# Patient Record
Sex: FEMALE | Race: WHITE | NOT HISPANIC OR LATINO
[De-identification: names, ages, dates, MRNs, and addresses within clinical notes are randomized per-mention and may not be internally consistent; named-entity substitution may affect disease eponyms.]

---

## 2017-02-23 ENCOUNTER — APPOINTMENT (OUTPATIENT)
Dept: SURGERY | Facility: CLINIC | Age: 72
End: 2017-02-23

## 2017-02-23 VITALS
HEART RATE: 84 BPM | SYSTOLIC BLOOD PRESSURE: 100 MMHG | TEMPERATURE: 98.3 F | OXYGEN SATURATION: 97 % | RESPIRATION RATE: 16 BRPM | DIASTOLIC BLOOD PRESSURE: 63 MMHG

## 2017-02-23 DIAGNOSIS — S40.022S CONTUSION OF LEFT UPPER ARM, SEQUELA: ICD-10-CM

## 2017-02-23 DIAGNOSIS — R92.0 MAMMOGRAPHIC MICROCALCIFICATION FOUND ON DIAGNOSTIC IMAGING OF BREAST: ICD-10-CM

## 2017-02-23 DIAGNOSIS — K21.9 GASTRO-ESOPHAGEAL REFLUX DISEASE W/OUT ESOPHAGITIS: ICD-10-CM

## 2017-02-23 DIAGNOSIS — I89.0 LYMPHEDEMA, NOT ELSEWHERE CLASSIFIED: ICD-10-CM

## 2017-02-23 DIAGNOSIS — F17.200 NICOTINE DEPENDENCE, UNSPECIFIED, UNCOMPLICATED: ICD-10-CM

## 2017-02-23 DIAGNOSIS — Z87.19 PERSONAL HISTORY OF OTHER DISEASES OF THE DIGESTIVE SYSTEM: ICD-10-CM

## 2017-02-23 RX ORDER — VENLAFAXINE 37.5 MG/1
37.5 TABLET ORAL
Refills: 0 | Status: ACTIVE | COMMUNITY

## 2017-02-23 RX ORDER — LIDOCAINE 50 MG/G
5 PATCH CUTANEOUS
Refills: 0 | Status: ACTIVE | COMMUNITY

## 2017-02-23 RX ORDER — BISMUTH SUBSALICYLATE 525 MG/1
TABLET ORAL
Refills: 0 | Status: ACTIVE | COMMUNITY

## 2017-04-26 ENCOUNTER — APPOINTMENT (OUTPATIENT)
Dept: ORTHOPEDIC SURGERY | Facility: CLINIC | Age: 72
End: 2017-04-26

## 2017-04-26 VITALS
HEIGHT: 62.5 IN | BODY MASS INDEX: 15.79 KG/M2 | SYSTOLIC BLOOD PRESSURE: 141 MMHG | HEART RATE: 82 BPM | DIASTOLIC BLOOD PRESSURE: 80 MMHG | WEIGHT: 88 LBS

## 2017-04-26 DIAGNOSIS — M25.731 OSTEOPHYTE, RIGHT WRIST: ICD-10-CM

## 2017-04-26 DIAGNOSIS — R22.9 LOCALIZED SWELLING, MASS AND LUMP, UNSPECIFIED: ICD-10-CM

## 2018-11-20 ENCOUNTER — INPATIENT (INPATIENT)
Facility: HOSPITAL | Age: 73
LOS: 5 days | Discharge: HOME | DRG: 682 | End: 2018-11-26
Payer: MEDICARE

## 2018-11-20 VITALS
OXYGEN SATURATION: 99 % | SYSTOLIC BLOOD PRESSURE: 110 MMHG | DIASTOLIC BLOOD PRESSURE: 74 MMHG | TEMPERATURE: 97 F | HEIGHT: 62 IN | HEART RATE: 70 BPM | RESPIRATION RATE: 18 BRPM | WEIGHT: 89.07 LBS

## 2018-11-20 DIAGNOSIS — E87.2 ACIDOSIS: ICD-10-CM

## 2018-11-20 DIAGNOSIS — E83.39 OTHER DISORDERS OF PHOSPHORUS METABOLISM: ICD-10-CM

## 2018-11-20 DIAGNOSIS — K56.609 UNSPECIFIED INTESTINAL OBSTRUCTION, UNSPECIFIED AS TO PARTIAL VERSUS COMPLETE OBSTRUCTION: ICD-10-CM

## 2018-11-20 DIAGNOSIS — N17.9 ACUTE KIDNEY FAILURE, UNSPECIFIED: ICD-10-CM

## 2018-11-20 LAB
ALBUMIN SERPL ELPH-MCNC: 4 G/DL — SIGNIFICANT CHANGE UP (ref 3.3–5)
ALBUMIN SERPL ELPH-MCNC: 4.9 G/DL — SIGNIFICANT CHANGE UP (ref 3.3–5)
ALP SERPL-CCNC: 249 U/L — HIGH (ref 40–120)
ALP SERPL-CCNC: 334 U/L — HIGH (ref 40–120)
ALT FLD-CCNC: 51 U/L — HIGH (ref 10–45)
ALT FLD-CCNC: 64 U/L — HIGH (ref 10–45)
ANION GAP SERPL CALC-SCNC: 27 MMOL/L — HIGH (ref 5–17)
ANION GAP SERPL CALC-SCNC: 38 MMOL/L — HIGH (ref 5–17)
APPEARANCE UR: ABNORMAL
APTT BLD: 34.4 SEC — SIGNIFICANT CHANGE UP (ref 27.5–36.3)
AST SERPL-CCNC: 48 U/L — HIGH (ref 10–40)
AST SERPL-CCNC: 64 U/L — HIGH (ref 10–40)
BACTERIA # UR AUTO: NEGATIVE — SIGNIFICANT CHANGE UP
BASE EXCESS BLDV CALC-SCNC: 6.5 MMOL/L — HIGH (ref -2–2)
BASOPHILS # BLD AUTO: 0 K/UL — SIGNIFICANT CHANGE UP (ref 0–0.2)
BILIRUB SERPL-MCNC: 0.3 MG/DL — SIGNIFICANT CHANGE UP (ref 0.2–1.2)
BILIRUB SERPL-MCNC: 0.5 MG/DL — SIGNIFICANT CHANGE UP (ref 0.2–1.2)
BILIRUB UR-MCNC: ABNORMAL
BLD GP AB SCN SERPL QL: NEGATIVE — SIGNIFICANT CHANGE UP
BUN SERPL-MCNC: 101 MG/DL — HIGH (ref 7–23)
BUN SERPL-MCNC: 101 MG/DL — HIGH (ref 7–23)
CA-I SERPL-SCNC: 0.84 MMOL/L — LOW (ref 1.12–1.3)
CALCIUM SERPL-MCNC: 6.8 MG/DL — LOW (ref 8.4–10.5)
CALCIUM SERPL-MCNC: 8.5 MG/DL — SIGNIFICANT CHANGE UP (ref 8.4–10.5)
CHLORIDE BLDV-SCNC: 76 MMOL/L — LOW (ref 96–108)
CHLORIDE SERPL-SCNC: 73 MMOL/L — LOW (ref 96–108)
CHLORIDE SERPL-SCNC: 85 MMOL/L — LOW (ref 96–108)
CO2 BLDV-SCNC: 40 MMOL/L — HIGH (ref 22–30)
CO2 SERPL-SCNC: 28 MMOL/L — SIGNIFICANT CHANGE UP (ref 22–31)
CO2 SERPL-SCNC: 29 MMOL/L — SIGNIFICANT CHANGE UP (ref 22–31)
COLOR SPEC: YELLOW — SIGNIFICANT CHANGE UP
CREAT ?TM UR-MCNC: 137 MG/DL — SIGNIFICANT CHANGE UP
CREAT SERPL-MCNC: 7.51 MG/DL — HIGH (ref 0.5–1.3)
CREAT SERPL-MCNC: 7.66 MG/DL — HIGH (ref 0.5–1.3)
DIFF PNL FLD: ABNORMAL
EOSINOPHIL # BLD AUTO: 0 K/UL — SIGNIFICANT CHANGE UP (ref 0–0.5)
EPI CELLS # UR: 1 /HPF — SIGNIFICANT CHANGE UP
GAS PNL BLDA: SIGNIFICANT CHANGE UP
GAS PNL BLDV: 135 MMOL/L — LOW (ref 136–145)
GAS PNL BLDV: SIGNIFICANT CHANGE UP
GLUCOSE BLDV-MCNC: 132 MG/DL — HIGH (ref 70–99)
GLUCOSE SERPL-MCNC: 102 MG/DL — HIGH (ref 70–99)
GLUCOSE SERPL-MCNC: 139 MG/DL — HIGH (ref 70–99)
GLUCOSE UR QL: NEGATIVE — SIGNIFICANT CHANGE UP
HCO3 BLDV-SCNC: 38 MMOL/L — HIGH (ref 21–29)
HCT VFR BLD CALC: 47.4 % — HIGH (ref 34.5–45)
HCT VFR BLD CALC: 52.5 % — HIGH (ref 34.5–45)
HCT VFR BLDA CALC: 52 % — HIGH (ref 39–50)
HGB BLD CALC-MCNC: 17.1 G/DL — HIGH (ref 11.5–15.5)
HGB BLD-MCNC: 15.2 G/DL — SIGNIFICANT CHANGE UP (ref 11.5–15.5)
HGB BLD-MCNC: 17.4 G/DL — HIGH (ref 11.5–15.5)
HYALINE CASTS # UR AUTO: 20 /LPF — HIGH (ref 0–2)
INR BLD: 1.15 RATIO — SIGNIFICANT CHANGE UP (ref 0.88–1.16)
KETONES UR-MCNC: NEGATIVE — SIGNIFICANT CHANGE UP
LACTATE BLDV-MCNC: 6.1 MMOL/L — CRITICAL HIGH (ref 0.7–2)
LEUKOCYTE ESTERASE UR-ACNC: ABNORMAL
LIDOCAIN IGE QN: 19 U/L — SIGNIFICANT CHANGE UP (ref 7–60)
LYMPHOCYTES # BLD AUTO: 0.5 K/UL — LOW (ref 1–3.3)
LYMPHOCYTES # BLD AUTO: 2 % — LOW (ref 13–44)
MAGNESIUM SERPL-MCNC: 1.9 MG/DL — SIGNIFICANT CHANGE UP (ref 1.6–2.6)
MAGNESIUM SERPL-MCNC: 2.3 MG/DL — SIGNIFICANT CHANGE UP (ref 1.6–2.6)
MCHC RBC-ENTMCNC: 31.5 PG — SIGNIFICANT CHANGE UP (ref 27–34)
MCHC RBC-ENTMCNC: 32.1 GM/DL — SIGNIFICANT CHANGE UP (ref 32–36)
MCHC RBC-ENTMCNC: 32.2 PG — SIGNIFICANT CHANGE UP (ref 27–34)
MCHC RBC-ENTMCNC: 33.1 GM/DL — SIGNIFICANT CHANGE UP (ref 32–36)
MCV RBC AUTO: 97.2 FL — SIGNIFICANT CHANGE UP (ref 80–100)
MCV RBC AUTO: 98.2 FL — SIGNIFICANT CHANGE UP (ref 80–100)
METAMYELOCYTES # FLD: 1 % — HIGH (ref 0–0)
MONOCYTES # BLD AUTO: 1.2 K/UL — HIGH (ref 0–0.9)
MONOCYTES NFR BLD AUTO: 10 % — SIGNIFICANT CHANGE UP (ref 2–14)
MYELOCYTES NFR BLD: 1 % — HIGH (ref 0–0)
NEUTROPHILS # BLD AUTO: 9.7 K/UL — HIGH (ref 1.8–7.4)
NEUTROPHILS NFR BLD AUTO: 85 % — HIGH (ref 43–77)
NEUTS BAND # BLD: 1 % — SIGNIFICANT CHANGE UP (ref 0–8)
NITRITE UR-MCNC: NEGATIVE — SIGNIFICANT CHANGE UP
OSMOLALITY UR: 363 MOS/KG — SIGNIFICANT CHANGE UP (ref 300–900)
PCO2 BLDV: 84 MMHG — HIGH (ref 35–50)
PH BLDV: 7.27 — LOW (ref 7.35–7.45)
PH UR: 5.5 — SIGNIFICANT CHANGE UP (ref 5–8)
PHOSPHATE SERPL-MCNC: 13.6 MG/DL — HIGH (ref 2.5–4.5)
PHOSPHATE SERPL-MCNC: 15.1 MG/DL — HIGH (ref 2.5–4.5)
PLAT MORPH BLD: NORMAL — SIGNIFICANT CHANGE UP
PLATELET # BLD AUTO: 290 K/UL — SIGNIFICANT CHANGE UP (ref 150–400)
PLATELET # BLD AUTO: 350 K/UL — SIGNIFICANT CHANGE UP (ref 150–400)
PO2 BLDV: <50 MMHG — LOW (ref 25–45)
POTASSIUM BLDV-SCNC: 3.6 MMOL/L — SIGNIFICANT CHANGE UP (ref 3.5–5.3)
POTASSIUM SERPL-MCNC: 3.9 MMOL/L — SIGNIFICANT CHANGE UP (ref 3.5–5.3)
POTASSIUM SERPL-MCNC: 4.7 MMOL/L — SIGNIFICANT CHANGE UP (ref 3.5–5.3)
POTASSIUM SERPL-SCNC: 3.9 MMOL/L — SIGNIFICANT CHANGE UP (ref 3.5–5.3)
POTASSIUM SERPL-SCNC: 4.7 MMOL/L — SIGNIFICANT CHANGE UP (ref 3.5–5.3)
PROT SERPL-MCNC: 6.9 G/DL — SIGNIFICANT CHANGE UP (ref 6–8.3)
PROT SERPL-MCNC: 8.6 G/DL — HIGH (ref 6–8.3)
PROT UR-MCNC: ABNORMAL
PROTHROM AB SERPL-ACNC: 13.3 SEC — HIGH (ref 10–12.9)
RBC # BLD: 4.83 M/UL — SIGNIFICANT CHANGE UP (ref 3.8–5.2)
RBC # BLD: 5.4 M/UL — HIGH (ref 3.8–5.2)
RBC # FLD: 12.9 % — SIGNIFICANT CHANGE UP (ref 10.3–14.5)
RBC # FLD: 13 % — SIGNIFICANT CHANGE UP (ref 10.3–14.5)
RBC BLD AUTO: SIGNIFICANT CHANGE UP
RBC CASTS # UR COMP ASSIST: 18 /HPF — HIGH (ref 0–4)
RH IG SCN BLD-IMP: POSITIVE — SIGNIFICANT CHANGE UP
SAO2 % BLDV: 16 % — LOW (ref 67–88)
SODIUM SERPL-SCNC: 140 MMOL/L — SIGNIFICANT CHANGE UP (ref 135–145)
SODIUM SERPL-SCNC: 140 MMOL/L — SIGNIFICANT CHANGE UP (ref 135–145)
SODIUM UR-SCNC: 31 MMOL/L — SIGNIFICANT CHANGE UP
SP GR SPEC: 1.02 — SIGNIFICANT CHANGE UP (ref 1.01–1.02)
UROBILINOGEN FLD QL: NEGATIVE — SIGNIFICANT CHANGE UP
WBC # BLD: 11.5 K/UL — HIGH (ref 3.8–10.5)
WBC # BLD: 7 K/UL — SIGNIFICANT CHANGE UP (ref 3.8–10.5)
WBC # FLD AUTO: 11.5 K/UL — HIGH (ref 3.8–10.5)
WBC # FLD AUTO: 7 K/UL — SIGNIFICANT CHANGE UP (ref 3.8–10.5)
WBC UR QL: 12 /HPF — HIGH (ref 0–5)

## 2018-11-20 PROCEDURE — 74176 CT ABD & PELVIS W/O CONTRAST: CPT | Mod: 26

## 2018-11-20 PROCEDURE — 93010 ELECTROCARDIOGRAM REPORT: CPT

## 2018-11-20 PROCEDURE — 71045 X-RAY EXAM CHEST 1 VIEW: CPT | Mod: 26,76

## 2018-11-20 PROCEDURE — 71250 CT THORAX DX C-: CPT | Mod: 26

## 2018-11-20 PROCEDURE — 99291 CRITICAL CARE FIRST HOUR: CPT | Mod: GC

## 2018-11-20 RX ORDER — ERTAPENEM SODIUM 1 G/1
500 INJECTION, POWDER, LYOPHILIZED, FOR SOLUTION INTRAMUSCULAR; INTRAVENOUS ONCE
Qty: 0 | Refills: 0 | Status: COMPLETED | OUTPATIENT
Start: 2018-11-20 | End: 2018-11-20

## 2018-11-20 RX ORDER — IPRATROPIUM/ALBUTEROL SULFATE 18-103MCG
3 AEROSOL WITH ADAPTER (GRAM) INHALATION EVERY 6 HOURS
Qty: 0 | Refills: 0 | Status: DISCONTINUED | OUTPATIENT
Start: 2018-11-20 | End: 2018-11-26

## 2018-11-20 RX ORDER — MORPHINE SULFATE 50 MG/1
4 CAPSULE, EXTENDED RELEASE ORAL ONCE
Qty: 0 | Refills: 0 | Status: DISCONTINUED | OUTPATIENT
Start: 2018-11-20 | End: 2018-11-20

## 2018-11-20 RX ORDER — SODIUM CHLORIDE 9 MG/ML
1000 INJECTION INTRAMUSCULAR; INTRAVENOUS; SUBCUTANEOUS
Qty: 0 | Refills: 0 | Status: DISCONTINUED | OUTPATIENT
Start: 2018-11-20 | End: 2018-11-22

## 2018-11-20 RX ORDER — HEPARIN SODIUM 5000 [USP'U]/ML
5000 INJECTION INTRAVENOUS; SUBCUTANEOUS EVERY 8 HOURS
Qty: 0 | Refills: 0 | Status: DISCONTINUED | OUTPATIENT
Start: 2018-11-20 | End: 2018-11-23

## 2018-11-20 RX ORDER — SODIUM CHLORIDE 9 MG/ML
1000 INJECTION INTRAMUSCULAR; INTRAVENOUS; SUBCUTANEOUS ONCE
Qty: 0 | Refills: 0 | Status: DISCONTINUED | OUTPATIENT
Start: 2018-11-20 | End: 2018-11-20

## 2018-11-20 RX ORDER — SODIUM CHLORIDE 9 MG/ML
500 INJECTION INTRAMUSCULAR; INTRAVENOUS; SUBCUTANEOUS ONCE
Qty: 0 | Refills: 0 | Status: COMPLETED | OUTPATIENT
Start: 2018-11-20 | End: 2018-11-20

## 2018-11-20 RX ORDER — BUDESONIDE, MICRONIZED 100 %
0.5 POWDER (GRAM) MISCELLANEOUS EVERY 12 HOURS
Qty: 0 | Refills: 0 | Status: DISCONTINUED | OUTPATIENT
Start: 2018-11-20 | End: 2018-11-26

## 2018-11-20 RX ORDER — SODIUM CHLORIDE 9 MG/ML
1000 INJECTION INTRAMUSCULAR; INTRAVENOUS; SUBCUTANEOUS ONCE
Qty: 0 | Refills: 0 | Status: COMPLETED | OUTPATIENT
Start: 2018-11-20 | End: 2018-11-20

## 2018-11-20 RX ORDER — NICOTINE POLACRILEX 2 MG
1 GUM BUCCAL DAILY
Qty: 0 | Refills: 0 | Status: DISCONTINUED | OUTPATIENT
Start: 2018-11-20 | End: 2018-11-21

## 2018-11-20 RX ORDER — PANTOPRAZOLE SODIUM 20 MG/1
40 TABLET, DELAYED RELEASE ORAL DAILY
Qty: 0 | Refills: 0 | Status: DISCONTINUED | OUTPATIENT
Start: 2018-11-20 | End: 2018-11-22

## 2018-11-20 RX ORDER — CHLORHEXIDINE GLUCONATE 213 G/1000ML
1 SOLUTION TOPICAL
Qty: 0 | Refills: 0 | Status: DISCONTINUED | OUTPATIENT
Start: 2018-11-20 | End: 2018-11-26

## 2018-11-20 RX ORDER — CALCIUM GLUCONATE 100 MG/ML
2 VIAL (ML) INTRAVENOUS ONCE
Qty: 0 | Refills: 0 | Status: COMPLETED | OUTPATIENT
Start: 2018-11-20 | End: 2018-11-21

## 2018-11-20 RX ORDER — TETRACAINE/BENZOCAINE/BUTAMBEN 2%-14%-2%
1 OINTMENT (GRAM) TOPICAL ONCE
Qty: 0 | Refills: 0 | Status: COMPLETED | OUTPATIENT
Start: 2018-11-20 | End: 2018-11-20

## 2018-11-20 RX ORDER — TETRACAINE/BENZOCAINE/BUTAMBEN 2%-14%-2%
1 OINTMENT (GRAM) TOPICAL
Qty: 0 | Refills: 0 | Status: DISCONTINUED | OUTPATIENT
Start: 2018-11-20 | End: 2018-11-21

## 2018-11-20 RX ADMIN — Medication 1 SPRAY(S): at 19:34

## 2018-11-20 RX ADMIN — MORPHINE SULFATE 4 MILLIGRAM(S): 50 CAPSULE, EXTENDED RELEASE ORAL at 20:43

## 2018-11-20 RX ADMIN — SODIUM CHLORIDE 1000 MILLILITER(S): 9 INJECTION INTRAMUSCULAR; INTRAVENOUS; SUBCUTANEOUS at 19:56

## 2018-11-20 RX ADMIN — ERTAPENEM SODIUM 100 MILLIGRAM(S): 1 INJECTION, POWDER, LYOPHILIZED, FOR SOLUTION INTRAMUSCULAR; INTRAVENOUS at 18:29

## 2018-11-20 RX ADMIN — SODIUM CHLORIDE 3000 MILLILITER(S): 9 INJECTION INTRAMUSCULAR; INTRAVENOUS; SUBCUTANEOUS at 23:40

## 2018-11-20 RX ADMIN — MORPHINE SULFATE 4 MILLIGRAM(S): 50 CAPSULE, EXTENDED RELEASE ORAL at 18:30

## 2018-11-20 RX ADMIN — SODIUM CHLORIDE 1000 MILLILITER(S): 9 INJECTION INTRAMUSCULAR; INTRAVENOUS; SUBCUTANEOUS at 17:57

## 2018-11-20 RX ADMIN — MORPHINE SULFATE 4 MILLIGRAM(S): 50 CAPSULE, EXTENDED RELEASE ORAL at 20:07

## 2018-11-20 NOTE — ED ADULT NURSE REASSESSMENT NOTE - NS ED NURSE REASSESS COMMENT FT1
Pt. is a difficult stick; VBG unable to be obtained. Dr. Sandip Oden states that it can be done upstairs in SICU (transport ready).

## 2018-11-20 NOTE — H&P ADULT - ASSESSMENT
Assessment:  73F with multiple past surgeries and history of narcotic bowel, who presents with inability to tolerate PO and notable dehydration, as well as CT findings concerning for SBO.    Plan:  - Admit to [facility] team surgery under Dr. Livingston  - NGT placed in ED with 700cc immediate return  - NPO / IVF  - 2L bolus given in ED  - Repeat labs tonight  - Serial abdominal exams  - SICU consult     Discussed with Dr. Miki Dang, PGY-2  [facility] Surgery [phone]

## 2018-11-20 NOTE — H&P ADULT - NSHPLABSRESULTS_GEN_ALL_CORE
CBC (11-20 @ 15:31)                              17.4<H>                         11.5<H>  )----------------(  350        85.0<H>% Neutrophils, 2.0<L>% Lymphocytes, ANC: 9.7<H>                              52.5<H>    BMP (11-20 @ 15:31)             140     |  73<L>   |  101<H>		Ca++ --      Ca 8.5                ---------------------------------( 139<H>		Mg 2.3                3.9     |  29      |  7.66<H>			Ph 15.1<H>    LFTs (11-20 @ 15:31)      TPro 8.6<H> / Alb 4.9 / TBili 0.5 / DBili -- / AST 64<H> / ALT 64<H> / AlkPhos 334<H>          VBG (11-20 @ 15:31)     7.27<L> / 84<H> / <50<L> / 38<H> / 6.5<H> / 16<L>%     Lactate: 6.1<HH>        < from: CT Abdomen and Pelvis No Cont (11.20.18 @ 17:07) >    FINDINGS:  Evaluation of the solid organs and vasculature is limited without   intravenous and oral contrast.    LOWER CHEST: Groundglass opacities in the right lower lobe.    LIVER: Status post partial hepatectomy.  BILE DUCTS: Intrahepatic and extrahepatic biliary ductal dilatation,   unchanged.  GALLBLADDER: Postcholecystectomy.  SPLEEN: Within normal limits.  PANCREAS: Within normal limits.  ADRENALS: Within normal limits.  KIDNEYS/URETERS: Bilateral nonobstructing renal calculi, measuring up to   4 mm in the left kidney. No hydronephrosis.    BLADDER: Within normal limits.  REPRODUCTIVE ORGANS: Hysterectomy.    BOWEL: The stomach is distended. Multiple dilated loops of small bowel   with collapsed loops of ileum in the right lower quadrant consistent with   small bowel obstruction.   PERITONEUM: No ascites.  VESSELS:  Atherosclerotic calcifications.  RETROPERITONEUM: No lymphadenopathy.    ABDOMINAL WALL: Within normal limits.  BONES: Within normal limits.    IMPRESSION: Small bowel obstruction with transition point in the ileum   located in theright lower quadrant.    Groundglass opacities in the right lower lobe suggest pneumonia.    < end of copied text >

## 2018-11-20 NOTE — CONSULT NOTE ADULT - ASSESSMENT
73 year old female with   PMH chronic Back pain (on Narcotics), history of multiple SBO's, narcotic associated constipation/ileus, COPD, current everyday smoker   admitted with abdominal pain and distension, nausea and poor PO intake with associated worsening painful spasms.      c/c  cachexia  and   dehydrated with acute Renal Failure (SHAKIRA) with marked hyperphosphatemia, acidosis   SBO on ct  surg eval  iV hydration  SHAKIRA/ renal  eval  arevalo  to be placed  (known to Dr Livingston)  ct with   sbo  and   RLL pna/  on   ab   ct  chest, pending    from: CT Abdomen and Pelvis No Cont (11.20.18 @ 17:07) >    IMPRESSION: Small bowel obstruction with transition point in the ileum   located in theright lower quadrant.  Groundglass opacities in the right lower lobe suggest pneumonia.  Findings discussed with Dr. Diaz on 11/20/2018 at 5:30 PM with read   back.    < end of copied text >

## 2018-11-20 NOTE — ED PROVIDER NOTE - MEDICAL DECISION MAKING DETAILS
73 F, cachectic, with vomiting and inability to tolerate PO x 3 days. Appears dry. Labs, hydrate, Ct abd, admit

## 2018-11-20 NOTE — CONSULT NOTE ADULT - ASSESSMENT
pt is well known to me who was admitted sec to SBO,Acute renal failure with sob and possible pneumoniae.  gentle hydration  npo  surgery consult  pt is a high risk candidate for surgery sec to severe malnutrition  tsh  arevalo check for uo  awaiting ecg  if pt needs surgery will consider echo  dvt prophylaxis  doppler of le r/o dvt pt is well known to me who was admitted sec to SBO,Acute renal failure with sob and possible pneumoniae.  gentle hydration  npo  surgery consult  pt is a high risk candidate for surgery sec to severe malnutrition  tsh/ lactic level  arevalo check for uo  awaiting ecg  if pt needs surgery will consider echo  dvt prophylaxis  doppler of le r/o dvt

## 2018-11-20 NOTE — H&P ADULT - ATTENDING COMMENTS
I have seen and evaluated patient, and I have reviewed the chart, data, and CT films. I have discussed case with surgery and SICU teams. Patient clinically improved this morning with soft mildly distended nontender abdomen. Patient continues with high NG tube output and elevated creatinine. Nephrology consulted. Continue as per SICU. Once labs improve I recommended Narcan via the NG tube. Patient has narcotic bowel and often responds to Narcan. Her CT scans often mimic SBO when she has narcotic-based ileus.

## 2018-11-20 NOTE — H&P ADULT - HISTORY OF PRESENT ILLNESS
72 y/o F pt c/o abdominal pain, has known chronic back pain. Also notes vomiting x12 hours - spitting up white saliva, no drooling. Pt is taking pain medication (chronic for back pain), last took 1 hour ago, oxycodone 10mg. PMD sent pt into the ED because she's dehydrated. She is known to our practice with multiple prior admissions for SBO, narcotic associated ileus/constipation, but has not required hospitalization for this in several years.  She states she moves bowels more regularly - last regular BM reported yesterday, aide at bedside reports smear brown BM today    Patient states she lives alone, last ate 3 days go. drank a cup of tea yesterday  reports generalized spasms - hands/abdomen, face/mouth and also in throat which are extremely painful.  Hand spasms cause sever finger cramps and cannot hold anything - this began 1-2 days ago, increased in intensity today    no rectal bleeding. Increased abdominal distension from baseline +crampy abdominal pain  no fever or chills  known COPD - current everyday smoker  admits to weight loss but cannot quantify

## 2018-11-20 NOTE — CONSULT NOTE ADULT - PROBLEM SELECTOR RECOMMENDATION 9
Etiology?  Likely Pre-renal, clinically dehydrated  Persistent Pre-renal state leading to ATN can not be ruled out at present  FENa >1  CT abd has no hydronephrosis  Given NS bolus in the ER  Start NS 75cc/hr  Monitor I/O  Check CK level  Monitor for fluid overload  No urgent indication for HD at present  Avoid Nephrotoxic meds
96.8

## 2018-11-20 NOTE — CONSULT NOTE ADULT - SUBJECTIVE AND OBJECTIVE BOX
Patient is a 73y old  Female who presents with a chief complaint of nausea, abdominal pain    HPI:   72 y/o F pt c/o abdominal pain, has known chronic back pain. Also notes vomiting x12 hours - spitting up white saliva, no drooling. Pt is taking pain medication (chronic for back pain), last took 1 hour ago, oxycodone 10mg. PMD sent pt into the ED because she's dehydrated. She is known to our practice with multiple prior admissions for SBO, narcotic associated ileus/constipation, but has not required hospitalization for this in several years.  She states she moves bowels more regularly - last regular BM reported yesterday, aide at bedside reports smear brown BM today    Patient states she lives alone, last ate 3 days go. drank a cup of tea yesterday  reports generalized spasms - hands/abdomen, face/mouth and also in throat which are extremely painful.  Hand spasms cause sever finger cramps and cannot hold anything - this began 1-2 days ago, increased in intensity today    no rectal bleeding. Increased abdominal distension from baseline +crampy abdominal pain  no fever or chills  known COPD - current everyday smoker  admits to weight loss but cannot quantify    PAST MEDICAL & SURGICAL HISTORY:  Back pain  Bowel obstruction: multiple hospitalizations  Kidney stone  Emphysema  Narcotic Dependence  S/P Radiation Therapy  S/P Chemotherapy, Time Since Greater than 12 Weeks  Narcotic Dysmotile Cilia Syndrome  Chronic Pain Following Surgery or Procedure: following right breast mastectomy  Ankle Fracture: right anle fx s/p cast 2009  History of Small Bowel Obstruction: 1/2010  Asthma  Breast Cancer  S/P hernia surgery: femoral hernia - 2012  S/P Exploratory Laparotomy  S/P Appendectomy  S/P Cholecystectomy  Liver Mass s/p liver rsxn: s/p resection 2009  History of Hysterectomy: 1998  S/P Right Mastectomy: 2006      Allergies  Biaxin (Rash)  Cipro (Headache)  Flagyl (Headache)  penicillin (Rash; Swelling)  sulfa drugs (Unknown)        MEDICATIONS  (STANDING):  · 	pantoprazole 40 mg oral delayed release tablet: tab(s) orally 2 times a day  · 	sucralfate 1 g oral tablet: 1 tab(s) orally 4 times a day  · 	simethicone 80 mg oral tablet, chewable: 1 tab(s) orally 3 times a day  · 	senna oral tablet: 2 tab(s) orally once a day (at bedtime)  · 	lidocaine 5% topical film: Apply topically to affected area once a day  · 	nicotine 14 mg/24 hr transdermal film, extended release: 1 patch transdermal once a day  · 	Centrum Antioxidant Multiple Vitamins and Minerals oral tablet: 1 tab(s) orally once a day  · 	Valium: 2.5 milligram(s) orally once a day (at bedtime), As Needed  · 	oxyCODONE 10 mg oral tablet: 1 tab(s) orally every 6 hours  · 	MiraLax - oral powder for reconstitution: 17 gram(s) orally 2 times a day  MEDICATIONS  (PRN):      Social History:  single, lives alone   current everyday smoker, no ETOH reported      Family History   IBD (  ) Yes   ( X ) No  GI Malignancy (  )  Yes    ( X ) No        Advanced Directives: ( X    ) None    (      ) DNR    (     ) DNI    (     ) Health Care Proxy:     Review of Systems:    General:  No, fevers, chills, night sweats, +weight loss  CV:  No pain, palpitations, hypo/hypertension  Resp:  +COPD 9not on home oxygen) +current tobacco use  GI:  see HPI  :  No pain, bleeding, incontinence, nocturia  Muscle:  see HPI  Neuro:  No weakness, tingling, memory problems  Psych:  No fatigue, insomnia, mood problems, depression  Endocrine:  No polyuria, polydypsia, cold/heat intolerance  Heme:  No petechiae, ecchymosis, easy bruisability  Skin:  No rash, tattoos, scars, edema      Vital Signs Last 24 Hrs  T(C): 36.1 (20 Nov 2018 14:36), Max: 36.1 (20 Nov 2018 14:36)  T(F): 97 (20 Nov 2018 14:36), Max: 97 (20 Nov 2018 14:36)  HR: 70 (20 Nov 2018 14:36) (70 - 70)  BP: 110/74 (20 Nov 2018 14:36) (110/74 - 110/74)  BP(mean): --  RR: 18 (20 Nov 2018 14:36) (18 - 18)  SpO2: 99% (20 Nov 2018 14:36) (99% - 99%)    PHYSICAL EXAM:    Constitutional: chronically ill appearing frail emaciated appearing female +temporal wasting +muscle wasting  Neck: No LAD, no JVD  Respiratory: distant BS b/l  Cardiovascular: S1 and S2, RRR  Gastrointestinal: protuberant abdomen, softly distended +muscle wasting +prominent abdominal vasculature +tympanitic +generalized tenderness to palpation. no rebound or rigidity +multiple surgical scars +incisional hernias, reducible  Extremities: +clubbing +dusky mottled appearance to feet +pulses  Vascular: palpable  Neurological: A/O x 3, no focal deficits  Psychiatric: Normal mood, normal affect  Skin: No rashes +decreased turgor        LABS:                        17.4   11.5  )-----------( 350      ( 20 Nov 2018 15:31 )             52.5     Blood Gas Venous - Lactate (11.20.18 @ 15:31)    Blood Gas Venous - Lactate: 6.1 mmoL/L  Blood Gas Profile - Venous (11.20.18 @ 15:31)    pH, Venous: 7.27    11-20    140  |  73<L>  |  101<H>  ----------------------------<  139<H>  3.9   |  29  |  7.66<H>    Ca    8.5      20 Nov 2018 15:31  Phos  15.1     11-20  Mg     2.3     11-20    TPro  8.6<H>  /  Alb  4.9  /  TBili  0.5  /  DBili  x   /  AST  64<H>  /  ALT  64<H>  /  AlkPhos  334<H>  11-20      RADIOLOGY & ADDITIONAL TESTS:

## 2018-11-20 NOTE — CONSULT NOTE ADULT - SUBJECTIVE AND OBJECTIVE BOX
PI:   74 y/o F pt c/o abdominal pain,   also has  chronic   back pain    vomiting x12 hours - spitting up white saliva, no drooling. Pt is takes  c/c   pain medication (chronic for back pain), last took 1 hour ago, oxycodone 10mg.   PMD sent pt into the ED because she's dehydrated. S   multiple prior admissions for SBO, narcotic associated ileus/constipation,     She states she moves bowels more regularly - last regular BM reported yesterday, aide at bedside reports  BM today    Patient states she lives alone, last ate 3 days go. drank a cup of tea yesterday  reports generalized spasms - hands/    Hand spasms cause sever finger cramps and cannot hold anything - this began 1-2 days ago, increased in intensity today    no rectal bleeding. Increased abdominal distension from baseline +crampy abdominal pain  no fever or chills  known COPD - current everyday smoker  c/c  cachexia  ca  breast/  right  mastectomy    PAST MEDICAL & SURGICAL HISTORY:  Back pain  Bowel obstruction: multiple hospitalizations  Kidney stone  Emphysema  Narcotic Dependence  S/P Radiation Therapy  S/P Chemotherapy, Time Since Greater than 12 Weeks       REVIEW OF SYSTEMS:  GEN: no fever,    no chills  RESP: no SOB,   no cough  CVS: no chest pain,   no palpitations  GI:  abdominal pain,    nausea,   / vomiting,   no constipation,   no diarrhea  : no dysuria,   no frequency  NEURO: no headache,   no dizziness  PSYCH: no depression,   not anxious  Derm : no rash    Allergies    Biaxin (Rash)  Cipro (Headache)  Flagyl (Headache)  penicillin (Rash; Swelling)  sulfa drugs (Unknown)    Intolerances        CAPILLARY BLOOD GLUCOSE        I&O's Summary      Vital Signs Last 24 Hrs  T(C): 36.1 (20 Nov 2018 14:36), Max: 36.1 (20 Nov 2018 14:36)  T(F): 97 (20 Nov 2018 14:36), Max: 97 (20 Nov 2018 14:36)  HR: 70 (20 Nov 2018 14:36) (70 - 70)  BP: 110/74 (20 Nov 2018 14:36) (110/74 - 110/74)  BP(mean): --  RR: 18 (20 Nov 2018 14:36) (18 - 18)  SpO2: 99% (20 Nov 2018 14:36) (99% - 99%)  PHYSICAL EXAM:  GENERAL: NAD,   cachexia  HEAD:  Atraumatic, Normocephalic  EYES: EOMI,  NECK: Supple, No JVD  CHEST/LUNG: Clear to auscultation bilaterally; No wheeze  HEART: Regular rate and rhythm;        murmur  ABDOMEN: Soft, Nontender, ,  distended  EXTREMITIES:     no    edema  NEUROLOGY:  alert    MEDICATIONS  (STANDING):  tetracaine/benzocaine/butamben Spray 1 Spray(s) Topical Once    MEDICATIONS  (PRN):    LABS:                        17.4   11.5  )-----------( 350      ( 20 Nov 2018 15:31 )             52.5     11-20    140  |  73<L>  |  101<H>  ----------------------------<  139<H>  3.9   |  29  |  7.66<H>    Ca    8.5      20 Nov 2018 15:31  Phos  15.1     11-20  Mg     2.3     11-20    TPro  8.6<H>  /  Alb  4.9  /  TBili  0.5  /  DBili  x   /  AST  64<H>  /  ALT  64<H>  /  AlkPhos  334<H>  11-20 11-20 @ 15:31  3.6  <50              Consultant(s) Notes Reviewed:      Care Discussed with Consultants/Other Providers:  Plan: PI:   72 y/o F pt c/o abdominal pain,     also has  chronic   back pain , on oxycodone daily  for years  by pain md   vomiting x12 hours - spitting up white saliva, no drooling. Pt is takes  c/c   pain medication (chronic for back pain), last took 1 hour ago, oxycodone 10mg.   PMD sent pt into the ED because she's dehydrated.    multiple prior admissions for SBO, narcotic associated ileus/constipation,     She states she moves bowels more regularly - last regular BM reported yesterday, aide at bedside reports  BM today    Patient states she lives alone, last ate 3 days go. drank a cup of tea yesterday  reports generalized spasms - hands/    Hand spasms cause sever finger cramps and cannot hold anything - this began 1-2 days ago, increased in intensity today    no rectal bleeding. Increased abdominal distension from baseline +crampy abdominal pain  no fever or chills  known COPD - current everyday smoker  c/c  cachexia  ca  breast/  right  mastectomy    PAST MEDICAL & SURGICAL HISTORY:  Back pain  Bowel obstruction: multiple hospitalizations  Kidney stone  Emphysema  Narcotic Dependence  S/P Radiation Therapy  S/P Chemotherapy, Time Since Greater than 12 Weeks       REVIEW OF SYSTEMS:  GEN: no fever,    no chills  RESP: no SOB,   no cough  CVS: no chest pain,   no palpitations  GI:  abdominal pain,    nausea,   / vomiting,   no constipation,   no diarrhea  : no dysuria,   no frequency  NEURO: no headache,   no dizziness  PSYCH: no depression,   not anxious  Derm : no rash    Allergies    Biaxin (Rash)  Cipro (Headache)  Flagyl (Headache)  penicillin (Rash; Swelling)  sulfa drugs (Unknown)    Intolerances        CAPILLARY BLOOD GLUCOSE        I&O's Summary      Vital Signs Last 24 Hrs  T(C): 36.1 (20 Nov 2018 14:36), Max: 36.1 (20 Nov 2018 14:36)  T(F): 97 (20 Nov 2018 14:36), Max: 97 (20 Nov 2018 14:36)  HR: 70 (20 Nov 2018 14:36) (70 - 70)  BP: 110/74 (20 Nov 2018 14:36) (110/74 - 110/74)  BP(mean): --  RR: 18 (20 Nov 2018 14:36) (18 - 18)  SpO2: 99% (20 Nov 2018 14:36) (99% - 99%)  PHYSICAL EXAM:  GENERAL: NAD,   cachexia  HEAD:  Atraumatic, Normocephalic  EYES: EOMI,  NECK: Supple, No JVD  CHEST/LUNG: Clear to auscultation bilaterally; No wheeze  HEART: Regular rate and rhythm;        murmur  ABDOMEN: Soft, Nontender, ,  distended  EXTREMITIES:     no    edema  NEUROLOGY:  alert  left    fingers  with  purplish  hue/  p  states this  is  chronic  diminished  radial  pulse/  very  dehydrated    MEDICATIONS  (STANDING):  tetracaine/benzocaine/butamben Spray 1 Spray(s) Topical Once    MEDICATIONS  (PRN):    LABS:                        17.4   11.5  )-----------( 350      ( 20 Nov 2018 15:31 )             52.5     11-20    140  |  73<L>  |  101<H>  ----------------------------<  139<H>  3.9   |  29  |  7.66<H>    Ca    8.5      20 Nov 2018 15:31  Phos  15.1     11-20  Mg     2.3     11-20    TPro  8.6<H>  /  Alb  4.9  /  TBili  0.5  /  DBili  x   /  AST  64<H>  /  ALT  64<H>  /  AlkPhos  334<H>  11-20 11-20 @ 15:31  3.6  <50              Consultant(s) Notes Reviewed:      Care Discussed with Consultants/Other Providers:  Plan:

## 2018-11-20 NOTE — ED ADULT NURSE NOTE - PSH
History of Hysterectomy  1998  Liver Mass s/p liver rsxn  s/p resection 2009  S/P Appendectomy    S/P Cholecystectomy    S/P Exploratory Laparotomy    S/P hernia surgery  femoral hernia - 2012  S/P Right Mastectomy  2006

## 2018-11-20 NOTE — ED STATDOCS - OBJECTIVE STATEMENT
74 y/o F pt c/o body pain. Also notes vomiting x12 hours. Pt is taking pain medication, last took 1 hour ago, oxycodone 10mg. PMD sent pt into the ED because she's dehydrated. Had blood work completed last week with her PMD. Pt is accompanied by her aide who took care of her a year ago and hasn't seen her since. Today was the first day she saw her. Notes difficulty swallowing and SOB today. Feels as if it gets stuck in her throat.

## 2018-11-20 NOTE — CONSULT NOTE ADULT - ASSESSMENT
73 female with history of multiple    PLAN:  ***  Neurologic:   Respiratory:   Cardiovascular:   Gastrointestinal/Nutrition:   Renal/Genitourinary:   Hematologic:   Infectious Disease:   Tubes/Lines/Drains:   Endocrine:   Disposition:     --------------------------------------------------------------------------------------    Critical Care Diagnoses: 73 female with complicated surgical history and history of multiple episodes of small bowel obstruction presented with SBO demonstrated on CT scan. Patient is also found to have severe dehydration on the lab and admitted to SICU for monitoring and fluid resuscitation     PLAN:   Neurologic:   No standing pain medication, PRN Tylenol after examination     Respiratory:   Large cavitary lesion on CT scan, patient has extensive smoking history  Send for quantiferon gold for TB though patient doesn't have documented history of risk for TB  Breathing well on room air     Cardiovascular:   Lactate normalized after fluid resuscitation (6.1 > 1.9), hemodynamically stable   no rate/rhythm controlling agent required     Gastrointestinal/Nutrition:   Serial abdominal exam   NPO with IVF  NGT for gastric decompression     Renal/Genitourinary:   Chavarria placed to monitor for   Hematologic:     Infectious Disease:     Tubes/Lines/Drains:     Endocrine:     Disposition:     --------------------------------------------------------------------------------------    Critical Care Diagnoses: 73 female with complicated surgical history and history of multiple episodes of small bowel obstruction presented with SBO demonstrated on CT scan. Patient is also found to have severe dehydration on the lab and admitted to SICU for monitoring and fluid resuscitation     PLAN:   Neurologic:   No standing pain medication, PRN Tylenol after examination     Respiratory:   Large cavitary lesion on CT scan, patient has extensive smoking history  Send for quantiferon gold for TB though patient doesn't have documented history of risk for TB  Breathing well on room air     Cardiovascular:   Lactate normalized after fluid resuscitation (6.1 > 1.9), hemodynamically stable   no rate/rhythm controlling agent required     Gastrointestinal/Nutrition:   Serial abdominal exam   NPO with IVF  NGT for gastric decompression     Renal/Genitourinary:   Severe SHAKIRA likely secondary to pre-renal dehydration   Patient received 2L NaCl boluses in the ER, will give another 500 cc bolus   Will keep patient on 125 cc/h NaCl  Chavarria placed to monitor for resuscitation     Hematologic:   SQH for DVT ppx, no other issue     Infectious Disease:   No acute issue     Tubes/Lines/Drains:   PIV, NGT and Chavarria     Endocrine:   No history of endocrine disease   No acute issue     Disposition:  SICU

## 2018-11-20 NOTE — CONSULT NOTE ADULT - SUBJECTIVE AND OBJECTIVE BOX
CHIEF COMPLAINT:Patient is a 73y old  Female who presents with a chief complaint of dysphagia/sob.    HPI:  3 F chronic back pain, opiod dependence, multiple obstructions in the past, p/w inability to tolerate PO. Has not eaten in three days. Also noted decreased BM- normally 6-7 BM per day, now only one. Decreased flatus. no fever. multiple episodes of emesis with attempting PO. Also with cramping in hands. Diffuse abd pain.  while in er pt c/o sob ,dysphagia and feeling weak. no chest pain.    PAST MEDICAL & SURGICAL HISTORY:  Back pain  Bowel obstruction: multiple hospitalizations  Kidney stone  Emphysema  Narcotic Dependence  S/P Radiation Therapy  S/P Chemotherapy, Time Since Greater than 12 Weeks  Narcotic Dysmotile Cilia Syndrome  Chronic Pain Following Surgery or Procedure: following right breast mastectomy  Ankle Fracture: right anle fx s/p cast 2009  History of Small Bowel Obstruction: 1/2010  Asthma  Breast Cancer  S/P hernia surgery: femoral hernia - 2012  S/P Exploratory Laparotomy  S/P Appendectomy  S/P Cholecystectomy  Liver Mass s/p liver rsxn: s/p resection 2009  History of Hysterectomy: 1998  S/P Right Mastectomy: 2006      MEDICATIONS  (STANDING):  tetracaine/benzocaine/butamben Spray 1 Spray(s) Topical Once    MEDICATIONS  (PRN):      FAMILY HISTORY:  No pertinent family history in first degree relatives      SOCIAL HISTORY:    [ ] Non-smoker  [ ] Smoker  [ ] Alcohol    Allergies    Biaxin (Rash)  Cipro (Headache)  Flagyl (Headache)  penicillin (Rash; Swelling)  sulfa drugs (Unknown)    Intolerances    	    REVIEW OF SYSTEMS:  CONSTITUTIONAL: No fever, weight loss, or fatigue  EYES: No eye pain, visual disturbances, or discharge  ENT:  No difficulty hearing, tinnitus, vertigo; No sinus or throat pain  NECK: No pain or stiffness  RESPIRATORY: No cough, wheezing, chills or hemoptysis; + Shortness of Breath  CARDIOVASCULAR: No chest pain, palpitations, passing out, dizziness, or leg swelling  GASTROINTESTINAL: +abdominal or epigastric pain. No nausea, vomiting, or hematemesis; No diarrhea or constipation. No melena or hematochezia.  GENITOURINARY: No dysuria, frequency, hematuria, or incontinence  NEUROLOGICAL: No headaches, memory loss, loss of strength, numbness, or tremors  SKIN: No itching, burning, rashes, or lesions   LYMPH Nodes: No enlarged glands  ENDOCRINE: No heat or cold intolerance; No hair loss  MUSCULOSKELETAL: No joint pain or swelling; No muscle, back, or extremity pain  PSYCHIATRIC: No depression, anxiety, mood swings, or difficulty sleeping  HEME/LYMPH: No easy bruising, or bleeding gums  ALLERGY AND IMMUNOLOGIC: No hives or eczema	    [ ] All others negative	  [ ] Unable to obtain    PHYSICAL EXAM:  T(C): 36.1 (11-20-18 @ 14:36), Max: 36.1 (11-20-18 @ 14:36)  HR: 70 (11-20-18 @ 14:36) (70 - 70)  BP: 110/74 (11-20-18 @ 14:36) (110/74 - 110/74)  RR: 18 (11-20-18 @ 14:36) (18 - 18)  SpO2: 99% (11-20-18 @ 14:36) (99% - 99%)  Wt(kg): --  I&O's Summary      Appearance: cachectic  HEENT:   Normal oral mucosa, PERRL, EOMI	  Lymphatic: No lymphadenopathy  Cardiovascular: Normal S1 S2, No JVD, + murmurs, No edema  Respiratory: decrease bs	  Psychiatry: A & O x 3, Mood & affect appropriate  Gastrointestinal:  Soft, Non-tender, + BS	  Skin: No rashes, No ecchymoses, No cyanosis	  Neurologic: Non-focal  Extremities: Normal range of motion, No clubbing, cyanosis or edema  Vascular: Peripheral pulses palpable 2+ bilaterally    TELEMETRY: 	    ECG:  	  RADIOLOGY:  OTHER: 	  	  LABS:	 	    CARDIAC MARKERS:    < from: CT Abdomen and Pelvis No Cont (11.20.18 @ 17:07) >  Small bowel obstruction with transition point in the ileum   located in theright lower quadrant.    Groundglass opacities in the right lower lobe suggest pneumonia.    < end of copied text >                            17.4   11.5  )-----------( 350      ( 20 Nov 2018 15:31 )             52.5     11-20    140  |  73<L>  |  101<H>  ----------------------------<  139<H>  3.9   |  29  |  7.66<H>    Ca    8.5      20 Nov 2018 15:31  Phos  15.1     11-20  Mg     2.3     11-20    TPro  8.6<H>  /  Alb  4.9  /  TBili  0.5  /  DBili  x   /  AST  64<H>  /  ALT  64<H>  /  AlkPhos  334<H>  11-20    proBNP:   Lipid Profile:   HgA1c:   TSH:       PREVIOUS DIAGNOSTIC TESTING:    < from: Xray Chest 1 View- PORTABLE-Urgent (11.20.18 @ 16:29) >  INTERPRETATION:  No emergent or urgent findings.    Follow up official report.    < end of copied text >

## 2018-11-20 NOTE — ED PROVIDER NOTE - ATTENDING CONTRIBUTION TO CARE
Patient presenting reporting dehydration and difficulty swallowing x4 days.  Tolerating spit but saying she cannot drink water.  Reporting decreased stool output.  Nausea but no vomiting.  History of prior SBO which she reports this does not feel like this.  Sent by PMD.    On exam patient vital signs within normal limits, cachectic, DMM, RRR, lungs clear, abdomen distended.    Patient with dehydration, ? SBO or abdominal pathology, found to be acidotic and in acute renal failure, nephrology consulted, arevalo placed for UOP monitoring, will start intravenous fluids therapy, CT A/P ordered to r/o obstructive pathology, MICU consulted

## 2018-11-20 NOTE — ED ADULT NURSE REASSESSMENT NOTE - NS ED NURSE REASSESS COMMENT FT1
NGT to low wall suction placed by Dr. Gill. Arevalo cathether inserted for strict I/O monitoring, patient's creatinine is 7. 16 Turkish arevalo inserted using aseptic technique with YURIY Riggins at bedside. Tea colored urine obtained and patient tolerated well.

## 2018-11-20 NOTE — ED PROVIDER NOTE - OBJECTIVE STATEMENT
73 F chronic back pain, opiod dependence, multiple obstructions in the past, p/w inability to tolerate PO. Has not eaten in three days. Also noted decreased BM- normally 6-7 BM per day, now only one. Decreased flatus. no fever. multiple episodes of emesis with attempting PO. Also with cramping in hands. Diffuse abd pain.

## 2018-11-20 NOTE — ED ADULT NURSE NOTE - NSIMPLEMENTINTERV_GEN_ALL_ED
Implemented All Fall Risk Interventions:  [place] to call system. Call bell, personal items and telephone within reach. Instruct patient to call for assistance. Room bathroom lighting operational. Non-slip footwear when patient is off stretcher. Physically safe environment: no spills, clutter or unnecessary equipment. Stretcher in lowest position, wheels locked, appropriate side rails in place. Provide visual cue, wrist band, yellow gown, etc. Monitor gait and stability. Monitor for mental status changes and reorient to person, place, and time. Review medications for side effects contributing to fall risk. Reinforce activity limits and safety measures with patient and family.

## 2018-11-20 NOTE — ED PROVIDER NOTE - CARE PLAN
Principal Discharge DX:	Bowel obstruction  Secondary Diagnosis:	SHAKIRA (acute kidney injury)  Secondary Diagnosis:	Hyperphosphatemia  Secondary Diagnosis:	Lactic acidosis  Secondary Diagnosis:	Cavitary lesion of lung

## 2018-11-20 NOTE — ED ADULT NURSE NOTE - OBJECTIVE STATEMENT
Patient c/o inability to tolerate po fluids or food for about 3-4 days. Patient states any time she attempts to eat or drink she vomits. Patient also c/o lower abdominal pain as well. Denies any fever or chills. Patient takes oxycodone for chronic back pain and has had multiple bowel obstructions and narcotic induced constipation as per GI doctor at bedside. Patient appears emaciated and drowsy. Follows commands, but keeps eyes closed most of the time. Fingers are cyanotic and cold to touch but radial pulses are palpable and patient denies any numbness or tingling. Patient c/o cramping to both her hands as well. Patient is a chronic smoker as well. Patient has friend at her bedside.

## 2018-11-20 NOTE — ED ADULT NURSE NOTE - PMH
Ankle Fracture  right anle fx s/p cast 2009  Asthma    Back pain    Bowel obstruction  multiple hospitalizations  Breast Cancer    Chronic Pain Following Surgery or Procedure  following right breast mastectomy  Emphysema    History of Small Bowel Obstruction  1/2010  Kidney stone    Narcotic Dependence    Narcotic Dysmotile Cilia Syndrome    S/P Chemotherapy, Time Since Greater than 12 Weeks    S/P Radiation Therapy

## 2018-11-20 NOTE — CONSULT NOTE ADULT - PROBLEM SELECTOR RECOMMENDATION 2
Likely sec to renal failure  ? if using PO4 containing laxatives  Currently NPO  Monitor PO4 level at present

## 2018-11-20 NOTE — CONSULT NOTE ADULT - SUBJECTIVE AND OBJECTIVE BOX
SICU Consultation Note  =====================================================    74 y/o female with COPD and current smoker with more than 30 packs year history, complicated surgical history (appendectomy, cholecystectomy, femoral hernia repair, mastectomy and ex-lap with Lisa x 2), back pain on chronic narcotic use and recurrent SBO presented with abdominal pain. The pain started 4 days ago localized to the mid-abdomen associated with poor PO intake and persistent vomiting for past 12 hours. Patient had an NGT in ER with 700 output immediately    pt c/o abdominal pain, has known chronic back pain. Also notes vomiting x12 hours - spitting up white saliva, no drooling. Pt is taking pain medication (chronic for back pain), last took 1 hour ago, oxycodone 10mg. PMD sent pt into the ED because she's dehydrated. She is known to our practice with multiple prior admissions for SBO, narcotic associated ileus/constipation, but has not required hospitalization for this in several years.  She states she moves bowels more regularly - last regular BM reported yesterday, aide at bedside reports smear brown BM today    Patient states she lives alone, last ate 3 days go. drank a cup of tea yesterday  reports generalized spasms - hands/abdomen, face/mouth and also in throat which are extremely painful.  Hand spasms cause sever finger cramps and cannot hold anything - this began 1-2 days ago, increased in intensity today    no rectal bleeding. Increased abdominal distension from baseline +crampy abdominal pain  no fever or chills  known COPD - current everyday smoker  admits to weight loss but cannot quantify (20 Nov 2018 20:43)    Allergies: Biaxin (Rash)  Cipro (Headache)  Flagyl (Headache)  penicillin (Rash; Swelling)  sulfa drugs (Unknown)    PAST MEDICAL & SURGICAL HISTORY:  Back pain  Bowel obstruction: multiple hospitalizations  Kidney stone  Emphysema  Narcotic Dependence  S/P Radiation Therapy  S/P Chemotherapy, Time Since Greater than 12 Weeks  Narcotic Dysmotile Cilia Syndrome  Chronic Pain Following Surgery or Procedure: following right breast mastectomy  Ankle Fracture: right anle fx s/p cast 2009  History of Small Bowel Obstruction: 1/2010  Asthma  Breast Cancer  S/P hernia surgery: femoral hernia - 2012  S/P Exploratory Laparotomy  S/P Appendectomy  S/P Cholecystectomy  Liver Mass s/p liver rsxn: s/p resection 2009  History of Hysterectomy: 1998  S/P Right Mastectomy: 2006    FAMILY HISTORY:  No pertinent family history in first degree relatives      SOCIAL HISTORY:  ***    ADVANCE DIRECTIVES: Presumed Full Code  ***    REVIEW OF SYSTEMS:  ***  General: Non-Contributory  Skin/Breast: Non-Contributory  Ophthalmologic: Non-Contributory  ENMT: Non-Contributory  Respiratory and Thorax: Non-Contributory  Cardiovascular: Non-Contributory  Gastrointestinal: Non-Contributory  Genitourinary: Non-Contributory  Musculoskeletal: Non-Contributory  Neurological: Non-Contributory  Psychiatric: Non-Contributory  Hematology/Lymphatics: Non-Contributory  Endocrine: Non-Contributory  Allergic/Immunologic: Non-Contributory    HOME MEDICATIONS:  ***    CURRENT MEDICATIONS:   --------------------------------------------------------------------------------------  Neurologic Medications    Respiratory Medications  ALBUTerol/ipratropium for Nebulization 3 milliLiter(s) Nebulizer every 6 hours  buDESOnide   0.5 milliGRAM(s) Respule 0.5 milliGRAM(s) Inhalation every 12 hours    Cardiovascular Medications    Gastrointestinal Medications  calcium gluconate IVPB 2 Gram(s) IV Intermittent once  pantoprazole  Injectable 40 milliGRAM(s) IV Push daily  sodium chloride 0.9%. 1000 milliLiter(s) IV Continuous <Continuous>    Genitourinary Medications    Hematologic/Oncologic Medications  heparin  Injectable 5000 Unit(s) SubCutaneous every 8 hours    Antimicrobial/Immunologic Medications    Endocrine/Metabolic Medications    Topical/Other Medications  chlorhexidine 4% Liquid 1 Application(s) Topical <User Schedule>  nicotine -  14 mG/24Hr(s) Patch 1 patch Transdermal daily  tetracaine/benzocaine/butamben Spray 1 Spray(s) Topical four times a day PRN Sore throat    --------------------------------------------------------------------------------------    VITAL SIGNS, INS/OUTS (last 24 hours):  --------------------------------------------------------------------------------------  ((Insert SICU Vitals / Is+Os here)) ***  --------------------------------------------------------------------------------------    EXAM  NEUROLOGY  RASS:   	GCS:    Exam: Normal, NAD, alert, oriented x 3, no focal deficits. ***    HEENT  Exam: Normocephalic, atraumatic.  EOMI ***    RESPIRATORY  Exam: Lungs clear to auscultation, Normal expansion/effort.  ***  Mechanical Ventilation:     CARDIOVASCULAR  Exam: S1, S2.  Regular rate and rhythm.  Peripheral edema  ***    GI/NUTRITION  Exam: Abdomen soft, Non-tender, Non-distended.  ***  Wound:   ***  Current Diet:  NPO ***    VASCULAR  Exam: Extremities warm, pink, well-perfused.  ***    MUSCULOSKELETAL  Exam: All extremities moving spontaneously without limitations.  ***    SKIN:  Exam: Good skin turgor, no skin breakdown.  ***    METABOLIC/FLUIDS/ELECTROLYTES  calcium gluconate IVPB 2 Gram(s) IV Intermittent once  sodium chloride 0.9%. 1000 milliLiter(s) IV Continuous <Continuous>      HEMATOLOGIC  [x] DVT Prophylaxis: heparin  Injectable 5000 Unit(s) SubCutaneous every 8 hours    Transfusions:	[] PRBC	[] Platelets		[] FFP	[] Cryoprecipitate    INFECTIOUS DISEASE  Antimicrobials/Immunologic Medications:    Day #  	of    ***    Tubes/Lines/Drains  ***  [x] Peripheral IV  [] Central Venous Line     	[] R	[] L	[] IJ	[] Fem	[] SC	Date Placed:   [] Arterial Line		[] R	[] L	[] Fem	[] Rad	[] Ax	Date Placed:   [] PICC:         	[] Midline		[] Mediport  [] Urinary Catheter		Date Placed:     LABS  --------------------------------------------------------------------------------------  ((Insert SICU Labs here))***  --------------------------------------------------------------------------------------    OTHER LABS    IMAGING RESULTS  Echo:   CT:   Xray:     ASSESSMENT:  73y Female ***    PLAN:  ***  Neurologic:   Respiratory:   Cardiovascular:   Gastrointestinal/Nutrition:   Renal/Genitourinary:   Hematologic:   Infectious Disease:   Tubes/Lines/Drains:   Endocrine:   Disposition:     --------------------------------------------------------------------------------------    Critical Care Diagnoses: SICU Consultation Note  =====================================================    72 y/o female with COPD and current smoker with more than 30 packs year history, complicated surgical history (hysterectomy, appendectomy, cholecystectomy, femoral hernia repair, mastectomy and ex-lap with Lisa x 2), back pain on chronic narcotic use and recurrent SBO presented with abdominal pain. The pain started 4 days ago localized to the mid-abdomen associated with poor PO intake and persistent vomiting for past 12 hours. She has had multiple prior admissions for SBO, narcotic associated ileus/constipation, but has not required hospitalization for several years. She states that her last regular BM reported yesterday, aide at bedside reports smear brown BM today Patient had an NGT in ER with 700 output immediately. Patient also had a Chavarria placed with about 200 output. Patient is found to have severe dehydration as evidenced by hemocentration, SHAKIRA (Cr 1.1 > 7.6) and elevated lactate.     Allergies:   Biaxin (Rash)  Cipro (Headache)  Flagyl (Headache)  penicillin (Rash; Swelling)  sulfa drugs (Unknown)    PAST MEDICAL & SURGICAL HISTORY:  Back pain  Bowel obstruction: multiple hospitalizations  Kidney stone  Emphysema  Narcotic Dependence  S/P Radiation Therapy  S/P Chemotherapy, Time Since Greater than 12 Weeks  Narcotic Dysmotile Cilia Syndrome  Chronic Pain Following Surgery or Procedure: following right breast mastectomy  Ankle Fracture: right anle fx s/p cast   History of Small Bowel Obstruction: 2010  Asthma  Breast Cancer  S/P hernia surgery: femoral hernia -   S/P Exploratory Laparotomy  S/P Appendectomy  S/P Cholecystectomy  Liver Mass s/p liver rsxn: s/p resection 2009  History of Hysterectomy:   S/P Right Mastectomy:     FAMILY HISTORY: No pertinent family history in first degree relatives    SOCIAL HISTORY: current smoker with more than 30 pack year history     ADVANCE DIRECTIVES: Presumed Full Code     REVIEW OF SYSTEMS:   General: Non-Contributory  Skin/Breast: Non-Contributory  Ophthalmologic: Non-Contributory  ENMT: Non-Contributory  Respiratory and Thorax: Non-Contributory  Cardiovascular: Non-Contributory  Gastrointestinal: Non-Contributory  Genitourinary: Non-Contributory  Musculoskeletal: Non-Contributory  Neurological: Non-Contributory  Psychiatric: Non-Contributory  Hematology/Lymphatics: Non-Contributory  Endocrine: Non-Contributory  Allergic/Immunologic: Non-Contributory    CURRENT MEDICATIONS:   --------------------------------------------------------------------------------------  Neurologic Medications    Respiratory Medications  ALBUTerol/ipratropium for Nebulization 3 milliLiter(s) Nebulizer every 6 hours  buDESOnide   0.5 milliGRAM(s) Respule 0.5 milliGRAM(s) Inhalation every 12 hours    Cardiovascular Medications    Gastrointestinal Medications  calcium gluconate IVPB 2 Gram(s) IV Intermittent once  pantoprazole  Injectable 40 milliGRAM(s) IV Push daily  sodium chloride 0.9%. 1000 milliLiter(s) IV Continuous <Continuous>    Genitourinary Medications    Hematologic/Oncologic Medications  heparin  Injectable 5000 Unit(s) SubCutaneous every 8 hours    Antimicrobial/Immunologic Medications    Endocrine/Metabolic Medications    Topical/Other Medications  chlorhexidine 4% Liquid 1 Application(s) Topical <User Schedule>  nicotine -  14 mG/24Hr(s) Patch 1 patch Transdermal daily  tetracaine/benzocaine/butamben Spray 1 Spray(s) Topical four times a day PRN Sore throat    --------------------------------------------------------------------------------------  T(C): 36.2 (18 @ 22:00), Max: 36.4 (18 @ 19:58)  HR: 82 (18 @ 00:20) (70 - 98)  BP: 127/59 (18 @ 00:00) (108/57 - 171/76)  BP(mean): 85 (18 @ 00:00) (77 - 109)  ABP: --  ABP(mean): --  RR: 27 (18 @ 00:00) (17 - 33)  SpO2: 95% (18 @ 00:20) (72% - 99%)  Wt(kg): --  CVP(mm Hg): --  CI: --  CAPILLARY BLOOD GLUCOSE       @ 07:01  -   @ 00:46  --------------------------------------------------------  IN:    Sodium Chloride 0.9% IV Bolus: 500 mL    sodium chloride 0.9%.: 125 mL  Total IN: 625 mL    OUT:    Indwelling Catheter - Urethral: 205 mL    Nasoenteral Tube: 1800 mL  Total OUT: 2005 mL  Total NET: -1380 mL  --------------------------------------------------------------------------------------    EXAM  NEUROLOGY   Exam: Normal, NAD, alert, oriented x 3, no focal deficits.     HEENT  Exam: Normocephalic, atraumatic.  EOMI *    RESPIRATORY  Exam: Lungs clear to auscultation, Normal expansion/effort.     CARDIOVASCULAR  Exam: S1, S2.  Regular rate and rhythm.  No peripheral edema     GI/NUTRITION  Exam: Abdomen distended, diffusely tender without focal peritonitis     VASCULAR  Exam: Extremities warm, pink, well-perfused.     MUSCULOSKELETAL  Exam: All extremities moving spontaneously without limitations.     SKIN:  Exam: mucosa appears dry     METABOLIC/FLUIDS/ELECTROLYTES  calcium gluconate IVPB 2 Gram(s) IV Intermittent once  sodium chloride 0.9%. 1000 milliLiter(s) IV Continuous <Continuous>    HEMATOLOGIC  [x] DVT Prophylaxis: heparin  Injectable 5000 Unit(s) SubCutaneous every 8 hours    Transfusions: no transfusion required     INFECTIOUS DISEASE  Antimicrobials/Immunologic Medications: none    Tubes/Lines/Drains   [x] Peripheral IV  [] Central Venous Line     	[] R	[] L	[] IJ	[] Fem	[] SC	Date Placed:   [] Arterial Line		[] R	[] L	[] Fem	[] Rad	[] Ax	Date Placed:   [] PICC:         	[] Midline		[] Mediport  [x] Urinary Catheter		Date Placed:     LABS  --------------------------------------------------------------------------------------  CBC ( 23:08)                          15.2                     7.0     )--------------(  290        --    % Neuts, --    % Lymphs, ANC: --                              47.4<H>  CBC ( 15:31)                          17.4<H>                   11.5<H>  )--------------(  350        85.0<H>% Neuts, 2.0<L>% Lymphs, ANC: 9.7<H>                          52.5<H>    BMP ( 23:08)       140     |  85<L>   |  101<H>			Ca++ --      Ca 6.8<L>       ---------------------------------( 102<H>		Mg 1.9          4.7     |  28      |  7.51<H>			Ph 13.6<H>  BMP ( 15:31)       140     |  73<L>   |  101<H>			Ca++ --      Ca 8.5          ---------------------------------( 139<H>		Mg 2.3          3.9     |  29      |  7.66<H>			Ph 15.1<H>    LFTs ( 23:08)      TPro 6.9 / Alb 4.0 / TBili 0.3 / DBili -- / AST 48<H> / ALT 51<H> / AlkPhos 249<H>  LFTs (11-20 @ 15:31)      TPro 8.6<H> / Alb 4.9 / TBili 0.5 / DBili -- / AST 64<H> / ALT 64<H> / AlkPhos 334<H>    Coags ( @ 23:08)  aPTT 34.4 / INR 1.15 / PT 13.3<H>      ABG ( @ 22:56)     7.22<L> / 80<HH> / 39 / 32<H> / 1.2 / 55<L>%     Lactate:      VBG ( @ 15:31)     7.27<L> / 84<H> / <50<L> / 38<H> / 6.5<H> / 16<L>%      Lactate: 6.1<HH>    Urinalysis ( @ 18:08):     Color: Yellow / Appearance: Slightly Turbid<!> / S.020 / pH: 5.5 / Gluc: Negative / Ketones: Negative / Bili: Small<!> / Urobili: Negative / Protein :30 mg/dL<!> / Nitrites: Negative / Leuk.Est: Moderate<!> / RBC: 18<H> / WBC: 12<H> / Sq Epi:  / Non Sq Epi: 1 / Bacteria Negative     --------------------------------------------------------------------------------------    EXAM:  CT ABDOMEN AND PELVIS                        IMPRESSION: Small bowel obstruction with transition point in the ileum located in the right lower quadrant.    EXAM:  CT CHEST                        IMPRESSION: Large cavitary lesion in the left upper lobe. Primary   consideration is lung carcinoma. Tuberculosis or a lung abscess is not   excluded. Mucous plugging in the right lower lobe with ground glass opacities which   may be due to infection or atelectasis. SICU Consultation Note  =====================================================    72 y/o female with COPD and current smoker with more than 30 packs year history, complicated surgical history (hysterectomy, appendectomy, cholecystectomy, femoral hernia repair, mastectomy and ex-lap with Lisa x 2), back pain on chronic narcotic use and recurrent SBO presented with abdominal pain. The pain started 4 days ago localized to the mid-abdomen associated with poor PO intake and persistent vomiting for past 12 hours. She has had multiple prior admissions for SBO, narcotic associated ileus/constipation, but has not required hospitalization for several years. She states that her last regular BM reported yesterday, aide at bedside reports smear brown BM today Patient had an NGT in ER with 700 output immediately. Patient also had a Chavarria placed with about 200 output. Patient is found to have severe dehydration as evidenced by hemocentration, SHAKIRA (Cr 1.1 > 7.6) and elevated lactate.     Allergies:   Biaxin (Rash)  Cipro (Headache)  Flagyl (Headache)  penicillin (Rash; Swelling)  sulfa drugs (Unknown)    PAST MEDICAL & SURGICAL HISTORY:  Back pain  Bowel obstruction: multiple hospitalizations  Kidney stone  Emphysema  Narcotic Dependence  S/P Radiation Therapy  S/P Chemotherapy, Time Since Greater than 12 Weeks  Narcotic Dysmotile Cilia Syndrome  Chronic Pain Following Surgery or Procedure: following right breast mastectomy  Ankle Fracture: right anle fx s/p cast   History of Small Bowel Obstruction: 2010  Asthma  Breast Cancer  S/P hernia surgery: femoral hernia -   S/P Exploratory Laparotomy  S/P Appendectomy  S/P Cholecystectomy  Liver Mass s/p liver rsxn: s/p resection 2009  History of Hysterectomy:   S/P Right Mastectomy:     FAMILY HISTORY: No pertinent family history in first degree relatives    SOCIAL HISTORY: current smoker with more than 30 pack year history     ADVANCE DIRECTIVES: Presumed Full Code     REVIEW OF SYSTEMS:   General: Non-Contributory  Skin/Breast: Non-Contributory  Ophthalmologic: Non-Contributory  ENMT: Non-Contributory  Respiratory and Thorax: Non-Contributory  Cardiovascular: Non-Contributory  Gastrointestinal: Non-Contributory  Genitourinary: Non-Contributory  Musculoskeletal: Non-Contributory  Neurological: Non-Contributory  Psychiatric: Non-Contributory  Hematology/Lymphatics: Non-Contributory  Endocrine: Non-Contributory  Allergic/Immunologic: Non-Contributory    CURRENT MEDICATIONS:   --------------------------------------------------------------------------------------  Neurologic Medications    Respiratory Medications  ALBUTerol/ipratropium for Nebulization 3 milliLiter(s) Nebulizer every 6 hours  buDESOnide   0.5 milliGRAM(s) Respule 0.5 milliGRAM(s) Inhalation every 12 hours    Cardiovascular Medications    Gastrointestinal Medications  calcium gluconate IVPB 2 Gram(s) IV Intermittent once  pantoprazole  Injectable 40 milliGRAM(s) IV Push daily  sodium chloride 0.9%. 1000 milliLiter(s) IV Continuous <Continuous>    Genitourinary Medications    Hematologic/Oncologic Medications  heparin  Injectable 5000 Unit(s) SubCutaneous every 8 hours    Antimicrobial/Immunologic Medications    Endocrine/Metabolic Medications    Topical/Other Medications  chlorhexidine 4% Liquid 1 Application(s) Topical <User Schedule>  nicotine -  14 mG/24Hr(s) Patch 1 patch Transdermal daily  tetracaine/benzocaine/butamben Spray 1 Spray(s) Topical four times a day PRN Sore throat    --------------------------------------------------------------------------------------  T(C): 36.2 (18 @ 22:00), Max: 36.4 (18 @ 19:58)  HR: 82 (18 @ 00:20) (70 - 98)  BP: 127/59 (18 @ 00:00) (108/57 - 171/76)  BP(mean): 85 (18 @ 00:00) (77 - 109)  ABP: --  ABP(mean): --  RR: 27 (18 @ 00:00) (17 - 33)  SpO2: 95% (18 @ 00:20) (72% - 99%)  Wt(kg): --  CVP(mm Hg): --  CI: --  CAPILLARY BLOOD GLUCOSE       @ 07:01  -   @ 00:46  --------------------------------------------------------  IN:    Sodium Chloride 0.9% IV Bolus: 500 mL    sodium chloride 0.9%.: 125 mL  Total IN: 625 mL    OUT:    Indwelling Catheter - Urethral: 205 mL    Nasoenteral Tube: 1800 mL  Total OUT: 2005 mL  Total NET: -1380 mL  --------------------------------------------------------------------------------------    EXAM  NEUROLOGY   Exam: Normal, NAD, alert, oriented x 3, no focal deficits.     HEENT  Exam: Normocephalic, atraumatic.  EOMI     RESPIRATORY  Exam: Lungs clear to auscultation, Normal expansion/effort.     CARDIOVASCULAR  Exam: S1, S2.  Regular rate and rhythm.  No peripheral edema     GI/NUTRITION  Exam: Abdomen distended, diffusely tender without focal peritonitis     VASCULAR  Exam: Extremities warm, pink, well-perfused.     MUSCULOSKELETAL  Exam: All extremities moving spontaneously without limitations.     SKIN:  Exam: mucosa appears dry     METABOLIC/FLUIDS/ELECTROLYTES  calcium gluconate IVPB 2 Gram(s) IV Intermittent once  sodium chloride 0.9%. 1000 milliLiter(s) IV Continuous <Continuous>    HEMATOLOGIC  [x] DVT Prophylaxis: heparin  Injectable 5000 Unit(s) SubCutaneous every 8 hours    Transfusions: no transfusion required     INFECTIOUS DISEASE  Antimicrobials/Immunologic Medications: none    Tubes/Lines/Drains   [x] Peripheral IV  [] Central Venous Line     	[] R	[] L	[] IJ	[] Fem	[] SC	Date Placed:   [] Arterial Line		[] R	[] L	[] Fem	[] Rad	[] Ax	Date Placed:   [] PICC:         	[] Midline		[] Mediport  [x] Urinary Catheter		Date Placed:     LABS  --------------------------------------------------------------------------------------  CBC ( 23:08)                          15.2                     7.0     )--------------(  290        --    % Neuts, --    % Lymphs, ANC: --                              47.4<H>  CBC ( 15:31)                          17.4<H>                   11.5<H>  )--------------(  350        85.0<H>% Neuts, 2.0<L>% Lymphs, ANC: 9.7<H>                          52.5<H>    BMP ( 23:08)       140     |  85<L>   |  101<H>			Ca++ --      Ca 6.8<L>       ---------------------------------( 102<H>		Mg 1.9          4.7     |  28      |  7.51<H>			Ph 13.6<H>  BMP ( 15:31)       140     |  73<L>   |  101<H>			Ca++ --      Ca 8.5          ---------------------------------( 139<H>		Mg 2.3          3.9     |  29      |  7.66<H>			Ph 15.1<H>    LFTs ( 23:08)      TPro 6.9 / Alb 4.0 / TBili 0.3 / DBili -- / AST 48<H> / ALT 51<H> / AlkPhos 249<H>  LFTs (11-20 @ 15:31)      TPro 8.6<H> / Alb 4.9 / TBili 0.5 / DBili -- / AST 64<H> / ALT 64<H> / AlkPhos 334<H>    Coags ( @ 23:08)  aPTT 34.4 / INR 1.15 / PT 13.3<H>      ABG ( @ 22:56)     7.22<L> / 80<HH> / 39 / 32<H> / 1.2 / 55<L>%     Lactate:      VBG ( @ 15:31)     7.27<L> / 84<H> / <50<L> / 38<H> / 6.5<H> / 16<L>%      Lactate: 6.1<HH>    Urinalysis ( @ 18:08):     Color: Yellow / Appearance: Slightly Turbid<!> / S.020 / pH: 5.5 / Gluc: Negative / Ketones: Negative / Bili: Small<!> / Urobili: Negative / Protein :30 mg/dL<!> / Nitrites: Negative / Leuk.Est: Moderate<!> / RBC: 18<H> / WBC: 12<H> / Sq Epi:  / Non Sq Epi: 1 / Bacteria Negative     --------------------------------------------------------------------------------------    EXAM:  CT ABDOMEN AND PELVIS                        IMPRESSION: Small bowel obstruction with transition point in the ileum located in the right lower quadrant.    EXAM:  CT CHEST                        IMPRESSION: Large cavitary lesion in the left upper lobe. Primary   consideration is lung carcinoma. Tuberculosis or a lung abscess is not   excluded. Mucous plugging in the right lower lobe with ground glass opacities which   may be due to infection or atelectasis.

## 2018-11-20 NOTE — H&P ADULT - NSHPPHYSICALEXAM_GEN_ALL_CORE
Gen: AAOx3, NAD, mentating normally  Neuro: Cranial nerves II-XII grossly intact  HEENT: Atraumatic, normocephalic  CV: RRR, normal S1/S2, no audible m/r/g  Pulm: Breathing comfortably on RA. Equal chest rise b/l. Lung fields CTAB  Abd: Soft, significantly distended, tympanic, minimally tender in the RLQ. No rebound or guarding  Back/flank: No CVA tenderness  Extremities: WWP. Moving all 4 extremities spontaneously. Strength 5/5. Sensation intact  Skin: No rashes or suspicious lesions

## 2018-11-20 NOTE — ED PROVIDER NOTE - PROGRESS NOTE DETAILS
elevated cr, spoke with renal, at this time feels like obstructive in nature. will see patient CT with SBO - general surgery consulted for possible SICU admission General surgery will admit patient to SICU.

## 2018-11-20 NOTE — CONSULT NOTE ADULT - ASSESSMENT
73 year old female with PMH chronic Back pain (on Narcotics), history of multiple SBO's, narcotic associated constipation/ileus, COPD, current everyday smoker admitted with abdominal pain and distension, nausea and poor PO intake with associated worsening painful spasms.  Clinically emaciated and severely dehydrated with acute Renal Failure (SHAKIRA) with marked hyperphosphatemia, acidosis  ?SBO    PLAN  -IV hydration  -ICU evaluation  -renal consultation  -await CT scan (no IV contrast), can start with AXR to r/o SBO given clinical history/exam  -Recommend surgical input (known to Dr Livingston)  -Admit  -If vomiting, then needs NGT placement    Discussed with ER team  Will follow with you    Jake Khan PA-C    [facility]  [phone]

## 2018-11-20 NOTE — CONSULT NOTE ADULT - SUBJECTIVE AND OBJECTIVE BOX
Dr. Mosquera  Office [phone]  Cell [phone]  Akiko RICH  Cell [phone]    HPI:  73 yr old came with abdominal pain, admitted with SBO, found to have Acute kidney injury. S/P arevalo's catheter in ER, also has NG tube place    Allergies:  Biaxin (Rash)  Cipro (Headache)  Flagyl (Headache)  penicillin (Rash; Swelling)  sulfa drugs (Unknown)      PAST MEDICAL & SURGICAL HISTORY:  Back pain  Bowel obstruction: multiple hospitalizations  Kidney stone  Emphysema  Narcotic Dependence  S/P Radiation Therapy  S/P Chemotherapy, Time Since Greater than 12 Weeks  Narcotic Dysmotile Cilia Syndrome  Chronic Pain Following Surgery or Procedure: following right breast mastectomy  Ankle Fracture: right anle fx s/p cast   History of Small Bowel Obstruction: 2010  Asthma  Breast Cancer  S/P hernia surgery: femoral hernia -   S/P Exploratory Laparotomy  S/P Appendectomy  S/P Cholecystectomy  Liver Mass s/p liver rsxn: s/p resection   History of Hysterectomy:   S/P Right Mastectomy:       Home Medications Reviewed    Hospital Medications:   MEDICATIONS  (STANDING):  sodium chloride 0.9% Bolus 1000 milliLiter(s) IV Bolus once      SOCIAL HISTORY:  Denies ETOh, Smoking,     FAMILY HISTORY:  No pertinent family history in first degree relatives      REVIEW OF SYSTEMS:  CONSTITUTIONAL: No weakness, fevers or chills  EYES/ENT: No visual changes;  No vertigo or throat pain   NECK: No pain or stiffness  RESPIRATORY: No cough, wheezing, hemoptysis; No shortness of breath  CARDIOVASCULAR: No chest pain or palpitations.  GASTROINTESTINAL: No abdominal or epigastric pain. No nausea, vomiting, or hematemesis; No diarrhea or constipation. No melena or hematochezia.  GENITOURINARY: No dysuria, frequency, foamy urine, urinary urgency, incontinence or hematuria  NEUROLOGICAL: No numbness or weakness  SKIN: No itching, burning, rashes, or lesions   VASCULAR: No bilateral lower extremity edema.   All other review of systems is negative unless indicated above.    VITALS:  T(F): 97 (18 @ 14:36), Max: 97 (18 @ 14:36)  HR: 77 (18 @ 18:31)  BP: 136/89 (18 @ 18:31)  RR: 18 (18 @ 18:31)  SpO2: 98% (18 @ 18:31)  Wt(kg): --     @ 07:01  -   @ 19:51  --------------------------------------------------------  IN: 0 mL / OUT: 200 mL / NET: -200 mL      Height (cm): 157.48 ( @ 14:36)  Weight (kg): 40.4 ( @ 14:36)  BMI (kg/m2): 16.3 ( @ 14:36)  BSA (m2): 1.36 ( @ 14:36)    PHYSICAL EXAM:  Constitutional: NAD  HEENT: anicteric sclera, oropharynx clear, MMM  Neck: No JVD  Respiratory: CTAB, no wheezes, rales or rhonchi  Cardiovascular: S1, S2, RRR  Gastrointestinal: BS+, soft, NT/ND  Extremities: No cyanosis or clubbing. No peripheral edema  Neurological: A/O x 3, no focal deficits  Psychiatric: Normal mood, normal affect  : No CVA tenderness. No arevalo.   Skin: No rashes  Vascular Access:    LABS:      140  |  73<L>  |  101<H>  ----------------------------<  139<H>  3.9   |  29  |  7.66<H>    Ca    8.5      2018 15:31  Phos  15.1       Mg     2.3         TPro  8.6<H>  /  Alb  4.9  /  TBili  0.5  /  DBili      /  AST  64<H>  /  ALT  64<H>  /  AlkPhos  334<H>      Creatinine Trend: 7.66 <--                        17.4   11.5  )-----------( 350      ( 2018 15:31 )             52.5     Urine Studies:  Urinalysis Basic - ( 2018 18:08 )    Color: Yellow / Appearance: Slightly Turbid / S.020 / pH:   Gluc:  / Ketone: Negative  / Bili: Small / Urobili: Negative   Blood:  / Protein: 30 mg/dL / Nitrite: Negative   Leuk Esterase: Moderate / RBC: 18 /hpf / WBC 12 /hpf   Sq Epi:  / Non Sq Epi: 1 /hpf / Bacteria: Negative      Creatinine, Random Urine: 137 mg/dL ( @ 18:08)  Sodium, Random Urine: 31 mmol/L ( @ 18:08)  Osmolality, Random Urine: 363 mos/kg ( @ 18:08)      RADIOLOGY & ADDITIONAL STUDIES: Dr. Mosquera  Office [phone]  Cell [phone]  Akiko RICH  Cell [phone]    HPI:  73 yr old came with abdominal pain, admitted with SBO, found to have Acute kidney injury. S/P arevalo's catheter in ER, also has NG tube place    Allergies:  Biaxin (Rash)  Cipro (Headache)  Flagyl (Headache)  penicillin (Rash; Swelling)  sulfa drugs (Unknown)      PAST MEDICAL & SURGICAL HISTORY:  Back pain  Bowel obstruction: multiple hospitalizations  Kidney stone  Emphysema  Narcotic Dependence  S/P Radiation Therapy  S/P Chemotherapy, Time Since Greater than 12 Weeks  Narcotic Dysmotile Cilia Syndrome  Chronic Pain Following Surgery or Procedure: following right breast mastectomy  Ankle Fracture: right anle fx s/p cast   History of Small Bowel Obstruction: 2010  Asthma  Breast Cancer  S/P hernia surgery: femoral hernia -   S/P Exploratory Laparotomy  S/P Appendectomy  S/P Cholecystectomy  Liver Mass s/p liver rsxn: s/p resection   History of Hysterectomy:   S/P Right Mastectomy:       Home Medications Reviewed    Hospital Medications:   MEDICATIONS  (STANDING):  sodium chloride 0.9% Bolus 1000 milliLiter(s) IV Bolus once      SOCIAL HISTORY:  Denies ETOh, Smoking,     FAMILY HISTORY:  No pertinent family history in first degree relatives      REVIEW OF SYSTEMS:  CONSTITUTIONAL: No weakness, fevers or chills  EYES/ENT: No visual changes;  No vertigo or throat pain   NECK: No pain or stiffness  RESPIRATORY: No cough, wheezing, hemoptysis; No shortness of breath  CARDIOVASCULAR: No chest pain or palpitations.  GASTROINTESTINAL: as per HPI  GENITOURINARY: No dysuria, frequency, foamy urine, urinary urgency, incontinence or hematuria  NEUROLOGICAL: No numbness or weakness  SKIN: No itching, burning, rashes, or lesions   VASCULAR: No bilateral lower extremity edema.   All other review of systems is negative unless indicated above.    VITALS:  T(F): 97 (18 @ 14:36), Max: 97 (18 @ 14:36)  HR: 77 (18 @ 18:31)  BP: 136/89 (18 @ 18:31)  RR: 18 (18 @ 18:31)  SpO2: 98% (18 @ 18:31)  Wt(kg): --    11-20 @ 07:01  -   @ 19:51  --------------------------------------------------------  IN: 0 mL / OUT: 200 mL / NET: -200 mL      Height (cm): 157.48 ( @ 14:36)  Weight (kg): 40.4 ( @ 14:36)  BMI (kg/m2): 16.3 ( @ 14:36)  BSA (m2): 1.36 ( @ 14:36)    PHYSICAL EXAM:  Constitutional: NAD  HEENT: anicteric sclera, oropharynx clear, MMM  Neck: No JVD  Respiratory: CTAB, no wheezes, rales or rhonchi  Cardiovascular: S1, S2, RRR  Gastrointestinal: BS+, soft, NT, distended  Extremities: No cyanosis or clubbing. No peripheral edema  Neurological: A/O x 3, no focal deficits  Psychiatric: Normal mood, normal affect  : No CVA tenderness. has arevalo.   Skin: No rashes      LABS:      140  |  73<L>  |  101<H>  ----------------------------<  139<H>  3.9   |  29  |  7.66<H>    Ca    8.5      2018 15:31  Phos  15.1       Mg     2.3         TPro  8.6<H>  /  Alb  4.9  /  TBili  0.5  /  DBili      /  AST  64<H>  /  ALT  64<H>  /  AlkPhos  334<H>      Creatinine Trend: 7.66 <--                        17.4   11.5  )-----------( 350      ( 2018 15:31 )             52.5     Urine Studies:  Urinalysis Basic - ( 2018 18:08 )    Color: Yellow / Appearance: Slightly Turbid / S.020 / pH:   Gluc:  / Ketone: Negative  / Bili: Small / Urobili: Negative   Blood:  / Protein: 30 mg/dL / Nitrite: Negative   Leuk Esterase: Moderate / RBC: 18 /hpf / WBC 12 /hpf   Sq Epi:  / Non Sq Epi: 1 /hpf / Bacteria: Negative      Creatinine, Random Urine: 137 mg/dL ( @ 18:08)  Sodium, Random Urine: 31 mmol/L ( @ 18:08)  Osmolality, Random Urine: 363 mos/kg ( @ 18:08)      RADIOLOGY & ADDITIONAL STUDIES:

## 2018-11-21 LAB
ALBUMIN SERPL ELPH-MCNC: 3.1 G/DL — LOW (ref 3.3–5)
ALP SERPL-CCNC: 179 U/L — HIGH (ref 40–120)
ALT FLD-CCNC: 36 U/L — SIGNIFICANT CHANGE UP (ref 10–45)
ANION GAP SERPL CALC-SCNC: 25 MMOL/L — HIGH (ref 5–17)
ANION GAP SERPL CALC-SCNC: 26 MMOL/L — HIGH (ref 5–17)
ANION GAP SERPL CALC-SCNC: 28 MMOL/L — HIGH (ref 5–17)
AST SERPL-CCNC: 44 U/L — HIGH (ref 10–40)
BASOPHILS # BLD AUTO: 0 K/UL — SIGNIFICANT CHANGE UP (ref 0–0.2)
BASOPHILS NFR BLD AUTO: 0.1 % — SIGNIFICANT CHANGE UP (ref 0–2)
BILIRUB SERPL-MCNC: 0.2 MG/DL — SIGNIFICANT CHANGE UP (ref 0.2–1.2)
BUN SERPL-MCNC: 97 MG/DL — HIGH (ref 7–23)
BUN SERPL-MCNC: 97 MG/DL — HIGH (ref 7–23)
BUN SERPL-MCNC: 98 MG/DL — HIGH (ref 7–23)
CA-I BLD-SCNC: 0.9 MMOL/L — LOW (ref 1.12–1.3)
CALCIUM SERPL-MCNC: 7.1 MG/DL — LOW (ref 8.4–10.5)
CALCIUM SERPL-MCNC: 7.2 MG/DL — LOW (ref 8.4–10.5)
CALCIUM SERPL-MCNC: 7.4 MG/DL — LOW (ref 8.4–10.5)
CHLORIDE SERPL-SCNC: 91 MMOL/L — LOW (ref 96–108)
CHLORIDE SERPL-SCNC: 95 MMOL/L — LOW (ref 96–108)
CHLORIDE SERPL-SCNC: 97 MMOL/L — SIGNIFICANT CHANGE UP (ref 96–108)
CK MB BLD-MCNC: 1.4 % — SIGNIFICANT CHANGE UP (ref 0–3.5)
CK MB BLD-MCNC: 1.9 % — SIGNIFICANT CHANGE UP (ref 0–3.5)
CK MB CFR SERPL CALC: 4.3 NG/ML — HIGH (ref 0–3.8)
CK MB CFR SERPL CALC: 9.4 NG/ML — HIGH (ref 0–3.8)
CK SERPL-CCNC: 221 U/L — HIGH (ref 25–170)
CK SERPL-CCNC: 690 U/L — HIGH (ref 25–170)
CO2 SERPL-SCNC: 19 MMOL/L — LOW (ref 22–31)
CO2 SERPL-SCNC: 20 MMOL/L — LOW (ref 22–31)
CO2 SERPL-SCNC: 23 MMOL/L — SIGNIFICANT CHANGE UP (ref 22–31)
CREAT SERPL-MCNC: 5.54 MG/DL — HIGH (ref 0.5–1.3)
CREAT SERPL-MCNC: 5.93 MG/DL — HIGH (ref 0.5–1.3)
CREAT SERPL-MCNC: 6.83 MG/DL — HIGH (ref 0.5–1.3)
CULTURE RESULTS: NO GROWTH — SIGNIFICANT CHANGE UP
EOSINOPHIL # BLD AUTO: 0.1 K/UL — SIGNIFICANT CHANGE UP (ref 0–0.5)
EOSINOPHIL NFR BLD AUTO: 0.5 % — SIGNIFICANT CHANGE UP (ref 0–6)
GAS PNL BLDA: SIGNIFICANT CHANGE UP
GAS PNL BLDA: SIGNIFICANT CHANGE UP
GAS PNL BLDV: SIGNIFICANT CHANGE UP
GLUCOSE SERPL-MCNC: 116 MG/DL — HIGH (ref 70–99)
GLUCOSE SERPL-MCNC: 78 MG/DL — SIGNIFICANT CHANGE UP (ref 70–99)
GLUCOSE SERPL-MCNC: 96 MG/DL — SIGNIFICANT CHANGE UP (ref 70–99)
HCT VFR BLD CALC: 41.7 % — SIGNIFICANT CHANGE UP (ref 34.5–45)
HCT VFR BLD CALC: 41.8 % — SIGNIFICANT CHANGE UP (ref 34.5–45)
HGB BLD-MCNC: 13.7 G/DL — SIGNIFICANT CHANGE UP (ref 11.5–15.5)
HGB BLD-MCNC: 13.8 G/DL — SIGNIFICANT CHANGE UP (ref 11.5–15.5)
LYMPHOCYTES # BLD AUTO: 0.2 K/UL — LOW (ref 1–3.3)
LYMPHOCYTES # BLD AUTO: 2.5 % — LOW (ref 13–44)
MAGNESIUM SERPL-MCNC: 1.8 MG/DL — SIGNIFICANT CHANGE UP (ref 1.6–2.6)
MAGNESIUM SERPL-MCNC: 2.6 MG/DL — SIGNIFICANT CHANGE UP (ref 1.6–2.6)
MAGNESIUM SERPL-MCNC: 2.7 MG/DL — HIGH (ref 1.6–2.6)
MCHC RBC-ENTMCNC: 32.3 PG — SIGNIFICANT CHANGE UP (ref 27–34)
MCHC RBC-ENTMCNC: 32.4 PG — SIGNIFICANT CHANGE UP (ref 27–34)
MCHC RBC-ENTMCNC: 32.7 GM/DL — SIGNIFICANT CHANGE UP (ref 32–36)
MCHC RBC-ENTMCNC: 33 GM/DL — SIGNIFICANT CHANGE UP (ref 32–36)
MCV RBC AUTO: 98 FL — SIGNIFICANT CHANGE UP (ref 80–100)
MCV RBC AUTO: 98.7 FL — SIGNIFICANT CHANGE UP (ref 80–100)
MONOCYTES # BLD AUTO: 0.5 K/UL — SIGNIFICANT CHANGE UP (ref 0–0.9)
MONOCYTES NFR BLD AUTO: 5.2 % — SIGNIFICANT CHANGE UP (ref 2–14)
NEUTROPHILS # BLD AUTO: 8.7 K/UL — HIGH (ref 1.8–7.4)
NEUTROPHILS NFR BLD AUTO: 91.8 % — HIGH (ref 43–77)
PHOSPHATE SERPL-MCNC: 10.8 MG/DL — HIGH (ref 2.5–4.5)
PHOSPHATE SERPL-MCNC: 11.6 MG/DL — HIGH (ref 2.5–4.5)
PHOSPHATE SERPL-MCNC: 9.4 MG/DL — HIGH (ref 2.5–4.5)
PLATELET # BLD AUTO: 217 K/UL — SIGNIFICANT CHANGE UP (ref 150–400)
PLATELET # BLD AUTO: 246 K/UL — SIGNIFICANT CHANGE UP (ref 150–400)
POTASSIUM SERPL-MCNC: 3.8 MMOL/L — SIGNIFICANT CHANGE UP (ref 3.5–5.3)
POTASSIUM SERPL-MCNC: 4.5 MMOL/L — SIGNIFICANT CHANGE UP (ref 3.5–5.3)
POTASSIUM SERPL-MCNC: 4.9 MMOL/L — SIGNIFICANT CHANGE UP (ref 3.5–5.3)
POTASSIUM SERPL-SCNC: 3.8 MMOL/L — SIGNIFICANT CHANGE UP (ref 3.5–5.3)
POTASSIUM SERPL-SCNC: 4.5 MMOL/L — SIGNIFICANT CHANGE UP (ref 3.5–5.3)
POTASSIUM SERPL-SCNC: 4.9 MMOL/L — SIGNIFICANT CHANGE UP (ref 3.5–5.3)
PROT ?TM UR-MCNC: 46 MG/DL — HIGH (ref 0–12)
PROT SERPL-MCNC: 5.8 G/DL — LOW (ref 6–8.3)
RBC # BLD: 4.24 M/UL — SIGNIFICANT CHANGE UP (ref 3.8–5.2)
RBC # BLD: 4.26 M/UL — SIGNIFICANT CHANGE UP (ref 3.8–5.2)
RBC # FLD: 12.8 % — SIGNIFICANT CHANGE UP (ref 10.3–14.5)
RBC # FLD: 12.9 % — SIGNIFICANT CHANGE UP (ref 10.3–14.5)
SODIUM SERPL-SCNC: 140 MMOL/L — SIGNIFICANT CHANGE UP (ref 135–145)
SODIUM SERPL-SCNC: 140 MMOL/L — SIGNIFICANT CHANGE UP (ref 135–145)
SODIUM SERPL-SCNC: 144 MMOL/L — SIGNIFICANT CHANGE UP (ref 135–145)
SPECIMEN SOURCE: SIGNIFICANT CHANGE UP
TROPONIN T, HIGH SENSITIVITY RESULT: 273 NG/L — HIGH (ref 0–51)
TROPONIN T, HIGH SENSITIVITY RESULT: 48 NG/L — SIGNIFICANT CHANGE UP (ref 0–51)
UUN UR-MCNC: 331 MG/DL — SIGNIFICANT CHANGE UP
WBC # BLD: 7.8 K/UL — SIGNIFICANT CHANGE UP (ref 3.8–10.5)
WBC # BLD: 9.4 K/UL — SIGNIFICANT CHANGE UP (ref 3.8–10.5)
WBC # FLD AUTO: 7.8 K/UL — SIGNIFICANT CHANGE UP (ref 3.8–10.5)
WBC # FLD AUTO: 9.4 K/UL — SIGNIFICANT CHANGE UP (ref 3.8–10.5)

## 2018-11-21 PROCEDURE — 93010 ELECTROCARDIOGRAM REPORT: CPT | Mod: 76

## 2018-11-21 PROCEDURE — 99291 CRITICAL CARE FIRST HOUR: CPT | Mod: 25

## 2018-11-21 PROCEDURE — 31500 INSERT EMERGENCY AIRWAY: CPT | Mod: GC,79

## 2018-11-21 PROCEDURE — 71045 X-RAY EXAM CHEST 1 VIEW: CPT | Mod: 26,76

## 2018-11-21 PROCEDURE — 99233 SBSQ HOSP IP/OBS HIGH 50: CPT

## 2018-11-21 PROCEDURE — 99223 1ST HOSP IP/OBS HIGH 75: CPT | Mod: GC

## 2018-11-21 PROCEDURE — 99233 SBSQ HOSP IP/OBS HIGH 50: CPT | Mod: GC

## 2018-11-21 RX ORDER — MAGNESIUM SULFATE 500 MG/ML
2 VIAL (ML) INJECTION ONCE
Qty: 0 | Refills: 0 | Status: COMPLETED | OUTPATIENT
Start: 2018-11-21 | End: 2018-11-21

## 2018-11-21 RX ORDER — HALOPERIDOL DECANOATE 100 MG/ML
5 INJECTION INTRAMUSCULAR ONCE
Qty: 0 | Refills: 0 | Status: COMPLETED | OUTPATIENT
Start: 2018-11-21 | End: 2018-11-21

## 2018-11-21 RX ORDER — NALTREXONE HYDROCHLORIDE 50 MG/1
50 TABLET, FILM COATED ORAL ONCE
Qty: 0 | Refills: 0 | Status: COMPLETED | OUTPATIENT
Start: 2018-11-21 | End: 2018-11-21

## 2018-11-21 RX ORDER — SODIUM CHLORIDE 9 MG/ML
1000 INJECTION INTRAMUSCULAR; INTRAVENOUS; SUBCUTANEOUS
Qty: 0 | Refills: 0 | Status: DISCONTINUED | OUTPATIENT
Start: 2018-11-21 | End: 2018-11-22

## 2018-11-21 RX ORDER — HYDROMORPHONE HYDROCHLORIDE 2 MG/ML
0.5 INJECTION INTRAMUSCULAR; INTRAVENOUS; SUBCUTANEOUS ONCE
Qty: 0 | Refills: 0 | Status: DISCONTINUED | OUTPATIENT
Start: 2018-11-21 | End: 2018-11-21

## 2018-11-21 RX ORDER — DEXMEDETOMIDINE HYDROCHLORIDE IN 0.9% SODIUM CHLORIDE 4 UG/ML
0.2 INJECTION INTRAVENOUS
Qty: 200 | Refills: 0 | Status: DISCONTINUED | OUTPATIENT
Start: 2018-11-21 | End: 2018-11-21

## 2018-11-21 RX ORDER — CHLORHEXIDINE GLUCONATE 213 G/1000ML
15 SOLUTION TOPICAL
Qty: 0 | Refills: 0 | Status: DISCONTINUED | OUTPATIENT
Start: 2018-11-22 | End: 2018-11-23

## 2018-11-21 RX ORDER — SODIUM CHLORIDE 9 MG/ML
500 INJECTION INTRAMUSCULAR; INTRAVENOUS; SUBCUTANEOUS ONCE
Qty: 0 | Refills: 0 | Status: COMPLETED | OUTPATIENT
Start: 2018-11-21 | End: 2018-11-21

## 2018-11-21 RX ORDER — INFLUENZA VIRUS VACCINE 15; 15; 15; 15 UG/.5ML; UG/.5ML; UG/.5ML; UG/.5ML
0.5 SUSPENSION INTRAMUSCULAR ONCE
Qty: 0 | Refills: 0 | Status: DISCONTINUED | OUTPATIENT
Start: 2018-11-21 | End: 2018-11-26

## 2018-11-21 RX ORDER — CALCIUM GLUCONATE 100 MG/ML
2 VIAL (ML) INTRAVENOUS ONCE
Qty: 0 | Refills: 0 | Status: COMPLETED | OUTPATIENT
Start: 2018-11-21 | End: 2018-11-21

## 2018-11-21 RX ORDER — NICOTINE POLACRILEX 2 MG
1 GUM BUCCAL DAILY
Qty: 0 | Refills: 0 | Status: DISCONTINUED | OUTPATIENT
Start: 2018-11-21 | End: 2018-11-26

## 2018-11-21 RX ORDER — CHLORHEXIDINE GLUCONATE 213 G/1000ML
15 SOLUTION TOPICAL
Qty: 0 | Refills: 0 | Status: DISCONTINUED | OUTPATIENT
Start: 2018-11-21 | End: 2018-11-21

## 2018-11-21 RX ORDER — PROPOFOL 10 MG/ML
10 INJECTION, EMULSION INTRAVENOUS
Qty: 500 | Refills: 0 | Status: DISCONTINUED | OUTPATIENT
Start: 2018-11-21 | End: 2018-11-22

## 2018-11-21 RX ORDER — CALCIUM GLUCONATE 100 MG/ML
2 VIAL (ML) INTRAVENOUS ONCE
Qty: 0 | Refills: 0 | Status: DISCONTINUED | OUTPATIENT
Start: 2018-11-21 | End: 2018-11-21

## 2018-11-21 RX ORDER — MORPHINE SULFATE 50 MG/1
1 CAPSULE, EXTENDED RELEASE ORAL ONCE
Qty: 0 | Refills: 0 | Status: DISCONTINUED | OUTPATIENT
Start: 2018-11-21 | End: 2018-11-21

## 2018-11-21 RX ORDER — HALOPERIDOL DECANOATE 100 MG/ML
2.5 INJECTION INTRAMUSCULAR ONCE
Qty: 0 | Refills: 0 | Status: COMPLETED | OUTPATIENT
Start: 2018-11-21 | End: 2018-11-21

## 2018-11-21 RX ADMIN — Medication 200 GRAM(S): at 22:40

## 2018-11-21 RX ADMIN — HEPARIN SODIUM 5000 UNIT(S): 5000 INJECTION INTRAVENOUS; SUBCUTANEOUS at 05:57

## 2018-11-21 RX ADMIN — HEPARIN SODIUM 5000 UNIT(S): 5000 INJECTION INTRAVENOUS; SUBCUTANEOUS at 21:00

## 2018-11-21 RX ADMIN — Medication 200 GRAM(S): at 06:00

## 2018-11-21 RX ADMIN — Medication 1 SPRAY(S): at 08:12

## 2018-11-21 RX ADMIN — HYDROMORPHONE HYDROCHLORIDE 0.5 MILLIGRAM(S): 2 INJECTION INTRAMUSCULAR; INTRAVENOUS; SUBCUTANEOUS at 16:00

## 2018-11-21 RX ADMIN — Medication 3 MILLILITER(S): at 12:50

## 2018-11-21 RX ADMIN — Medication 1 PATCH: at 13:09

## 2018-11-21 RX ADMIN — PANTOPRAZOLE SODIUM 40 MILLIGRAM(S): 20 TABLET, DELAYED RELEASE ORAL at 11:58

## 2018-11-21 RX ADMIN — DEXMEDETOMIDINE HYDROCHLORIDE IN 0.9% SODIUM CHLORIDE 2 MICROGRAM(S)/KG/HR: 4 INJECTION INTRAVENOUS at 14:13

## 2018-11-21 RX ADMIN — CHLORHEXIDINE GLUCONATE 1 APPLICATION(S): 213 SOLUTION TOPICAL at 06:21

## 2018-11-21 RX ADMIN — Medication 1 MILLIGRAM(S): at 21:00

## 2018-11-21 RX ADMIN — Medication 1 MILLIGRAM(S): at 13:01

## 2018-11-21 RX ADMIN — SODIUM CHLORIDE 125 MILLILITER(S): 9 INJECTION INTRAMUSCULAR; INTRAVENOUS; SUBCUTANEOUS at 00:38

## 2018-11-21 RX ADMIN — Medication 3 MILLILITER(S): at 18:22

## 2018-11-21 RX ADMIN — HYDROMORPHONE HYDROCHLORIDE 0.5 MILLIGRAM(S): 2 INJECTION INTRAMUSCULAR; INTRAVENOUS; SUBCUTANEOUS at 16:15

## 2018-11-21 RX ADMIN — Medication 1 MILLIGRAM(S): at 13:30

## 2018-11-21 RX ADMIN — Medication 3 MILLILITER(S): at 23:18

## 2018-11-21 RX ADMIN — HALOPERIDOL DECANOATE 2.5 MILLIGRAM(S): 100 INJECTION INTRAMUSCULAR at 13:29

## 2018-11-21 RX ADMIN — MORPHINE SULFATE 1 MILLIGRAM(S): 50 CAPSULE, EXTENDED RELEASE ORAL at 12:34

## 2018-11-21 RX ADMIN — HALOPERIDOL DECANOATE 5 MILLIGRAM(S): 100 INJECTION INTRAMUSCULAR at 15:23

## 2018-11-21 RX ADMIN — Medication 3 MILLILITER(S): at 00:19

## 2018-11-21 RX ADMIN — Medication 3 MILLILITER(S): at 05:29

## 2018-11-21 RX ADMIN — SODIUM CHLORIDE 3000 MILLILITER(S): 9 INJECTION INTRAMUSCULAR; INTRAVENOUS; SUBCUTANEOUS at 16:03

## 2018-11-21 RX ADMIN — Medication 0.5 MILLIGRAM(S): at 05:29

## 2018-11-21 RX ADMIN — Medication 1 PATCH: at 11:58

## 2018-11-21 RX ADMIN — Medication 200 GRAM(S): at 02:00

## 2018-11-21 RX ADMIN — HYDROMORPHONE HYDROCHLORIDE 0.5 MILLIGRAM(S): 2 INJECTION INTRAMUSCULAR; INTRAVENOUS; SUBCUTANEOUS at 15:26

## 2018-11-21 RX ADMIN — MORPHINE SULFATE 1 MILLIGRAM(S): 50 CAPSULE, EXTENDED RELEASE ORAL at 12:24

## 2018-11-21 RX ADMIN — CHLORHEXIDINE GLUCONATE 15 MILLILITER(S): 213 SOLUTION TOPICAL at 18:15

## 2018-11-21 RX ADMIN — Medication 1 SPRAY(S): at 03:09

## 2018-11-21 RX ADMIN — HEPARIN SODIUM 5000 UNIT(S): 5000 INJECTION INTRAVENOUS; SUBCUTANEOUS at 13:02

## 2018-11-21 RX ADMIN — Medication 50 GRAM(S): at 05:54

## 2018-11-21 RX ADMIN — Medication 0.5 MILLIGRAM(S): at 18:22

## 2018-11-21 RX ADMIN — Medication 1 PATCH: at 20:22

## 2018-11-21 RX ADMIN — Medication 1 PATCH: at 18:15

## 2018-11-21 RX ADMIN — NALTREXONE HYDROCHLORIDE 50 MILLIGRAM(S): 50 TABLET, FILM COATED ORAL at 11:58

## 2018-11-21 RX ADMIN — HYDROMORPHONE HYDROCHLORIDE 0.5 MILLIGRAM(S): 2 INJECTION INTRAMUSCULAR; INTRAVENOUS; SUBCUTANEOUS at 15:10

## 2018-11-21 NOTE — DIETITIAN INITIAL EVALUATION ADULT. - ORAL INTAKE PTA
Pt reports poor appetite and PO intake x 3 days PTA due to nausea, vomiting, and abdominal pain; prior with good PO intake./poor

## 2018-11-21 NOTE — CONSULT NOTE ADULT - SUBJECTIVE AND OBJECTIVE BOX
CHIEF COMPLAINT:    HPI:    PAST MEDICAL & SURGICAL HISTORY:  Back pain  Bowel obstruction: multiple hospitalizations  Kidney stone  Emphysema  Narcotic Dependence  S/P Radiation Therapy  S/P Chemotherapy, Time Since Greater than 12 Weeks  Narcotic Dysmotile Cilia Syndrome  Chronic Pain Following Surgery or Procedure: following right breast mastectomy  Ankle Fracture: right anle fx s/p cast   History of Small Bowel Obstruction: 2010  Asthma  Breast Cancer  S/P hernia surgery: femoral hernia -   S/P Exploratory Laparotomy  S/P Appendectomy  S/P Cholecystectomy  Liver Mass s/p liver rsxn: s/p resection   History of Hysterectomy:   S/P Right Mastectomy:       FAMILY HISTORY:  No pertinent family history in first degree relatives      SOCIAL HISTORY:  Smoking:  Substance Use:   EtOH Use:  Marital Status: [ ] Single [ ]  [ ]  [ ]   Sexual History:   Occupation:  Recent Travel:  Country of Birth:  Advance Directives:    Allergies    Biaxin (Rash)  Cipro (Headache)  Flagyl (Headache)  penicillin (Rash; Swelling)  sulfa drugs (Unknown)    Intolerances        HOME MEDICATIONS:    REVIEW OF SYSTEMS:  Constitutional: [ ] negative [ ] fevers [ ] chills [ ] weight loss [ ] weight gain  HEENT: [ ] negative [ ] dry eyes [ ] eye irritation [ ] postnasal drip [ ] nasal congestion  CV: [ ] negative  [ ] chest pain [ ] orthopnea [ ] palpitations [ ] murmur  Resp: [ ] negative [ ] cough [ ] shortness of breath [ ] dyspnea [ ] wheezing [ ] sputum [ ] hemoptysis  GI: [ ] negative [ ] nausea [ ] vomiting [ ] diarrhea [ ] constipation [ ] abd pain [ ] dysphagia   : [ ] negative [ ] dysuria [ ] nocturia [ ] hematuria [ ] increased urinary frequency  Musculoskeletal: [ ] negative [ ] back pain [ ] myalgias [ ] arthralgias [ ] fracture  Skin: [ ] negative [ ] rash [ ] itch  Neurological: [ ] negative [ ] headache [ ] dizziness [ ] syncope [ ] weakness [ ] numbness  Psychiatric: [ ] negative [ ] anxiety [ ] depression  Endocrine: [ ] negative [ ] diabetes [ ] thyroid problem  Hematologic/Lymphatic: [ ] negative [ ] anemia [ ] bleeding problem  Allergic/Immunologic: [ ] negative [ ] itchy eyes [ ] nasal discharge [ ] hives [ ] angioedema  [ ] All other systems negative  [ ] Unable to assess ROS because ________    OBJECTIVE:  ICU Vital Signs Last 24 Hrs  T(C): 36.3 (2018 11:00), Max: 36.6 (2018 23:00)  T(F): 97.4 (2018 11:00), Max: 97.9 (2018 23:00)  HR: 69 (2018 12:00) (62 - 98)  BP: 131/60 (:00) (105/53 - 171/76)  BP(mean): 87 (2018 12:00) (72 - 109)  ABP: --  ABP(mean): --  RR: 42 (2018 12:00) (17 - 50)  SpO2: 87% (2018 12:00) (72% - 99%)         @ 07:01  -   @ 07:00  --------------------------------------------------------  IN: 2100 mL / OUT: 2305 mL / NET: -205 mL     @ 07:01  -   @ 12:52  --------------------------------------------------------  IN: 700 mL / OUT: 400 mL / NET: 300 mL      CAPILLARY BLOOD GLUCOSE          PHYSICAL EXAM:  General:   HEENT:   Respiratory:   Cardiovascular:   Abdomen:   Extremities:   Skin:   Neurological:    HOSPITAL MEDICATIONS:  heparin  Injectable 5000 Unit(s) SubCutaneous every 8 hours          ALBUTerol/ipratropium for Nebulization 3 milliLiter(s) Nebulizer every 6 hours  buDESOnide   0.5 milliGRAM(s) Respule 0.5 milliGRAM(s) Inhalation every 12 hours      pantoprazole  Injectable 40 milliGRAM(s) IV Push daily        sodium chloride 0.9%. 1000 milliLiter(s) IV Continuous <Continuous>  sodium chloride 0.9%. 1000 milliLiter(s) IV Continuous <Continuous>    influenza   Vaccine 0.5 milliLiter(s) IntraMuscular once    chlorhexidine 4% Liquid 1 Application(s) Topical <User Schedule>  tetracaine/benzocaine/butamben Spray 1 Spray(s) Topical four times a day PRN    nicotine -  14 mG/24Hr(s) Patch 1 patch Transdermal daily      LABS:                        13.7   7.8   )-----------( 217      ( 2018 03:49 )             41.8     Hgb Trend: 13.7<--, 15.2<--, 17.4<--      140  |  91<L>  |  98<H>  ----------------------------<  116<H>  4.9   |  23  |  6.83<H>    Ca    7.4<L>      2018 03:49  Phos  11.6       Mg     1.8         TPro  6.9  /  Alb  4.0  /  TBili  0.3  /  DBili  x   /  AST  48<H>  /  ALT  51<H>  /  AlkPhos  249<H>      Creatinine Trend: 6.83<--, 7.51<--, 7.66<--  PT/INR - ( 2018 23:08 )   PT: 13.3 sec;   INR: 1.15 ratio         PTT - ( 2018 23:08 )  PTT:34.4 sec  Urinalysis Basic - ( 2018 18:08 )    Color: Yellow / Appearance: Slightly Turbid / S.020 / pH: x  Gluc: x / Ketone: Negative  / Bili: Small / Urobili: Negative   Blood: x / Protein: 30 mg/dL / Nitrite: Negative   Leuk Esterase: Moderate / RBC: 18 /hpf / WBC 12 /hpf   Sq Epi: x / Non Sq Epi: 1 /hpf / Bacteria: Negative      Arterial Blood Gas:   @ 22:56  7.22/80/39/32/55/1.2  ABG lactate: --    Venous Blood Gas:   @ 03:35  7.24/67/<50/28/30  VBG Lactate: 1.2  Venous Blood Gas:   @ 15:31  7.27/84/<50/38/16  VBG Lactate: 6.1      MICROBIOLOGY:     RADIOLOGY:  [ ] Reviewed and interpreted by me    PULMONARY FUNCTION TESTS:    EKG: CHIEF COMPLAINT: abdominal pain    HPI:  73F hx chronic back pain, complicated surgical history (hysterectomy, appendectomy, cholecystectomy, ex-lap x2), breast CA s/p R mastectomy , chemoRT, liver mass resection in , multiple prior SBO due to narcotic-associated ileus/constipation (last ), who initially p/w abdominal pain and por PO intake. Found to have SBO and severe SHAKIRA. Evaluated by SICU and admitted for close monitoring. NGT placed with 700 output initially. Crt 7.6 with elevated lactate, thought to be due to prerenal/ATN. CT chest obtained for ?SOB and found to have large RENUKA cavitary lesion. Pt became increasingly more agitated today and was given precedex/ativan/haldol. Was subsequently intubated. She is on valium at home and unclear how much was being absorbed given SBO.     Pulmonary consulted for cavitary lesion. Pt has significant smoking history with > 30 pack year as per chart (pt had AMS at time of interview).     PAST MEDICAL & SURGICAL HISTORY:  Back pain  Bowel obstruction: multiple hospitalizations  Kidney stone  Emphysema  Narcotic Dependence  S/P Radiation Therapy  S/P Chemotherapy, Time Since Greater than 12 Weeks  Narcotic Dysmotile Cilia Syndrome  Chronic Pain Following Surgery or Procedure: following right breast mastectomy  Ankle Fracture: right anle fx s/p cast   History of Small Bowel Obstruction: 2010  Asthma  Breast Cancer  S/P hernia surgery: femoral hernia -   S/P Exploratory Laparotomy  S/P Appendectomy  S/P Cholecystectomy  Liver Mass s/p liver rsxn: s/p resection   History of Hysterectomy:   S/P Right Mastectomy:       FAMILY HISTORY:  No pertinent family history in first degree relatives      SOCIAL HISTORY:  Smoking: > 30 pack years  Substance Use: unknown  EtOH Use: unknown    Allergies  Biaxin (Rash)  Cipro (Headache)  Flagyl (Headache)  penicillin (Rash; Swelling)  sulfa drugs (Unknown)    HOME MEDICATIONS:  pantoprazole 40 mg oral delayed release tablet: tab(s) orally 2 times a day  · 	sucralfate 1 g oral tablet: 1 tab(s) orally 4 times a day  · 	simethicone 80 mg oral tablet, chewable: 1 tab(s) orally 3 times a day  · 	senna oral tablet: 2 tab(s) orally once a day (at bedtime)  · 	lidocaine 5% topical film: Apply topically to affected area once a day  · 	nicotine 14 mg/24 hr transdermal film, extended release: 1 patch transdermal once a day  · 	Centrum Antioxidant Multiple Vitamins and Minerals oral tablet: 1 tab(s) orally once a day  · 	Valium: 2.5 milligram(s) orally once a day (at bedtime), As Needed  · 	oxyCODONE 10 mg oral tablet: 1 tab(s) orally every 6 hours  · 	MiraLax - oral powder for reconstitution: 17 gram(s) orally 2 times a day    REVIEW OF SYSTEMS:  [x] Unable to assess ROS because AMS    OBJECTIVE:  ICU Vital Signs Last 24 Hrs  T(C): 36.3 (2018 11:00), Max: 36.6 (2018 23:00)  T(F): 97.4 (2018 11:00), Max: 97.9 (2018 23:00)  HR: 69 (2018 12:00) (62 - 98)  BP: 131/60 (2018 12:00) (105/53 - 171/76)  BP(mean): 87 (2018 12:00) (72 - 109)  ABP: --  ABP(mean): --  RR: 42 (2018 12:00) (17 - 50)  SpO2: 87% (2018 12:00) (72% - 99%)     @ :  -   @ 07:00  --------------------------------------------------------  IN: 2100 mL / OUT: 2305 mL / NET: -205 mL     @ 07:  -   @ 12:52  --------------------------------------------------------  IN: 700 mL / OUT: 400 mL / NET: 300 mL    PHYSICAL EXAM:  General: agitated  HEENT: NCAT, moist mucosal membranes; left EJ in place  Respiratory: CTAB; barrel chested  Cardiovascular: S1/S2 normal, tachycardic, regular rhythm; no murmurs/rubs/gallops appreciated  Abdomen: soft, non-tender, mildly distended  Extremities: no b/l LE edema  Skin: warm/dry  Neurological: agitated    HOSPITAL MEDICATIONS:  heparin  Injectable 5000 Unit(s) SubCutaneous every 8 hours    ALBUTerol/ipratropium for Nebulization 3 milliLiter(s) Nebulizer every 6 hours  buDESOnide   0.5 milliGRAM(s) Respule 0.5 milliGRAM(s) Inhalation every 12 hours    pantoprazole  Injectable 40 milliGRAM(s) IV Push daily    sodium chloride 0.9%. 1000 milliLiter(s) IV Continuous <Continuous>  sodium chloride 0.9%. 1000 milliLiter(s) IV Continuous <Continuous>    influenza   Vaccine 0.5 milliLiter(s) IntraMuscular once    chlorhexidine 4% Liquid 1 Application(s) Topical <User Schedule>  tetracaine/benzocaine/butamben Spray 1 Spray(s) Topical four times a day PRN    nicotine -  14 mG/24Hr(s) Patch 1 patch Transdermal daily      LABS:                        13.7   7.8   )-----------( 217      ( 2018 03:49 )             41.8     Hgb Trend: 13.7<--, 15.2<--, 17.4<--  1121    140  |  91<L>  |  98<H>  ----------------------------<  116<H>  4.9   |  23  |  6.83<H>    Ca    7.4<L>      2018 03:49  Phos  11.6       Mg     1.8         TPro  6.9  /  Alb  4.0  /  TBili  0.3  /  DBili  x   /  AST  48<H>  /  ALT  51<H>  /  AlkPhos  249<H>      Creatinine Trend: 6.83<--, 7.51<--, 7.66<--  PT/INR - ( 2018 23:08 )   PT: 13.3 sec;   INR: 1.15 ratio         PTT - ( 2018 23:08 )  PTT:34.4 sec  Urinalysis Basic - ( 2018 18:08 )    Color: Yellow / Appearance: Slightly Turbid / S.020 / pH: x  Gluc: x / Ketone: Negative  / Bili: Small / Urobili: Negative   Blood: x / Protein: 30 mg/dL / Nitrite: Negative   Leuk Esterase: Moderate / RBC: 18 /hpf / WBC 12 /hpf   Sq Epi: x / Non Sq Epi: 1 /hpf / Bacteria: Negative      Arterial Blood Gas:   @ 22:56  7.22/80/39/32/55/1.2  ABG lactate: --    Venous Blood Gas:   @ 03:35  7.24/67/<50/28/30  VBG Lactate: 1.2  Venous Blood Gas:   @ 15:31  7.27/84/<50/38/16  VBG Lactate: 6.1      MICROBIOLOGY:  None    RADIOLOGY:  CT Chest:  FINDINGS:    CHEST:     LUNGS AND LARGE AIRWAYS: Centrilobular emphysema. There is a 5.0 x 3.3 cm   cavitary mass in the left upper lobe extending from the left hilum to the   pleural surface. Secretions in the right lower lobe bronchus and   opacification of segmental and subsegmental bronchi. Patchy groundglass   opacities in the right lower lobe.   PLEURA: No pleural effusion.  VESSELS: Atherosclerotic calcification of the aorta.  HEART: Heart size is normal. No pericardial effusion.  MEDIASTINUM AND CLARISA: Few subcentimeter mediastinal lymph nodes.  CHEST WALL AND LOWER NECK: Within normal limits.  VISUALIZED UPPER ABDOMEN: Enteric tube is in the stomach. See dedicated   CT of the abdomen performed on the same day for additional findings.  BONES: Within normal limits.    IMPRESSION: Large cavitary lesion in the left upper lobe. Primary   consideration is lung carcinoma. Tuberculosis or a lung abscess is not   excluded.    Mucous plugging in the right lower lobe with groundglass opacities which   may be due to infection or atelectasis.

## 2018-11-21 NOTE — PROGRESS NOTE ADULT - SUBJECTIVE AND OBJECTIVE BOX
no vomiting, less  abd  pain    REVIEW OF SYSTEMS:  GEN: no fever,    no chills  RESP: no SOB,   no cough  CVS: no chest pain,   no palpitations  GI: no abdominal pain,   no nausea,   no vomiting,   no constipation,   no diarrhea  : no dysuria,   no frequency  NEURO: no headache,   no dizziness  PSYCH: no depression,   not anxious  Derm : no rash    MEDICATIONS  (STANDING):  ALBUTerol/ipratropium for Nebulization 3 milliLiter(s) Nebulizer every 6 hours  buDESOnide   0.5 milliGRAM(s) Respule 0.5 milliGRAM(s) Inhalation every 12 hours  chlorhexidine 4% Liquid 1 Application(s) Topical <User Schedule>  heparin  Injectable 5000 Unit(s) SubCutaneous every 8 hours  influenza   Vaccine 0.5 milliLiter(s) IntraMuscular once  nicotine -  14 mG/24Hr(s) Patch 1 patch Transdermal daily  pantoprazole  Injectable 40 milliGRAM(s) IV Push daily  sodium chloride 0.9%. 1000 milliLiter(s) (125 mL/Hr) IV Continuous <Continuous>    MEDICATIONS  (PRN):  tetracaine/benzocaine/butamben Spray 1 Spray(s) Topical four times a day PRN Sore throat      Vital Signs Last 24 Hrs  T(C): 36.4 (2018 03:00), Max: 36.6 (2018 23:00)  T(F): 97.5 (2018 03:00), Max: 97.9 (2018 23:00)  HR: 71 (2018 07:00) (68 - 98)  BP: 118/58 (2018 07:00) (105/53 - 171/76)  BP(mean): 82 (2018 07:00) (72 - 109)  RR: 46 (2018 07:00) (17 - 46)  SpO2: 92% (2018 07:00) (72% - 99%)  CAPILLARY BLOOD GLUCOSE        I&O's Summary    2018 07:01  -  2018 07:00  --------------------------------------------------------  IN: 2100 mL / OUT: 2305 mL / NET: -205 mL        PHYSICAL EXAM:  HEAD:  Atraumatic, Normocephalic  NECK: Supple, No   JVD  CHEST/LUNG:   no     rales,     no,    rhonchi  HEART: Regular rate and rhythm;         murmur  ABDOMEN: Soft, Nontender, ; distended   EXTREMITIES:    no    edema  NEUROLOGY:  alert    LABS:                        13.7   7.8   )-----------( 217      ( 2018 03:49 )             41.8         140  |  91<L>  |  98<H>  ----------------------------<  116<H>  4.9   |  23  |  6.83<H>    Ca    7.4<L>      2018 03:49  Phos  11.6       Mg     1.8         TPro  6.9  /  Alb  4.0  /  TBili  0.3  /  DBili  x   /  AST  48<H>  /  ALT  51<H>  /  AlkPhos  249<H>      PT/INR - ( 2018 23:08 )   PT: 13.3 sec;   INR: 1.15 ratio         PTT - ( 2018 23:08 )  PTT:34.4 sec      Urinalysis Basic - ( 2018 18:08 )    Color: Yellow / Appearance: Slightly Turbid / S.020 / pH: x  Gluc: x / Ketone: Negative  / Bili: Small / Urobili: Negative   Blood: x / Protein: 30 mg/dL / Nitrite: Negative   Leuk Esterase: Moderate / RBC: 18 /hpf / WBC 12 /hpf   Sq Epi: x / Non Sq Epi: 1 /hpf / Bacteria: Negative        ABG - ( 2018 22:56 )  pH, Arterial: 7.22  pH, Blood: x     /  pCO2: 80    /  pO2: 39    / HCO3: 32    / Base Excess: 1.2   /  SaO2: 55                 @ 03:35  4.3  <50              Consultant(s) Notes Reviewed:      Care Discussed with Consultants/Other Providers:

## 2018-11-21 NOTE — CONSULT NOTE ADULT - ATTENDING COMMENTS
Ms. Boswell is a 73 year old woman active smoker with history of breast cancer status post mastectomy, RT and chemo in 2006 also with multiple abdominal surgeries and liver mass resected in 2009 admitted with SBO thought secondary to abdominal adhesions.  She was intubated in setting of agitation, from likely benzo withdrawal.  Chest Xray and CT showed large cavitary mass in the jennifer.  This was not seen in 2016.  Given extensive smoking history and history of personal cancer, concerned for lung cancer.  alternatively abscess needs to be considered, including TB.  She is also in my.    Recommend starting Zosyn until we have results of cultures and further diagnostic testing.  Send sputum for AFB, C & S, fungal.  If AFB smear negative, will schedule bronchoscopy to evaluate airway and obtain samples.  Will follow with you.  Would place on respiratory isolation.

## 2018-11-21 NOTE — PROGRESS NOTE ADULT - ASSESSMENT
73 female with complicated surgical history and history of multiple episodes of small bowel obstruction presented with SBO demonstrated on CT scan. Patient is also found to have severe dehydration on the lab and admitted to SICU for monitoring and fluid resuscitation   NPO  pain control  fu labs   care per sicu team

## 2018-11-21 NOTE — PROGRESS NOTE ADULT - ASSESSMENT
73 year old female with PMH chronic Back pain , history of multiple SBO's, narcotic associated constipation/ileus, COPD, dmitted with abdominal pain and distension, nausea and poor PO intake with associated worsening painful spasms. CT reports findings suggestive of SBO.    Recommendations   NGT to suction , defer clamping trials to surgical service  Monitor labs and trends as ordered   IV PPI      [facility]  [address] [place] [address]  [phone]

## 2018-11-21 NOTE — PROVIDER CONTACT NOTE (OTHER) - BACKGROUND
Pt admitted for SBO likely secondary to opiod use. Pt has hx of breast CA, femoral hernia, multiple SBO and abdominal sx in the past

## 2018-11-21 NOTE — PROGRESS NOTE ADULT - SUBJECTIVE AND OBJECTIVE BOX
INTERVAL HPI/OVERNIGHT EVENTS:  Patient positioned in bed for comfort and safety NG tube in tact , small BM as reported by staff     MEDICATIONS  (STANDING):  ALBUTerol/ipratropium for Nebulization 3 milliLiter(s) Nebulizer every 6 hours  buDESOnide   0.5 milliGRAM(s) Respule 0.5 milliGRAM(s) Inhalation every 12 hours  chlorhexidine 0.12% Liquid 15 milliLiter(s) Oral Mucosa two times a day  chlorhexidine 4% Liquid 1 Application(s) Topical <User Schedule>  heparin  Injectable 5000 Unit(s) SubCutaneous every 8 hours  influenza   Vaccine 0.5 milliLiter(s) IntraMuscular once  nicotine - 21 mG/24Hr(s) Patch 1 patch Transdermal daily  pantoprazole  Injectable 40 milliGRAM(s) IV Push daily  propofol Infusion 10 MICROgram(s)/kG/Min (2.4 mL/Hr) IV Continuous <Continuous>  sodium chloride 0.9%. 1000 milliLiter(s) (125 mL/Hr) IV Continuous <Continuous>  sodium chloride 0.9%. 1000 milliLiter(s) (10 mL/Hr) IV Continuous <Continuous>    MEDICATIONS  (PRN):  tetracaine/benzocaine/butamben Spray 1 Spray(s) Topical four times a day PRN Sore throat      Allergies    Biaxin (Rash)  Cipro (Headache)  Flagyl (Headache)  penicillin (Rash; Swelling)  sulfa drugs (Unknown)    Intolerances        Review of Systems:    General:  No fever or chills ,   ENT:  No sore throat, or dysphagia  CV:  No chest pain reported   Resp:  No dyspnea, cough, tachypnea, wheezing      Vital Signs Last 24 Hrs  T(C): 36.7 (2018 16:00), Max: 36.7 (2018 16:00)  T(F): 98.1 (2018 16:00), Max: 98.1 (2018 16:00)  HR: 131 (2018 16:30) (62 - 131)  BP: 139/63 (2018 16:30) (77/45 - 175/79)  BP(mean): 91 (2018 16:30) (55 - 114)  RR: 15 (2018 16:30) (15 - 54)  SpO2: 97% (2018 16:30) (72% - 98%)    PHYSICAL EXAM:    Constitutional: NAD, non toxic   Neck:  supple  Respiratory: on hi flow O2 anterior chest wall clear to auscultation  Cardiovascular: S1 and S2, RRR  Gastrointestinal: soft mild distended girth hypoactive bowel sounds   Extremities: No peripheral edema  Skin: No jaundice rashes      LABS:                        13.7   7.8   )-----------( 217      ( 2018 03:49 )             41.8         140  |  95<L>  |  97<H>  ----------------------------<  78  4.5   |  20<L>  |  5.93<H>    Ca    7.2<L>      2018 12:36  Phos  10.8       Mg     2.7         TPro  6.9  /  Alb  4.0  /  TBili  0.3  /  DBili  x   /  AST  48<H>  /  ALT  51<H>  /  AlkPhos  249<H>      PT/INR - ( 2018 23:08 )   PT: 13.3 sec;   INR: 1.15 ratio         PTT - ( 2018 23:08 )  PTT:34.4 sec  Urinalysis Basic - ( 2018 18:08 )    Color: Yellow / Appearance: Slightly Turbid / S.020 / pH: x  Gluc: x / Ketone: Negative  / Bili: Small / Urobili: Negative   Blood: x / Protein: 30 mg/dL / Nitrite: Negative   Leuk Esterase: Moderate / RBC: 18 /hpf / WBC 12 /hpf   Sq Epi: x / Non Sq Epi: 1 /hpf / Bacteria: Negative      LIVER FUNCTIONS - ( 2018 23:08 )  Alb: 4.0 g/dL / Pro: 6.9 g/dL / ALK PHOS: 249 U/L / ALT: 51 U/L / AST: 48 U/L / GGT: x             RADIOLOGY & ADDITIONAL TESTS:  < from: CT Abdomen and Pelvis No Cont (18 @ 17:07) >  EXAM:  CT ABDOMEN AND PELVIS                            PROCEDURE DATE:  2018            INTERPRETATION:  CLINICAL INFORMATION: Abdominal distention, pain for 2   days. Vomiting. Dehydration. Evaluate for obstruction.    COMPARISON: None.    PROCEDURE:   CT of the Abdomen and Pelvis was performed without intravenous contrast.   Intravenous contrast: None.  Oral contrast: None.  Sagittal and coronal reformats were performed.    FINDINGS:  Evaluation of the solid organs and vasculature is limited without   intravenous and oral contrast.    LOWER CHEST: Groundglass opacities in the right lower lobe.    LIVER: Status post partial hepatectomy.  BILE DUCTS: Intrahepatic and extrahepatic biliary ductal dilatation,   unchanged.  GALLBLADDER: Postcholecystectomy.  SPLEEN: Within normal limits.  PANCREAS: Within normal limits.  ADRENALS: Within normal limits.  KIDNEYS/URETERS: Bilateral nonobstructing renal calculi, measuring up to   4 mm in the left kidney. No hydronephrosis.    BLADDER: Within normal limits.  REPRODUCTIVE ORGANS: Hysterectomy.    BOWEL: The stomach is distended. Multiple dilated loops of small bowel   with collapsed loops of ileum in the right lower quadrant consistent with   small bowel obstruction.   PERITONEUM: No ascites.  VESSELS:  Atherosclerotic calcifications.  RETROPERITONEUM: No lymphadenopathy.    ABDOMINAL WALL: Within normal limits.  BONES: Within normal limits.    IMPRESSION: Small bowel obstruction with transition point in the ileum   located in theright lower quadrant.    Groundglass opacities in the right lower lobe suggest pneumonia.    Findings discussed with Dr. Diaz on 2018 at 5:30 PM with read   back.              < end of copied text >

## 2018-11-21 NOTE — CONSULT NOTE ADULT - SUBJECTIVE AND OBJECTIVE BOX
HPI:  72 y/o F pt c/o abdominal pain, has known chronic back pain. Also notes vomiting x12 hours - spitting up white saliva, no drooling. Pt is taking pain medication (chronic for back pain), last took 1 hour ago, oxycodone 10mg. PMD sent pt into the ED because she's dehydrated. She is known to our practice with multiple prior admissions for SBO, narcotic associated ileus/constipation, but has not required hospitalization for this in several years.  She states she moves bowels more regularly - last regular BM reported yesterday, aide at bedside reports smear brown BM today    Patient states she lives alone, last ate 3 days go. drank a cup of tea yesterday  reports generalized spasms - hands/abdomen, face/mouth and also in throat which are extremely painful.  Hand spasms cause sever finger cramps and cannot hold anything - this began 1-2 days ago, increased in intensity today    no rectal bleeding. Increased abdominal distension from baseline +crampy abdominal pain  no fever or chills  known COPD - current everyday smoker  admits to weight loss but cannot quantify (20 Nov 2018 20:43)    PAST MEDICAL & SURGICAL HISTORY:  Back pain  Bowel obstruction: multiple hospitalizations  Kidney stone  Emphysema  Narcotic Dependence  S/P Radiation Therapy  S/P Chemotherapy, Time Since Greater than 12 Weeks  Narcotic Dysmotile Cilia Syndrome  Chronic Pain Following Surgery or Procedure: following right breast mastectomy  Ankle Fracture: right anle fx s/p cast 2009  History of Small Bowel Obstruction: 1/2010  Asthma  Breast Cancer  S/P hernia surgery: femoral hernia - 2012  S/P Exploratory Laparotomy  S/P Appendectomy  S/P Cholecystectomy  Liver Mass s/p liver rsxn: s/p resection 2009  History of Hysterectomy: 1998  S/P Right Mastectomy: 2006    Meds:  ALBUTerol/ipratropium for Nebulization 3 milliLiter(s) Nebulizer every 6 hours  buDESOnide   0.5 milliGRAM(s) Respule 0.5 milliGRAM(s) Inhalation every 12 hours  chlorhexidine 4% Liquid 1 Application(s) Topical <User Schedule>  heparin  Injectable 5000 Unit(s) SubCutaneous every 8 hours  influenza   Vaccine 0.5 milliLiter(s) IntraMuscular once  nicotine -  14 mG/24Hr(s) Patch 1 patch Transdermal daily  pantoprazole  Injectable 40 milliGRAM(s) IV Push daily  sodium chloride 0.9%. 1000 milliLiter(s) IV Continuous <Continuous>  tetracaine/benzocaine/butamben Spray 1 Spray(s) Topical four times a day PRN Sore throat    Labs:  CBC Full  -  ( 21 Nov 2018 03:49 )  WBC Count : 7.8 K/uL  Hemoglobin : 13.7 g/dL  Hematocrit : 41.8 %  Platelet Count - Automated : 217 K/uL  Mean Cell Volume : 98.7 fl  Mean Cell Hemoglobin : 32.3 pg  Mean Cell Hemoglobin Concentration : 32.7 gm/dL  Auto Neutrophil # : x  Auto Lymphocyte # : x  Auto Monocyte # : x  Auto Eosinophil # : x  Auto Basophil # : x  Auto Neutrophil % : x  Auto Lymphocyte % : x  Auto Monocyte % : x  Auto Eosinophil % : x  Auto Basophil % : x    11-21    140  |  91<L>  |  98<H>  ----------------------------<  116<H>  4.9   |  23  |  6.83<H>    Ca    7.4<L>      21 Nov 2018 03:49  Phos  11.6     11-21  Mg     1.8     11-21    TPro  6.9  /  Alb  4.0  /  TBili  0.3  /  DBili  x   /  AST  48<H>  /  ALT  51<H>  /  AlkPhos  249<H>  11-20      Radiology:       < from: CT Chest No Cont (11.20.18 @ 19:17) >  CT of the abdomen performed on the same day for additional findings.  BONES: Within normal limits.    IMPRESSION: Large cavitary lesion in the left upper lobe. Primary   consideration is lung carcinoma. Tuberculosis or a lung abscess is not   excluded.    Mucous plugging in the right lower lobe with groundglass opacities which   may be due to infection or atelectasis.      < end of copied text >        ROS:  No pain, no fever  No lumps in neck or pain  No Sob or chest pain  No palpitations   No abdominal pain. No change in bowel habit. No blood in stools  No weakness in extremities  No leg swelling      Vital Signs Last 24 Hrs  T(C): 36.3 (21 Nov 2018 07:00), Max: 36.6 (20 Nov 2018 23:00)  T(F): 97.4 (21 Nov 2018 07:00), Max: 97.9 (20 Nov 2018 23:00)  HR: 62 (21 Nov 2018 10:00) (62 - 98)  BP: 116/55 (21 Nov 2018 10:00) (105/53 - 171/76)  BP(mean): 82 (21 Nov 2018 10:00) (72 - 109)  RR: 48 (21 Nov 2018 10:00) (17 - 48)  SpO2: 95% (21 Nov 2018 10:00) (72% - 99%)    Physical Exam:  Patient comfortable  AXOX3  Neck supple, no LN's  Chest: bilateral breath sounds, no wheeze or rales  CVS: regular heart rate without murmur  Abdomen: soft, BS+, no massess or organomegaly  CNS: no gross deficit  Ext: no edema HPI:  72 y/o F pt admitted with abdominal pain, has known chronic back pain for which she is on pain meds, narcotics.  She has multiple prior admissions for SBO, narcotic associated ileus/constipation, but has not required hospitalization for this in several years.  Being managed in SICU for SBO, dehydration, fluid electrolyte disorder  Known COPD - current everyday smoker      PAST MEDICAL & SURGICAL HISTORY:  Back pain  Bowel obstruction: multiple hospitalizations  Kidney stone  Emphysema  Narcotic Dependence  S/P Radiation Therapy  S/P Chemotherapy, Time Since Greater than 12 Weeks  Narcotic Dysmotile Cilia Syndrome  Chronic Pain Following Surgery or Procedure: following right breast mastectomy  Ankle Fracture: right anle fx s/p cast 2009  History of Small Bowel Obstruction: 1/2010  Asthma  Breast Cancer  S/P hernia surgery: femoral hernia - 2012  S/P Exploratory Laparotomy  S/P Appendectomy  S/P Cholecystectomy  Liver Mass s/p liver rsxn: s/p resection 2009  History of Hysterectomy: 1998  S/P Right Mastectomy: 2006    Meds:  ALBUTerol/ipratropium for Nebulization 3 milliLiter(s) Nebulizer every 6 hours  buDESOnide   0.5 milliGRAM(s) Respule 0.5 milliGRAM(s) Inhalation every 12 hours  chlorhexidine 4% Liquid 1 Application(s) Topical <User Schedule>  heparin  Injectable 5000 Unit(s) SubCutaneous every 8 hours  influenza   Vaccine 0.5 milliLiter(s) IntraMuscular once  nicotine -  14 mG/24Hr(s) Patch 1 patch Transdermal daily  pantoprazole  Injectable 40 milliGRAM(s) IV Push daily  sodium chloride 0.9%. 1000 milliLiter(s) IV Continuous <Continuous>  tetracaine/benzocaine/butamben Spray 1 Spray(s) Topical four times a day PRN Sore throat    Labs:  CBC Full  -  ( 21 Nov 2018 03:49 )  WBC Count : 7.8 K/uL  Hemoglobin : 13.7 g/dL  Hematocrit : 41.8 %  Platelet Count - Automated : 217 K/uL  Mean Cell Volume : 98.7 fl  Mean Cell Hemoglobin : 32.3 pg  Mean Cell Hemoglobin Concentration : 32.7 gm/dL  Auto Neutrophil # : x  Auto Lymphocyte # : x  Auto Monocyte # : x  Auto Eosinophil # : x  Auto Basophil # : x  Auto Neutrophil % : x  Auto Lymphocyte % : x  Auto Monocyte % : x  Auto Eosinophil % : x  Auto Basophil % : x    11-21    140  |  91<L>  |  98<H>  ----------------------------<  116<H>  4.9   |  23  |  6.83<H>    Ca    7.4<L>      21 Nov 2018 03:49  Phos  11.6     11-21  Mg     1.8     11-21    TPro  6.9  /  Alb  4.0  /  TBili  0.3  /  DBili  x   /  AST  48<H>  /  ALT  51<H>  /  AlkPhos  249<H>  11-20      Radiology:     < from: CT Abdomen and Pelvis No Cont (11.20.18 @ 17:07) >  Small bowel obstruction with transition point in the ileum   located in theright lower quadrant.    Groundglass opacities in the right lower lobe suggest pneumonia.    < end of copied text >    < from: CT Chest No Cont (11.20.18 @ 19:17) >  CT of the abdomen performed on the same day for additional findings.  BONES: Within normal limits.    IMPRESSION: Large cavitary lesion in the left upper lobe. Primary   consideration is lung carcinoma. Tuberculosis or a lung abscess is not   excluded.    Mucous plugging in the right lower lobe with groundglass opacities which   may be due to infection or atelectasis.      < end of copied text >        ROS:  Patient with NG tube, looks weak.   Does not give specific complain at present   Vital Signs Last 24 Hrs  T(C): 36.3 (21 Nov 2018 07:00), Max: 36.6 (20 Nov 2018 23:00)  T(F): 97.4 (21 Nov 2018 07:00), Max: 97.9 (20 Nov 2018 23:00)  HR: 62 (21 Nov 2018 10:00) (62 - 98)  BP: 116/55 (21 Nov 2018 10:00) (105/53 - 171/76)  BP(mean): 82 (21 Nov 2018 10:00) (72 - 109)  RR: 48 (21 Nov 2018 10:00) (17 - 48)  SpO2: 95% (21 Nov 2018 10:00) (72% - 99%)    Physical Exam:  Patient looks frail and somewhat emaciated  Lethargic  No LN's  Chest: bilateral breath sounds, no wheeze or rales  CVS: regular heart rate without murmur  Abdomen: scars of multiple surgeries, distended  Ext: no edema

## 2018-11-21 NOTE — PROGRESS NOTE ADULT - SUBJECTIVE AND OBJECTIVE BOX
Progress note surgery     Pt was seen and examined. pain controlled with medication no nausea or vomiting       Vital Signs Last 24 Hrs  T(C): 36.4 (2018 03:00), Max: 36.6 (2018 23:00)  T(F): 97.5 (2018 03:00), Max: 97.9 (2018 23:00)  HR: 70 (2018 06:00) (68 - 98)  BP: 135/58 (2018 06:00) (105/53 - 171/76)  BP(mean): 84 (2018 06:00) (72 - 109)  RR: 28 (2018 06:00) (17 - 43)  SpO2: 98% (2018 06:00) (72% - 99%)    I&O's Detail    2018 07:01  -  2018 07:00  --------------------------------------------------------  IN:    IV PiggyBack: 400 mL    Sodium Chloride 0.9% IV Bolus: 500 mL    sodium chloride 0.9%.: 875 mL  Total IN: 1775 mL    OUT:    Indwelling Catheter - Urethral: 255 mL    Nasoenteral Tube: 2050 mL  Total OUT: 2305 mL    Total NET: -530 mL          MEDICATIONS  (STANDING):  ALBUTerol/ipratropium for Nebulization 3 milliLiter(s) Nebulizer every 6 hours  buDESOnide   0.5 milliGRAM(s) Respule 0.5 milliGRAM(s) Inhalation every 12 hours  chlorhexidine 4% Liquid 1 Application(s) Topical <User Schedule>  heparin  Injectable 5000 Unit(s) SubCutaneous every 8 hours  influenza   Vaccine 0.5 milliLiter(s) IntraMuscular once  nicotine -  14 mG/24Hr(s) Patch 1 patch Transdermal daily  pantoprazole  Injectable 40 milliGRAM(s) IV Push daily  sodium chloride 0.9%. 1000 milliLiter(s) (125 mL/Hr) IV Continuous <Continuous>    MEDICATIONS  (PRN):  tetracaine/benzocaine/butamben Spray 1 Spray(s) Topical four times a day PRN Sore throat      LABS:                        13.7   7.8   )-----------( 217      ( 2018 03:49 )             41.8         140  |  91<L>  |  98<H>  ----------------------------<  116<H>  4.9   |  23  |  6.83<H>    Ca    7.4<L>      2018 03:49  Phos  11.6       Mg     1.8         TPro  6.9  /  Alb  4.0  /  TBili  0.3  /  DBili  x   /  AST  48<H>  /  ALT  51<H>  /  AlkPhos  249<H>      PT/INR - ( 2018 23:08 )   PT: 13.3 sec;   INR: 1.15 ratio         PTT - ( 2018 23:08 )  PTT:34.4 sec  Urinalysis Basic - ( 2018 18:08 )    Color: Yellow / Appearance: Slightly Turbid / S.020 / pH: x  Gluc: x / Ketone: Negative  / Bili: Small / Urobili: Negative   Blood: x / Protein: 30 mg/dL / Nitrite: Negative   Leuk Esterase: Moderate / RBC: 18 /hpf / WBC 12 /hpf   Sq Epi: x / Non Sq Epi: 1 /hpf / Bacteria: Negative      LIVER FUNCTIONS - ( 2018 23:08 )  Alb: 4.0 g/dL / Pro: 6.9 g/dL / ALK PHOS: 249 U/L / ALT: 51 U/L / AST: 48 U/L / GGT: x             Physical exam   No acute distress  resp: non labored breathing   abd. soft, non distended

## 2018-11-21 NOTE — DIETITIAN INITIAL EVALUATION ADULT. - ADHERENCE
Pt reports not following any type of diet or restriction at home, reports taking Multivitamin and Probiotics./n/a

## 2018-11-21 NOTE — DIETITIAN INITIAL EVALUATION ADULT. - NS AS NUTRI INTERV COLLABORAT
Malnutrition label placed in chart - discussed with team. Continue to obtain weights to identify changes if any.

## 2018-11-21 NOTE — CONSULT NOTE ADULT - ASSESSMENT
74 y/o F pt admitted with abdominal pain, has known chronic back pain for which she is on pain meds, narcotics.  She has multiple prior admissions for SBO, narcotic associated ileus/constipation, but has not required hospitalization for this in several years.  Being managed in SICU for SBO, dehydration, SHAKIRA, fluid electrolyte disorder  Known COPD - current everyday smoker  She has Large cavitary lesion in the left upper lobe. Primary   consideration is lung carcinoma. Tuberculosis or a lung abscess is not   excluded.  She did not have any abdominal mets or significant LN on present studies  She will need bx and further imaging once she is clinically better from her obstruction and SHAKIRA etc.  Further management planning with consideration to her PS and comorbidities after that

## 2018-11-21 NOTE — CONSULT NOTE ADULT - ASSESSMENT
----------------------------------------  Jenelle Bautista MD PGY-5  Pulmonary/Critical Care Fellow  Pager # [phone] (NS), [phone] ([facility]) 73F hx chronic back pain, complicated surgical history (hysterectomy, appendectomy, cholecystectomy, ex-lap x2), breast CA s/p R mastectomy 2006, chemoRT, liver mass resection in 2009, multiple prior SBO due to narcotic-associated ileus/constipation (last 2016), who initially p/w abdominal pain and poor PO intake. Pulm c/s for RENUKA cavitary lesion and ?COPD.     #RENUKA cavitary lesion  CT chest with incidental finding of RENUKA cavitary lesion and ?RENUKA airway closure (extrinsic vs. intrinsic blockage). Prior CT from 2011 without any evidence of lesion and CXR from 2016 without evidence of cavitary lesion or nodules. Pt has significant smoking history, prior breast CA, so most likely has a smoldering malignancy causing cavitary lesion, but cannot rule out infectious causes, such as TB, fungal, or staph.  - plan on bedside bronch Friday with BAL and biopsies if pt remains intubated  - no need for abx at this time, would have low threshold to start if pt develops signs of infection  - sputum cx, fungal cx, and afb cx  - f/u quant gold  - airborne isolation while Tb is being ruled out  - chest PT, metanebs for airway clearance  - hold HSQ Thursday night, please obtain BMP, CBC, and coags  - NPO MN on Thursday    #Emphysema  CT chest also with emphysematous changes. No wheezing on exam. Mildly hypoxic  - cont budesonide bid  - cont duonebs q6h  - ABG   - will need outpatient PFTs to confirm presence of obstructive lung disease    Case d/w Dr. Escalona (pulm attending) and SICU team    ----------------------------------------  Jenelle Bautista MD PGY-5  Pulmonary/Critical Care Fellow  Pager # [phone] (NS), [phone] ([facility]) 73F hx chronic back pain, complicated surgical history (hysterectomy, appendectomy, cholecystectomy, ex-lap x2), breast CA s/p R mastectomy 2006, chemoRT, liver mass resection in 2009, multiple prior SBO due to narcotic-associated ileus/constipation (last 2016), who initially p/w abdominal pain and poor PO intake. Pulm c/s for RENUKA cavitary lesion and ?COPD.     #RENUKA cavitary lesion  CT chest with incidental finding of RENUKA cavitary lesion and ?RENUKA airway closure (extrinsic vs. intrinsic blockage). Prior CT from 2011 without any evidence of lesion and CXR from 2016 without evidence of cavitary lesion or nodules. Pt has significant smoking history, prior breast CA, so most likely has a smoldering malignancy causing cavitary lesion, but cannot rule out infectious causes, such as TB, fungal, or staph.  - plan on bedside bronch Friday with BAL and biopsies if pt remains intubated  - would start zosyn for possible aspiration PNA  - sputum cx, fungal cx, and afb cx  - f/u quant gold  - airborne isolation while Tb is being ruled out  - chest PT, metanebs for airway clearance  - hold HSQ Thursday night, please obtain BMP, CBC, and coags  - NPO MN on Thursday    #Emphysema  CT chest also with emphysematous changes. No wheezing on exam. Mildly hypoxic  - cont budesonide bid  - cont duonebs q6h  - ABG   - will need outpatient PFTs to confirm presence of obstructive lung disease    Case d/w Dr. Escalona (pulm attending) and SICU team    ----------------------------------------  Jenelle Bautista MD PGY-5  Pulmonary/Critical Care Fellow  Pager # [phone] (NS), [phone] ([facility])

## 2018-11-21 NOTE — CHART NOTE - NSCHARTNOTEFT_GEN_A_CORE
Called to bedside to evaluate patient for agitation.  Patient has been persistently agitated since earlier this afternoon, Precedex gtt started with minimal response.  Patient given ativan x2 for concern of benzo withdrawal, patient takes valium PO daily at home.  Precedex requirement increased throughout day.  Patient saturating in the 80s on NC, put on high flow NC.  Her sats remained low and patient became increasingly confused/altered.  Concerned patient was unable to protect airway, so it was decided to intubate patient at bedside.  Induction with propofol and rocuronium.  Patient intubated using glideoscope, 7.5 ETT.  Color change capnography positive.  Patient to remain sedated with propofol. Dr. Bianchi present for intubation.     DAKOTA Marcano PGY2  [phone]

## 2018-11-21 NOTE — DIETITIAN INITIAL EVALUATION ADULT. - NS FNS WEIGHT CHANGE REASON
Pt reports 29 pounds weight loss from 118 to 89 pounds x 1 year PTA, unable to recall reason. Weight as per flow sheets (11/20) 88.1 pounds -> (11/21) 84.4 pounds.

## 2018-11-21 NOTE — DIETITIAN INITIAL EVALUATION ADULT. - ENERGY NEEDS
Ht: 62 inches Wt: 84.4 pounds BMI: 15.4 kg/m2 IBW: 110 (+/-10%) 76.7 %IBW  Pertinent information: Pt 72 y/o F with PMH: COPD with cachexia, bowel obstructions with multiple hospitalizations, kidney stones, emphysema, narcotic dependence, breast cancer S/P RT, S/P chemotherapy, admitted with abdomina pain, nausea, vomiting and poor PO intake, found with SBO in CT, severe dehydration with fluid resuscitation, SHAKIRA due to dehydration, NGT drainage - no feeding plans at this time.    No noted edema as per flow sheets. Skin: no noted pressure injuries as per documentation.

## 2018-11-21 NOTE — PROGRESS NOTE ADULT - SUBJECTIVE AND OBJECTIVE BOX
HISTORY  74 y/o female with COPD and current smoker with more than 30 packs year history, complicated surgical history (hysterectomy, appendectomy, cholecystectomy, femoral hernia repair, mastectomy and ex-lap with Lisa x 2), back pain on chronic narcotic use and recurrent SBO presented with abdominal pain. The pain started 4 days ago localized to the mid-abdomen associated with poor PO intake and persistent vomiting for past 12 hours. She has had multiple prior admissions for SBO, narcotic associated ileus/constipation, but has not required hospitalization for several years. She states that her last regular BM reported yesterday, aide at bedside reports smear brown BM today Patient had an NGT in ER with 700 output immediately. Patient also had a Chavraria placed with about 200 output. Patient is found to have severe dehydration as evidenced by hemocentration, SHAKIRA (Cr 1.1 > 7.6) and elevated lactate.     24 hr event: patient is further resuscitated with 500 cc bolus and 125cc/hr normal saline in SICU     SUBJECTIVE/ROS:  [X] A ten-point review of systems was otherwise negative except as noted.  [ ] Due to altered mental status/intubation, subjective information were not able to be obtained from the patient. History was obtained, to the extent possible, from review of the chart and collateral sources of information.    NEURO  Exam: patient is mentating well, follows command    Meds:   [x] Adequacy of sedation and pain control has been assessed and adjusted      RESPIRATORY  RR: 21 (11-21-18 @ 01:00) (17 - 33)  SpO2: 97% (11-21-18 @ 01:00) (72% - 99%)  Wt(kg): --  Exam: unlabored, clear to auscultation bilaterally  ABG - ( 20 Nov 2018 22:56 )  pH: 7.22  /  pCO2: 80    /  pO2: 39    / HCO3: 32    / Base Excess: 1.2   /  SaO2: 55      Lactate: x      Meds: ALBUTerol/ipratropium for Nebulization 3 milliLiter(s) Nebulizer every 6 hours  buDESOnide   0.5 milliGRAM(s) Respule 0.5 milliGRAM(s) Inhalation every 12 hours    CARDIOVASCULAR  HR: 79 (11-21-18 @ 01:00) (70 - 98)  BP: 113/55 (11-21-18 @ 01:00) (108/57 - 171/76)  BP(mean): 77 (11-21-18 @ 01:00) (77 - 109)  Lactate: 6.1 > 1.9    Exam:  Cardiac Rhythm: regular rate and rhythm   Perfusion     [x]Adequate   [ ]Inadequate  Mentation   [x]Normal       [ ]Reduced  Extremities  [x]Warm         [ ]Cool  Volume Status [ ]Hypervolemic [ ]Euvolemic [x]Hypovolemic  Meds: none     GI/NUTRITION  Exam: distended abdomen, tender diffusely without focal peritonitis   Diet: NPO with NGT  Meds: pantoprazole  Injectable 40 milliGRAM(s) IV Push daily      GENITOURINARY  I&O's Detail    11-20 @ 07:01  -  11-21 @ 01:14  --------------------------------------------------------  IN:    Sodium Chloride 0.9% IV Bolus: 500 mL    sodium chloride 0.9%.: 250 mL  Total IN: 750 mL    OUT:    Indwelling Catheter - Urethral: 215 mL    Nasoenteral Tube: 1800 mL  Total OUT: 2015 mL    Total NET: -1265 mL    Weight (kg): 40 (11-20 @ 22:00)  11-20    140  |  85<L>  |  101<H>  ----------------------------<  102<H>  4.7   |  28  |  7.51<H>    Ca    6.8<L>      20 Nov 2018 23:08  Phos  13.6     11-20  Mg     1.9     11-20    TPro  6.9  /  Alb  4.0  /  TBili  0.3  /  DBili  x   /  AST  48<H>  /  ALT  51<H>  /  AlkPhos  249<H>  11-20    [x] Chavarria catheter, indication: monitor resuscitation   Meds: None      HEMATOLOGIC  Meds: heparin  Injectable 5000 Unit(s) SubCutaneous every 8 hours    [x] VTE Prophylaxis                        15.2   7.0   )-----------( 290      ( 20 Nov 2018 23:08 )             47.4     PT/INR - ( 20 Nov 2018 23:08 )   PT: 13.3 sec;   INR: 1.15 ratio    PTT - ( 20 Nov 2018 23:08 )  PTT:34.4 sec  Transfusion: none      INFECTIOUS DISEASES  T(C): 36.2 (11-20-18 @ 22:00), Max: 36.4 (11-20-18 @ 19:58)  WBC Count: 7.0 K/uL (11-20 @ 23:08)  WBC Count: 11.5 K/uL (11-20 @ 15:31)  Recent Cultures: none  Meds: none    ENDOCRINE  No history of endocrine disorder     ACCESS DEVICES:  [x] Peripheral IV  [ ] Central Venous Line	[ ] R	[ ] L	[ ] IJ	[ ] Fem	[ ] SC	Placed:   [ ] Arterial Line		[ ] R	[ ] L	[ ] Fem	[ ] Rad	[ ] Ax	Placed:   [ ] PICC:					[ ] Mediport  [x] Urinary Catheter, Date Placed:   [x] Necessity of urinary, arterial, and venous catheters discussed    OTHER MEDICATIONS:  chlorhexidine 4% Liquid 1 Application(s) Topical <User Schedule>  nicotine -  14 mG/24Hr(s) Patch 1 patch Transdermal daily  tetracaine/benzocaine/butamben Spray 1 Spray(s) Topical four times a day PRN    CODE STATUS: Full code    IMAGING:  EXAM:  CT ABDOMEN AND PELVIS                        IMPRESSION: Small bowel obstruction with transition point in the ileum located in the right lower quadrant.    EXAM:  CT CHEST                        IMPRESSION: Large cavitary lesion in the left upper lobe. Primary   consideration is lung carcinoma. Tuberculosis or a lung abscess is not   excluded. Mucous plugging in the right lower lobe with ground glass opacities which   may be due to infection or atelectasis. HISTORY  72 y/o female with COPD and current smoker with more than 30 packs year history, complicated surgical history (hysterectomy, appendectomy, cholecystectomy, femoral hernia repair, mastectomy and ex-lap with Lisa x 2), back pain on chronic opioid use and recurrent SBO presented with abdominal pain. The pain started 4 days ago localized to the mid-abdomen associated with poor PO intake and persistent vomiting for past 12 hours. She has had multiple prior admissions for SBO, narcotic associated ileus/constipation, but has not required hospitalization for several years. She states that her last regular BM reported yesterday, aide at bedside reports smear brown BM today Patient had an NGT in ER with 700 output immediately. Patient also had a Chavarria placed with about 200 output. Patient is found to have severe dehydration as evidenced by hemocentration, SHAKIRA (Cr 1.1 > 7.6) and elevated lactate.     24 hr event: patient is further resuscitated with 500 cc bolus and 125cc/hr normal saline in SICU     SUBJECTIVE/ROS:  [X] A ten-point review of systems was otherwise negative except as noted.  [ ] Due to altered mental status/intubation, subjective information were not able to be obtained from the patient. History was obtained, to the extent possible, from review of the chart and collateral sources of information.    NEURO  Exam: patient is mentating well, follows command    Meds:   [x] Adequacy of sedation and pain control has been assessed and adjusted      RESPIRATORY  RR: 21 (11-21-18 @ 01:00) (17 - 33)  SpO2: 97% (11-21-18 @ 01:00) (72% - 99%)  Wt(kg): --  Exam: unlabored, clear to auscultation bilaterally  ABG - ( 20 Nov 2018 22:56 )  pH: 7.22  /  pCO2: 80    /  pO2: 39    / HCO3: 32    / Base Excess: 1.2   /  SaO2: 55      Lactate: x      Meds: ALBUTerol/ipratropium for Nebulization 3 milliLiter(s) Nebulizer every 6 hours  buDESOnide   0.5 milliGRAM(s) Respule 0.5 milliGRAM(s) Inhalation every 12 hours    CARDIOVASCULAR  HR: 79 (11-21-18 @ 01:00) (70 - 98)  BP: 113/55 (11-21-18 @ 01:00) (108/57 - 171/76)  BP(mean): 77 (11-21-18 @ 01:00) (77 - 109)  Lactate: 6.1 > 1.9    Exam:  Cardiac Rhythm: regular rate and rhythm   Perfusion     [x]Adequate   [ ]Inadequate  Mentation   [x]Normal       [ ]Reduced  Extremities  [x]Warm         [ ]Cool  Volume Status [ ]Hypervolemic [ ]Euvolemic [x]Hypovolemic  Meds: none     GI/NUTRITION  Exam: distended abdomen, tender diffusely without focal peritonitis   Diet: NPO with NGT  Meds: pantoprazole  Injectable 40 milliGRAM(s) IV Push daily      GENITOURINARY  I&O's Detail    11-20 @ 07:01  -  11-21 @ 01:14  --------------------------------------------------------  IN:    Sodium Chloride 0.9% IV Bolus: 500 mL    sodium chloride 0.9%.: 250 mL  Total IN: 750 mL    OUT:    Indwelling Catheter - Urethral: 215 mL    Nasoenteral Tube: 1800 mL  Total OUT: 2015 mL    Total NET: -1265 mL    Weight (kg): 40 (11-20 @ 22:00)  11-20    140  |  85<L>  |  101<H>  ----------------------------<  102<H>  4.7   |  28  |  7.51<H>    Ca    6.8<L>      20 Nov 2018 23:08  Phos  13.6     11-20  Mg     1.9     11-20    TPro  6.9  /  Alb  4.0  /  TBili  0.3  /  DBili  x   /  AST  48<H>  /  ALT  51<H>  /  AlkPhos  249<H>  11-20    [x] Chavarria catheter, indication: monitor resuscitation   Meds: None      HEMATOLOGIC  Meds: heparin  Injectable 5000 Unit(s) SubCutaneous every 8 hours    [x] VTE Prophylaxis                        15.2   7.0   )-----------( 290      ( 20 Nov 2018 23:08 )             47.4     PT/INR - ( 20 Nov 2018 23:08 )   PT: 13.3 sec;   INR: 1.15 ratio    PTT - ( 20 Nov 2018 23:08 )  PTT:34.4 sec  Transfusion: none      INFECTIOUS DISEASES  T(C): 36.2 (11-20-18 @ 22:00), Max: 36.4 (11-20-18 @ 19:58)  WBC Count: 7.0 K/uL (11-20 @ 23:08)  WBC Count: 11.5 K/uL (11-20 @ 15:31)  Recent Cultures: none  Meds: none    ENDOCRINE  No history of endocrine disorder     ACCESS DEVICES:  [x] Peripheral IV  [ ] Central Venous Line	[ ] R	[ ] L	[ ] IJ	[ ] Fem	[ ] SC	Placed:   [ ] Arterial Line		[ ] R	[ ] L	[ ] Fem	[ ] Rad	[ ] Ax	Placed:   [ ] PICC:					[ ] Mediport  [x] Urinary Catheter, Date Placed:   [x] Necessity of urinary, arterial, and venous catheters discussed    OTHER MEDICATIONS:  chlorhexidine 4% Liquid 1 Application(s) Topical <User Schedule>  nicotine -  14 mG/24Hr(s) Patch 1 patch Transdermal daily  tetracaine/benzocaine/butamben Spray 1 Spray(s) Topical four times a day PRN    CODE STATUS: Full code    IMAGING:  EXAM:  CT ABDOMEN AND PELVIS                        IMPRESSION: Small bowel obstruction with transition point in the ileum located in the right lower quadrant.    EXAM:  CT CHEST                        IMPRESSION: Large cavitary lesion in the left upper lobe. Primary   consideration is lung carcinoma. Tuberculosis or a lung abscess is not   excluded. Mucous plugging in the right lower lobe with ground glass opacities which   may be due to infection or atelectasis.

## 2018-11-21 NOTE — DIETITIAN INITIAL EVALUATION ADULT. - PHYSICAL APPEARANCE
emaciated/Performed Nutrition Focused Physical Exam with pt's consent and noted mild muscle loss in temporales; severe muscle loss in clavicles, shoulders, interosseous muscles, and scapula; and severe fat loss in ribs.

## 2018-11-21 NOTE — PROGRESS NOTE ADULT - ATTENDING COMMENTS
Dr. Bianchi (Attending Physician)  chronic pain with opioid use.  SBO - will attempt oral narcan consider methylnaltrexone  Acute renal failure improving with volume resuscitation will continue to monitor  Lung mass will consult pulm to eval Dr. Bianchi (Attending Physician)  chronic pain with opioid use.  SBO - will attempt oral narcan consider methylnaltrexone  Acute renal failure improving with volume resuscitation will continue to monitor  Lung mass will consult pulm to eval    This patient was critically ill with one or more vital organ systems at a high probability of imminent or life threatening deterioration. Complex decision making was required to assess and treat single or multiple vital organ system failure and/or prevent further life threatening deterioration of the patient’s condition.  All recent labwork and imaging was reviewed.  Total Critical Care Time 50 min

## 2018-11-21 NOTE — DIETITIAN INITIAL EVALUATION ADULT. - NS AS NUTRI INTERV MEALS SNACK
Defer diet advancement/tolerance to medical team. If pt to start PO intake, recommend low fiber diet. Will monitor and consider nutritional supplementation as appropriate. Encourage PO intake, obtain food preferences, provide feeding assistance as needed.

## 2018-11-21 NOTE — DIETITIAN INITIAL EVALUATION ADULT. - OTHER INFO
Pt seen for ICU length of stay initial assessment. Pt currently NPO, denies nausea or vomiting, reports last BM yesterday (10/20). Pt seen for ICU length of stay initial assessment. Pt currently NPO, states "I am very hungry", denies nausea or vomiting at this time, reports last BM yesterday (10/20).

## 2018-11-21 NOTE — DIETITIAN INITIAL EVALUATION ADULT. - FACTORS AFF FOOD INTAKE
Pt NPO for SBO Pt NPO for SBO; pt with history of COPD, denies difficulty breathing and eating at the same time PTA Pt NPO for SBO; NGT for suction, 2050 ml x 24 hours as per flow sheets; pt with history of COPD, denies difficulty breathing and eating at the same time PTA

## 2018-11-21 NOTE — PROGRESS NOTE ADULT - ASSESSMENT
73 year old female with   PMH chronic Back pain (on Narcotics), history of multiple SBO's, narcotic associated constipation/ileus, COPD, current everyday smoker   admitted with abdominal pain and distension, nausea and poor PO intake with associated worsening painful spasms.      c/c  cachexia  and   dehydrated with acute Renal Failure (SHAKIRA) with marked hyperphosphatemia, acidosis   SBO on ct  surg eval  iV hydration  SHAKIRA , improving/  hb  stable  ct  scans  noted/  RENUKA  cavitary lesion./ ? pna  SBO, transition point  in ileum  oncology  to f/p/ d r forte  care per  sicu        < from: CT Chest No Cont (11.20.18 @ 19:17) >  IMPRESSION: Large cavitary lesion in the left upper lobe. Primary   consideration is lung carcinoma. Tuberculosis or a lung abscess is not   excluded.  Mucous plugging in the right lower lobe with groundglass opacities which   may be due to infection or atelectasis.  < end of copied text >     < from: CT Abdomen and Pelvis No Cont (11.20.18 @ 17:07) >  IMPRESSION: Small bowel obstruction with transition point in the ileum   located in theright lower quadrant.  Groundglass opacities in the right lower lobe suggest pneumonia.  Findings discussed with Dr. Diaz on 11/20/2018 at 5:30 PM with read   back.  < end of copied text >

## 2018-11-21 NOTE — DIETITIAN INITIAL EVALUATION ADULT. - NS AS NUTRI INTERV ENTERAL NUTRITION
If pt to start EN: recommend Vital AF starting @10 ml/hr and advance 10ml/hr until goal of 40 ml/hr x 24 hours to provide 960 ml, 1152 kcal, and 72 g protein (29 kcal/kg and 1.8 g/kg protein based on dosing weight 40 kg)

## 2018-11-21 NOTE — PROGRESS NOTE ADULT - ASSESSMENT
73 female with complicated surgical history and history of multiple episodes of small bowel obstruction presented with SBO demonstrated on CT scan. Patient is also found to have severe dehydration on the lab and admitted to SICU for monitoring and fluid resuscitation     PLAN:   Neurologic:   No standing pain medication, PRN Tylenol after examination     Respiratory:   Large cavitary lesion on CT scan, patient has extensive smoking history  Send for quantiferon gold for TB though patient doesn't have documented history of risk for TB  Breathing well on room air  DUONEB and pulmicort for COPD    Cardiovascular:   Lactate normalized after fluid resuscitation (6.1 > 1.9), hemodynamically stable   no rate/rhythm controlling agent required     Gastrointestinal/Nutrition:   Serial abdominal exam   NPO with IVF  NGT for gastric decompression     Renal/Genitourinary:   Severe SHAKIRA likely secondary to pre-renal dehydration   Patient received 2L NaCl boluses in the ER, will give another 500 cc bolus   Will keep patient on 125 cc/h NaCl  Chavarria placed to monitor for resuscitation     Hematologic:   SQH for DVT ppx, no other issue     Infectious Disease:   No acute issue     Tubes/Lines/Drains:   PIV, NGT and Chavarria     Endocrine:   No history of endocrine disease   No acute issue     Disposition:  SICU

## 2018-11-21 NOTE — PROGRESS NOTE ADULT - SUBJECTIVE AND OBJECTIVE BOX
CARDIOLOGY     PROGRESS  NOTE   ________________________________________________    CHIEF COMPLAINT:Patient is a 73y old  Female who presents with a chief complaint of SBO with severe dehydration (21 Nov 2018 01:13)    	  REVIEW OF SYSTEMS:  CONSTITUTIONAL: No fever, weight loss, or fatigue  EYES: No eye pain, visual disturbances, or discharge  ENT:  No difficulty hearing, tinnitus, vertigo; No sinus or throat pain  NECK: No pain or stiffness  RESPIRATORY: No cough, wheezing, chills or hemoptysis; No Shortness of Breath  CARDIOVASCULAR: No chest pain, palpitations, passing out, dizziness, or leg swelling  GASTROINTESTINAL: No abdominal or epigastric pain. No nausea, vomiting, or hematemesis; No diarrhea or constipation. No melena or hematochezia.  GENITOURINARY: No dysuria, frequency, hematuria, or incontinence  NEUROLOGICAL: No headaches, memory loss, loss of strength, numbness, or tremors  SKIN: No itching, burning, rashes, or lesions   LYMPH Nodes: No enlarged glands  ENDOCRINE: No heat or cold intolerance; No hair loss  MUSCULOSKELETAL: No joint pain or swelling; No muscle, back, or extremity pain  PSYCHIATRIC: No depression, anxiety, mood swings, or difficulty sleeping  HEME/LYMPH: No easy bruising, or bleeding gums  ALLERGY AND IMMUNOLOGIC: No hives or eczema	    [ ] All others negative	  [ ] Unable to obtain    PHYSICAL EXAM:  T(C): 36.3 (11-21-18 @ 07:00), Max: 36.6 (11-20-18 @ 23:00)  HR: 63 (11-21-18 @ 08:00) (63 - 98)  BP: 105/53 (11-21-18 @ 08:00) (105/53 - 171/76)  RR: 47 (11-21-18 @ 08:00) (17 - 47)  SpO2: 93% (11-21-18 @ 08:00) (72% - 99%)  Wt(kg): --  I&O's Summary    20 Nov 2018 07:01  -  21 Nov 2018 07:00  --------------------------------------------------------  IN: 2100 mL / OUT: 2305 mL / NET: -205 mL    21 Nov 2018 07:01  -  21 Nov 2018 08:49  --------------------------------------------------------  IN: 125 mL / OUT: 60 mL / NET: 65 mL        Appearance: Normal	  HEENT:   Normal oral mucosa, PERRL, EOMI	  Lymphatic: No lymphadenopathy  Cardiovascular: Normal S1 S2, No JVD, + murmurs, No edema  Respiratory: Lungs clear to auscultation	  Psychiatry: A & O x 3, Mood & affect appropriate  Gastrointestinal:  Soft, Non-tender, + BS	  Skin: No rashes, No ecchymoses, No cyanosis	  Neurologic: Non-focal  Extremities: Normal range of motion, No clubbing, cyanosis or edema  Vascular: Peripheral pulses palpable 2+ bilaterally    MEDICATIONS  (STANDING):  ALBUTerol/ipratropium for Nebulization 3 milliLiter(s) Nebulizer every 6 hours  buDESOnide   0.5 milliGRAM(s) Respule 0.5 milliGRAM(s) Inhalation every 12 hours  chlorhexidine 4% Liquid 1 Application(s) Topical <User Schedule>  heparin  Injectable 5000 Unit(s) SubCutaneous every 8 hours  influenza   Vaccine 0.5 milliLiter(s) IntraMuscular once  nicotine -  14 mG/24Hr(s) Patch 1 patch Transdermal daily  pantoprazole  Injectable 40 milliGRAM(s) IV Push daily  sodium chloride 0.9%. 1000 milliLiter(s) (125 mL/Hr) IV Continuous <Continuous>      TELEMETRY: 	    ECG:  	  RADIOLOGY:  OTHER: 	  	  LABS:	 	    CARDIAC MARKERS:                                13.7   7.8   )-----------( 217      ( 21 Nov 2018 03:49 )             41.8     11-21    140  |  91<L>  |  98<H>  ----------------------------<  116<H>  4.9   |  23  |  6.83<H>    Ca    7.4<L>      21 Nov 2018 03:49  Phos  11.6     11-21  Mg     1.8     11-21    TPro  6.9  /  Alb  4.0  /  TBili  0.3  /  DBili  x   /  AST  48<H>  /  ALT  51<H>  /  AlkPhos  249<H>  11-20    proBNP:   Lipid Profile:   HgA1c:   TSH:   PT/INR - ( 20 Nov 2018 23:08 )   PT: 13.3 sec;   INR: 1.15 ratio         PTT - ( 20 Nov 2018 23:08 )  PTT:34.4 sec      Assessment and plan  ---------------------------  SBO as per surgery  continue fluid  echo to evaluate heart murmur/LV function  tsh  dvt prophylaxis

## 2018-11-21 NOTE — CHART NOTE - NSCHARTNOTEFT_GEN_A_CORE
Upon Nutritional Assessment by the Registered Dietitian your patient was determined to meet criteria / has evidence of the following diagnosis/diagnoses:          [ ]  Mild Protein Calorie Malnutrition        [ ]  Moderate Protein Calorie Malnutrition        [x] Severe Protein Calorie Malnutrition        [ ] Unspecified Protein Calorie Malnutrition        [ ] Underweight / BMI <19        [ ] Morbid Obesity / BMI > 40      Findings as based on:  [x] Comprehensive nutrition assessment   [x] Nutrition Focused Physical Exam with pt's consent and noted mild muscle loss in temporales; severe muscle loss in clavicles, shoulders, interosseous muscles, and scapula; and severe fat loss in ribs.  [x] Other: 29% weight loss x 1 year; signs of muscle and fat loss; PO intake <25% x 5 days    Nutrition Plan/Recommendations:  1. Defer diet advancement/tolerance to medical team. If pt to start PO intake, recommend low fiber diet. Will monitor and consider nutritional supplementation as appropriate. Encourage PO intake, obtain food preferences, provide feeding assistance as needed.  2. If pt to start EN: recommend Vital AF starting @10 ml/hr and advance 10ml/hr until goal of 40 ml/hr x 24 hours to provide 960 ml, 1152 kcal, and 72 g protein (29 kcal/kg and 1.8 g/kg protein based on dosing weight 40 kg)  3. Continue to obtain weights to identify changes if any.      RD remains available,   Katja Green RDN    PROVIDER Section:     By signing this assessment you are acknowledging and agree with the diagnosis/diagnoses assigned by the Registered Dietitian    Comments:

## 2018-11-21 NOTE — DIETITIAN INITIAL EVALUATION ADULT. - NUTRITION INTERVENTION
Meals and Snack/Nutrition Education/Collaboration and Referral of Nutrition Care/Enteral Nutrition Enteral Nutrition/Nutrition Education/Collaboration and Referral of Nutrition Care/Meals and Snack/Mineral

## 2018-11-21 NOTE — PROGRESS NOTE ADULT - SUBJECTIVE AND OBJECTIVE BOX
[facility]   MD VICENTA LEONARD MD RUORU WONG, PA    TEL:  OFFICE: [phone]  DR MAYER CELL: [phone]  DONNA WORKMAN CELL: [phone]  DR. PARTIDA CELL: [phone]    RENAL FOLLOW UP NOTE  --------------------------------------------------------------------------------  HPI:      Pt seen and examined at bedside IN SICU      PAST HISTORY  --------------------------------------------------------------------------------  No significant changes to PMH, PSH, FHx, SHx, unless otherwise noted    ALLERGIES & MEDICATIONS  --------------------------------------------------------------------------------  Allergies    Biaxin (Rash)  Cipro (Headache)  Flagyl (Headache)  penicillin (Rash; Swelling)  sulfa drugs (Unknown)    Intolerances      Standing Inpatient Medications  ALBUTerol/ipratropium for Nebulization 3 milliLiter(s) Nebulizer every 6 hours  buDESOnide   0.5 milliGRAM(s) Respule 0.5 milliGRAM(s) Inhalation every 12 hours  chlorhexidine 4% Liquid 1 Application(s) Topical <User Schedule>  heparin  Injectable 5000 Unit(s) SubCutaneous every 8 hours  influenza   Vaccine 0.5 milliLiter(s) IntraMuscular once  nicotine -  14 mG/24Hr(s) Patch 1 patch Transdermal daily  pantoprazole  Injectable 40 milliGRAM(s) IV Push daily  sodium chloride 0.9%. 1000 milliLiter(s) IV Continuous <Continuous>    PRN Inpatient Medications  tetracaine/benzocaine/butamben Spray 1 Spray(s) Topical four times a day PRN      REVIEW OF SYSTEMS  --------------------------------------------------------------------------------  General: no fever  MSK: no edema     VITALS/PHYSICAL EXAM  --------------------------------------------------------------------------------  T(C): 36.3 (11-21-18 @ 07:00), Max: 36.6 (11-20-18 @ 23:00)  HR: 63 (11-21-18 @ 08:00) (63 - 98)  BP: 105/53 (11-21-18 @ 08:00) (105/53 - 171/76)  RR: 47 (11-21-18 @ 08:00) (17 - 47)  SpO2: 93% (11-21-18 @ 08:00) (72% - 99%)  Wt(kg): --  Height (cm): 157.48 (11-20-18 @ 22:00)  Weight (kg): 40 (11-20-18 @ 22:00)  BMI (kg/m2): 16.1 (11-20-18 @ 22:00)  BSA (m2): 1.35 (11-20-18 @ 22:00)      11-20-18 @ 07:01  -  11-21-18 @ 07:00  --------------------------------------------------------  IN: 2100 mL / OUT: 2305 mL / NET: -205 mL    11-21-18 @ 07:01  -  11-21-18 @ 09:08  --------------------------------------------------------  IN: 125 mL / OUT: 60 mL / NET: 65 mL      Physical Exam:  	Gen: NAD, + NGT  	HEENT: MMM  	Pulm: CTA B/L  	CV: S1S2  	Abd: Soft, +BS  	Ext: No LE edema B/L                      Neuro: Awake   	Skin: Warm and Dry   	    LABS/STUDIES  --------------------------------------------------------------------------------              13.7   7.8   >-----------<  217      [11-21-18 @ 03:49]              41.8     140  |  91  |  98  ----------------------------<  116      [11-21-18 @ 03:49]  4.9   |  23  |  6.83        Ca     7.4     [11-21-18 @ 03:49]      iCa    0.90     [11-21 @ 03:59]      Mg     1.8     [11-21-18 @ 03:49]      Phos  11.6     [11-21-18 @ 03:49]    TPro  6.9  /  Alb  4.0  /  TBili  0.3  /  DBili  x   /  AST  48  /  ALT  51  /  AlkPhos  249  [11-20-18 @ 23:08]    PT/INR: PT 13.3 , INR 1.15       [11-20-18 @ 23:08]  PTT: 34.4       [11-20-18 @ 23:08]      Creatinine Trend:  SCr 6.83 [11-21 @ 03:49]  SCr 7.51 [11-20 @ 23:08]  SCr 7.66 [11-20 @ 15:31]    Urinalysis - [11-20-18 @ 18:08]      Color Yellow / Appearance Slightly Turbid / SG 1.020 / pH 5.5      Gluc Negative / Ketone Negative  / Bili Small / Urobili Negative       Blood Small / Protein 30 mg/dL / Leuk Est Moderate / Nitrite Negative      RBC 18 / WBC 12 / Hyaline 20 / Gran  / Sq Epi  / Non Sq Epi 1 / Bacteria Negative    Urine Creatinine 137      [11-20-18 @ 18:08]  Urine Sodium 31      [11-20-18 @ 18:08]  Urine Osmolality 363      [11-20-18 @ 18:08]

## 2018-11-22 DIAGNOSIS — E87.2 ACIDOSIS: ICD-10-CM

## 2018-11-22 LAB
ANION GAP SERPL CALC-SCNC: 28 MMOL/L — HIGH (ref 5–17)
ANION GAP SERPL CALC-SCNC: 29 MMOL/L — HIGH (ref 5–17)
BUN SERPL-MCNC: 87 MG/DL — HIGH (ref 7–23)
BUN SERPL-MCNC: 96 MG/DL — HIGH (ref 7–23)
CALCIUM SERPL-MCNC: 7.7 MG/DL — LOW (ref 8.4–10.5)
CALCIUM SERPL-MCNC: 7.7 MG/DL — LOW (ref 8.4–10.5)
CHLORIDE SERPL-SCNC: 102 MMOL/L — SIGNIFICANT CHANGE UP (ref 96–108)
CHLORIDE SERPL-SCNC: 99 MMOL/L — SIGNIFICANT CHANGE UP (ref 96–108)
CO2 SERPL-SCNC: 16 MMOL/L — LOW (ref 22–31)
CO2 SERPL-SCNC: 16 MMOL/L — LOW (ref 22–31)
CREAT SERPL-MCNC: 3.66 MG/DL — HIGH (ref 0.5–1.3)
CREAT SERPL-MCNC: 4.75 MG/DL — HIGH (ref 0.5–1.3)
GAS PNL BLDA: SIGNIFICANT CHANGE UP
GAS PNL BLDA: SIGNIFICANT CHANGE UP
GLUCOSE SERPL-MCNC: 121 MG/DL — HIGH (ref 70–99)
GLUCOSE SERPL-MCNC: 93 MG/DL — SIGNIFICANT CHANGE UP (ref 70–99)
HCT VFR BLD CALC: 36.1 % — SIGNIFICANT CHANGE UP (ref 34.5–45)
HGB BLD-MCNC: 11.7 G/DL — SIGNIFICANT CHANGE UP (ref 11.5–15.5)
MAGNESIUM SERPL-MCNC: 2.5 MG/DL — SIGNIFICANT CHANGE UP (ref 1.6–2.6)
MAGNESIUM SERPL-MCNC: 2.6 MG/DL — SIGNIFICANT CHANGE UP (ref 1.6–2.6)
MCHC RBC-ENTMCNC: 31.3 PG — SIGNIFICANT CHANGE UP (ref 27–34)
MCHC RBC-ENTMCNC: 32.3 GM/DL — SIGNIFICANT CHANGE UP (ref 32–36)
MCV RBC AUTO: 97 FL — SIGNIFICANT CHANGE UP (ref 80–100)
NIGHT BLUE STAIN TISS: SIGNIFICANT CHANGE UP
PHOSPHATE SERPL-MCNC: 6.5 MG/DL — HIGH (ref 2.5–4.5)
PHOSPHATE SERPL-MCNC: 8.9 MG/DL — HIGH (ref 2.5–4.5)
PLATELET # BLD AUTO: 201 K/UL — SIGNIFICANT CHANGE UP (ref 150–400)
POTASSIUM SERPL-MCNC: 3.3 MMOL/L — LOW (ref 3.5–5.3)
POTASSIUM SERPL-MCNC: 3.3 MMOL/L — LOW (ref 3.5–5.3)
POTASSIUM SERPL-SCNC: 3.3 MMOL/L — LOW (ref 3.5–5.3)
POTASSIUM SERPL-SCNC: 3.3 MMOL/L — LOW (ref 3.5–5.3)
RBC # BLD: 3.73 M/UL — LOW (ref 3.8–5.2)
RBC # FLD: 12.2 % — SIGNIFICANT CHANGE UP (ref 10.3–14.5)
SODIUM SERPL-SCNC: 144 MMOL/L — SIGNIFICANT CHANGE UP (ref 135–145)
SODIUM SERPL-SCNC: 146 MMOL/L — HIGH (ref 135–145)
SPECIMEN SOURCE: SIGNIFICANT CHANGE UP
TROPONIN T, HIGH SENSITIVITY RESULT: 211 NG/L — HIGH (ref 0–51)
TROPONIN T, HIGH SENSITIVITY RESULT: 336 NG/L — HIGH (ref 0–51)
WBC # BLD: 7.1 K/UL — SIGNIFICANT CHANGE UP (ref 3.8–10.5)
WBC # FLD AUTO: 7.1 K/UL — SIGNIFICANT CHANGE UP (ref 3.8–10.5)

## 2018-11-22 PROCEDURE — 99291 CRITICAL CARE FIRST HOUR: CPT

## 2018-11-22 PROCEDURE — 71045 X-RAY EXAM CHEST 1 VIEW: CPT | Mod: 26

## 2018-11-22 RX ORDER — HYDROMORPHONE HYDROCHLORIDE 2 MG/ML
1 INJECTION INTRAMUSCULAR; INTRAVENOUS; SUBCUTANEOUS ONCE
Qty: 0 | Refills: 0 | Status: DISCONTINUED | OUTPATIENT
Start: 2018-11-22 | End: 2018-11-22

## 2018-11-22 RX ORDER — HYDROMORPHONE HYDROCHLORIDE 2 MG/ML
2 INJECTION INTRAMUSCULAR; INTRAVENOUS; SUBCUTANEOUS ONCE
Qty: 0 | Refills: 0 | Status: DISCONTINUED | OUTPATIENT
Start: 2018-11-22 | End: 2018-11-22

## 2018-11-22 RX ORDER — SODIUM CHLORIDE 9 MG/ML
1000 INJECTION, SOLUTION INTRAVENOUS
Qty: 0 | Refills: 0 | Status: DISCONTINUED | OUTPATIENT
Start: 2018-11-22 | End: 2018-11-23

## 2018-11-22 RX ORDER — POTASSIUM CHLORIDE 20 MEQ
10 PACKET (EA) ORAL
Qty: 0 | Refills: 0 | Status: COMPLETED | OUTPATIENT
Start: 2018-11-22 | End: 2018-11-22

## 2018-11-22 RX ORDER — ACETAMINOPHEN 500 MG
750 TABLET ORAL ONCE
Qty: 0 | Refills: 0 | Status: COMPLETED | OUTPATIENT
Start: 2018-11-22 | End: 2018-11-22

## 2018-11-22 RX ORDER — SODIUM CHLORIDE 9 MG/ML
1000 INJECTION, SOLUTION INTRAVENOUS
Qty: 0 | Refills: 0 | Status: DISCONTINUED | OUTPATIENT
Start: 2018-11-22 | End: 2018-11-22

## 2018-11-22 RX ORDER — PANTOPRAZOLE SODIUM 20 MG/1
40 TABLET, DELAYED RELEASE ORAL
Qty: 0 | Refills: 0 | Status: DISCONTINUED | OUTPATIENT
Start: 2018-11-22 | End: 2018-11-23

## 2018-11-22 RX ADMIN — Medication 3 MILLILITER(S): at 18:03

## 2018-11-22 RX ADMIN — Medication 300 MILLIGRAM(S): at 16:56

## 2018-11-22 RX ADMIN — CHLORHEXIDINE GLUCONATE 15 MILLILITER(S): 213 SOLUTION TOPICAL at 05:50

## 2018-11-22 RX ADMIN — HYDROMORPHONE HYDROCHLORIDE 1 MILLIGRAM(S): 2 INJECTION INTRAMUSCULAR; INTRAVENOUS; SUBCUTANEOUS at 22:46

## 2018-11-22 RX ADMIN — PANTOPRAZOLE SODIUM 40 MILLIGRAM(S): 20 TABLET, DELAYED RELEASE ORAL at 05:49

## 2018-11-22 RX ADMIN — HYDROMORPHONE HYDROCHLORIDE 1 MILLIGRAM(S): 2 INJECTION INTRAMUSCULAR; INTRAVENOUS; SUBCUTANEOUS at 18:47

## 2018-11-22 RX ADMIN — CHLORHEXIDINE GLUCONATE 15 MILLILITER(S): 213 SOLUTION TOPICAL at 13:08

## 2018-11-22 RX ADMIN — Medication 1 PATCH: at 12:42

## 2018-11-22 RX ADMIN — HEPARIN SODIUM 5000 UNIT(S): 5000 INJECTION INTRAVENOUS; SUBCUTANEOUS at 21:10

## 2018-11-22 RX ADMIN — Medication 0.5 MILLIGRAM(S): at 18:04

## 2018-11-22 RX ADMIN — PANTOPRAZOLE SODIUM 40 MILLIGRAM(S): 20 TABLET, DELAYED RELEASE ORAL at 17:00

## 2018-11-22 RX ADMIN — Medication 1 PATCH: at 12:19

## 2018-11-22 RX ADMIN — PROPOFOL 2.4 MICROGRAM(S)/KG/MIN: 10 INJECTION, EMULSION INTRAVENOUS at 05:49

## 2018-11-22 RX ADMIN — HEPARIN SODIUM 5000 UNIT(S): 5000 INJECTION INTRAVENOUS; SUBCUTANEOUS at 13:35

## 2018-11-22 RX ADMIN — Medication 100 MILLIEQUIVALENT(S): at 08:17

## 2018-11-22 RX ADMIN — SODIUM CHLORIDE 100 MILLILITER(S): 9 INJECTION, SOLUTION INTRAVENOUS at 21:11

## 2018-11-22 RX ADMIN — Medication 1 PATCH: at 09:14

## 2018-11-22 RX ADMIN — CHLORHEXIDINE GLUCONATE 15 MILLILITER(S): 213 SOLUTION TOPICAL at 21:10

## 2018-11-22 RX ADMIN — HEPARIN SODIUM 5000 UNIT(S): 5000 INJECTION INTRAVENOUS; SUBCUTANEOUS at 05:49

## 2018-11-22 RX ADMIN — Medication 100 MILLIEQUIVALENT(S): at 20:15

## 2018-11-22 RX ADMIN — Medication 750 MILLIGRAM(S): at 17:15

## 2018-11-22 RX ADMIN — Medication 3 MILLILITER(S): at 11:57

## 2018-11-22 RX ADMIN — Medication 3 MILLILITER(S): at 06:17

## 2018-11-22 RX ADMIN — Medication 100 MILLIEQUIVALENT(S): at 18:47

## 2018-11-22 RX ADMIN — Medication 100 MILLIEQUIVALENT(S): at 16:56

## 2018-11-22 RX ADMIN — HYDROMORPHONE HYDROCHLORIDE 1 MILLIGRAM(S): 2 INJECTION INTRAMUSCULAR; INTRAVENOUS; SUBCUTANEOUS at 19:02

## 2018-11-22 RX ADMIN — CHLORHEXIDINE GLUCONATE 1 APPLICATION(S): 213 SOLUTION TOPICAL at 05:50

## 2018-11-22 RX ADMIN — Medication 0.5 MILLIGRAM(S): at 06:19

## 2018-11-22 RX ADMIN — Medication 1 MILLIGRAM(S): at 16:56

## 2018-11-22 RX ADMIN — Medication 100 MILLIEQUIVALENT(S): at 05:49

## 2018-11-22 RX ADMIN — HYDROMORPHONE HYDROCHLORIDE 1 MILLIGRAM(S): 2 INJECTION INTRAMUSCULAR; INTRAVENOUS; SUBCUTANEOUS at 23:01

## 2018-11-22 RX ADMIN — Medication 1 PATCH: at 19:15

## 2018-11-22 NOTE — PROGRESS NOTE ADULT - SUBJECTIVE AND OBJECTIVE BOX
[facility] [place] [facility]   MD VICENTA LEONARD MD RUORU WONG, PA    TEL:  OFFICE: [phone]  DR MAYER CELL: [phone]  DONNA WORKMAN CELL: [phone]  DR. PARTIDA CELL: [phone]    RENAL FOLLOW UP NOTE  --------------------------------------------------------------------------------  HPI:      Pt seen and examined at bedside.   Intubated for resp distress    PAST HISTORY  --------------------------------------------------------------------------------  No significant changes to PMH, PSH, FHx, SHx, unless otherwise noted    ALLERGIES & MEDICATIONS  --------------------------------------------------------------------------------  Allergies    Biaxin (Rash)  Cipro (Headache)  Flagyl (Headache)  penicillin (Rash; Swelling)  sulfa drugs (Unknown)    Intolerances      Standing Inpatient Medications  ALBUTerol/ipratropium for Nebulization 3 milliLiter(s) Nebulizer every 6 hours  buDESOnide   0.5 milliGRAM(s) Respule 0.5 milliGRAM(s) Inhalation every 12 hours  chlorhexidine 0.12% Liquid 15 milliLiter(s) Oral Mucosa <User Schedule>  chlorhexidine 4% Liquid 1 Application(s) Topical <User Schedule>  dextrose 5% + lactated ringers. 1000 milliLiter(s) IV Continuous <Continuous>  heparin  Injectable 5000 Unit(s) SubCutaneous every 8 hours  influenza   Vaccine 0.5 milliLiter(s) IntraMuscular once  nicotine - 21 mG/24Hr(s) Patch 1 patch Transdermal daily  pantoprazole  Injectable 40 milliGRAM(s) IV Push two times a day  propofol Infusion 10 MICROgram(s)/kG/Min IV Continuous <Continuous>    PRN Inpatient Medications  LORazepam   Injectable 1 milliGRAM(s) IV Push every 4 hours PRN      REVIEW OF SYSTEMS  --------------------------------------------------------------------------------  unable to obtain     VITALS/PHYSICAL EXAM  --------------------------------------------------------------------------------  T(C): 36.1 (11-22-18 @ 11:00), Max: 37.1 (11-21-18 @ 19:00)  HR: 62 (11-22-18 @ 11:00) (60 - 131)  BP: 129/60 (11-22-18 @ 11:00) (77/45 - 175/79)  RR: 30 (11-22-18 @ 11:00) (15 - 54)  SpO2: 100% (11-22-18 @ 11:00) (87% - 100%)  Wt(kg): --  Height (cm): 157.48 (11-20-18 @ 22:00)  Weight (kg): 40 (11-20-18 @ 22:00)  BMI (kg/m2): 16.1 (11-20-18 @ 22:00)  BSA (m2): 1.35 (11-20-18 @ 22:00)      11-21-18 @ 07:01  -  11-22-18 @ 07:00  --------------------------------------------------------  IN: 3562.8 mL / OUT: 1765 mL / NET: 1797.8 mL    11-22-18 @ 07:01  -  11-22-18 @ 11:23  --------------------------------------------------------  IN: 595.4 mL / OUT: 380 mL / NET: 215.4 mL      Physical Exam:  	Gen: female agitated   	HEENT: + ETT  	Pulm: CTA B/L  	CV: S1S2  	Abd: Soft, +BS  	Ext: No LE edema B/L                      Neuro: Awake   	Skin: Warm and Dry   	    LABS/STUDIES  --------------------------------------------------------------------------------              11.7   7.1   >-----------<  201      [11-22-18 @ 03:58]              36.1     144  |  99  |  96  ----------------------------<  93      [11-22-18 @ 03:58]  3.3   |  16  |  4.75        Ca     7.7     [11-22-18 @ 03:58]      iCa    0.90     [11-21 @ 03:59]      Mg     2.6     [11-22-18 @ 03:58]      Phos  8.9     [11-22-18 @ 03:58]    TPro  5.8  /  Alb  3.1  /  TBili  0.2  /  DBili  x   /  AST  44  /  ALT  36  /  AlkPhos  179  [11-21-18 @ 18:24]    PT/INR: PT 13.3 , INR 1.15       [11-20-18 @ 23:08]  PTT: 34.4       [11-20-18 @ 23:08]          [11-21-18 @ 18:24]    Creatinine Trend:  SCr 4.75 [11-22 @ 03:58]  SCr 5.54 [11-21 @ 18:24]  SCr 5.93 [11-21 @ 12:36]  SCr 6.83 [11-21 @ 03:49]  SCr 7.51 [11-20 @ 23:08]    Urinalysis - [11-20-18 @ 18:08]      Color Yellow / Appearance Slightly Turbid / SG 1.020 / pH 5.5      Gluc Negative / Ketone Negative  / Bili Small / Urobili Negative       Blood Small / Protein 30 mg/dL / Leuk Est Moderate / Nitrite Negative      RBC 18 / WBC 12 / Hyaline 20 / Gran  / Sq Epi  / Non Sq Epi 1 / Bacteria Negative    Urine Creatinine 137      [11-20-18 @ 18:08]  Urine Protein 46      [11-20-18 @ 23:05]  Urine Sodium 31      [11-20-18 @ 18:08]  Urine Urea Nitrogen 331      [11-20-18 @ 23:05]  Urine Osmolality 363      [11-20-18 @ 18:08]

## 2018-11-22 NOTE — PHYSICAL THERAPY INITIAL EVALUATION ADULT - PERTINENT HX OF CURRENT PROBLEM, REHAB EVAL
Pt is a 74 y/o F admitted to [facility] on 11/20/18 for abdominal pain, +vomiting x12 hrs, spitting up white saliva, no drooling. +prior admissions for SBO, narcotic associated ileus/constipation, not required hospitalizations for this in several years. +generalized spasms.

## 2018-11-22 NOTE — CHART NOTE - NSCHARTNOTEFT_GEN_A_CORE
I updated patient's home cardiologist, Dr. Jonathan Dennison, about patient's current status. During the day, the patient became acutely agitated, confused, and combative. At that time, she was also complaining of chest pain. EKG was obtained, which demonstrated sinus tachycardia but was otherwise unremarkable. Troponins at that time were also obtained, which was 48 ng/L. Given patient's extensive history of opioid & benzodiazepine dependence and her small bowel obstruction, she was originally treated for withdrawal. However, her respiratory status began to worsen so she was intubated for respiratory distress. Repeat troponins were obtained and notably elevated at 273 ng/L and 336 ng/L. Dr. Dennison stated that she did not need any acute intervention overnight and would see the patient later this AM. Discussed with overnight SICU attending Dr. Ku.    Sakshi Bruce PA-C    [id number]

## 2018-11-22 NOTE — PROGRESS NOTE ADULT - SUBJECTIVE AND OBJECTIVE BOX
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 Labs significant for WBC 11.5, HCT 52.5, , Cr 7.66, phosphorus 15.1, pH 7.27, and lactate 6.1. CT scan also demonstrated a SBO in the ileum in the RLQ. She was fluid resuscitated and a NG tube was placed with 700 mL of output immediately. SICU consulted for hemodynamic monitoring.    24 HOUR EVENTS:  - Became acutely confused and combative in the afternoon. Was treated for possible opioid and benzodiazepine withdrawal but remained agitated to the point where she went into respiratory distress requiring intubation.  - Patient was complaining of chest pain during her episode of agitation. EKG demonstrated sinus tachycardia but was otherwise unremarkable. Troponin T trended upward from 48 to 273 to 335 ng/L. Home cardiologist notified.  - Pulmonary and oncology consulted for RENUKA cavitary lesion. Tuberculosis work-up sent.  - Started 0.5:1 NG tube repletions with normal saline.  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  - Disimpacted. 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 Labs significant for WBC 11.5, HCT 52.5, , Cr 7.66, phosphorus 15.1, pH 7.27, and lactate 6.1. CT scan also demonstrated a SBO in the ileum in the RLQ. She was fluid resuscitated and a NG tube was placed with 700 mL of output immediately. SICU consulted for hemodynamic monitoring.    24 HOUR EVENTS:  - Became acutely confused and combative in the afternoon. Was treated for possible opioid and benzodiazepine withdrawal but remained agitated to the point where she went into respiratory distress requiring intubation.  - Patient was complaining of chest pain during her episode of agitation. EKG demonstrated sinus tachycardia but was otherwise unremarkable. Troponin T trended upward from 48 to 273 to 335 ng/L. Home cardiologist notified.  - Pulmonary and oncology consulted for RENUKA cavitary lesion. Tuberculosis work-up sent.  - Started 0.5:1 NG tube repletions with normal saline.  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    SUBJECTIVE/ROS:  [x] A ten-point review of systems was otherwise negative except as noted.  [ ] Due to altered mental status/intubation, subjective information were not able to be obtained from the patient. History was obtained, to the extent possible, from review of the chart and collateral sources of information.    NEURO  Exam: sedated, easily arousable, does not follow commands  Meds:  - LORazepam   Injectable 1 milliGRAM(s) IV Push every 4 hours PRN Agitation  - propofol Infusion 10 MICROgram(s)/kG/Min IV Continuous <Continuous>  [x] Adequacy of sedation and pain control has been assessed and adjusted    RESPIRATORY  RR: 22 (11-22-18 @ 06:00) (15 - 54)  SpO2: 99% (11-22-18 @ 06:25) (87% - 100%)  Exam: coarse rhonchi in all lung fields  Mechanical Ventilation: Mode: AC/ CMV (Assist Control/ Continuous Mandatory Ventilation), RR (machine): 20, RR (patient): 22, TV (machine): 400, FiO2: 40, PEEP: 5, ITime: 0.9, MAP: 8, PIP: 15  [x] Extubation Readiness Assessed  ABG - ( 22 Nov 2018 03:51 )  pH: 7.34  /  pCO2: 33    /  pO2: 155   / HCO3: 18    / Base Excess: -6.6  /  SaO2: 97    /     Lactate: 0.7  Meds:  - ALBUTerol/ipratropium for Nebulization 3 milliLiter(s) Nebulizer every 6 hours  - buDESOnide   0.5 milliGRAM(s) Respule 0.5 milliGRAM(s) Inhalation every 12 hours  - nicotine - 21 mG/24Hr(s) Patch 1 patch Transdermal daily    CARDIOVASCULAR  HR: 88 (11-22-18 @ 06:25) (60 - 131)  BP: 124/75 (11-22-18 @ 06:00) (77/45 - 175/79)  BP(mean): 95 (11-22-18 @ 06:00) (55 - 114)  Exam: regular rate and rhythm, S1S2  Cardiac Rhythm: sinus  Perfusion    [x]Adequate    [ ]Inadequate  Mentation   [ ]Normal       [x]Reduced  Extremities  [x]Warm         [ ]Cool  Volume Status [ ]Hypervolemic [x]Euvolemic [ ]Hypovolemic  Meds: none    GI/NUTRITION  Exam: soft, nontender, nondistended  Diet: NPO with NG tube to continuous low wall suction  Meds: pantoprazole  Injectable 40 milliGRAM(s) IV Push two times a day    GENITOURINARY      144  |  99  |  96<H>  ----------------------------<  93  3.3<L>   |  16<L>  |  4.75<H>    Ca    7.7<L>      22 Nov 2018 03:58  Phos  8.9  Mg     2.6    [x] Chavarria catheter, indication: urine output monitoring in the critically ill  Meds:  - sodium chloride 0.9% infuse at 125 mL/hr  - sodium chloride 0.9% for 0.5:1 NG tube repletions    HEMATOLOGIC  Meds: heparin  Injectable 5000 Unit(s) SubCutaneous every 8 hours  [x] VTE Prophylaxis                        11.7   7.1   )-----------( 201      ( 22 Nov 2018 03:58 )             36.1     INFECTIOUS DISEASES  T(C): 36.5 (11-22-18 @ 03:00), Max: 37.1 (11-21-18 @ 19:00)  WBC Count:  - 7.1 K/uL (11-22 @ 03:58)  - 9.4 K/uL (11-21 @ 18:24)    Recent Cultures:  Specimen Source: .Urine Clean Catch (Midstream), 11-20 @ 21:49; Results   No growth; Gram Stain: --; Organism: --    Meds: none    ENDOCRINE  Capillary Blood Glucose: none  Meds: none    ACCESS DEVICES:  [x] Peripheral IV  [ ] Central Venous Line	[ ] R	[ ] L	[ ] IJ	[ ] Fem	[ ] SC	Placed:   [ ] Arterial Line		[ ] R	[ ] L	[ ] Fem	[ ] Rad	[ ] Ax	Placed:   [ ] PICC:					[ ] Mediport  [x] Urinary Catheter, Date Placed: 11/21/2018  [x] Necessity of urinary, arterial, and venous catheters discussed    OTHER MEDICATIONS:  - chlorhexidine 0.12% Liquid 15 milliLiter(s) Oral Mucosa <User Schedule>  - chlorhexidine 4% Liquid 1 Application(s) Topical <User Schedule>    CODE STATUS: Full code.    IMAGING:

## 2018-11-22 NOTE — AIRWAY REMOVAL NOTE  ADULT & PEDS - ARTIFICAL AIRWAY REMOVAL COMMENTS
Written order for extubation verified. The patient was identified by full name and birth date compared to the identification band. Present during the procedure was Jose Rafael YAN

## 2018-11-22 NOTE — PROGRESS NOTE ADULT - SUBJECTIVE AND OBJECTIVE BOX
CARDIOLOGY     PROGRESS  NOTE   ________________________________________________    CHIEF COMPLAINT:Patient is a 73y old  Female who presents with a chief complaint of SBO, dehydration (21 Nov 2018 10:08)    	  REVIEW OF SYSTEMS:  CONSTITUTIONAL: No fever, weight loss, or fatigue  EYES: No eye pain, visual disturbances, or discharge  ENT:  No difficulty hearing, tinnitus, vertigo; No sinus or throat pain  NECK: No pain or stiffness  RESPIRATORY: No cough, wheezing, chills or hemoptysis; No Shortness of Breath  CARDIOVASCULAR: No chest pain, palpitations, passing out, dizziness, or leg swelling  GASTROINTESTINAL: No abdominal or epigastric pain. No nausea, vomiting, or hematemesis; No diarrhea or constipation. No melena or hematochezia.  GENITOURINARY: No dysuria, frequency, hematuria, or incontinence  NEUROLOGICAL: No headaches, memory loss, loss of strength, numbness, or tremors  SKIN: No itching, burning, rashes, or lesions   LYMPH Nodes: No enlarged glands  ENDOCRINE: No heat or cold intolerance; No hair loss  MUSCULOSKELETAL: No joint pain or swelling; No muscle, back, or extremity pain  PSYCHIATRIC: No depression, anxiety, mood swings, or difficulty sleeping  HEME/LYMPH: No easy bruising, or bleeding gums  ALLERGY AND IMMUNOLOGIC: No hives or eczema	    [ ] All others negative	  [ ] Unable to obtain    PHYSICAL EXAM:  T(C): 35.8 (11-22-18 @ 07:00), Max: 37.1 (11-21-18 @ 19:00)  HR: 60 (11-22-18 @ 08:34) (60 - 131)  BP: 100/54 (11-22-18 @ 08:00) (77/45 - 175/79)  RR: 49 (11-22-18 @ 08:00) (15 - 54)  SpO2: 99% (11-22-18 @ 08:34) (87% - 100%)  Wt(kg): --  I&O's Summary    21 Nov 2018 07:01  -  22 Nov 2018 07:00  --------------------------------------------------------  IN: 3562.8 mL / OUT: 1765 mL / NET: 1797.8 mL    22 Nov 2018 07:01  -  22 Nov 2018 08:43  --------------------------------------------------------  IN: 229.8 mL / OUT: 225 mL / NET: 4.8 mL        Appearance: Normal	  HEENT:   Normal oral mucosa, PERRL, EOMI	  Lymphatic: No lymphadenopathy  Cardiovascular: Normal S1 S2, No JVD, No murmurs, No edema  Respiratory: Lungs clear to auscultation	  Psychiatry: A & O x 3, Mood & affect appropriate  Gastrointestinal:  Soft, Non-tender, + BS	  Skin: No rashes, No ecchymoses, No cyanosis	  Neurologic: Non-focal  Extremities: Normal range of motion, No clubbing, cyanosis or edema  Vascular: Peripheral pulses palpable 2+ bilaterally    MEDICATIONS  (STANDING):  ALBUTerol/ipratropium for Nebulization 3 milliLiter(s) Nebulizer every 6 hours  buDESOnide   0.5 milliGRAM(s) Respule 0.5 milliGRAM(s) Inhalation every 12 hours  chlorhexidine 0.12% Liquid 15 milliLiter(s) Oral Mucosa <User Schedule>  chlorhexidine 4% Liquid 1 Application(s) Topical <User Schedule>  heparin  Injectable 5000 Unit(s) SubCutaneous every 8 hours  influenza   Vaccine 0.5 milliLiter(s) IntraMuscular once  nicotine - 21 mG/24Hr(s) Patch 1 patch Transdermal daily  pantoprazole  Injectable 40 milliGRAM(s) IV Push two times a day  propofol Infusion 10 MICROgram(s)/kG/Min (2.4 mL/Hr) IV Continuous <Continuous>  sodium chloride 0.9%. 1000 milliLiter(s) (125 mL/Hr) IV Continuous <Continuous>  sodium chloride 0.9%. 1000 milliLiter(s) (10 mL/Hr) IV Continuous <Continuous>      TELEMETRY: 	    ECG:  	  RADIOLOGY:  OTHER: 	  	  LABS:	 	    CARDIAC MARKERS:  CARDIAC MARKERS ( 21 Nov 2018 18:24 )  x     / x     / 690 U/L / x     / 9.4 ng/mL  CARDIAC MARKERS ( 21 Nov 2018 12:36 )  x     / x     / 221 U/L / x     / 4.3 ng/mL                                11.7   7.1   )-----------( 201      ( 22 Nov 2018 03:58 )             36.1     11-22    144  |  99  |  96<H>  ----------------------------<  93  3.3<L>   |  16<L>  |  4.75<H>    Ca    7.7<L>      22 Nov 2018 03:58  Phos  8.9     11-22  Mg     2.6     11-22    TPro  5.8<L>  /  Alb  3.1<L>  /  TBili  0.2  /  DBili  x   /  AST  44<H>  /  ALT  36  /  AlkPhos  179<H>  11-21    proBNP:   Lipid Profile:   HgA1c:   TSH:   PT/INR - ( 20 Nov 2018 23:08 )   PT: 13.3 sec;   INR: 1.15 ratio         PTT - ( 20 Nov 2018 23:08 )  PTT:34.4 sec    < from: 12 Lead ECG (11.20.18 @ 16:35) >  SINUS RHYTHM WITH SHORT MO  RIGHT ATRIAL ENLARGEMENT  NONSPECIFIC ST AND T WAVE ABNORMALITY  ABNORMAL ECG        Assessment and plan  ---------------------------  events noted   respiratory failure s/p intubation  increase troponin sec to renal failure, doubt cardiac ecg no sig change  asa daily  dvt prophylaxis  awaiting echo

## 2018-11-22 NOTE — PROGRESS NOTE ADULT - ATTENDING COMMENTS
Dr. Bianchi (Attending Physician)  N - Agitated Delirium yesterday unresponsive to benzos, precedex, opioids, now improved with propofol following commands, ativan 1 q4 prn, on nicotine patch  P - Acute hypoxic resp failure when agitated yesterday, now with minimal vent settings, hyperinflated cxr, low suspicion for PE since improved this am  C - tachycardia improved with resolution of agitation, trop leak will stop trending  GI - SBO vs Ileus started enemas for large stool burden, will use methlnaltrexone today instead of narcan   - acute renal failure, change to D5 LR @100 mL/hr  H - stable  ID - no abx   E -    This patient was critically ill with one or more vital organ systems at a high probability of imminent or life threatening deterioration. Complex decision making was required to assess and treat single or multiple vital organ system failure and/or prevent further life threatening deterioration of the patient’s condition.  All recent labwork and imaging was reviewed.  Total Critical Care Time 45 min

## 2018-11-22 NOTE — PROGRESS NOTE ADULT - SUBJECTIVE AND OBJECTIVE BOX
events  noted    intubated/  positive troponin  REVIEW OF SYSTEMS:  GEN: no fever,    no chills  MEDICATIONS  (STANDING):  ALBUTerol/ipratropium for Nebulization 3 milliLiter(s) Nebulizer every 6 hours  buDESOnide   0.5 milliGRAM(s) Respule 0.5 milliGRAM(s) Inhalation every 12 hours  chlorhexidine 0.12% Liquid 15 milliLiter(s) Oral Mucosa <User Schedule>  chlorhexidine 4% Liquid 1 Application(s) Topical <User Schedule>  heparin  Injectable 5000 Unit(s) SubCutaneous every 8 hours  influenza   Vaccine 0.5 milliLiter(s) IntraMuscular once  nicotine - 21 mG/24Hr(s) Patch 1 patch Transdermal daily  pantoprazole  Injectable 40 milliGRAM(s) IV Push two times a day  potassium chloride  10 mEq/100 mL IVPB 10 milliEquivalent(s) IV Intermittent every 1 hour  propofol Infusion 10 MICROgram(s)/kG/Min (2.4 mL/Hr) IV Continuous <Continuous>  sodium chloride 0.9%. 1000 milliLiter(s) (125 mL/Hr) IV Continuous <Continuous>  sodium chloride 0.9%. 1000 milliLiter(s) (10 mL/Hr) IV Continuous <Continuous>    MEDICATIONS  (PRN):  LORazepam   Injectable 1 milliGRAM(s) IV Push every 4 hours PRN Agitation      Vital Signs Last 24 Hrs  T(C): 36.5 (2018 03:00), Max: 37.1 (2018 19:00)  T(F): 97.7 (2018 03:00), Max: 98.7 (2018 19:00)  HR: 68 (2018 07:00) (60 - 131)  BP: 137/61 (2018 07:00) (77/45 - 175/79)  BP(mean): 88 (2018 07:00) (55 - 114)  RR: 28 (2018 07:00) (15 - 54)  SpO2: 98% (2018 07:00) (87% - 100%)  CAPILLARY BLOOD GLUCOSE        I&O's Summary    2018 07:01  -  2018 07:00  --------------------------------------------------------  IN: 3562.8 mL / OUT: 1765 mL / NET: 1797.8 mL        PHYSICAL EXAM:  HEAD:  Atraumatic, Normocephalic  NECK: Supple, No   JVD  CHEST/LUNG:   no     rales,     no,    rhonchi  HEART: Regular rate and rhythm;         murmur  ABDOMEN: Soft, Nontender, ;   EXTREMITIES:     no   edema  NEUROLOGY:   intubated  LABS:                        11.7   7.1   )-----------( 201      ( 2018 03:58 )             36.1         144  |  99  |  96<H>  ----------------------------<  93  3.3<L>   |  16<L>  |  4.75<H>    Ca    7.7<L>      2018 03:58  Phos  8.9       Mg     2.6         TPro  5.8<L>  /  Alb  3.1<L>  /  TBili  0.2  /  DBili  x   /  AST  44<H>  /  ALT  36  /  AlkPhos  179<H>  11    PT/INR - ( 2018 23:08 )   PT: 13.3 sec;   INR: 1.15 ratio         PTT - ( 2018 23:08 )  PTT:34.4 sec  CARDIAC MARKERS ( 2018 18:24 )  x     / x     / 690 U/L / x     / 9.4 ng/mL  CARDIAC MARKERS ( 2018 12:36 )  x     / x     / 221 U/L / x     / 4.3 ng/mL      Urinalysis Basic - ( 2018 18:08 )    Color: Yellow / Appearance: Slightly Turbid / S.020 / pH: x  Gluc: x / Ketone: Negative  / Bili: Small / Urobili: Negative   Blood: x / Protein: 30 mg/dL / Nitrite: Negative   Leuk Esterase: Moderate / RBC: 18 /hpf / WBC 12 /hpf   Sq Epi: x / Non Sq Epi: 1 /hpf / Bacteria: Negative        ABG - ( 2018 03:51 )  pH, Arterial: 7.34  pH, Blood: x     /  pCO2: 33    /  pO2: 155   / HCO3: 18    / Base Excess: -6.6  /  SaO2: 97                 @ 03:35  4.3  <50              Consultant(s) Notes Reviewed:      Care Discussed with Consultants/Other Providers:

## 2018-11-22 NOTE — PHYSICAL THERAPY INITIAL EVALUATION ADULT - PRECAUTIONS/LIMITATIONS, REHAB EVAL
Hospital course: +SBO demonstrated on CT scan. Patient is also found to have severe dehydration on the lab and admitted to SICU for monitoring and fluid resuscitation. On 11/21 PM, pt became acutely confused/combative, +resp distress requiring intubation. EKG shows sinus tach, trop T trended upward, +NGT, +propofol for sedation Hospital course: +SBO demonstrated on CT scan. Patient is also found to have severe dehydration on the lab and admitted to SICU for monitoring and fluid resuscitation. On 11/21 PM, pt became acutely confused/combative, +resp distress requiring intubation. EKG shows sinus tach, trop T trended upward, +NGT, +propofol for sedation. CT chest with incidental finding of RENUKA cavitary lesion and ?RENUKA airway closure (extrinsic vs. intrinsic blockage) ?malignancy ?TB (AFB negative x1). Hospital course: +SBO demonstrated on CT scan. Patient is also found to have severe dehydration on the lab and admitted to SICU for monitoring and fluid resuscitation. On 11/21 PM, pt became acutely confused/combative, +resp distress requiring intubation. EKG shows sinus tach, trop T trended upward, +NGT, +propofol for sedation. CT chest with incidental finding of RENUKA cavitary lesion and ?RENUKA airway closure (extrinsic vs. intrinsic blockage) ?malignancy ?TB (AFB negative x1)./fall precautions

## 2018-11-22 NOTE — PROGRESS NOTE ADULT - ATTENDING COMMENTS
I have seen and examined the patient.  I agree with the surgical resident's note.  The patient has had 2-3 bowel movements. His troponins are positive. He remains intubated. He is to have an ECHO done. We left the NG tube in for now.    Eddie Hopper contact information [phone]

## 2018-11-22 NOTE — PROGRESS NOTE ADULT - ASSESSMENT
ASSESSMENT:  73 year old female with an extensive past medical and surgical history presenting with a recurrent small bowel obstruction, SHAKIRA stage 3, electrolyte abnormalities, and ASSESSMENT:  73 year old female with an extensive past medical and surgical history presenting with a recurrent small bowel obstruction, SHAKIRA stage 3, electrolyte abnormalities, and acute respiratory failure requiring intubation.    PLAN:    Neuro: intubated, altered mental status, narcotic & benzodiazepine dependence  - Monitor mental status.  - Propofol for sedation while intubated with Ativan PRN agitation.    Resp: acute respiratory failure, COPD, RENUKA cavitary lesion  - Monitor pulse oximeter, ABGs, and EtCO2.  - Mechanical ventilator for acute respiratory failure. Wean with goal to extubate when mental status improves.  - Nicotine patch as patient is current every day smoker.  - Duoneb and Pulmicort for COPD.  - Follow up tuberculosis work-up. Will need work-up for possible lung cancer. Appreciate pulmonary and oncology recommendations.    CV: elevated troponins  - Monitor vital signs.  - Trend troponins. Appreciate cardiology recommendations.    GI: SBO, narcotic dysmotile cilia syndrome  - NPO with NG tube to continuous low wall suction. Monitor NG tube output.  - Protonix BID for bloody NG tube output.    Renal: SHAKIRA stage 3 w/ baseline Cr 1.1 in Nov 2016  - Monitor I&Os.  - Monitor electrolytes and replete as necessary.  - NS at 125 mL/hr while NPO with 0.5:1 NG tube output repletions.  - Appreciate nephrology recommendations.    Heme: no acute issues  - SQH for VTE prophylaxis.    ID: no acute issues  - Monitor for clinical evidence of active infection.    Endo: no acute issues  - Monitor blood glucose on BMP.    Disposition:  - Full code.  - Will remain in SICU    Sakshi Bruce PA-C     [id number]

## 2018-11-22 NOTE — PROGRESS NOTE ADULT - SUBJECTIVE AND OBJECTIVE BOX
Progress note surgery     Pt was seen and examined. pain controlled with medication no nausea or vomiting         Vital Signs Last 24 Hrs  T(C): 36.5 (2018 03:00), Max: 37.1 (2018 19:00)  T(F): 97.7 (2018 03:00), Max: 98.7 (2018 19:00)  HR: 61 (2018 05:00) (61 - 131)  BP: 98/54 (2018 05:00) (77/45 - 175/79)  BP(mean): 72 (2018 05:00) (55 - 114)  RR: 22 (2018 05:00) (15 - 54)  SpO2: 99% (2018 05:00) (87% - 100%)    I&O's Detail    2018 07:01  -  2018 07:00  --------------------------------------------------------  IN:    IV PiggyBack: 600 mL    Sodium Chloride 0.9% IV Bolus: 500 mL    sodium chloride 0.9%.: 1000 mL  Total IN: 2100 mL    OUT:    Indwelling Catheter - Urethral: 255 mL    Nasoenteral Tube: 2050 mL  Total OUT: 2305 mL    Total NET: -205 mL      2018 07:01  -  2018 05:25  --------------------------------------------------------  IN:    IV PiggyBack: 100 mL    propofol Infusion: 110.4 mL    sodium chloride 0.9%.: 250 mL    sodium chloride 0.9%.: 2750 mL  Total IN: 3210.4 mL    OUT:    Indwelling Catheter - Urethral: 785 mL    Nasoenteral Tube: 750 mL  Total OUT: 1535 mL    Total NET: 1675.4 mL          MEDICATIONS  (STANDING):  ALBUTerol/ipratropium for Nebulization 3 milliLiter(s) Nebulizer every 6 hours  buDESOnide   0.5 milliGRAM(s) Respule 0.5 milliGRAM(s) Inhalation every 12 hours  chlorhexidine 0.12% Liquid 15 milliLiter(s) Oral Mucosa <User Schedule>  chlorhexidine 4% Liquid 1 Application(s) Topical <User Schedule>  heparin  Injectable 5000 Unit(s) SubCutaneous every 8 hours  influenza   Vaccine 0.5 milliLiter(s) IntraMuscular once  nicotine - 21 mG/24Hr(s) Patch 1 patch Transdermal daily  pantoprazole  Injectable 40 milliGRAM(s) IV Push two times a day  potassium chloride  10 mEq/100 mL IVPB 10 milliEquivalent(s) IV Intermittent every 1 hour  propofol Infusion 10 MICROgram(s)/kG/Min (2.4 mL/Hr) IV Continuous <Continuous>  sodium chloride 0.9%. 1000 milliLiter(s) (125 mL/Hr) IV Continuous <Continuous>  sodium chloride 0.9%. 1000 milliLiter(s) (10 mL/Hr) IV Continuous <Continuous>    MEDICATIONS  (PRN):  LORazepam   Injectable 1 milliGRAM(s) IV Push every 4 hours PRN Agitation      LABS:                        11.7   7.1   )-----------( 201      ( 2018 03:58 )             36.1     11-    144  |  99  |  96<H>  ----------------------------<  93  3.3<L>   |  16<L>  |  4.75<H>    Ca    7.7<L>      2018 03:58  Phos  8.9       Mg     2.6         TPro  5.8<L>  /  Alb  3.1<L>  /  TBili  0.2  /  DBili  x   /  AST  44<H>  /  ALT  36  /  AlkPhos  179<H>  11    PT/INR - ( 2018 23:08 )   PT: 13.3 sec;   INR: 1.15 ratio         PTT - ( 2018 23:08 )  PTT:34.4 sec  Urinalysis Basic - ( 2018 18:08 )    Color: Yellow / Appearance: Slightly Turbid / S.020 / pH: x  Gluc: x / Ketone: Negative  / Bili: Small / Urobili: Negative   Blood: x / Protein: 30 mg/dL / Nitrite: Negative   Leuk Esterase: Moderate / RBC: 18 /hpf / WBC 12 /hpf   Sq Epi: x / Non Sq Epi: 1 /hpf / Bacteria: Negative      LIVER FUNCTIONS - ( 2018 18:24 )  Alb: 3.1 g/dL / Pro: 5.8 g/dL / ALK PHOS: 179 U/L / ALT: 36 U/L / AST: 44 U/L / GGT: x                   Physical exam   No acute distress  resp: non labored breathing   abd. soft, non distended Progress note surgery     Pt was seen and examined. Intubated and sedated. pt had 3 bowel movements.         Vital Signs Last 24 Hrs  T(C): 36.5 (2018 03:00), Max: 37.1 (2018 19:00)  T(F): 97.7 (2018 03:00), Max: 98.7 (2018 19:00)  HR: 61 (2018 05:00) (61 - 131)  BP: 98/54 (2018 05:00) (77/45 - 175/79)  BP(mean): 72 (2018 05:00) (55 - 114)  RR: 22 (2018 05:00) (15 - 54)  SpO2: 99% (2018 05:00) (87% - 100%)    I&O's Detail    2018 07:01  -  2018 07:00  --------------------------------------------------------  IN:    IV PiggyBack: 600 mL    Sodium Chloride 0.9% IV Bolus: 500 mL    sodium chloride 0.9%.: 1000 mL  Total IN: 2100 mL    OUT:    Indwelling Catheter - Urethral: 255 mL    Nasoenteral Tube: 2050 mL  Total OUT: 2305 mL    Total NET: -205 mL      2018 07:01  -  2018 05:25  --------------------------------------------------------  IN:    IV PiggyBack: 100 mL    propofol Infusion: 110.4 mL    sodium chloride 0.9%.: 250 mL    sodium chloride 0.9%.: 2750 mL  Total IN: 3210.4 mL    OUT:    Indwelling Catheter - Urethral: 785 mL    Nasoenteral Tube: 750 mL  Total OUT: 1535 mL    Total NET: 1675.4 mL          MEDICATIONS  (STANDING):  ALBUTerol/ipratropium for Nebulization 3 milliLiter(s) Nebulizer every 6 hours  buDESOnide   0.5 milliGRAM(s) Respule 0.5 milliGRAM(s) Inhalation every 12 hours  chlorhexidine 0.12% Liquid 15 milliLiter(s) Oral Mucosa <User Schedule>  chlorhexidine 4% Liquid 1 Application(s) Topical <User Schedule>  heparin  Injectable 5000 Unit(s) SubCutaneous every 8 hours  influenza   Vaccine 0.5 milliLiter(s) IntraMuscular once  nicotine - 21 mG/24Hr(s) Patch 1 patch Transdermal daily  pantoprazole  Injectable 40 milliGRAM(s) IV Push two times a day  potassium chloride  10 mEq/100 mL IVPB 10 milliEquivalent(s) IV Intermittent every 1 hour  propofol Infusion 10 MICROgram(s)/kG/Min (2.4 mL/Hr) IV Continuous <Continuous>  sodium chloride 0.9%. 1000 milliLiter(s) (125 mL/Hr) IV Continuous <Continuous>  sodium chloride 0.9%. 1000 milliLiter(s) (10 mL/Hr) IV Continuous <Continuous>    MEDICATIONS  (PRN):  LORazepam   Injectable 1 milliGRAM(s) IV Push every 4 hours PRN Agitation      LABS:                        11.7   7.1   )-----------( 201      ( 2018 03:58 )             36.1     -    144  |  99  |  96<H>  ----------------------------<  93  3.3<L>   |  16<L>  |  4.75<H>    Ca    7.7<L>      2018 03:58  Phos  8.9       Mg     2.6         TPro  5.8<L>  /  Alb  3.1<L>  /  TBili  0.2  /  DBili  x   /  AST  44<H>  /  ALT  36  /  AlkPhos  179<H>  11-21    PT/INR - ( 2018 23:08 )   PT: 13.3 sec;   INR: 1.15 ratio         PTT - ( 2018 23:08 )  PTT:34.4 sec  Urinalysis Basic - ( 2018 18:08 )    Color: Yellow / Appearance: Slightly Turbid / S.020 / pH: x  Gluc: x / Ketone: Negative  / Bili: Small / Urobili: Negative   Blood: x / Protein: 30 mg/dL / Nitrite: Negative   Leuk Esterase: Moderate / RBC: 18 /hpf / WBC 12 /hpf   Sq Epi: x / Non Sq Epi: 1 /hpf / Bacteria: Negative      LIVER FUNCTIONS - ( 2018 18:24 )  Alb: 3.1 g/dL / Pro: 5.8 g/dL / ALK PHOS: 179 U/L / ALT: 36 U/L / AST: 44 U/L / GGT: x                   Physical exam   No acute distress  resp: non labored breathing   abd. soft, non distended

## 2018-11-22 NOTE — PHYSICAL THERAPY INITIAL EVALUATION ADULT - ADDITIONAL COMMENTS
Per chart, pt lives alone in [place], [place]. As per previous 2016 admission, pt noted to live in garden apartment (no steps) and was independent with ADLs, owned walker. Per chart, pt lives alone in a garden apartment (+2 steps) and was independent with ADLs, owned walker.

## 2018-11-22 NOTE — PROGRESS NOTE ADULT - ASSESSMENT
73 year old female with   PMH chronic Back pain (on Narcotics), history of multiple SBO's, narcotic associated constipation/ileus, COPD, current everyday smoker   admitted with abdominal pain and distension, nausea and poor PO intake with associated worsening painful spasms.      c/c  cachexia  and   dehydrated with acute Renal Failure (SHAKIRA) with marked hyperphosphatemia, acidosis   SBO on ct  /   iV hydration  /    SHAKIRA , improving/  ct  scans  noted/  RENUKA  cavitary lesion./ ? pna  SBO, transition point  in ileum  oncology  to f/p/ d r forte  intubated/  positive  troponin/ pt  not an ideal candidate for cardiac intervention  recommend  asa  care per  sicu        < from: CT Chest No Cont (11.20.18 @ 19:17) >  IMPRESSION: Large cavitary lesion in the left upper lobe. Primary   consideration is lung carcinoma. Tuberculosis or a lung abscess is not   excluded.  Mucous plugging in the right lower lobe with groundglass opacities which   may be due to infection or atelectasis.  < end of copied text >     < from: CT Abdomen and Pelvis No Cont (11.20.18 @ 17:07) >  IMPRESSION: Small bowel obstruction with transition point in the ileum   located in theright lower quadrant.  Groundglass opacities in the right lower lobe suggest pneumonia.  Findings discussed with Dr. Diaz on 11/20/2018 at 5:30 PM with read   back.  < end of copied text >

## 2018-11-23 LAB
ANION GAP SERPL CALC-SCNC: 18 MMOL/L — HIGH (ref 5–17)
APTT BLD: 32.3 SEC — SIGNIFICANT CHANGE UP (ref 27.5–36.3)
BLD GP AB SCN SERPL QL: NEGATIVE — SIGNIFICANT CHANGE UP
BUN SERPL-MCNC: 68 MG/DL — HIGH (ref 7–23)
CALCIUM SERPL-MCNC: 7.7 MG/DL — LOW (ref 8.4–10.5)
CHLORIDE SERPL-SCNC: 107 MMOL/L — SIGNIFICANT CHANGE UP (ref 96–108)
CO2 SERPL-SCNC: 23 MMOL/L — SIGNIFICANT CHANGE UP (ref 22–31)
CREAT SERPL-MCNC: 2.75 MG/DL — HIGH (ref 0.5–1.3)
GAS PNL BLDA: SIGNIFICANT CHANGE UP
GLUCOSE SERPL-MCNC: 166 MG/DL — HIGH (ref 70–99)
HCT VFR BLD CALC: 34.5 % — SIGNIFICANT CHANGE UP (ref 34.5–45)
HGB BLD-MCNC: 11.5 G/DL — SIGNIFICANT CHANGE UP (ref 11.5–15.5)
INR BLD: 1.03 RATIO — SIGNIFICANT CHANGE UP (ref 0.88–1.16)
MAGNESIUM SERPL-MCNC: 2.2 MG/DL — SIGNIFICANT CHANGE UP (ref 1.6–2.6)
MCHC RBC-ENTMCNC: 32 PG — SIGNIFICANT CHANGE UP (ref 27–34)
MCHC RBC-ENTMCNC: 33.3 GM/DL — SIGNIFICANT CHANGE UP (ref 32–36)
MCV RBC AUTO: 96 FL — SIGNIFICANT CHANGE UP (ref 80–100)
PHOSPHATE SERPL-MCNC: 3.2 MG/DL — SIGNIFICANT CHANGE UP (ref 2.5–4.5)
PLATELET # BLD AUTO: 193 K/UL — SIGNIFICANT CHANGE UP (ref 150–400)
POTASSIUM SERPL-MCNC: 3.7 MMOL/L — SIGNIFICANT CHANGE UP (ref 3.5–5.3)
POTASSIUM SERPL-SCNC: 3.7 MMOL/L — SIGNIFICANT CHANGE UP (ref 3.5–5.3)
PROTHROM AB SERPL-ACNC: 11.7 SEC — SIGNIFICANT CHANGE UP (ref 10–12.9)
RBC # BLD: 3.59 M/UL — LOW (ref 3.8–5.2)
RBC # FLD: 13.1 % — SIGNIFICANT CHANGE UP (ref 10.3–14.5)
RH IG SCN BLD-IMP: POSITIVE — SIGNIFICANT CHANGE UP
SODIUM SERPL-SCNC: 148 MMOL/L — HIGH (ref 135–145)
WBC # BLD: 7.8 K/UL — SIGNIFICANT CHANGE UP (ref 3.8–10.5)
WBC # FLD AUTO: 7.8 K/UL — SIGNIFICANT CHANGE UP (ref 3.8–10.5)

## 2018-11-23 PROCEDURE — 99232 SBSQ HOSP IP/OBS MODERATE 35: CPT | Mod: GC

## 2018-11-23 PROCEDURE — 71045 X-RAY EXAM CHEST 1 VIEW: CPT | Mod: 26

## 2018-11-23 PROCEDURE — 99233 SBSQ HOSP IP/OBS HIGH 50: CPT | Mod: GC

## 2018-11-23 RX ORDER — PANTOPRAZOLE SODIUM 20 MG/1
40 TABLET, DELAYED RELEASE ORAL EVERY 12 HOURS
Qty: 0 | Refills: 0 | Status: DISCONTINUED | OUTPATIENT
Start: 2018-11-23 | End: 2018-11-24

## 2018-11-23 RX ORDER — DIAZEPAM 5 MG
2.5 TABLET ORAL AT BEDTIME
Qty: 0 | Refills: 0 | Status: DISCONTINUED | OUTPATIENT
Start: 2018-11-23 | End: 2018-11-23

## 2018-11-23 RX ORDER — MULTIVIT-MIN/FERROUS GLUCONATE 9 MG/15 ML
1 LIQUID (ML) ORAL DAILY
Qty: 0 | Refills: 0 | Status: DISCONTINUED | OUTPATIENT
Start: 2018-11-23 | End: 2018-11-26

## 2018-11-23 RX ORDER — ENOXAPARIN SODIUM 100 MG/ML
30 INJECTION SUBCUTANEOUS EVERY 24 HOURS
Qty: 0 | Refills: 0 | Status: DISCONTINUED | OUTPATIENT
Start: 2018-11-23 | End: 2018-11-26

## 2018-11-23 RX ORDER — LIDOCAINE 4 G/100G
1 CREAM TOPICAL DAILY
Qty: 0 | Refills: 0 | Status: DISCONTINUED | OUTPATIENT
Start: 2018-11-23 | End: 2018-11-26

## 2018-11-23 RX ORDER — SENNA PLUS 8.6 MG/1
2 TABLET ORAL AT BEDTIME
Qty: 0 | Refills: 0 | Status: DISCONTINUED | OUTPATIENT
Start: 2018-11-23 | End: 2018-11-25

## 2018-11-23 RX ORDER — POLYETHYLENE GLYCOL 3350 17 G/17G
17 POWDER, FOR SOLUTION ORAL DAILY
Qty: 0 | Refills: 0 | Status: DISCONTINUED | OUTPATIENT
Start: 2018-11-23 | End: 2018-11-25

## 2018-11-23 RX ORDER — POTASSIUM CHLORIDE 20 MEQ
10 PACKET (EA) ORAL
Qty: 0 | Refills: 0 | Status: COMPLETED | OUTPATIENT
Start: 2018-11-23 | End: 2018-11-23

## 2018-11-23 RX ORDER — CALCIUM GLUCONATE 100 MG/ML
2 VIAL (ML) INTRAVENOUS ONCE
Qty: 0 | Refills: 0 | Status: COMPLETED | OUTPATIENT
Start: 2018-11-23 | End: 2018-11-23

## 2018-11-23 RX ORDER — HEPARIN SODIUM 5000 [USP'U]/ML
5000 INJECTION INTRAVENOUS; SUBCUTANEOUS EVERY 8 HOURS
Qty: 0 | Refills: 0 | Status: DISCONTINUED | OUTPATIENT
Start: 2018-11-23 | End: 2018-11-23

## 2018-11-23 RX ORDER — SODIUM CHLORIDE 9 MG/ML
1000 INJECTION, SOLUTION INTRAVENOUS
Qty: 0 | Refills: 0 | Status: DISCONTINUED | OUTPATIENT
Start: 2018-11-23 | End: 2018-11-24

## 2018-11-23 RX ORDER — DOCUSATE SODIUM 100 MG
100 CAPSULE ORAL THREE TIMES A DAY
Qty: 0 | Refills: 0 | Status: DISCONTINUED | OUTPATIENT
Start: 2018-11-23 | End: 2018-11-25

## 2018-11-23 RX ORDER — CALCIUM GLUCONATE 100 MG/ML
2 VIAL (ML) INTRAVENOUS ONCE
Qty: 0 | Refills: 0 | Status: DISCONTINUED | OUTPATIENT
Start: 2018-11-23 | End: 2018-11-23

## 2018-11-23 RX ORDER — HYDROMORPHONE HYDROCHLORIDE 2 MG/ML
0.5 INJECTION INTRAMUSCULAR; INTRAVENOUS; SUBCUTANEOUS ONCE
Qty: 0 | Refills: 0 | Status: DISCONTINUED | OUTPATIENT
Start: 2018-11-23 | End: 2018-11-23

## 2018-11-23 RX ORDER — OXYCODONE HYDROCHLORIDE 5 MG/1
10 TABLET ORAL EVERY 6 HOURS
Qty: 0 | Refills: 0 | Status: DISCONTINUED | OUTPATIENT
Start: 2018-11-23 | End: 2018-11-24

## 2018-11-23 RX ORDER — SUCRALFATE 1 G
1 TABLET ORAL
Qty: 0 | Refills: 0 | Status: DISCONTINUED | OUTPATIENT
Start: 2018-11-23 | End: 2018-11-26

## 2018-11-23 RX ORDER — SIMETHICONE 80 MG/1
80 TABLET, CHEWABLE ORAL EVERY 8 HOURS
Qty: 0 | Refills: 0 | Status: DISCONTINUED | OUTPATIENT
Start: 2018-11-23 | End: 2018-11-26

## 2018-11-23 RX ORDER — PANTOPRAZOLE SODIUM 20 MG/1
40 TABLET, DELAYED RELEASE ORAL
Qty: 0 | Refills: 0 | Status: DISCONTINUED | OUTPATIENT
Start: 2018-11-23 | End: 2018-11-23

## 2018-11-23 RX ORDER — DIAZEPAM 5 MG
5 TABLET ORAL AT BEDTIME
Qty: 0 | Refills: 0 | Status: DISCONTINUED | OUTPATIENT
Start: 2018-11-23 | End: 2018-11-26

## 2018-11-23 RX ADMIN — Medication 100 MILLIEQUIVALENT(S): at 06:31

## 2018-11-23 RX ADMIN — Medication 1 MILLIGRAM(S): at 09:44

## 2018-11-23 RX ADMIN — LIDOCAINE 1 PATCH: 4 CREAM TOPICAL at 12:23

## 2018-11-23 RX ADMIN — Medication 5 MILLIGRAM(S): at 21:34

## 2018-11-23 RX ADMIN — OXYCODONE HYDROCHLORIDE 10 MILLIGRAM(S): 5 TABLET ORAL at 12:55

## 2018-11-23 RX ADMIN — POLYETHYLENE GLYCOL 3350 17 GRAM(S): 17 POWDER, FOR SOLUTION ORAL at 12:23

## 2018-11-23 RX ADMIN — CHLORHEXIDINE GLUCONATE 15 MILLILITER(S): 213 SOLUTION TOPICAL at 05:26

## 2018-11-23 RX ADMIN — Medication 200 GRAM(S): at 12:25

## 2018-11-23 RX ADMIN — Medication 1 PATCH: at 20:45

## 2018-11-23 RX ADMIN — Medication 3 MILLILITER(S): at 12:07

## 2018-11-23 RX ADMIN — Medication 100 MILLIEQUIVALENT(S): at 05:25

## 2018-11-23 RX ADMIN — Medication 0.5 MILLIGRAM(S): at 05:28

## 2018-11-23 RX ADMIN — HEPARIN SODIUM 5000 UNIT(S): 5000 INJECTION INTRAVENOUS; SUBCUTANEOUS at 14:39

## 2018-11-23 RX ADMIN — CHLORHEXIDINE GLUCONATE 1 APPLICATION(S): 213 SOLUTION TOPICAL at 05:27

## 2018-11-23 RX ADMIN — Medication 1 TABLET(S): at 16:13

## 2018-11-23 RX ADMIN — HYDROMORPHONE HYDROCHLORIDE 0.5 MILLIGRAM(S): 2 INJECTION INTRAMUSCULAR; INTRAVENOUS; SUBCUTANEOUS at 04:23

## 2018-11-23 RX ADMIN — Medication 1 MILLIGRAM(S): at 05:42

## 2018-11-23 RX ADMIN — Medication 100 MILLIGRAM(S): at 14:39

## 2018-11-23 RX ADMIN — Medication 3 MILLILITER(S): at 00:20

## 2018-11-23 RX ADMIN — Medication 1 PATCH: at 12:17

## 2018-11-23 RX ADMIN — Medication 1 PATCH: at 12:23

## 2018-11-23 RX ADMIN — Medication 3 MILLILITER(S): at 05:27

## 2018-11-23 RX ADMIN — SODIUM CHLORIDE 75 MILLILITER(S): 9 INJECTION, SOLUTION INTRAVENOUS at 16:39

## 2018-11-23 RX ADMIN — Medication 1 MILLIGRAM(S): at 02:15

## 2018-11-23 RX ADMIN — HEPARIN SODIUM 5000 UNIT(S): 5000 INJECTION INTRAVENOUS; SUBCUTANEOUS at 05:26

## 2018-11-23 RX ADMIN — Medication 100 MILLIEQUIVALENT(S): at 09:09

## 2018-11-23 RX ADMIN — Medication 0.5 MILLIGRAM(S): at 18:15

## 2018-11-23 RX ADMIN — Medication 1 PATCH: at 08:44

## 2018-11-23 RX ADMIN — Medication 1 GRAM(S): at 17:59

## 2018-11-23 RX ADMIN — SODIUM CHLORIDE 100 MILLILITER(S): 9 INJECTION, SOLUTION INTRAVENOUS at 09:09

## 2018-11-23 RX ADMIN — PANTOPRAZOLE SODIUM 40 MILLIGRAM(S): 20 TABLET, DELAYED RELEASE ORAL at 17:44

## 2018-11-23 RX ADMIN — LIDOCAINE 1 PATCH: 4 CREAM TOPICAL at 20:45

## 2018-11-23 RX ADMIN — PANTOPRAZOLE SODIUM 40 MILLIGRAM(S): 20 TABLET, DELAYED RELEASE ORAL at 05:27

## 2018-11-23 RX ADMIN — Medication 3 MILLILITER(S): at 18:15

## 2018-11-23 RX ADMIN — HYDROMORPHONE HYDROCHLORIDE 0.5 MILLIGRAM(S): 2 INJECTION INTRAMUSCULAR; INTRAVENOUS; SUBCUTANEOUS at 04:08

## 2018-11-23 RX ADMIN — Medication 1 GRAM(S): at 16:12

## 2018-11-23 RX ADMIN — OXYCODONE HYDROCHLORIDE 10 MILLIGRAM(S): 5 TABLET ORAL at 12:23

## 2018-11-23 NOTE — PROGRESS NOTE ADULT - SUBJECTIVE AND OBJECTIVE BOX
CARDIOLOGY     PROGRESS  NOTE   ________________________________________________    CHIEF COMPLAINT:Patient is a 73y old  Female who presents with a chief complaint of SBO, dehydration (21 Nov 2018 10:08)    	  REVIEW OF SYSTEMS:  CONSTITUTIONAL: No fever, weight loss, or fatigue  EYES: No eye pain, visual disturbances, or discharge  ENT:  No difficulty hearing, tinnitus, vertigo; No sinus or throat pain  NECK: No pain or stiffness  RESPIRATORY: No cough, wheezing, chills or hemoptysis; No Shortness of Breath  CARDIOVASCULAR: No chest pain, palpitations, passing out, dizziness, or leg swelling  GASTROINTESTINAL: No abdominal or epigastric pain. No nausea, vomiting, or hematemesis; No diarrhea or constipation. No melena or hematochezia.  GENITOURINARY: No dysuria, frequency, hematuria, or incontinence  NEUROLOGICAL: No headaches, memory loss, loss of strength, numbness, or tremors  SKIN: No itching, burning, rashes, or lesions   LYMPH Nodes: No enlarged glands  ENDOCRINE: No heat or cold intolerance; No hair loss  MUSCULOSKELETAL: No joint pain or swelling; No muscle, back, or extremity pain  PSYCHIATRIC: No depression, anxiety, mood swings, or difficulty sleeping  HEME/LYMPH: No easy bruising, or bleeding gums  ALLERGY AND IMMUNOLOGIC: No hives or eczema	    [ ] All others negative	  [ ] Unable to obtain    PHYSICAL EXAM:  T(C): 36.6 (11-23-18 @ 07:00), Max: 36.8 (11-23-18 @ 03:00)  HR: 67 (11-23-18 @ 09:00) (61 - 99)  BP: 118/57 (11-23-18 @ 09:00) (105/56 - 182/91)  RR: 40 (11-23-18 @ 09:00) (19 - 44)  SpO2: 99% (11-23-18 @ 09:00) (97% - 100%)  Wt(kg): --  I&O's Summary    22 Nov 2018 07:01  -  23 Nov 2018 07:00  --------------------------------------------------------  IN: 3117 mL / OUT: 1750 mL / NET: 1367 mL        Appearance: Normal	  HEENT:   Normal oral mucosa, PERRL, EOMI	  Lymphatic: No lymphadenopathy  Cardiovascular: Normal S1 S2, No JVD, No murmurs, No edema  Respiratory: Lungs clear to auscultation	  Psychiatry: A & O x 3, Mood & affect appropriate  Gastrointestinal:  Soft, Non-tender, + BS	  Skin: No rashes, No ecchymoses, No cyanosis	  Neurologic: Non-focal  Extremities: Normal range of motion, No clubbing, cyanosis or edema  Vascular: Peripheral pulses palpable 2+ bilaterally    MEDICATIONS  (STANDING):  ALBUTerol/ipratropium for Nebulization 3 milliLiter(s) Nebulizer every 6 hours  buDESOnide   0.5 milliGRAM(s) Respule 0.5 milliGRAM(s) Inhalation every 12 hours  chlorhexidine 4% Liquid 1 Application(s) Topical <User Schedule>  dextrose 5% + sodium chloride 0.45%. 1000 milliLiter(s) (75 mL/Hr) IV Continuous <Continuous>  heparin  Injectable 5000 Unit(s) SubCutaneous every 8 hours  influenza   Vaccine 0.5 milliLiter(s) IntraMuscular once  lidocaine   Patch 1 Patch Transdermal daily  nicotine - 21 mG/24Hr(s) Patch 1 patch Transdermal daily  pantoprazole  Injectable 40 milliGRAM(s) IV Push two times a day      TELEMETRY: 	    ECG:  	  RADIOLOGY:  OTHER: 	  	  LABS:	 	    CARDIAC MARKERS:  CARDIAC MARKERS ( 21 Nov 2018 18:24 )  x     / x     / 690 U/L / x     / 9.4 ng/mL  CARDIAC MARKERS ( 21 Nov 2018 12:36 )  x     / x     / 221 U/L / x     / 4.3 ng/mL                                11.5   7.8   )-----------( 193      ( 23 Nov 2018 04:57 )             34.5     11-23    148<H>  |  107  |  68<H>  ----------------------------<  166<H>  3.7   |  23  |  2.75<H>    Ca    7.7<L>      23 Nov 2018 04:57  Phos  3.2     11-23  Mg     2.2     11-23    TPro  5.8<L>  /  Alb  3.1<L>  /  TBili  0.2  /  DBili  x   /  AST  44<H>  /  ALT  36  /  AlkPhos  179<H>  11-21    proBNP:   Lipid Profile:   HgA1c:   TSH:   PT/INR - ( 23 Nov 2018 08:51 )   PT: 11.7 sec;   INR: 1.03 ratio         PTT - ( 23 Nov 2018 08:51 )  PTT:32.3 sec      Assessment and plan  ---------------------------  s/p extubation  awaiting echo  increase trop sec to renal failure

## 2018-11-23 NOTE — PROGRESS NOTE ADULT - SUBJECTIVE AND OBJECTIVE BOX
OBJECTIVE:  ICU Vital Signs Last 24 Hrs  T(C): 36.8 (23 Nov 2018 03:00), Max: 36.8 (23 Nov 2018 03:00)  T(F): 98.3 (23 Nov 2018 03:00), Max: 98.3 (23 Nov 2018 03:00)  HR: 79 (23 Nov 2018 05:32) (60 - 97)  BP: 139/67 (23 Nov 2018 05:00) (100/54 - 182/91)  BP(mean): 96 (23 Nov 2018 05:00) (74 - 129)  ABP: --  ABP(mean): --  RR: 21 (23 Nov 2018 05:00) (19 - 49)  SpO2: 100% (23 Nov 2018 05:32) (98% - 100%)    Mode: CPAP with PS, FiO2: 40, PEEP: 5, PS: 5, MAP: 7, PC: 15, PIP: 10    11-21 @ 07:01  -  11-22 @ 07:00  --------------------------------------------------------  IN: 3562.8 mL / OUT: 1765 mL / NET: 1797.8 mL    11-22 @ 07:01  -  11-23 @ 06:03  --------------------------------------------------------  IN: 2917 mL / OUT: 1400 mL / NET: 1517 mL      CAPILLARY BLOOD GLUCOSE          PHYSICAL EXAM:  General:   HEENT:   Respiratory:   Cardiovascular:   Abdomen:   Extremities:   Skin:   Neurological:    HOSPITAL MEDICATIONS:  heparin  Injectable 5000 Unit(s) SubCutaneous every 8 hours          ALBUTerol/ipratropium for Nebulization 3 milliLiter(s) Nebulizer every 6 hours  buDESOnide   0.5 milliGRAM(s) Respule 0.5 milliGRAM(s) Inhalation every 12 hours    LORazepam   Injectable 1 milliGRAM(s) IV Push every 4 hours PRN    pantoprazole  Injectable 40 milliGRAM(s) IV Push two times a day        dextrose 5% + sodium chloride 0.45% 1000 milliLiter(s) IV Continuous <Continuous>  potassium chloride  10 mEq/100 mL IVPB 10 milliEquivalent(s) IV Intermittent every 1 hour    influenza   Vaccine 0.5 milliLiter(s) IntraMuscular once    chlorhexidine 0.12% Liquid 15 milliLiter(s) Oral Mucosa <User Schedule>  chlorhexidine 4% Liquid 1 Application(s) Topical <User Schedule>    nicotine - 21 mG/24Hr(s) Patch 1 patch Transdermal daily      LABS:                        11.5   7.8   )-----------( 193      ( 23 Nov 2018 04:57 )             34.5     Hgb Trend: 11.5<--, 11.7<--, 13.8<--, 13.7<--, 15.2<--  11-23    148<H>  |  107  |  68<H>  ----------------------------<  166<H>  3.7   |  23  |  2.75<H>    Ca    7.7<L>      23 Nov 2018 04:57  Phos  3.2     11-23  Mg     2.2     11-23    TPro  5.8<L>  /  Alb  3.1<L>  /  TBili  0.2  /  DBili  x   /  AST  44<H>  /  ALT  36  /  AlkPhos  179<H>  11-21    Creatinine Trend: 2.75<--, 3.66<--, 4.75<--, 5.54<--, 5.93<--, 6.83<--      Arterial Blood Gas:  11-23 @ 04:42  7.47/39/91/28/95/4.6  ABG lactate: --  Arterial Blood Gas:  11-22 @ 15:12  7.36/33/121/18/96/-5.8  ABG lactate: --  Arterial Blood Gas:  11-22 @ 03:51  7.34/33/155/18/97/-6.6  ABG lactate: --  Arterial Blood Gas:  11-21 @ 23:39  7.34/36/313/19/98/-6.0  ABG lactate: --  Arterial Blood Gas:  11-21 @ 18:15  7.20/60/74/22/86/-6.4  ABG lactate: --        MICROBIOLOGY:     RADIOLOGY:  [ ] Reviewed and interpreted by me    PULMONARY FUNCTION TESTS: Pt seen and examined by the bedside. Exubated yesterday. Remains confused and tremulous. Unable to focus on answering questions. Has had 10 lbs weight loss over the past month and cough.    OBJECTIVE:  ICU Vital Signs Last 24 Hrs  T(C): 36.8 (23 Nov 2018 03:00), Max: 36.8 (23 Nov 2018 03:00)  T(F): 98.3 (23 Nov 2018 03:00), Max: 98.3 (23 Nov 2018 03:00)  HR: 79 (23 Nov 2018 05:32) (60 - 97)  BP: 139/67 (23 Nov 2018 05:00) (100/54 - 182/91)  BP(mean): 96 (23 Nov 2018 05:00) (74 - 129)  ABP: --  ABP(mean): --  RR: 21 (23 Nov 2018 05:00) (19 - 49)  SpO2: 100% (23 Nov 2018 05:32) (98% - 100%)    Mode: CPAP with PS, FiO2: 40, PEEP: 5, PS: 5, MAP: 7, PC: 15, PIP: 10    11-21 @ 07:01 - 11-22 @ 07:00  --------------------------------------------------------  IN: 3562.8 mL / OUT: 1765 mL / NET: 1797.8 mL    11-22 @ 07:01 - 11-23 @ 06:03  --------------------------------------------------------  IN: 2917 mL / OUT: 1400 mL / NET: 1517 mL    PHYSICAL EXAM:  General: anxious  HEENT: NCAT, moist mucosal membranes  Respiratory: CTAB; barrel chested  Cardiovascular: S1/S2 normal, tachycardic, regular rhythm; no murmurs/rubs/gallops appreciated  Abdomen: soft, non-tender, mildly distended  Extremities: no b/l LE edema  Skin: warm/dry  Neurological: extremely tremulous      HOSPITAL MEDICATIONS:  heparin  Injectable 5000 Unit(s) SubCutaneous every 8 hours          ALBUTerol/ipratropium for Nebulization 3 milliLiter(s) Nebulizer every 6 hours  buDESOnide   0.5 milliGRAM(s) Respule 0.5 milliGRAM(s) Inhalation every 12 hours    LORazepam   Injectable 1 milliGRAM(s) IV Push every 4 hours PRN    pantoprazole  Injectable 40 milliGRAM(s) IV Push two times a day        dextrose 5% + sodium chloride 0.45% 1000 milliLiter(s) IV Continuous <Continuous>  potassium chloride  10 mEq/100 mL IVPB 10 milliEquivalent(s) IV Intermittent every 1 hour    influenza   Vaccine 0.5 milliLiter(s) IntraMuscular once    chlorhexidine 0.12% Liquid 15 milliLiter(s) Oral Mucosa <User Schedule>  chlorhexidine 4% Liquid 1 Application(s) Topical <User Schedule>    nicotine - 21 mG/24Hr(s) Patch 1 patch Transdermal daily      LABS:                        11.5   7.8   )-----------( 193      ( 23 Nov 2018 04:57 )             34.5     Hgb Trend: 11.5<--, 11.7<--, 13.8<--, 13.7<--, 15.2<--  11-23    148<H>  |  107  |  68<H>  ----------------------------<  166<H>  3.7   |  23  |  2.75<H>    Ca    7.7<L>      23 Nov 2018 04:57  Phos  3.2     11-23  Mg     2.2     11-23    TPro  5.8<L>  /  Alb  3.1<L>  /  TBili  0.2  /  DBili  x   /  AST  44<H>  /  ALT  36  /  AlkPhos  179<H>  11-21    Creatinine Trend: 2.75<--, 3.66<--, 4.75<--, 5.54<--, 5.93<--, 6.83<--      Arterial Blood Gas:  11-23 @ 04:42  7.47/39/91/28/95/4.6  ABG lactate: --  Arterial Blood Gas:  11-22 @ 15:12  7.36/33/121/18/96/-5.8  ABG lactate: --  Arterial Blood Gas:  11-22 @ 03:51  7.34/33/155/18/97/-6.6  ABG lactate: --  Arterial Blood Gas:  11-21 @ 23:39  7.34/36/313/19/98/-6.0  ABG lactate: --  Arterial Blood Gas:  11-21 @ 18:15  7.20/60/74/22/86/-6.4  ABG lactate: --

## 2018-11-23 NOTE — PROGRESS NOTE ADULT - ASSESSMENT
73 year old female with   PMH chronic Back pain (on Narcotics), history of multiple SBO's, narcotic associated constipation/ileus, COPD, current everyday smoker,  ca  breast,  right  mastectomy   admitted with abdominal pain and distension, nausea and poor PO intake with associated worsening painful spasms.      c/c  cachexia  and   dehydrated with acute Renal Failure (SHAKIRA) with marked hyperphosphatemia, acidosis   SBO on ct  /   iV hydration  /    SHAKIRA , improving/  ct  scans  noted/  RENUKA  cavitary lesion./ ? pna  SBO, transition point  in ileum  oncology   d r forte  intubated/  positive  troponin/ pt  not an ideal candidate for cardiac intervention  recommend  asa  QuantiFeron,  pending  care per  sicu        < from: CT Chest No Cont (11.20.18 @ 19:17) >  IMPRESSION: Large cavitary lesion in the left upper lobe. Primary   consideration is lung carcinoma. Tuberculosis or a lung abscess is not   excluded.  Mucous plugging in the right lower lobe with groundglass opacities which   may be due to infection or atelectasis.  < end of copied text >     < from: CT Abdomen and Pelvis No Cont (11.20.18 @ 17:07) >  IMPRESSION: Small bowel obstruction with transition point in the ileum   located in theright lower quadrant.  Groundglass opacities in the right lower lobe suggest pneumonia.  Findings discussed with Dr. Diaz on 11/20/2018 at 5:30 PM with read   back.  < end of copied text > 73 year old female with   PMH chronic Back pain (on Narcotics), history of multiple SBO's, narcotic associated constipation/ileus, COPD, current everyday smoker,  ca  breast,  right  mastectomy   admitted with abdominal pain and distension, nausea and poor PO intake with associated worsening painful spasms.      c/c  cachexia  and   dehydrated with acute Renal Failure (SHAKIRA) with marked hyperphosphatemia, acidosis   SBO on ct  /   iV hydration  /    SHAKIRA , improving/  ct  scans  noted/  RENUKA  cavitary lesion./ ? pna  SBO, transition point  in ileum  oncology   d r forte    positive  troponin/ pt  not an ideal candidate for cardiac intervention  recommend  asa  QuantiFeron,  pending  care per  sicu        < from: CT Chest No Cont (11.20.18 @ 19:17) >  IMPRESSION: Large cavitary lesion in the left upper lobe. Primary   consideration is lung carcinoma. Tuberculosis or a lung abscess is not   excluded.  Mucous plugging in the right lower lobe with groundglass opacities which   may be due to infection or atelectasis.  < end of copied text >     < from: CT Abdomen and Pelvis No Cont (11.20.18 @ 17:07) >  IMPRESSION: Small bowel obstruction with transition point in the ileum   located in theright lower quadrant.  Groundglass opacities in the right lower lobe suggest pneumonia.  Findings discussed with Dr. Diaz on 11/20/2018 at 5:30 PM with read   back.  < end of copied text >

## 2018-11-23 NOTE — PROGRESS NOTE ADULT - ASSESSMENT
----------------------------------------  Jenelle Bautista MD PGY-5  Pulmonary/Critical Care Fellow  Pager # [phone] ([facility]), [phone] ([facility]) 73F hx chronic back pain on oxycodone, anxiety on chronic benzos, complicated surgical history (hysterectomy, appendectomy, cholecystectomy, ex-lap x2), breast CA s/p R mastectomy 2006, chemoRT, liver mass resection in 2009, multiple prior SBO due to narcotic-associated ileus/constipation (last 2016), who initially p/w abdominal pain and poor PO intake. Pulm c/s for RENUKA cavitary lesion and ?COPD.     #RENUKA cavitary lesion  CT chest with incidental finding of RENUKA cavitary lesion and ?RENUKA airway closure (extrinsic vs. intrinsic blockage). Prior CT from 2011 without any evidence of lesion and CXR from 2016 without evidence of cavitary lesion or nodules. Pt has significant smoking history, prior breast CA, so most likely has a smoldering malignancy causing cavitary lesion, but cannot rule out infectious causes, such as TB, fungal, or staph.  - bronchoscopy canceled given benzo withdrawl, will reschedule when pt is more clinically stable  - would start zosyn for possible aspiration PNA  - sputum cx, fungal cx, and afb cx  - will need 3 afb sputum to r/o TB  - f/u quant gold  - airborne isolation while Tb is being ruled out  - chest PT for airway clearance    #Emphysema  CT chest also with emphysematous changes. No wheezing on exam. Mildly hypoxic  - cont budesonide bid  - cont duonebs q6h  - ABG   - will need outpatient PFTs to confirm presence of obstructive lung disease    Case d/w Dr. Escalona (pulm attending) and SICU team      ----------------------------------------  Jenelle Bautista MD PGY-5  Pulmonary/Critical Care Fellow  Pager # [phone] ([facility]), [phone] ([facility])

## 2018-11-23 NOTE — PROGRESS NOTE ADULT - SUBJECTIVE AND OBJECTIVE BOX
intubated/   on isolation    REVIEW OF SYSTEMS:  GEN: no fever,    no chills  MEDICATIONS  (STANDING):  ALBUTerol/ipratropium for Nebulization 3 milliLiter(s) Nebulizer every 6 hours  buDESOnide   0.5 milliGRAM(s) Respule 0.5 milliGRAM(s) Inhalation every 12 hours  chlorhexidine 4% Liquid 1 Application(s) Topical <User Schedule>  dextrose 5% + sodium chloride 0.45% 1000 milliLiter(s) (100 mL/Hr) IV Continuous <Continuous>  heparin  Injectable 5000 Unit(s) SubCutaneous every 8 hours  influenza   Vaccine 0.5 milliLiter(s) IntraMuscular once  lidocaine   Patch 1 Patch Transdermal daily  nicotine - 21 mG/24Hr(s) Patch 1 patch Transdermal daily  pantoprazole  Injectable 40 milliGRAM(s) IV Push two times a day    MEDICATIONS  (PRN):      Vital Signs Last 24 Hrs  T(C): 36.8 (23 Nov 2018 03:00), Max: 36.8 (23 Nov 2018 03:00)  T(F): 98.3 (23 Nov 2018 03:00), Max: 98.3 (23 Nov 2018 03:00)  HR: 93 (23 Nov 2018 07:00) (61 - 97)  BP: 166/82 (23 Nov 2018 07:00) (105/56 - 182/91)  BP(mean): 117 (23 Nov 2018 07:00) (76 - 129)  RR: 27 (23 Nov 2018 07:00) (19 - 44)  SpO2: 98% (23 Nov 2018 07:00) (97% - 100%)  CAPILLARY BLOOD GLUCOSE        I&O's Summary    22 Nov 2018 07:01  -  23 Nov 2018 07:00  --------------------------------------------------------  IN: 3117 mL / OUT: 1750 mL / NET: 1367 mL        PHYSICAL EXAM:  HEAD:  Atraumatic, Normocephalic  NECK: Supple, No   JVD  CHEST/LUNG:   no     rales,     no,    rhonchi  HEART: Regular rate and rhythm;         murmur  ABDOMEN: Soft, Nontender, ;   EXTREMITIES:    no    edema  NEUROLOGY:  intubated    LABS:                        11.5   7.8   )-----------( 193      ( 23 Nov 2018 04:57 )             34.5     11-23    148<H>  |  107  |  68<H>  ----------------------------<  166<H>  3.7   |  23  |  2.75<H>    Ca    7.7<L>      23 Nov 2018 04:57  Phos  3.2     11-23  Mg     2.2     11-23    TPro  5.8<L>  /  Alb  3.1<L>  /  TBili  0.2  /  DBili  x   /  AST  44<H>  /  ALT  36  /  AlkPhos  179<H>  11-21    PT/INR - ( 23 Nov 2018 08:51 )   PT: 11.7 sec;   INR: 1.03 ratio         PTT - ( 23 Nov 2018 08:51 )  PTT:32.3 sec  CARDIAC MARKERS ( 21 Nov 2018 18:24 )  x     / x     / 690 U/L / x     / 9.4 ng/mL  CARDIAC MARKERS ( 21 Nov 2018 12:36 )  x     / x     / 221 U/L / x     / 4.3 ng/mL          ABG - ( 23 Nov 2018 04:42 )  pH, Arterial: 7.47  pH, Blood: x     /  pCO2: 39    /  pO2: 91    / HCO3: 28    / Base Excess: 4.6   /  SaO2: 95                            Consultant(s) Notes Reviewed:      Care Discussed with Consultants/Other Providers: extubated  on isolation    REVIEW OF SYSTEMS:  GEN: no fever,    no chills  MEDICATIONS  (STANDING):  ALBUTerol/ipratropium for Nebulization 3 milliLiter(s) Nebulizer every 6 hours  buDESOnide   0.5 milliGRAM(s) Respule 0.5 milliGRAM(s) Inhalation every 12 hours  chlorhexidine 4% Liquid 1 Application(s) Topical <User Schedule>  dextrose 5% + sodium chloride 0.45% 1000 milliLiter(s) (100 mL/Hr) IV Continuous <Continuous>  heparin  Injectable 5000 Unit(s) SubCutaneous every 8 hours  influenza   Vaccine 0.5 milliLiter(s) IntraMuscular once  lidocaine   Patch 1 Patch Transdermal daily  nicotine - 21 mG/24Hr(s) Patch 1 patch Transdermal daily  pantoprazole  Injectable 40 milliGRAM(s) IV Push two times a day    MEDICATIONS  (PRN):      Vital Signs Last 24 Hrs  T(C): 36.8 (23 Nov 2018 03:00), Max: 36.8 (23 Nov 2018 03:00)  T(F): 98.3 (23 Nov 2018 03:00), Max: 98.3 (23 Nov 2018 03:00)  HR: 93 (23 Nov 2018 07:00) (61 - 97)  BP: 166/82 (23 Nov 2018 07:00) (105/56 - 182/91)  BP(mean): 117 (23 Nov 2018 07:00) (76 - 129)  RR: 27 (23 Nov 2018 07:00) (19 - 44)  SpO2: 98% (23 Nov 2018 07:00) (97% - 100%)  CAPILLARY BLOOD GLUCOSE        I&O's Summary    22 Nov 2018 07:01  -  23 Nov 2018 07:00  --------------------------------------------------------  IN: 3117 mL / OUT: 1750 mL / NET: 1367 mL        PHYSICAL EXAM:  HEAD:  Atraumatic, Normocephalic  NECK: Supple, No   JVD  CHEST/LUNG:   no     rales,     no,    rhonchi  HEART: Regular rate and rhythm;         murmur  ABDOMEN: Soft, Nontender, ;   EXTREMITIES:    no    edema  NEUROLOGY:  intubated    LABS:                        11.5   7.8   )-----------( 193      ( 23 Nov 2018 04:57 )             34.5     11-23    148<H>  |  107  |  68<H>  ----------------------------<  166<H>  3.7   |  23  |  2.75<H>    Ca    7.7<L>      23 Nov 2018 04:57  Phos  3.2     11-23  Mg     2.2     11-23    TPro  5.8<L>  /  Alb  3.1<L>  /  TBili  0.2  /  DBili  x   /  AST  44<H>  /  ALT  36  /  AlkPhos  179<H>  11-21    PT/INR - ( 23 Nov 2018 08:51 )   PT: 11.7 sec;   INR: 1.03 ratio         PTT - ( 23 Nov 2018 08:51 )  PTT:32.3 sec  CARDIAC MARKERS ( 21 Nov 2018 18:24 )  x     / x     / 690 U/L / x     / 9.4 ng/mL  CARDIAC MARKERS ( 21 Nov 2018 12:36 )  x     / x     / 221 U/L / x     / 4.3 ng/mL          ABG - ( 23 Nov 2018 04:42 )  pH, Arterial: 7.47  pH, Blood: x     /  pCO2: 39    /  pO2: 91    / HCO3: 28    / Base Excess: 4.6   /  SaO2: 95                            Consultant(s) Notes Reviewed:      Care Discussed with Consultants/Other Providers:

## 2018-11-23 NOTE — PROGRESS NOTE ADULT - ATTENDING COMMENTS
Surgery Attending    Pt discussed with resident & ICU staff    Pt now having multiple BMs    Agree with above assessment and plan

## 2018-11-23 NOTE — PROGRESS NOTE ADULT - SUBJECTIVE AND OBJECTIVE BOX
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 Labs significant for WBC 11.5, HCT 52.5, , Cr 7.66, phosphorus 15.1, pH 7.27, and lactate 6.1. CT scan also demonstrated a SBO in the ileum in the RLQ. She was fluid resuscitated and a NG tube was placed with 700 mL of output immediately. SICU consulted for hemodynamic monitoring.    24 HOUR EVENTS:  - Passed SBT and was extubated  - Alert, awake, following commands  - Minimal NGT output and having bowel movements; denies passing flatus     SUBJECTIVE/ROS:  [x] A ten-point review of systems was otherwise negative except as noted.  [ ] Due to altered mental status/intubation, subjective information were not able to be obtained from the patient. History was obtained, to the extent possible, from review of the chart and collateral sources of information.      NEURO  Exam: awake, alert, oriented, answering questions appropriately   Meds:   [x] Adequacy of sedation and pain control has been assessed and adjusted      RESPIRATORY  RR: 20 (11-23-18 @ 06:00) (19 - 49)  SpO2: 97% (11-23-18 @ 06:00) (97% - 100%)  Exam: unlabored, clear to auscultation bilaterally  Mechanical Ventilation: Mode: CPAP with PS, RR (patient): 21, FiO2: 40, PEEP: 5, PS: 5, MAP: 7, PC: 15, PIP: 10  ABG - ( 23 Nov 2018 04:42 )  pH: 7.47  /  pCO2: 39    /  pO2: 91    / HCO3: 28    / Base Excess: 4.6   /  SaO2: 95          [N/A] Extubation Readiness Assessed  Meds: ALBUTerol/ipratropium for Nebulization 3 milliLiter(s) Nebulizer every 6 hours  buDESOnide   0.5 milliGRAM(s) Respule 0.5 milliGRAM(s) Inhalation every 12 hours      CARDIOVASCULAR  HR: 97 (11-23-18 @ 06:00) (60 - 97)  BP: 161/76 (11-23-18 @ 06:00) (100/54 - 182/91)  BP(mean): 107 (11-23-18 @ 06:00) (74 - 129)      Exam: normotensive, regular rate   Cardiac Rhythm: sinus  Perfusion     [x]Adequate   [ ]Inadequate  Mentation   [x]Normal       [ ]Reduced  Extremities  [x]Warm         [ ]Cool  Volume Status [ ]Hypervolemic [x]Euvolemic [ ]Hypovolemic  Meds:       GI/NUTRITION  Exam: soft, nontender, nondistended, NGT with minimal output   Diet: NPO + NGT  Meds: pantoprazole  Injectable 40 milliGRAM(s) IV Push two times a day      GENITOURINARY  I&O's Detail    11-22 @ 07:01  -  11-23 @ 07:00  --------------------------------------------------------  IN:    dextrose 5% + lactated ringers.: 100 mL    dextrose 5% + sodium chloride 0.45%: 2000 mL    IV PiggyBack: 600 mL    propofol Infusion: 42 mL    sodium chloride 0.9%: 375 mL  Total IN: 3117 mL    OUT:    Indwelling Catheter - Urethral: 1700 mL    Nasoenteral Tube: 50 mL  Total OUT: 1750 mL    Total NET: 1367 mL    11-23    148<H>  |  107  |  68<H>  ----------------------------<  166<H>  3.7   |  23  |  2.75<H>    Ca    7.7<L>      23 Nov 2018 04:57  Phos  3.2     11-23  Mg     2.2     11-23    TPro  5.8<L>  /  Alb  3.1<L>  /  TBili  0.2  /  DBili  x   /  AST  44<H>  /  ALT  36  /  AlkPhos  179<H>  11-21    [x] Chavarria catheter, indication: monitoring in critically ill patient   Meds: dextrose 5% + sodium chloride 0.45% 1000 milliLiter(s) IV Continuous <Continuous>  potassium chloride  10 mEq/100 mL IVPB 10 milliEquivalent(s) IV Intermittent every 1 hour      HEMATOLOGIC  Meds: heparin  Injectable 5000 Unit(s) SubCutaneous every 8 hours    [x] VTE Prophylaxis                        11.5   7.8   )-----------( 193      ( 23 Nov 2018 04:57 )             34.5       Transfusion     [ ] PRBC   [ ] Platelets   [ ] FFP   [ ] Cryoprecipitate      INFECTIOUS DISEASES  WBC Count: 7.8 K/uL (11-23 @ 04:57)    RECENT CULTURES:  Specimen Source: .Broncial Bronchoalveolar Lavage  Date/Time: 11-21 @ 22:29  Culture Results:   Testing in progress  Gram Stain: --  Organism: --  Specimen Source: .Urine Clean Catch (Midstream)  Date/Time: 11-20 @ 21:49  Culture Results:   No growth  Gram Stain: --  Organism: --    Meds: influenza   Vaccine 0.5 milliLiter(s) IntraMuscular once      ENDOCRINE  CAPILLARY BLOOD GLUCOSE      ACCESS DEVICES:  [x] Peripheral IV  [ ] Central Venous Line	[ ] R	[ ] L	[ ] IJ	[ ] Fem	[ ] SC	Placed:   [ ] Arterial Line		[ ] R	[ ] L	[ ] Fem	[ ] Rad	[ ] Ax	Placed:   [ ] PICC:					[ ] Mediport  [ ] Urinary Catheter, Date Placed:   [x] Necessity of urinary, arterial, and venous catheters discussed    OTHER MEDICATIONS:  chlorhexidine 4% Liquid 1 Application(s) Topical <User Schedule>  lidocaine   Patch 1 Patch Transdermal daily  nicotine - 21 mG/24Hr(s) Patch 1 patch Transdermal daily      CODE STATUS: Full       IMAGING:  < from: CT Abdomen and Pelvis No Cont (11.20.18 @ 17:07) >  LOWER CHEST: Groundglass opacities in the right lower lobe.    LIVER: Status post partial hepatectomy.  BILE DUCTS: Intrahepatic and extrahepatic biliary ductal dilatation,   unchanged.  GALLBLADDER: Postcholecystectomy.  SPLEEN: Within normal limits.  PANCREAS: Within normal limits.  ADRENALS: Within normal limits.  KIDNEYS/URETERS: Bilateral nonobstructing renal calculi, measuring up to   4 mm in the left kidney. No hydronephrosis.    BLADDER: Within normal limits.  REPRODUCTIVE ORGANS: Hysterectomy.    BOWEL: The stomach is distended. Multiple dilated loops of small bowel   with collapsed loops of ileum in the right lower quadrant consistent with   small bowel obstruction.   PERITONEUM: No ascites.  VESSELS:  Atherosclerotic calcifications.  RETROPERITONEUM: No lymphadenopathy.    ABDOMINAL WALL: Within normal limits.  BONES: Within normal limits.    IMPRESSION: Small bowel obstruction with transition point in the ileum   located in theright lower quadrant.    Groundglass opacities in the right lower lobe suggest pneumonia.    < end of copied text >

## 2018-11-23 NOTE — PROGRESS NOTE ADULT - SUBJECTIVE AND OBJECTIVE BOX
Patient is a 73y old  Female who presented with a chief complaint of sbo/+ troponin (23 Nov 2018 10:15)      INTERVAL HPI/OVERNIGHT EVENTS:  multiple BMs  scant NGT output  drowsy - just received Ativan dose before examination    MEDICATIONS  (STANDING):  ALBUTerol/ipratropium for Nebulization 3 milliLiter(s) Nebulizer every 6 hours  buDESOnide   0.5 milliGRAM(s) Respule 0.5 milliGRAM(s) Inhalation every 12 hours  calcium gluconate IVPB 2 Gram(s) IV Intermittent once  chlorhexidine 4% Liquid 1 Application(s) Topical <User Schedule>  dextrose 5% + sodium chloride 0.45%. 1000 milliLiter(s) (75 mL/Hr) IV Continuous <Continuous>  docusate sodium 100 milliGRAM(s) Oral three times a day  heparin  Injectable 5000 Unit(s) SubCutaneous every 8 hours  influenza   Vaccine 0.5 milliLiter(s) IntraMuscular once  lidocaine   Patch 1 Patch Transdermal daily  multivitamin/minerals 1 Tablet(s) Oral daily  nicotine - 21 mG/24Hr(s) Patch 1 patch Transdermal daily  pantoprazole    Tablet 40 milliGRAM(s) Oral every 12 hours  polyethylene glycol 3350 17 Gram(s) Oral daily  senna 2 Tablet(s) Oral at bedtime  sucralfate 1 Gram(s) Oral four times a day    MEDICATIONS  (PRN):  diazepam    Tablet 5 milliGRAM(s) Oral at bedtime PRN insomnia  oxyCODONE    IR 10 milliGRAM(s) Oral every 6 hours PRN Severe Pain (7 - 10)  simethicone 80 milliGRAM(s) Chew every 8 hours PRN Indigestion    Allergies  Biaxin (Rash)  Cipro (Headache)  Flagyl (Headache)  penicillin (Rash; Swelling)  sulfa drugs (Unknown)      Review of Systems:  unable to obtain from pr    Vital Signs Last 24 Hrs  T(C): 36.6 (23 Nov 2018 07:00), Max: 36.8 (23 Nov 2018 03:00)  T(F): 97.9 (23 Nov 2018 07:00), Max: 98.3 (23 Nov 2018 03:00)  HR: 68 (23 Nov 2018 10:00) (61 - 99)  BP: 123/64 (23 Nov 2018 10:00) (105/56 - 182/91)  BP(mean): 87 (23 Nov 2018 10:00) (76 - 129)  RR: 21 (23 Nov 2018 10:00) (19 - 44)  SpO2: 98% (23 Nov 2018 10:00) (97% - 100%)    PHYSICAL EXAM:  Constitutional: chronically ill appearing frail female +temporal wasting +muscle wasting  Neck: No LAD, no JVD  Respiratory: distant BS b/l  Cardiovascular: S1 and S2, RRR  Gastrointestinal: soft ND NT +BS +arevalo with clear urine  Extremities: +clubbing  no edema  Vascular: palpable  Neurological: drowsy/rousable no focal asymmetry  Skin: No rashes     LABS:                        11.5   7.8   )-----------( 193      ( 23 Nov 2018 04:57 )             34.5     11-23    148<H>  |  107  |  68<H>  ----------------------------<  166<H>  3.7   |  23  |  2.75<H>    Ca    7.7<L>      23 Nov 2018 04:57  Phos  3.2     11-23  Mg     2.2     11-23    TPro  5.8<L>  /  Alb  3.1<L>  /  TBili  0.2  /  DBili  x   /  AST  44<H>  /  ALT  36  /  AlkPhos  179<H>  11-21    PT/INR - ( 23 Nov 2018 08:51 )   PT: 11.7 sec;   INR: 1.03 ratio    PTT - ( 23 Nov 2018 08:51 )  PTT:32.3 sec        RADIOLOGY & ADDITIONAL TESTS:

## 2018-11-23 NOTE — PROGRESS NOTE ADULT - ASSESSMENT
73F with complicated surgical history and history of multiple episodes of small bowel obstruction presented with SBO demonstrated on CT scan. s/p extubation. Cr continues to downtrend, now 3.6    - NPO/ IVF  - trend Cr  - pain control  - Monitor vitals  - care per sicu team     [facility]  [phone] 73F with complicated surgical history and history of multiple episodes of small bowel obstruction presented with SBO demonstrated on CT scan. s/p extubation. Cr continues to downtrend, now 3.6    - NPO/ IVF  - Consider discontinuation of NGT  - trend Cr  - pain control  - Monitor vitals  - care per sicu team     [facility]  [phone]

## 2018-11-23 NOTE — PROGRESS NOTE ADULT - ASSESSMENT
73 year old female with an extensive past medical and surgical history presenting with a recurrent small bowel obstruction, SHAKIRA stage 3, electrolyte abnormalities, and acute respiratory failure requiring intubation. Extubated on 11/22. Having bowel movement, but no flatus. Unclear if this is resolving SBO or decompression of intestine distal to transition point.     PLAN:    Neuro: narcotic & benzodiazepine dependence  - Monitor mental status.  - PRN pain medication AFTER evaluation of abdomen for signs of peritonitis     Resp: extubated, RENUKA cavitary lesion  - Monitor pulse ox  - Nicotine patch as patient is current every day smoker.  - Duoneb and Pulmicort for COPD.  - Follow up tuberculosis work-up. Will need work-up for possible lung cancer. Appreciate pulmonary and oncology recommendations.    CV: elevated troponins, resolved   - Monitor vital signs.  - F/u echo    GI: SBO, narcotic dysmotile cilia syndrome  - NPO with NG tube to continuous low wall suction. Monitor NG tube output.  - Protonix BID for bloody NG tube output.    Renal: SHAKIRA stage 3 w/ baseline Cr 1.1 in Nov 2016  - Monitor I&Os.  - Monitor electrolytes and replete as necessary.  - D5 1/2 NS at 100 mL/hr while NPO  - Appreciate nephrology recommendations.    Heme: no acute issues  - SQH for VTE prophylaxis.    ID: no acute issues  - Monitor for clinical evidence of active infection.    Endo: no acute issues  - Monitor blood glucose on BMP.    Disposition:  - Full code.

## 2018-11-23 NOTE — PROGRESS NOTE ADULT - ATTENDING COMMENTS
Ms. Boswell was extubated yesterday.  She is currently sedated, responding to name.  Does not verbalize and does not follow commands.  She received ATivan this morning.  Lungs notable for rhonchi.  Afebrile.  Will schedule bronchoscopy when she is more stable clinically.  Agree with plan as outlined above.

## 2018-11-23 NOTE — CHART NOTE - NSCHARTNOTEFT_GEN_A_CORE
Nutrition Follow Up Note    Patient seen for: severe malnutrition follow up    Source: medical record, SICU team ethan. Pt shaking, from narcotic withdrawal per team.     Chart reviewed, events noted. Pt with "extensive past medical and surgical history presenting with a recurrent small bowel obstruction, SHAKIRA stage 3, electrolyte abnormalities, and acute respiratory failure requiring intubation. Extubated on . Having bowel movement, but no flatus. Unclear if this is resolving SBO or decompression of intestine distal to transition point."    Pt remains NPO day 4. Pt with severe COPD, left lobe lesion noted; plan for bronch today, work up for possible lung cancer, possible TB.    Diet () : NPO    NGT output x 24-hours: 50ml    Last BM:     Daily Weight in k.7 (), Weight in k.6 (), Weight in k.2 (), Weight in k.3 ()    Drug Dosing Weight  Weight (kg): 40 (2018 22:00)  BMI (kg/m2): 16.1 (2018 22:00)    Pertinent Medications: MEDICATIONS  (STANDING):  ALBUTerol/ipratropium for Nebulization 3 milliLiter(s) Nebulizer every 6 hours  buDESOnide   0.5 milliGRAM(s) Respule 0.5 milliGRAM(s) Inhalation every 12 hours  chlorhexidine 4% Liquid 1 Application(s) Topical <User Schedule>  dextrose 5% + sodium chloride 0.45% 1000 milliLiter(s) (100 mL/Hr) IV Continuous <Continuous>  influenza   Vaccine 0.5 milliLiter(s) IntraMuscular once  lidocaine   Patch 1 Patch Transdermal daily  nicotine - 21 mG/24Hr(s) Patch 1 patch Transdermal daily  pantoprazole  Injectable 40 milliGRAM(s) IV Push two times a day  potassium chloride  10 mEq/100 mL IVPB 10 milliEquivalent(s) IV Intermittent every 1 hour    LABS:    @ 04:57: Sodium 148<H>, Potassium 3.7, Chloride 107, Calcium 7.7<L>, Magnesium 2.2, Phosphorus 3.2, BUN 68<H>, Creatinine 2.75<H>, <H>, Hemoglobin 11.5, Hematocrit 34.5    Skin per nursing documentation: no pressure injuries noted  Edema: none noted    Estimated Needs: based on dosing wt 40Kg with consideration for malnutrition  2205-2151 victor manuel/day (30-35cal/Kg)  64-72 Gm protein/day (1.6-1.8Gm/Kg)    Previous Nutrition Diagnosis: Severe malnutrition  Nutrition Diagnosis is: ongoing    New Nutrition Diagnosis: none   Interventions:     Recommend  1) Pending diet advancement and tolerance to clear liquids, advance to Low Fiber diet. Monitor need for oral supplements.   2) If EN is warranted, recommend Vital1.2 starting @10 ml/hr, advance as tolerated to 40 ml/hr x 24 hours to provide 960 ml formula, 1152 victor manuel/day, and 72 Gm protein/day, 779ml free water; meets 29cal/Kg and 1.8Gm protein/Kg per dosing wt 40Kg.  3) Reinforce therapeutic diet education as appropriate    Monitoring and Evaluation:     Continue to monitor nutritional intake, tolerance to diet prescription, weights, labs, skin integrity    RD remains available upon request and will follow up per protocol    Melissa Tapia, MS [facility], Pager # [phone]

## 2018-11-23 NOTE — PROGRESS NOTE ADULT - SUBJECTIVE AND OBJECTIVE BOX
Dr. Mosquera  Office [phone]  Cell [phone]  Akiko RICH  Cell [phone]      Patient is a 73y old  Female who presents with a chief complaint of sbo/+ troponin (23 Nov 2018 10:15)      Patient seen and examined at bedside. No apparent distress, lethargic    VITALS:  T(F): 98.2 (11-23-18 @ 11:00), Max: 98.3 (11-23-18 @ 03:00)  HR: 80 (11-23-18 @ 12:08)  BP: 141/70 (11-23-18 @ 11:00)  RR: 35 (11-23-18 @ 11:00)  SpO2: 98% (11-23-18 @ 12:08)  Wt(kg): --    11-22 @ 07:01  -  11-23 @ 07:00  --------------------------------------------------------  IN: 3117 mL / OUT: 1750 mL / NET: 1367 mL    11-23 @ 07:01  -  11-23 @ 12:48  --------------------------------------------------------  IN: 575 mL / OUT: 175 mL / NET: 400 mL          PHYSICAL EXAM:  Constitutional: NAD  Neck: No JVD  Respiratory: CTAB, no wheezes, rales or rhonchi  Cardiovascular: S1, S2, RRR  Gastrointestinal: BS+, soft, NT/ND  Extremities: No peripheral edema    Hospital Medications:   MEDICATIONS  (STANDING):  ALBUTerol/ipratropium for Nebulization 3 milliLiter(s) Nebulizer every 6 hours  buDESOnide   0.5 milliGRAM(s) Respule 0.5 milliGRAM(s) Inhalation every 12 hours  chlorhexidine 4% Liquid 1 Application(s) Topical <User Schedule>  dextrose 5% + sodium chloride 0.45%. 1000 milliLiter(s) (75 mL/Hr) IV Continuous <Continuous>  docusate sodium 100 milliGRAM(s) Oral three times a day  heparin  Injectable 5000 Unit(s) SubCutaneous every 8 hours  influenza   Vaccine 0.5 milliLiter(s) IntraMuscular once  lidocaine   Patch 1 Patch Transdermal daily  multivitamin/minerals 1 Tablet(s) Oral daily  nicotine - 21 mG/24Hr(s) Patch 1 patch Transdermal daily  pantoprazole    Tablet 40 milliGRAM(s) Oral every 12 hours  polyethylene glycol 3350 17 Gram(s) Oral daily  senna 2 Tablet(s) Oral at bedtime  sucralfate 1 Gram(s) Oral four times a day      LABS:  11-23    148<H>  |  107  |  68<H>  ----------------------------<  166<H>  3.7   |  23  |  2.75<H>    Ca    7.7<L>      23 Nov 2018 04:57  Phos  3.2     11-23  Mg     2.2     11-23    TPro  5.8<L>  /  Alb  3.1<L>  /  TBili  0.2  /  DBili      /  AST  44<H>  /  ALT  36  /  AlkPhos  179<H>  11-21    Creatinine Trend: 2.75 <--, 3.66 <--, 4.75 <--, 5.54 <--, 5.93 <--, 6.83 <--, 7.51 <--, 7.66 <--    Phosphorus Level, Serum: 3.2 mg/dL (11-23 @ 04:57)  Phosphorus Level, Serum: 6.5 mg/dL (11-22 @ 15:19)                              11.5   7.8   )-----------( 193      ( 23 Nov 2018 04:57 )             34.5     Urine Studies:  Urinalysis - [11-20-18 @ 18:08]      Color Yellow / Appearance Slightly Turbid / SG 1.020 / pH 5.5      Gluc Negative / Ketone Negative  / Bili Small / Urobili Negative       Blood Small / Protein 30 mg/dL / Leuk Est Moderate / Nitrite Negative      RBC 18 / WBC 12 / Hyaline 20 / Gran  / Sq Epi  / Non Sq Epi 1 / Bacteria Negative    Urine Creatinine 137      [11-20-18 @ 18:08]  Urine Protein 46      [11-20-18 @ 23:05]  Urine Sodium 31      [11-20-18 @ 18:08]  Urine Urea Nitrogen 331      [11-20-18 @ 23:05]  Urine Osmolality 363      [11-20-18 @ 18:08]          RADIOLOGY & ADDITIONAL STUDIES:

## 2018-11-23 NOTE — PROGRESS NOTE ADULT - ASSESSMENT
73 year old female with PMH chronic Back pain , history of multiple SBO's, narcotic associated constipation/ileus, COPD, admitted with abdominal pain and distension, nausea and poor PO intake with associated worsening painful spasms.   CT with SBO - improved  SHAKIRA - improving  Cavitary lung lesion ?malignancy ?TB (AFB negative x1)  Chronic narcotic use    Recommendations   SICU care  PPI  NGT clamp trial vs PO clears once more alert/responsive  Pulmonary work-up in progress    Discussed with SICU team  Surgery following    Please call over weekend prn with GI concerns  Jake Khan PA-C    [facility]  [phone]

## 2018-11-23 NOTE — PROGRESS NOTE ADULT - SUBJECTIVE AND OBJECTIVE BOX
GENERAL SURGERY DAILY PROGRESS NOTE:     Subjective:    Patient was seen and examined this morning during morning rounds. Pt extubated yesterday. Pain well controlled. Continues to be NPO.    Objective:    NAD, awake and alert  Respirations nonlabored  Abdomen soft, nontender, nondistended  No guarding or rebound tenderness     MEDICATIONS  (STANDING):  ALBUTerol/ipratropium for Nebulization 3 milliLiter(s) Nebulizer every 6 hours  buDESOnide   0.5 milliGRAM(s) Respule 0.5 milliGRAM(s) Inhalation every 12 hours  chlorhexidine 0.12% Liquid 15 milliLiter(s) Oral Mucosa <User Schedule>  chlorhexidine 4% Liquid 1 Application(s) Topical <User Schedule>  dextrose 5% + sodium chloride 0.45% 1000 milliLiter(s) (100 mL/Hr) IV Continuous <Continuous>  heparin  Injectable 5000 Unit(s) SubCutaneous every 8 hours  influenza   Vaccine 0.5 milliLiter(s) IntraMuscular once  nicotine - 21 mG/24Hr(s) Patch 1 patch Transdermal daily  pantoprazole  Injectable 40 milliGRAM(s) IV Push two times a day  potassium chloride  10 mEq/100 mL IVPB 10 milliEquivalent(s) IV Intermittent every 1 hour    MEDICATIONS  (PRN):  LORazepam   Injectable 1 milliGRAM(s) IV Push every 4 hours PRN Agitation      Vital Signs Last 24 Hrs  T(C): 36.7 (2018 23:00), Max: 36.7 (2018 23:00)  T(F): 98.1 (2018 23:00), Max: 98.1 (2018 23:00)  HR: 97 (2018 04:00) (60 - 97)  BP: 136/68 (2018 04:00) (100/54 - 182/91)  BP(mean): 95 (2018 04:00) (74 - 129)  RR: 31 (2018 04:00) (19 - 49)  SpO2: 100% (2018 04:00) (98% - 100%)    I&O's Detail    2018 07:01  -  2018 07:00  --------------------------------------------------------  IN:    IV PiggyBack: 100 mL    propofol Infusion: 112.8 mL    sodium chloride 0.9%: 3000 mL    sodium chloride 0.9%: 350 mL  Total IN: 3562.8 mL    OUT:    Indwelling Catheter - Urethral: 965 mL    Nasoenteral Tube: 800 mL  Total OUT: 1765 mL    Total NET: 1797.8 mL      2018 07:01  -  2018 05:25  --------------------------------------------------------  IN:    dextrose 5% + lactated ringers.: 100 mL    dextrose 5% + sodium chloride 0.45%: 1700 mL    IV PiggyBack: 400 mL    propofol Infusion: 42 mL    sodium chloride 0.9%: 375 mL  Total IN: 2617 mL    OUT:    Indwelling Catheter - Urethral: 1250 mL  Total OUT: 1250 mL    Total NET: 1367 mL      Daily Weight in k.7 (2018 05:00)    LABS:                        11.5   7.8   )-----------( 193      ( 2018 04:57 )             34.5         146<H>  |  102  |  87<H>  ----------------------------<  121<H>  3.3<L>   |  16<L>  |  3.66<H>    Ca    7.7<L>      2018 15:19  Phos  6.5       Mg     2.5         TPro  5.8<L>  /  Alb  3.1<L>  /  TBili  0.2  /  DBili  x   /  AST  44<H>  /  ALT  36  /  AlkPhos  179<H>   GENERAL SURGERY DAILY PROGRESS NOTE:     Subjective:    Patient was seen and examined this morning during morning rounds. Pt extubated yesterday. Pain well controlled. Continues to be NPO. NGT with minimal output.    Objective:    Awake, alert, agitated  Respirations nonlabored  Abdomen soft, nontender, nondistended  No guarding or rebound tenderness     MEDICATIONS  (STANDING):  ALBUTerol/ipratropium for Nebulization 3 milliLiter(s) Nebulizer every 6 hours  buDESOnide   0.5 milliGRAM(s) Respule 0.5 milliGRAM(s) Inhalation every 12 hours  chlorhexidine 0.12% Liquid 15 milliLiter(s) Oral Mucosa <User Schedule>  chlorhexidine 4% Liquid 1 Application(s) Topical <User Schedule>  dextrose 5% + sodium chloride 0.45% 1000 milliLiter(s) (100 mL/Hr) IV Continuous <Continuous>  heparin  Injectable 5000 Unit(s) SubCutaneous every 8 hours  influenza   Vaccine 0.5 milliLiter(s) IntraMuscular once  nicotine - 21 mG/24Hr(s) Patch 1 patch Transdermal daily  pantoprazole  Injectable 40 milliGRAM(s) IV Push two times a day  potassium chloride  10 mEq/100 mL IVPB 10 milliEquivalent(s) IV Intermittent every 1 hour    MEDICATIONS  (PRN):  LORazepam   Injectable 1 milliGRAM(s) IV Push every 4 hours PRN Agitation      Vital Signs Last 24 Hrs  T(C): 36.7 (2018 23:00), Max: 36.7 (2018 23:00)  T(F): 98.1 (2018 23:00), Max: 98.1 (2018 23:00)  HR: 97 (2018 04:00) (60 - 97)  BP: 136/68 (2018 04:00) (100/54 - 182/91)  BP(mean): 95 (2018 04:00) (74 - 129)  RR: 31 (2018 04:00) (19 - 49)  SpO2: 100% (2018 04:00) (98% - 100%)    I&O's Detail    2018 07:01  -  2018 07:00  --------------------------------------------------------  IN:    IV PiggyBack: 100 mL    propofol Infusion: 112.8 mL    sodium chloride 0.9%: 3000 mL    sodium chloride 0.9%: 350 mL  Total IN: 3562.8 mL    OUT:    Indwelling Catheter - Urethral: 965 mL    Nasoenteral Tube: 800 mL  Total OUT: 1765 mL    Total NET: 1797.8 mL      2018 07:01  -  2018 05:25  --------------------------------------------------------  IN:    dextrose 5% + lactated ringers.: 100 mL    dextrose 5% + sodium chloride 0.45%: 1700 mL    IV PiggyBack: 400 mL    propofol Infusion: 42 mL    sodium chloride 0.9%: 375 mL  Total IN: 2617 mL    OUT:    Indwelling Catheter - Urethral: 1250 mL  Total OUT: 1250 mL    Total NET: 1367 mL      Daily Weight in k.7 (2018 05:00)    LABS:                        11.5   7.8   )-----------( 193      ( 2018 04:57 )             34.5         146<H>  |  102  |  87<H>  ----------------------------<  121<H>  3.3<L>   |  16<L>  |  3.66<H>    Ca    7.7<L>      2018 15:19  Phos  6.5       Mg     2.5         TPro  5.8<L>  /  Alb  3.1<L>  /  TBili  0.2  /  DBili  x   /  AST  44<H>  /  ALT  36  /  AlkPhos  179<H>

## 2018-11-24 LAB
ALBUMIN SERPL ELPH-MCNC: 2.7 G/DL — LOW (ref 3.3–5)
ALP SERPL-CCNC: 123 U/L — HIGH (ref 40–120)
ALT FLD-CCNC: 29 U/L — SIGNIFICANT CHANGE UP (ref 10–45)
ANION GAP SERPL CALC-SCNC: 14 MMOL/L — SIGNIFICANT CHANGE UP (ref 5–17)
AST SERPL-CCNC: 39 U/L — SIGNIFICANT CHANGE UP (ref 10–40)
BILIRUB DIRECT SERPL-MCNC: 0.2 MG/DL — SIGNIFICANT CHANGE UP (ref 0–0.2)
BILIRUB INDIRECT FLD-MCNC: 0.3 MG/DL — SIGNIFICANT CHANGE UP (ref 0.2–1)
BILIRUB SERPL-MCNC: 0.5 MG/DL — SIGNIFICANT CHANGE UP (ref 0.2–1.2)
BUN SERPL-MCNC: 41 MG/DL — HIGH (ref 7–23)
CALCIUM SERPL-MCNC: 8.2 MG/DL — LOW (ref 8.4–10.5)
CHLORIDE SERPL-SCNC: 104 MMOL/L — SIGNIFICANT CHANGE UP (ref 96–108)
CO2 SERPL-SCNC: 26 MMOL/L — SIGNIFICANT CHANGE UP (ref 22–31)
CREAT SERPL-MCNC: 1.77 MG/DL — HIGH (ref 0.5–1.3)
GLUCOSE SERPL-MCNC: 129 MG/DL — HIGH (ref 70–99)
HCT VFR BLD CALC: 34.2 % — LOW (ref 34.5–45)
HGB BLD-MCNC: 11.6 G/DL — SIGNIFICANT CHANGE UP (ref 11.5–15.5)
MAGNESIUM SERPL-MCNC: 1.7 MG/DL — SIGNIFICANT CHANGE UP (ref 1.6–2.6)
MCHC RBC-ENTMCNC: 32.9 PG — SIGNIFICANT CHANGE UP (ref 27–34)
MCHC RBC-ENTMCNC: 33.8 GM/DL — SIGNIFICANT CHANGE UP (ref 32–36)
MCV RBC AUTO: 97.4 FL — SIGNIFICANT CHANGE UP (ref 80–100)
NIGHT BLUE STAIN TISS: SIGNIFICANT CHANGE UP
PHOSPHATE SERPL-MCNC: 1.5 MG/DL — LOW (ref 2.5–4.5)
PLATELET # BLD AUTO: 173 K/UL — SIGNIFICANT CHANGE UP (ref 150–400)
POTASSIUM SERPL-MCNC: 3.8 MMOL/L — SIGNIFICANT CHANGE UP (ref 3.5–5.3)
POTASSIUM SERPL-SCNC: 3.8 MMOL/L — SIGNIFICANT CHANGE UP (ref 3.5–5.3)
PROT SERPL-MCNC: 5.4 G/DL — LOW (ref 6–8.3)
RBC # BLD: 3.51 M/UL — LOW (ref 3.8–5.2)
RBC # FLD: 12.5 % — SIGNIFICANT CHANGE UP (ref 10.3–14.5)
SODIUM SERPL-SCNC: 144 MMOL/L — SIGNIFICANT CHANGE UP (ref 135–145)
SPECIMEN SOURCE: SIGNIFICANT CHANGE UP
WBC # BLD: 7.8 K/UL — SIGNIFICANT CHANGE UP (ref 3.8–10.5)
WBC # FLD AUTO: 7.8 K/UL — SIGNIFICANT CHANGE UP (ref 3.8–10.5)

## 2018-11-24 PROCEDURE — 71045 X-RAY EXAM CHEST 1 VIEW: CPT | Mod: 26

## 2018-11-24 PROCEDURE — 99233 SBSQ HOSP IP/OBS HIGH 50: CPT | Mod: GC

## 2018-11-24 PROCEDURE — 74018 RADEX ABDOMEN 1 VIEW: CPT | Mod: 26

## 2018-11-24 RX ORDER — OXYCODONE HYDROCHLORIDE 5 MG/1
10 TABLET ORAL EVERY 4 HOURS
Qty: 0 | Refills: 0 | Status: DISCONTINUED | OUTPATIENT
Start: 2018-11-24 | End: 2018-11-26

## 2018-11-24 RX ORDER — POTASSIUM PHOSPHATE, MONOBASIC POTASSIUM PHOSPHATE, DIBASIC 236; 224 MG/ML; MG/ML
15 INJECTION, SOLUTION INTRAVENOUS ONCE
Qty: 0 | Refills: 0 | Status: COMPLETED | OUTPATIENT
Start: 2018-11-24 | End: 2018-11-24

## 2018-11-24 RX ORDER — POTASSIUM CHLORIDE 20 MEQ
20 PACKET (EA) ORAL
Qty: 0 | Refills: 0 | Status: COMPLETED | OUTPATIENT
Start: 2018-11-24 | End: 2018-11-24

## 2018-11-24 RX ORDER — OXYCODONE HYDROCHLORIDE 5 MG/1
5 TABLET ORAL ONCE
Qty: 0 | Refills: 0 | Status: DISCONTINUED | OUTPATIENT
Start: 2018-11-24 | End: 2018-11-24

## 2018-11-24 RX ORDER — SODIUM,POTASSIUM PHOSPHATES 278-250MG
1 POWDER IN PACKET (EA) ORAL
Qty: 0 | Refills: 0 | Status: COMPLETED | OUTPATIENT
Start: 2018-11-24 | End: 2018-11-25

## 2018-11-24 RX ORDER — MAGNESIUM SULFATE 500 MG/ML
2 VIAL (ML) INJECTION ONCE
Qty: 0 | Refills: 0 | Status: COMPLETED | OUTPATIENT
Start: 2018-11-24 | End: 2018-11-24

## 2018-11-24 RX ORDER — ACETAMINOPHEN 500 MG
650 TABLET ORAL ONCE
Qty: 0 | Refills: 0 | Status: COMPLETED | OUTPATIENT
Start: 2018-11-24 | End: 2018-11-24

## 2018-11-24 RX ADMIN — Medication 1 TABLET(S): at 09:54

## 2018-11-24 RX ADMIN — Medication 20 MILLIEQUIVALENT(S): at 09:53

## 2018-11-24 RX ADMIN — Medication 1 TABLET(S): at 18:08

## 2018-11-24 RX ADMIN — LIDOCAINE 1 PATCH: 4 CREAM TOPICAL at 18:04

## 2018-11-24 RX ADMIN — Medication 1 TABLET(S): at 11:31

## 2018-11-24 RX ADMIN — OXYCODONE HYDROCHLORIDE 5 MILLIGRAM(S): 5 TABLET ORAL at 09:00

## 2018-11-24 RX ADMIN — Medication 650 MILLIGRAM(S): at 10:24

## 2018-11-24 RX ADMIN — OXYCODONE HYDROCHLORIDE 10 MILLIGRAM(S): 5 TABLET ORAL at 19:27

## 2018-11-24 RX ADMIN — Medication 1 PATCH: at 07:00

## 2018-11-24 RX ADMIN — OXYCODONE HYDROCHLORIDE 10 MILLIGRAM(S): 5 TABLET ORAL at 11:01

## 2018-11-24 RX ADMIN — SODIUM CHLORIDE 75 MILLILITER(S): 9 INJECTION, SOLUTION INTRAVENOUS at 08:31

## 2018-11-24 RX ADMIN — SODIUM CHLORIDE 75 MILLILITER(S): 9 INJECTION, SOLUTION INTRAVENOUS at 05:57

## 2018-11-24 RX ADMIN — OXYCODONE HYDROCHLORIDE 10 MILLIGRAM(S): 5 TABLET ORAL at 23:11

## 2018-11-24 RX ADMIN — Medication 0.5 MILLIGRAM(S): at 06:24

## 2018-11-24 RX ADMIN — OXYCODONE HYDROCHLORIDE 10 MILLIGRAM(S): 5 TABLET ORAL at 15:14

## 2018-11-24 RX ADMIN — Medication 3 MILLILITER(S): at 00:23

## 2018-11-24 RX ADMIN — Medication 1 GRAM(S): at 05:58

## 2018-11-24 RX ADMIN — OXYCODONE HYDROCHLORIDE 10 MILLIGRAM(S): 5 TABLET ORAL at 18:57

## 2018-11-24 RX ADMIN — SODIUM CHLORIDE 75 MILLILITER(S): 9 INJECTION, SOLUTION INTRAVENOUS at 06:01

## 2018-11-24 RX ADMIN — Medication 650 MILLIGRAM(S): at 09:54

## 2018-11-24 RX ADMIN — LIDOCAINE 1 PATCH: 4 CREAM TOPICAL at 01:51

## 2018-11-24 RX ADMIN — ENOXAPARIN SODIUM 30 MILLIGRAM(S): 100 INJECTION SUBCUTANEOUS at 21:23

## 2018-11-24 RX ADMIN — OXYCODONE HYDROCHLORIDE 10 MILLIGRAM(S): 5 TABLET ORAL at 03:52

## 2018-11-24 RX ADMIN — Medication 3 MILLILITER(S): at 22:41

## 2018-11-24 RX ADMIN — Medication 1 PATCH: at 11:30

## 2018-11-24 RX ADMIN — Medication 1 TABLET(S): at 14:09

## 2018-11-24 RX ADMIN — Medication 3 MILLILITER(S): at 06:24

## 2018-11-24 RX ADMIN — OXYCODONE HYDROCHLORIDE 10 MILLIGRAM(S): 5 TABLET ORAL at 22:41

## 2018-11-24 RX ADMIN — OXYCODONE HYDROCHLORIDE 10 MILLIGRAM(S): 5 TABLET ORAL at 04:22

## 2018-11-24 RX ADMIN — Medication 50 GRAM(S): at 05:57

## 2018-11-24 RX ADMIN — OXYCODONE HYDROCHLORIDE 5 MILLIGRAM(S): 5 TABLET ORAL at 08:30

## 2018-11-24 RX ADMIN — Medication 1 TABLET(S): at 21:23

## 2018-11-24 RX ADMIN — Medication 0.5 MILLIGRAM(S): at 18:57

## 2018-11-24 RX ADMIN — LIDOCAINE 1 PATCH: 4 CREAM TOPICAL at 11:30

## 2018-11-24 RX ADMIN — Medication 1 GRAM(S): at 18:08

## 2018-11-24 RX ADMIN — Medication 1 GRAM(S): at 00:04

## 2018-11-24 RX ADMIN — Medication 1 GRAM(S): at 11:32

## 2018-11-24 RX ADMIN — Medication 1 PATCH: at 18:04

## 2018-11-24 RX ADMIN — CHLORHEXIDINE GLUCONATE 1 APPLICATION(S): 213 SOLUTION TOPICAL at 06:01

## 2018-11-24 RX ADMIN — ENOXAPARIN SODIUM 30 MILLIGRAM(S): 100 INJECTION SUBCUTANEOUS at 00:03

## 2018-11-24 RX ADMIN — Medication 20 MILLIEQUIVALENT(S): at 05:58

## 2018-11-24 RX ADMIN — OXYCODONE HYDROCHLORIDE 10 MILLIGRAM(S): 5 TABLET ORAL at 15:44

## 2018-11-24 RX ADMIN — Medication 1 GRAM(S): at 22:41

## 2018-11-24 RX ADMIN — Medication 3 MILLILITER(S): at 18:08

## 2018-11-24 RX ADMIN — POTASSIUM PHOSPHATE, MONOBASIC POTASSIUM PHOSPHATE, DIBASIC 62.5 MILLIMOLE(S): 236; 224 INJECTION, SOLUTION INTRAVENOUS at 08:34

## 2018-11-24 RX ADMIN — OXYCODONE HYDROCHLORIDE 10 MILLIGRAM(S): 5 TABLET ORAL at 10:31

## 2018-11-24 NOTE — PROGRESS NOTE ADULT - ASSESSMENT
73 year old female with an extensive past medical and surgical history presenting with a recurrent small bowel obstruction vs. narcotic induced ileus, SHAKIRA stage 3, electrolyte abnormalities, and acute respiratory failure requiring intubation. Extubated on 11/22. Patient is currently having full GI function.     PLAN:  Neuro: narcotic & benzodiazepine dependence  - Monitor mental status.  - Restarted on home medication: valium and oxycodone IR 10 mg  - Lidocaine patch for back pain      Resp: extubated, RENUKA cavitary lesion  - Monitor pulse ox  - Nicotine patch as patient is current every day smoker.  - Duoneb and Pulmicort for COPD.  - Follow up tuberculosis work-up. Will need work-up for possible lung cancer. Appreciate pulmonary and oncology recommendations.  - Planning for bronchoscopy when patient is more clinically stable     CV: elevated troponins, resolved   - Monitor vital signs.    GI: SBO vs. narcotic dysmotile cilia syndrome, resolved with currently full GI function   - Tolerating clear, advance as tolerated   - Discontinued GI ppx, patient has been taking it at home     Renal: SHAKIRA stage 3 w/ baseline Cr 1.1 in Nov 2016, resolving   - Monitor I&Os.  - Monitor electrolytes and replete as necessary.  - D5 1/2NS at 75cc and IVL once patient has adequate PO intake   - Appreciate nephrology recommendations.    Heme: no acute issues  - SQH for VTE prophylaxis.    ID: no acute issues  - Monitor for clinical evidence of active infection.    Endo: no acute issues  - Monitor blood glucose on BMP.    Disposition:  - Full code.

## 2018-11-24 NOTE — CONSULT NOTE ADULT - SUBJECTIVE AND OBJECTIVE BOX
Patient seen in coverage for Dr. Reese    Chief Complaint:  Patient is a 73y old  Female who presents with a chief complaint of pos troponin (24 Nov 2018 08:54)      HPI:  patient with history of breast cancer.  She follows with Dr. Reese.  She says that she had a mastectomy in the past.  She is not getting any current therapy but rather is just followed and observed.  She also notes that she has B12 deficiency and she does get monthly B12 injections.  She says that she is due next month.  She was admitted with N/V and decreased PO and had an SBO.  She was treated medically with an NGT and she says that it has resolved.  She is not getting fed with a regular diet and ate steak tonight and feels okay.  She is having BM.    Medications:  ALBUTerol/ipratropium for Nebulization 3 milliLiter(s) Nebulizer every 6 hours  buDESOnide   0.5 milliGRAM(s) Respule 0.5 milliGRAM(s) Inhalation every 12 hours  chlorhexidine 4% Liquid 1 Application(s) Topical <User Schedule>  diazepam    Tablet 5 milliGRAM(s) Oral at bedtime PRN  docusate sodium 100 milliGRAM(s) Oral three times a day  enoxaparin Injectable 30 milliGRAM(s) SubCutaneous every 24 hours  influenza   Vaccine 0.5 milliLiter(s) IntraMuscular once  lidocaine   Patch 1 Patch Transdermal daily  multivitamin/minerals 1 Tablet(s) Oral daily  nicotine - 21 mG/24Hr(s) Patch 1 patch Transdermal daily  oxyCODONE    IR 10 milliGRAM(s) Oral every 4 hours PRN  polyethylene glycol 3350 17 Gram(s) Oral daily  potassium acid phosphate/sodium acid phosphate tablet (K-PHOS No. 2) 1 Tablet(s) Oral four times a day with meals  senna 2 Tablet(s) Oral at bedtime  simethicone 80 milliGRAM(s) Chew every 8 hours PRN  sucralfate 1 Gram(s) Oral four times a day    Allergies:  Biaxin (Rash)  Cipro (Headache)  Flagyl (Headache)  penicillin (Rash; Swelling)  sulfa drugs (Unknown)    FAMILY HISTORY:  No pertinent family history in first degree relatives    PAST MEDICAL & SURGICAL HISTORY:  Back pain  Bowel obstruction: multiple hospitalizations  Kidney stone  Emphysema  Narcotic Dependence  S/P Radiation Therapy  S/P Chemotherapy, Time Since Greater than 12 Weeks  Narcotic Dysmotile Cilia Syndrome  Chronic Pain Following Surgery or Procedure: following right breast mastectomy  Ankle Fracture: right anle fx s/p cast 2009  History of Small Bowel Obstruction: 1/2010  Asthma  Breast Cancer  S/P hernia surgery: femoral hernia - 2012  S/P Exploratory Laparotomy  S/P Appendectomy  S/P Cholecystectomy  Liver Mass s/p liver rsxn: s/p resection 2009  History of Hysterectomy: 1998  S/P Right Mastectomy: 2006    REVIEW OF SYSTEMS      General: Has fatigue and weakness.  has lost weight.  No fevers or swats but she has occasional chills    Skin/Breast: has bruising from venipunctures.  No rash or itching  	  Ophthalmologic: No vision changes  	  ENMT:	No nosebleeds or sore throat    Respiratory and Thorax: No SOB, cough, or hemoptysis  	  Cardiovascular:	 No CP or palpitations    Gastrointestinal:	No further N/V.  Has mild right sided abdominal discomfort.  No diarrhea    Genitourinary:	No dysuria or hematuria    Musculoskeletal:	 She has chronic back pain.  No leg pain or swelling    Neurological:	Dizzy if stands too quickly.  No Ha    Vitals:  Vital Signs Last 24 Hrs  T(C): 36.2 (24 Nov 2018 16:40), Max: 36.7 (24 Nov 2018 03:00)  T(F): 97.1 (24 Nov 2018 16:40), Max: 98.1 (24 Nov 2018 07:00)  HR: 76 (24 Nov 2018 16:40) (72 - 112)  BP: 150/80 (24 Nov 2018 16:40) (122/63 - 159/72)  BP(mean): 97 (24 Nov 2018 11:00) (87 - 110)  RR: 18 (24 Nov 2018 14:08) (17 - 39)  SpO2: 93% (24 Nov 2018 11:54) (91% - 100%)    Pex:  alert NAD  EOMI anicteric sclera  Neck Supple No LNA  Cv s1 S2 RRR  Lungs clear B/L  abd soft NT ND +BS  No LE edema or tenderness  Ecchymoses on arms    Labs:                        11.6   7.8   )-----------( 173      ( 24 Nov 2018 03:37 )             34.2     CBC Full  -  ( 24 Nov 2018 03:37 )  WBC Count : 7.8 K/uL  Hemoglobin : 11.6 g/dL  Hematocrit : 34.2 %  Platelet Count - Automated : 173 K/uL  Mean Cell Volume : 97.4 fl  Mean Cell Hemoglobin : 32.9 pg  Mean Cell Hemoglobin Concentration : 33.8 gm/dL  Auto Neutrophil # : x  Auto Lymphocyte # : x  Auto Monocyte # : x  Auto Eosinophil # : x  Auto Basophil # : x  Auto Neutrophil % : x  Auto Lymphocyte % : x  Auto Monocyte % : x  Auto Eosinophil % : x  Auto Basophil % : x    11-24    144  |  104  |  41<H>  ----------------------------<  129<H>  3.8   |  26  |  1.77<H>    Ca    8.2<L>      24 Nov 2018 03:37  Phos  1.5     11-24  Mg     1.7     11-24    TPro  5.4<L>  /  Alb  2.7<L>  /  TBili  0.5  /  DBili  0.2  /  AST  39  /  ALT  29  /  AlkPhos  123<H>  11-24    PT/INR - ( 23 Nov 2018 08:51 )   PT: 11.7 sec;   INR: 1.03 ratio         PTT - ( 23 Nov 2018 08:51 )  PTT:32.3 sec  [phone]

## 2018-11-24 NOTE — PROGRESS NOTE ADULT - ASSESSMENT
73F with complicated surgical history and history of multiple episodes of small bowel obstruction presented with SBO demonstrated on CT scan. s/p extubation. Cr continues to downtrend, now 2.7    - CLD  - Monitor GI function  - trend Cr  - f/u AM CXR and AXR  - pain control  - Monitor vitals  - Monitor UOP  - care per sicu team     [facility]  [phone] 73F with complicated surgical history and history of multiple episodes of small bowel obstruction presented with SBO demonstrated on CT scan. s/p extubation. Cr continues to downtrend, now 1.77    - CLD  - Monitor GI function  - trend Cr  - f/u AM CXR and AXR  - pain control  - Monitor vitals  - Monitor UOP  - care per sicu team     [facility]  [phone]

## 2018-11-24 NOTE — CONSULT NOTE ADULT - ASSESSMENT
Patient with a remote history of breast cancer not on current therapy admitted with SBO now resolved.  She does report B12 deficiency and she gets monthly B12 injections.  She says that she is not due until next month.  There is a borderline anemia due to concurrent medical issues.  Hgb is adequate and no intervention needed at this time.  From Hem/Onc perspective supportive management.    Care per surgical team.

## 2018-11-24 NOTE — CONSULT NOTE ADULT - CONSULT REQUESTED DATE/TIME
20-Nov-2018
20-Nov-2018
20-Nov-2018 16:20
20-Nov-2018 19:51
21-Nov-2018 10:09
21-Nov-2018 12:52
24-Nov-2018
20-Nov-2018 19:20

## 2018-11-24 NOTE — PROGRESS NOTE ADULT - ATTENDING COMMENTS
Dr. Bianchi (Attending Physician)  Chronic pain control  AFB negative, quant gold pending, still on isolation will d/w ID  Acute renal failure resolving

## 2018-11-24 NOTE — PROGRESS NOTE ADULT - SUBJECTIVE AND OBJECTIVE BOX
CARDIOLOGY     PROGRESS  NOTE   ________________________________________________    CHIEF COMPLAINT:Patient is a 73y old  Female who presents with a chief complaint of sbo/+ troponin (23 Nov 2018 10:15)    	  REVIEW OF SYSTEMS:  CONSTITUTIONAL: No fever, weight loss, or fatigue  EYES: No eye pain, visual disturbances, or discharge  ENT:  No difficulty hearing, tinnitus, vertigo; No sinus or throat pain  NECK: No pain or stiffness  RESPIRATORY: No cough, wheezing, chills or hemoptysis; No Shortness of Breath  CARDIOVASCULAR: No chest pain, palpitations, passing out, dizziness, or leg swelling  GASTROINTESTINAL: No abdominal or epigastric pain. No nausea, vomiting, or hematemesis; No diarrhea or constipation. No melena or hematochezia.  GENITOURINARY: No dysuria, frequency, hematuria, or incontinence  NEUROLOGICAL: No headaches, memory loss, loss of strength, numbness, or tremors  SKIN: No itching, burning, rashes, or lesions   LYMPH Nodes: No enlarged glands  ENDOCRINE: No heat or cold intolerance; No hair loss  MUSCULOSKELETAL: No joint pain or swelling; No muscle, back, or extremity pain  PSYCHIATRIC: No depression, anxiety, mood swings, or difficulty sleeping  HEME/LYMPH: No easy bruising, or bleeding gums  ALLERGY AND IMMUNOLOGIC: No hives or eczema	    [ ] All others negative	  [ ] Unable to obtain    PHYSICAL EXAM:  T(C): 36.7 (11-24-18 @ 07:00), Max: 36.8 (11-23-18 @ 11:00)  HR: 112 (11-24-18 @ 08:00) (67 - 112)  BP: 141/71 (11-24-18 @ 08:00) (117/60 - 163/76)  RR: 24 (11-24-18 @ 08:00) (18 - 40)  SpO2: 91% (11-24-18 @ 08:00) (91% - 100%)  Wt(kg): --  I&O's Summary    23 Nov 2018 07:01  -  24 Nov 2018 07:00  --------------------------------------------------------  IN: 2545 mL / OUT: 415 mL / NET: 2130 mL    24 Nov 2018 07:01  -  24 Nov 2018 08:54  --------------------------------------------------------  IN: 105 mL / OUT: 100 mL / NET: 5 mL        Appearance: Normal	  HEENT:   Normal oral mucosa, PERRL, EOMI	  Lymphatic: No lymphadenopathy  Cardiovascular: Normal S1 S2, No JVD, + murmurs, No edema  Respiratory: Lungs clear to auscultation	  Psychiatry: A & O x 3, Mood & affect appropriate  Gastrointestinal:  Soft, Non-tender, + BS	  Skin: No rashes, No ecchymoses, No cyanosis	  Neurologic: Non-focal  Extremities: Normal range of motion, No clubbing, cyanosis or edema  Vascular: Peripheral pulses palpable 2+ bilaterally    MEDICATIONS  (STANDING):  acetaminophen   Tablet .. 650 milliGRAM(s) Oral once  ALBUTerol/ipratropium for Nebulization 3 milliLiter(s) Nebulizer every 6 hours  buDESOnide   0.5 milliGRAM(s) Respule 0.5 milliGRAM(s) Inhalation every 12 hours  chlorhexidine 4% Liquid 1 Application(s) Topical <User Schedule>  docusate sodium 100 milliGRAM(s) Oral three times a day  enoxaparin Injectable 30 milliGRAM(s) SubCutaneous every 24 hours  influenza   Vaccine 0.5 milliLiter(s) IntraMuscular once  lidocaine   Patch 1 Patch Transdermal daily  multivitamin/minerals 1 Tablet(s) Oral daily  nicotine - 21 mG/24Hr(s) Patch 1 patch Transdermal daily  polyethylene glycol 3350 17 Gram(s) Oral daily  potassium acid phosphate/sodium acid phosphate tablet (K-PHOS No. 2) 1 Tablet(s) Oral four times a day with meals  potassium chloride    Tablet ER 20 milliEquivalent(s) Oral every 2 hours  senna 2 Tablet(s) Oral at bedtime  sucralfate 1 Gram(s) Oral four times a day      TELEMETRY: 	    ECG:  	  RADIOLOGY:  OTHER: 	  	  LABS:	 	    CARDIAC MARKERS:                                11.6   7.8   )-----------( 173      ( 24 Nov 2018 03:37 )             34.2     11-24    144  |  104  |  41<H>  ----------------------------<  129<H>  3.8   |  26  |  1.77<H>    Ca    8.2<L>      24 Nov 2018 03:37  Phos  1.5     11-24  Mg     1.7     11-24    TPro  5.4<L>  /  Alb  2.7<L>  /  TBili  0.5  /  DBili  0.2  /  AST  39  /  ALT  29  /  AlkPhos  123<H>  11-24    proBNP:   Lipid Profile:   HgA1c:   TSH:   PT/INR - ( 23 Nov 2018 08:51 )   PT: 11.7 sec;   INR: 1.03 ratio         PTT - ( 23 Nov 2018 08:51 )  PTT:32.3 sec      Assessment and plan  ---------------------------    + troponin ? sec to renal failure  add beta blocker increase bp/hr  awaiting echo  tsh  dvt prophylaxis

## 2018-11-24 NOTE — PROGRESS NOTE ADULT - SUBJECTIVE AND OBJECTIVE BOX
[facility]   MD VICENTA LEONARD MD ANGELA WONG, PA    TEL:  OFFICE: [phone]  DR MAYER CELL: [phone]  DR. PARTIDA CELL: [phone]  ALTANYA WORKMAN CELL: [phone]        Patient is a 73y old  Female who presents with a chief complaint of pos troponin (24 Nov 2018 08:54)      Patient seen and examined at bedside. No chest pain/sob    VITALS:  T(F): 97 (11-24-18 @ 11:54), Max: 98.1 (11-24-18 @ 07:00)  HR: 81 (11-24-18 @ 14:08)  BP: 155/82 (11-24-18 @ 14:08)  RR: 18 (11-24-18 @ 14:08)  SpO2: 93% (11-24-18 @ 11:54)  Wt(kg): --    11-23 @ 07:01  -  11-24 @ 07:00  --------------------------------------------------------  IN: 2545 mL / OUT: 415 mL / NET: 2130 mL    11-24 @ 07:01  -  11-24 @ 15:44  --------------------------------------------------------  IN: 165 mL / OUT: 100 mL / NET: 65 mL          PHYSICAL EXAM:  Constitutional: NAD  Neck: No JVD  Respiratory: CTAB, no wheezes, rales or rhonchi  Cardiovascular: S1, S2, RRR  Gastrointestinal: BS+, soft, NT/ND  Extremities: No peripheral edema    Hospital Medications:   MEDICATIONS  (STANDING):  ALBUTerol/ipratropium for Nebulization 3 milliLiter(s) Nebulizer every 6 hours  buDESOnide   0.5 milliGRAM(s) Respule 0.5 milliGRAM(s) Inhalation every 12 hours  chlorhexidine 4% Liquid 1 Application(s) Topical <User Schedule>  docusate sodium 100 milliGRAM(s) Oral three times a day  enoxaparin Injectable 30 milliGRAM(s) SubCutaneous every 24 hours  influenza   Vaccine 0.5 milliLiter(s) IntraMuscular once  lidocaine   Patch 1 Patch Transdermal daily  multivitamin/minerals 1 Tablet(s) Oral daily  nicotine - 21 mG/24Hr(s) Patch 1 patch Transdermal daily  polyethylene glycol 3350 17 Gram(s) Oral daily  potassium acid phosphate/sodium acid phosphate tablet (K-PHOS No. 2) 1 Tablet(s) Oral four times a day with meals  senna 2 Tablet(s) Oral at bedtime  sucralfate 1 Gram(s) Oral four times a day      LABS:  11-24    144  |  104  |  41<H>  ----------------------------<  129<H>  3.8   |  26  |  1.77<H>    Ca    8.2<L>      24 Nov 2018 03:37  Phos  1.5     11-24  Mg     1.7     11-24    TPro  5.4<L>  /  Alb  2.7<L>  /  TBili  0.5  /  DBili  0.2  /  AST  39  /  ALT  29  /  AlkPhos  123<H>  11-24    Creatinine Trend: 1.77 <--, 2.75 <--, 3.66 <--, 4.75 <--, 5.54 <--, 5.93 <--, 6.83 <--, 7.51 <--, 7.66 <--    Phosphorus Level, Serum: 1.5 mg/dL (11-24 @ 03:37)  Albumin, Serum: 2.7 g/dL (11-24 @ 03:37)                              11.6   7.8   )-----------( 173      ( 24 Nov 2018 03:37 )             34.2     Urine Studies:  Urinalysis - [11-20-18 @ 18:08]      Color Yellow / Appearance Slightly Turbid / SG 1.020 / pH 5.5      Gluc Negative / Ketone Negative  / Bili Small / Urobili Negative       Blood Small / Protein 30 mg/dL / Leuk Est Moderate / Nitrite Negative      RBC 18 / WBC 12 / Hyaline 20 / Gran  / Sq Epi  / Non Sq Epi 1 / Bacteria Negative    Urine Creatinine 137      [11-20-18 @ 18:08]  Urine Protein 46      [11-20-18 @ 23:05]  Urine Sodium 31      [11-20-18 @ 18:08]  Urine Urea Nitrogen 331      [11-20-18 @ 23:05]  Urine Osmolality 363      [11-20-18 @ 18:08]          RADIOLOGY & ADDITIONAL STUDIES:

## 2018-11-24 NOTE — PROGRESS NOTE ADULT - ATTENDING COMMENTS
I saw and examined the patient, and reviewed  the history and data with the staff  Agree with note which was also reviewed and edited where appropriate.  D/W RN, residents and Fellow and ICU team.

## 2018-11-24 NOTE — PROGRESS NOTE ADULT - SUBJECTIVE AND OBJECTIVE BOX
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 Labs significant for WBC 11.5, HCT 52.5, , Cr 7.66, phosphorus 15.1, pH 7.27, and lactate 6.1. CT scan also demonstrated a SBO in the ileum in the RLQ. She was fluid resuscitated and a NG tube was placed with 700 mL of output immediately. SICU consulted for hemodynamic monitoring.    24 HOUR EVENTS:  - Patient is having full GI function (gas and BM) this am with minimal NGT output. NGT discontinued and patient advanced to clear liquid, tolerating well without nausea or vomiting   - Good urine output with continuing improvement of electrolytes   - Restarted on home medication (valium and oxycodone for back pain)   - Pending abdominal Xray in the morning HISTORY  73 year old female with a history of COPD w/ current cigarette use, chronic back pain with opioid dependence, breast cancer s/p right mastectomy w/ chemo & radiation, kidney stones, and extensive abdominal surgeries (s/p hysterectomy, liver mass resection, appendectomy, cholecystectomy, and femoral hernia repair) complicated by multiple small bowel obstructions, and narcotic dysmotile cilia syndrome who presented on 11/20 with abdominal pain, abdominal distension, and vomiting. Labs significant for WBC 11.5, HCT 52.5, , Cr 7.66, phosphorus 15.1, pH 7.27, and lactate 6.1. CT scan also demonstrated a SBO in the ileum in the RLQ. She was fluid resuscitated and a NG tube was placed with 700 mL of output immediately. SICU consulted for hemodynamic monitoring.    24 HOUR EVENTS:  - Patient is having full GI function (gas and BM) this am with minimal NGT output. NGT discontinued and patient advanced to clear liquid, tolerating well without nausea or vomiting   - Good urine output with continuing improvement of electrolytes   - Restarted on home medication (valium and oxycodone for back pain)   - Pending abdominal Xray in the morning     SUBJECTIVE/ROS:  [x] A ten-point review of systems was otherwise negative except as noted.  [ ] Due to altered mental status/intubation, subjective information were not able to be obtained from the patient. History was obtained, to the extent possible, from review of the chart and collateral sources of information.    NEURO  RASS: 0      Exam: awake and follows command   Meds: diazepam    Tablet 5 milliGRAM(s) Oral at bedtime PRN insomnia  oxyCODONE    IR 10 milliGRAM(s) Oral every 6 hours PRN Severe Pain (7 - 10)  [x] Adequacy of sedation and pain control has been assessed and adjusted    RESPIRATORY  RR: 29 (11-24-18 @ 04:00) (20 - 40)  SpO2: 96% (11-24-18 @ 04:00) (91% - 100%)  Exam: unlabored, clear to auscultation bilaterally  Meds: ALBUTerol/ipratropium for Nebulization 3 milliLiter(s) Nebulizer every 6 hours  buDESOnide   0.5 milliGRAM(s) Respule 0.5 milliGRAM(s) Inhalation every 12 hours    CARDIOVASCULAR  HR: 91 (11-24-18 @ 04:00) (67 - 103)  BP: 143/77 (11-24-18 @ 04:00) (117/60 - 166/82)  BP(mean): 105 (11-24-18 @ 04:00) (82 - 117)    Exam:  Cardiac Rhythm: regular rate and rhythm  Perfusion     [x]Adequate   [ ]Inadequate  Mentation   [x]Normal       [ ]Reduced  Extremities  [x]Warm         [ ]Cool  Volume Status [ ]Hypervolemic [x]Euvolemic [ ]Hypovolemic    GI/NUTRITION  Exam: abdomen soft, nondistended, nontender   Diet: Clear liquid   Meds: docusate sodium 100 milliGRAM(s) Oral three times a day  polyethylene glycol 3350 17 Gram(s) Oral daily  senna 2 Tablet(s) Oral at bedtime  simethicone 80 milliGRAM(s) Chew every 8 hours PRN Indigestion  sucralfate 1 Gram(s) Oral four times a day      GENITOURINARY  I&O's Detail    11-22 @ 07:01 - 11-23 @ 07:00  --------------------------------------------------------  IN:    dextrose 5% + lactated ringers.: 100 mL    dextrose 5% + sodium chloride 0.45%: 2000 mL    IV PiggyBack: 600 mL    propofol Infusion: 42 mL    sodium chloride 0.9%: 375 mL  Total IN: 3117 mL    OUT:    Indwelling Catheter - Urethral: 1700 mL    Nasoenteral Tube: 50 mL  Total OUT: 1750 mL    Total NET: 1367 mL      11-23 @ 07:01 - 11-24 @ 04:35  --------------------------------------------------------  IN:    dextrose 5% + sodium chloride 0.45%: 300 mL    dextrose 5% + sodium chloride 0.45%.: 1350 mL    IV PiggyBack: 200 mL    Oral Fluid: 420 mL  Total IN: 2270 mL    OUT:    Indwelling Catheter - Urethral: 175 mL    Voided: 90 mL  Total OUT: 265 mL    Total NET: 2005 mL      11-24    144  |  104  |  41<H>  ----------------------------<  129<H>  3.8   |  26  |  1.77<H>    Ca    8.2<L>      24 Nov 2018 03:37  Phos  1.5     11-24  Mg     1.7     11-24    TPro  5.4<L>  /  Alb  2.7<L>  /  TBili  0.5  /  DBili  0.2  /  AST  39  /  ALT  29  /  AlkPhos  123<H>  11-24    Meds: dextrose 5% + sodium chloride 0.45%. 1000 milliLiter(s) IV Continuous <Continuous>  magnesium sulfate  IVPB 2 Gram(s) IV Intermittent once  multivitamin/minerals 1 Tablet(s) Oral daily  potassium acid phosphate/sodium acid phosphate tablet (K-PHOS No. 2) 1 Tablet(s) Oral four times a day with meals  potassium chloride    Tablet ER 20 milliEquivalent(s) Oral every 2 hours  potassium phosphate IVPB 15 milliMole(s) IV Intermittent once      HEMATOLOGIC  Meds: enoxaparin Injectable 30 milliGRAM(s) SubCutaneous every 24 hours    [x] VTE Prophylaxis                        11.6   7.8   )-----------( 173      ( 24 Nov 2018 03:37 )             34.2     PT/INR - ( 23 Nov 2018 08:51 )   PT: 11.7 sec;   INR: 1.03 ratio      PTT - ( 23 Nov 2018 08:51 )  PTT:32.3 sec  Transfusion: none       INFECTIOUS DISEASES  T(C): 36.7 (11-24-18 @ 03:00), Max: 36.8 (11-23-18 @ 11:00)  Wt(kg): --  WBC Count: 7.8 K/uL (11-24 @ 03:37)  WBC Count: 7.8 K/uL (11-23 @ 04:57)    Recent Cultures:  Specimen Source: .Broncial Bronchoalveolar Lavage, 11-21 @ 22:29; Results   Testing in progress; Gram Stain: --; Organism: --  Specimen Source: .Urine Clean Catch (Midstream), 11-20 @ 21:49; Results   No growth; Gram Stain: --; Organism: --    Meds: influenza   Vaccine 0.5 milliLiter(s) IntraMuscular once        ENDOCRINE  Capillary Blood Glucose    Meds: none       ACCESS DEVICES:  [x] Peripheral IV  [ ] Central Venous Line	[ ] R	[ ] L	[ ] IJ	[ ] Fem	[ ] SC	Placed:   [ ] Arterial Line		[ ] R	[ ] L	[ ] Fem	[ ] Rad	[ ] Ax	Placed:   [ ] PICC:					[ ] Mediport  [ ] Urinary Catheter, Date Placed:   [ ] Necessity of urinary, arterial, and venous catheters discussed    OTHER MEDICATIONS:  chlorhexidine 4% Liquid 1 Application(s) Topical <User Schedule>  lidocaine   Patch 1 Patch Transdermal daily  nicotine - 21 mG/24Hr(s) Patch 1 patch Transdermal daily      CODE STATUS: Full

## 2018-11-24 NOTE — PROGRESS NOTE ADULT - SUBJECTIVE AND OBJECTIVE BOX
GENERAL SURGERY DAILY PROGRESS NOTE:     Subjective:    Patient was seen and examined this morning during morning rounds. NGT was removed yesterday and patient is now tolerating CLD. Passing flatus and bowel movements. Pain well controlled.    Objective:    NAD, awake and alert  Respirations nonlabored  Abdomen soft, nontender, nondistended  No guarding or rebound tenderness     MEDICATIONS  (STANDING):  ALBUTerol/ipratropium for Nebulization 3 milliLiter(s) Nebulizer every 6 hours  buDESOnide   0.5 milliGRAM(s) Respule 0.5 milliGRAM(s) Inhalation every 12 hours  chlorhexidine 4% Liquid 1 Application(s) Topical <User Schedule>  dextrose 5% + sodium chloride 0.45%. 1000 milliLiter(s) (75 mL/Hr) IV Continuous <Continuous>  docusate sodium 100 milliGRAM(s) Oral three times a day  enoxaparin Injectable 30 milliGRAM(s) SubCutaneous every 24 hours  influenza   Vaccine 0.5 milliLiter(s) IntraMuscular once  lidocaine   Patch 1 Patch Transdermal daily  multivitamin/minerals 1 Tablet(s) Oral daily  nicotine - 21 mG/24Hr(s) Patch 1 patch Transdermal daily  polyethylene glycol 3350 17 Gram(s) Oral daily  senna 2 Tablet(s) Oral at bedtime  sucralfate 1 Gram(s) Oral four times a day    MEDICATIONS  (PRN):  diazepam    Tablet 5 milliGRAM(s) Oral at bedtime PRN insomnia  oxyCODONE    IR 10 milliGRAM(s) Oral every 6 hours PRN Severe Pain (7 - 10)  simethicone 80 milliGRAM(s) Chew every 8 hours PRN Indigestion      Vital Signs Last 24 Hrs  T(C): 36.7 (2018 03:00), Max: 36.8 (2018 11:00)  T(F): 98 (2018 03:00), Max: 98.2 (2018 11:00)  HR: 86 (2018 03:00) (67 - 103)  BP: 143/70 (2018 03:00) (117/60 - 166/82)  BP(mean): 100 (2018 03:00) (82 - 117)  RR: 26 (2018 03:00) (20 - 40)  SpO2: 94% (2018 03:00) (91% - 100%)    I&O's Detail    2018 07:  -  2018 07:00  --------------------------------------------------------  IN:    dextrose 5% + lactated ringers.: 100 mL    dextrose 5% + sodium chloride 0.45%: 2000 mL    IV PiggyBack: 600 mL    propofol Infusion: 42 mL    sodium chloride 0.9%: 375 mL  Total IN: 3117 mL    OUT:    Indwelling Catheter - Urethral: 1700 mL    Nasoenteral Tube: 50 mL  Total OUT: 1750 mL    Total NET: 1367 mL      2018 07:  -  2018 03:51  --------------------------------------------------------  IN:    dextrose 5% + sodium chloride 0.45%: 300 mL    dextrose 5% + sodium chloride 0.45%.: 1275 mL    IV PiggyBack: 200 mL    Oral Fluid: 420 mL  Total IN: 2195 mL    OUT:    Indwelling Catheter - Urethral: 175 mL  Total OUT: 175 mL    Total NET: 2020 mL          Daily Weight in k.7 (2018 05:00)    LABS:                        11.5   7.8   )-----------( 193      ( 2018 04:57 )             34.5     11-23    148<H>  |  107  |  68<H>  ----------------------------<  166<H>  3.7   |  23  |  2.75<H>    Ca    7.7<L>      2018 04:57  Phos  3.2     11-23  Mg     2.2     11-      PT/INR - ( 2018 08:51 )   PT: 11.7 sec;   INR: 1.03 ratio         PTT - ( 2018 08:51 )  PTT:32.3 sec

## 2018-11-24 NOTE — PROGRESS NOTE ADULT - SUBJECTIVE AND OBJECTIVE BOX
c/c  cachexia/ in sicu  REVIEW OF SYSTEMS:  GEN: no fever,    no chills  RESP: no SOB,   no cough  CVS: no chest pain,   no palpitations  GI: no abdominal pain,   no nausea,   no vomiting,   no constipation,   no diarrhea  : no dysuria,   no frequency  NEURO: no headache,   no dizziness  PSYCH: no depression,   not anxious  Derm : no rash    MEDICATIONS  (STANDING):  acetaminophen   Tablet .. 650 milliGRAM(s) Oral once  ALBUTerol/ipratropium for Nebulization 3 milliLiter(s) Nebulizer every 6 hours  buDESOnide   0.5 milliGRAM(s) Respule 0.5 milliGRAM(s) Inhalation every 12 hours  chlorhexidine 4% Liquid 1 Application(s) Topical <User Schedule>  dextrose 5% + sodium chloride 0.45%. 1000 milliLiter(s) (75 mL/Hr) IV Continuous <Continuous>  docusate sodium 100 milliGRAM(s) Oral three times a day  enoxaparin Injectable 30 milliGRAM(s) SubCutaneous every 24 hours  influenza   Vaccine 0.5 milliLiter(s) IntraMuscular once  lidocaine   Patch 1 Patch Transdermal daily  multivitamin/minerals 1 Tablet(s) Oral daily  nicotine - 21 mG/24Hr(s) Patch 1 patch Transdermal daily  polyethylene glycol 3350 17 Gram(s) Oral daily  potassium acid phosphate/sodium acid phosphate tablet (K-PHOS No. 2) 1 Tablet(s) Oral four times a day with meals  potassium chloride    Tablet ER 20 milliEquivalent(s) Oral every 2 hours  senna 2 Tablet(s) Oral at bedtime  sucralfate 1 Gram(s) Oral four times a day    MEDICATIONS  (PRN):  diazepam    Tablet 5 milliGRAM(s) Oral at bedtime PRN insomnia  oxyCODONE    IR 10 milliGRAM(s) Oral every 6 hours PRN Severe Pain (7 - 10)  simethicone 80 milliGRAM(s) Chew every 8 hours PRN Indigestion      Vital Signs Last 24 Hrs  T(C): 36.7 (24 Nov 2018 07:00), Max: 36.8 (23 Nov 2018 11:00)  T(F): 98.1 (24 Nov 2018 07:00), Max: 98.2 (23 Nov 2018 11:00)  HR: 112 (24 Nov 2018 08:00) (67 - 112)  BP: 141/71 (24 Nov 2018 08:00) (117/60 - 163/76)  BP(mean): 99 (24 Nov 2018 08:00) (82 - 110)  RR: 24 (24 Nov 2018 08:00) (18 - 40)  SpO2: 91% (24 Nov 2018 08:00) (91% - 100%)  CAPILLARY BLOOD GLUCOSE        I&O's Summary    23 Nov 2018 07:01  -  24 Nov 2018 07:00  --------------------------------------------------------  IN: 2545 mL / OUT: 415 mL / NET: 2130 mL    24 Nov 2018 07:01  -  24 Nov 2018 08:53  --------------------------------------------------------  IN: 105 mL / OUT: 100 mL / NET: 5 mL        PHYSICAL EXAM:  HEAD:  Atraumatic, Normocephalic  NECK: Supple, No   JVD  CHEST/LUNG:   no     rales,     no,    rhonchi  HEART: Regular rate and rhythm;         murmur  ABDOMEN: Soft, Nontender, ;   EXTREMITIES:     no   edema/ distended  NEUROLOGY:  alert    LABS:                        11.6   7.8   )-----------( 173      ( 24 Nov 2018 03:37 )             34.2     11-24    144  |  104  |  41<H>  ----------------------------<  129<H>  3.8   |  26  |  1.77<H>    Ca    8.2<L>      24 Nov 2018 03:37  Phos  1.5     11-24  Mg     1.7     11-24    TPro  5.4<L>  /  Alb  2.7<L>  /  TBili  0.5  /  DBili  0.2  /  AST  39  /  ALT  29  /  AlkPhos  123<H>  11-24    PT/INR - ( 23 Nov 2018 08:51 )   PT: 11.7 sec;   INR: 1.03 ratio         PTT - ( 23 Nov 2018 08:51 )  PTT:32.3 sec          ABG - ( 23 Nov 2018 04:42 )  pH, Arterial: 7.47  pH, Blood: x     /  pCO2: 39    /  pO2: 91    / HCO3: 28    / Base Excess: 4.6   /  SaO2: 95                            Consultant(s) Notes Reviewed:      Care Discussed with Consultants/Other Providers:

## 2018-11-25 LAB
ANION GAP SERPL CALC-SCNC: 12 MMOL/L — SIGNIFICANT CHANGE UP (ref 5–17)
BUN SERPL-MCNC: 30 MG/DL — HIGH (ref 7–23)
CA-I BLD-SCNC: 1.08 MMOL/L — LOW (ref 1.12–1.3)
CALCIUM SERPL-MCNC: 8.3 MG/DL — LOW (ref 8.4–10.5)
CHLORIDE SERPL-SCNC: 101 MMOL/L — SIGNIFICANT CHANGE UP (ref 96–108)
CO2 SERPL-SCNC: 28 MMOL/L — SIGNIFICANT CHANGE UP (ref 22–31)
CREAT SERPL-MCNC: 1.6 MG/DL — HIGH (ref 0.5–1.3)
GLUCOSE SERPL-MCNC: 81 MG/DL — SIGNIFICANT CHANGE UP (ref 70–99)
HCT VFR BLD CALC: 37 % — SIGNIFICANT CHANGE UP (ref 34.5–45)
HGB BLD-MCNC: 12.2 G/DL — SIGNIFICANT CHANGE UP (ref 11.5–15.5)
MAGNESIUM SERPL-MCNC: 1.9 MG/DL — SIGNIFICANT CHANGE UP (ref 1.6–2.6)
MCHC RBC-ENTMCNC: 32.2 PG — SIGNIFICANT CHANGE UP (ref 27–34)
MCHC RBC-ENTMCNC: 33 GM/DL — SIGNIFICANT CHANGE UP (ref 32–36)
MCV RBC AUTO: 97.6 FL — SIGNIFICANT CHANGE UP (ref 80–100)
PHOSPHATE SERPL-MCNC: 2.1 MG/DL — LOW (ref 2.5–4.5)
PLATELET # BLD AUTO: 163 K/UL — SIGNIFICANT CHANGE UP (ref 150–400)
POTASSIUM SERPL-MCNC: 4.8 MMOL/L — SIGNIFICANT CHANGE UP (ref 3.5–5.3)
POTASSIUM SERPL-SCNC: 4.8 MMOL/L — SIGNIFICANT CHANGE UP (ref 3.5–5.3)
RBC # BLD: 3.79 M/UL — LOW (ref 3.8–5.2)
RBC # FLD: 12.6 % — SIGNIFICANT CHANGE UP (ref 10.3–14.5)
SODIUM SERPL-SCNC: 141 MMOL/L — SIGNIFICANT CHANGE UP (ref 135–145)
WBC # BLD: 9.7 K/UL — SIGNIFICANT CHANGE UP (ref 3.8–10.5)
WBC # FLD AUTO: 9.7 K/UL — SIGNIFICANT CHANGE UP (ref 3.8–10.5)

## 2018-11-25 PROCEDURE — 71045 X-RAY EXAM CHEST 1 VIEW: CPT | Mod: 26

## 2018-11-25 RX ORDER — ONDANSETRON 8 MG/1
4 TABLET, FILM COATED ORAL ONCE
Qty: 0 | Refills: 0 | Status: COMPLETED | OUTPATIENT
Start: 2018-11-25 | End: 2018-11-25

## 2018-11-25 RX ORDER — SODIUM,POTASSIUM PHOSPHATES 278-250MG
1 POWDER IN PACKET (EA) ORAL
Qty: 0 | Refills: 0 | Status: DISCONTINUED | OUTPATIENT
Start: 2018-11-25 | End: 2018-11-25

## 2018-11-25 RX ORDER — NALOXEGOL OXALATE 12.5 MG/1
25 TABLET, FILM COATED ORAL DAILY
Qty: 0 | Refills: 0 | Status: DISCONTINUED | OUTPATIENT
Start: 2018-11-25 | End: 2018-11-26

## 2018-11-25 RX ADMIN — LIDOCAINE 1 PATCH: 4 CREAM TOPICAL at 23:22

## 2018-11-25 RX ADMIN — LIDOCAINE 1 PATCH: 4 CREAM TOPICAL at 11:59

## 2018-11-25 RX ADMIN — OXYCODONE HYDROCHLORIDE 10 MILLIGRAM(S): 5 TABLET ORAL at 07:58

## 2018-11-25 RX ADMIN — Medication 1 TABLET(S): at 17:27

## 2018-11-25 RX ADMIN — ONDANSETRON 4 MILLIGRAM(S): 8 TABLET, FILM COATED ORAL at 07:58

## 2018-11-25 RX ADMIN — LIDOCAINE 1 PATCH: 4 CREAM TOPICAL at 19:38

## 2018-11-25 RX ADMIN — Medication 1 TABLET(S): at 11:59

## 2018-11-25 RX ADMIN — Medication 3 MILLILITER(S): at 23:52

## 2018-11-25 RX ADMIN — Medication 3 MILLILITER(S): at 05:19

## 2018-11-25 RX ADMIN — Medication 1 TABLET(S): at 11:58

## 2018-11-25 RX ADMIN — Medication 0.5 MILLIGRAM(S): at 08:01

## 2018-11-25 RX ADMIN — ENOXAPARIN SODIUM 30 MILLIGRAM(S): 100 INJECTION SUBCUTANEOUS at 21:59

## 2018-11-25 RX ADMIN — Medication 1 TABLET(S): at 08:01

## 2018-11-25 RX ADMIN — OXYCODONE HYDROCHLORIDE 10 MILLIGRAM(S): 5 TABLET ORAL at 16:13

## 2018-11-25 RX ADMIN — Medication 1 GRAM(S): at 17:27

## 2018-11-25 RX ADMIN — OXYCODONE HYDROCHLORIDE 10 MILLIGRAM(S): 5 TABLET ORAL at 17:10

## 2018-11-25 RX ADMIN — OXYCODONE HYDROCHLORIDE 10 MILLIGRAM(S): 5 TABLET ORAL at 13:00

## 2018-11-25 RX ADMIN — Medication 1 TABLET(S): at 21:59

## 2018-11-25 RX ADMIN — OXYCODONE HYDROCHLORIDE 10 MILLIGRAM(S): 5 TABLET ORAL at 02:44

## 2018-11-25 RX ADMIN — Medication 1 PATCH: at 11:59

## 2018-11-25 RX ADMIN — OXYCODONE HYDROCHLORIDE 10 MILLIGRAM(S): 5 TABLET ORAL at 03:14

## 2018-11-25 RX ADMIN — Medication 0.5 MILLIGRAM(S): at 17:27

## 2018-11-25 RX ADMIN — Medication 1 GRAM(S): at 23:53

## 2018-11-25 RX ADMIN — NALOXEGOL OXALATE 25 MILLIGRAM(S): 12.5 TABLET, FILM COATED ORAL at 11:58

## 2018-11-25 RX ADMIN — Medication 1 PATCH: at 08:07

## 2018-11-25 RX ADMIN — Medication 1 GRAM(S): at 05:22

## 2018-11-25 RX ADMIN — OXYCODONE HYDROCHLORIDE 10 MILLIGRAM(S): 5 TABLET ORAL at 08:30

## 2018-11-25 RX ADMIN — Medication 3 MILLILITER(S): at 11:58

## 2018-11-25 RX ADMIN — OXYCODONE HYDROCHLORIDE 10 MILLIGRAM(S): 5 TABLET ORAL at 11:59

## 2018-11-25 RX ADMIN — Medication 1 GRAM(S): at 11:59

## 2018-11-25 RX ADMIN — Medication 1 PATCH: at 11:55

## 2018-11-25 RX ADMIN — OXYCODONE HYDROCHLORIDE 10 MILLIGRAM(S): 5 TABLET ORAL at 21:40

## 2018-11-25 RX ADMIN — Medication 1 PATCH: at 19:38

## 2018-11-25 RX ADMIN — Medication 3 MILLILITER(S): at 17:27

## 2018-11-25 RX ADMIN — OXYCODONE HYDROCHLORIDE 10 MILLIGRAM(S): 5 TABLET ORAL at 20:49

## 2018-11-25 NOTE — PROVIDER CONTACT NOTE (OTHER) - ASSESSMENT
A/Ox4, c/o of new onset leg pain. Pt requesting to see provider. A/Ox4, c/o of new onset leg pain. Pt requesting to see provider. Pt LE warm, oliver, no numbness or tingling, PT DP palpable.

## 2018-11-25 NOTE — PROGRESS NOTE ADULT - SUBJECTIVE AND OBJECTIVE BOX
CARDIOLOGY     PROGRESS  NOTE   ________________________________________________    CHIEF COMPLAINT:Patient is a 73y old  Female who presents with a chief complaint of pos troponin (24 Nov 2018 08:54)    	  REVIEW OF SYSTEMS:  CONSTITUTIONAL: No fever, weight loss, or fatigue  EYES: No eye pain, visual disturbances, or discharge  ENT:  No difficulty hearing, tinnitus, vertigo; No sinus or throat pain  NECK: No pain or stiffness  RESPIRATORY: No cough, wheezing, chills or hemoptysis; No Shortness of Breath  CARDIOVASCULAR: No chest pain, palpitations, passing out, dizziness, or leg swelling  GASTROINTESTINAL: No abdominal or epigastric pain. No nausea, vomiting, or hematemesis; No diarrhea or constipation. No melena or hematochezia.  GENITOURINARY: No dysuria, frequency, hematuria, or incontinence  NEUROLOGICAL: No headaches, memory loss, loss of strength, numbness, or tremors  SKIN: No itching, burning, rashes, or lesions   LYMPH Nodes: No enlarged glands  ENDOCRINE: No heat or cold intolerance; No hair loss  MUSCULOSKELETAL: No joint pain or swelling; No muscle, back, or extremity pain  PSYCHIATRIC: No depression, anxiety, mood swings, or difficulty sleeping  HEME/LYMPH: No easy bruising, or bleeding gums  ALLERGY AND IMMUNOLOGIC: No hives or eczema	    [ ] All others negative	  [ ] Unable to obtain    PHYSICAL EXAM:  T(C): 37.1 (11-25-18 @ 05:27), Max: 37.7 (11-24-18 @ 21:12)  HR: 91 (11-25-18 @ 05:27) (76 - 109)  BP: 137/72 (11-25-18 @ 05:27) (127/76 - 155/82)  RR: 16 (11-25-18 @ 05:27) (16 - 37)  SpO2: 94% (11-25-18 @ 05:27) (93% - 95%)  Wt(kg): --  I&O's Summary    24 Nov 2018 07:01  -  25 Nov 2018 07:00  --------------------------------------------------------  IN: 965 mL / OUT: 650 mL / NET: 315 mL        Appearance: Normal	  HEENT:   Normal oral mucosa, PERRL, EOMI	  Lymphatic: No lymphadenopathy  Cardiovascular: Normal S1 S2, No JVD, No murmurs, No edema  Respiratory: Lungs clear to auscultation	  Psychiatry: A & O x 3, Mood & affect appropriate  Gastrointestinal:  Soft, Non-tender, + BS	  Skin: No rashes, No ecchymoses, No cyanosis	  Neurologic: Non-focal  Extremities: Normal range of motion, No clubbing, cyanosis or edema  Vascular: Peripheral pulses palpable 2+ bilaterally    MEDICATIONS  (STANDING):  ALBUTerol/ipratropium for Nebulization 3 milliLiter(s) Nebulizer every 6 hours  buDESOnide   0.5 milliGRAM(s) Respule 0.5 milliGRAM(s) Inhalation every 12 hours  chlorhexidine 4% Liquid 1 Application(s) Topical <User Schedule>  enoxaparin Injectable 30 milliGRAM(s) SubCutaneous every 24 hours  influenza   Vaccine 0.5 milliLiter(s) IntraMuscular once  lidocaine   Patch 1 Patch Transdermal daily  multivitamin/minerals 1 Tablet(s) Oral daily  naloxegol 25 milliGRAM(s) Oral daily  nicotine - 21 mG/24Hr(s) Patch 1 patch Transdermal daily  potassium acid phosphate/sodium acid phosphate tablet (K-PHOS No. 2) 1 Tablet(s) Oral four times a day with meals  potassium acid phosphate/sodium acid phosphate tablet (K-PHOS No. 2) 1 Tablet(s) Oral four times a day with meals  sucralfate 1 Gram(s) Oral four times a day      TELEMETRY: 	    ECG:  	  RADIOLOGY:  OTHER: 	  	  LABS:	 	    CARDIAC MARKERS:                                12.2   9.7   )-----------( 163      ( 25 Nov 2018 06:46 )             37.0     11-25    141  |  101  |  30<H>  ----------------------------<  81  4.8   |  28  |  1.60<H>    Ca    8.3<L>      25 Nov 2018 06:45  Phos  2.1     11-25  Mg     1.9     11-25    TPro  5.4<L>  /  Alb  2.7<L>  /  TBili  0.5  /  DBili  0.2  /  AST  39  /  ALT  29  /  AlkPhos  123<H>  11-24    proBNP:   Lipid Profile:   HgA1c:   TSH:         Assessment and plan  ---------------------------  doing much better  awaiting echo  ivf  dvt prophylaxis

## 2018-11-25 NOTE — PROGRESS NOTE ADULT - SUBJECTIVE AND OBJECTIVE BOX
HPI:  72 y/o F pt admitted with abdominal pain, has known chronic back pain for which she is on pain meds, narcotics.  She has multiple prior admissions for SBO, narcotic associated ileus/constipation, but has not required hospitalization for this in several years.  Being managed in SICU for SBO, dehydration, fluid electrolyte disorder        PAST MEDICAL & SURGICAL HISTORY:  Back pain  Bowel obstruction: multiple hospitalizations  Kidney stone  Emphysema  Narcotic Dependence  S/P Radiation Therapy  S/P Chemotherapy, Time Since Greater than 12 Weeks  Narcotic Dysmotile Cilia Syndrome  Chronic Pain Following Surgery or Procedure: following right breast mastectomy  Ankle Fracture: right anle fx s/p cast 2009  History of Small Bowel Obstruction: 1/2010  Asthma  Breast Cancer  S/P hernia surgery: femoral hernia - 2012  S/P Exploratory Laparotomy  S/P Appendectomy  S/P Cholecystectomy  Liver Mass s/p liver rsxn: s/p resection 2009  History of Hysterectomy: 1998  S/P Right Mastectomy: 2006    Medications:  ALBUTerol/ipratropium for Nebulization 3 milliLiter(s) Nebulizer every 6 hours  buDESOnide   0.5 milliGRAM(s) Respule 0.5 milliGRAM(s) Inhalation every 12 hours  chlorhexidine 4% Liquid 1 Application(s) Topical <User Schedule>  diazepam    Tablet 5 milliGRAM(s) Oral at bedtime PRN insomnia  enoxaparin Injectable 30 milliGRAM(s) SubCutaneous every 24 hours  influenza   Vaccine 0.5 milliLiter(s) IntraMuscular once  lidocaine   Patch 1 Patch Transdermal daily  multivitamin/minerals 1 Tablet(s) Oral daily  naloxegol 25 milliGRAM(s) Oral daily  nicotine - 21 mG/24Hr(s) Patch 1 patch Transdermal daily  oxyCODONE    IR 10 milliGRAM(s) Oral every 4 hours PRN Severe Pain (7 - 10)  potassium acid phosphate/sodium acid phosphate tablet (K-PHOS No. 2) 1 Tablet(s) Oral four times a day with meals  potassium acid phosphate/sodium acid phosphate tablet (K-PHOS No. 2) 1 Tablet(s) Oral four times a day with meals  simethicone 80 milliGRAM(s) Chew every 8 hours PRN Indigestion  sucralfate 1 Gram(s) Oral four times a day    Labs:  CBC Full  -  ( 25 Nov 2018 06:46 )  WBC Count : 9.7 K/uL  Hemoglobin : 12.2 g/dL  Hematocrit : 37.0 %  Platelet Count - Automated : 163 K/uL  Mean Cell Volume : 97.6 fl  Mean Cell Hemoglobin : 32.2 pg  Mean Cell Hemoglobin Concentration : 33.0 gm/dL  Auto Neutrophil # : x  Auto Lymphocyte # : x  Auto Monocyte # : x  Auto Eosinophil # : x  Auto Basophil # : x  Auto Neutrophil % : x  Auto Lymphocyte % : x  Auto Monocyte % : x  Auto Eosinophil % : x  Auto Basophil % : x    11-25    141  |  101  |  30<H>  ----------------------------<  81  4.8   |  28  |  1.60<H>    Ca    8.3<L>      25 Nov 2018 06:45  Phos  2.1     11-25  Mg     1.9     11-25    TPro  5.4<L>  /  Alb  2.7<L>  /  TBili  0.5  /  DBili  0.2  /  AST  39  /  ALT  29  /  AlkPhos  123<H>  11-24      Radiology:             ROS:  Patient comfortable without distress  No SOB or chest pain  No palpitation  No abdominal pain, diarrhaea or constipation  No weakness of extremities  No skin changes or swelling of legs    Vital Signs Last 24 Hrs  T(C): 36.6 (25 Nov 2018 13:36), Max: 37.7 (24 Nov 2018 21:12)  T(F): 97.9 (25 Nov 2018 13:36), Max: 99.9 (24 Nov 2018 21:12)  HR: 90 (25 Nov 2018 13:36) (76 - 97)  BP: 140/70 (25 Nov 2018 13:36) (137/72 - 155/82)  BP(mean): --  RR: 18 (25 Nov 2018 13:36) (16 - 18)  SpO2: 95% (25 Nov 2018 13:36) (94% - 95%)    Physical exam:  Patient alert and oriented  No distress  CVS: S1, S2 regular or murmur  Chest: bilateral breath sound without rales  Abdomen: soft, not tender, no organomegaly or masses  No focal neuro deficit  No edema      Assessment and Plan: HPI:  72 y/o F pt admitted with abdominal pain, has known chronic back pain for which she is on pain meds, narcotics.  She has multiple prior admissions for SBO, narcotic associated ileus/constipation, but has not required hospitalization for this in several years.  Was managed in SICU for SBO, dehydration, fluid electrolyte disorder and is now on floor        PAST MEDICAL & SURGICAL HISTORY:  Back pain  Bowel obstruction: multiple hospitalizations  Kidney stone  Emphysema  Narcotic Dependence  S/P Radiation Therapy  S/P Chemotherapy, Time Since Greater than 12 Weeks  Narcotic Dysmotile Cilia Syndrome  Chronic Pain Following Surgery or Procedure: following right breast mastectomy  Ankle Fracture: right anle fx s/p cast 2009  History of Small Bowel Obstruction: 1/2010  Asthma  Breast Cancer  S/P hernia surgery: femoral hernia - 2012  S/P Exploratory Laparotomy  S/P Appendectomy  S/P Cholecystectomy  Liver Mass s/p liver rsxn: s/p resection 2009  History of Hysterectomy: 1998  S/P Right Mastectomy: 2006    Medications:  ALBUTerol/ipratropium for Nebulization 3 milliLiter(s) Nebulizer every 6 hours  buDESOnide   0.5 milliGRAM(s) Respule 0.5 milliGRAM(s) Inhalation every 12 hours  chlorhexidine 4% Liquid 1 Application(s) Topical <User Schedule>  diazepam    Tablet 5 milliGRAM(s) Oral at bedtime PRN insomnia  enoxaparin Injectable 30 milliGRAM(s) SubCutaneous every 24 hours  influenza   Vaccine 0.5 milliLiter(s) IntraMuscular once  lidocaine   Patch 1 Patch Transdermal daily  multivitamin/minerals 1 Tablet(s) Oral daily  naloxegol 25 milliGRAM(s) Oral daily  nicotine - 21 mG/24Hr(s) Patch 1 patch Transdermal daily  oxyCODONE    IR 10 milliGRAM(s) Oral every 4 hours PRN Severe Pain (7 - 10)  potassium acid phosphate/sodium acid phosphate tablet (K-PHOS No. 2) 1 Tablet(s) Oral four times a day with meals  potassium acid phosphate/sodium acid phosphate tablet (K-PHOS No. 2) 1 Tablet(s) Oral four times a day with meals  simethicone 80 milliGRAM(s) Chew every 8 hours PRN Indigestion  sucralfate 1 Gram(s) Oral four times a day    Labs:  CBC Full  -  ( 25 Nov 2018 06:46 )  WBC Count : 9.7 K/uL  Hemoglobin : 12.2 g/dL  Hematocrit : 37.0 %  Platelet Count - Automated : 163 K/uL  Mean Cell Volume : 97.6 fl  Mean Cell Hemoglobin : 32.2 pg  Mean Cell Hemoglobin Concentration : 33.0 gm/dL  Auto Neutrophil # : x  Auto Lymphocyte # : x  Auto Monocyte # : x  Auto Eosinophil # : x  Auto Basophil # : x  Auto Neutrophil % : x  Auto Lymphocyte % : x  Auto Monocyte % : x  Auto Eosinophil % : x  Auto Basophil % : x    11-25    141  |  101  |  30<H>  ----------------------------<  81  4.8   |  28  |  1.60<H>    Ca    8.3<L>      25 Nov 2018 06:45  Phos  2.1     11-25  Mg     1.9     11-25    TPro  5.4<L>  /  Alb  2.7<L>  /  TBili  0.5  /  DBili  0.2  /  AST  39  /  ALT  29  /  AlkPhos  123<H>  11-24      Radiology:             ROS:  Is weak and states has BM's now, loose    Vital Signs Last 24 Hrs  T(C): 36.6 (25 Nov 2018 13:36), Max: 37.7 (24 Nov 2018 21:12)  T(F): 97.9 (25 Nov 2018 13:36), Max: 99.9 (24 Nov 2018 21:12)  HR: 90 (25 Nov 2018 13:36) (76 - 97)  BP: 140/70 (25 Nov 2018 13:36) (137/72 - 155/82)  BP(mean): --  RR: 18 (25 Nov 2018 13:36) (16 - 18)  SpO2: 95% (25 Nov 2018 13:36) (94% - 95%)    Physical exam:  Patient alert and oriented  frail and cachectic looking  CVS: S1, S2 regular or murmur  Chest: bilateral breath sound without rales  Abdomen: soft, not tender, no organomegaly or masses  No focal neuro deficit  No edema      Assessment and Plan:

## 2018-11-25 NOTE — PROGRESS NOTE ADULT - SUBJECTIVE AND OBJECTIVE BOX
GENERAL SURGERY DAILY PROGRESS NOTE:     Subjective:    Patient was seen and examined this morning during morning rounds. No acute events overnight. Tolerating regular diet. Not complaining of pain.    Objective:    NAD, awake and alert  Respirations nonlabored  Abdomen soft, nontender, mild distention  No guarding or rebound tenderness     MEDICATIONS  (STANDING):  ALBUTerol/ipratropium for Nebulization 3 milliLiter(s) Nebulizer every 6 hours  buDESOnide   0.5 milliGRAM(s) Respule 0.5 milliGRAM(s) Inhalation every 12 hours  chlorhexidine 4% Liquid 1 Application(s) Topical <User Schedule>  docusate sodium 100 milliGRAM(s) Oral three times a day  enoxaparin Injectable 30 milliGRAM(s) SubCutaneous every 24 hours  influenza   Vaccine 0.5 milliLiter(s) IntraMuscular once  lidocaine   Patch 1 Patch Transdermal daily  multivitamin/minerals 1 Tablet(s) Oral daily  nicotine - 21 mG/24Hr(s) Patch 1 patch Transdermal daily  polyethylene glycol 3350 17 Gram(s) Oral daily  potassium acid phosphate/sodium acid phosphate tablet (K-PHOS No. 2) 1 Tablet(s) Oral four times a day with meals  senna 2 Tablet(s) Oral at bedtime  sucralfate 1 Gram(s) Oral four times a day    MEDICATIONS  (PRN):  diazepam    Tablet 5 milliGRAM(s) Oral at bedtime PRN insomnia  oxyCODONE    IR 10 milliGRAM(s) Oral every 4 hours PRN Severe Pain (7 - 10)  simethicone 80 milliGRAM(s) Chew every 8 hours PRN Indigestion      Vital Signs Last 24 Hrs  T(C): 37.1 (25 Nov 2018 05:27), Max: 37.7 (24 Nov 2018 21:12)  T(F): 98.7 (25 Nov 2018 05:27), Max: 99.9 (24 Nov 2018 21:12)  HR: 91 (25 Nov 2018 05:27) (76 - 109)  BP: 137/72 (25 Nov 2018 05:27) (122/63 - 155/82)  BP(mean): 97 (24 Nov 2018 11:00) (87 - 101)  RR: 16 (25 Nov 2018 05:27) (16 - 37)  SpO2: 94% (25 Nov 2018 05:27) (93% - 95%)    I&O's Detail    24 Nov 2018 07:01  -  25 Nov 2018 07:00  --------------------------------------------------------  IN:    dextrose 5% + sodium chloride 0.45%.: 75 mL    IV PiggyBack: 90 mL    Oral Fluid: 800 mL  Total IN: 965 mL    OUT:    Voided: 650 mL  Total OUT: 650 mL    Total NET: 315 mL          Daily     Daily     LABS:                        12.2   9.7   )-----------( 163      ( 25 Nov 2018 06:46 )             37.0     11-25    141  |  101  |  30<H>  ----------------------------<  81  4.8   |  28  |  1.60<H>    Ca    8.3<L>      25 Nov 2018 06:45  Phos  2.1     11-25  Mg     1.9     11-25    TPro  5.4<L>  /  Alb  2.7<L>  /  TBili  0.5  /  DBili  0.2  /  AST  39  /  ALT  29  /  AlkPhos  123<H>  11-24    PT/INR - ( 23 Nov 2018 08:51 )   PT: 11.7 sec;   INR: 1.03 ratio         PTT - ( 23 Nov 2018 08:51 )  PTT:32.3 sec

## 2018-11-25 NOTE — PROGRESS NOTE ADULT - ASSESSMENT
74 y/o F pt admitted with abdominal pain, has known chronic back pain for which she is on pain meds, narcotics.  She has multiple prior admissions for SBO, narcotic associated ileus/constipation, but has not required hospitalization for this in several years.  Being managed in SICU for SBO, dehydration, SHAKIRA, fluid electrolyte disorder  Known COPD - current everyday smoker  She has Large cavitary lesion in the left upper lobe. Primary   consideration is lung carcinoma. Tuberculosis or a lung abscess is not   excluded.  She did not have any abdominal mets or significant LN on present studies  She will need bx and further imaging once she is clinically better from her obstruction and SHAKIRA etc.  Further management planning with consideration to her PS and comorbidities after that 72 y/o F pt admitted with abdominal pain, has known chronic back pain for which she is on pain meds, narcotics.  She has multiple prior admissions for SBO, narcotic associated ileus/constipation, but has not required hospitalization for this in several years.  Was managed  for SBO, dehydration, SHAKIRA, fluid electrolyte disorder  Known COPD - current everyday smoker  She has Large cavitary lesion in the left upper lobe. Primary   consideration is lung carcinoma. Tuberculosis or a lung abscess is not   excluded. Pulmonary will do bronch if needed. Presently TB being ruled out,  She has remote history of breast cancer. H/O B12 def, gets supplemented  Will follow.

## 2018-11-25 NOTE — PROVIDER CONTACT NOTE (OTHER) - ACTION/TREATMENT ORDERED:
MD made aware. Provider assessed at bedside. no intervention at this time. Continue to monitor. MD made aware. Provider assessed at bedside. Pt encouraged to get OOB & perform ROM exercises pf LE. Will continue to monitor.

## 2018-11-25 NOTE — PROGRESS NOTE ADULT - ASSESSMENT
73F with complicated surgical history and history of multiple episodes of small bowel obstruction presented with SBO demonstrated on CT scan. s/p extubation. Cr continues to downtrend, now 1.6    - Continue regular diet  - Monitor GI function  - trend Cr  - Continue senna/colace  - pain control  - Monitor vitals  - Monitor UOP      [facility]  [phone]

## 2018-11-25 NOTE — PROGRESS NOTE ADULT - SUBJECTIVE AND OBJECTIVE BOX
Dr. Mosquera  Office [phone]  Cell [phone]  Akiko RICH  Cell [phone]      Patient is a 73y old  Female who presents with a chief complaint of pos troponin (24 Nov 2018 08:54)      Patient seen and examined at bedside. No chest pain/sob    VITALS:  T(F): 98.3 (11-25-18 @ 10:20), Max: 99.9 (11-24-18 @ 21:12)  HR: 92 (11-25-18 @ 10:20)  BP: 142/75 (11-25-18 @ 10:20)  RR: 18 (11-25-18 @ 10:20)  SpO2: 95% (11-25-18 @ 10:20)  Wt(kg): --    11-24 @ 07:01  -  11-25 @ 07:00  --------------------------------------------------------  IN: 965 mL / OUT: 650 mL / NET: 315 mL    11-25 @ 07:01  -  11-25 @ 11:38  --------------------------------------------------------  IN: 240 mL / OUT: 200 mL / NET: 40 mL          PHYSICAL EXAM:  Constitutional: NAD  Neck: No JVD  Respiratory: CTAB, no wheezes, rales or rhonchi  Cardiovascular: S1, S2, RRR  Gastrointestinal: BS+, soft, NT/ND  Extremities: No peripheral edema    Hospital Medications:   MEDICATIONS  (STANDING):  ALBUTerol/ipratropium for Nebulization 3 milliLiter(s) Nebulizer every 6 hours  buDESOnide   0.5 milliGRAM(s) Respule 0.5 milliGRAM(s) Inhalation every 12 hours  chlorhexidine 4% Liquid 1 Application(s) Topical <User Schedule>  enoxaparin Injectable 30 milliGRAM(s) SubCutaneous every 24 hours  influenza   Vaccine 0.5 milliLiter(s) IntraMuscular once  lidocaine   Patch 1 Patch Transdermal daily  multivitamin/minerals 1 Tablet(s) Oral daily  naloxegol 25 milliGRAM(s) Oral daily  nicotine - 21 mG/24Hr(s) Patch 1 patch Transdermal daily  potassium acid phosphate/sodium acid phosphate tablet (K-PHOS No. 2) 1 Tablet(s) Oral four times a day with meals  potassium acid phosphate/sodium acid phosphate tablet (K-PHOS No. 2) 1 Tablet(s) Oral four times a day with meals  sucralfate 1 Gram(s) Oral four times a day      LABS:  11-25    141  |  101  |  30<H>  ----------------------------<  81  4.8   |  28  |  1.60<H>    Ca    8.3<L>      25 Nov 2018 06:45  Phos  2.1     11-25  Mg     1.9     11-25    TPro  5.4<L>  /  Alb  2.7<L>  /  TBili  0.5  /  DBili  0.2  /  AST  39  /  ALT  29  /  AlkPhos  123<H>  11-24    Creatinine Trend: 1.60 <--, 1.77 <--, 2.75 <--, 3.66 <--, 4.75 <--, 5.54 <--, 5.93 <--, 6.83 <--, 7.51 <--, 7.66 <--    Phosphorus Level, Serum: 2.1 mg/dL (11-25 @ 06:45)                              12.2   9.7   )-----------( 163      ( 25 Nov 2018 06:46 )             37.0     Urine Studies:  Urinalysis - [11-20-18 @ 18:08]      Color Yellow / Appearance Slightly Turbid / SG 1.020 / pH 5.5      Gluc Negative / Ketone Negative  / Bili Small / Urobili Negative       Blood Small / Protein 30 mg/dL / Leuk Est Moderate / Nitrite Negative      RBC 18 / WBC 12 / Hyaline 20 / Gran  / Sq Epi  / Non Sq Epi 1 / Bacteria Negative    Urine Creatinine 137      [11-20-18 @ 18:08]  Urine Protein 46      [11-20-18 @ 23:05]  Urine Sodium 31      [11-20-18 @ 18:08]  Urine Urea Nitrogen 331      [11-20-18 @ 23:05]  Urine Osmolality 363      [11-20-18 @ 18:08]          RADIOLOGY & ADDITIONAL STUDIES:

## 2018-11-25 NOTE — PROGRESS NOTE ADULT - ATTENDING COMMENTS
I have seen and evaluated patient and discussed with team. Patient clinically improved. [place] started because of history of multiple admits for narcotic ileus which responds to oral Narcan.  Plan for discharge tomorrow

## 2018-11-25 NOTE — PROGRESS NOTE ADULT - ASSESSMENT
73 year old female with   PMH chronic Back pain (on Narcotics), history of multiple SBO's, narcotic associated constipation/ileus, COPD, current everyday smoker,  ca  breast,  right  mastectomy   admitted with abdominal pain and distension, nausea and poor PO intake with associated worsening painful spasms.      c/c  cachexia  and   dehydrated with acute Renal Failure (SHAKIRA) with marked hyperphosphatemia, acidosis   SBO on ct  /   iV hydration  /    SHAKIRA , improving/ crt  1.7  ct  scans  noted/  RENUKA  cavitary lesion./ ? pna  SBO, transition point  in ileum  oncology   d r forte  QuantiFeron,  pending/  pending bronch  axr , ileus  care per  sicu        < from: CT Chest No Cont (11.20.18 @ 19:17) >  IMPRESSION: Large cavitary lesion in the left upper lobe. Primary   consideration is lung carcinoma. Tuberculosis or a lung abscess is not   excluded.  Mucous plugging in the right lower lobe with groundglass opacities which   may be due to infection or atelectasis.  < end of copied text >     < from: CT Abdomen and Pelvis No Cont (11.20.18 @ 17:07) >  IMPRESSION: Small bowel obstruction with transition point in the ileum   located in theright lower quadrant.  Groundglass opacities in the right lower lobe suggest pneumonia.  Findings discussed with Dr. Diaz on 11/20/2018 at 5:30 PM with read   back.  < end of copied text >

## 2018-11-25 NOTE — PROGRESS NOTE ADULT - SUBJECTIVE AND OBJECTIVE BOX
no  cp/sob    REVIEW OF SYSTEMS:  GEN: no fever,    no chills  RESP: no SOB,   no cough  CVS: no chest pain,   no palpitations  GI: no abdominal pain,   no nausea,   no vomiting,   no constipation,   no diarrhea  : no dysuria,   no frequency  NEURO: no headache,   no dizziness  PSYCH: no depression,   not anxious  Derm : no rash    MEDICATIONS  (STANDING):  ALBUTerol/ipratropium for Nebulization 3 milliLiter(s) Nebulizer every 6 hours  buDESOnide   0.5 milliGRAM(s) Respule 0.5 milliGRAM(s) Inhalation every 12 hours  chlorhexidine 4% Liquid 1 Application(s) Topical <User Schedule>  docusate sodium 100 milliGRAM(s) Oral three times a day  enoxaparin Injectable 30 milliGRAM(s) SubCutaneous every 24 hours  influenza   Vaccine 0.5 milliLiter(s) IntraMuscular once  lidocaine   Patch 1 Patch Transdermal daily  multivitamin/minerals 1 Tablet(s) Oral daily  nicotine - 21 mG/24Hr(s) Patch 1 patch Transdermal daily  polyethylene glycol 3350 17 Gram(s) Oral daily  potassium acid phosphate/sodium acid phosphate tablet (K-PHOS No. 2) 1 Tablet(s) Oral four times a day with meals  potassium acid phosphate/sodium acid phosphate tablet (K-PHOS No. 2) 1 Tablet(s) Oral four times a day with meals  senna 2 Tablet(s) Oral at bedtime  sucralfate 1 Gram(s) Oral four times a day    MEDICATIONS  (PRN):  diazepam    Tablet 5 milliGRAM(s) Oral at bedtime PRN insomnia  oxyCODONE    IR 10 milliGRAM(s) Oral every 4 hours PRN Severe Pain (7 - 10)  simethicone 80 milliGRAM(s) Chew every 8 hours PRN Indigestion      Vital Signs Last 24 Hrs  T(C): 37.1 (25 Nov 2018 05:27), Max: 37.7 (24 Nov 2018 21:12)  T(F): 98.7 (25 Nov 2018 05:27), Max: 99.9 (24 Nov 2018 21:12)  HR: 91 (25 Nov 2018 05:27) (76 - 109)  BP: 137/72 (25 Nov 2018 05:27) (127/76 - 155/82)  BP(mean): 97 (24 Nov 2018 11:00) (97 - 101)  RR: 16 (25 Nov 2018 05:27) (16 - 37)  SpO2: 94% (25 Nov 2018 05:27) (93% - 95%)  CAPILLARY BLOOD GLUCOSE        I&O's Summary    24 Nov 2018 07:01  -  25 Nov 2018 07:00  --------------------------------------------------------  IN: 965 mL / OUT: 650 mL / NET: 315 mL        PHYSICAL EXAM:  HEAD:  Atraumatic, Normocephalic  NECK: Supple, No   JVD  CHEST/LUNG:   no     rales,     no,    rhonchi  HEART: Regular rate and rhythm;         murmur  ABDOMEN: Soft, Nontender, ;   EXTREMITIES:      no  edema  NEUROLOGY:  alert    LABS:                        12.2   9.7   )-----------( 163      ( 25 Nov 2018 06:46 )             37.0     11-25    141  |  101  |  30<H>  ----------------------------<  81  4.8   |  28  |  1.60<H>    Ca    8.3<L>      25 Nov 2018 06:45  Phos  2.1     11-25  Mg     1.9     11-25    TPro  5.4<L>  /  Alb  2.7<L>  /  TBili  0.5  /  DBili  0.2  /  AST  39  /  ALT  29  /  AlkPhos  123<H>  11-24                            Consultant(s) Notes Reviewed:      Care Discussed with Consultants/Other Providers:

## 2018-11-26 ENCOUNTER — TRANSCRIPTION ENCOUNTER (OUTPATIENT)
Age: 73
End: 2018-11-26

## 2018-11-26 VITALS
TEMPERATURE: 98 F | DIASTOLIC BLOOD PRESSURE: 76 MMHG | HEART RATE: 108 BPM | RESPIRATION RATE: 16 BRPM | SYSTOLIC BLOOD PRESSURE: 137 MMHG | OXYGEN SATURATION: 95 %

## 2018-11-26 LAB
ANION GAP SERPL CALC-SCNC: 10 MMOL/L — SIGNIFICANT CHANGE UP (ref 5–17)
BUN SERPL-MCNC: 27 MG/DL — HIGH (ref 7–23)
CALCIUM SERPL-MCNC: 8.1 MG/DL — LOW (ref 8.4–10.5)
CHLORIDE SERPL-SCNC: 102 MMOL/L — SIGNIFICANT CHANGE UP (ref 96–108)
CO2 SERPL-SCNC: 27 MMOL/L — SIGNIFICANT CHANGE UP (ref 22–31)
CREAT SERPL-MCNC: 1.61 MG/DL — HIGH (ref 0.5–1.3)
GAMMA INTERFERON BACKGROUND BLD IA-ACNC: 0.01 IU/ML — SIGNIFICANT CHANGE UP
GLUCOSE SERPL-MCNC: 96 MG/DL — SIGNIFICANT CHANGE UP (ref 70–99)
HCT VFR BLD CALC: 35.3 % — SIGNIFICANT CHANGE UP (ref 34.5–45)
HGB BLD-MCNC: 11.6 G/DL — SIGNIFICANT CHANGE UP (ref 11.5–15.5)
M TB IFN-G BLD-IMP: NEGATIVE — SIGNIFICANT CHANGE UP
M TB IFN-G CD4+ BCKGRND COR BLD-ACNC: 0 IU/ML — SIGNIFICANT CHANGE UP
M TB IFN-G CD4+CD8+ BCKGRND COR BLD-ACNC: 0 IU/ML — SIGNIFICANT CHANGE UP
MAGNESIUM SERPL-MCNC: 1.4 MG/DL — LOW (ref 1.6–2.6)
MCHC RBC-ENTMCNC: 31.9 PG — SIGNIFICANT CHANGE UP (ref 27–34)
MCHC RBC-ENTMCNC: 32.8 GM/DL — SIGNIFICANT CHANGE UP (ref 32–36)
MCV RBC AUTO: 97.5 FL — SIGNIFICANT CHANGE UP (ref 80–100)
NIGHT BLUE STAIN TISS: SIGNIFICANT CHANGE UP
PHOSPHATE SERPL-MCNC: 2.9 MG/DL — SIGNIFICANT CHANGE UP (ref 2.5–4.5)
PLATELET # BLD AUTO: 148 K/UL — LOW (ref 150–400)
POTASSIUM SERPL-MCNC: 4.3 MMOL/L — SIGNIFICANT CHANGE UP (ref 3.5–5.3)
POTASSIUM SERPL-SCNC: 4.3 MMOL/L — SIGNIFICANT CHANGE UP (ref 3.5–5.3)
QUANT TB PLUS MITOGEN MINUS NIL: 0.82 IU/ML — SIGNIFICANT CHANGE UP
RBC # BLD: 3.62 M/UL — LOW (ref 3.8–5.2)
RBC # FLD: 12.7 % — SIGNIFICANT CHANGE UP (ref 10.3–14.5)
SODIUM SERPL-SCNC: 139 MMOL/L — SIGNIFICANT CHANGE UP (ref 135–145)
SPECIMEN SOURCE: SIGNIFICANT CHANGE UP
WBC # BLD: 9.1 K/UL — SIGNIFICANT CHANGE UP (ref 3.8–10.5)
WBC # FLD AUTO: 9.1 K/UL — SIGNIFICANT CHANGE UP (ref 3.8–10.5)

## 2018-11-26 PROCEDURE — 51702 INSERT TEMP BLADDER CATH: CPT | Mod: XU

## 2018-11-26 PROCEDURE — 82330 ASSAY OF CALCIUM: CPT

## 2018-11-26 PROCEDURE — 96375 TX/PRO/DX INJ NEW DRUG ADDON: CPT | Mod: XU

## 2018-11-26 PROCEDURE — 97162 PT EVAL MOD COMPLEX 30 MIN: CPT

## 2018-11-26 PROCEDURE — 82803 BLOOD GASES ANY COMBINATION: CPT

## 2018-11-26 PROCEDURE — 85610 PROTHROMBIN TIME: CPT

## 2018-11-26 PROCEDURE — 96374 THER/PROPH/DIAG INJ IV PUSH: CPT | Mod: XU

## 2018-11-26 PROCEDURE — 99285 EMERGENCY DEPT VISIT HI MDM: CPT | Mod: 25

## 2018-11-26 PROCEDURE — 87015 SPECIMEN INFECT AGNT CONCNTJ: CPT

## 2018-11-26 PROCEDURE — 43753 TX GASTRO INTUB W/ASP: CPT

## 2018-11-26 PROCEDURE — 80048 BASIC METABOLIC PNL TOTAL CA: CPT

## 2018-11-26 PROCEDURE — 94770: CPT

## 2018-11-26 PROCEDURE — 71250 CT THORAX DX C-: CPT

## 2018-11-26 PROCEDURE — 82550 ASSAY OF CK (CPK): CPT

## 2018-11-26 PROCEDURE — 82570 ASSAY OF URINE CREATININE: CPT

## 2018-11-26 PROCEDURE — 93005 ELECTROCARDIOGRAM TRACING: CPT

## 2018-11-26 PROCEDURE — 82553 CREATINE MB FRACTION: CPT

## 2018-11-26 PROCEDURE — 85027 COMPLETE CBC AUTOMATED: CPT

## 2018-11-26 PROCEDURE — 84132 ASSAY OF SERUM POTASSIUM: CPT

## 2018-11-26 PROCEDURE — 96376 TX/PRO/DX INJ SAME DRUG ADON: CPT | Mod: XU

## 2018-11-26 PROCEDURE — 84156 ASSAY OF PROTEIN URINE: CPT

## 2018-11-26 PROCEDURE — 86901 BLOOD TYPING SEROLOGIC RH(D): CPT

## 2018-11-26 PROCEDURE — 87102 FUNGUS ISOLATION CULTURE: CPT

## 2018-11-26 PROCEDURE — 83690 ASSAY OF LIPASE: CPT

## 2018-11-26 PROCEDURE — 87206 SMEAR FLUORESCENT/ACID STAI: CPT

## 2018-11-26 PROCEDURE — 87086 URINE CULTURE/COLONY COUNT: CPT

## 2018-11-26 PROCEDURE — 80053 COMPREHEN METABOLIC PANEL: CPT

## 2018-11-26 PROCEDURE — 94002 VENT MGMT INPAT INIT DAY: CPT

## 2018-11-26 PROCEDURE — 83935 ASSAY OF URINE OSMOLALITY: CPT

## 2018-11-26 PROCEDURE — 94799 UNLISTED PULMONARY SVC/PX: CPT

## 2018-11-26 PROCEDURE — 94640 AIRWAY INHALATION TREATMENT: CPT

## 2018-11-26 PROCEDURE — 87116 MYCOBACTERIA CULTURE: CPT

## 2018-11-26 PROCEDURE — 74176 CT ABD & PELVIS W/O CONTRAST: CPT

## 2018-11-26 PROCEDURE — 80076 HEPATIC FUNCTION PANEL: CPT

## 2018-11-26 PROCEDURE — 84100 ASSAY OF PHOSPHORUS: CPT

## 2018-11-26 PROCEDURE — 85730 THROMBOPLASTIN TIME PARTIAL: CPT

## 2018-11-26 PROCEDURE — 87070 CULTURE OTHR SPECIMN AEROBIC: CPT

## 2018-11-26 PROCEDURE — 71045 X-RAY EXAM CHEST 1 VIEW: CPT

## 2018-11-26 PROCEDURE — 82435 ASSAY OF BLOOD CHLORIDE: CPT

## 2018-11-26 PROCEDURE — 85014 HEMATOCRIT: CPT

## 2018-11-26 PROCEDURE — 86900 BLOOD TYPING SEROLOGIC ABO: CPT

## 2018-11-26 PROCEDURE — 81001 URINALYSIS AUTO W/SCOPE: CPT

## 2018-11-26 PROCEDURE — 84300 ASSAY OF URINE SODIUM: CPT

## 2018-11-26 PROCEDURE — 74018 RADEX ABDOMEN 1 VIEW: CPT

## 2018-11-26 PROCEDURE — 94003 VENT MGMT INPAT SUBQ DAY: CPT

## 2018-11-26 PROCEDURE — 82947 ASSAY GLUCOSE BLOOD QUANT: CPT

## 2018-11-26 PROCEDURE — 86480 TB TEST CELL IMMUN MEASURE: CPT

## 2018-11-26 PROCEDURE — 84484 ASSAY OF TROPONIN QUANT: CPT

## 2018-11-26 PROCEDURE — 83735 ASSAY OF MAGNESIUM: CPT

## 2018-11-26 PROCEDURE — 84295 ASSAY OF SERUM SODIUM: CPT

## 2018-11-26 PROCEDURE — 83605 ASSAY OF LACTIC ACID: CPT

## 2018-11-26 PROCEDURE — 86850 RBC ANTIBODY SCREEN: CPT

## 2018-11-26 PROCEDURE — 84540 ASSAY OF URINE/UREA-N: CPT

## 2018-11-26 RX ORDER — MAGNESIUM SULFATE 500 MG/ML
1 VIAL (ML) INJECTION ONCE
Qty: 0 | Refills: 0 | Status: COMPLETED | OUTPATIENT
Start: 2018-11-26 | End: 2018-11-26

## 2018-11-26 RX ORDER — MAGNESIUM SULFATE 500 MG/ML
2 VIAL (ML) INJECTION ONCE
Qty: 0 | Refills: 0 | Status: COMPLETED | OUTPATIENT
Start: 2018-11-26 | End: 2018-11-26

## 2018-11-26 RX ORDER — CALCIUM GLUCONATE 100 MG/ML
1 VIAL (ML) INTRAVENOUS ONCE
Qty: 0 | Refills: 0 | Status: COMPLETED | OUTPATIENT
Start: 2018-11-26 | End: 2018-11-26

## 2018-11-26 RX ORDER — NALOXEGOL OXALATE 12.5 MG/1
1 TABLET, FILM COATED ORAL
Qty: 30 | Refills: 0
Start: 2018-11-26 | End: 2018-12-25

## 2018-11-26 RX ORDER — BUDESONIDE, MICRONIZED 100 %
0.5 POWDER (GRAM) MISCELLANEOUS
Qty: 120 | Refills: 0 | OUTPATIENT
Start: 2018-11-26 | End: 2018-12-25

## 2018-11-26 RX ORDER — ONDANSETRON 8 MG/1
4 TABLET, FILM COATED ORAL ONCE
Qty: 0 | Refills: 0 | Status: COMPLETED | OUTPATIENT
Start: 2018-11-26 | End: 2018-11-26

## 2018-11-26 RX ADMIN — Medication 1 GRAM(S): at 09:56

## 2018-11-26 RX ADMIN — OXYCODONE HYDROCHLORIDE 10 MILLIGRAM(S): 5 TABLET ORAL at 02:10

## 2018-11-26 RX ADMIN — Medication 1 PATCH: at 06:12

## 2018-11-26 RX ADMIN — OXYCODONE HYDROCHLORIDE 10 MILLIGRAM(S): 5 TABLET ORAL at 07:00

## 2018-11-26 RX ADMIN — OXYCODONE HYDROCHLORIDE 10 MILLIGRAM(S): 5 TABLET ORAL at 01:25

## 2018-11-26 RX ADMIN — Medication 3 MILLILITER(S): at 13:28

## 2018-11-26 RX ADMIN — OXYCODONE HYDROCHLORIDE 10 MILLIGRAM(S): 5 TABLET ORAL at 06:10

## 2018-11-26 RX ADMIN — OXYCODONE HYDROCHLORIDE 10 MILLIGRAM(S): 5 TABLET ORAL at 10:01

## 2018-11-26 RX ADMIN — Medication 50 GRAM(S): at 09:57

## 2018-11-26 RX ADMIN — OXYCODONE HYDROCHLORIDE 10 MILLIGRAM(S): 5 TABLET ORAL at 13:27

## 2018-11-26 RX ADMIN — ONDANSETRON 4 MILLIGRAM(S): 8 TABLET, FILM COATED ORAL at 02:45

## 2018-11-26 NOTE — PROGRESS NOTE ADULT - ATTENDING COMMENTS
I have seen and evaluated patient and agree with resident assessment and plan.  Can DC home from GI/surgery point of view

## 2018-11-26 NOTE — DISCHARGE NOTE ADULT - PLAN OF CARE
To return to daily living activity Please begin taking naloxegol (movantik) daily as prescribed. Do not take any form of stool-softeners (miralax, senna, colace) for the first 3 days while starting this medication. You may reintroduce these meds after 3 days of being on naloxegol.   Please follow-up with Dr. Livingston in 1-2 weeks. Please call [phone] to schedule an appointment. You were found to have a cavitary lung lesion while in the hospital.  Please follow-up with Dr. Escalona as an outpatient for workup regarding this lesion. Please call [phone] to schedule an appointment. Please followup with cardiology to have an echocardiogram done of your heart. This will look at the function and strength of your heart. Please follow-up with Dr. Dennison. Please call [phone] to schedule an appointment.

## 2018-11-26 NOTE — PROGRESS NOTE ADULT - SUBJECTIVE AND OBJECTIVE BOX
[facility]   MD VICENTA LEONARD MD RUORU WONG, PA    TEL:  OFFICE: [phone]  DR MAYER CELL: [phone]  DONNA WORKMAN CELL: [phone]  DR. PARTIDA CELL: [phone]    RENAL FOLLOW UP NOTE  --------------------------------------------------------------------------------  HPI:      Pt seen and examined at bedside.   Brenda MENDEZ, chest pain     PAST HISTORY  --------------------------------------------------------------------------------  No significant changes to PMH, PSH, FHx, SHx, unless otherwise noted    ALLERGIES & MEDICATIONS  --------------------------------------------------------------------------------  Allergies    Biaxin (Rash)  Cipro (Headache)  Flagyl (Headache)  penicillin (Rash; Swelling)  sulfa drugs (Unknown)    Intolerances      Standing Inpatient Medications  ALBUTerol/ipratropium for Nebulization 3 milliLiter(s) Nebulizer every 6 hours  buDESOnide   0.5 milliGRAM(s) Respule 0.5 milliGRAM(s) Inhalation every 12 hours  calcium gluconate IVPB 1 Gram(s) IV Intermittent once  chlorhexidine 4% Liquid 1 Application(s) Topical <User Schedule>  enoxaparin Injectable 30 milliGRAM(s) SubCutaneous every 24 hours  influenza   Vaccine 0.5 milliLiter(s) IntraMuscular once  lidocaine   Patch 1 Patch Transdermal daily  magnesium sulfate  IVPB 1 Gram(s) IV Intermittent once  multivitamin/minerals 1 Tablet(s) Oral daily  naloxegol 25 milliGRAM(s) Oral daily  nicotine - 21 mG/24Hr(s) Patch 1 patch Transdermal daily  sucralfate 1 Gram(s) Oral four times a day    PRN Inpatient Medications  diazepam    Tablet 5 milliGRAM(s) Oral at bedtime PRN  oxyCODONE    IR 10 milliGRAM(s) Oral every 4 hours PRN  simethicone 80 milliGRAM(s) Chew every 8 hours PRN      REVIEW OF SYSTEMS  --------------------------------------------------------------------------------  General: no fever  CVS: no chest pain  RESP: no sob, no cough  ABD: no abdominal pain  : no dysuria,  MSK: no edema     VITALS/PHYSICAL EXAM  --------------------------------------------------------------------------------  T(C): 36.9 (11-26-18 @ 05:54), Max: 37.3 (11-25-18 @ 21:19)  HR: 94 (11-26-18 @ 05:54) (84 - 98)  BP: 140/78 (11-26-18 @ 05:54) (138/82 - 161/85)  RR: 18 (11-26-18 @ 05:54) (18 - 20)  SpO2: 93% (11-26-18 @ 05:54) (93% - 95%)  Wt(kg): --        11-25-18 @ 07:01  -  11-26-18 @ 07:00  --------------------------------------------------------  IN: 960 mL / OUT: 1200 mL / NET: -240 mL      Physical Exam:  	Gen: NAD  	HEENT: MMM  	Pulm: CTA B/L  	CV: S1S2  	Abd: Soft, +BS  	Ext: No LE edema B/L                      Neuro: Awake   	Skin: Warm and Dry     LABS/STUDIES  --------------------------------------------------------------------------------              11.6   9.1   >-----------<  148      [11-26-18 @ 07:16]              35.3     139  |  102  |  27  ----------------------------<  96      [11-26-18 @ 07:16]  4.3   |  27  |  1.61        Ca     8.1     [11-26-18 @ 07:16]      iCa    1.08     [11-25 @ 06:53]      Mg     1.4     [11-26-18 @ 07:16]      Phos  2.9     [11-26-18 @ 07:16]            Creatinine Trend:  SCr 1.61 [11-26 @ 07:16]  SCr 1.60 [11-25 @ 06:45]  SCr 1.77 [11-24 @ 03:37]  SCr 2.75 [11-23 @ 04:57]  SCr 3.66 [11-22 @ 15:19]    Urinalysis - [11-20-18 @ 18:08]      Color Yellow / Appearance Slightly Turbid / SG 1.020 / pH 5.5      Gluc Negative / Ketone Negative  / Bili Small / Urobili Negative       Blood Small / Protein 30 mg/dL / Leuk Est Moderate / Nitrite Negative      RBC 18 / WBC 12 / Hyaline 20 / Gran  / Sq Epi  / Non Sq Epi 1 / Bacteria Negative    Urine Creatinine 137      [11-20-18 @ 18:08]  Urine Protein 46      [11-20-18 @ 23:05]  Urine Sodium 31      [11-20-18 @ 18:08]  Urine Urea Nitrogen 331      [11-20-18 @ 23:05]  Urine Osmolality 363      [11-20-18 @ 18:08]

## 2018-11-26 NOTE — DISCHARGE NOTE ADULT - HOSPITAL COURSE
Ms. Boswell came into the ED with abdominal pain. Also noted vomiting x12 hours - spitting up white saliva, no drooling. PMD sent pt into the ED because she was dehydrated. She stated that she moves bowels more regularly - last regular BM reported the day prior. Her creatinine was noted to be 7.66. CT scam showed small bowel obstruction with transition point in the ileum. An NG tube was placed in the ED and she was admitted to the surgical service and placed in the SICU for observation.    Patient was resuscitated in the SICU. Kidney function improved with IV hydration. On imaging the patient was noted to have a new cavitary lung lesion. She was seen by pulmonology and heme/onc. She was worked up for TB with acid fast sputum samples and a quantiferon gold test. Quantiferon gold was pending at the time of discharge but she had 3 negative sputum samples. She will need to follow-up for likely biopsy of this lesion.     With the use of narcan, her bowel function resumed. With resumption of GI function, her diet was advanced and she tolerated it well.     On the day of discharge, she was in stable condition. She was having bowel function and tolerating regular diet. Her pain was controlled.

## 2018-11-26 NOTE — PROGRESS NOTE ADULT - PROBLEM SELECTOR PLAN 1
PT wtih SHAKIRA Likely Pre-renal, in setting of GI losses  PT may have persistent Pre-renal state leading to ATN   FENa >1  CT abd has no hydronephrosis  Scr 1.1 in 11/2016  COntinue IVF ( NS at 125cc/hr)  Renal function mild improvement   MOnitor BMP   Monitor I/O  Avoid Nephrotoxic meds.
PT wtih SHAKIRA Likely Pre-renal, in setting of GI losses  persistent Pre-renal state lead to ATN   FENa >1  CT abd has no hydronephrosis  Scr 1.1 in 11/2016  IVF D51/2NS -75cc/hr  Renal function slowly improving    MOnitor BMP   Monitor I/O  Avoid Nephrotoxic meds.
PT wtih SHAKIRA Likely Pre-renal, in setting of GI losses  persistent Pre-renal state lead to ATN   FENa >1  CT abd has no hydronephrosis  Scr 1.1 in 11/2016  eating and drinking well. off IVF  Renal function slowly improving    MOnitor BMP   Monitor I/O  Avoid Nephrotoxic meds.
PT wtih SHAKIRA Likely Pre-renal, in setting of GI losses  persistent Pre-renal state lead to ATN   FENa >1  CT abd has no hydronephrosis  Scr 1.1 in 11/2016  eating and drinking well. off IVF  Renal function slowly improving    MOnitor BMP   Monitor I/O  Avoid Nephrotoxic meds.
PT wtih SHAKIRA Likely Pre-renal, in setting of GI losses  persistent Pre-renal state lead to ATN   FENa >1  CT abd has no hydronephrosis  Scr 1.1 in 11/2016  eating and drinking well. off IVF  Renal function slowly improving    MOnitor BMP   Monitor I/O  Avoid Nephrotoxic meds.    Replete calcium gluconate and Mg sul today
PT wtih SHAKIRA Likely Pre-renal, in setting of GI losses  PT may have persistent Pre-renal state leading to ATN   FENa >1  CT abd has no hydronephrosis  Scr 1.1 in 11/2016  Consider changing  IVF to 1/2NS with 75 meQ of bicarb at 125cc/hr in view of worsening acidosis.   Renal function improving with IVF   MOnitor BMP   Monitor I/O  Avoid Nephrotoxic meds.

## 2018-11-26 NOTE — PROGRESS NOTE ADULT - PROBLEM SELECTOR PLAN 2
Better  MOnitor serum CO2
Likely sec to renal failure  Serum PO4 improving   Monitor PO4 level at present.
Better  MOnitor serum CO2
IVf as above  MOnitor serum CO2

## 2018-11-26 NOTE — DISCHARGE NOTE ADULT - CARE PROVIDER_API CALL
Garrett Livingston), ColonRectal Surgery; Surgery  [address]  [address]  [place] [address]  Phone: [phone]  Fax: [phone]    Jonathan Dennison (), Cardiology Medicine  [address] [place] [address]  [address]  [place] [address]  Phone: [phone]  Fax: [phone]    Tigist Escalona), Critical Care Medicine; Pulmonary Disease  [address] [facility]  [address]  [place] [address]  Phone: [phone]  Fax: [phone]

## 2018-11-26 NOTE — PROGRESS NOTE ADULT - ASSESSMENT
73F with complicated surgical history and history of multiple episodes of small bowel obstruction presented with SBO demonstrated on CT scan. s/p extubation. Cr continues to downtrend, now 1.77    - Regular diet  - Monitor GI function  - pain control  - Monitor vitals  - Monitor UOP  - dispo planning     [facility]  [phone]

## 2018-11-26 NOTE — PROGRESS NOTE ADULT - SUBJECTIVE AND OBJECTIVE BOX
no cp/sob    REVIEW OF SYSTEMS:  GEN: no fever,    no chills  RESP: no SOB,   no cough  CVS: no chest pain,   no palpitations  GI: no abdominal pain,   no nausea,   no vomiting,   no constipation,   no diarrhea  : no dysuria,   no frequency  NEURO: no headache,   no dizziness  PSYCH: no depression,   not anxious  Derm : no rash    MEDICATIONS  (STANDING):  ALBUTerol/ipratropium for Nebulization 3 milliLiter(s) Nebulizer every 6 hours  buDESOnide   0.5 milliGRAM(s) Respule 0.5 milliGRAM(s) Inhalation every 12 hours  chlorhexidine 4% Liquid 1 Application(s) Topical <User Schedule>  enoxaparin Injectable 30 milliGRAM(s) SubCutaneous every 24 hours  influenza   Vaccine 0.5 milliLiter(s) IntraMuscular once  lidocaine   Patch 1 Patch Transdermal daily  multivitamin/minerals 1 Tablet(s) Oral daily  naloxegol 25 milliGRAM(s) Oral daily  nicotine - 21 mG/24Hr(s) Patch 1 patch Transdermal daily  sucralfate 1 Gram(s) Oral four times a day    MEDICATIONS  (PRN):  diazepam    Tablet 5 milliGRAM(s) Oral at bedtime PRN insomnia  oxyCODONE    IR 10 milliGRAM(s) Oral every 4 hours PRN Severe Pain (7 - 10)  simethicone 80 milliGRAM(s) Chew every 8 hours PRN Indigestion      Vital Signs Last 24 Hrs  T(C): 36.9 (26 Nov 2018 05:54), Max: 37.3 (25 Nov 2018 21:19)  T(F): 98.4 (26 Nov 2018 05:54), Max: 99.2 (25 Nov 2018 21:19)  HR: 94 (26 Nov 2018 05:54) (84 - 98)  BP: 140/78 (26 Nov 2018 05:54) (138/82 - 161/85)  BP(mean): --  RR: 18 (26 Nov 2018 05:54) (18 - 20)  SpO2: 93% (26 Nov 2018 05:54) (93% - 95%)  CAPILLARY BLOOD GLUCOSE        I&O's Summary    25 Nov 2018 07:01  -  26 Nov 2018 07:00  --------------------------------------------------------  IN: 960 mL / OUT: 1200 mL / NET: -240 mL        PHYSICAL EXAM:  HEAD:  Atraumatic, Normocephalic  NECK: Supple, No   JVD  CHEST/LUNG:   no     rales,     no,    rhonchi  HEART: Regular rate and rhythm;         murmur  ABDOMEN: Soft, Nontender, ;   EXTREMITIES:    no    edema  NEUROLOGY:  alert    LABS:                        11.6   9.1   )-----------( 148      ( 26 Nov 2018 07:16 )             35.3     11-25    141  |  101  |  30<H>  ----------------------------<  81  4.8   |  28  |  1.60<H>    Ca    8.3<L>      25 Nov 2018 06:45  Phos  2.1     11-25  Mg     1.9     11-25                              Consultant(s) Notes Reviewed:      Care Discussed with Consultants/Other Providers:

## 2018-11-26 NOTE — PROGRESS NOTE ADULT - PROBLEM SELECTOR PROBLEM 1
SHAKIRA (acute kidney injury)

## 2018-11-26 NOTE — DISCHARGE NOTE ADULT - MEDICATION SUMMARY - MEDICATIONS TO TAKE
I will START or STAY ON the medications listed below when I get home from the hospital:    budesonide 0.5 mg/2 mL inhalation suspension  -- 0.5 milligram(s) inhaled 2 times a day   -- Indication: For Breathing    oxyCODONE 10 mg oral tablet  -- 1 tab(s) by mouth every 6 hours  -- Indication: For Home med    Valium  -- 2.5 milligram(s) by mouth once a day (at bedtime), As Needed  -- Indication: For Home med    lidocaine 5% topical film  -- Apply on skin to affected area once a day  -- Indication: For Home med    naloxegol 25 mg oral tablet  -- 1 tab(s) by mouth once a day  -- Indication: For Constipation/Bowel obstructions    MiraLax - oral powder for reconstitution  -- 17 gram(s) by mouth 2 times a day  -- Indication: For Home med    senna oral tablet  -- 2 tab(s) by mouth once a day (at bedtime)  -- Indication: For Home med    sucralfate 1 g oral tablet  -- 1 tab(s) by mouth 4 times a day  -- Indication: For Home med    simethicone 80 mg oral tablet, chewable  -- 1 tab(s) by mouth 3 times a day  -- Indication: For Home med    pantoprazole 40 mg oral delayed release tablet  -- tab(s) by mouth 2 times a day  -- It is very important that you take or use this exactly as directed.  Do not skip doses or discontinue unless directed by your doctor.  Obtain medical advice before taking any non-prescription drugs as some may affect the action of this medication.  Swallow whole.  Do not crush.    -- Indication: For Home med    nicotine 14 mg/24 hr transdermal film, extended release  -- 1 patch by transdermal patch once a day  -- Indication: For Home med    Centrum Antioxidant Multiple Vitamins and Minerals oral tablet  -- 1 tab(s) by mouth once a day  -- Indication: For Home med

## 2018-11-26 NOTE — PROGRESS NOTE ADULT - ASSESSMENT
73 year old female with   PMH chronic Back pain (on Narcotics), history of multiple SBO's, narcotic associated constipation/ileus, COPD, current everyday smoker,  ca  breast,  right  mastectomy   admitted with abdominal pain      c/c  cachexia  and , with acute Renal Failure (SHAKIRA) with marked hyperphosphatemia, acidosis  ct  scans  noted/  RENUKA  cavitary lesion./ ? pna,  SBO, transition point  in ileum  QuantiFeron,  pending/  pending bronch  axr , ileus  need  to ro malignancy  seen by  oncology         < from: CT Chest No Cont (11.20.18 @ 19:17) >  IMPRESSION: Large cavitary lesion in the left upper lobe. Primary   consideration is lung carcinoma. Tuberculosis or a lung abscess is not   excluded.  Mucous plugging in the right lower lobe with groundglass opacities which   may be due to infection or atelectasis.  < end of copied text >     < from: CT Abdomen and Pelvis No Cont (11.20.18 @ 17:07) >  IMPRESSION: Small bowel obstruction with transition point in the ileum   located in theright lower quadrant.  Groundglass opacities in the right lower lobe suggest pneumonia.  Findings discussed with Dr. Diaz on 11/20/2018 at 5:30 PM with read   back.  < end of copied text >

## 2018-11-26 NOTE — DISCHARGE NOTE ADULT - INSTRUCTIONS
If  unable to tolerate diet, nausea, vomiting, fever above 100.3, chills, or an increase in pain, notify provider or return to ER.

## 2018-11-26 NOTE — DISCHARGE NOTE ADULT - ADDITIONAL INSTRUCTIONS
Please follow-up with Dr. Livingston in his office in 1-2 weeks. Please call [phone] to schedule an appointment.  Please follow-up with your cardiologist, Dr. Dennison in 1 week. Please call [phone] to schedule an appointment.  Please follow-up with your pulmonologist, Dr. Escalona in 1 week. Please call [phone] to schedule an appointment.  Please followup with your Primary Care Physician within one to two weeks to update your medical records regarding this hospitalization and to review all your current medications and dosages.  Call their office for appointment.

## 2018-11-26 NOTE — DISCHARGE NOTE ADULT - CARE PROVIDERS DIRECT ADDRESSES
[email or web address]

## 2018-11-26 NOTE — DISCHARGE NOTE ADULT - PATIENT PORTAL LINK FT
You can access the [email or web address] Patient Portal, offered by [facility], by registering with the following website: [email or web address]

## 2018-11-26 NOTE — DISCHARGE NOTE ADULT - CONDITIONS AT DISCHARGE
Patient A&ox4. Patient VSS. Patient safety maintained. Patient Iv removed with no signs/symptoms of redness/swelling.

## 2018-11-26 NOTE — PROGRESS NOTE ADULT - SUBJECTIVE AND OBJECTIVE BOX
CARDIOLOGY     PROGRESS  NOTE   ________________________________________________    CHIEF COMPLAINT:Patient is a 73y old  Female who presents with a chief complaint of pos troponin (24 Nov 2018 08:54)  doing well.  	  REVIEW OF SYSTEMS:  CONSTITUTIONAL: No fever, weight loss, or fatigue  EYES: No eye pain, visual disturbances, or discharge  ENT:  No difficulty hearing, tinnitus, vertigo; No sinus or throat pain  NECK: No pain or stiffness  RESPIRATORY: No cough, wheezing, chills or hemoptysis; No Shortness of Breath  CARDIOVASCULAR: No chest pain, palpitations, passing out, dizziness, or leg swelling  GASTROINTESTINAL: No abdominal or epigastric pain. No nausea, vomiting, or hematemesis; No diarrhea or constipation. No melena or hematochezia.  GENITOURINARY: No dysuria, frequency, hematuria, or incontinence  NEUROLOGICAL: No headaches, memory loss, loss of strength, numbness, or tremors  SKIN: No itching, burning, rashes, or lesions   LYMPH Nodes: No enlarged glands  ENDOCRINE: No heat or cold intolerance; No hair loss  MUSCULOSKELETAL: No joint pain or swelling; No muscle, back, or extremity pain  PSYCHIATRIC: No depression, anxiety, mood swings, or difficulty sleeping  HEME/LYMPH: No easy bruising, or bleeding gums  ALLERGY AND IMMUNOLOGIC: No hives or eczema	    [ ] All others negative	  [ ] Unable to obtain    PHYSICAL EXAM:  T(C): 36.9 (11-26-18 @ 05:54), Max: 37.3 (11-25-18 @ 21:19)  HR: 94 (11-26-18 @ 05:54) (84 - 98)  BP: 140/78 (11-26-18 @ 05:54) (138/82 - 161/85)  RR: 18 (11-26-18 @ 05:54) (18 - 20)  SpO2: 93% (11-26-18 @ 05:54) (93% - 95%)  Wt(kg): --  I&O's Summary    25 Nov 2018 07:01  -  26 Nov 2018 07:00  --------------------------------------------------------  IN: 960 mL / OUT: 1200 mL / NET: -240 mL        Appearance: Normal	  HEENT:   Normal oral mucosa, PERRL, EOMI	  Lymphatic: No lymphadenopathy  Cardiovascular: Normal S1 S2, No JVD, + murmurs, No edema  Respiratory: Lungs clear to auscultation	  Psychiatry: A & O x 3, Mood & affect appropriate  Gastrointestinal:  Soft, Non-tender, + BS	  Skin: No rashes, No ecchymoses, No cyanosis	  Neurologic: Non-focal  Extremities: Normal range of motion, No clubbing, cyanosis or edema  Vascular: Peripheral pulses palpable 2+ bilaterally    MEDICATIONS  (STANDING):  ALBUTerol/ipratropium for Nebulization 3 milliLiter(s) Nebulizer every 6 hours  buDESOnide   0.5 milliGRAM(s) Respule 0.5 milliGRAM(s) Inhalation every 12 hours  chlorhexidine 4% Liquid 1 Application(s) Topical <User Schedule>  enoxaparin Injectable 30 milliGRAM(s) SubCutaneous every 24 hours  influenza   Vaccine 0.5 milliLiter(s) IntraMuscular once  lidocaine   Patch 1 Patch Transdermal daily  magnesium sulfate  IVPB 2 Gram(s) IV Intermittent once  multivitamin/minerals 1 Tablet(s) Oral daily  naloxegol 25 milliGRAM(s) Oral daily  nicotine - 21 mG/24Hr(s) Patch 1 patch Transdermal daily  sucralfate 1 Gram(s) Oral four times a day      TELEMETRY: 	    ECG:  	  RADIOLOGY:  OTHER: 	  	  LABS:	 	    CARDIAC MARKERS:                                11.6   9.1   )-----------( 148      ( 26 Nov 2018 07:16 )             35.3     11-26    139  |  102  |  27<H>  ----------------------------<  96  4.3   |  27  |  1.61<H>    Ca    8.1<L>      26 Nov 2018 07:16  Phos  2.9     11-26  Mg     1.4     11-26      proBNP:   Lipid Profile:   HgA1c:   TSH:         Assessment and plan  ---------------------------  continue cardiac meds  will need bp meds  f/u renal function  dvt prophylaxis

## 2018-11-26 NOTE — PROGRESS NOTE ADULT - ASSESSMENT
73 year old female with PMH chronic Back pain , history of multiple SBO's, narcotic associated constipation/ileus, COPD, admitted with abdominal pain and distension, nausea and poor PO intake with associated worsening painful spasms.   CT with SBO - resolved  SHAKIRA - improving  Cavitary lung lesion ?malignancy ?TB (AFB negative x1)  Chronic narcotic use    Recommendations   PPI  PO diet as tolerated  Continue Movantik  Pulmonary work-up in progress, hem/Onc following      Jake Khan PA-C    [facility]  [phone]

## 2018-11-26 NOTE — PROGRESS NOTE ADULT - PROVIDER SPECIALTY LIST ADULT
Cardiology
Gastroenterology
Heme/Onc
Heme/Onc
Internal Medicine
Nephrology
Pulmonology
SICU
Surgery
Trauma Surgery
Internal Medicine
Nephrology
Surgery
Nephrology

## 2018-11-26 NOTE — PROGRESS NOTE ADULT - PROBLEM SELECTOR PROBLEM 3
Bowel obstruction
Hyperphosphatemia

## 2018-11-26 NOTE — PROGRESS NOTE ADULT - SUBJECTIVE AND OBJECTIVE BOX
GENERAL SURGERY DAILY PROGRESS NOTE:     Subjective:    Patient was seen and examined this morning during morning rounds. tolerating regular diet. Passing flatus and bowel movements. Pain well controlled.    Objective:    NAD, awake and alert  Respirations nonlabored  Abdomen soft, nontender, nondistended  No guarding or rebound tenderness       Vital Signs Last 24 Hrs  T(C): 36.9 (26 Nov 2018 01:14), Max: 37.3 (25 Nov 2018 21:19)  T(F): 98.4 (26 Nov 2018 01:14), Max: 99.2 (25 Nov 2018 21:19)  HR: 98 (26 Nov 2018 01:14) (84 - 98)  BP: 143/88 (26 Nov 2018 01:14) (137/72 - 161/85)  BP(mean): --  RR: 20 (26 Nov 2018 01:14) (16 - 20)  SpO2: 95% (26 Nov 2018 01:14) (94% - 95%)    I&O's Detail    24 Nov 2018 07:01  -  25 Nov 2018 07:00  --------------------------------------------------------  IN:    dextrose 5% + sodium chloride 0.45%.: 75 mL    IV PiggyBack: 90 mL    Oral Fluid: 800 mL  Total IN: 965 mL    OUT:    Voided: 650 mL  Total OUT: 650 mL    Total NET: 315 mL      25 Nov 2018 07:01  -  26 Nov 2018 05:10  --------------------------------------------------------  IN:    Oral Fluid: 780 mL  Total IN: 780 mL    OUT:    Voided: 900 mL  Total OUT: 900 mL    Total NET: -120 mL          MEDICATIONS  (STANDING):  ALBUTerol/ipratropium for Nebulization 3 milliLiter(s) Nebulizer every 6 hours  buDESOnide   0.5 milliGRAM(s) Respule 0.5 milliGRAM(s) Inhalation every 12 hours  chlorhexidine 4% Liquid 1 Application(s) Topical <User Schedule>  enoxaparin Injectable 30 milliGRAM(s) SubCutaneous every 24 hours  influenza   Vaccine 0.5 milliLiter(s) IntraMuscular once  lidocaine   Patch 1 Patch Transdermal daily  multivitamin/minerals 1 Tablet(s) Oral daily  naloxegol 25 milliGRAM(s) Oral daily  nicotine - 21 mG/24Hr(s) Patch 1 patch Transdermal daily  sucralfate 1 Gram(s) Oral four times a day    MEDICATIONS  (PRN):  diazepam    Tablet 5 milliGRAM(s) Oral at bedtime PRN insomnia  oxyCODONE    IR 10 milliGRAM(s) Oral every 4 hours PRN Severe Pain (7 - 10)  simethicone 80 milliGRAM(s) Chew every 8 hours PRN Indigestion      LABS:                        12.2   9.7   )-----------( 163      ( 25 Nov 2018 06:46 )             37.0     11-25    141  |  101  |  30<H>  ----------------------------<  81  4.8   |  28  |  1.60<H>    Ca    8.3<L>      25 Nov 2018 06:45  Phos  2.1     11-25  Mg     1.9     11-25

## 2018-11-26 NOTE — PROGRESS NOTE ADULT - ASSESSMENT
74 y/o F pt admitted with abdominal pain, has known chronic back pain for which she is on pain meds, narcotics.  She has multiple prior admissions for SBO, narcotic associated ileus/constipation, but has not required hospitalization for this in several years.  Was managed  for SBO, dehydration, SHAKIRA, fluid electrolyte disorder. Now has diarrhea being followed by GI  Known COPD - current everyday smoker  She has Large cavitary lesion in the left upper lobe. Primary   consideration is lung carcinoma. Tuberculosis or a lung abscess is not   excluded. Pulmonary will do bronch if needed. Presently TB being ruled out,  She has remote history of breast cancer. H/O B12 def, gets supplemented etc  She gave verbal permission to us to speak to her cousin about her medical condition

## 2018-11-26 NOTE — PROGRESS NOTE ADULT - PROBLEM SELECTOR PLAN 3
Likely sec to renal failure  Serum PO4 improving   Monitor PO4 level at present.
Likely sec to renal failure  Improved  Monitor PO4 level at present.
f/u Surgery
Likely sec to renal failure  Improved  Monitor PO4 level at present.

## 2018-11-26 NOTE — DISCHARGE NOTE ADULT - CARE PLAN
Principal Discharge DX:	Bowel obstruction  Goal:	To return to daily living activity  Assessment and plan of treatment:	Please begin taking naloxegol (movantik) daily as prescribed. Do not take any form of stool-softeners (miralax, senna, colace) for the first 3 days while starting this medication. You may reintroduce these meds after 3 days of being on naloxegol.   Please follow-up with Dr. Livingston in 1-2 weeks. Please call [phone] to schedule an appointment.  Secondary Diagnosis:	Cavitary lesion of lung  Assessment and plan of treatment:	You were found to have a cavitary lung lesion while in the hospital.  Please follow-up with Dr. Escalona as an outpatient for workup regarding this lesion. Please call [phone] to schedule an appointment.  Secondary Diagnosis:	Heart murmur  Assessment and plan of treatment:	Please followup with cardiology to have an echocardiogram done of your heart. This will look at the function and strength of your heart. Please follow-up with Dr. Dennison. Please call [phone] to schedule an appointment.

## 2018-11-26 NOTE — PROGRESS NOTE ADULT - SUBJECTIVE AND OBJECTIVE BOX
HPI:74 y/o F pt admitted with abdominal pain, has known chronic back pain for which she is on pain meds, narcotics.  She has multiple prior admissions for SBO, narcotic associated ileus/constipation, but has not required hospitalization for this in several years.  Was managed in SICU for SBO, dehydration, fluid electrolyte disorder and is now on floor  PMH includes as stated breast cancer, hemangiopericytoma and liver surgery, pernicious anemia         PAST MEDICAL & SURGICAL HISTORY:  Back pain  Bowel obstruction: multiple hospitalizations  Kidney stone  Emphysema  Narcotic Dependence  S/P Radiation Therapy  S/P Chemotherapy, Time Since Greater than 12 Weeks  Narcotic Dysmotile Cilia Syndrome  Chronic Pain Following Surgery or Procedure: following right breast mastectomy  Ankle Fracture: right anle fx s/p cast 2009  History of Small Bowel Obstruction: 1/2010  Asthma  Breast Cancer  S/P hernia surgery: femoral hernia - 2012  S/P Exploratory Laparotomy  S/P Appendectomy  S/P Cholecystectomy  Liver Mass s/p liver rsxn: s/p resection 2009  History of Hysterectomy: 1998  S/P Right Mastectomy: 2006    Medications:  ALBUTerol/ipratropium for Nebulization 3 milliLiter(s) Nebulizer every 6 hours  buDESOnide   0.5 milliGRAM(s) Respule 0.5 milliGRAM(s) Inhalation every 12 hours  calcium gluconate IVPB 1 Gram(s) IV Intermittent once  chlorhexidine 4% Liquid 1 Application(s) Topical <User Schedule>  diazepam    Tablet 5 milliGRAM(s) Oral at bedtime PRN insomnia  enoxaparin Injectable 30 milliGRAM(s) SubCutaneous every 24 hours  influenza   Vaccine 0.5 milliLiter(s) IntraMuscular once  lidocaine   Patch 1 Patch Transdermal daily  magnesium sulfate  IVPB 1 Gram(s) IV Intermittent once  multivitamin/minerals 1 Tablet(s) Oral daily  naloxegol 25 milliGRAM(s) Oral daily  nicotine - 21 mG/24Hr(s) Patch 1 patch Transdermal daily  oxyCODONE    IR 10 milliGRAM(s) Oral every 4 hours PRN Severe Pain (7 - 10)  simethicone 80 milliGRAM(s) Chew every 8 hours PRN Indigestion  sucralfate 1 Gram(s) Oral four times a day    Labs:  CBC Full  -  ( 26 Nov 2018 07:16 )  WBC Count : 9.1 K/uL  Hemoglobin : 11.6 g/dL  Hematocrit : 35.3 %  Platelet Count - Automated : 148 K/uL  Mean Cell Volume : 97.5 fl  Mean Cell Hemoglobin : 31.9 pg  Mean Cell Hemoglobin Concentration : 32.8 gm/dL  Auto Neutrophil # : x  Auto Lymphocyte # : x  Auto Monocyte # : x  Auto Eosinophil # : x  Auto Basophil # : x  Auto Neutrophil % : x  Auto Lymphocyte % : x  Auto Monocyte % : x  Auto Eosinophil % : x  Auto Basophil % : x    11-26    139  |  102  |  27<H>  ----------------------------<  96  4.3   |  27  |  1.61<H>    Ca    8.1<L>      26 Nov 2018 07:16  Phos  2.9     11-26  Mg     1.4     11-26        Radiology:             ROS:  Patient comfortable without distress  No SOB or chest pain  No palpitation  No abdominal pain, but has diarrhea now  No weakness of extremities  No skin changes or swelling of legs    Vital Signs Last 24 Hrs  T(C): 36.9 (26 Nov 2018 10:30), Max: 37.3 (25 Nov 2018 21:19)  T(F): 98.4 (26 Nov 2018 10:30), Max: 99.2 (25 Nov 2018 21:19)  HR: 91 (26 Nov 2018 10:30) (84 - 98)  BP: 141/70 (26 Nov 2018 10:30) (138/82 - 161/85)  BP(mean): --  RR: 16 (26 Nov 2018 10:30) (16 - 20)  SpO2: 94% (26 Nov 2018 10:30) (93% - 95%)    Physical exam:  Patient alert and oriented  No distress, frail looking  CVS: S1, S2 regular or murmur  Chest: bilateral breath sound without rales, left mastectomy with left arm slightly chronically swollen  Abdomen: soft, not tender, no organomegaly or masses  No focal neuro deficit        Assessment and Plan:

## 2018-11-27 LAB
NIGHT BLUE STAIN TISS: SIGNIFICANT CHANGE UP
SPECIMEN SOURCE: SIGNIFICANT CHANGE UP

## 2018-11-30 RX ORDER — POLYETHYLENE GLYCOL 3350 17 G/17G
17 POWDER, FOR SOLUTION ORAL
Qty: 0 | Refills: 0 | COMMUNITY
Start: 2018-11-30

## 2018-11-30 RX ORDER — SENNA PLUS 8.6 MG/1
2 TABLET ORAL
Qty: 0 | Refills: 0 | COMMUNITY
Start: 2018-11-30

## 2018-12-18 ENCOUNTER — OUTPATIENT (OUTPATIENT)
Dept: OUTPATIENT SERVICES | Facility: HOSPITAL | Age: 73
LOS: 1 days | End: 2018-12-18
Payer: MEDICARE

## 2018-12-18 ENCOUNTER — APPOINTMENT (OUTPATIENT)
Dept: NUCLEAR MEDICINE | Facility: IMAGING CENTER | Age: 73
End: 2018-12-18
Payer: MEDICARE

## 2018-12-18 DIAGNOSIS — Z00.8 ENCOUNTER FOR OTHER GENERAL EXAMINATION: ICD-10-CM

## 2018-12-18 PROCEDURE — A9552: CPT

## 2018-12-18 PROCEDURE — 78815 PET IMAGE W/CT SKULL-THIGH: CPT | Mod: 26,PI

## 2018-12-18 PROCEDURE — 78815 PET IMAGE W/CT SKULL-THIGH: CPT

## 2018-12-20 ENCOUNTER — APPOINTMENT (OUTPATIENT)
Dept: PULMONOLOGY | Facility: CLINIC | Age: 73
End: 2018-12-20
Payer: MEDICARE

## 2018-12-20 VITALS
DIASTOLIC BLOOD PRESSURE: 82 MMHG | BODY MASS INDEX: 17.3 KG/M2 | HEART RATE: 89 BPM | TEMPERATURE: 99 F | SYSTOLIC BLOOD PRESSURE: 144 MMHG | HEIGHT: 62 IN | OXYGEN SATURATION: 92 % | WEIGHT: 94 LBS | RESPIRATION RATE: 16 BRPM

## 2018-12-20 DIAGNOSIS — J31.0 CHRONIC RHINITIS: ICD-10-CM

## 2018-12-20 DIAGNOSIS — J44.9 CHRONIC OBSTRUCTIVE PULMONARY DISEASE, UNSPECIFIED: ICD-10-CM

## 2018-12-20 DIAGNOSIS — Z72.0 TOBACCO USE: ICD-10-CM

## 2018-12-20 PROCEDURE — 94729 DIFFUSING CAPACITY: CPT

## 2018-12-20 PROCEDURE — 94060 EVALUATION OF WHEEZING: CPT

## 2018-12-20 PROCEDURE — 99204 OFFICE O/P NEW MOD 45 MIN: CPT | Mod: 25

## 2018-12-20 PROCEDURE — 94727 GAS DIL/WSHOT DETER LNG VOL: CPT

## 2018-12-21 PROBLEM — Z72.0 TOBACCO USER: Status: ACTIVE | Noted: 2018-12-21

## 2018-12-21 PROBLEM — J31.0 CHRONIC RHINITIS: Status: ACTIVE | Noted: 2018-12-21

## 2018-12-21 PROBLEM — J44.9 COPD, MODERATE: Status: ACTIVE | Noted: 2018-12-21

## 2018-12-21 RX ORDER — IPRATROPIUM BROMIDE 21 UG/1
0.03 SPRAY NASAL 3 TIMES DAILY
Qty: 1 | Refills: 1 | Status: ACTIVE | COMMUNITY
Start: 2018-12-21 | End: 1900-01-01

## 2018-12-22 LAB
CULTURE RESULTS: SIGNIFICANT CHANGE UP
SPECIMEN SOURCE: SIGNIFICANT CHANGE UP

## 2019-01-12 LAB
CULTURE RESULTS: SIGNIFICANT CHANGE UP
CULTURE RESULTS: SIGNIFICANT CHANGE UP
SPECIMEN SOURCE: SIGNIFICANT CHANGE UP
SPECIMEN SOURCE: SIGNIFICANT CHANGE UP

## 2019-01-14 ENCOUNTER — APPOINTMENT (OUTPATIENT)
Dept: THORACIC SURGERY | Facility: CLINIC | Age: 74
End: 2019-01-14
Payer: MEDICARE

## 2019-01-14 VITALS
TEMPERATURE: 98.2 F | HEIGHT: 60 IN | DIASTOLIC BLOOD PRESSURE: 58 MMHG | WEIGHT: 80 LBS | SYSTOLIC BLOOD PRESSURE: 93 MMHG | HEART RATE: 108 BPM | RESPIRATION RATE: 18 BRPM | OXYGEN SATURATION: 88 % | BODY MASS INDEX: 15.71 KG/M2

## 2019-01-14 DIAGNOSIS — R91.8 OTHER NONSPECIFIC ABNORMAL FINDING OF LUNG FIELD: ICD-10-CM

## 2019-01-14 PROCEDURE — 99205 OFFICE O/P NEW HI 60 MIN: CPT

## 2019-01-15 ENCOUNTER — INPATIENT (INPATIENT)
Facility: HOSPITAL | Age: 74
LOS: 6 days | Discharge: HOME | DRG: 388 | End: 2019-01-22
Attending: SURGERY | Admitting: SURGERY
Payer: MEDICARE

## 2019-01-15 VITALS
HEIGHT: 62 IN | HEART RATE: 100 BPM | DIASTOLIC BLOOD PRESSURE: 61 MMHG | TEMPERATURE: 98 F | RESPIRATION RATE: 18 BRPM | SYSTOLIC BLOOD PRESSURE: 91 MMHG | OXYGEN SATURATION: 88 % | WEIGHT: 80.03 LBS

## 2019-01-15 DIAGNOSIS — R10.9 UNSPECIFIED ABDOMINAL PAIN: ICD-10-CM

## 2019-01-15 LAB
ALBUMIN SERPL ELPH-MCNC: 4.9 G/DL — SIGNIFICANT CHANGE UP (ref 3.3–5)
ALP SERPL-CCNC: 207 U/L — HIGH (ref 40–120)
ALT FLD-CCNC: 13 U/L — SIGNIFICANT CHANGE UP (ref 10–45)
ANION GAP SERPL CALC-SCNC: 31 MMOL/L — HIGH (ref 5–17)
APTT BLD: 41.5 SEC — HIGH (ref 27.5–36.3)
AST SERPL-CCNC: 13 U/L — SIGNIFICANT CHANGE UP (ref 10–40)
BASOPHILS # BLD AUTO: 0 K/UL — SIGNIFICANT CHANGE UP (ref 0–0.2)
BASOPHILS NFR BLD AUTO: 0 % — SIGNIFICANT CHANGE UP (ref 0–2)
BILIRUB SERPL-MCNC: 0.5 MG/DL — SIGNIFICANT CHANGE UP (ref 0.2–1.2)
BLD GP AB SCN SERPL QL: NEGATIVE — SIGNIFICANT CHANGE UP
BUN SERPL-MCNC: 68 MG/DL — HIGH (ref 7–23)
CALCIUM SERPL-MCNC: 9.1 MG/DL — SIGNIFICANT CHANGE UP (ref 8.4–10.5)
CHLORIDE SERPL-SCNC: 72 MMOL/L — LOW (ref 96–108)
CO2 SERPL-SCNC: 39 MMOL/L — HIGH (ref 22–31)
CREAT SERPL-MCNC: 7.31 MG/DL — HIGH (ref 0.5–1.3)
EOSINOPHIL # BLD AUTO: 0 K/UL — SIGNIFICANT CHANGE UP (ref 0–0.5)
EOSINOPHIL NFR BLD AUTO: 0.3 % — SIGNIFICANT CHANGE UP (ref 0–6)
GAS PNL BLDV: SIGNIFICANT CHANGE UP
GLUCOSE SERPL-MCNC: 124 MG/DL — HIGH (ref 70–99)
HCT VFR BLD CALC: 48.1 % — HIGH (ref 34.5–45)
HGB BLD-MCNC: 15.9 G/DL — HIGH (ref 11.5–15.5)
INR BLD: 1.15 RATIO — SIGNIFICANT CHANGE UP (ref 0.88–1.16)
LYMPHOCYTES # BLD AUTO: 0.7 K/UL — LOW (ref 1–3.3)
LYMPHOCYTES # BLD AUTO: 7.6 % — LOW (ref 13–44)
MCHC RBC-ENTMCNC: 31.4 PG — SIGNIFICANT CHANGE UP (ref 27–34)
MCHC RBC-ENTMCNC: 33.1 GM/DL — SIGNIFICANT CHANGE UP (ref 32–36)
MCV RBC AUTO: 94.9 FL — SIGNIFICANT CHANGE UP (ref 80–100)
MONOCYTES # BLD AUTO: 0.7 K/UL — SIGNIFICANT CHANGE UP (ref 0–0.9)
MONOCYTES NFR BLD AUTO: 7 % — SIGNIFICANT CHANGE UP (ref 2–14)
NEUTROPHILS # BLD AUTO: 7.9 K/UL — HIGH (ref 1.8–7.4)
NEUTROPHILS NFR BLD AUTO: 85 % — HIGH (ref 43–77)
PLATELET # BLD AUTO: 395 K/UL — SIGNIFICANT CHANGE UP (ref 150–400)
POTASSIUM SERPL-MCNC: 3.3 MMOL/L — LOW (ref 3.5–5.3)
POTASSIUM SERPL-SCNC: 3.3 MMOL/L — LOW (ref 3.5–5.3)
PROT SERPL-MCNC: 8.8 G/DL — HIGH (ref 6–8.3)
PROTHROM AB SERPL-ACNC: 13.2 SEC — HIGH (ref 10–12.9)
RBC # BLD: 5.07 M/UL — SIGNIFICANT CHANGE UP (ref 3.8–5.2)
RBC # FLD: 13.2 % — SIGNIFICANT CHANGE UP (ref 10.3–14.5)
RH IG SCN BLD-IMP: POSITIVE — SIGNIFICANT CHANGE UP
SODIUM SERPL-SCNC: 142 MMOL/L — SIGNIFICANT CHANGE UP (ref 135–145)
WBC # BLD: 9.3 K/UL — SIGNIFICANT CHANGE UP (ref 3.8–10.5)
WBC # FLD AUTO: 9.3 K/UL — SIGNIFICANT CHANGE UP (ref 3.8–10.5)

## 2019-01-15 PROCEDURE — 74176 CT ABD & PELVIS W/O CONTRAST: CPT | Mod: 26

## 2019-01-15 PROCEDURE — 99284 EMERGENCY DEPT VISIT MOD MDM: CPT | Mod: 25,GC

## 2019-01-15 PROCEDURE — 71250 CT THORAX DX C-: CPT | Mod: 26

## 2019-01-15 PROCEDURE — 71045 X-RAY EXAM CHEST 1 VIEW: CPT | Mod: 26

## 2019-01-15 PROCEDURE — 99291 CRITICAL CARE FIRST HOUR: CPT

## 2019-01-15 RX ORDER — LIDOCAINE 4 G/100G
1 CREAM TOPICAL DAILY
Qty: 0 | Refills: 0 | Status: DISCONTINUED | OUTPATIENT
Start: 2019-01-15 | End: 2019-01-22

## 2019-01-15 RX ORDER — ENOXAPARIN SODIUM 100 MG/ML
30 INJECTION SUBCUTANEOUS DAILY
Qty: 0 | Refills: 0 | Status: DISCONTINUED | OUTPATIENT
Start: 2019-01-15 | End: 2019-01-22

## 2019-01-15 RX ORDER — TETRACAINE/BENZOCAINE/BUTAMBEN 2%-14%-2%
1 OINTMENT (GRAM) TOPICAL ONCE
Qty: 0 | Refills: 0 | Status: COMPLETED | OUTPATIENT
Start: 2019-01-15 | End: 2019-01-15

## 2019-01-15 RX ORDER — SODIUM CHLORIDE 9 MG/ML
1000 INJECTION INTRAMUSCULAR; INTRAVENOUS; SUBCUTANEOUS
Qty: 0 | Refills: 0 | Status: DISCONTINUED | OUTPATIENT
Start: 2019-01-15 | End: 2019-01-17

## 2019-01-15 RX ORDER — ENOXAPARIN SODIUM 100 MG/ML
30 INJECTION SUBCUTANEOUS EVERY 24 HOURS
Qty: 0 | Refills: 0 | Status: DISCONTINUED | OUTPATIENT
Start: 2019-01-15 | End: 2019-01-15

## 2019-01-15 RX ORDER — PANTOPRAZOLE SODIUM 20 MG/1
40 TABLET, DELAYED RELEASE ORAL DAILY
Qty: 0 | Refills: 0 | Status: DISCONTINUED | OUTPATIENT
Start: 2019-01-15 | End: 2019-01-18

## 2019-01-15 RX ORDER — SODIUM CHLORIDE 9 MG/ML
1000 INJECTION INTRAMUSCULAR; INTRAVENOUS; SUBCUTANEOUS ONCE
Qty: 0 | Refills: 0 | Status: COMPLETED | OUTPATIENT
Start: 2019-01-15 | End: 2019-01-15

## 2019-01-15 RX ORDER — BUDESONIDE, MICRONIZED 100 %
0.5 POWDER (GRAM) MISCELLANEOUS
Qty: 0 | Refills: 0 | Status: DISCONTINUED | OUTPATIENT
Start: 2019-01-15 | End: 2019-01-16

## 2019-01-15 RX ORDER — MORPHINE SULFATE 50 MG/1
2 CAPSULE, EXTENDED RELEASE ORAL ONCE
Qty: 0 | Refills: 0 | Status: DISCONTINUED | OUTPATIENT
Start: 2019-01-15 | End: 2019-01-15

## 2019-01-15 RX ORDER — NICOTINE POLACRILEX 2 MG
1 GUM BUCCAL DAILY
Qty: 0 | Refills: 0 | Status: DISCONTINUED | OUTPATIENT
Start: 2019-01-15 | End: 2019-01-22

## 2019-01-15 RX ORDER — MORPHINE SULFATE 50 MG/1
4 CAPSULE, EXTENDED RELEASE ORAL ONCE
Qty: 0 | Refills: 0 | Status: DISCONTINUED | OUTPATIENT
Start: 2019-01-15 | End: 2019-01-15

## 2019-01-15 RX ORDER — LIDOCAINE HCL 20 MG/ML
10 VIAL (ML) INJECTION ONCE
Qty: 0 | Refills: 0 | Status: COMPLETED | OUTPATIENT
Start: 2019-01-15 | End: 2019-01-15

## 2019-01-15 RX ORDER — ONDANSETRON 8 MG/1
4 TABLET, FILM COATED ORAL ONCE
Qty: 0 | Refills: 0 | Status: COMPLETED | OUTPATIENT
Start: 2019-01-15 | End: 2019-01-15

## 2019-01-15 RX ORDER — NALOXEGOL OXALATE 12.5 MG/1
25 TABLET, FILM COATED ORAL DAILY
Qty: 0 | Refills: 0 | Status: DISCONTINUED | OUTPATIENT
Start: 2019-01-15 | End: 2019-01-22

## 2019-01-15 RX ORDER — ACETAMINOPHEN 500 MG
750 TABLET ORAL ONCE
Qty: 0 | Refills: 0 | Status: COMPLETED | OUTPATIENT
Start: 2019-01-15 | End: 2019-01-16

## 2019-01-15 RX ADMIN — ONDANSETRON 4 MILLIGRAM(S): 8 TABLET, FILM COATED ORAL at 16:01

## 2019-01-15 RX ADMIN — MORPHINE SULFATE 2 MILLIGRAM(S): 50 CAPSULE, EXTENDED RELEASE ORAL at 18:33

## 2019-01-15 RX ADMIN — Medication 1 SPRAY(S): at 20:00

## 2019-01-15 RX ADMIN — Medication 10 MILLILITER(S): at 20:30

## 2019-01-15 RX ADMIN — SODIUM CHLORIDE 2000 MILLILITER(S): 9 INJECTION INTRAMUSCULAR; INTRAVENOUS; SUBCUTANEOUS at 23:30

## 2019-01-15 RX ADMIN — Medication 0.5 MILLIGRAM(S): at 21:40

## 2019-01-15 RX ADMIN — MORPHINE SULFATE 4 MILLIGRAM(S): 50 CAPSULE, EXTENDED RELEASE ORAL at 16:01

## 2019-01-15 RX ADMIN — Medication 1 PATCH: at 21:29

## 2019-01-15 RX ADMIN — MORPHINE SULFATE 2 MILLIGRAM(S): 50 CAPSULE, EXTENDED RELEASE ORAL at 19:00

## 2019-01-15 RX ADMIN — PANTOPRAZOLE SODIUM 40 MILLIGRAM(S): 20 TABLET, DELAYED RELEASE ORAL at 21:29

## 2019-01-15 RX ADMIN — MORPHINE SULFATE 4 MILLIGRAM(S): 50 CAPSULE, EXTENDED RELEASE ORAL at 18:33

## 2019-01-15 RX ADMIN — SODIUM CHLORIDE 1000 MILLILITER(S): 9 INJECTION INTRAMUSCULAR; INTRAVENOUS; SUBCUTANEOUS at 19:06

## 2019-01-15 RX ADMIN — SODIUM CHLORIDE 125 MILLILITER(S): 9 INJECTION INTRAMUSCULAR; INTRAVENOUS; SUBCUTANEOUS at 21:29

## 2019-01-15 RX ADMIN — SODIUM CHLORIDE 1000 MILLILITER(S): 9 INJECTION INTRAMUSCULAR; INTRAVENOUS; SUBCUTANEOUS at 16:01

## 2019-01-15 NOTE — ED PROVIDER NOTE - NS ED ROS FT
ROS: denies HA, weakness, dizziness, fevers/chills, chest pain, SOB, diaphoresis, diarrhea, joint pain, neuro deficits, dysuria/hematuria, rash    +abd pain, n/v, not passing gas

## 2019-01-15 NOTE — CONSULT NOTE ADULT - SUBJECTIVE AND OBJECTIVE BOX
Patient is a 73y old  Female who presents with a chief complaint of     HPI:  72 y/o F pt c/o abdominal pain with associated nausea and vomiting with poor PO intake x3 days, has known chronic back pain. Also notes vomiting x 3 days. Pt is taking pain medication (chronic for back pain), oxycodone 10mg. Pt came via EMS because she's " I have an obstruction again and I am dehydrated". She is known to our practice with multiple prior admissions for SBO, narcotic associated ileus/constipation (admits to not taking prescribed Movantik), last admission in 11/2018 requiring ICU stay due to severe electrolyte abnormalities and acidosis.  She states last BM was Sunday.    Patient states she lives alone, last ate 3 days go. She is reporting generalized spasms - hands/abdomen, face/mouth and also in throat which are extremely painful; similar to last admission presentation.  Hand spasms cause sever finger cramps and cannot hold anything.    no rectal bleeding. Increased abdominal distension from baseline +crampy abdominal pain  no fever or chills  known COPD - current everyday smoker  admits to weight loss but cannot quantify, states currently weighs 80 pounds.  Also being worked up for suspected lung malignancy by Dr Reese    PAST MEDICAL & SURGICAL HISTORY:  Back pain  Bowel obstruction: multiple hospitalizations  Kidney stone  Emphysema  Narcotic Dependence  S/P Radiation Therapy  S/P Chemotherapy, Time Since Greater than 12 Weeks  Narcotic Dysmotile Cilia Syndrome  Chronic Pain Following Surgery or Procedure: following right breast mastectomy  Ankle Fracture: right anle fx s/p cast 2009  History of Small Bowel Obstruction: 1/2010  Asthma  Breast Cancer  S/P hernia surgery: femoral hernia - 2012  S/P Exploratory Laparotomy  S/P Appendectomy  S/P Cholecystectomy  Liver Mass s/p liver rsxn: s/p resection 2009  History of Hysterectomy: 1998  S/P Right Mastectomy: 2006      Allergies  Biaxin (Rash)  Cipro (Headache)  Flagyl (Headache)  penicillin (Rash; Swelling)  sulfa drugs (Unknown)        MEDICATIONS  (STANDING):  morphine  - Injectable 4 milliGRAM(s) IV Push Once  ondansetron Injectable 4 milliGRAM(s) IV Push once  sodium chloride 0.9% Bolus 1000 milliLiter(s) IV Bolus once    MEDICATIONS  (PRN):      Social History:  Marital Status:  (   )    ( X  ) Single    (   )    (  )   Lives with: (X  ) alone  (  ) children   (  ) spouse   (  ) parents  (  ) other    Substance Use (street drugs): ( X ) never used  (  ) other:  Tobacco Usage:  (   ) never smoked   (   ) former smoker   ( X  ) current smoker  (     ) pack year  (        ) last cigarette date  Alcohol Usage: denies      Family History   IBD (  ) Yes   ( X ) No  GI Malignancy (  )  Yes    ( X ) No        Advanced Directives: (  X   ) None    (      ) DNR    (     ) DNI    (     ) Health Care Proxy:     Review of Systems:    General:  No fevers, chills, night sweats  CV:  No pain, palpitations, hypo/hypertension  Resp:  No dyspnea, tachypnea, wheezing +COPD +lung mass  GI:  see HPI  :  No pain, bleeding, incontinence, +decreased urine over past 2 days  Muscle: +spasms - see HPI, chronic back pain  Neuro:  No weakness, tingling, memory problems  Psych:  No fatigue, insomnia, mood problems, depression  Endocrine:  No polyuria, polydypsia, cold/heat intolerance  Heme:  No petechiae, ecchymosis, easy bruisability  Skin:  No rash, tattoos, scars, edema      Vital Signs Last 24 Hrs  T(C): 36.5 (15 Jonathon 2019 14:38), Max: 36.5 (15 Jonathon 2019 14:38)  T(F): 97.7 (15 Jonathon 2019 14:38), Max: 97.7 (15 Jonathon 2019 14:38)  HR: 100 (15 Jonathon 2019 14:38) (100 - 100)  BP: 91/61 (15 Jonathon 2019 14:38) (91/61 - 91/61)  BP(mean): --  RR: 18 (15 Jonathon 2019 14:38) (18 - 18)  SpO2: 88% (15 Jonathon 2019 14:38) (88% - 88%)    PHYSICAL EXAM:    Constitutional: thin chronically ill appearing female +odor of tobacco.  sl. dyspneic, anxious appearing  Neck: No LAD, supple no JVD  Respiratory: decreased BS b/l, distant BS  Cardiovascular: S1 and S2, tachy  Gastrointestinal: protuberant abdomen, softly distended +muscle wasting +prominent abdominal vasculature +tympanitic  outline of bowel loops visible +generalized tenderness to palpation. no rebound or rigidity +multiple surgical scars +incisional hernias, reducible  Extremities: +clubbing +dusky mottled appearance to feet +pulses, intermittent cramping of hands/fingers left>right  Vascular: palpable  Neurological: A/O x 3, no focal deficits  Psychiatric: Normal mood, normal affect  Skin: No rashes +decreased turgor        LABS:    PENDING              RADIOLOGY & ADDITIONAL TESTS: Patient is a 73y old  Female who presents with a chief complaint of back pain, abd pain, n/v    HPI:  74 y/o F pt c/o abdominal pain with associated nausea and vomiting with poor PO intake x3 days, has known chronic back pain. Also notes vomiting x 3 days. Pt is taking pain medication (chronic for back pain), oxycodone 10mg. Pt came via EMS because she's " I have an obstruction again and I am dehydrated". She is known to our practice with multiple prior admissions for SBO, narcotic associated ileus/constipation (admits to not taking prescribed Movantik), last admission in 11/2018 requiring ICU stay due to severe electrolyte abnormalities and acidosis.  She states last BM was Sunday.    Patient states she lives alone, last ate 3 days go. She is reporting generalized spasms - hands/abdomen, face/mouth and also in throat which are extremely painful; similar to last admission presentation.  Hand spasms cause sever finger cramps and cannot hold anything.    no rectal bleeding. Increased abdominal distension from baseline +crampy abdominal pain  no fever or chills  known COPD - current everyday smoker  admits to weight loss but cannot quantify, states currently weighs 80 pounds.  Also being worked up for suspected lung malignancy by Dr Reese    PAST MEDICAL & SURGICAL HISTORY:  Back pain  Bowel obstruction: multiple hospitalizations  Kidney stone  Emphysema  Narcotic Dependence  S/P Radiation Therapy  S/P Chemotherapy, Time Since Greater than 12 Weeks  Narcotic Dysmotile Cilia Syndrome  Chronic Pain Following Surgery or Procedure: following right breast mastectomy  Ankle Fracture: right anle fx s/p cast 2009  History of Small Bowel Obstruction: 1/2010  Asthma  Breast Cancer  S/P hernia surgery: femoral hernia - 2012  S/P Exploratory Laparotomy  S/P Appendectomy  S/P Cholecystectomy  Liver Mass s/p liver rsxn: s/p resection 2009  History of Hysterectomy: 1998  S/P Right Mastectomy: 2006    Allergies  Biaxin (Rash)  Cipro (Headache)  Flagyl (Headache)  penicillin (Rash; Swelling)  sulfa drugs (Unknown)    MEDICATIONS  (STANDING):  morphine  - Injectable 4 milliGRAM(s) IV Push Once  ondansetron Injectable 4 milliGRAM(s) IV Push once  sodium chloride 0.9% Bolus 1000 milliLiter(s) IV Bolus once    MEDICATIONS  (PRN):    Social History:  Marital Status:  (   )    ( X  ) Single    (   )    (  )   Lives with: (X  ) alone  (  ) children   (  ) spouse   (  ) parents  (  ) other    Substance Use (street drugs): ( X ) never used  (  ) other:  Tobacco Usage:  (   ) never smoked   (   ) former smoker   ( X  ) current smoker  (     ) pack year  (        ) last cigarette date  Alcohol Usage: denies    Family History   IBD (  ) Yes   ( X ) No  GI Malignancy (  )  Yes    ( X ) No    Advanced Directives: (  X   ) None    (      ) DNR    (     ) DNI    (     ) Health Care Proxy:     Review of Systems:  General:  No fevers, chills, night sweats  CV:  No pain, palpitations, hypo/hypertension  Resp:  No dyspnea, tachypnea, wheezing +COPD +lung mass  GI:  see HPI  :  No pain, bleeding, incontinence, +decreased urine over past 2 days  Muscle: +spasms - see HPI, chronic back pain  Neuro:  No weakness, tingling, memory problems  Psych:  No fatigue, insomnia, mood problems, depression  Endocrine:  No polyuria, polydypsia, cold/heat intolerance  Heme:  No petechiae, ecchymosis, easy bruisability  Skin:  No rash, tattoos, scars, edema    Vital Signs Last 24 Hrs  T(C): 36.5 (15 Jonathon 2019 14:38), Max: 36.5 (15 Jonathon 2019 14:38)  T(F): 97.7 (15 Jonathon 2019 14:38), Max: 97.7 (15 Jonathon 2019 14:38)  HR: 100 (15 Jonathon 2019 14:38) (100 - 100)  BP: 91/61 (15 Jonathon 2019 14:38) (91/61 - 91/61)  BP(mean): --  RR: 18 (15 Jonathon 2019 14:38) (18 - 18)  SpO2: 88% (15 Jonathon 2019 14:38) (88% - 88%)    PHYSICAL EXAM:  Constitutional: thin chronically ill appearing female +odor of tobacco.  sl. dyspneic, anxious appearing  Neck: No LAD, supple no JVD  Respiratory: decreased BS b/l, distant BS  Cardiovascular: S1 and S2, tachy  Gastrointestinal: protuberant abdomen, softly distended +muscle wasting +prominent abdominal vasculature +tympanitic  outline of bowel loops visible +generalized tenderness to palpation. no rebound or rigidity +multiple surgical scars +incisional hernias, reducible  Extremities: +clubbing +dusky mottled appearance to feet +pulses, intermittent cramping of hands/fingers left>right  Vascular: palpable  Neurological: A/O x 3, no focal deficits  Psychiatric: Normal mood, normal affect  Skin: No rashes +decreased turgor    LABS:    PENDING    RADIOLOGY & ADDITIONAL TESTS:

## 2019-01-15 NOTE — H&P ADULT - NSHPPHYSICALEXAM_GEN_ALL_CORE
General: NAD, Pleasant  Neurology: Patient is AA&Ox4, follows commands, and speech fluent. EOMI intact, PERRLA, 3mm--2mm. Sensation in the Trigeminal distribution is wnl and symmetrical. Facial muscles intact and symmetrical. Hearing appropriate. Uvula rises equally upon phonation and tongue protrudes symmetrically. MS 5/5 throughout  Neck: Neck supple, trachea midline, No JVD  Respiratory: CTA B/L, (-)rales, rhonchi  CV: S1S2, r/r/r, (-)m/r/g  Abdominal: Soft, nontender, bsx4; (+)visible small bowel visible through skin, tympanny to percussion  Extremities: 2+ peripheral pulses bilat throughout UE's, 1+ pulses to bilat LE's; (-)edema appreciated; nail beds cyanotic  Skin: No Rashes, Hematoma, Ecchymosis

## 2019-01-15 NOTE — CONSULT NOTE ADULT - SUBJECTIVE AND OBJECTIVE BOX
**FULL NOTE TO FOLLOW**  HISTORY OF PRESENT ILLNESS:  GEORGIE JOLLEY is a 73F with COPD and current smoker with more than 30 packs year history, complicated surgical history (hysterectomy, appendectomy, cholecystectomy, femoral hernia repair, mastectomy and ex-lap with Lisa x 2), back pain on chronic narcotic use and recurrent SBO presented with abdominal pain.     PAST MEDICAL HISTORY: Back pain  Bowel obstruction  Kidney stone  Emphysema  Narcotic Dependence  Breast Cancer  S/P Radiation Therapy  S/P Chemotherapy, Time Since Greater than 12 Weeks  Narcotic Dysmotile Cilia Syndrome  Chronic Pain Following Surgery or Procedure  Ankle Fracture  History of Small Bowel Obstruction  Asthma  Breast Cancer      PAST SURGICAL HISTORY: S/P hernia surgery  S/P Exploratory Laparotomy  S/P Appendectomy  S/P Cholecystectomy  Liver Mass s/p liver rsxn  History of Hysterectomy  History of Right Mastectomy  S/P Right Mastectomy      FAMILY HISTORY: No pertinent family history in first degree relatives      SOCIAL HISTORY:    CODE STATUS:     HOME MEDICATIONS:    ALLERGIES: Biaxin (Rash)  Cipro (Headache)  Flagyl (Headache)  penicillin (Rash; Swelling)  sulfa drugs (Unknown)      VITAL SIGNS:  ICU Vital Signs Last 24 Hrs  T(C): 36.7 (15 Jonathon 2019 18:37), Max: 36.7 (15 Jonathon 2019 18:37)  T(F): 98.1 (15 Jonathon 2019 18:37), Max: 98.1 (15 Jonathon 2019 18:37)  HR: 75 (15 Jonathon 2019 20:00) (75 - 100)  BP: 120/59 (15 Jonathon 2019 20:00) (91/61 - 120/59)  BP(mean): --  ABP: --  ABP(mean): --  RR: 15 (15 Jonathon 2019 20:00) (14 - 18)  SpO2: 99% (15 Jonathon 2019 20:00) (88% - 99%)      NEURO  Exam:      RESPIRATORY  Mechanical Ventilation:     Exam:  buDESOnide   0.5 milliGRAM(s) Respule 0.5 milliGRAM(s) Inhalation two times a day      CARDIOVASCULAR  VBG - ( 15 Jonathon 2019 20:48 )  pH: 7.31  /  pCO2: 94    /  pO2: 24    / HCO3: 46    / Base Excess: 15.0  /  SaO2: 25     Lactate: 1.9              Exam:  Cardiac Rhythm:      GI/NUTRITION  Exam:  Diet:  naloxegol 25 milliGRAM(s) Oral daily  pantoprazole  Injectable 40 milliGRAM(s) IV Push daily      GENITOURINARY/RENAL  sodium chloride 0.9%. 1000 milliLiter(s) IV Continuous <Continuous>      Weight (kg): 36.3 (01-15 @ 14:38)  01-15    142  |  72<L>  |  68<H>  ----------------------------<  124<H>  3.3<L>   |  39<H>  |  7.31<H>    Ca    9.1      15 Jonathon 2019 16:12    TPro  8.8<H>  /  Alb  4.9  /  TBili  0.5  /  DBili  x   /  AST  13  /  ALT  13  /  AlkPhos  207<H>  01-15    [ ] Chavarria catheter, indication: urine output monitoring in critically ill patient    HEMATOLOGIC  [ ] VTE Prophylaxis:  enoxaparin Injectable 30 milliGRAM(s) SubCutaneous every 24 hours                          15.9   9.3   )-----------( 395      ( 15 Jonathon 2019 16:12 )             48.1     PT/INR - ( 15 Jonathon 2019 16:12 )   PT: 13.2 sec;   INR: 1.15 ratio         PTT - ( 15 Jonathon 2019 16:12 )  PTT:41.5 sec  Transfusion: [ ] PRBC	[ ] Platelets	[ ] FFP	[ ] Cryoprecipitate      INFECTIOUS DISEASES    RECENT CULTURES:      ENDOCRINE    CAPILLARY BLOOD GLUCOSE          PATIENT CARE ACCESS DEVICES:  [ ] Peripheral IV  [ ] Central Venous Line	[ ] R	[ ] L	[ ] IJ	[ ] Fem	[ ] SC	Placed:   [ ] Arterial Line		[ ] R	[ ] L	[ ] Fem	[ ] Rad	[ ] Ax	Placed:   [ ] PICC:					[ ] Mediport  [ ] Urinary Catheter, Date Placed:   [x] Necessity of urinary, arterial, and venous catheters discussed    OTHER MEDICATIONS: lidocaine   Patch 1 Patch Transdermal daily  nicotine -  14 mG/24Hr(s) Patch 1 patch Transdermal daily  tetracaine/benzocaine/butamben Spray 1 Spray(s) Topical Once      IMAGING STUDIES: HISTORY OF PRESENT ILLNESS:  GEORGIE JOLLEY is a 73F with COPD and current smoker with more than 30 packs year history, complicated surgical history (hysterectomy, appendectomy, cholecystectomy, femoral hernia repair, mastectomy and ex-lap with Lisa x 2), back pain on chronic narcotic use and recurrent SBO presented with 3 days of inability to tolerate PO, worsening emesis and decreased bowel function ( no flatus or BM for 2 days).   She has had multiple prior admissions for SBO, narcotic associated ileus/constipation, most recently Nov of this year managed nonoperatively with NGT decompression.    On presentation in ED, patient was hypotensive with SBP in 90's which improved to 120's with IV fluid hydration. Pt was found to be markedly distended, emaciated, and NGT was placed with bilious output returned on placement. Lab revealed acute renal failure with Cre increased to 7.3 , patient prior admission show baseline Cre of 1.6 as well as multiple electrolyte derangements. CT showed SBO with questionable transition point in distal ileum but no clear transition point.  SICU consulted for hemodynamic monitoring. On exam, remained distended, NGT canister with 400 cc bilious output otherwise vitals were stable. Pt denied any pain, SOB, fever, chills or CP.     PAST MEDICAL HISTORY: Back pain  Bowel obstruction  Kidney stone  Emphysema  Narcotic Dependence  Breast Cancer  S/P Radiation Therapy  S/P Chemotherapy, Time Since Greater than 12 Weeks  Narcotic Dysmotile Cilia Syndrome  Chronic Pain Following Surgery or Procedure  Ankle Fracture  History of Small Bowel Obstruction  Asthma  Breast Cancer      PAST SURGICAL HISTORY: S/P hernia surgery  S/P Exploratory Laparotomy  S/P Appendectomy  S/P Cholecystectomy  Liver Mass s/p liver rsxn  History of Hysterectomy  History of Right Mastectomy  S/P Right Mastectomy      FAMILY HISTORY: No pertinent family history in first degree relatives      SOCIAL HISTORY: current smoker    CODE STATUS: FULL    HOME MEDICATIONS:   Centrum Antioxidant Multiple Vitamins and Minerals oral tablet: 1 tab(s) orally once a day (15 Oct 2014 00:55)  lidocaine 5% topical film: Apply topically to affected area once a day (15 Oct 2014 00:55)  MiraLax - oral powder for reconstitution: 17 gram(s) orally 2 times a day (26 Nov 2018 09:17)  nicotine 14 mg/24 hr transdermal film, extended release: 1 patch transdermal once a day (15 Oct 2014 00:55)  oxyCODONE 10 mg oral tablet: 1 tab(s) orally every 6 hours (01 Nov 2016 01:50)  senna oral tablet: 2 tab(s) orally once a day (at bedtime) (26 Nov 2018 09:17)  Valium: 2.5 milligram(s) orally once a day (at bedtime), As Needed (01 Nov 2016 00:32)      ALLERGIES: Biaxin (Rash)  Cipro (Headache)  Flagyl (Headache)  penicillin (Rash; Swelling)  sulfa drugs (Unknown)      VITAL SIGNS:  ICU Vital Signs Last 24 Hrs  T(C): 36.7 (15 Jonathon 2019 18:37), Max: 36.7 (15 Jonathon 2019 18:37)  T(F): 98.1 (15 Jonathon 2019 18:37), Max: 98.1 (15 Jonathon 2019 18:37)  HR: 75 (15 Jonathon 2019 20:00) (75 - 100)  BP: 120/59 (15 Jonathon 2019 20:00) (91/61 - 120/59)  BP(mean): --  ABP: --  ABP(mean): --  RR: 15 (15 Jonathon 2019 20:00) (14 - 18)  SpO2: 99% (15 Jonathon 2019 20:00) (88% - 99%)      NEURO  Exam: AO x 3, elderly, emaciated woman      RESPIRATORY  Exam: on 2L NC sating without issue, no increased work of breathing  buDESOnide   0.5 milliGRAM(s) Respule 0.5 milliGRAM(s) Inhalation two times a day      CARDIOVASCULAR  VBG - ( 15 Jonathon 2019 20:48 )  pH: 7.31  /  pCO2: 94    /  pO2: 24    / HCO3: 46    / Base Excess: 15.0  /  SaO2: 25     Lactate: 1.9              Exam: RRR  Cardiac Rhythm: NSR      GI/NUTRITION  Exam: softly distended, bowel notable beneath skin . Denied TTP . No rebound or guarding. Tympanic to percussion. NGT in place with bilious output.  Diet: NPO  naloxegol 25 milliGRAM(s) Oral daily  pantoprazole  Injectable 40 milliGRAM(s) IV Push daily      GENITOURINARY/RENAL  sodium chloride 0.9%. 1000 milliLiter(s) IV Continuous <Continuous>      Weight (kg): 36.3 (01-15 @ 14:38)  01-15    142  |  72<L>  |  68<H>  ----------------------------<  124<H>  3.3<L>   |  39<H>  |  7.31<H>    Ca    9.1      15 Jonathon 2019 16:12    TPro  8.8<H>  /  Alb  4.9  /  TBili  0.5  /  DBili  x   /  AST  13  /  ALT  13  /  AlkPhos  207<H>  01-15    [x ] Chavarria catheter, indication: urine output monitoring in critically ill patient    HEMATOLOGIC  [x ] VTE Prophylaxis:  enoxaparin Injectable 30 milliGRAM(s) SubCutaneous every 24 hours                          15.9   9.3   )-----------( 395      ( 15 Jonathon 2019 16:12 )             48.1     PT/INR - ( 15 Jonathon 2019 16:12 )   PT: 13.2 sec;   INR: 1.15 ratio         PTT - ( 15 Jonathon 2019 16:12 )  PTT:41.5 sec  Transfusion: [0 ] PRBC	[0 ] Platelets	[0 ] FFP	[ 0] Cryoprecipitate      INFECTIOUS DISEASES    RECENT CULTURES: N/A      ENDOCRINE    CAPILLARY BLOOD GLUCOSE          PATIENT CARE ACCESS DEVICES:  [x ] Peripheral IV  [ ] Central Venous Line	[ ] R	[ ] L	[ ] IJ	[ ] Fem	[ ] SC	Placed:   [ ] Arterial Line		[ ] R	[ ] L	[ ] Fem	[ ] Rad	[ ] Ax	Placed:   [ ] PICC:					[ ] Mediport  [x ] Urinary Catheter, Date Placed: 1/15/19  [x] Necessity of urinary, arterial, and venous catheters discussed    OTHER MEDICATIONS: lidocaine   Patch 1 Patch Transdermal daily  nicotine -  14 mG/24Hr(s) Patch 1 patch Transdermal daily  tetracaine/benzocaine/butamben Spray 1 Spray(s) Topical Once      IMAGING STUDIES:  < from: CT Abdomen and Pelvis No Cont (01.15.19 @ 18:29) >  IMPRESSION:     Chest:  1.  The solid component of the patient's left upper lobe cavitary mass is   increased in size from 12/18/2018 and is compatible with the known   history of lung cancer that extends centrally.    Abdomen and pelvis:  1.  Small bowel obstruction with joint position to decompressed bowel in   the distal ileum; the discrete point of transition is not identified at   CT.    < end of copied text >

## 2019-01-15 NOTE — ED PROVIDER NOTE - DATE/TIME 1
This note was copied from the mother's chart.  LC reviewed supply and demand. Baby cluster fed last night so mother started to top off with formula. LC reviewed how formula impacts mother's overall supply.   15-Jonathon-2019 17:36

## 2019-01-15 NOTE — CONSULT NOTE ADULT - ASSESSMENT
73 F w/ PMHx most notable for multiple Ab surgeries and recurrent SBOs  p/w inability to tolerate oral intake and worsening n/vomiting for 3 days, concern for obstruction again now with CT demonstrating SBO with likely transition point in distal ileum, hypotensive on presentation but resolved with fluid resuscitation clinically improving     Neuro: Hx Narcotic Dependence  - Pain control PRN  - monitor for changes in MS    Resp: COPD, Active Smoker, Lung CA  - c/w home meds  - Maintain O2> 88%  - Daily CXR    CV: Hypotension likely 2/2 hypovolemia improving 73 F w/ PMHx most notable for multiple Ab surgeries and recurrent SBOs  p/w inability to tolerate oral intake and worsening n/vomiting for 3 days, concern for obstruction again now with CT demonstrating SBO with likely transition point in distal ileum, hypotensive on presentation but resolved with fluid resuscitation clinically improving     Neuro: Hx Narcotic Dependence  - Pain control PRN  - monitor for changes in MS  - c/w nicotine patch given smoking hx  - Lidoderm for chronic back pain    Resp: COPD, Active Smoker, Lung CA  - c/w home meds  - Maintain O2> 88%  - Daily CXR    CV: Hypotension likely 2/2 hypovolemia improving  - Maintain MAP > 65 mmHg    GI: SBO  - NPO  - NGT to LCWS  - Monitor electrolytes    Renal: Acute renal failure Cre 7.3 baseline 1.6  - Trend Cre  - NS @ 150  - Chavarria for Strict I's and O's  - f/u Urine Lytes    Heme: No acute issues  - Daily CBC    ID: No acute issues  - Panculture if febrile    Dispo: SICU

## 2019-01-15 NOTE — ED ADULT NURSE NOTE - OBJECTIVE STATEMENT
Pt presents to ED awake and alert, brought in by EMS from home accompanied by her aide. Pt states she has not eaten or had a BM in 3 days. Pt has been vomiting anything she eats and feels generalized abdominal pain. Pt has h/o SBO and states it feels like she has another one. Abdomen is distended. Pt reports 2 episodes of vomiting today. No fever. EMS found pt to be hypotensive to 90s/60s and hypoxic- pt arrived on 2L nasal cannula satting 88%. No h/o home O2. Pt was recently diagnosed with a mass on her lung. Mastectomy to right arm do not use. Pt is A&ox3. Ambulating less at home d/t generalized weakness. Pt appears with dry skin and dry mucous membranes. Pt reports + flatus. Pt is a current smoker. Pt presents to ED awake and alert, brought in by EMS from home accompanied by her aide. Pt states she has not eaten or had a BM in 3 days. Pt has been vomiting anything she eats and feels generalized abdominal pain. Pt has h/o SBO and states it feels like she has another one. Abdomen is distended. Pt reports 2 episodes of vomiting today. No fever. EMS found pt to be hypotensive to 90s/60s and hypoxic- pt arrived on 2L nasal cannula satting 88%. No h/o home O2. Pt was recently diagnosed with a mass on her lung. Mastectomy to right arm do not use. Pt is A&ox3. Ambulating less at home d/t generalized weakness. Pt appears with dry skin and dry mucous membranes. Pt reports + flatus. Pt is a current smoker. Pt also noted her jaw and her left hand have been cramping up. This happened to pt during her last admission in which she had abnormal calcium levels and required a MICU stay.

## 2019-01-15 NOTE — ED PROVIDER NOTE - OBJECTIVE STATEMENT
74yo F with hx of breast ca s/p mastectomy, lung ca, multiple sbos, multiple abd surg presents with vomiting. 2-3d of vomiting, not passing gas. has had multiple sbos in the past, last episode in nov 2018, which resolved without operation. denies f/c

## 2019-01-15 NOTE — H&P ADULT - ATTENDING COMMENTS
Patient seen/examined.  Agree w above note and plan and have discussed plan w house staff.  Abdomen soft, non-tender.  Will give po narcan which has consistently worked for this patient in the past    Jose Elias Carrizales MD

## 2019-01-15 NOTE — H&P ADULT - HISTORY OF PRESENT ILLNESS
This is a 72yo F with hx of breast cancer s/p mastectomy 2006, lung cancer, multiple sbos,with most recent 11/2018, s/p Hysterectomy, Liver Mass s/p liver rsxn 2009, S/P Appendectomy, S/P Cholecystectomy, S/P Exploratory Laparotomy,  S/P femoral hernia surgery 2012, who presents with c/o approx 2-3 days of NBNB vomiting, inability to tolerate po liquids or food, and has not passed gas. Her last BM was Sunday and has noticed that her abdomen has slowly increased in size and girth.  Pt last SBO resolved with NGT decompression, and aggressive fluid resuscitation. Pt denies any sick contacts, fevers, chills or diarrhea.  Labs obtained in the ED with metabolic alkalosis and CT Scan with SBO with transititon point in the terminal ileum.  General surgery consulted for management of SBO.

## 2019-01-15 NOTE — ED PROVIDER NOTE - PHYSICAL EXAMINATION
Gen: cachectic female  Head: NCAT  HEENT: PERRL, MMM, normal conjunctiva, anicteric, neck supple  Lung: CTAB, no adventitious sounds  CV: RRR, no murmurs, rubs or gallops  Abd: soft, NTND, no rebound or guarding, no CVAT  MSK: No edema, no visible deformities  Neuro: No focal neurologic deficits. CN II-XII grossly intact. 5/5 strength and normal sensation in all extremities.  Skin: Warm and dry, no evidence of rash  Psych: normal mood and affect

## 2019-01-15 NOTE — ED PROVIDER NOTE - ATTENDING CONTRIBUTION TO CARE
72yo F with hx of breast ca s/p mastectomy, lung ca, multiple sbos, multiple abd surg presents with vomiting. 2-3d with abd distension, dec bm, likely recurrent sbo, labs, ivf, ct ordered.

## 2019-01-15 NOTE — CONSULT NOTE ADULT - ASSESSMENT
73 year old female with PMH Lung mass, chronic Back pain (on Narcotics), history of multiple SBO's, narcotic associated constipation/ileus, COPD, current everyday smoker admitted with abdominal pain and distension, nausea and poor PO intake with associated worsening painful spasms.  Clinically emaciated and severely dehydrated   Suspect SBO    PLAN  -IV hydration  -low threshold for ICU evaluation  -Consider CT scan (no IV contrast until Cr and labs resulted), can start with AXR to r/o SBO given clinical history/exam  -Recommend surgical input (known to Dr Livingston)  -If vomiting, then needs NGT placement    Discussed with ER team  Will follow with you    Jake Khan PA-C    [facility]  [phone] 73 year old female with PMH Lung mass, chronic Back pain (on Narcotics), history of multiple SBO's, narcotic associated constipation/ileus, COPD, current everyday smoker admitted with abdominal pain and distension, nausea and poor PO intake with associated worsening painful spasms.  Clinically emaciated and severely dehydrated   r/o SBO    PLAN  -IV hydration  -Low threshold for ICU evaluation  -CT scan (no IV contrast until Cr and labs resulted) r/o SBO given clinical history/exam  -Recommend surgical input (known to Dr Livingston)  -If vomiting, then needs NGT placement    Discussed with ER team  Will follow with you    Jake Khan PA-C    [facility]  [phone]

## 2019-01-15 NOTE — H&P ADULT - NSHPLABSRESULTS_GEN_ALL_CORE
01-15    142  |  72<L>  |  68<H>  ----------------------------<  124<H>  3.3<L>   |  39<H>  |  7.31<H>    Ca    9.1      15 Jonathon 2019 16:12    TPro  8.8<H>  /  Alb  4.9  /  TBili  0.5  /  DBili  x   /  AST  13  /  ALT  13  /  AlkPhos  207<H>  01-15                        15.9   9.3   )-----------( 395      ( 15 Jonathon 2019 16:12 )             48.1     LIVER FUNCTIONS - ( 15 Jonathon 2019 16:12 )  Alb: 4.9 g/dL / Pro: 8.8 g/dL / ALK PHOS: 207 U/L / ALT: 13 U/L / AST: 13 U/L / GGT: x           CT Abdomen and Pelvis No Cont (01.15.19 @ 18:29) >    FINDINGS:    CHEST:     LUNGS AND LARGE AIRWAYS: Emphysema. A cavitary mass in the left upper   lobe. The solid component adjacent to or arising from the mass measures   3.0 x 2.4 cm, and has increased in size from the 12/18/2018 PET/CT where   it measured 2.2 x 2.2 cm. The mass extends centrally to the origin of the   left upper lobe bronchus and mediastinum. The left upper lobe bronchus is   opacified with secretions or alternatively tumor.     There are unchanged foci of mucoid impaction the right lower lobe.  PLEURA: No pleural effusion. No pneumothorax.  VESSELS: The aorta is normal in caliber. Atheromatous disease of the   aorta. No aneurysm. Main pulmonary artery is normal in caliber.  HEART: Heart size is normal. No pericardial effusion.  MEDIASTINUM AND CLARISA: No lymphadenopathy.  CHEST WALL AND LOWER NECK: Right mastectomy.    ABDOMEN AND PELVIS:    LIVER: Status post right hepatectomy.  BILE DUCTS: Mild dilatation of the common bile duct is within expected   limits following cholecystectomy.  GALLBLADDER: Cholecystectomy.  SPLEEN: Within normal limits.  PANCREAS: Within normal limits.  ADRENALS: Within normal limits.  KIDNEYS/URETERS: Within normal limits.    BLADDER: Within normal limits.  REPRODUCTIVE ORGANS: Hysterectomy.    BOWEL: The stomach is distended. The small bowel is dilated with a zone   of transition likely involving the distal ileum. There is no pneumatosis.   No free air.  PERITONEUM: No ascites.  VESSELS:  Within normal limits.  RETROPERITONEUM: No lymphadenopathy.    ABDOMINAL WALL: Within normal limits.  BONES: Degenerative changes of the spine.    IMPRESSION:     Chest:  1.  The solid component of the patient's left upper lobe cavitary mass is   increased in size from 12/18/2018 and is compatible with the known   history of lung cancer that extends centrally.    Abdomen and pelvis:  1.  Small bowel obstruction with joint position to decompressed bowel in   the distal ileum; the discrete point of transition is not identified at   CT.

## 2019-01-15 NOTE — H&P ADULT - ASSESSMENT
This is a 72yo F with hx of breast cancer s/p mastectomy 2006, lung cancer, multiple sbos,with most recent 11/2018, s/p Hysterectomy, Liver Mass s/p liver rsxn 2009, S/P Appendectomy, S/P Cholecystectomy, S/P Exploratory Laparotomy,  S/P femoral hernia surgery 2012, who presents with c/o approx 2-3 days of NBNB vomiting, inability to tolerate po liquids or food, and resultant imaging with recurrent small bowel obstruction with transition point in the terminal ileum.  Pt has been receiving fluid resuscitation with improvement in hemodynamics with SBP increasing from 90's to 120's and with HR decreasing to the 80's from 90's.    -Admit to Dr. Best   -NPO  -NGT to LCWS  -IVF  -Chavarria catheter  -Strict I/O  -Continue home meds  -IV tylenol prn for pain  -Repeat labs in 4 hours and again in AM  -Ambulate as tolerated  -IS  -Lovenox for DVT prophlyaxis  -D/w Dr. Best

## 2019-01-16 LAB
ANION GAP SERPL CALC-SCNC: 22 MMOL/L — HIGH (ref 5–17)
ANION GAP SERPL CALC-SCNC: 23 MMOL/L — HIGH (ref 5–17)
ANION GAP SERPL CALC-SCNC: 23 MMOL/L — HIGH (ref 5–17)
BUN SERPL-MCNC: 63 MG/DL — HIGH (ref 7–23)
BUN SERPL-MCNC: 67 MG/DL — HIGH (ref 7–23)
BUN SERPL-MCNC: 69 MG/DL — HIGH (ref 7–23)
CA-I BLD-SCNC: 0.7 MMOL/L — CRITICAL LOW (ref 1.12–1.3)
CA-I BLD-SCNC: 0.86 MMOL/L — LOW (ref 1.12–1.3)
CA-I BLD-SCNC: 1.13 MMOL/L — SIGNIFICANT CHANGE UP (ref 1.12–1.3)
CALCIUM SERPL-MCNC: 6.8 MG/DL — LOW (ref 8.4–10.5)
CALCIUM SERPL-MCNC: 7.2 MG/DL — LOW (ref 8.4–10.5)
CALCIUM SERPL-MCNC: 9 MG/DL — SIGNIFICANT CHANGE UP (ref 8.4–10.5)
CHLORIDE SERPL-SCNC: 87 MMOL/L — LOW (ref 96–108)
CHLORIDE SERPL-SCNC: 91 MMOL/L — LOW (ref 96–108)
CHLORIDE SERPL-SCNC: 98 MMOL/L — SIGNIFICANT CHANGE UP (ref 96–108)
CO2 SERPL-SCNC: 21 MMOL/L — LOW (ref 22–31)
CO2 SERPL-SCNC: 28 MMOL/L — SIGNIFICANT CHANGE UP (ref 22–31)
CO2 SERPL-SCNC: 34 MMOL/L — HIGH (ref 22–31)
CREAT ?TM UR-MCNC: 180 MG/DL — SIGNIFICANT CHANGE UP
CREAT SERPL-MCNC: 4.87 MG/DL — HIGH (ref 0.5–1.3)
CREAT SERPL-MCNC: 6.25 MG/DL — HIGH (ref 0.5–1.3)
CREAT SERPL-MCNC: 6.83 MG/DL — HIGH (ref 0.5–1.3)
CULTURE RESULTS: SIGNIFICANT CHANGE UP
CULTURE RESULTS: SIGNIFICANT CHANGE UP
GLUCOSE SERPL-MCNC: 75 MG/DL — SIGNIFICANT CHANGE UP (ref 70–99)
GLUCOSE SERPL-MCNC: 84 MG/DL — SIGNIFICANT CHANGE UP (ref 70–99)
GLUCOSE SERPL-MCNC: 92 MG/DL — SIGNIFICANT CHANGE UP (ref 70–99)
HCT VFR BLD CALC: 40.1 % — SIGNIFICANT CHANGE UP (ref 34.5–45)
HGB BLD-MCNC: 13.1 G/DL — SIGNIFICANT CHANGE UP (ref 11.5–15.5)
MAGNESIUM SERPL-MCNC: 1.4 MG/DL — LOW (ref 1.6–2.6)
MAGNESIUM SERPL-MCNC: 2.2 MG/DL — SIGNIFICANT CHANGE UP (ref 1.6–2.6)
MAGNESIUM SERPL-MCNC: 2.3 MG/DL — SIGNIFICANT CHANGE UP (ref 1.6–2.6)
MCHC RBC-ENTMCNC: 31.2 PG — SIGNIFICANT CHANGE UP (ref 27–34)
MCHC RBC-ENTMCNC: 32.7 GM/DL — SIGNIFICANT CHANGE UP (ref 32–36)
MCV RBC AUTO: 95.3 FL — SIGNIFICANT CHANGE UP (ref 80–100)
OSMOLALITY UR: 335 MOS/KG — SIGNIFICANT CHANGE UP (ref 300–900)
PHOSPHATE SERPL-MCNC: 7.3 MG/DL — HIGH (ref 2.5–4.5)
PHOSPHATE SERPL-MCNC: 8.3 MG/DL — HIGH (ref 2.5–4.5)
PHOSPHATE SERPL-MCNC: 8.9 MG/DL — HIGH (ref 2.5–4.5)
PLATELET # BLD AUTO: 282 K/UL — SIGNIFICANT CHANGE UP (ref 150–400)
POTASSIUM SERPL-MCNC: 3.9 MMOL/L — SIGNIFICANT CHANGE UP (ref 3.5–5.3)
POTASSIUM SERPL-MCNC: 4.1 MMOL/L — SIGNIFICANT CHANGE UP (ref 3.5–5.3)
POTASSIUM SERPL-MCNC: 4.5 MMOL/L — SIGNIFICANT CHANGE UP (ref 3.5–5.3)
POTASSIUM SERPL-SCNC: 3.9 MMOL/L — SIGNIFICANT CHANGE UP (ref 3.5–5.3)
POTASSIUM SERPL-SCNC: 4.1 MMOL/L — SIGNIFICANT CHANGE UP (ref 3.5–5.3)
POTASSIUM SERPL-SCNC: 4.5 MMOL/L — SIGNIFICANT CHANGE UP (ref 3.5–5.3)
RBC # BLD: 4.21 M/UL — SIGNIFICANT CHANGE UP (ref 3.8–5.2)
RBC # FLD: 12.9 % — SIGNIFICANT CHANGE UP (ref 10.3–14.5)
SODIUM SERPL-SCNC: 142 MMOL/L — SIGNIFICANT CHANGE UP (ref 135–145)
SODIUM SERPL-SCNC: 142 MMOL/L — SIGNIFICANT CHANGE UP (ref 135–145)
SODIUM SERPL-SCNC: 143 MMOL/L — SIGNIFICANT CHANGE UP (ref 135–145)
SODIUM UR-SCNC: 43 MMOL/L — SIGNIFICANT CHANGE UP
SPECIMEN SOURCE: SIGNIFICANT CHANGE UP
SPECIMEN SOURCE: SIGNIFICANT CHANGE UP
WBC # BLD: 7.8 K/UL — SIGNIFICANT CHANGE UP (ref 3.8–10.5)
WBC # FLD AUTO: 7.8 K/UL — SIGNIFICANT CHANGE UP (ref 3.8–10.5)

## 2019-01-16 PROCEDURE — 99233 SBSQ HOSP IP/OBS HIGH 50: CPT

## 2019-01-16 PROCEDURE — 71045 X-RAY EXAM CHEST 1 VIEW: CPT | Mod: 26

## 2019-01-16 RX ORDER — SODIUM CHLORIDE 9 MG/ML
1000 INJECTION INTRAMUSCULAR; INTRAVENOUS; SUBCUTANEOUS ONCE
Qty: 0 | Refills: 0 | Status: COMPLETED | OUTPATIENT
Start: 2019-01-16 | End: 2019-01-16

## 2019-01-16 RX ORDER — CALCIUM GLUCONATE 100 MG/ML
2 VIAL (ML) INTRAVENOUS
Qty: 0 | Refills: 0 | Status: DISCONTINUED | OUTPATIENT
Start: 2019-01-16 | End: 2019-01-16

## 2019-01-16 RX ORDER — CHLORHEXIDINE GLUCONATE 213 G/1000ML
1 SOLUTION TOPICAL
Qty: 0 | Refills: 0 | Status: DISCONTINUED | OUTPATIENT
Start: 2019-01-16 | End: 2019-01-22

## 2019-01-16 RX ORDER — INFLUENZA VIRUS VACCINE 15; 15; 15; 15 UG/.5ML; UG/.5ML; UG/.5ML; UG/.5ML
0.5 SUSPENSION INTRAMUSCULAR ONCE
Qty: 0 | Refills: 0 | Status: DISCONTINUED | OUTPATIENT
Start: 2019-01-16 | End: 2019-01-22

## 2019-01-16 RX ORDER — ACETAMINOPHEN 500 MG
750 TABLET ORAL ONCE
Qty: 0 | Refills: 0 | Status: COMPLETED | OUTPATIENT
Start: 2019-01-16 | End: 2019-01-16

## 2019-01-16 RX ORDER — MAGNESIUM SULFATE 500 MG/ML
2 VIAL (ML) INJECTION ONCE
Qty: 0 | Refills: 0 | Status: COMPLETED | OUTPATIENT
Start: 2019-01-16 | End: 2019-01-16

## 2019-01-16 RX ORDER — CALCIUM GLUCONATE 100 MG/ML
2 VIAL (ML) INTRAVENOUS ONCE
Qty: 0 | Refills: 0 | Status: COMPLETED | OUTPATIENT
Start: 2019-01-16 | End: 2019-01-16

## 2019-01-16 RX ORDER — SODIUM CHLORIDE 9 MG/ML
1000 INJECTION, SOLUTION INTRAVENOUS ONCE
Qty: 0 | Refills: 0 | Status: COMPLETED | OUTPATIENT
Start: 2019-01-16 | End: 2019-01-16

## 2019-01-16 RX ORDER — BUDESONIDE, MICRONIZED 100 %
0.5 POWDER (GRAM) MISCELLANEOUS EVERY 12 HOURS
Qty: 0 | Refills: 0 | Status: DISCONTINUED | OUTPATIENT
Start: 2019-01-16 | End: 2019-01-22

## 2019-01-16 RX ORDER — MORPHINE SULFATE 50 MG/1
2 CAPSULE, EXTENDED RELEASE ORAL ONCE
Qty: 0 | Refills: 0 | Status: DISCONTINUED | OUTPATIENT
Start: 2019-01-16 | End: 2019-01-16

## 2019-01-16 RX ORDER — OXYCODONE HYDROCHLORIDE 5 MG/1
10 TABLET ORAL EVERY 6 HOURS
Qty: 0 | Refills: 0 | Status: DISCONTINUED | OUTPATIENT
Start: 2019-01-16 | End: 2019-01-22

## 2019-01-16 RX ORDER — CALCIUM GLUCONATE 100 MG/ML
1 VIAL (ML) INTRAVENOUS ONCE
Qty: 0 | Refills: 0 | Status: COMPLETED | OUTPATIENT
Start: 2019-01-16 | End: 2019-01-16

## 2019-01-16 RX ORDER — CALCIUM GLUCONATE 100 MG/ML
2 VIAL (ML) INTRAVENOUS
Qty: 0 | Refills: 0 | Status: COMPLETED | OUTPATIENT
Start: 2019-01-16 | End: 2019-01-16

## 2019-01-16 RX ORDER — IPRATROPIUM/ALBUTEROL SULFATE 18-103MCG
3 AEROSOL WITH ADAPTER (GRAM) INHALATION EVERY 6 HOURS
Qty: 0 | Refills: 0 | Status: DISCONTINUED | OUTPATIENT
Start: 2019-01-16 | End: 2019-01-22

## 2019-01-16 RX ADMIN — Medication 750 MILLIGRAM(S): at 18:45

## 2019-01-16 RX ADMIN — CHLORHEXIDINE GLUCONATE 1 APPLICATION(S): 213 SOLUTION TOPICAL at 06:09

## 2019-01-16 RX ADMIN — SODIUM CHLORIDE 150 MILLILITER(S): 9 INJECTION INTRAMUSCULAR; INTRAVENOUS; SUBCUTANEOUS at 00:00

## 2019-01-16 RX ADMIN — Medication 100 GRAM(S): at 12:03

## 2019-01-16 RX ADMIN — Medication 3 MILLILITER(S): at 17:04

## 2019-01-16 RX ADMIN — MORPHINE SULFATE 2 MILLIGRAM(S): 50 CAPSULE, EXTENDED RELEASE ORAL at 11:59

## 2019-01-16 RX ADMIN — Medication 100 GRAM(S): at 02:40

## 2019-01-16 RX ADMIN — ENOXAPARIN SODIUM 30 MILLIGRAM(S): 100 INJECTION SUBCUTANEOUS at 12:01

## 2019-01-16 RX ADMIN — Medication 300 MILLIGRAM(S): at 18:31

## 2019-01-16 RX ADMIN — Medication 200 GRAM(S): at 02:05

## 2019-01-16 RX ADMIN — Medication 1 PATCH: at 12:02

## 2019-01-16 RX ADMIN — Medication 300 MILLIGRAM(S): at 23:45

## 2019-01-16 RX ADMIN — Medication 1 PATCH: at 09:12

## 2019-01-16 RX ADMIN — NALOXEGOL OXALATE 25 MILLIGRAM(S): 12.5 TABLET, FILM COATED ORAL at 12:56

## 2019-01-16 RX ADMIN — Medication 0.5 MILLIGRAM(S): at 17:04

## 2019-01-16 RX ADMIN — Medication 3 MILLILITER(S): at 11:10

## 2019-01-16 RX ADMIN — LIDOCAINE 1 PATCH: 4 CREAM TOPICAL at 12:57

## 2019-01-16 RX ADMIN — Medication 1 PATCH: at 19:00

## 2019-01-16 RX ADMIN — Medication 750 MILLIGRAM(S): at 07:15

## 2019-01-16 RX ADMIN — Medication 3 MILLILITER(S): at 05:35

## 2019-01-16 RX ADMIN — Medication 300 MILLIGRAM(S): at 06:58

## 2019-01-16 RX ADMIN — SODIUM CHLORIDE 150 MILLILITER(S): 9 INJECTION INTRAMUSCULAR; INTRAVENOUS; SUBCUTANEOUS at 09:25

## 2019-01-16 RX ADMIN — LIDOCAINE 1 PATCH: 4 CREAM TOPICAL at 19:00

## 2019-01-16 RX ADMIN — SODIUM CHLORIDE 1000 MILLILITER(S): 9 INJECTION INTRAMUSCULAR; INTRAVENOUS; SUBCUTANEOUS at 09:25

## 2019-01-16 RX ADMIN — SODIUM CHLORIDE 1000 MILLILITER(S): 9 INJECTION, SOLUTION INTRAVENOUS at 18:31

## 2019-01-16 RX ADMIN — MORPHINE SULFATE 2 MILLIGRAM(S): 50 CAPSULE, EXTENDED RELEASE ORAL at 12:15

## 2019-01-16 RX ADMIN — Medication 100 GRAM(S): at 10:51

## 2019-01-16 RX ADMIN — Medication 50 GRAM(S): at 03:13

## 2019-01-16 RX ADMIN — Medication 1 PATCH: at 21:50

## 2019-01-16 RX ADMIN — Medication 3 MILLILITER(S): at 23:49

## 2019-01-16 RX ADMIN — PANTOPRAZOLE SODIUM 40 MILLIGRAM(S): 20 TABLET, DELAYED RELEASE ORAL at 12:02

## 2019-01-16 NOTE — PROGRESS NOTE ADULT - SUBJECTIVE AND OBJECTIVE BOX
HISTORY  GEORGIE JOLLEY is a 73F with COPD and current smoker with more than 30 packs year history, complicated surgical history (hysterectomy, appendectomy, cholecystectomy, femoral hernia repair, mastectomy and ex-lap with Lisa x 2), back pain on chronic narcotic use and recurrent SBO presented with 3 days of inability to tolerate PO, worsening emesis and decreased bowel function ( no flatus or BM for 2 days).   She has had multiple prior admissions for SBO, narcotic associated ileus/constipation, most recently Nov of this year managed nonoperatively with NGT decompression.    On presentation in ED, patient was hypotensive with SBP in 90's which improved to 120's with IV fluid hydration. Pt was found to be markedly distended, emaciated, and NGT was placed with bilious output returned on placement. Lab revealed acute renal failure with Cre increased to 7.3 , patient prior admission show baseline Cre of 1.6 as well as multiple electrolyte derangements. CT showed SBO with questionable transition point in distal ileum but no clear transition point.  SICU consulted for hemodynamic monitoring. On exam, remained distended, NGT canister with 400 cc bilious output otherwise vitals were stable. Pt denied any pain, SOB, fever, chills or CP.     24 HOUR EVENTS: Tolerated nasogastric decompression without issue. Hypotension improved with fluid resuscitation. Electrolytes improved on repeat labs. Still without bowel function.     SUBJECTIVE/ROS:  [x ] A ten-point review of systems was otherwise negative except as noted.  [ ] Due to altered mental status/intubation, subjective information were not able to be obtained from the patient. History was obtained, to the extent possible, from review of the chart and collateral sources of information.    NEURO  GCS: 15  Exam: awake, alert, oriented  Meds:   [x] Adequacy of sedation and pain control has been assessed and adjusted    RESPIRATORY  RR: 22 (01-16-19 @ 01:00) (14 - 22)  SpO2: 100% (01-16-19 @ 01:00) (88% - 100%)  Exam: on 2 L NC, no increased WOB    Meds: ALBUTerol/ipratropium for Nebulization 3 milliLiter(s) Nebulizer every 6 hours      CARDIOVASCULAR  HR: 71 (01-16-19 @ 01:00) (71 - 100)  BP: 133/59 (01-16-19 @ 01:00) (91/61 - 133/59)  BP(mean): 85 (01-16-19 @ 01:00) (79 - 88)  VBG - ( 15 Jonathon 2019 20:48 )  pH: 7.31  /  pCO2: 94    /  pO2: 24    / HCO3: 46    / Base Excess: 15.0  /  SaO2: 25     Lactate: 1.9      Exam: regular rate and rhythm  Cardiac Rhythm: sinus  Perfusion     [x]Adequate   [ ]Inadequate  Mentation   [x]Normal       [ ]Reduced  Extremities  [x]Warm         [ ]Cool  Volume Status [ ]Hypervolemic [x]Euvolemic [ ]Hypovolemic  Meds:     GI/NUTRITION  Exam: softly distended, bowel notable beneath skin . Denied TTP . No rebound or guarding. Tympanic to percussion. NGT in place with bilious output.  Diet: NPO  Meds: naloxegol 25 milliGRAM(s) Oral daily  pantoprazole  Injectable 40 milliGRAM(s) IV Push daily      GENITOURINARY  I&O's Detail    01-15 @ 07:01  -  01-16 @ 01:06  --------------------------------------------------------  IN:    IV PiggyBack: 75 mL    sodium chloride 0.9%.: 125 mL  Total IN: 200 mL    OUT:    Indwelling Catheter - Urethral: 155 mL    Nasoenteral Tube: 600 mL  Total OUT: 755 mL    Total NET: -555 mL        Weight (kg): 36.3 (01-15 @ 14:38)  01-15    142  |  72<L>  |  68<H>  ----------------------------<  124<H>  3.3<L>   |  39<H>  |  7.31<H>    Ca    9.1      15 Jonathon 2019 16:12    TPro  8.8<H>  /  Alb  4.9  /  TBili  0.5  /  DBili  x   /  AST  13  /  ALT  13  /  AlkPhos  207<H>  01-15    [x ] Chavarria catheter, indication: critically ill  Meds: sodium chloride 0.9%. 1000 milliLiter(s) IV Continuous <Continuous>      HEMATOLOGIC  Meds: enoxaparin Injectable 30 milliGRAM(s) SubCutaneous daily    [x] VTE Prophylaxis                        15.9   9.3   )-----------( 395      ( 15 Jonathon 2019 16:12 )             48.1     PT/INR - ( 15 Jonathon 2019 16:12 )   PT: 13.2 sec;   INR: 1.15 ratio         PTT - ( 15 Jonathon 2019 16:12 )  PTT:41.5 sec  Transfusion     [0 ] PRBC   [0 ] Platelets   [0 ] FFP   [0 ] Cryoprecipitate    INFECTIOUS DISEASES  WBC Count: 9.3 K/uL (01-15 @ 16:12)    RECENT CULTURES:    Meds:     ENDOCRINE  CAPILLARY BLOOD GLUCOSE        Meds:     ACCESS DEVICES:  [x ] Peripheral IV  [ ] Central Venous Line	[ ] R	[ ] L	[ ] IJ	[ ] Fem	[ ] SC	Placed:   [ ] Arterial Line		[ ] R	[ ] L	[ ] Fem	[ ] Rad	[ ] Ax	Placed:   [ ] PICC:					[ ] Mediport  [x ] Urinary Catheter, Date Placed: 1/15/19  [x] Necessity of urinary, arterial, and venous catheters discussed    OTHER MEDICATIONS:  chlorhexidine 4% Liquid 1 Application(s) Topical <User Schedule>  lidocaine   Patch 1 Patch Transdermal daily  nicotine -  14 mG/24Hr(s) Patch 1 patch Transdermal daily      CODE STATUS: FULL      IMAGING: No New Imaging

## 2019-01-16 NOTE — PROGRESS NOTE ADULT - ASSESSMENT
73F w/ PMH of multiple abd surgeries and recurrent SBOs, p/w w/ another ep of SBO shown by CT, w/ TP in distal ileum    - Admitted to SICU for hemodynamic monitoring  - Pain ctrl PRN  - monitor mental status  - continue home meds  - trend Cr  - continue NPO/IVF  - f/u UOP  - care per SICU appreciated      [facility]  [id number]

## 2019-01-16 NOTE — PROGRESS NOTE ADULT - ATTENDING COMMENTS
Patient seen/examined.  Agree w above note and plan and have discussed plan w house staff.  PO narcan, IV fluid resuscitation, follow creatinine    Jose Elias Carrizales MD Patient seen and examined and agree with above.  NO flatus overnight. NGT put out 600 cc/ drainage. Will continue NPO with IVF for IVF resuscitation. Initially hypotensive secondary to hypovolemia.  Will monitor serial abdominal exams.   Acute kidney insufficiency- will give 1 L IVF bolus and repeat labs in AM  COPD- will continue nebulizers  Hypocalcemia - repleted and will recheck

## 2019-01-16 NOTE — PROGRESS NOTE ADULT - SUBJECTIVE AND OBJECTIVE BOX
General Surgery Progress Note     S: Pt seen and examined at bedside in SICU during AM rounds  - Pt transferred to SICU late PM due to hemodynamic monitoring   - SHAKIRA due to hypotension, intravascular depletion  - Pt states that pain was present o/n  - GI fxn -/-   - denies f/c, n/v, CP, SOB, lightheadedness.     O:    ***PHYSICAL EXAM***    Gen: NAD, laying in bed  HEENT: NC/AT  RESP: nonlabored breathing  Abdominal: Soft, minimally distended, non-tender, no rebound  : arevalo in place draining clear urine    MEDICATIONS  (STANDING):  acetaminophen  IVPB .. 750 milliGRAM(s) IV Intermittent once  ALBUTerol/ipratropium for Nebulization 3 milliLiter(s) Nebulizer every 6 hours  chlorhexidine 4% Liquid 1 Application(s) Topical <User Schedule>  enoxaparin Injectable 30 milliGRAM(s) SubCutaneous daily  lidocaine   Patch 1 Patch Transdermal daily  naloxegol 25 milliGRAM(s) Oral daily  nicotine -  14 mG/24Hr(s) Patch 1 patch Transdermal daily  pantoprazole  Injectable 40 milliGRAM(s) IV Push daily  sodium chloride 0.9%. 1000 milliLiter(s) (150 mL/Hr) IV Continuous <Continuous>    MEDICATIONS  (PRN):          Vital Signs Last 24 Hrs  T(C): 36.4 (15 Jonathon 2019 23:15), Max: 36.7 (15 Jonathon 2019 18:37)  T(F): 97.6 (15 Jonathon 2019 23:15), Max: 98.1 (15 Jonathon 2019 18:37)  HR: 74 (16 Jan 2019 00:00) (74 - 100)  BP: 123/61 (16 Jan 2019 00:00) (91/61 - 123/61)  BP(mean): 88 (16 Jan 2019 00:00) (79 - 88)  RR: 51 (16 Jan 2019 00:00) (14 - 51)  SpO2: 100% (16 Jan 2019 00:00) (88% - 100%)    01-15-19 @ 07:01  -  01-16-19 @ 00:29  --------------------------------------------------------  IN: 0 mL / OUT: 700 mL / NET: -700 mL            LABS:                        15.9   9.3   )-----------( 395      ( 15 Jonathon 2019 16:12 )             48.1     01-15    142  |  72<L>  |  68<H>  ----------------------------<  124<H>  3.3<L>   |  39<H>  |  7.31<H>    Ca    9.1      15 Jonathon 2019 16:12    TPro  8.8<H>  /  Alb  4.9  /  TBili  0.5  /  DBili  x   /  AST  13  /  ALT  13  /  AlkPhos  207<H>  01-15

## 2019-01-16 NOTE — PROGRESS NOTE ADULT - SUBJECTIVE AND OBJECTIVE BOX
Patient is a 73y old  Female who presented with a chief complaint of Small Bowel Obstruction (16 Jan 2019 01:06)      INTERVAL HPI/OVERNIGHT EVENTS:  prior events noted  in SICU  +NGT - scant output  no BM or flatus reported but decreased distension    MEDICATIONS  (STANDING):  ALBUTerol/ipratropium for Nebulization 3 milliLiter(s) Nebulizer every 6 hours  buDESOnide   0.5 milliGRAM(s) Respule 0.5 milliGRAM(s) Inhalation every 12 hours  calcium gluconate IVPB 2 Gram(s) IV Intermittent every 1 hour  chlorhexidine 4% Liquid 1 Application(s) Topical <User Schedule>  enoxaparin Injectable 30 milliGRAM(s) SubCutaneous daily  influenza   Vaccine 0.5 milliLiter(s) IntraMuscular once  lidocaine   Patch 1 Patch Transdermal daily  naloxegol 25 milliGRAM(s) Oral daily  nicotine -  14 mG/24Hr(s) Patch 1 patch Transdermal daily  pantoprazole  Injectable 40 milliGRAM(s) IV Push daily  sodium chloride 0.9%. 1000 milliLiter(s) (150 mL/Hr) IV Continuous <Continuous>    Allergies  Biaxin (Rash)  Cipro (Headache)  Flagyl (Headache)  penicillin (Rash; Swelling)  sulfa drugs (Unknown)        Review of Systems:  General:  No fevers, chills, night sweats  CV:  No pain, palpitations, hypo/hypertension  Resp:  No dyspnea, tachypnea, wheezing +COPD +lung mass  :  No pain, bleeding, incontinence, +arevalo  Muscle: +spasms - resolved,  chronic back pain  Neuro:  No weakness, tingling, memory problems  Psych:  No fatigue, insomnia, mood problems, depression  Endocrine:  No polyuria, polydypsia, cold/heat intolerance  Heme:  No petechiae, ecchymosis, easy bruisability  Skin:  No rash, tattoos, scars, edema    Vital Signs Last 24 Hrs  T(C): 36.4 (16 Jan 2019 07:00), Max: 36.8 (16 Jan 2019 03:00)  T(F): 97.6 (16 Jan 2019 07:00), Max: 98.2 (16 Jan 2019 03:00)  HR: 66 (16 Jan 2019 10:00) (65 - 100)  BP: 96/55 (16 Jan 2019 10:00) (77/41 - 133/59)  BP(mean): 72 (16 Jan 2019 10:00) (54 - 88)  RR: 11 (16 Jan 2019 10:00) (10 - 27)  SpO2: 94% (16 Jan 2019 10:00) (88% - 100%)    PHYSICAL EXAM:  Constitutional: thin chronically ill appearing female +NGT - scant output  Neck: No LAD, supple no JVD  Respiratory: decreased BS b/l, distant BS  Cardiovascular: S1 and S2, regular  Gastrointestinal: softly distended NT. no rebound or rigidity +multiple surgical scars +incisional hernias, reducible  Extremities: +clubbing +not dusky, improved perfusion  Vascular: palpable  Neurological: A/O x 3, no focal deficits  Psychiatric: Normal mood, normal affect  Skin: No rashes +improved turgor    LABS:                        13.1   7.8   )-----------( 282      ( 16 Jan 2019 01:06 )             40.1     01-16    142  |  91<L>  |  67<H>  ----------------------------<  84  4.1   |  28  |  6.25<H>    Ca    7.2<L>      16 Jan 2019 06:50  Phos  8.3     01-16  Mg     2.2     01-16    TPro  8.8<H>  /  Alb  4.9  /  TBili  0.5  /  DBili  x   /  AST  13  /  ALT  13  /  AlkPhos  207<H>  01-15    PT/INR - ( 15 Jonathon 2019 16:12 )   PT: 13.2 sec;   INR: 1.15 ratio    PTT - ( 15 Jonathon 2019 16:12 )  PTT:41.5 sec        RADIOLOGY & ADDITIONAL TESTS:  < from: CT Abdomen and Pelvis No Cont (01.15.19 @ 18:29) >  FINDINGS:    CHEST:     LUNGS AND LARGE AIRWAYS: Emphysema. A cavitary mass in in the left upper   lobe. The solid component adjacent to or arising from the mass measures   3.0 x 2.4 cm, and has increased in size from the 12/18/2018 PET/CT where   it measured 2.2 x 2.2 cm. The mass extends centrally to the origin of the   left upper lobe bronchus and mediastinum. The left upper lobe bronchus is   opacified with secretions or alternatively tumor.     There are unchanged foci of mucoid impaction the right lower lobe.  PLEURA: No pleural effusion. No pneumothorax.  VESSELS: The aorta is normal in caliber. Atheromatous disease of the   aorta. No aneurysm. Main pulmonary artery is normal in caliber.  HEART: Heart size is normal. No pericardial effusion.  MEDIASTINUM AND CLARISA: No lymphadenopathy.  CHEST WALL AND LOWER NECK: Right mastectomy.    ABDOMEN AND PELVIS:    LIVER: Status post right hepatectomy.  BILE DUCTS: Mild dilatation of the common bile duct is within expected   limits following cholecystectomy.  GALLBLADDER: Cholecystectomy.  SPLEEN: Within normal limits.  PANCREAS: Within normal limits.  ADRENALS: Within normal limits.  KIDNEYS/URETERS: Within normal limits.    BLADDER: Within normal limits.  REPRODUCTIVE ORGANS: Hysterectomy.    BOWEL: The stomach is distended. The small bowel is dilated with a zone   of transition likely involving the distal ileum. There is no pneumatosis.   No free air.  PERITONEUM: No ascites.  VESSELS:  Within normal limits.  RETROPERITONEUM: No lymphadenopathy.    ABDOMINAL WALL: Within normal limits.  BONES: Degenerative changes of the spine.    IMPRESSION:     Chest:  1.  The solid component of the patient's left upper lobe cavitary mass is   increased in size from 12/18/2018 and is compatible with the known   history of lung cancer that extends centrally.    Abdomen and pelvis:  1.  Small bowel obstruction with joint position to decompressed bowel in   the distal ileum; the discrete point of transition is not identified at   CT.  2.  The above findings were discussed with Dr. Peralta by Dr. Negron on   1/15/2019 6:48 PM, with read-back verification.

## 2019-01-16 NOTE — PROGRESS NOTE ADULT - ATTENDING COMMENTS
Patient seen/examined.  Agree w above note and plan and have discussed plan w house staff.  PO narcan, IV fluid resuscitation, follow creatinine    Jose Elias Carrizales MD

## 2019-01-16 NOTE — CONSULT NOTE ADULT - SUBJECTIVE AND OBJECTIVE BOX
CHIEF COMPLAINT:Patient is a 73y old  Female who presents with a chief complaint of Small Bowel Obstruction (16 Jan 2019 12:08)      HPI: pt is well known to me.  This is a 72yo F with hx of breast cancer s/p mastectomy 2006, lung cancer, multiple sbos,with most recent 11/2018, s/p Hysterectomy, Liver Mass s/p liver rsxn 2009, S/P Appendectomy, S/P Cholecystectomy, S/P Exploratory Laparotomy,  S/P femoral hernia surgery 2012, who presents with c/o approx 2-3 days of NBNB vomiting, inability to tolerate po liquids or food, and has not passed gas. Her last BM was Sunday and has noticed that her abdomen has slowly increased in size and girth.  Pt last SBO resolved with NGT decompression, and aggressive fluid resuscitation. Pt denies any sick contacts, fevers, chills or diarrhea.  Labs obtained in the ED with metabolic alkalosis and CT Scan with SBO with transititon point in the terminal ileum.  General surgery consulted for management of SBO. (15 Jonathon 2019 20:28)      PAST MEDICAL & SURGICAL HISTORY:  Back pain  Bowel obstruction: multiple hospitalizations  Kidney stone  Emphysema  Narcotic Dependence  S/P Radiation Therapy  S/P Chemotherapy, Time Since Greater than 12 Weeks  Narcotic Dysmotile Cilia Syndrome  Chronic Pain Following Surgery or Procedure: following right breast mastectomy  Ankle Fracture: right anle fx s/p cast 2009  History of Small Bowel Obstruction: 1/2010  Asthma  Breast Cancer  S/P hernia surgery: femoral hernia - 2012  S/P Exploratory Laparotomy  S/P Appendectomy  S/P Cholecystectomy  Liver Mass s/p liver rsxn: s/p resection 2009  History of Hysterectomy: 1998  S/P Right Mastectomy: 2006      MEDICATIONS  (STANDING):  ALBUTerol/ipratropium for Nebulization 3 milliLiter(s) Nebulizer every 6 hours  buDESOnide   0.5 milliGRAM(s) Respule 0.5 milliGRAM(s) Inhalation every 12 hours  chlorhexidine 4% Liquid 1 Application(s) Topical <User Schedule>  enoxaparin Injectable 30 milliGRAM(s) SubCutaneous daily  influenza   Vaccine 0.5 milliLiter(s) IntraMuscular once  lactated ringers Bolus 1000 milliLiter(s) IV Bolus once  lidocaine   Patch 1 Patch Transdermal daily  naloxegol 25 milliGRAM(s) Oral daily  nicotine -  14 mG/24Hr(s) Patch 1 patch Transdermal daily  pantoprazole  Injectable 40 milliGRAM(s) IV Push daily  sodium chloride 0.9%. 1000 milliLiter(s) (100 mL/Hr) IV Continuous <Continuous>    MEDICATIONS  (PRN):      FAMILY HISTORY:  No pertinent family history in first degree relatives      SOCIAL HISTORY:    [ ] Non-smoker  [ ] Smoker  [ ] Alcohol    Allergies    Biaxin (Rash)  Cipro (Headache)  Flagyl (Headache)  penicillin (Rash; Swelling)  sulfa drugs (Unknown)    Intolerances    	    REVIEW OF SYSTEMS:  CONSTITUTIONAL: No fever, weight loss, or fatigue  EYES: No eye pain, visual disturbances, or discharge  ENT:  No difficulty hearing, tinnitus, vertigo; No sinus or throat pain  NECK: No pain or stiffness  RESPIRATORY: No cough, wheezing, chills or hemoptysis; +Shortness of Breath  CARDIOVASCULAR: No chest pain, palpitations, passing out, dizziness, or leg swelling  GASTROINTESTINAL: + abdominal or epigastric pain. No nausea, vomiting, or hematemesis; No diarrhea or constipation. No melena or hematochezia.  GENITOURINARY: No dysuria, frequency, hematuria, or incontinence  NEUROLOGICAL: No headaches, memory loss, loss of strength, numbness, or tremors  SKIN: No itching, burning, rashes, or lesions   LYMPH Nodes: No enlarged glands  ENDOCRINE: No heat or cold intolerance; No hair loss  MUSCULOSKELETAL: No joint pain or swelling; No muscle, back, or extremity pain  PSYCHIATRIC: No depression, anxiety, mood swings, or difficulty sleeping  HEME/LYMPH: No easy bruising, or bleeding gums  ALLERGY AND IMMUNOLOGIC: No hives or eczema	    [ ] All others negative	  [ ] Unable to obtain    PHYSICAL EXAM:  T(C): 36.3 (01-16-19 @ 15:00), Max: 36.8 (01-16-19 @ 03:00)  HR: 86 (01-16-19 @ 16:00) (64 - 94)  BP: 123/59 (01-16-19 @ 16:00) (77/41 - 133/60)  RR: 13 (01-16-19 @ 16:00) (10 - 27)  SpO2: 93% (01-16-19 @ 16:00) (90% - 100%)  Wt(kg): --  I&O's Summary    15 Jonathon 2019 07:01  -  16 Jan 2019 07:00  --------------------------------------------------------  IN: 1450 mL / OUT: 815 mL / NET: 635 mL    16 Jan 2019 07:01  -  16 Jan 2019 17:05  --------------------------------------------------------  IN: 2325 mL / OUT: 475 mL / NET: 1850 mL        Appearance: Normal	  HEENT:   Normal oral mucosa, PERRL, EOMI	  Lymphatic: No lymphadenopathy  Cardiovascular: Normal S1 S2, No JVD, +murmurs, No edema  Respiratory:  decrease bs  Psychiatry: A & O x 3, Mood & affect appropriate  Gastrointestinal:  Soft, Non-tender, + BS	  Skin: No rashes, No ecchymoses, No cyanosis	  Neurologic: Non-focal  Extremities: Normal range of motion, No clubbing, cyanosis or edema  Vascular: Peripheral pulses palpable 2+ bilaterally    TELEMETRY: 	    ECG:  	  RADIOLOGY:  OTHER: 	  	  LABS:	 	    CARDIAC MARKERS:                              13.1   7.8   )-----------( 282      ( 16 Jan 2019 01:06 )             40.1     01-16    142  |  98  |  63<H>  ----------------------------<  75  3.9   |  21<L>  |  4.87<H>    Ca    9.0      16 Jan 2019 15:03  Phos  7.3     01-16  Mg     2.3     01-16    TPro  8.8<H>  /  Alb  4.9  /  TBili  0.5  /  DBili  x   /  AST  13  /  ALT  13  /  AlkPhos  207<H>  01-15    proBNP:   Lipid Profile:   HgA1c:   TSH:   PT/INR - ( 15 Jonathon 2019 16:12 )   PT: 13.2 sec;   INR: 1.15 ratio         PTT - ( 15 Jonathon 2019 16:12 )  PTT:41.5 sec    PREVIOUS DIAGNOSTIC TESTING:    < from: CT Abdomen and Pelvis No Cont (01.15.19 @ 18:29) >  1.  The solid component of the patient's left upper lobe cavitary mass is   increased in size from 12/18/2018 and is compatible with the known   history of lung cancer that extends centrally.    Abdomen and pelvis:  1.  Small bowel obstruction with joint position to decompressed bowel in   the distal ileum; the discrete point of transition is not identified at   CT.  2.  The above findings were discussed with Dr. Peralta by Dr. Negron on   1/15/2019 6:48 PM, with read-back verification.    < from: Xray Chest 1 View- PORTABLE-Routine (01.16.19 @ 07:17) >  The heart is normal in size. Emphysematous changes of the lungs is   evident. Cavitary mass is seen in the left upper lobe. This is better   visualized on CT scan that was performed on July 15, 2019. NG tube is in   the stomach. No change in the appearance of the chest whencompared to   previous study done January 15, 2019. Metallic clips are seen in the soft   tissue of the right axilla.    < from: 12 Lead ECG (11.21.18 @ 18:15) >  Diagnosis Line SINUS TACHYCARDIA  LEFT AXIS DEVIATION  ANTERIOR INFARCT , AGE UNDETERMINED    < end of copied text >

## 2019-01-16 NOTE — CONSULT NOTE ADULT - ASSESSMENT
pt with sig risk factor for cad with unknown cad, and abnormal ecg and drug dependency and lung mass with recurrence sbo  echo  to r/o pulmonary htn  lung mass  watch for withdrawal from opiates  dvt prophylaxis  inhaler

## 2019-01-16 NOTE — PROGRESS NOTE ADULT - ASSESSMENT
73 year old female with PMH Lung mass, chronic Back pain (on Narcotics), history of multiple SBO's, narcotic associated constipation/ileus, COPD, current everyday smoker admitted with abdominal pain and distension, nausea and poor PO intake with associated worsening painful spasms.  Clinically emaciated and severely dehydrated   CT + SBO  SHAKIRA    PLAN  -IV hydration, Monitor Cr and UO  -SICU care  -NGT decompression  -PO Narcan as per Surgical recs  -monitor serial abdominal exams  -Electrolyte replacement as ordered    Discussed with SICU team    Jake Khan PA-C    [facility]  [phone]

## 2019-01-16 NOTE — PROGRESS NOTE ADULT - ASSESSMENT
73 F w/ PMHx most notable for multiple Ab surgeries and recurrent SBOs  p/w inability to tolerate oral intake and worsening n/vomiting for 3 days, concern for obstruction again now with CT demonstrating SBO with likely transition point in distal ileum, hypotensive on presentation but resolved with fluid resuscitation clinically improving     Neuro: Hx Narcotic Dependence  - Pain control PRN  - monitor for changes in MS  - c/w nicotine patch given smoking hx  - Lidoderm for chronic back pain    Resp: COPD, Active Smoker, Lung CA  - c/w home meds  - Maintain O2> 88%  - Daily CXR    CV: Hypotension likely 2/2 hypovolemia improving  - Maintain MAP > 65 mmHg    GI: SBO  - NPO  - NGT to LCWS  - Monitor electrolytes    Renal: Acute renal failure Cre 7.3 baseline 1.6  - Trend Cre  - NS @ 150  - Chavarria for Strict I's and O's  - f/u Urine Lytes    Heme: No acute issues  - Daily CBC    ID: No acute issues  - Panculture if febrile    Dispo: SICU

## 2019-01-16 NOTE — CONSULT NOTE ADULT - ASSESSMENT
73 year old female with   PMH chronic Back pain (on Narcotics), history of multiple SBO's, narcotic associated constipation/ileus, COPD,  smoker,  ca  breast,  right  mastectomy   admitted with abdominal pain  /  no  bowel  movement    c/c  cachexia  and , with acute Renal Failure (SHAKIRA) with marked hyperphosphatemia, acidosis  ct  scans  noted/old   RENUKA  cavitary lesion./   SBO,   npo/  ngtube  seen by  oncology, on prior  admissions/ d r forte  in sicu/       < from: CT Abdomen and Pelvis No Cont (01.15.19 @ 18:29) >  Chest:  1.  The solid component of the patient's left upper lobe cavitary mass is   increased in size from 12/18/2018 and is compatible with the known   history of lung cancer that extends centrally.    Abdomen and pelvis:  1.  Small bowel obstruction with joint position to decompressed bowel in   the distal ileum; the discrete point of transition is not identified at   CT.  2.  The above findings were discussed with Dr. Peralta by Dr. Negron on   < end of copied text >

## 2019-01-16 NOTE — CONSULT NOTE ADULT - SUBJECTIVE AND OBJECTIVE BOX
HPI:  This is a 74yo F with hx of breast cancer s/p mastectomy 2006, lung cancer, multiple sbos,with most recent 11/2018, s/p Hysterectomy, Liver Mass s/p liver rsxn 2009, S/P Appendectomy, S/P Cholecystectomy, S/P Exploratory Laparotomy,  S/P femoral hernia surgery 2012,   who presents with c/o approx 2-3 days of NBNB vomiting, inability to tolerate po liquids or food, and has not passed gas. Her last BM was Sunday and has noticed that her abdomen has slowly increased in size and girth.  Pt last SBO resolved with NGT decompression, and aggressive fluid resuscitation. Pt denies any sick contacts, fevers, chills or diarrhea.  on  admisssion, with  with metabolic alkalosis and   CT Scan with SBO with transititon point in the terminal ileum.  General surgery consulted for management of SBO. (15 Jonathon 2019 20:28)      REVIEW OF SYSTEMS:  GEN: no fever,    no chills  RESP: no SOB,   no cough  CVS: no chest pain,   no palpitations  GI:   had abdominal pain,   no nausea,   no vomiting,   no constipation,   no diarrhea  : no dysuria,   no frequency  NEURO: no headache,   no dizziness  PSYCH: no depression,   not anxious  Derm : no rash    Allergies    Biaxin (Rash)  Cipro (Headache)  Flagyl (Headache)  penicillin (Rash; Swelling)  sulfa drugs (Unknown)    Intolerances        CAPILLARY BLOOD GLUCOSE        I&O's Summary    15 Jonathon 2019 07:01  -  16 Jan 2019 07:00  --------------------------------------------------------  IN: 1450 mL / OUT: 815 mL / NET: 635 mL    16 Jan 2019 07:01  -  16 Jan 2019 12:09  --------------------------------------------------------  IN: 1700 mL / OUT: 50 mL / NET: 1650 mL        Vital Signs Last 24 Hrs  T(C): 36.4 (16 Jan 2019 07:00), Max: 36.8 (16 Jan 2019 03:00)  T(F): 97.6 (16 Jan 2019 07:00), Max: 98.2 (16 Jan 2019 03:00)  HR: 89 (16 Jan 2019 12:00) (64 - 100)  BP: 133/60 (16 Jan 2019 12:00) (77/41 - 133/60)  BP(mean): 86 (16 Jan 2019 12:00) (54 - 88)  RR: 23 (16 Jan 2019 12:00) (10 - 27)  SpO2: 91% (16 Jan 2019 12:00) (88% - 100%)  PHYSICAL EXAM:  GENERAL: NAD,   HEAD:  Atraumatic, Normocephalic  EYES: EOMI,  NECK: Supple, No JVD  CHEST/LUNG: Clear to auscultation bilaterally; No wheeze  HEART: Regular rate and rhythm;        murmur  ABDOMEN: Soft, Nontender,   EXTREMITIES:         edema  NEUROLOGY:  alert    MEDICATIONS  (STANDING):  ALBUTerol/ipratropium for Nebulization 3 milliLiter(s) Nebulizer every 6 hours  buDESOnide   0.5 milliGRAM(s) Respule 0.5 milliGRAM(s) Inhalation every 12 hours  chlorhexidine 4% Liquid 1 Application(s) Topical <User Schedule>  enoxaparin Injectable 30 milliGRAM(s) SubCutaneous daily  influenza   Vaccine 0.5 milliLiter(s) IntraMuscular once  lactated ringers Bolus 1000 milliLiter(s) IV Bolus once  lidocaine   Patch 1 Patch Transdermal daily  naloxegol 25 milliGRAM(s) Oral daily  nicotine -  14 mG/24Hr(s) Patch 1 patch Transdermal daily  pantoprazole  Injectable 40 milliGRAM(s) IV Push daily  sodium chloride 0.9%. 1000 milliLiter(s) (100 mL/Hr) IV Continuous <Continuous>    MEDICATIONS  (PRN):    LABS:                        13.1   7.8   )-----------( 282      ( 16 Jan 2019 01:06 )             40.1     01-16    142  |  91<L>  |  67<H>  ----------------------------<  84  4.1   |  28  |  6.25<H>    Ca    7.2<L>      16 Jan 2019 06:50  Phos  8.3     01-16  Mg     2.2     01-16    TPro  8.8<H>  /  Alb  4.9  /  TBili  0.5  /  DBili  x   /  AST  13  /  ALT  13  /  AlkPhos  207<H>  01-15    PT/INR - ( 15 Jonathon 2019 16:12 )   PT: 13.2 sec;   INR: 1.15 ratio         PTT - ( 15 Jonathon 2019 16:12 )  PTT:41.5 sec            01-15 @ 20:48  3.7  24              Consultant(s) Notes Reviewed:      Care Discussed with Consultants/Other Providers:  Plan:

## 2019-01-17 LAB
ANION GAP SERPL CALC-SCNC: 13 MMOL/L — SIGNIFICANT CHANGE UP (ref 5–17)
ANION GAP SERPL CALC-SCNC: 15 MMOL/L — SIGNIFICANT CHANGE UP (ref 5–17)
ANION GAP SERPL CALC-SCNC: 16 MMOL/L — SIGNIFICANT CHANGE UP (ref 5–17)
APTT BLD: 31.9 SEC — SIGNIFICANT CHANGE UP (ref 27.5–36.3)
BUN SERPL-MCNC: 40 MG/DL — HIGH (ref 7–23)
BUN SERPL-MCNC: 46 MG/DL — HIGH (ref 7–23)
BUN SERPL-MCNC: 57 MG/DL — HIGH (ref 7–23)
CALCIUM SERPL-MCNC: 5.5 MG/DL — CRITICAL LOW (ref 8.4–10.5)
CALCIUM SERPL-MCNC: 7.5 MG/DL — LOW (ref 8.4–10.5)
CALCIUM SERPL-MCNC: 8.3 MG/DL — LOW (ref 8.4–10.5)
CHLORIDE SERPL-SCNC: 102 MMOL/L — SIGNIFICANT CHANGE UP (ref 96–108)
CHLORIDE SERPL-SCNC: 106 MMOL/L — SIGNIFICANT CHANGE UP (ref 96–108)
CHLORIDE SERPL-SCNC: 115 MMOL/L — HIGH (ref 96–108)
CO2 SERPL-SCNC: 16 MMOL/L — LOW (ref 22–31)
CO2 SERPL-SCNC: 17 MMOL/L — LOW (ref 22–31)
CO2 SERPL-SCNC: 24 MMOL/L — SIGNIFICANT CHANGE UP (ref 22–31)
CREAT SERPL-MCNC: 2.12 MG/DL — HIGH (ref 0.5–1.3)
CREAT SERPL-MCNC: 2.39 MG/DL — HIGH (ref 0.5–1.3)
CREAT SERPL-MCNC: 3.48 MG/DL — HIGH (ref 0.5–1.3)
GAS PNL BLDA: SIGNIFICANT CHANGE UP
GLUCOSE SERPL-MCNC: 110 MG/DL — HIGH (ref 70–99)
GLUCOSE SERPL-MCNC: 132 MG/DL — HIGH (ref 70–99)
GLUCOSE SERPL-MCNC: 134 MG/DL — HIGH (ref 70–99)
HCT VFR BLD CALC: 30.6 % — LOW (ref 34.5–45)
HCT VFR BLD CALC: 34.1 % — LOW (ref 34.5–45)
HCT VFR BLD CALC: 44.5 % — SIGNIFICANT CHANGE UP (ref 34.5–45)
HGB BLD-MCNC: 11.3 G/DL — LOW (ref 11.5–15.5)
HGB BLD-MCNC: 13.7 G/DL — SIGNIFICANT CHANGE UP (ref 11.5–15.5)
HGB BLD-MCNC: 9.7 G/DL — LOW (ref 11.5–15.5)
INR BLD: 1.05 RATIO — SIGNIFICANT CHANGE UP (ref 0.88–1.16)
MAGNESIUM SERPL-MCNC: 1.9 MG/DL — SIGNIFICANT CHANGE UP (ref 1.6–2.6)
MAGNESIUM SERPL-MCNC: 2 MG/DL — SIGNIFICANT CHANGE UP (ref 1.6–2.6)
MCHC RBC-ENTMCNC: 30.4 PG — SIGNIFICANT CHANGE UP (ref 27–34)
MCHC RBC-ENTMCNC: 30.6 PG — SIGNIFICANT CHANGE UP (ref 27–34)
MCHC RBC-ENTMCNC: 30.8 GM/DL — LOW (ref 32–36)
MCHC RBC-ENTMCNC: 31.5 PG — SIGNIFICANT CHANGE UP (ref 27–34)
MCHC RBC-ENTMCNC: 31.7 GM/DL — LOW (ref 32–36)
MCHC RBC-ENTMCNC: 33 GM/DL — SIGNIFICANT CHANGE UP (ref 32–36)
MCV RBC AUTO: 95.4 FL — SIGNIFICANT CHANGE UP (ref 80–100)
MCV RBC AUTO: 96.3 FL — SIGNIFICANT CHANGE UP (ref 80–100)
MCV RBC AUTO: 98.6 FL — SIGNIFICANT CHANGE UP (ref 80–100)
PHOSPHATE SERPL-MCNC: 3.2 MG/DL — SIGNIFICANT CHANGE UP (ref 2.5–4.5)
PHOSPHATE SERPL-MCNC: 5.8 MG/DL — HIGH (ref 2.5–4.5)
PLATELET # BLD AUTO: 131 K/UL — LOW (ref 150–400)
PLATELET # BLD AUTO: 158 K/UL — SIGNIFICANT CHANGE UP (ref 150–400)
PLATELET # BLD AUTO: 225 K/UL — SIGNIFICANT CHANGE UP (ref 150–400)
POTASSIUM SERPL-MCNC: 3.3 MMOL/L — LOW (ref 3.5–5.3)
POTASSIUM SERPL-MCNC: 3.3 MMOL/L — LOW (ref 3.5–5.3)
POTASSIUM SERPL-MCNC: 5.6 MMOL/L — HIGH (ref 3.5–5.3)
POTASSIUM SERPL-SCNC: 3.3 MMOL/L — LOW (ref 3.5–5.3)
POTASSIUM SERPL-SCNC: 3.3 MMOL/L — LOW (ref 3.5–5.3)
POTASSIUM SERPL-SCNC: 5.6 MMOL/L — HIGH (ref 3.5–5.3)
PROTHROM AB SERPL-ACNC: 12.1 SEC — SIGNIFICANT CHANGE UP (ref 10–12.9)
RBC # BLD: 3.18 M/UL — LOW (ref 3.8–5.2)
RBC # BLD: 3.57 M/UL — LOW (ref 3.8–5.2)
RBC # BLD: 4.52 M/UL — SIGNIFICANT CHANGE UP (ref 3.8–5.2)
RBC # FLD: 12.7 % — SIGNIFICANT CHANGE UP (ref 10.3–14.5)
RBC # FLD: 12.8 % — SIGNIFICANT CHANGE UP (ref 10.3–14.5)
RBC # FLD: 13 % — SIGNIFICANT CHANGE UP (ref 10.3–14.5)
SODIUM SERPL-SCNC: 138 MMOL/L — SIGNIFICANT CHANGE UP (ref 135–145)
SODIUM SERPL-SCNC: 142 MMOL/L — SIGNIFICANT CHANGE UP (ref 135–145)
SODIUM SERPL-SCNC: 144 MMOL/L — SIGNIFICANT CHANGE UP (ref 135–145)
WBC # BLD: 6.8 K/UL — SIGNIFICANT CHANGE UP (ref 3.8–10.5)
WBC # BLD: 8.1 K/UL — SIGNIFICANT CHANGE UP (ref 3.8–10.5)
WBC # BLD: 8.2 K/UL — SIGNIFICANT CHANGE UP (ref 3.8–10.5)
WBC # FLD AUTO: 6.8 K/UL — SIGNIFICANT CHANGE UP (ref 3.8–10.5)
WBC # FLD AUTO: 8.1 K/UL — SIGNIFICANT CHANGE UP (ref 3.8–10.5)
WBC # FLD AUTO: 8.2 K/UL — SIGNIFICANT CHANGE UP (ref 3.8–10.5)

## 2019-01-17 PROCEDURE — 99232 SBSQ HOSP IP/OBS MODERATE 35: CPT

## 2019-01-17 PROCEDURE — 71045 X-RAY EXAM CHEST 1 VIEW: CPT | Mod: 26

## 2019-01-17 RX ORDER — POTASSIUM CHLORIDE 20 MEQ
20 PACKET (EA) ORAL
Qty: 0 | Refills: 0 | Status: DISCONTINUED | OUTPATIENT
Start: 2019-01-17 | End: 2019-01-17

## 2019-01-17 RX ORDER — POTASSIUM CHLORIDE 20 MEQ
10 PACKET (EA) ORAL
Qty: 0 | Refills: 0 | Status: DISCONTINUED | OUTPATIENT
Start: 2019-01-17 | End: 2019-01-17

## 2019-01-17 RX ORDER — DOCUSATE SODIUM 100 MG
100 CAPSULE ORAL THREE TIMES A DAY
Qty: 0 | Refills: 0 | Status: DISCONTINUED | OUTPATIENT
Start: 2019-01-17 | End: 2019-01-18

## 2019-01-17 RX ORDER — POTASSIUM CHLORIDE 20 MEQ
40 PACKET (EA) ORAL ONCE
Qty: 0 | Refills: 0 | Status: COMPLETED | OUTPATIENT
Start: 2019-01-17 | End: 2019-01-17

## 2019-01-17 RX ORDER — SENNA PLUS 8.6 MG/1
2 TABLET ORAL AT BEDTIME
Qty: 0 | Refills: 0 | Status: DISCONTINUED | OUTPATIENT
Start: 2019-01-17 | End: 2019-01-18

## 2019-01-17 RX ORDER — SODIUM CHLORIDE 9 MG/ML
1000 INJECTION INTRAMUSCULAR; INTRAVENOUS; SUBCUTANEOUS
Qty: 0 | Refills: 0 | Status: DISCONTINUED | OUTPATIENT
Start: 2019-01-17 | End: 2019-01-19

## 2019-01-17 RX ORDER — CALCIUM GLUCONATE 100 MG/ML
2 VIAL (ML) INTRAVENOUS ONCE
Qty: 0 | Refills: 0 | Status: COMPLETED | OUTPATIENT
Start: 2019-01-17 | End: 2019-01-17

## 2019-01-17 RX ORDER — MAGNESIUM SULFATE 500 MG/ML
2 VIAL (ML) INJECTION ONCE
Qty: 0 | Refills: 0 | Status: COMPLETED | OUTPATIENT
Start: 2019-01-17 | End: 2019-01-17

## 2019-01-17 RX ORDER — DIAZEPAM 5 MG
2.5 TABLET ORAL
Qty: 0 | Refills: 0 | Status: DISCONTINUED | OUTPATIENT
Start: 2019-01-17 | End: 2019-01-22

## 2019-01-17 RX ADMIN — OXYCODONE HYDROCHLORIDE 10 MILLIGRAM(S): 5 TABLET ORAL at 08:42

## 2019-01-17 RX ADMIN — Medication 40 MILLIEQUIVALENT(S): at 06:19

## 2019-01-17 RX ADMIN — Medication 1 PATCH: at 12:06

## 2019-01-17 RX ADMIN — Medication 0.5 MILLIGRAM(S): at 17:06

## 2019-01-17 RX ADMIN — OXYCODONE HYDROCHLORIDE 10 MILLIGRAM(S): 5 TABLET ORAL at 00:32

## 2019-01-17 RX ADMIN — Medication 20 MILLIEQUIVALENT(S): at 03:59

## 2019-01-17 RX ADMIN — OXYCODONE HYDROCHLORIDE 10 MILLIGRAM(S): 5 TABLET ORAL at 20:33

## 2019-01-17 RX ADMIN — Medication 3 MILLILITER(S): at 11:49

## 2019-01-17 RX ADMIN — Medication 0.5 MILLIGRAM(S): at 05:06

## 2019-01-17 RX ADMIN — Medication 2.5 MILLIGRAM(S): at 16:16

## 2019-01-17 RX ADMIN — Medication 100 MILLIEQUIVALENT(S): at 05:24

## 2019-01-17 RX ADMIN — NALOXEGOL OXALATE 25 MILLIGRAM(S): 12.5 TABLET, FILM COATED ORAL at 12:01

## 2019-01-17 RX ADMIN — Medication 200 GRAM(S): at 17:43

## 2019-01-17 RX ADMIN — SENNA PLUS 2 TABLET(S): 8.6 TABLET ORAL at 21:32

## 2019-01-17 RX ADMIN — Medication 1 PATCH: at 07:45

## 2019-01-17 RX ADMIN — Medication 3 MILLILITER(S): at 05:06

## 2019-01-17 RX ADMIN — Medication 3 MILLILITER(S): at 17:03

## 2019-01-17 RX ADMIN — PANTOPRAZOLE SODIUM 40 MILLIGRAM(S): 20 TABLET, DELAYED RELEASE ORAL at 12:01

## 2019-01-17 RX ADMIN — Medication 100 MILLIGRAM(S): at 21:32

## 2019-01-17 RX ADMIN — OXYCODONE HYDROCHLORIDE 10 MILLIGRAM(S): 5 TABLET ORAL at 21:00

## 2019-01-17 RX ADMIN — OXYCODONE HYDROCHLORIDE 10 MILLIGRAM(S): 5 TABLET ORAL at 00:02

## 2019-01-17 RX ADMIN — Medication 1 PATCH: at 12:02

## 2019-01-17 RX ADMIN — Medication 50 GRAM(S): at 03:58

## 2019-01-17 RX ADMIN — LIDOCAINE 1 PATCH: 4 CREAM TOPICAL at 00:03

## 2019-01-17 RX ADMIN — OXYCODONE HYDROCHLORIDE 10 MILLIGRAM(S): 5 TABLET ORAL at 09:15

## 2019-01-17 RX ADMIN — LIDOCAINE 1 PATCH: 4 CREAM TOPICAL at 12:01

## 2019-01-17 RX ADMIN — SODIUM CHLORIDE 100 MILLILITER(S): 9 INJECTION INTRAMUSCULAR; INTRAVENOUS; SUBCUTANEOUS at 00:02

## 2019-01-17 RX ADMIN — ENOXAPARIN SODIUM 30 MILLIGRAM(S): 100 INJECTION SUBCUTANEOUS at 12:01

## 2019-01-17 RX ADMIN — LIDOCAINE 1 PATCH: 4 CREAM TOPICAL at 19:49

## 2019-01-17 RX ADMIN — Medication 40 MILLIEQUIVALENT(S): at 12:01

## 2019-01-17 RX ADMIN — CHLORHEXIDINE GLUCONATE 1 APPLICATION(S): 213 SOLUTION TOPICAL at 05:27

## 2019-01-17 NOTE — DIETITIAN INITIAL EVALUATION ADULT. - NS AS NUTRI INTERV MEDICAL AND FOOD SUPPLEMENTS
Commercial beverage/Add 2 servings Ensure Enlive (provides 350cal, 20Gm protein per 8oz serving). High Protein Gelatin added to meal trays. Modified food/Mighty Shakes (200 calories, 6 Gm protein) and Magic Cup added to meal trays; pt refuses all other nutrition supplements./Modified beverage

## 2019-01-17 NOTE — DIETITIAN INITIAL EVALUATION ADULT. - NS FNS WEIGHT CHANGE REASON
In October 2018, pt reported 1 year weight loss from 118 pounds to 89 pounds. Dosing wt 11/21/18 = 88 pounds, current dosing wt = 80 pounds/unintentional

## 2019-01-17 NOTE — PHYSICAL THERAPY INITIAL EVALUATION ADULT - PLANNED THERAPY INTERVENTIONS, PT EVAL
balance training/gait training/transfer training/GOAL: Stair Negotiation Training: Patient will be able to negotiate up & down 1 flight of stairs with bilateral rails, step to gait pattern, in 2 weeks./strengthening/bed mobility training

## 2019-01-17 NOTE — PROGRESS NOTE ADULT - SUBJECTIVE AND OBJECTIVE BOX
CARDIOLOGY     PROGRESS  NOTE   ________________________________________________    CHIEF COMPLAINT:Patient is a 73y old  Female who presents with a chief complaint of Small Bowel Obstruction (17 Jan 2019 08:14)  c/o pain.  	  REVIEW OF SYSTEMS:  CONSTITUTIONAL: No fever, weight loss, or fatigue  EYES: No eye pain, visual disturbances, or discharge  ENT:  No difficulty hearing, tinnitus, vertigo; No sinus or throat pain  NECK: No pain or stiffness  RESPIRATORY: No cough, wheezing, chills or hemoptysis; No Shortness of Breath  CARDIOVASCULAR: No chest pain, palpitations, passing out, dizziness, or leg swelling  GASTROINTESTINAL: No abdominal or epigastric pain. No nausea, vomiting, or hematemesis; No diarrhea or constipation. No melena or hematochezia.  GENITOURINARY: No dysuria, frequency, hematuria, or incontinence  NEUROLOGICAL: No headaches, memory loss, loss of strength, numbness, or tremors  SKIN: No itching, burning, rashes, or lesions   LYMPH Nodes: No enlarged glands  ENDOCRINE: No heat or cold intolerance; No hair loss  MUSCULOSKELETAL: No joint pain or swelling; No muscle, back, or extremity pain  PSYCHIATRIC: No depression, anxiety, mood swings, or difficulty sleeping  HEME/LYMPH: No easy bruising, or bleeding gums  ALLERGY AND IMMUNOLOGIC: No hives or eczema	    [ ] All others negative	  [x ] Unable to obtain    PHYSICAL EXAM:  T(C): 36.8 (01-17-19 @ 07:00), Max: 36.9 (01-16-19 @ 23:00)  HR: 82 (01-17-19 @ 07:00) (64 - 90)  BP: 138/64 (01-17-19 @ 07:00) (96/55 - 146/67)  RR: 20 (01-17-19 @ 07:00) (11 - 23)  SpO2: 98% (01-17-19 @ 07:00) (86% - 99%)  Wt(kg): --  I&O's Summary    16 Jan 2019 07:01  -  17 Jan 2019 07:00  --------------------------------------------------------  IN: 5355 mL / OUT: 1325 mL / NET: 4030 mL    17 Jan 2019 07:01  -  17 Jan 2019 09:16  --------------------------------------------------------  IN: 200 mL / OUT: 100 mL / NET: 100 mL        Appearance: Normal	  HEENT:   Normal oral mucosa, PERRL, EOMI	  Lymphatic: No lymphadenopathy  Cardiovascular: Normal S1 S2, No JVD, + murmurs, No edema  Respiratory: Lungs clear to auscultation	  Psychiatry: A & O x 3, Mood & affect appropriate  Gastrointestinal:  Soft, Non-tender, + BS	  Skin: No rashes, No ecchymoses, No cyanosis	  Neurologic: Non-focal  Extremities: Normal range of motion, No clubbing, cyanosis or edema  Vascular: Peripheral pulses palpable 2+ bilaterally    MEDICATIONS  (STANDING):  ALBUTerol/ipratropium for Nebulization 3 milliLiter(s) Nebulizer every 6 hours  buDESOnide   0.5 milliGRAM(s) Respule 0.5 milliGRAM(s) Inhalation every 12 hours  chlorhexidine 4% Liquid 1 Application(s) Topical <User Schedule>  docusate sodium 100 milliGRAM(s) Oral three times a day  enoxaparin Injectable 30 milliGRAM(s) SubCutaneous daily  influenza   Vaccine 0.5 milliLiter(s) IntraMuscular once  lidocaine   Patch 1 Patch Transdermal daily  naloxegol 25 milliGRAM(s) Oral daily  nicotine -  14 mG/24Hr(s) Patch 1 patch Transdermal daily  pantoprazole  Injectable 40 milliGRAM(s) IV Push daily  senna 2 Tablet(s) Oral at bedtime  sodium chloride 0.9%. 1000 milliLiter(s) (100 mL/Hr) IV Continuous <Continuous>      TELEMETRY: 	    ECG:  	  RADIOLOGY:  OTHER: 	  	  LABS:	 	    CARDIAC MARKERS:                                11.3   8.2   )-----------( 225      ( 17 Jan 2019 03:06 )             34.1     01-17    142  |  102  |  57<H>  ----------------------------<  132<H>  3.3<L>   |  24  |  3.48<H>    Ca    7.5<L>      17 Jan 2019 03:05  Phos  5.8     01-17  Mg     1.9     01-17    TPro  8.8<H>  /  Alb  4.9  /  TBili  0.5  /  DBili  x   /  AST  13  /  ALT  13  /  AlkPhos  207<H>  01-15    proBNP:   Lipid Profile:   HgA1c:   TSH:   PT/INR - ( 17 Jan 2019 03:05 )   PT: 12.1 sec;   INR: 1.05 ratio         PTT - ( 17 Jan 2019 03:05 )  PTT:31.9 sec      Assessment and plan  ---------------------------  observe for withrawal symtomps  echo   wants pain meds  npo  ivf  dvt prophylaxis  f/u i/o

## 2019-01-17 NOTE — DIETITIAN INITIAL EVALUATION ADULT. - FACTORS AFF FOOD INTAKE
Pt NPO secondary to SBO until diet advanced to clears on 1/16 and Regular diet 1/17. Pt with NGT output of 50ml, now discontinued. Pt now with multiple BMs today and SBO resolved. Pt NPO secondary to SBO until diet advanced to clears on 1/16 and Regular diet 1/17. Pt with NGT output of 50ml, now discontinued. Pt now with multiple BMs today and SBO resolved. Pt reports she ate 100% of salmon and potato for lunch with good appetite and no GI distress. Pt is amenable to trying  Mighty Shakes (200 calories, 6 Gm protein) and Magic Cup supplement (290 calories, 9 Gm protein), however refuses Ensure, High Protein Gelatin and other supplements or food preferences.

## 2019-01-17 NOTE — CHART NOTE - NSCHARTNOTEFT_GEN_A_CORE
Upon Nutritional Assessment by the Registered Dietitian your patient was determined to meet criteria / has evidence of the following diagnosis/diagnoses:          [ ]  Mild Protein Calorie Malnutrition        [ ]  Moderate Protein Calorie Malnutrition        [X ] Severe Protein Calorie Malnutrition, in context of chronic illness        [ ] Unspecified Protein Calorie Malnutrition        [X ] Underweight / BMI <19        [ ] Morbid Obesity / BMI > 40      Findings as based on:  [X ] Comprehensive nutrition assessment   [X] Nutrition Focused Physical Exam  [X ] Other: progressive 32% weight loss in ~1 year, po intake <50% of needs x 5 days, cachexia, BMI 14.6Kg/m2      Nutrition Plan/Recommendations:    1. Continue Regular diet  2. Mighty Shakes (200 calories, 6 Gm protein) and Magic Cup (290 calories, 9 Gm protein)  added to meal trays; pt refuses all other nutrition supplements      PROVIDER Section:     By signing this assessment you are acknowledging and agree with the diagnosis/diagnoses assigned by the Registered Dietitian    Comments:

## 2019-01-17 NOTE — HISTORY OF PRESENT ILLNESS
[FreeTextEntry1] : 74 y/o female, current every day smoker for 50 years ( 1 ppd), with Pmhx of s/p TAHBSO for hemangiocytoma in 1998, Right breast Ca s/p mastectomy and chemo in 2006, COPD, is referred by oncologist Dr. Chano Duke for RENUKA mass incidentally found from workup for SBO when hospitalization in Nov 2018. \par \par Subsequent PET/CT on 12/18/18:\par -a 5 x 3.3 cm hypermetabolic cavitary mass in the RENUKA extending from the left hilum to the pleural surface with SUV 15.1. Hypermetabolism extends into the mediastinum in the AP window region. \par \par PFT on 12/20/18: FEV1 pre 0.82, 46%; post 0.89, 50%; DLCO 8.3, 61%. \par \par She presents today for further evaluation. She admits SOB on exertion, intermittent cough and 10 Ibs weight loss within few weeks. She denies cheat pain, N/V, hemoptysis, palpitation. \par

## 2019-01-17 NOTE — PROGRESS NOTE ADULT - ASSESSMENT
73 year old female with   PMH chronic Back pain (on Narcotics), history of multiple SBO's, narcotic associated constipation/ileus, COPD,  smoker,  ca  breast,  right  mastectomy   admitted with abdominal pain  /  no  bowel  movement    c/c  cachexia  and , with acute Renal Failure (SHAKIRA) with marked hyperphosphatemia, acidosis  ct  scans  noted/old   RENUKA  cavitary lesion./   SBO,  seen by  oncology, on prior  admissions/ d r forte  oral  feeds/  limit narcotics        < from: CT Abdomen and Pelvis No Cont (01.15.19 @ 18:29) >  Chest:  1.  The solid component of the patient's left upper lobe cavitary mass is   increased in size from 12/18/2018 and is compatible with the known   history of lung cancer that extends centrally.    Abdomen and pelvis:  1.  Small bowel obstruction with joint position to decompressed bowel in   the distal ileum; the discrete point of transition is not identified at   CT.  2.  The above findings were discussed with Dr. Peralta by Dr. Negron on   < end of copied text >

## 2019-01-17 NOTE — CHART NOTE - NSCHARTNOTEFT_GEN_A_CORE
GENERAL SURGERY FLOOR TRANSFER NOTE    73y Female transferred to floor from SICU    SUBJECTIVE:  3F with COPD and current smoker with more than 30 packs year history, complicated surgical history (hysterectomy, appendectomy, cholecystectomy, femoral hernia repair, mastectomy and ex-lap with Lisa x 2), back pain on chronic narcotic use and recurrent SBO presented with 3 days of inability to tolerate PO, worsening emesis and decreased bowel function ( no flatus or BM for 2 days).   She has had multiple prior admissions for SBO, narcotic associated ileus/constipation, most recently Nov of this year managed nonoperatively with NGT decompression.    On presentation in ED, patient was hypotensive with SBP in 90's which improved to 120's with IV fluid hydration. Pt was found to be markedly distended, emaciated, and NGT was placed with bilious output returned on placement. Lab revealed acute renal failure with Cre increased to 7.3 , patient prior admission show baseline Cre of 1.6 as well as multiple electrolyte derangements. CT showed SBO with questionable transition point in distal ileum but no clear transition point.  SICU consulted for hemodynamic monitoring. On exam, remained distended, NGT canister with 400 cc bilious output otherwise vitals were stable. Pt denied any pain, SOB, fever, chills or CP.     During her stay in SICU, patient had return of bowel function and NGT was removed after narcan administration. Kidney function improved. Electrolytes corrected. Chavarria has been removed and patient is voiding     OBJECTIVE:    T(C): 37.1 (01-17-19 @ 18:20), Max: 37.1 (01-17-19 @ 18:20)  HR: 72 (01-17-19 @ 18:20) (72 - 94)  BP: 163/78 (01-17-19 @ 18:20) (106/56 - 163/78)  RR: 17 (01-17-19 @ 18:20) (12 - 23)  SpO2: 97% (01-17-19 @ 18:20) (85% - 100%)  Wt(kg): --      I&O's Summary    16 Jan 2019 07:01  -  17 Jan 2019 07:00  --------------------------------------------------------  IN: 5355 mL / OUT: 1325 mL / NET: 4030 mL    17 Jan 2019 07:01  -  17 Jan 2019 23:53  --------------------------------------------------------  IN: 890 mL / OUT: 600 mL / NET: 290 mL                              13.7   8.1   )-----------( 131      ( 17 Jan 2019 16:09 )             44.5       01-17    138  |  106  |  46<H>  ----------------------------<  134<H>  5.6<H>   |  17<L>  |  2.39<H>    Ca    8.3<L>      17 Jan 2019 16:09  Phos  3.2     01-17  Mg     2.0     01-17        MEDICATIONS  (STANDING):  ALBUTerol/ipratropium for Nebulization 3 milliLiter(s) Nebulizer every 6 hours  buDESOnide   0.5 milliGRAM(s) Respule 0.5 milliGRAM(s) Inhalation every 12 hours  chlorhexidine 4% Liquid 1 Application(s) Topical <User Schedule>  docusate sodium 100 milliGRAM(s) Oral three times a day  enoxaparin Injectable 30 milliGRAM(s) SubCutaneous daily  influenza   Vaccine 0.5 milliLiter(s) IntraMuscular once  lidocaine   Patch 1 Patch Transdermal daily  naloxegol 25 milliGRAM(s) Oral daily  nicotine -  14 mG/24Hr(s) Patch 1 patch Transdermal daily  pantoprazole  Injectable 40 milliGRAM(s) IV Push daily  senna 2 Tablet(s) Oral at bedtime  sodium chloride 0.9%. 1000 milliLiter(s) (50 mL/Hr) IV Continuous <Continuous>    MEDICATIONS  (PRN):  diazepam    Tablet 2.5 milliGRAM(s) Oral two times a day PRN Anxiety  oxyCODONE    IR 10 milliGRAM(s) Oral every 6 hours PRN Moderate Pain (4 - 6)        PHYSICAL EXAM:  General: NAD  Abdominal: Soft, minimally distended, non-tender, no rebound      ASSESSMENT   73F w/ PMH of multiple abd surgeries and recurrent SBOs, p/w w/ another ep of SBO shown by CT, w/ TP in distal ileum, now with return of bowel function and tolerating regular diet and creatinine down-trending    PLAN:   - appreciate recs from GI, Cards, and IM  - regular diet  - monitor GI function  - f/u AM labs  - home medications

## 2019-01-17 NOTE — PHYSICAL THERAPY INITIAL EVALUATION ADULT - PERTINENT HX OF CURRENT PROBLEM, REHAB EVAL
Pt is a 73 F w/ PMHx most notable for multiple Ab surgeries and recurrent SBOs  p/w inability to tolerate oral intake and worsening n/vomiting for 3 days, concern for obstruction again now with CT demonstrating SBO with likely transition point in distal ileum. Continued below.

## 2019-01-17 NOTE — PROGRESS NOTE ADULT - SUBJECTIVE AND OBJECTIVE BOX
Green Team Surgery Progress Note    Interval: Received Narcan via NGT yesterday. Pt with several bowel movements and flatus overnight. NGT subsequently removed. Diet advanced to CLD. Creatinine down-trending. No acute overnight events. Notes from outpatient work-up with Dr. Alanis for cavitary lung lesion reviewed.    SUBJECTIVE: Patient seen and examined at the bedside. Feeling well this morning. Tolerating clear liquids without nausea or vomiting. Pain is well controlled. Has passed flatus and bowel movements.     VITALS  T(C): 36.9 (01-16-19 @ 23:00), Max: 36.9 (01-16-19 @ 23:00)  HR: 87 (01-17-19 @ 04:00) (64 - 90)  BP: 114/63 (01-17-19 @ 04:00) (77/41 - 146/67)  RR: 15 (01-17-19 @ 04:00) (11 - 23)  SpO2: 99% (01-17-19 @ 04:00) (86% - 100%)  CAPILLARY BLOOD GLUCOSE        Is/Os    01-15 @ 07:01  -  01-16 @ 07:00  --------------------------------------------------------  IN:    IV PiggyBack: 150 mL    sodium chloride 0.9%.: 1050 mL    Solution: 250 mL  Total IN: 1450 mL    OUT:    Indwelling Catheter - Urethral: 215 mL    Nasoenteral Tube: 600 mL  Total OUT: 815 mL    Total NET: 635 mL      01-16 @ 07:01  -  01-17 @ 05:06  --------------------------------------------------------  IN:    IV PiggyBack: 75 mL    Lactated Ringers IV Bolus: 1000 mL    Sodium Chloride 0.9% IV Bolus: 1000 mL    sodium chloride 0.9%.: 2350 mL    Solution: 150 mL  Total IN: 4575 mL    OUT:    Indwelling Catheter - Urethral: 1175 mL    Nasoenteral Tube: 50 mL  Total OUT: 1225 mL    Total NET: 3350 mL    PHYSICAL EXAM:   General: NAD, Lying in bed comfortably, alert, oriented x3  Pulm: Non-labored breathing  Abdominal: Soft, minimally distended, non-tender, no rebound  : arevalo in place draining clear urine    MEDICATIONS (STANDING): ALBUTerol/ipratropium for Nebulization 3 milliLiter(s) Nebulizer every 6 hours  buDESOnide   0.5 milliGRAM(s) Respule 0.5 milliGRAM(s) Inhalation every 12 hours  enoxaparin Injectable 30 milliGRAM(s) SubCutaneous daily  influenza   Vaccine 0.5 milliLiter(s) IntraMuscular once  naloxegol 25 milliGRAM(s) Oral daily  nicotine -  14 mG/24Hr(s) Patch 1 patch Transdermal daily  pantoprazole  Injectable 40 milliGRAM(s) IV Push daily  potassium chloride  10 mEq/100 mL IVPB 10 milliEquivalent(s) IV Intermittent every 1 hour  sodium chloride 0.9%. 1000 milliLiter(s) IV Continuous <Continuous>    MEDICATIONS (PRN):oxyCODONE    IR 10 milliGRAM(s) Oral every 6 hours PRN Moderate Pain (4 - 6)    LABS  CBC (01-17 @ 03:06)                              11.3<L>                         8.2     )----------------(  225        --    % Neutrophils, --    % Lymphocytes, ANC: --                                  34.1<L>  CBC (01-16 @ 01:06)                              13.1                           7.8     )----------------(  282        --    % Neutrophils, --    % Lymphocytes, ANC: --                                  40.1      BMP (01-17 @ 03:05)             142     |  102     |  57<H> 		Ca++ --      Ca 7.5<L>             ---------------------------------( 132<H>		Mg 1.9                3.3<L>  |  24      |  3.48<H>			Ph 5.8<H>  BMP (01-16 @ 15:11)             --      |  --      |  --    		Ca++ 1.13    Ca --                 ---------------------------------( --    		Mg --                 --      |  --      |  --    			Ph --          Coags (01-17 @ 03:05)  aPTT 31.9 / INR 1.05 / PT 12.1      ABG (01-17 @ 03:05)     7.38 / 48<H> / 84 / 28 / 2.4<H> / 95%     Lactate:      VBG (01-15 @ 20:48)     7.31<L> / 94<H> / 24<L> / 46<H> / 15.0<H> / 25<L>%     Lactate: 1.9

## 2019-01-17 NOTE — DIETITIAN INITIAL EVALUATION ADULT. - PERTINENT LABORATORY DATA
01-17 @ 10:14: Sodium 144, Potassium 3.3<L>, Chloride 115<H>, Calcium 5.5<LL>, Magnesium 2.0, Phosphorus 3.2, BUN 40<H>, Creatinine 2.12<H>, <H>, Hemoglobin 9.7<L>, Hematocrit 30.6<L>

## 2019-01-17 NOTE — ASSESSMENT
[FreeTextEntry1] : 72 y/o female, current every day smoker for 50 years ( 1 ppd), with Pmhx of s/p TAHBSO for hemangiocytoma in 1998, Right breast Ca s/p mastectomy and chemo in 2006, COPD, is referred by oncologist Dr. Chano Duke for RENUKA mass incidentally found from workup for SBO when hospitalization in Nov 2018. \par \par Subsequent PET/CT on 12/18/18:\par -a 5 x 3.3 cm hypermetabolic cavitary mass in the RENUKA extending from the left hilum to the pleural surface with SUV 15.1. Hypermetabolism extends into the mediastinum in the AP window region. \par \par PFT on 12/20/18: FEV1 pre 0.82, 46%; post 0.89, 50%; DLCO 8.3, 61%. \par \par I have length discussion with patient and her family in the office today, her RENUKA mass is highly suspicious for malignancy, as given to her poor performance ( wheelchair bound with 10 Ibs weight loss within several weeks), she most likely not candidate for surgery nor SBRT( tumor extends from pleural surface to mediastinum). I will present her case to Thoracic tumor board for discussion. \par \par I have reviewed the patient's medical records and diagnostic images at the time of this office consultation and have made the following recommendation.\par Plan:\par 1. CT chest w/ contrast. \par 2. Present to Thoracic tumor board for discussion of treatment plan. \par 3. RTC after above done. \par \par \par \par \par Written by Mimi Buenrostro NP, acting as a scribe for Dr. Zaid Alanis . \par “The documentation recorded by the scribe accurately reflects the service I personally performed and the decisions made by me.” Signature Zaid Alanis MD.\par \par

## 2019-01-17 NOTE — DIETITIAN INITIAL EVALUATION ADULT. - PHYSICAL APPEARANCE
BMI 14.6KG/m2. Pt noted to be "clinically emaciated" per chart, with h/o severe malnutrition and cachexia./underweight/emaciated

## 2019-01-17 NOTE — DIETITIAN INITIAL EVALUATION ADULT. - SOURCE
medical record, SICU team huddle. Pt is known to this RD from 11/21/18 nutrition assessment./patient/other (specify)

## 2019-01-17 NOTE — PROGRESS NOTE ADULT - ASSESSMENT
73 year old female with PMH Lung mass, chronic Back pain (on Narcotics), history of multiple SBO's, narcotic associated constipation/ileus, COPD, current everyday smoker admitted with abdominal pain and distension, nausea and poor PO intake with associated worsening painful spasms.  Clinically emaciated and severely dehydrated   CT + SBO  SHAKIRA - improving    PLAN  -IV hydration, Monitor Cr and UO  -SICU care  -PO Narcan as per Surgical recs  -can change to PO PPI  -monitor clinically  -Electrolyte replacement as ordered    Discussed with SICU team    Jake Khan PA-C    [facility]  [phone]

## 2019-01-17 NOTE — PROGRESS NOTE ADULT - SUBJECTIVE AND OBJECTIVE BOX
- Pt with several bowel movements and flatus overnight  - NGT subsequently removed  - Diet advanced  - iSTOP performed with corroborated pt's claims of 10mg oxycodone PO q6hrs at home, Restarted regimen overnight  - Pt's pain subsequently better controlled  - Serum creatinine, ionized calcium improving HISTORY  GEORGIE JOLLEY is a 73F with COPD and current smoker with more than 30 packs year history, complicated surgical history (hysterectomy, appendectomy, cholecystectomy, femoral hernia repair, mastectomy and ex-lap with Lisa x 2), back pain on chronic narcotic use and recurrent SBO presented with 3 days of inability to tolerate PO, worsening emesis and decreased bowel function ( no flatus or BM for 2 days).   She has had multiple prior admissions for SBO, narcotic associated ileus/constipation, most recently Nov of this year managed nonoperatively with NGT decompression.    On presentation in ED, patient was hypotensive with SBP in 90's which improved to 120's with IV fluid hydration. Pt was found to be markedly distended, emaciated, and NGT was placed with bilious output returned on placement. Lab revealed acute renal failure with Cre increased to 7.3 , patient prior admission show baseline Cre of 1.6 as well as multiple electrolyte derangements. CT showed SBO with questionable transition point in distal ileum but no clear transition point.  SICU consulted for hemodynamic monitoring. On exam, remained distended, NGT canister with 400 cc bilious output otherwise vitals were stable. Pt denied any pain, SOB, fever, chills or CP.       24 HOUR EVENTS:  - Pt with several bowel movements and flatus overnight  - NGT subsequently removed  - Diet advanced  - iSTOP performed with corroborated pt's claims of 10mg oxycodone PO q6hrs at home, Restarted regimen overnight  - Pt's pain subsequently better controlled  - Serum creatinine, ionized calcium improving    SUBJECTIVE/ROS:  [ ] A ten-point review of systems was otherwise negative except as noted.  [ ] Due to altered mental status/intubation, subjective information were not able to be obtained from the patient. History was obtained, to the extent possible, from review of the chart and collateral sources of information.      NEURO  RASS:  -1 to 0     Meds: diazepam    Tablet 2.5 milliGRAM(s) Oral two times a day PRN Anxiety  oxyCODONE    IR 10 milliGRAM(s) Oral every 6 hours PRN Moderate Pain (4 - 6)  [x] Adequacy of sedation and pain control has been assessed and adjusted      RESPIRATORY  RR: 18 (01-17-19 @ 15:00) (12 - 23)  SpO2: 100% (01-17-19 @ 15:00) (85% - 100%)  Wt(kg): --  Mechanical Ventilation:   ABG - ( 17 Jan 2019 03:05 )  pH: 7.38  /  pCO2: 48    /  pO2: 84    / HCO3: 28    / Base Excess: 2.4   /  SaO2: 95      Lactate: x      Exam: CTAB          Meds: ALBUTerol/ipratropium for Nebulization 3 milliLiter(s) Nebulizer every 6 hours  buDESOnide   0.5 milliGRAM(s) Respule 0.5 milliGRAM(s) Inhalation every 12 hours        CARDIOVASCULAR  HR: 84 (01-17-19 @ 15:00) (74 - 94)  BP: 143/68 (01-17-19 @ 15:00) (106/56 - 150/66)  BP(mean): 98 (01-17-19 @ 15:00) (78 - 98)  ABP: --  ABP(mean): --  Wt(kg): --  CVP(cm H2O): --  VBG - ( 15 Jonathon 2019 20:48 )  pH: 7.31  /  pCO2: 94    /  pO2: 24    / HCO3: 46    / Base Excess: 15.0  /  SaO2: 25     Lactate: 1.9    Exam: RRR                GI/NUTRITION  Diet: Reg  Meds: docusate sodium 100 milliGRAM(s) Oral three times a day  naloxegol 25 milliGRAM(s) Oral daily  pantoprazole  Injectable 40 milliGRAM(s) IV Push daily  senna 2 Tablet(s) Oral at bedtime  Exam: Soft, NT/ND      GENITOURINARY  I&O's Detail    01-16 @ 07:01 - 01-17 @ 07:00  --------------------------------------------------------  IN:    IV PiggyBack: 75 mL    Lactated Ringers IV Bolus: 1000 mL    Oral Fluid: 480 mL    sodium chloride 0.9%: 2650 mL    Sodium Chloride 0.9% IV Bolus: 1000 mL    Solution: 150 mL  Total IN: 5355 mL    OUT:    Indwelling Catheter - Urethral: 1275 mL    Nasoenteral Tube: 50 mL  Total OUT: 1325 mL    Total NET: 4030 mL      01-17 @ 07:01 - 01-17 @ 15:27  --------------------------------------------------------  IN:    sodium chloride 0.9%: 200 mL    sodium chloride 0.9%.: 50 mL  Total IN: 250 mL    OUT:    Indwelling Catheter - Urethral: 100 mL  Total OUT: 100 mL    Total NET: 150 mL          01-17    144  |  115<H>  |  40<H>  ----------------------------<  110<H>  3.3<L>   |  16<L>  |  2.12<H>    Ca    5.5<LL>      17 Jan 2019 10:14  Phos  3.2     01-17  Mg     2.0     01-17    TPro  8.8<H>  /  Alb  4.9  /  TBili  0.5  /  DBili  x   /  AST  13  /  ALT  13  /  AlkPhos  207<H>  01-15    Meds: calcium gluconate IVPB 2 Gram(s) IV Intermittent once  sodium chloride 0.9%. 1000 milliLiter(s) IV Continuous <Continuous>        HEMATOLOGIC  Meds: enoxaparin Injectable 30 milliGRAM(s) SubCutaneous daily    [x] VTE Prophylaxis                        9.7    6.8   )-----------( 158      ( 17 Jan 2019 10:14 )             30.6     PT/INR - ( 17 Jan 2019 03:05 )   PT: 12.1 sec;   INR: 1.05 ratio         PTT - ( 17 Jan 2019 03:05 )  PTT:31.9 sec      INFECTIOUS DISEASES  T(C): 36.7 (01-17-19 @ 15:00), Max: 36.9 (01-16-19 @ 23:00)  Wt(kg): --  WBC Count: 6.8 K/uL (01-17 @ 10:14)  WBC Count: 8.2 K/uL (01-17 @ 03:06)    Recent Cultures:    Meds: influenza   Vaccine 0.5 milliLiter(s) IntraMuscular once        ENDOCRINE  Capillary Blood Glucose    Meds:       ACCESS DEVICES:  [ ] Peripheral IV  [ ] Central Venous Line	[ ] R	[ ] L	[ ] IJ	[ ] Fem	[ ] SC	Placed:   [ ] Arterial Line		[ ] R	[ ] L	[ ] Fem	[ ] Rad	[ ] Ax	Placed:   [ ] PICC:					[ ] Mediport  [ ] Urinary Catheter, Date Placed:   [ ] Necessity of urinary, arterial, and venous catheters discussed      OTHER MEDICATIONS:  chlorhexidine 4% Liquid 1 Application(s) Topical <User Schedule>  lidocaine   Patch 1 Patch Transdermal daily  nicotine -  14 mG/24Hr(s) Patch 1 patch Transdermal daily

## 2019-01-17 NOTE — PROGRESS NOTE ADULT - ASSESSMENT
ASSESSMENT  73F w/ PMH of multiple abd surgeries and recurrent SBOs, p/w w/ another ep of SBO shown by CT, w/ TP in distal ileum, now with return of bowel function and tolerating CLD and creatinine down-trending    PLAN  - Advance diet as tolerated  - Monitor GI fxn  - continue home meds  - Trend Cr and electrolytes  - f/u UOP  - care per SICU appreciated      [facility]  [id number]

## 2019-01-17 NOTE — DIETITIAN INITIAL EVALUATION ADULT. - ORAL INTAKE PTA
Pt noted with 2-3 days of vomiting and inability to tolerate po PTA. Pt takes multivitamin supplement; reports NKFA. Pt reports she has a home aide who shops and cooks for her. Pt reports she frequently eats takeout, and consumes protein foods such as eggs and chicken daily. Pt reports she is a "small eater". Pt endorses several days of vomiting and inability to tolerate po PTA. Pt takes multivitamin supplement; reports NKFA.

## 2019-01-17 NOTE — DATA REVIEWED
[FreeTextEntry1] : PET/CT on 12/18/18:\par -a 5 x 3.3 cm hypermetabolic cavitary mass in the RENUKA extending from the left hilum to the pleural surface with SUV 15.1. Hypermetabolism extends into the mediastinum in the AP window region. \par

## 2019-01-17 NOTE — PHYSICAL THERAPY INITIAL EVALUATION ADULT - GENERAL OBSERVATIONS, REHAB EVAL
Pt received semisupine in bed with +cardiac monitor, +pulse ox, +BP cuff, +1L of O2 via NC, +arevalo and +ICU monitoring. NAD noted.

## 2019-01-17 NOTE — DIETITIAN INITIAL EVALUATION ADULT. - ENERGY NEEDS
ht: 5 feet 2 inches, wt: 80 pounds, BMI: 14.6 Kg/m2, IBW: 110 pounds (+/- 10%), 73% IBW. Edema: none noted; Skin: Stage I sacrum

## 2019-01-17 NOTE — PROGRESS NOTE ADULT - SUBJECTIVE AND OBJECTIVE BOX
Patient is a 73y old  Female who presented with a chief complaint of Small Bowel Obstruction (17 Jan 2019 09:15)      INTERVAL HPI/OVERNIGHT EVENTS:  +BMs  feeling better  tolerating PO diet  no abdominal pain    MEDICATIONS  (STANDING):  ALBUTerol/ipratropium for Nebulization 3 milliLiter(s) Nebulizer every 6 hours  buDESOnide   0.5 milliGRAM(s) Respule 0.5 milliGRAM(s) Inhalation every 12 hours  calcium gluconate IVPB 2 Gram(s) IV Intermittent once  chlorhexidine 4% Liquid 1 Application(s) Topical <User Schedule>  docusate sodium 100 milliGRAM(s) Oral three times a day  enoxaparin Injectable 30 milliGRAM(s) SubCutaneous daily  influenza   Vaccine 0.5 milliLiter(s) IntraMuscular once  lidocaine   Patch 1 Patch Transdermal daily  naloxegol 25 milliGRAM(s) Oral daily  nicotine -  14 mG/24Hr(s) Patch 1 patch Transdermal daily  pantoprazole  Injectable 40 milliGRAM(s) IV Push daily  senna 2 Tablet(s) Oral at bedtime  sodium chloride 0.9%. 1000 milliLiter(s) (50 mL/Hr) IV Continuous <Continuous>    MEDICATIONS  (PRN):  oxyCODONE    IR 10 milliGRAM(s) Oral every 6 hours PRN Moderate Pain (4 - 6)      Allergies  Biaxin (Rash)  Cipro (Headache)  Flagyl (Headache)  penicillin (Rash; Swelling)  sulfa drugs (Unknown)      Review of Systems:  General:  No fevers, chills, night sweats  CV:  No pain, palpitations, hypo/hypertension  Resp:  No dyspnea, tachypnea, wheezing +COPD +lung mass  :  No pain, bleeding, incontinence, +arevalo  Muscle: +spasms - resolved,  chronic back pain  Neuro:  No weakness, tingling, memory problems  Psych:  No fatigue, insomnia, mood problems, depression  Endocrine:  No polyuria, polydypsia, cold/heat intolerance  Heme:  No petechiae, ecchymosis, easy bruisability  Skin:  No rash, tattoos, scars, edema    Vital Signs Last 24 Hrs  T(C): 36.9 (17 Jan 2019 11:00), Max: 36.9 (16 Jan 2019 23:00)  T(F): 98.5 (17 Jan 2019 11:00), Max: 98.5 (17 Jan 2019 11:00)  HR: 78 (17 Jan 2019 11:49) (74 - 94)  BP: 140/65 (17 Jan 2019 11:08) (105/54 - 150/66)  BP(mean): 95 (17 Jan 2019 11:00) (75 - 97)  RR: 18 (17 Jan 2019 11:08) (12 - 20)  SpO2: 98% (17 Jan 2019 11:49) (86% - 99%)    PHYSICAL EXAM:  Constitutional: thin chronically ill appearing female eating food  Neck: No LAD, supple no JVD  Respiratory: decreased BS b/l, distant BS  Cardiovascular: S1 and S2, regular  Gastrointestinal: softly distended NT. no rebound or rigidity +multiple surgical scars +incisional hernias, reducible. arevalo with clear urine  Extremities: +clubbing no edema  Vascular: palpable  Neurological: A/O x 3, no focal deficits  Psychiatric: Normal mood, normal affect  Skin: No rashes good turgor    LABS:                        9.7    6.8   )-----------( 158      ( 17 Jan 2019 10:14 )             30.6     01-17    144  |  115<H>  |  40<H>  ----------------------------<  110<H>  3.3<L>   |  16<L>  |  2.12<H>    Ca    5.5<LL>      17 Jan 2019 10:14  Phos  3.2     01-17  Mg     2.0     01-17    TPro  8.8<H>  /  Alb  4.9  /  TBili  0.5  /  DBili  x   /  AST  13  /  ALT  13  /  AlkPhos  207<H>  01-15    PT/INR - ( 17 Jan 2019 03:05 )   PT: 12.1 sec;   INR: 1.05 ratio         PTT - ( 17 Jan 2019 03:05 )  PTT:31.9 sec        RADIOLOGY & ADDITIONAL TESTS:

## 2019-01-17 NOTE — PROGRESS NOTE ADULT - ATTENDING COMMENTS
Patient seen and examined and agree with above.  Patient had bowel movement. Diet advanced to regular diet.   Acute kidney insufficiency- improving creatinine and making adequate urine output.  Will discontinue arevalo catheter.  COPD- will continue nebulizers and wean oxygen supplementation  Hypocalcemia and hypokalemia - repleted and will recheck    Patient is stable for discharge to floor.

## 2019-01-17 NOTE — PHYSICAL EXAM
[General Appearance - Alert] : alert [General Appearance - In No Acute Distress] : in no acute distress [Sclera] : the sclera and conjunctiva were normal [PERRL With Normal Accommodation] : pupils were equal in size, round, and reactive to light [Outer Ear] : the ears and nose were normal in appearance [Hearing Threshold Finger Rub Not [place]] : hearing was normal [Neck Appearance] : the appearance of the neck was normal [Respiration, Rhythm And Depth] : normal respiratory rhythm and effort [Auscultation Breath Sounds / Voice Sounds] : lungs were clear to auscultation bilaterally [Heart Rate And Rhythm] : heart rate was normal and rhythm regular [Heart Sounds] : normal S1 and S2 [Examination Of The Chest] : the chest was normal in appearance [2+] : left 2+ [No Abnormalities] : the abdominal aorta was not enlarged and no bruit was heard [No Pulse Delay] : no pulse delay [No Pulse Discrepancy] : no pulse discrepancy detected [Full Pulse] : peripheral pulses were full [Bowel Sounds] : normal bowel sounds [Abdomen Soft] : soft [Abdomen Tenderness] : non-tender [Cervical Lymph Nodes Enlarged Posterior Bilaterally] : posterior cervical [Cervical Lymph Nodes Enlarged Anterior Bilaterally] : anterior cervical [No CVA Tenderness] : no ~M costovertebral angle tenderness [Abnormal Walk] : normal gait [Involuntary Movements] : no involuntary movements were seen [Skin Color & Pigmentation] : normal skin color and pigmentation [Skin Turgor] : normal skin turgor [] : no rash [No Focal Deficits] : no focal deficits [Oriented To Time, Place, And Person] : oriented to person, place, and time [Affect] : the affect was normal [Mood] : the mood was normal [Fingers] :  capillary refill of the fingers was normal [Varicose Veins Of The Right Leg] : the patient has no varicose veins of the right leg [Varicose Veins Of The Left Leg] : the patient has no varicose veins of the left leg [FreeTextEntry1] : deferred

## 2019-01-17 NOTE — DIETITIAN INITIAL EVALUATION ADULT. - PERTINENT MEDS FT
MEDICATIONS  (STANDING):  ALBUTerol/ipratropium for Nebulization 3 milliLiter(s) Nebulizer every 6 hours  buDESOnide   0.5 milliGRAM(s) Respule 0.5 milliGRAM(s) Inhalation every 12 hours  calcium gluconate IVPB 2 Gram(s) IV Intermittent once  chlorhexidine 4% Liquid 1 Application(s) Topical <User Schedule>  docusate sodium 100 milliGRAM(s) Oral three times a day  enoxaparin Injectable 30 milliGRAM(s) SubCutaneous daily  influenza   Vaccine 0.5 milliLiter(s) IntraMuscular once  lidocaine   Patch 1 Patch Transdermal daily  naloxegol 25 milliGRAM(s) Oral daily  nicotine -  14 mG/24Hr(s) Patch 1 patch Transdermal daily  pantoprazole  Injectable 40 milliGRAM(s) IV Push daily  senna 2 Tablet(s) Oral at bedtime  sodium chloride 0.9%. 1000 milliLiter(s) (50 mL/Hr) IV Continuous <Continuous>    MEDICATIONS  (PRN):  diazepam    Tablet 2.5 milliGRAM(s) Oral two times a day PRN Anxiety  oxyCODONE    IR 10 milliGRAM(s) Oral every 6 hours PRN Moderate Pain (4 - 6)

## 2019-01-17 NOTE — PROGRESS NOTE ADULT - ATTENDING COMMENTS
Paulino Parra MD, FACG, [facility]  [phone]     32 minutes spent critical care time.

## 2019-01-17 NOTE — DIETITIAN INITIAL EVALUATION ADULT. - OTHER INFO
Nutrition Consult for BMI<18 received and appreciated. Per chart, "73 F w/ PMHx most notable for multiple Ab surgeries and recurrent SBOs  p/w inability to tolerate oral intake and worsening n/vomiting for 3 days, concern for obstruction again now with CT demonstrating SBO with likely transition point in distal ileum, hypotensive on presentation but resolved with fluid resuscitation, now clinically improving." Per chart, hypocalcemia and renal function now improving.

## 2019-01-17 NOTE — PHYSICAL THERAPY INITIAL EVALUATION ADULT - PRECAUTIONS/LIMITATIONS, REHAB EVAL
+CXR 1/17/19: Cavitary mass is seen left upper lobe. Emphysematous changes of the lungs is evident. +Chest CT 1/15/19: The solid component of the patient's left upper lobe cavitary mass is increased in size from 12/18/2018 and is compatible with the known history of lung cancer that extends centrally. +Abdomen and pelvis CT 1/15/19: Small bowel obstruction with joint position to decompressed bowel in the distal ileum./fall precautions

## 2019-01-17 NOTE — PROGRESS NOTE ADULT - ASSESSMENT
73 F w/ PMHx most notable for multiple Ab surgeries and recurrent SBOs  p/w inability to tolerate oral intake and worsening n/vomiting for 3 days, concern for obstruction again now with CT demonstrating SBO with likely transition point in distal ileum, hypotensive on presentation but resolved with fluid resuscitation, now clinically improving.    Neuro: Hx Narcotic Dependence  - Pain control with home regimen (see physical chart for iSTOP hx)  - monitor for changes in MS  - c/w nicotine patch given smoking hx  - Lidoderm for chronic back pain    Resp: COPD, Active Smoker, Lung CA  - c/w home meds  - Maintain O2> 88%  - Daily CXR    CV: Hypotension likely 2/2 hypovolemia improved  - Maintain MAP > 65 mmHg    GI: SBO  - Return of bowel function  - NGT out overnight  - Resumed diet; ADAT  - Monitor electrolytes    Renal: Acute renal failure Cre 7.3->4.87 baseline 1.6  - Trend Cre  - NS @ 100  - [place] for Strict I's and O's    Heme: No acute issues  - Daily CBC    ID: No acute issues  - Panculture if febrile    [facility]    Iftikhar Garcia MD  Resident Physician  Emergency Medicine &  Internal Medicine  PGY-3

## 2019-01-17 NOTE — PROGRESS NOTE ADULT - SUBJECTIVE AND OBJECTIVE BOX
ngt  removed/  had  bowel move,emt    REVIEW OF SYSTEMS:  GEN: no fever,    no chills  RESP: no SOB,   no cough  CVS: no chest pain,   no palpitations  GI: no abdominal pain,   no nausea,   no vomiting,   no constipation,   no diarrhea  : no dysuria,   no frequency  NEURO: no headache,   no dizziness  PSYCH: no depression,   not anxious  Derm : no rash    MEDICATIONS  (STANDING):  ALBUTerol/ipratropium for Nebulization 3 milliLiter(s) Nebulizer every 6 hours  buDESOnide   0.5 milliGRAM(s) Respule 0.5 milliGRAM(s) Inhalation every 12 hours  chlorhexidine 4% Liquid 1 Application(s) Topical <User Schedule>  enoxaparin Injectable 30 milliGRAM(s) SubCutaneous daily  influenza   Vaccine 0.5 milliLiter(s) IntraMuscular once  lidocaine   Patch 1 Patch Transdermal daily  naloxegol 25 milliGRAM(s) Oral daily  nicotine -  14 mG/24Hr(s) Patch 1 patch Transdermal daily  pantoprazole  Injectable 40 milliGRAM(s) IV Push daily  sodium chloride 0.9%. 1000 milliLiter(s) (100 mL/Hr) IV Continuous <Continuous>    MEDICATIONS  (PRN):  oxyCODONE    IR 10 milliGRAM(s) Oral every 6 hours PRN Moderate Pain (4 - 6)      Vital Signs Last 24 Hrs  T(C): 36.9 (16 Jan 2019 23:00), Max: 36.9 (16 Jan 2019 23:00)  T(F): 98.4 (16 Jan 2019 23:00), Max: 98.4 (16 Jan 2019 23:00)  HR: 82 (17 Jan 2019 07:00) (64 - 90)  BP: 138/64 (17 Jan 2019 07:00) (94/50 - 146/67)  BP(mean): 92 (17 Jan 2019 07:00) (69 - 97)  RR: 20 (17 Jan 2019 07:00) (11 - 23)  SpO2: 98% (17 Jan 2019 07:00) (86% - 99%)  CAPILLARY BLOOD GLUCOSE        I&O's Summary    16 Jan 2019 07:01  -  17 Jan 2019 07:00  --------------------------------------------------------  IN: 5355 mL / OUT: 1325 mL / NET: 4030 mL        PHYSICAL EXAM:  HEAD:  Atraumatic, Normocephalic  NECK: Supple, No   JVD  CHEST/LUNG:   no     rales,     no,    rhonchi  HEART: Regular rate and rhythm;         murmur  ABDOMEN: Soft, Nontender, ;   EXTREMITIES:      no  edema  NEUROLOGY:  alert    LABS:                        11.3   8.2   )-----------( 225      ( 17 Jan 2019 03:06 )             34.1     01-17    142  |  102  |  57<H>  ----------------------------<  132<H>  3.3<L>   |  24  |  3.48<H>    Ca    7.5<L>      17 Jan 2019 03:05  Phos  5.8     01-17  Mg     1.9     01-17    TPro  8.8<H>  /  Alb  4.9  /  TBili  0.5  /  DBili  x   /  AST  13  /  ALT  13  /  AlkPhos  207<H>  01-15    PT/INR - ( 17 Jan 2019 03:05 )   PT: 12.1 sec;   INR: 1.05 ratio         PTT - ( 17 Jan 2019 03:05 )  PTT:31.9 sec          ABG - ( 17 Jan 2019 03:05 )  pH, Arterial: 7.38  pH, Blood: x     /  pCO2: 48    /  pO2: 84    / HCO3: 28    / Base Excess: 2.4   /  SaO2: 95                01-15 @ 20:48  3.7  24              Consultant(s) Notes Reviewed:      Care Discussed with Consultants/Other Providers:

## 2019-01-17 NOTE — CONSULT LETTER
[Dear  ___] : Dear  [unfilled], [Courtesy Letter:] : I had the pleasure of seeing your patient, [unfilled], in my office today. [Please see my note below.] : Please see my note below. [Sincerely,] : Sincerely, [FreeTextEntry2] : Chano Duke MD

## 2019-01-17 NOTE — DIETITIAN INITIAL EVALUATION ADULT. - NS AS NUTRI INTERV MEALS SNACK
General/healthful diet/Continue Regular diet; encourage intake of high protein, nutrient dense foods

## 2019-01-18 ENCOUNTER — RESULT REVIEW (OUTPATIENT)
Age: 74
End: 2019-01-18

## 2019-01-18 LAB
ANION GAP SERPL CALC-SCNC: 12 MMOL/L — SIGNIFICANT CHANGE UP (ref 5–17)
BUN SERPL-MCNC: 35 MG/DL — HIGH (ref 7–23)
CA-I BLD-SCNC: 1.17 MMOL/L — SIGNIFICANT CHANGE UP (ref 1.12–1.3)
CALCIUM SERPL-MCNC: 8.7 MG/DL — SIGNIFICANT CHANGE UP (ref 8.4–10.5)
CHLORIDE SERPL-SCNC: 106 MMOL/L — SIGNIFICANT CHANGE UP (ref 96–108)
CO2 SERPL-SCNC: 21 MMOL/L — LOW (ref 22–31)
CREAT SERPL-MCNC: 1.79 MG/DL — HIGH (ref 0.5–1.3)
GLUCOSE SERPL-MCNC: 83 MG/DL — SIGNIFICANT CHANGE UP (ref 70–99)
HCT VFR BLD CALC: 37.4 % — SIGNIFICANT CHANGE UP (ref 34.5–45)
HGB BLD-MCNC: 11.4 G/DL — LOW (ref 11.5–15.5)
MAGNESIUM SERPL-MCNC: 1.9 MG/DL — SIGNIFICANT CHANGE UP (ref 1.6–2.6)
MCHC RBC-ENTMCNC: 30.2 PG — SIGNIFICANT CHANGE UP (ref 27–34)
MCHC RBC-ENTMCNC: 30.5 GM/DL — LOW (ref 32–36)
MCV RBC AUTO: 98.9 FL — SIGNIFICANT CHANGE UP (ref 80–100)
PHOSPHATE SERPL-MCNC: 1.2 MG/DL — LOW (ref 2.5–4.5)
PLATELET # BLD AUTO: 244 K/UL — SIGNIFICANT CHANGE UP (ref 150–400)
POTASSIUM SERPL-MCNC: 5.4 MMOL/L — HIGH (ref 3.5–5.3)
POTASSIUM SERPL-SCNC: 5.4 MMOL/L — HIGH (ref 3.5–5.3)
RBC # BLD: 3.78 M/UL — LOW (ref 3.8–5.2)
RBC # FLD: 14.2 % — SIGNIFICANT CHANGE UP (ref 10.3–14.5)
SODIUM SERPL-SCNC: 139 MMOL/L — SIGNIFICANT CHANGE UP (ref 135–145)
WBC # BLD: 7.82 K/UL — SIGNIFICANT CHANGE UP (ref 3.8–10.5)
WBC # FLD AUTO: 7.82 K/UL — SIGNIFICANT CHANGE UP (ref 3.8–10.5)

## 2019-01-18 PROCEDURE — 88342 IMHCHEM/IMCYTCHM 1ST ANTB: CPT | Mod: 26

## 2019-01-18 PROCEDURE — 88173 CYTOPATH EVAL FNA REPORT: CPT | Mod: 26

## 2019-01-18 PROCEDURE — 76942 ECHO GUIDE FOR BIOPSY: CPT | Mod: 26

## 2019-01-18 PROCEDURE — 88341 IMHCHEM/IMCYTCHM EA ADD ANTB: CPT | Mod: 26

## 2019-01-18 PROCEDURE — 32405: CPT

## 2019-01-18 PROCEDURE — 88360 TUMOR IMMUNOHISTOCHEM/MANUAL: CPT | Mod: 26,59

## 2019-01-18 PROCEDURE — 88305 TISSUE EXAM BY PATHOLOGIST: CPT | Mod: 26

## 2019-01-18 RX ORDER — HYDRALAZINE HCL 50 MG
5 TABLET ORAL ONCE
Qty: 0 | Refills: 0 | Status: COMPLETED | OUTPATIENT
Start: 2019-01-18 | End: 2019-01-18

## 2019-01-18 RX ORDER — PANTOPRAZOLE SODIUM 20 MG/1
40 TABLET, DELAYED RELEASE ORAL
Qty: 0 | Refills: 0 | Status: DISCONTINUED | OUTPATIENT
Start: 2019-01-18 | End: 2019-01-22

## 2019-01-18 RX ADMIN — NALOXEGOL OXALATE 25 MILLIGRAM(S): 12.5 TABLET, FILM COATED ORAL at 12:20

## 2019-01-18 RX ADMIN — Medication 0.5 MILLIGRAM(S): at 19:30

## 2019-01-18 RX ADMIN — Medication 1 PATCH: at 12:19

## 2019-01-18 RX ADMIN — OXYCODONE HYDROCHLORIDE 10 MILLIGRAM(S): 5 TABLET ORAL at 09:03

## 2019-01-18 RX ADMIN — OXYCODONE HYDROCHLORIDE 10 MILLIGRAM(S): 5 TABLET ORAL at 21:42

## 2019-01-18 RX ADMIN — OXYCODONE HYDROCHLORIDE 10 MILLIGRAM(S): 5 TABLET ORAL at 15:35

## 2019-01-18 RX ADMIN — Medication 0.5 MILLIGRAM(S): at 08:10

## 2019-01-18 RX ADMIN — LIDOCAINE 1 PATCH: 4 CREAM TOPICAL at 19:00

## 2019-01-18 RX ADMIN — OXYCODONE HYDROCHLORIDE 10 MILLIGRAM(S): 5 TABLET ORAL at 21:12

## 2019-01-18 RX ADMIN — Medication 3 MILLILITER(S): at 19:00

## 2019-01-18 RX ADMIN — Medication 1 PATCH: at 19:00

## 2019-01-18 RX ADMIN — Medication 3 MILLILITER(S): at 00:46

## 2019-01-18 RX ADMIN — OXYCODONE HYDROCHLORIDE 10 MILLIGRAM(S): 5 TABLET ORAL at 15:05

## 2019-01-18 RX ADMIN — LIDOCAINE 1 PATCH: 4 CREAM TOPICAL at 00:00

## 2019-01-18 RX ADMIN — OXYCODONE HYDROCHLORIDE 10 MILLIGRAM(S): 5 TABLET ORAL at 03:03

## 2019-01-18 RX ADMIN — Medication 1 PATCH: at 07:00

## 2019-01-18 RX ADMIN — OXYCODONE HYDROCHLORIDE 10 MILLIGRAM(S): 5 TABLET ORAL at 09:33

## 2019-01-18 RX ADMIN — OXYCODONE HYDROCHLORIDE 10 MILLIGRAM(S): 5 TABLET ORAL at 03:33

## 2019-01-18 RX ADMIN — SODIUM CHLORIDE 50 MILLILITER(S): 9 INJECTION INTRAMUSCULAR; INTRAVENOUS; SUBCUTANEOUS at 16:31

## 2019-01-18 RX ADMIN — PANTOPRAZOLE SODIUM 40 MILLIGRAM(S): 20 TABLET, DELAYED RELEASE ORAL at 09:26

## 2019-01-18 RX ADMIN — ENOXAPARIN SODIUM 30 MILLIGRAM(S): 100 INJECTION SUBCUTANEOUS at 12:18

## 2019-01-18 RX ADMIN — Medication 5 MILLIGRAM(S): at 03:10

## 2019-01-18 RX ADMIN — Medication 1 PATCH: at 12:15

## 2019-01-18 RX ADMIN — Medication 3 MILLILITER(S): at 06:46

## 2019-01-18 RX ADMIN — Medication 3 MILLILITER(S): at 12:18

## 2019-01-18 RX ADMIN — LIDOCAINE 1 PATCH: 4 CREAM TOPICAL at 12:20

## 2019-01-18 NOTE — CHART NOTE - NSCHARTNOTEFT_GEN_A_CORE
Nutrition Follow Up Note  Patient seen for: initial malnutrition follow up    Chart reviewed, events noted. Pt is "74yo F w/ PMHx most notable for hx of breast cancer s/p mastectomy , lung cancer, Liver Mass s/p liver rsxn , multiple sbos, with most recent 2018, multiple Ab surgeries p/w inability to tolerate oral intake and worsening n/vomiting for 3 days, concern for obstruction again now with CT demonstrating SBO with likely transition point in distal ileum, hypotensive on presentation but resolved with fluid resuscitation, now clinically improving." Pt continues to have multiple BMs. Discharge planning.    Source: pt, chart    Diet : Regular    Patient reports: feeling better overall given multiple BMs, however reports feeling stressed and anxious, worried she will experience more diarrhea. Pt notes she is "stressed" in general, but she has a good support system consisting of friends and family and declines speaking to psych/SW in-patient. Pt expresses desire to gain 20 lbs, "this weight loss is ridiculous." She would like to trial Magic Cup and Healthy Shake, previously recommended. Plan to trial this AM.     PO intake : good po intake this AM with no nausea or vomiting. Continue to monitor. Pt declines providing additional food preferences at this time.     Source for PO intake: pt     Enteral /Parenteral Nutrition: n/a      Daily Weight in kg: Weight in k.9 ()    Pertinent Medications: MEDICATIONS  (STANDING):  ALBUTerol/ipratropium for Nebulization 3 milliLiter(s) Nebulizer every 6 hours  buDESOnide   0.5 milliGRAM(s) Respule 0.5 milliGRAM(s) Inhalation every 12 hours  chlorhexidine 4% Liquid 1 Application(s) Topical <User Schedule>  docusate sodium 100 milliGRAM(s) Oral three times a day  enoxaparin Injectable 30 milliGRAM(s) SubCutaneous daily  influenza   Vaccine 0.5 milliLiter(s) IntraMuscular once  lidocaine   Patch 1 Patch Transdermal daily  naloxegol 25 milliGRAM(s) Oral daily  nicotine -  14 mG/24Hr(s) Patch 1 patch Transdermal daily  pantoprazole  Injectable 40 milliGRAM(s) IV Push daily  senna 2 Tablet(s) Oral at bedtime  sodium chloride 0.9%. 1000 milliLiter(s) (50 mL/Hr) IV Continuous <Continuous>    MEDICATIONS  (PRN):  diazepam    Tablet 2.5 milliGRAM(s) Oral two times a day PRN Anxiety  oxyCODONE    IR 10 milliGRAM(s) Oral every 6 hours PRN Moderate Pain (4 - 6)    Pertinent Labs:  @ 16:09: Na 138, BUN 46<H>, Cr 2.39<H>, <H>, K+ 5.6<H>, Phos --, Mg --, Alk Phos --, ALT/SGPT --, AST/SGOT --, HbA1c --   @ 10:14: Na 144, BUN 40<H>, Cr 2.12<H>, <H>, K+ 3.3<L>, Phos 3.2, Mg 2.0, Alk Phos --, ALT/SGPT --, AST/SGOT --, HbA1c --    Finger Sticks:      Skin per nursing documentation: stage I pressure injury to sacrum noted  Edema: none noted    Estimated Needs:   [x] no change since previous assessment  [ ] recalculated:     Previous Nutrition Diagnosis: Malnutrition; severe, in context of chronic illness  Nutrition Diagnosis is: ongoing, being addressed with diet advancement and nutrition supplementation, po encouragement    New Nutrition Diagnosis: n/a  Related to:    As evidenced by:      Interventions:     Recommend  1. Continue Regular diet  2. Provide Mighty Shakes (200 calories, 6 Gm protein) and Magic Cup (290 calories, 9 Gm protein),  added to meal trays; pt refuses all other nutrition supplements  3. Continue to encourage adequate po intake as feasible. Emotional support.    Monitoring and Evaluation:     Continue to monitor Nutritional intake, Tolerance to diet prescription, weights, labs, skin integrity    RD remains available upon request and will follow up per protocol. Tory Weems RD, CDN Pager: [phone] Nutrition Follow Up Note  Patient seen for: initial malnutrition follow up    Chart reviewed, events noted. Pt is "74yo F w/ PMHx most notable for hx of breast cancer s/p mastectomy , lung cancer, Liver Mass s/p liver rsxn , multiple sbos, with most recent 2018, multiple Ab surgeries p/w inability to tolerate oral intake and worsening n/vomiting for 3 days, concern for obstruction again now with CT demonstrating SBO with likely transition point in distal ileum, hypotensive on presentation but resolved with fluid resuscitation, now clinically improving." Noted with elevated potassium yesterday following repletion, and low calcium improved yesterday. Pt continues to have multiple BMs. Discharge planning.    Source: pt, chart    Diet : Regular    Patient reports: feeling better overall given multiple BMs, however reports feeling stressed and anxious, worried she will experience more diarrhea. Pt notes she is "stressed" in general, but she has a good support system consisting of friends and family and declines speaking to psych/SW in-patient. Pt expresses desire to gain 20 lbs, "this weight loss is ridiculous." She would like to trial Magic Cup and Healthy Shake, previously recommended. Plan to trial this AM.     PO intake : good po intake this AM with no nausea or vomiting. Continue to monitor. Pt declines providing additional food preferences at this time.     Source for PO intake: pt     Enteral /Parenteral Nutrition: n/a      Daily Weight in kg: Weight in k.9 ()    Pertinent Medications: MEDICATIONS  (STANDING):  ALBUTerol/ipratropium for Nebulization 3 milliLiter(s) Nebulizer every 6 hours  buDESOnide   0.5 milliGRAM(s) Respule 0.5 milliGRAM(s) Inhalation every 12 hours  chlorhexidine 4% Liquid 1 Application(s) Topical <User Schedule>  docusate sodium 100 milliGRAM(s) Oral three times a day  enoxaparin Injectable 30 milliGRAM(s) SubCutaneous daily  influenza   Vaccine 0.5 milliLiter(s) IntraMuscular once  lidocaine   Patch 1 Patch Transdermal daily  naloxegol 25 milliGRAM(s) Oral daily  nicotine -  14 mG/24Hr(s) Patch 1 patch Transdermal daily  pantoprazole  Injectable 40 milliGRAM(s) IV Push daily  senna 2 Tablet(s) Oral at bedtime  sodium chloride 0.9%. 1000 milliLiter(s) (50 mL/Hr) IV Continuous <Continuous>    MEDICATIONS  (PRN):  diazepam    Tablet 2.5 milliGRAM(s) Oral two times a day PRN Anxiety  oxyCODONE    IR 10 milliGRAM(s) Oral every 6 hours PRN Moderate Pain (4 - 6)    Pertinent Labs:  @ 16:09: Na 138, BUN 46<H>, Cr 2.39<H>, <H>, K+ 5.6<H>, Phos --, Mg --, Alk Phos --, ALT/SGPT --, AST/SGOT --, HbA1c --   @ 10:14: Na 144, BUN 40<H>, Cr 2.12<H>, <H>, K+ 3.3<L>, Phos 3.2, Mg 2.0, Alk Phos --, ALT/SGPT --, AST/SGOT --, HbA1c --    Finger Sticks:      Skin per nursing documentation: stage I pressure injury to sacrum noted  Edema: none noted    Estimated Needs:   [x] no change since previous assessment  [ ] recalculated:     Previous Nutrition Diagnosis: Malnutrition; severe, in context of chronic illness  Nutrition Diagnosis is: ongoing, being addressed with diet advancement and nutrition supplementation, po encouragement    New Nutrition Diagnosis: n/a  Related to:    As evidenced by:      Interventions:     Recommend  1. Continue Regular diet  2. Provide Mighty Shakes (200 calories, 6 Gm protein) and Magic Cup (290 calories, 9 Gm protein),  added to meal trays; pt refuses all other nutrition supplements  3. Continue to encourage adequate po intake as feasible. Emotional support.  4. Monitor lytes    Monitoring and Evaluation:     Continue to monitor Nutritional intake, Tolerance to diet prescription, weights, labs, skin integrity    RD remains available upon request and will follow up per protocol. Tory Weems RD, CDN Pager: [phone]

## 2019-01-18 NOTE — PROGRESS NOTE ADULT - SUBJECTIVE AND OBJECTIVE BOX
no complaints  REVIEW OF SYSTEMS:  GEN: no fever,    no chills  RESP: no SOB,   no cough  CVS: no chest pain,   no palpitations  GI: no abdominal pain,   no nausea,   no vomiting,   no constipation,   no diarrhea  : no dysuria,   no frequency  NEURO: no headache,   no dizziness  PSYCH: no depression,   not anxious  Derm : no rash    MEDICATIONS  (STANDING):  ALBUTerol/ipratropium for Nebulization 3 milliLiter(s) Nebulizer every 6 hours  buDESOnide   0.5 milliGRAM(s) Respule 0.5 milliGRAM(s) Inhalation every 12 hours  chlorhexidine 4% Liquid 1 Application(s) Topical <User Schedule>  docusate sodium 100 milliGRAM(s) Oral three times a day  enoxaparin Injectable 30 milliGRAM(s) SubCutaneous daily  influenza   Vaccine 0.5 milliLiter(s) IntraMuscular once  lidocaine   Patch 1 Patch Transdermal daily  naloxegol 25 milliGRAM(s) Oral daily  nicotine -  14 mG/24Hr(s) Patch 1 patch Transdermal daily  pantoprazole    Tablet 40 milliGRAM(s) Oral before breakfast  senna 2 Tablet(s) Oral at bedtime  sodium chloride 0.9%. 1000 milliLiter(s) (50 mL/Hr) IV Continuous <Continuous>    MEDICATIONS  (PRN):  diazepam    Tablet 2.5 milliGRAM(s) Oral two times a day PRN Anxiety  oxyCODONE    IR 10 milliGRAM(s) Oral every 6 hours PRN Moderate Pain (4 - 6)      Vital Signs Last 24 Hrs  T(C): 37.4 (18 Jan 2019 04:42), Max: 37.4 (18 Jan 2019 04:42)  T(F): 99.3 (18 Jan 2019 04:42), Max: 99.3 (18 Jan 2019 04:42)  HR: 99 (18 Jan 2019 04:42) (72 - 99)  BP: 155/82 (18 Jan 2019 04:42) (140/65 - 163/78)  BP(mean): 101 (17 Jan 2019 17:00) (90 - 101)  RR: 17 (18 Jan 2019 04:42) (12 - 23)  SpO2: 97% (18 Jan 2019 04:42) (85% - 100%)  CAPILLARY BLOOD GLUCOSE        I&O's Summary    17 Jan 2019 07:01  -  18 Jan 2019 07:00  --------------------------------------------------------  IN: 890 mL / OUT: 600 mL / NET: 290 mL        PHYSICAL EXAM:  HEAD:  Atraumatic, Normocephalic  NECK: Supple, No   JVD  CHEST/LUNG:   no     rales,     no,    rhonchi  HEART: Regular rate and rhythm;         murmur  ABDOMEN: Soft, Nontender, ;   EXTREMITIES:     no   edema  NEUROLOGY:  alert    LABS:                        13.7   8.1   )-----------( 131      ( 17 Jan 2019 16:09 )             44.5     01-17    138  |  106  |  46<H>  ----------------------------<  134<H>  5.6<H>   |  17<L>  |  2.39<H>    Ca    8.3<L>      17 Jan 2019 16:09  Phos  3.2     01-17  Mg     2.0     01-17      PT/INR - ( 17 Jan 2019 03:05 )   PT: 12.1 sec;   INR: 1.05 ratio         PTT - ( 17 Jan 2019 03:05 )  PTT:31.9 sec          ABG - ( 17 Jan 2019 03:05 )  pH, Arterial: 7.38  pH, Blood: x     /  pCO2: 48    /  pO2: 84    / HCO3: 28    / Base Excess: 2.4   /  SaO2: 95                            Consultant(s) Notes Reviewed:      Care Discussed with Consultants/Other Providers:

## 2019-01-18 NOTE — PROGRESS NOTE ADULT - ASSESSMENT
ASSESSMENT  73F w/ PMH of multiple abd surgeries and recurrent SBOs, p/w w/ another ep of SBO shown by CT, w/ TP in distal ileum, now with return of bowel function and tolerating regular diet and creatinine down-trending    PLAN  - Continue regular diet  - Monitor GI fxn  - continue home meds  - Trend Cr and electrolytes - follow-up potassium this morning  - f/u UOP  - Dc planning

## 2019-01-18 NOTE — CONSULT NOTE ADULT - SUBJECTIVE AND OBJECTIVE BOX
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

## 2019-01-18 NOTE — PROGRESS NOTE ADULT - SUBJECTIVE AND OBJECTIVE BOX
Patient is a 73y old  Female who presented with a chief complaint of Small Bowel Obstruction (18 Jan 2019 08:52)      INTERVAL HPI/OVERNIGHT EVENTS:  transferred out of SICU yesterday  multiple BMs throughout the day yesterday  +Bms today  tolerating PO  no nausea or vomiting    +breast pain (chronic)    MEDICATIONS  (STANDING):  ALBUTerol/ipratropium for Nebulization 3 milliLiter(s) Nebulizer every 6 hours  buDESOnide   0.5 milliGRAM(s) Respule 0.5 milliGRAM(s) Inhalation every 12 hours  chlorhexidine 4% Liquid 1 Application(s) Topical <User Schedule>  docusate sodium 100 milliGRAM(s) Oral three times a day  enoxaparin Injectable 30 milliGRAM(s) SubCutaneous daily  influenza   Vaccine 0.5 milliLiter(s) IntraMuscular once  lidocaine   Patch 1 Patch Transdermal daily  naloxegol 25 milliGRAM(s) Oral daily  nicotine -  14 mG/24Hr(s) Patch 1 patch Transdermal daily  pantoprazole  Injectable 40 milliGRAM(s) IV Push daily  senna 2 Tablet(s) Oral at bedtime  sodium chloride 0.9%. 1000 milliLiter(s) (50 mL/Hr) IV Continuous <Continuous>    MEDICATIONS  (PRN):  diazepam    Tablet 2.5 milliGRAM(s) Oral two times a day PRN Anxiety  oxyCODONE    IR 10 milliGRAM(s) Oral every 6 hours PRN Moderate Pain (4 - 6)      Allergies  Biaxin (Rash)  Cipro (Headache)  Flagyl (Headache)  penicillin (Rash; Swelling)  sulfa drugs (Unknown)      Review of Systems:  General:  No fevers, chills, night sweats  CV:  No pain, palpitations, hypo/hypertension  Resp:  No dyspnea, tachypnea, wheezing +COPD +lung mass  :  No pain, bleeding, incontinence, +arevalo  Muscle: chronic back pain  Neuro:  No weakness, tingling, memory problems  Psych:  No fatigue, insomnia, mood problems, depression  Endocrine:  No polyuria, polydypsia, cold/heat intolerance  Heme:  No petechiae, ecchymosis, easy bruisability  Skin:  No rash, tattoos, scars, edema    Vital Signs Last 24 Hrs  T(C): 37.4 (18 Jan 2019 04:42), Max: 37.4 (18 Jan 2019 04:42)  T(F): 99.3 (18 Jan 2019 04:42), Max: 99.3 (18 Jan 2019 04:42)  HR: 99 (18 Jan 2019 04:42) (72 - 99)  BP: 155/82 (18 Jan 2019 04:42) (140/65 - 163/78)  BP(mean): 101 (17 Jan 2019 17:00) (90 - 101)  RR: 17 (18 Jan 2019 04:42) (12 - 23)  SpO2: 97% (18 Jan 2019 04:42) (85% - 100%)    PHYSICAL EXAM:  Constitutional: thin chronically ill appearing female eating food  Neck: No LAD, supple no JVD  Respiratory: decreased BS b/l, distant BS  Cardiovascular: S1 and S2, regular  Gastrointestinal: softly distended NT. no rebound or rigidity +multiple surgical scars   Extremities: +clubbing no edema  Vascular: palpable  Neurological: A/O x 3, no focal deficits  Psychiatric: anxious  Skin: No rashes good turgor    LABS:                        13.7   8.1   )-----------( 131      ( 17 Jan 2019 16:09 )             44.5     01-17    138  |  106  |  46<H>  ----------------------------<  134<H>  5.6<H>   |  17<L>  |  2.39<H>     (lab note moderate hemolysis)  Ca    8.3<L>      17 Jan 2019 16:09  Phos  3.2     01-17  Mg     2.0     01-17      PT/INR - ( 17 Jan 2019 03:05 )   PT: 12.1 sec;   INR: 1.05 ratio    PTT - ( 17 Jan 2019 03:05 )  PTT:31.9 sec      RADIOLOGY & ADDITIONAL TESTS:

## 2019-01-18 NOTE — PROGRESS NOTE ADULT - ASSESSMENT
73 year old female with   PMH chronic Back pain (on Narcotics), history of multiple SBO's, narcotic associated constipation/ileus, COPD,  smoker,  ca  breast,  right  mastectomy   admitted with abdominal pain  /  no  bowel  movement    c/c  cachexia  and , with acute Renal Failure (SHAKIRA) with marked hyperphosphatemia, acidosis  ct  scans  noted/old   RENUKA  cavitary lesion./   SBO,  seen by  oncology, on prior  admissions/ d r forte  oral  feeds/  limit narcotics    resolving SHAKIRA/  hyperkalemia      < from: CT Abdomen and Pelvis No Cont (01.15.19 @ 18:29) >  Chest:  1.  The solid component of the patient's left upper lobe cavitary mass is   increased in size from 12/18/2018 and is compatible with the known   history of lung cancer that extends centrally.    Abdomen and pelvis:  1.  Small bowel obstruction with joint position to decompressed bowel in   the distal ileum; the discrete point of transition is not identified at   CT.  2.  The above findings were discussed with Dr. Peralta by Dr. Negron on   < end of copied text > 73 year old female with   PMH chronic Back pain (on Narcotics), history of multiple SBO's, narcotic associated constipation/ileus, COPD,  smoker,  ca  breast,  right  mastectomy   admitted with abdominal pain  /  no  bowel  movement    c/c  cachexia  and , with acute Renal Failure (SHAKIRA) with marked hyperphosphatemia, acidosis  ct  scans  noted/old   RENUKA  cavitary lesion./   SBO,  seen by  oncology, on prior  admissions/ d r forte  oral  feeds/  limit narcotics    resolving SHAKIRA/  hyperkalemia  s/p  lung bx/ per oncology      < from: CT Abdomen and Pelvis No Cont (01.15.19 @ 18:29) >  Chest:  1.  The solid component of the patient's left upper lobe cavitary mass is   increased in size from 12/18/2018 and is compatible with the known   history of lung cancer that extends centrally.    Abdomen and pelvis:  1.  Small bowel obstruction with joint position to decompressed bowel in   the distal ileum; the discrete point of transition is not identified at   CT.  2.  The above findings were discussed with Dr. Peralta by Dr. Negron on   < end of copied text >

## 2019-01-18 NOTE — PROGRESS NOTE ADULT - SUBJECTIVE AND OBJECTIVE BOX
Green Team Surgery Progress Note    Interval: Transferred to the floor from SICU without acute events. Was hypertensive overnight and received hydralazine.     SUBJECTIVE: Patient seen and examined at the bedside. Feeling well this morning. Tolerating regular diet without nausea or vomiting. Denies any pain at this time. Has been having frequent bowel movements since narcan administered two days ago.     VITALS  T(C): 37.4 (01-18-19 @ 04:42), Max: 37.4 (01-18-19 @ 04:42)  HR: 99 (01-18-19 @ 04:42) (72 - 99)  BP: 155/82 (01-18-19 @ 04:42) (138/64 - 163/78)  RR: 17 (01-18-19 @ 04:42) (12 - 23)  SpO2: 97% (01-18-19 @ 04:42) (85% - 100%)    Is/Os    01-16 @ 07:01  -  01-17 @ 07:00  --------------------------------------------------------  IN:    IV PiggyBack: 75 mL    Lactated Ringers IV Bolus: 1000 mL    Oral Fluid: 480 mL    sodium chloride 0.9%: 2650 mL    Sodium Chloride 0.9% IV Bolus: 1000 mL    Solution: 150 mL  Total IN: 5355 mL    OUT:    Indwelling Catheter - Urethral: 1275 mL    Nasoenteral Tube: 50 mL  Total OUT: 1325 mL    Total NET: 4030 mL      01-17 @ 07:01 - 01-18 @ 05:24  --------------------------------------------------------  IN:    Oral Fluid: 240 mL    sodium chloride 0.9%: 200 mL    sodium chloride 0.9%.: 450 mL  Total IN: 890 mL    OUT:    Indwelling Catheter - Urethral: 100 mL    Voided: 500 mL  Total OUT: 600 mL    Total NET: 290 mL    PHYSICAL EXAM:   General: NAD, Lying in bed comfortably, alert, oriented x3  Pulm: Non-labored breathing on RA  GI/Abd: Softly distended, NT, no rebound/guarding    MEDICATIONS (STANDING): ALBUTerol/ipratropium for Nebulization 3 milliLiter(s) Nebulizer every 6 hours  buDESOnide   0.5 milliGRAM(s) Respule 0.5 milliGRAM(s) Inhalation every 12 hours  docusate sodium 100 milliGRAM(s) Oral three times a day  enoxaparin Injectable 30 milliGRAM(s) SubCutaneous daily  influenza   Vaccine 0.5 milliLiter(s) IntraMuscular once  naloxegol 25 milliGRAM(s) Oral daily  nicotine -  14 mG/24Hr(s) Patch 1 patch Transdermal daily  pantoprazole  Injectable 40 milliGRAM(s) IV Push daily  senna 2 Tablet(s) Oral at bedtime  sodium chloride 0.9%. 1000 milliLiter(s) IV Continuous <Continuous>    MEDICATIONS (PRN):diazepam    Tablet 2.5 milliGRAM(s) Oral two times a day PRN Anxiety  oxyCODONE    IR 10 milliGRAM(s) Oral every 6 hours PRN Moderate Pain (4 - 6)    LABS  CBC (01-17 @ 16:09)                              13.7                           8.1     )----------------(  131<L>     --    % Neutrophils, --    % Lymphocytes, ANC: --                                  44.5    CBC (01-17 @ 10:14)                              9.7<L>                         6.8     )----------------(  158        --    % Neutrophils, --    % Lymphocytes, ANC: --                                  30.6<L>    BMP (01-17 @ 16:09)             138     |  106     |  46<H> 		Ca++ --      Ca 8.3<L>             ---------------------------------( 134<H>		Mg --                 5.6<H>  |  17<L>   |  2.39<H>			Ph --      BMP (01-17 @ 10:14)             144     |  115<H>  |  40<H> 		Ca++ --      Ca 5.5<LL>             ---------------------------------( 110<H>		Mg 2.0                3.3<L>  |  16<L>   |  2.12<H>			Ph 3.2         Coags (01-17 @ 03:05)  aPTT 31.9 / INR 1.05 / PT 12.1      ABG (01-17 @ 03:05)     7.38 / 48<H> / 84 / 28 / 2.4<H> / 95%     Lactate:

## 2019-01-18 NOTE — PROGRESS NOTE ADULT - SUBJECTIVE AND OBJECTIVE BOX
CARDIOLOGY     PROGRESS  NOTE   ________________________________________________    CHIEF COMPLAINT:Patient is a 73y old  Female who presents with a chief complaint of Small Bowel Obstruction (18 Jan 2019 05:23)    	  REVIEW OF SYSTEMS: pain all over  CONSTITUTIONAL: No fever, weight loss, or fatigue  EYES: No eye pain, visual disturbances, or discharge  ENT:  No difficulty hearing, tinnitus, vertigo; No sinus or throat pain  NECK: No pain or stiffness  RESPIRATORY: No cough, wheezing, chills or hemoptysis; + Shortness of Breath  CARDIOVASCULAR: No chest pain, palpitations, passing out, dizziness, or leg swelling  GASTROINTESTINAL: No abdominal or epigastric pain. No nausea, vomiting, or hematemesis; No diarrhea or constipation. No melena or hematochezia.  GENITOURINARY: No dysuria, frequency, hematuria, or incontinence  NEUROLOGICAL: No headaches, memory loss, loss of strength, numbness, or tremors  SKIN: No itching, burning, rashes, or lesions   LYMPH Nodes: No enlarged glands  ENDOCRINE: No heat or cold intolerance; No hair loss  MUSCULOSKELETAL: No joint pain or swelling; No muscle, back, or extremity pain  PSYCHIATRIC: No depression, anxiety, mood swings, or difficulty sleeping  HEME/LYMPH: No easy bruising, or bleeding gums  ALLERGY AND IMMUNOLOGIC: No hives or eczema	    [ ] All others negative	  [ ] Unable to obtain    PHYSICAL EXAM:  T(C): 37.4 (01-18-19 @ 04:42), Max: 37.4 (01-18-19 @ 04:42)  HR: 99 (01-18-19 @ 04:42) (72 - 99)  BP: 155/82 (01-18-19 @ 04:42) (140/65 - 163/78)  RR: 17 (01-18-19 @ 04:42) (12 - 23)  SpO2: 97% (01-18-19 @ 04:42) (85% - 100%)  Wt(kg): --  I&O's Summary    17 Jan 2019 07:01  -  18 Jan 2019 07:00  --------------------------------------------------------  IN: 890 mL / OUT: 600 mL / NET: 290 mL        Appearance: Normal	  HEENT:   Normal oral mucosa, PERRL, EOMI	  Lymphatic: No lymphadenopathy  Cardiovascular: Normal S1 S2, No JVD, + murmurs, No edema  Respiratory: Lungs clear to auscultation	  Psychiatry: A & O x 3, Mood & affect appropriate  Gastrointestinal:  Soft, Non-tender, + BS	  Skin: No rashes, No ecchymoses, No cyanosis	  Neurologic: Non-focal  Extremities: Normal range of motion, No clubbing, cyanosis or edema  Vascular: Peripheral pulses palpable 2+ bilaterally    MEDICATIONS  (STANDING):  ALBUTerol/ipratropium for Nebulization 3 milliLiter(s) Nebulizer every 6 hours  buDESOnide   0.5 milliGRAM(s) Respule 0.5 milliGRAM(s) Inhalation every 12 hours  chlorhexidine 4% Liquid 1 Application(s) Topical <User Schedule>  docusate sodium 100 milliGRAM(s) Oral three times a day  enoxaparin Injectable 30 milliGRAM(s) SubCutaneous daily  influenza   Vaccine 0.5 milliLiter(s) IntraMuscular once  lidocaine   Patch 1 Patch Transdermal daily  naloxegol 25 milliGRAM(s) Oral daily  nicotine -  14 mG/24Hr(s) Patch 1 patch Transdermal daily  pantoprazole  Injectable 40 milliGRAM(s) IV Push daily  senna 2 Tablet(s) Oral at bedtime  sodium chloride 0.9%. 1000 milliLiter(s) (50 mL/Hr) IV Continuous <Continuous>      TELEMETRY: 	    ECG:  	  RADIOLOGY:  OTHER: 	  	  LABS:	 	    CARDIAC MARKERS:                                13.7   8.1   )-----------( 131      ( 17 Jan 2019 16:09 )             44.5     01-17    138  |  106  |  46<H>  ----------------------------<  134<H>  5.6<H>   |  17<L>  |  2.39<H>    Ca    8.3<L>      17 Jan 2019 16:09  Phos  3.2     01-17  Mg     2.0     01-17      proBNP:   Lipid Profile:   HgA1c:   TSH:   PT/INR - ( 17 Jan 2019 03:05 )   PT: 12.1 sec;   INR: 1.05 ratio         PTT - ( 17 Jan 2019 03:05 )  PTT:31.9 sec      Assessment and plan  ---------------------------  sbo doing better  will add bp meds slowly  dvt prophylaxis  observe for narcotic withrawal CARDIOLOGY     PROGRESS  NOTE   ________________________________________________    CHIEF COMPLAINT:Patient is a 73y old  Female who presents with a chief complaint of Small Bowel Obstruction (18 Jan 2019 05:23)    	  REVIEW OF SYSTEMS: pain all over  CONSTITUTIONAL: No fever, weight loss, or fatigue  EYES: No eye pain, visual disturbances, or discharge  ENT:  No difficulty hearing, tinnitus, vertigo; No sinus or throat pain  NECK: No pain or stiffness  RESPIRATORY: No cough, wheezing, chills or hemoptysis; + Shortness of Breath  CARDIOVASCULAR: No chest pain, palpitations, passing out, dizziness, or leg swelling  GASTROINTESTINAL: No abdominal or epigastric pain. No nausea, vomiting, or hematemesis; No diarrhea or constipation. No melena or hematochezia.  GENITOURINARY: No dysuria, frequency, hematuria, or incontinence  NEUROLOGICAL: No headaches, memory loss, loss of strength, numbness, or tremors  SKIN: No itching, burning, rashes, or lesions   LYMPH Nodes: No enlarged glands  ENDOCRINE: No heat or cold intolerance; No hair loss  MUSCULOSKELETAL: No joint pain or swelling; No muscle, back, or extremity pain  PSYCHIATRIC: No depression, anxiety, mood swings, or difficulty sleeping  HEME/LYMPH: No easy bruising, or bleeding gums  ALLERGY AND IMMUNOLOGIC: No hives or eczema	    [ ] All others negative	  [ ] Unable to obtain    PHYSICAL EXAM:  T(C): 37.4 (01-18-19 @ 04:42), Max: 37.4 (01-18-19 @ 04:42)  HR: 99 (01-18-19 @ 04:42) (72 - 99)  BP: 155/82 (01-18-19 @ 04:42) (140/65 - 163/78)  RR: 17 (01-18-19 @ 04:42) (12 - 23)  SpO2: 97% (01-18-19 @ 04:42) (85% - 100%)  Wt(kg): --  I&O's Summary    17 Jan 2019 07:01  -  18 Jan 2019 07:00  --------------------------------------------------------  IN: 890 mL / OUT: 600 mL / NET: 290 mL        Appearance: Normal	  HEENT:   Normal oral mucosa, PERRL, EOMI	  Lymphatic: No lymphadenopathy  Cardiovascular: Normal S1 S2, No JVD, + murmurs, No edema  Respiratory: Lungs clear to auscultation	  Psychiatry: A & O x 3, Mood & affect appropriate  Gastrointestinal:  Soft, Non-tender, + BS	  Skin: No rashes, No ecchymoses, No cyanosis	  Neurologic: Non-focal  Extremities: Normal range of motion, No clubbing, cyanosis or edema  Vascular: Peripheral pulses palpable 2+ bilaterally    MEDICATIONS  (STANDING):  ALBUTerol/ipratropium for Nebulization 3 milliLiter(s) Nebulizer every 6 hours  buDESOnide   0.5 milliGRAM(s) Respule 0.5 milliGRAM(s) Inhalation every 12 hours  chlorhexidine 4% Liquid 1 Application(s) Topical <User Schedule>  docusate sodium 100 milliGRAM(s) Oral three times a day  enoxaparin Injectable 30 milliGRAM(s) SubCutaneous daily  influenza   Vaccine 0.5 milliLiter(s) IntraMuscular once  lidocaine   Patch 1 Patch Transdermal daily  naloxegol 25 milliGRAM(s) Oral daily  nicotine -  14 mG/24Hr(s) Patch 1 patch Transdermal daily  pantoprazole  Injectable 40 milliGRAM(s) IV Push daily  senna 2 Tablet(s) Oral at bedtime  sodium chloride 0.9%. 1000 milliLiter(s) (50 mL/Hr) IV Continuous <Continuous>      TELEMETRY: 	    ECG:  	  RADIOLOGY:  OTHER: 	  	  LABS:	 	    CARDIAC MARKERS:                                13.7   8.1   )-----------( 131      ( 17 Jan 2019 16:09 )             44.5     01-17    138  |  106  |  46<H>  ----------------------------<  134<H>  5.6<H>   |  17<L>  |  2.39<H>    Ca    8.3<L>      17 Jan 2019 16:09  Phos  3.2     01-17  Mg     2.0     01-17      proBNP:   Lipid Profile:   HgA1c:   TSH:   PT/INR - ( 17 Jan 2019 03:05 )   PT: 12.1 sec;   INR: 1.05 ratio         PTT - ( 17 Jan 2019 03:05 )  PTT:31.9 sec      Assessment and plan  ---------------------------  sbo doing better  will add bp meds slowly  dvt prophylaxis  observe for narcotic withrawal  iv hydration, follow renal function and lytes closely

## 2019-01-18 NOTE — CONSULT NOTE ADULT - ASSESSMENT
RENUKA cavitary mass in smoker  FEV1 830 cc  case presented to tumor board  poor surgical candidate due to malnutrition, healing ability, chest wall invasion  recommend biopsy and consideration for radiation  chemo would be indicated as likely T3 lesion, but poor candidate for chemo  would obtain molecular studies as well  will need brain imaging as well

## 2019-01-18 NOTE — PROGRESS NOTE ADULT - ASSESSMENT
73 year old female with PMH Lung mass, chronic Back pain (on Narcotics), history of multiple SBO's, narcotic associated constipation/ileus, COPD, current everyday smoker admitted with abdominal pain and distension, nausea and poor PO intake with associated worsening painful spasms.  Clinically emaciated and severely dehydrated -admitted to SICU  CT + SBO  SHAKIRA - improving    PLAN  -IVF  -Monitor Cr  -PO Movantik as per Surgical recs  -Limit narcotics (discussed with pt)  -change to PO PPI  -monitor clinically  -monitor electrolytes and BMs    Please call over weekend prn with GI concerns    Jake Khan PA-C    [facility]  [phone]

## 2019-01-19 LAB
ANION GAP SERPL CALC-SCNC: 13 MMOL/L — SIGNIFICANT CHANGE UP (ref 5–17)
BUN SERPL-MCNC: 25 MG/DL — HIGH (ref 7–23)
CALCIUM SERPL-MCNC: 8.1 MG/DL — LOW (ref 8.4–10.5)
CHLORIDE SERPL-SCNC: 107 MMOL/L — SIGNIFICANT CHANGE UP (ref 96–108)
CO2 SERPL-SCNC: 22 MMOL/L — SIGNIFICANT CHANGE UP (ref 22–31)
CREAT SERPL-MCNC: 1.45 MG/DL — HIGH (ref 0.5–1.3)
GLUCOSE SERPL-MCNC: 113 MG/DL — HIGH (ref 70–99)
HCT VFR BLD CALC: 35.1 % — SIGNIFICANT CHANGE UP (ref 34.5–45)
HCT VFR BLD CALC: 35.7 % — SIGNIFICANT CHANGE UP (ref 34.5–45)
HGB BLD-MCNC: 10.7 G/DL — LOW (ref 11.5–15.5)
HGB BLD-MCNC: 11.5 G/DL — SIGNIFICANT CHANGE UP (ref 11.5–15.5)
MAGNESIUM SERPL-MCNC: 1.3 MG/DL — LOW (ref 1.6–2.6)
MAGNESIUM SERPL-MCNC: 1.3 MG/DL — LOW (ref 1.6–2.6)
MCHC RBC-ENTMCNC: 30.1 PG — SIGNIFICANT CHANGE UP (ref 27–34)
MCHC RBC-ENTMCNC: 30.5 GM/DL — LOW (ref 32–36)
MCHC RBC-ENTMCNC: 31.1 PG — SIGNIFICANT CHANGE UP (ref 27–34)
MCHC RBC-ENTMCNC: 32.3 GM/DL — SIGNIFICANT CHANGE UP (ref 32–36)
MCV RBC AUTO: 96.3 FL — SIGNIFICANT CHANGE UP (ref 80–100)
MCV RBC AUTO: 98.6 FL — SIGNIFICANT CHANGE UP (ref 80–100)
PHOSPHATE SERPL-MCNC: 1.1 MG/DL — LOW (ref 2.5–4.5)
PHOSPHATE SERPL-MCNC: 2.2 MG/DL — LOW (ref 2.5–4.5)
PLATELET # BLD AUTO: 194 K/UL — SIGNIFICANT CHANGE UP (ref 150–400)
PLATELET # BLD AUTO: 207 K/UL — SIGNIFICANT CHANGE UP (ref 150–400)
POTASSIUM SERPL-MCNC: 4.7 MMOL/L — SIGNIFICANT CHANGE UP (ref 3.5–5.3)
POTASSIUM SERPL-SCNC: 4.7 MMOL/L — SIGNIFICANT CHANGE UP (ref 3.5–5.3)
RBC # BLD: 3.56 M/UL — LOW (ref 3.8–5.2)
RBC # BLD: 3.7 M/UL — LOW (ref 3.8–5.2)
RBC # FLD: 12.7 % — SIGNIFICANT CHANGE UP (ref 10.3–14.5)
RBC # FLD: 13.9 % — SIGNIFICANT CHANGE UP (ref 10.3–14.5)
SODIUM SERPL-SCNC: 142 MMOL/L — SIGNIFICANT CHANGE UP (ref 135–145)
WBC # BLD: 7.38 K/UL — SIGNIFICANT CHANGE UP (ref 3.8–10.5)
WBC # BLD: 9 K/UL — SIGNIFICANT CHANGE UP (ref 3.8–10.5)
WBC # FLD AUTO: 7.38 K/UL — SIGNIFICANT CHANGE UP (ref 3.8–10.5)
WBC # FLD AUTO: 9 K/UL — SIGNIFICANT CHANGE UP (ref 3.8–10.5)

## 2019-01-19 RX ORDER — SENNA PLUS 8.6 MG/1
1 TABLET ORAL DAILY
Qty: 0 | Refills: 0 | Status: DISCONTINUED | OUTPATIENT
Start: 2019-01-19 | End: 2019-01-22

## 2019-01-19 RX ORDER — MAGNESIUM SULFATE 500 MG/ML
2 VIAL (ML) INJECTION ONCE
Qty: 0 | Refills: 0 | Status: COMPLETED | OUTPATIENT
Start: 2019-01-19 | End: 2019-01-19

## 2019-01-19 RX ORDER — DOCUSATE SODIUM 100 MG
100 CAPSULE ORAL
Qty: 0 | Refills: 0 | Status: DISCONTINUED | OUTPATIENT
Start: 2019-01-19 | End: 2019-01-19

## 2019-01-19 RX ORDER — DOCUSATE SODIUM 100 MG
100 CAPSULE ORAL THREE TIMES A DAY
Qty: 0 | Refills: 0 | Status: DISCONTINUED | OUTPATIENT
Start: 2019-01-19 | End: 2019-01-22

## 2019-01-19 RX ORDER — MAGNESIUM SULFATE 500 MG/ML
1 VIAL (ML) INJECTION ONCE
Qty: 0 | Refills: 0 | Status: COMPLETED | OUTPATIENT
Start: 2019-01-19 | End: 2019-01-19

## 2019-01-19 RX ADMIN — Medication 3 MILLILITER(S): at 17:28

## 2019-01-19 RX ADMIN — Medication 0.5 MILLIGRAM(S): at 17:28

## 2019-01-19 RX ADMIN — Medication 1 PATCH: at 17:06

## 2019-01-19 RX ADMIN — Medication 3 MILLILITER(S): at 06:24

## 2019-01-19 RX ADMIN — LIDOCAINE 1 PATCH: 4 CREAM TOPICAL at 11:29

## 2019-01-19 RX ADMIN — Medication 3 MILLILITER(S): at 11:29

## 2019-01-19 RX ADMIN — LIDOCAINE 1 PATCH: 4 CREAM TOPICAL at 18:12

## 2019-01-19 RX ADMIN — OXYCODONE HYDROCHLORIDE 10 MILLIGRAM(S): 5 TABLET ORAL at 02:56

## 2019-01-19 RX ADMIN — Medication 1 PATCH: at 08:10

## 2019-01-19 RX ADMIN — PANTOPRAZOLE SODIUM 40 MILLIGRAM(S): 20 TABLET, DELAYED RELEASE ORAL at 06:22

## 2019-01-19 RX ADMIN — OXYCODONE HYDROCHLORIDE 10 MILLIGRAM(S): 5 TABLET ORAL at 21:14

## 2019-01-19 RX ADMIN — ENOXAPARIN SODIUM 30 MILLIGRAM(S): 100 INJECTION SUBCUTANEOUS at 11:30

## 2019-01-19 RX ADMIN — Medication 1 PATCH: at 11:32

## 2019-01-19 RX ADMIN — Medication 50 GRAM(S): at 22:14

## 2019-01-19 RX ADMIN — Medication 3 MILLILITER(S): at 00:24

## 2019-01-19 RX ADMIN — Medication 1 PATCH: at 11:30

## 2019-01-19 RX ADMIN — OXYCODONE HYDROCHLORIDE 10 MILLIGRAM(S): 5 TABLET ORAL at 20:44

## 2019-01-19 RX ADMIN — OXYCODONE HYDROCHLORIDE 10 MILLIGRAM(S): 5 TABLET ORAL at 15:33

## 2019-01-19 RX ADMIN — OXYCODONE HYDROCHLORIDE 10 MILLIGRAM(S): 5 TABLET ORAL at 09:01

## 2019-01-19 RX ADMIN — OXYCODONE HYDROCHLORIDE 10 MILLIGRAM(S): 5 TABLET ORAL at 09:30

## 2019-01-19 RX ADMIN — OXYCODONE HYDROCHLORIDE 10 MILLIGRAM(S): 5 TABLET ORAL at 15:03

## 2019-01-19 RX ADMIN — Medication 85 MILLIMOLE(S): at 12:48

## 2019-01-19 RX ADMIN — Medication 50 GRAM(S): at 23:38

## 2019-01-19 RX ADMIN — OXYCODONE HYDROCHLORIDE 10 MILLIGRAM(S): 5 TABLET ORAL at 03:00

## 2019-01-19 RX ADMIN — Medication 0.5 MILLIGRAM(S): at 06:22

## 2019-01-19 RX ADMIN — LIDOCAINE 1 PATCH: 4 CREAM TOPICAL at 23:00

## 2019-01-19 RX ADMIN — Medication 100 GRAM(S): at 11:29

## 2019-01-19 RX ADMIN — Medication 3 MILLILITER(S): at 23:39

## 2019-01-19 NOTE — PROGRESS NOTE ADULT - ATTENDING COMMENTS
I have seen and examined the patient. I agree with the above surgery resident's note.  pt has narcotic bowel- ileus resolved w po narcan  tolerating po, +bm's abd benign  d/c home when pain controlled

## 2019-01-19 NOTE — PROGRESS NOTE ADULT - SUBJECTIVE AND OBJECTIVE BOX
CARDIOLOGY     PROGRESS  NOTE   ________________________________________________    CHIEF COMPLAINT:Patient is a 73y old  Female who presents with a chief complaint of Small Bowel Obstruction (19 Jan 2019 09:16)  pain all over.  	  REVIEW OF SYSTEMS:  CONSTITUTIONAL: No fever, weight loss, or fatigue  EYES: No eye pain, visual disturbances, or discharge  ENT:  No difficulty hearing, tinnitus, vertigo; No sinus or throat pain  NECK: No pain or stiffness  RESPIRATORY: No cough, wheezing, chills or hemoptysis; No Shortness of Breath  CARDIOVASCULAR: No chest pain, palpitations, passing out, dizziness, or leg swelling  GASTROINTESTINAL: No abdominal or epigastric pain. No nausea, vomiting, or hematemesis; No diarrhea or constipation. No melena or hematochezia.  GENITOURINARY: No dysuria, frequency, hematuria, or incontinence  NEUROLOGICAL: No headaches, memory loss, loss of strength, numbness, or tremors  SKIN: No itching, burning, rashes, or lesions   LYMPH Nodes: No enlarged glands  ENDOCRINE: No heat or cold intolerance; No hair loss  MUSCULOSKELETAL: No joint pain or swelling; No muscle, back, or extremity pain  PSYCHIATRIC: No depression, anxiety, mood swings, or difficulty sleeping  HEME/LYMPH: No easy bruising, or bleeding gums  ALLERGY AND IMMUNOLOGIC: No hives or eczema	    [ ] All others negative	  [ ] Unable to obtain    PHYSICAL EXAM:  T(C): 36.8 (01-19-19 @ 04:26), Max: 37.1 (01-18-19 @ 11:45)  HR: 89 (01-19-19 @ 04:26) (83 - 95)  BP: 158/88 (01-19-19 @ 04:26) (145/79 - 159/84)  RR: 18 (01-19-19 @ 04:26) (18 - 18)  SpO2: 95% (01-19-19 @ 04:26) (93% - 97%)  Wt(kg): --  I&O's Summary    18 Jan 2019 07:01  -  19 Jan 2019 07:00  --------------------------------------------------------  IN: 1420 mL / OUT: 1050 mL / NET: 370 mL        Appearance: Normal	  HEENT:   Normal oral mucosa, PERRL, EOMI	  Lymphatic: No lymphadenopathy  Cardiovascular: Normal S1 S2, No JVD, + murmurs, No edema  Respiratory: Lungs clear to auscultation	  Psychiatry: A & O x 3, Mood & affect appropriate  Gastrointestinal:  Soft, Non-tender, + BS	  Skin: No rashes, No ecchymoses, No cyanosis	  Neurologic: Non-focal  Extremities: Normal range of motion, No clubbing, cyanosis or edema  Vascular: Peripheral pulses palpable 2+ bilaterally    MEDICATIONS  (STANDING):  ALBUTerol/ipratropium for Nebulization 3 milliLiter(s) Nebulizer every 6 hours  buDESOnide   0.5 milliGRAM(s) Respule 0.5 milliGRAM(s) Inhalation every 12 hours  chlorhexidine 4% Liquid 1 Application(s) Topical <User Schedule>  docusate sodium 100 milliGRAM(s) Oral three times a day  enoxaparin Injectable 30 milliGRAM(s) SubCutaneous daily  influenza   Vaccine 0.5 milliLiter(s) IntraMuscular once  lidocaine   Patch 1 Patch Transdermal daily  naloxegol 25 milliGRAM(s) Oral daily  nicotine -  14 mG/24Hr(s) Patch 1 patch Transdermal daily  pantoprazole    Tablet 40 milliGRAM(s) Oral before breakfast  sodium chloride 0.9%. 1000 milliLiter(s) (50 mL/Hr) IV Continuous <Continuous>      TELEMETRY: 	    ECG:  	  RADIOLOGY:  OTHER: 	  	  LABS:	 	    CARDIAC MARKERS:                                11.4   7.82  )-----------( 244      ( 18 Jan 2019 15:12 )             37.4     01-18    139  |  106  |  35<H>  ----------------------------<  83  5.4<H>   |  21<L>  |  1.79<H>    Ca    8.7      18 Jan 2019 12:07  Phos  1.2     01-18  Mg     1.9     01-18      proBNP:   Lipid Profile:   HgA1c:   TSH:         Assessment and plan  ---------------------------  add norvasc if increase bp  pain meds  dvt prophylaxis

## 2019-01-19 NOTE — PROGRESS NOTE ADULT - SUBJECTIVE AND OBJECTIVE BOX
Green Team Surgery Progress Note    Interval: Continues to have several loose stool throughout the day, otherwise no acute events overnight.    SUBJECTIVE: Patient seen and examined at the bedside. Feeling well this morning. Tolerating regular diet without nausea or vomiting. Denies any pain at this time. Hypertension under better control.     VITALS  T(C): 36.8 (01-19-19 @ 04:26), Max: 37.1 (01-18-19 @ 11:45)  HR: 89 (01-19-19 @ 04:26) (83 - 95)  BP: 158/88 (01-19-19 @ 04:26) (145/79 - 159/84)  RR: 18 (01-19-19 @ 04:26) (18 - 18)  SpO2: 95% (01-19-19 @ 04:26) (93% - 97%)    Is/Os    01-17 @ 07:01  -  01-18 @ 07:00  --------------------------------------------------------  IN:    Oral Fluid: 240 mL    sodium chloride 0.9%: 200 mL    sodium chloride 0.9%.: 450 mL  Total IN: 890 mL    OUT:    Indwelling Catheter - Urethral: 100 mL    Voided: 500 mL  Total OUT: 600 mL    Total NET: 290 mL      01-18 @ 07:01 - 01-19 @ 05:10  --------------------------------------------------------  IN:    Oral Fluid: 820 mL    sodium chloride 0.9%.: 600 mL  Total IN: 1420 mL    OUT:    Voided: 1050 mL  Total OUT: 1050 mL    Total NET: 370 mL    PHYSICAL EXAM:   General: NAD, Lying in bed comfortably, alert, oriented x3  Pulm: Non-labored breathing on RA  GI/Abd: Soft, NT/ND, no rebound/guarding    MEDICATIONS (STANDING): ALBUTerol/ipratropium for Nebulization 3 milliLiter(s) Nebulizer every 6 hours  buDESOnide   0.5 milliGRAM(s) Respule 0.5 milliGRAM(s) Inhalation every 12 hours  enoxaparin Injectable 30 milliGRAM(s) SubCutaneous daily  influenza   Vaccine 0.5 milliLiter(s) IntraMuscular once  naloxegol 25 milliGRAM(s) Oral daily  nicotine -  14 mG/24Hr(s) Patch 1 patch Transdermal daily  pantoprazole    Tablet 40 milliGRAM(s) Oral before breakfast  sodium chloride 0.9%. 1000 milliLiter(s) IV Continuous <Continuous>    MEDICATIONS (PRN):diazepam    Tablet 2.5 milliGRAM(s) Oral two times a day PRN Anxiety  oxyCODONE    IR 10 milliGRAM(s) Oral every 6 hours PRN Moderate Pain (4 - 6)    LABS  CBC (01-18 @ 15:12)                              11.4<L>                         7.82    )----------------(  244        --    % Neutrophils, --    % Lymphocytes, ANC: --                                  37.4      BMP (01-18 @ 12:09)             --      |  --      |  --    		Ca++ 1.17    Ca --                 ---------------------------------( --    		Mg --                 --      |  --      |  --    			Ph --      BMP (01-18 @ 12:07)             139     |  106     |  35<H> 		Ca++ --      Ca 8.7                ---------------------------------( 83    		Mg 1.9                5.4<H>  |  21<L>   |  1.79<H>			Ph 1.2<L>

## 2019-01-19 NOTE — PROGRESS NOTE ADULT - ASSESSMENT
73 year old female with   PMH chronic Back pain (on Narcotics), history of multiple SBO's, narcotic associated constipation/ileus, COPD,  smoker,  ca  breast,  right  mastectomy   admitted with abdominal pain  /  no  bowel  movement    c/c  cachexia  and , with acute Renal Failure (SHAKIRA) with marked hyperphosphatemia, acidosis  ct  scans  noted/old   RENUKA  cavitary lesion./   SBO,  seen by  oncology, on prior  admissions/ d r forte  oral  feeds/  limit narcotics    resolving SHAKIRA/   s/p  lung bx/ per oncology/ path, pending  d/c  planning, per  surgery      < from: CT Abdomen and Pelvis No Cont (01.15.19 @ 18:29) >  Chest:  1.  The solid component of the patient's left upper lobe cavitary mass is   increased in size from 12/18/2018 and is compatible with the known   history of lung cancer that extends centrally.    Abdomen and pelvis:  1.  Small bowel obstruction with joint position to decompressed bowel in   the distal ileum; the discrete point of transition is not identified at   CT.  2.  The above findings were discussed with Dr. Peralta by Dr. Negron on   < end of copied text >

## 2019-01-19 NOTE — PROGRESS NOTE ADULT - SUBJECTIVE AND OBJECTIVE BOX
no comaplints    REVIEW OF SYSTEMS:  GEN: no fever,    no chills  RESP: no SOB,   no cough  CVS: no chest pain,   no palpitations  GI: no abdominal pain,   no nausea,   no vomiting,   no constipation,   no diarrhea  : no dysuria,   no frequency  NEURO: no headache,   no dizziness  PSYCH: no depression,   not anxious  Derm : no rash    MEDICATIONS  (STANDING):  ALBUTerol/ipratropium for Nebulization 3 milliLiter(s) Nebulizer every 6 hours  buDESOnide   0.5 milliGRAM(s) Respule 0.5 milliGRAM(s) Inhalation every 12 hours  chlorhexidine 4% Liquid 1 Application(s) Topical <User Schedule>  docusate sodium 100 milliGRAM(s) Oral three times a day  enoxaparin Injectable 30 milliGRAM(s) SubCutaneous daily  influenza   Vaccine 0.5 milliLiter(s) IntraMuscular once  lidocaine   Patch 1 Patch Transdermal daily  naloxegol 25 milliGRAM(s) Oral daily  nicotine -  14 mG/24Hr(s) Patch 1 patch Transdermal daily  pantoprazole    Tablet 40 milliGRAM(s) Oral before breakfast  sodium chloride 0.9%. 1000 milliLiter(s) (50 mL/Hr) IV Continuous <Continuous>    MEDICATIONS  (PRN):  diazepam    Tablet 2.5 milliGRAM(s) Oral two times a day PRN Anxiety  oxyCODONE    IR 10 milliGRAM(s) Oral every 6 hours PRN Moderate Pain (4 - 6)      Vital Signs Last 24 Hrs  T(C): 36.8 (19 Jan 2019 04:26), Max: 37.1 (18 Jan 2019 11:45)  T(F): 98.2 (19 Jan 2019 04:26), Max: 98.8 (18 Jan 2019 11:45)  HR: 89 (19 Jan 2019 04:26) (83 - 95)  BP: 158/88 (19 Jan 2019 04:26) (145/79 - 159/84)  BP(mean): --  RR: 18 (19 Jan 2019 04:26) (18 - 18)  SpO2: 95% (19 Jan 2019 04:26) (93% - 97%)  CAPILLARY BLOOD GLUCOSE        I&O's Summary    18 Jan 2019 07:01  -  19 Jan 2019 07:00  --------------------------------------------------------  IN: 1420 mL / OUT: 1050 mL / NET: 370 mL        PHYSICAL EXAM:  HEAD:  Atraumatic, Normocephalic  NECK: Supple, No   JVD  CHEST/LUNG:   no     rales,     no,    rhonchi  HEART: Regular rate and rhythm;         murmur  ABDOMEN: Soft, Nontender, ;   EXTREMITIES:    no    edema  NEUROLOGY:  alert    LABS:                        11.4   7.82  )-----------( 244      ( 18 Jan 2019 15:12 )             37.4     01-18    139  |  106  |  35<H>  ----------------------------<  83  5.4<H>   |  21<L>  |  1.79<H>    Ca    8.7      18 Jan 2019 12:07  Phos  1.2     01-18  Mg     1.9     01-18                              Consultant(s) Notes Reviewed:      Care Discussed with Consultants/Other Providers:

## 2019-01-19 NOTE — PROGRESS NOTE ADULT - ASSESSMENT
ASSESSMENT  73F w/ PMH of multiple abd surgeries and recurrent SBOs, p/w w/ another ep of SBO shown by CT, w/ TP in distal ileum, now with return of bowel function and tolerating regular diet and creatinine down-trending s/p oral narcan with resolution of bowel obstruction, creatinine downtrending and electrolytes within normal ranges    PLAN  - Continue regular diet  - Monitor GI fxn  - continue home meds  - Trend Cr and electrolytes - follow-up potassium this morning  - f/u [facility]  - Dc planning - PT currently recommending [facility] but will need to touch base with CM regarding disposition - the patient is known to SW and CM as having refused both rehab and home care in the past.

## 2019-01-20 LAB
ANION GAP SERPL CALC-SCNC: 15 MMOL/L — SIGNIFICANT CHANGE UP (ref 5–17)
BUN SERPL-MCNC: 26 MG/DL — HIGH (ref 7–23)
CALCIUM SERPL-MCNC: 8.4 MG/DL — SIGNIFICANT CHANGE UP (ref 8.4–10.5)
CHLORIDE SERPL-SCNC: 98 MMOL/L — SIGNIFICANT CHANGE UP (ref 96–108)
CO2 SERPL-SCNC: 21 MMOL/L — LOW (ref 22–31)
CREAT SERPL-MCNC: 1.3 MG/DL — SIGNIFICANT CHANGE UP (ref 0.5–1.3)
GLUCOSE SERPL-MCNC: 87 MG/DL — SIGNIFICANT CHANGE UP (ref 70–99)
HCT VFR BLD CALC: 36.6 % — SIGNIFICANT CHANGE UP (ref 34.5–45)
HGB BLD-MCNC: 11.5 G/DL — SIGNIFICANT CHANGE UP (ref 11.5–15.5)
MAGNESIUM SERPL-MCNC: 2.4 MG/DL — SIGNIFICANT CHANGE UP (ref 1.6–2.6)
MCHC RBC-ENTMCNC: 30.3 PG — SIGNIFICANT CHANGE UP (ref 27–34)
MCHC RBC-ENTMCNC: 31.4 GM/DL — LOW (ref 32–36)
MCV RBC AUTO: 96.6 FL — SIGNIFICANT CHANGE UP (ref 80–100)
PHOSPHATE SERPL-MCNC: 5 MG/DL — HIGH (ref 2.5–4.5)
PLATELET # BLD AUTO: 210 K/UL — SIGNIFICANT CHANGE UP (ref 150–400)
POTASSIUM SERPL-MCNC: 4.6 MMOL/L — SIGNIFICANT CHANGE UP (ref 3.5–5.3)
POTASSIUM SERPL-SCNC: 4.6 MMOL/L — SIGNIFICANT CHANGE UP (ref 3.5–5.3)
RBC # BLD: 3.79 M/UL — LOW (ref 3.8–5.2)
RBC # FLD: 13.8 % — SIGNIFICANT CHANGE UP (ref 10.3–14.5)
SODIUM SERPL-SCNC: 134 MMOL/L — LOW (ref 135–145)
WBC # BLD: 8.04 K/UL — SIGNIFICANT CHANGE UP (ref 3.8–10.5)
WBC # FLD AUTO: 8.04 K/UL — SIGNIFICANT CHANGE UP (ref 3.8–10.5)

## 2019-01-20 RX ORDER — ONDANSETRON 8 MG/1
4 TABLET, FILM COATED ORAL ONCE
Qty: 0 | Refills: 0 | Status: COMPLETED | OUTPATIENT
Start: 2019-01-20 | End: 2019-01-20

## 2019-01-20 RX ORDER — SODIUM CHLORIDE 9 MG/ML
1 INJECTION INTRAMUSCULAR; INTRAVENOUS; SUBCUTANEOUS ONCE
Qty: 0 | Refills: 0 | Status: COMPLETED | OUTPATIENT
Start: 2019-01-20 | End: 2019-01-20

## 2019-01-20 RX ADMIN — OXYCODONE HYDROCHLORIDE 10 MILLIGRAM(S): 5 TABLET ORAL at 02:22

## 2019-01-20 RX ADMIN — LIDOCAINE 1 PATCH: 4 CREAM TOPICAL at 13:07

## 2019-01-20 RX ADMIN — Medication 1 PATCH: at 17:41

## 2019-01-20 RX ADMIN — Medication 3 MILLILITER(S): at 13:07

## 2019-01-20 RX ADMIN — Medication 0.5 MILLIGRAM(S): at 17:07

## 2019-01-20 RX ADMIN — LIDOCAINE 1 PATCH: 4 CREAM TOPICAL at 17:41

## 2019-01-20 RX ADMIN — OXYCODONE HYDROCHLORIDE 10 MILLIGRAM(S): 5 TABLET ORAL at 08:24

## 2019-01-20 RX ADMIN — Medication 1 PATCH: at 13:09

## 2019-01-20 RX ADMIN — Medication 0.5 MILLIGRAM(S): at 05:01

## 2019-01-20 RX ADMIN — OXYCODONE HYDROCHLORIDE 10 MILLIGRAM(S): 5 TABLET ORAL at 02:52

## 2019-01-20 RX ADMIN — OXYCODONE HYDROCHLORIDE 10 MILLIGRAM(S): 5 TABLET ORAL at 14:59

## 2019-01-20 RX ADMIN — OXYCODONE HYDROCHLORIDE 10 MILLIGRAM(S): 5 TABLET ORAL at 00:00

## 2019-01-20 RX ADMIN — Medication 1 PATCH: at 13:07

## 2019-01-20 RX ADMIN — Medication 1 PATCH: at 06:21

## 2019-01-20 RX ADMIN — OXYCODONE HYDROCHLORIDE 10 MILLIGRAM(S): 5 TABLET ORAL at 14:29

## 2019-01-20 RX ADMIN — Medication 3 MILLILITER(S): at 17:07

## 2019-01-20 RX ADMIN — Medication 3 MILLILITER(S): at 05:01

## 2019-01-20 RX ADMIN — OXYCODONE HYDROCHLORIDE 10 MILLIGRAM(S): 5 TABLET ORAL at 20:25

## 2019-01-20 RX ADMIN — Medication 85 MILLIMOLE(S): at 00:45

## 2019-01-20 RX ADMIN — SODIUM CHLORIDE 1 GRAM(S): 9 INJECTION INTRAMUSCULAR; INTRAVENOUS; SUBCUTANEOUS at 17:06

## 2019-01-20 RX ADMIN — ONDANSETRON 4 MILLIGRAM(S): 8 TABLET, FILM COATED ORAL at 05:01

## 2019-01-20 RX ADMIN — Medication 2.5 MILLIGRAM(S): at 00:44

## 2019-01-20 RX ADMIN — OXYCODONE HYDROCHLORIDE 10 MILLIGRAM(S): 5 TABLET ORAL at 21:00

## 2019-01-20 RX ADMIN — PANTOPRAZOLE SODIUM 40 MILLIGRAM(S): 20 TABLET, DELAYED RELEASE ORAL at 05:08

## 2019-01-20 NOTE — PROGRESS NOTE ADULT - SUBJECTIVE AND OBJECTIVE BOX
CARDIOLOGY     PROGRESS  NOTE   ________________________________________________    CHIEF COMPLAINT:Patient is a 73y old  Female who presents with a chief complaint of Small Bowel Obstruction (19 Jan 2019 09:41)  doing well.  	  REVIEW OF SYSTEMS:  CONSTITUTIONAL: No fever, weight loss, or fatigue  EYES: No eye pain, visual disturbances, or discharge  ENT:  No difficulty hearing, tinnitus, vertigo; No sinus or throat pain  NECK: No pain or stiffness  RESPIRATORY: No cough, wheezing, chills or hemoptysis; No Shortness of Breath  CARDIOVASCULAR: No chest pain, palpitations, passing out, dizziness, or leg swelling  GASTROINTESTINAL: No abdominal or epigastric pain. No nausea, vomiting, or hematemesis; No diarrhea or constipation. No melena or hematochezia.  GENITOURINARY: No dysuria, frequency, hematuria, or incontinence  NEUROLOGICAL: No headaches, memory loss, loss of strength, numbness, or tremors  SKIN: No itching, burning, rashes, or lesions   LYMPH Nodes: No enlarged glands  ENDOCRINE: No heat or cold intolerance; No hair loss  MUSCULOSKELETAL: No joint pain or swelling; No muscle, back, or extremity pain  PSYCHIATRIC: No depression, anxiety, mood swings, or difficulty sleeping  HEME/LYMPH: No easy bruising, or bleeding gums  ALLERGY AND IMMUNOLOGIC: No hives or eczema	    [ ] All others negative	  [ ] Unable to obtain    PHYSICAL EXAM:  T(C): 37.4 (01-19-19 @ 21:46), Max: 37.4 (01-19-19 @ 21:46)  HR: 95 (01-19-19 @ 21:46) (89 - 99)  BP: 143/82 (01-19-19 @ 21:46) (143/82 - 158/88)  RR: 19 (01-19-19 @ 21:46) (18 - 19)  SpO2: 98% (01-19-19 @ 21:46) (95% - 98%)  Wt(kg): --  I&O's Summary    18 Jan 2019 07:01  -  19 Jan 2019 07:00  --------------------------------------------------------  IN: 1420 mL / OUT: 1050 mL / NET: 370 mL    19 Jan 2019 07:01  -  20 Jan 2019 01:11  --------------------------------------------------------  IN: 1080 mL / OUT: 500 mL / NET: 580 mL        Appearance: Normal	  HEENT:   Normal oral mucosa, PERRL, EOMI	  Lymphatic: No lymphadenopathy  Cardiovascular: Normal S1 S2, No JVD, + murmurs, No edema  Respiratory: Lungs clear to auscultation	  Psychiatry: A & O x 3, Mood & affect appropriate  Gastrointestinal:  Soft, Non-tender, + BS	  Skin: No rashes, No ecchymoses, No cyanosis	  Neurologic: Non-focal  Extremities: Normal range of motion, No clubbing, cyanosis or edema  Vascular: Peripheral pulses palpable 2+ bilaterally    MEDICATIONS  (STANDING):  ALBUTerol/ipratropium for Nebulization 3 milliLiter(s) Nebulizer every 6 hours  buDESOnide   0.5 milliGRAM(s) Respule 0.5 milliGRAM(s) Inhalation every 12 hours  chlorhexidine 4% Liquid 1 Application(s) Topical <User Schedule>  docusate sodium 100 milliGRAM(s) Oral three times a day  enoxaparin Injectable 30 milliGRAM(s) SubCutaneous daily  influenza   Vaccine 0.5 milliLiter(s) IntraMuscular once  lidocaine   Patch 1 Patch Transdermal daily  naloxegol 25 milliGRAM(s) Oral daily  nicotine -  14 mG/24Hr(s) Patch 1 patch Transdermal daily  pantoprazole    Tablet 40 milliGRAM(s) Oral before breakfast  senna 1 Tablet(s) Oral daily      TELEMETRY: 	    ECG:  	  RADIOLOGY:  OTHER: 	  	  LABS:	 	    CARDIAC MARKERS:                                11.5   9.0   )-----------( 207      ( 19 Jan 2019 21:22 )             35.7     01-19    142  |  107  |  25<H>  ----------------------------<  113<H>  4.7   |  22  |  1.45<H>    Ca    8.1<L>      19 Jan 2019 09:55  Phos  2.2     01-19  Mg     1.3     01-19      proBNP:   Lipid Profile:   HgA1c:   TSH:         Assessment and plan  ---------------------------  sbo ,doing well  tolerating po  dvt prophylaxis  continue current meds

## 2019-01-20 NOTE — PROGRESS NOTE ADULT - ASSESSMENT
73 year old female with   PMH chronic Back pain (on Narcotics), history of multiple SBO's, narcotic associated constipation/ileus, COPD,  smoker,  ca  breast,  right  mastectomy   admitted with abdominal pain  /  no  bowel  movement    c/c  cachexia  and , with acute Renal Failure (SHAKIRA) with marked hyperphosphatemia, acidosis  ct  scans  noted/old   RENUKA  cavitary lesion./   SBO,  seen by  oncology, on prior  admissions/ d r forte  oral  feeds/  limit narcotics    resolving SHAKIRA/   s/p  lung bx/ per oncology/ path, pending  doing well      < from: CT Abdomen and Pelvis No Cont (01.15.19 @ 18:29) >  Chest:  1.  The solid component of the patient's left upper lobe cavitary mass is   increased in size from 12/18/2018 and is compatible with the known   history of lung cancer that extends centrally.    Abdomen and pelvis:  1.  Small bowel obstruction with joint position to decompressed bowel in   the distal ileum; the discrete point of transition is not identified at   CT.  2.  The above findings were discussed with Dr. Peralta by Dr. Negron on   < end of copied text >

## 2019-01-20 NOTE — PROGRESS NOTE ADULT - ASSESSMENT
Anemia in setting of kidney disease and both resolving and Hgb is adequate and can monitor.    Lung cavitary mass.  Had biopsy - await results and management per primary oncology team.

## 2019-01-20 NOTE — PROVIDER CONTACT NOTE (OTHER) - SITUATION
Pt refusing lovenox. Pt states she "doesn't take this medication" although explained to her multiple times that she has taken it and the risks and benefits of lovenox she still continues to refuse.

## 2019-01-20 NOTE — PROGRESS NOTE ADULT - ASSESSMENT
ASSESSMENT  73F w/ PMH of multiple abd surgeries and recurrent SBOs, p/w w/ another ep of SBO shown by CT, w/ TP in distal ileum, now with return of bowel function and tolerating regular diet and creatinine down-trending s/p oral narcan with resolution of bowel obstruction, creatinine downtrending and electrolytes within normal ranges    PLAN  - f/u AM labs  - Continue regular diet  - Monitor GI fxn  - continue home meds  - Trend Cr, trending down  - f/u UOP  - Dc planning - PT currently recommending MADDIE but will need to touch base with CM regarding disposition - the patient is known to SW and CM as having refused both rehab and home care in the past.      Green Surgery  [id number]

## 2019-01-20 NOTE — PROGRESS NOTE ADULT - SUBJECTIVE AND OBJECTIVE BOX
eating  well/  no abd pain    REVIEW OF SYSTEMS:  GEN: no fever,    no chills  RESP: no SOB,   no cough  CVS: no chest pain,   no palpitations  GI: no abdominal pain,   no nausea,   no vomiting,   no constipation,   no diarrhea  : no dysuria,   no frequency  NEURO: no headache,   no dizziness  PSYCH: no depression,   not anxious  Derm : no rash    MEDICATIONS  (STANDING):  ALBUTerol/ipratropium for Nebulization 3 milliLiter(s) Nebulizer every 6 hours  buDESOnide   0.5 milliGRAM(s) Respule 0.5 milliGRAM(s) Inhalation every 12 hours  chlorhexidine 4% Liquid 1 Application(s) Topical <User Schedule>  docusate sodium 100 milliGRAM(s) Oral three times a day  enoxaparin Injectable 30 milliGRAM(s) SubCutaneous daily  influenza   Vaccine 0.5 milliLiter(s) IntraMuscular once  lidocaine   Patch 1 Patch Transdermal daily  naloxegol 25 milliGRAM(s) Oral daily  nicotine -  14 mG/24Hr(s) Patch 1 patch Transdermal daily  pantoprazole    Tablet 40 milliGRAM(s) Oral before breakfast  senna 1 Tablet(s) Oral daily    MEDICATIONS  (PRN):  diazepam    Tablet 2.5 milliGRAM(s) Oral two times a day PRN Anxiety  oxyCODONE    IR 10 milliGRAM(s) Oral every 6 hours PRN Moderate Pain (4 - 6)      Vital Signs Last 24 Hrs  T(C): 36.7 (20 Jan 2019 01:12), Max: 37.4 (19 Jan 2019 21:46)  T(F): 98.1 (20 Jan 2019 01:12), Max: 99.3 (19 Jan 2019 21:46)  HR: 93 (20 Jan 2019 01:12) (89 - 99)  BP: 148/85 (20 Jan 2019 01:12) (143/82 - 158/88)  BP(mean): --  RR: 18 (20 Jan 2019 01:12) (18 - 19)  SpO2: 95% (20 Jan 2019 01:12) (95% - 98%)  CAPILLARY BLOOD GLUCOSE        I&O's Summary    18 Jan 2019 07:01  -  19 Jan 2019 07:00  --------------------------------------------------------  IN: 1420 mL / OUT: 1050 mL / NET: 370 mL    19 Jan 2019 07:01  -  20 Jan 2019 02:03  --------------------------------------------------------  IN: 1080 mL / OUT: 500 mL / NET: 580 mL        PHYSICAL EXAM:  HEAD:  Atraumatic, Normocephalic  NECK: Supple, No   JVD  CHEST/LUNG:   no     rales,     no,    rhonchi  HEART: Regular rate and rhythm;         murmur  ABDOMEN: Soft, Nontender, ;   EXTREMITIES:   no     edema  NEUROLOGY:  alert    LABS:                        11.5   9.0   )-----------( 207      ( 19 Jan 2019 21:22 )             35.7     01-19    142  |  107  |  25<H>  ----------------------------<  113<H>  4.7   |  22  |  1.45<H>    Ca    8.1<L>      19 Jan 2019 09:55  Phos  2.2     01-19  Mg     1.3     01-19                              Consultant(s) Notes Reviewed:      Care Discussed with Consultants/Other Providers:

## 2019-01-20 NOTE — PROGRESS NOTE ADULT - SUBJECTIVE AND OBJECTIVE BOX
Patient is a 73y old  Female who presents with a chief complaint of Small Bowel Obstruction (20 Jan 2019 06:26)    she says that she feels twitchy today.  No CP or SOB.  No N/V.  No fevers.  No HA    Medication:   ALBUTerol/ipratropium for Nebulization 3 milliLiter(s) Nebulizer every 6 hours  buDESOnide   0.5 milliGRAM(s) Respule 0.5 milliGRAM(s) Inhalation every 12 hours  chlorhexidine 4% Liquid 1 Application(s) Topical <User Schedule>  diazepam    Tablet 2.5 milliGRAM(s) Oral two times a day PRN  docusate sodium 100 milliGRAM(s) Oral three times a day  enoxaparin Injectable 30 milliGRAM(s) SubCutaneous daily  influenza   Vaccine 0.5 milliLiter(s) IntraMuscular once  lidocaine   Patch 1 Patch Transdermal daily  naloxegol 25 milliGRAM(s) Oral daily  nicotine -  14 mG/24Hr(s) Patch 1 patch Transdermal daily  oxyCODONE    IR 10 milliGRAM(s) Oral every 6 hours PRN  pantoprazole    Tablet 40 milliGRAM(s) Oral before breakfast  senna 1 Tablet(s) Oral daily  sodium chloride 1 Gram(s) Oral once      Physical exam    T(C): 37.1 (01-20-19 @ 13:25), Max: 37.4 (01-19-19 @ 21:46)  HR: 90 (01-20-19 @ 13:25) (85 - 99)  BP: 130/78 (01-20-19 @ 13:25) (130/78 - 150/68)  RR: 19 (01-20-19 @ 13:25) (17 - 19)  SpO2: 96% (01-20-19 @ 13:25) (95% - 98%)  Wt(kg): --    alert NAD  EOMI anicteric sclera  Cv s1 S2  Lungs clear B/L anteriorly  No LE edema or tenderness    Labs                        11.5   8.04  )-----------( 210      ( 20 Jan 2019 11:32 )             36.6       01-20    134<L>  |  98  |  26<H>  ----------------------------<  87  4.6   |  21<L>  |  1.30    Ca    8.4      20 Jan 2019 09:43  Phos  5.0     01-20  Mg     2.4     01-20            [id number]

## 2019-01-20 NOTE — PROGRESS NOTE ADULT - SUBJECTIVE AND OBJECTIVE BOX
Green Team Surgery Progress Note    Interval: Continues to have several loose stool throughout the day but fewer than yday, otherwise no acute events overnight. Mg and Phos low even after repletion during the day, repleted again overnight    SUBJECTIVE: Patient seen and examined at the bedside  - Feeling well this morning. Tolerating regular diet without nausea or vomiting  - Denies any pain at this time. Hypertension under better control.  - Pt states she doesn't want to return home until she feels perfectly fine      OBJECTIVE:     ** PHYSICAL EXAM **    General: NAD, Lying in bed comfortably, alert, oriented x3  Pulm: Non-labored breathing on RA  GI/Abd: Soft, NT/ND, no rebound/guarding      ** VITAL SIGNS / I&O's **    Vital Signs Last 24 Hrs  T(C): 36.6 (20 Jan 2019 05:05), Max: 37.4 (19 Jan 2019 21:46)  T(F): 97.8 (20 Jan 2019 05:05), Max: 99.3 (19 Jan 2019 21:46)  HR: 85 (20 Jan 2019 05:05) (85 - 99)  BP: 130/79 (20 Jan 2019 05:05) (130/79 - 150/68)  BP(mean): --  RR: 18 (20 Jan 2019 05:05) (18 - 19)  SpO2: 97% (20 Jan 2019 05:05) (95% - 98%)      18 Jan 2019 07:01  -  19 Jan 2019 07:00  --------------------------------------------------------  IN:    Oral Fluid: 820 mL    sodium chloride 0.9%: 600 mL  Total IN: 1420 mL    OUT:    Voided: 1050 mL  Total OUT: 1050 mL    Total NET: 370 mL      19 Jan 2019 07:01  -  20 Jan 2019 06:27  --------------------------------------------------------  IN:    Oral Fluid: 480 mL    sodium chloride 0.9%: 600 mL    Solution: 600 mL  Total IN: 1680 mL    OUT:    Voided: 750 mL  Total OUT: 750 mL    Total NET: 930 mL            ** LABS **                          11.5   9.0   )-----------( 207      ( 19 Jan 2019 21:22 )             35.7     19 Jan 2019 09:55    142    |  107    |  25     ----------------------------<  113    4.7     |  22     |  1.45     Ca    8.1        19 Jan 2019 09:55  Phos  2.2       19 Jan 2019 20:57  Mg     1.3       19 Jan 2019 20:57        CAPILLARY BLOOD GLUCOSE                    MEDICATIONS  (STANDING):  ALBUTerol/ipratropium for Nebulization 3 milliLiter(s) Nebulizer every 6 hours  buDESOnide   0.5 milliGRAM(s) Respule 0.5 milliGRAM(s) Inhalation every 12 hours  chlorhexidine 4% Liquid 1 Application(s) Topical <User Schedule>  docusate sodium 100 milliGRAM(s) Oral three times a day  enoxaparin Injectable 30 milliGRAM(s) SubCutaneous daily  influenza   Vaccine 0.5 milliLiter(s) IntraMuscular once  lidocaine   Patch 1 Patch Transdermal daily  naloxegol 25 milliGRAM(s) Oral daily  nicotine -  14 mG/24Hr(s) Patch 1 patch Transdermal daily  pantoprazole    Tablet 40 milliGRAM(s) Oral before breakfast  senna 1 Tablet(s) Oral daily    MEDICATIONS  (PRN):  diazepam    Tablet 2.5 milliGRAM(s) Oral two times a day PRN Anxiety  oxyCODONE    IR 10 milliGRAM(s) Oral every 6 hours PRN Moderate Pain (4 - 6)

## 2019-01-21 ENCOUNTER — TRANSCRIPTION ENCOUNTER (OUTPATIENT)
Age: 74
End: 2019-01-21

## 2019-01-21 LAB
ANION GAP SERPL CALC-SCNC: 12 MMOL/L — SIGNIFICANT CHANGE UP (ref 5–17)
BUN SERPL-MCNC: 26 MG/DL — HIGH (ref 7–23)
CALCIUM SERPL-MCNC: 8.2 MG/DL — LOW (ref 8.4–10.5)
CHLORIDE SERPL-SCNC: 102 MMOL/L — SIGNIFICANT CHANGE UP (ref 96–108)
CO2 SERPL-SCNC: 23 MMOL/L — SIGNIFICANT CHANGE UP (ref 22–31)
CREAT SERPL-MCNC: 1.46 MG/DL — HIGH (ref 0.5–1.3)
GLUCOSE SERPL-MCNC: 111 MG/DL — HIGH (ref 70–99)
MAGNESIUM SERPL-MCNC: 1.5 MG/DL — LOW (ref 1.6–2.6)
PHOSPHATE SERPL-MCNC: 2.7 MG/DL — SIGNIFICANT CHANGE UP (ref 2.5–4.5)
POTASSIUM SERPL-MCNC: 4.8 MMOL/L — SIGNIFICANT CHANGE UP (ref 3.5–5.3)
POTASSIUM SERPL-SCNC: 4.8 MMOL/L — SIGNIFICANT CHANGE UP (ref 3.5–5.3)
SODIUM SERPL-SCNC: 137 MMOL/L — SIGNIFICANT CHANGE UP (ref 135–145)

## 2019-01-21 PROCEDURE — 93971 EXTREMITY STUDY: CPT | Mod: 26

## 2019-01-21 PROCEDURE — 99232 SBSQ HOSP IP/OBS MODERATE 35: CPT | Mod: GC

## 2019-01-21 RX ORDER — MAGNESIUM SULFATE 500 MG/ML
2 VIAL (ML) INJECTION ONCE
Qty: 0 | Refills: 0 | Status: COMPLETED | OUTPATIENT
Start: 2019-01-21 | End: 2019-01-21

## 2019-01-21 RX ORDER — ACETAMINOPHEN 500 MG
325 TABLET ORAL EVERY 6 HOURS
Qty: 0 | Refills: 0 | Status: DISCONTINUED | OUTPATIENT
Start: 2019-01-21 | End: 2019-01-22

## 2019-01-21 RX ADMIN — OXYCODONE HYDROCHLORIDE 10 MILLIGRAM(S): 5 TABLET ORAL at 21:20

## 2019-01-21 RX ADMIN — Medication 0.5 MILLIGRAM(S): at 17:46

## 2019-01-21 RX ADMIN — OXYCODONE HYDROCHLORIDE 10 MILLIGRAM(S): 5 TABLET ORAL at 14:33

## 2019-01-21 RX ADMIN — Medication 50 GRAM(S): at 11:08

## 2019-01-21 RX ADMIN — Medication 3 MILLILITER(S): at 05:08

## 2019-01-21 RX ADMIN — OXYCODONE HYDROCHLORIDE 10 MILLIGRAM(S): 5 TABLET ORAL at 21:50

## 2019-01-21 RX ADMIN — Medication 1 PATCH: at 18:55

## 2019-01-21 RX ADMIN — Medication 3 MILLILITER(S): at 17:46

## 2019-01-21 RX ADMIN — Medication 2.5 MILLIGRAM(S): at 05:06

## 2019-01-21 RX ADMIN — ENOXAPARIN SODIUM 30 MILLIGRAM(S): 100 INJECTION SUBCUTANEOUS at 11:09

## 2019-01-21 RX ADMIN — OXYCODONE HYDROCHLORIDE 10 MILLIGRAM(S): 5 TABLET ORAL at 02:29

## 2019-01-21 RX ADMIN — Medication 1 PATCH: at 05:59

## 2019-01-21 RX ADMIN — OXYCODONE HYDROCHLORIDE 10 MILLIGRAM(S): 5 TABLET ORAL at 15:03

## 2019-01-21 RX ADMIN — OXYCODONE HYDROCHLORIDE 10 MILLIGRAM(S): 5 TABLET ORAL at 03:00

## 2019-01-21 RX ADMIN — Medication 1 PATCH: at 05:55

## 2019-01-21 RX ADMIN — OXYCODONE HYDROCHLORIDE 10 MILLIGRAM(S): 5 TABLET ORAL at 08:39

## 2019-01-21 RX ADMIN — Medication 1 PATCH: at 19:33

## 2019-01-21 RX ADMIN — Medication 3 MILLILITER(S): at 11:08

## 2019-01-21 RX ADMIN — Medication 3 MILLILITER(S): at 00:20

## 2019-01-21 RX ADMIN — Medication 0.5 MILLIGRAM(S): at 05:06

## 2019-01-21 RX ADMIN — PANTOPRAZOLE SODIUM 40 MILLIGRAM(S): 20 TABLET, DELAYED RELEASE ORAL at 05:08

## 2019-01-21 RX ADMIN — LIDOCAINE 1 PATCH: 4 CREAM TOPICAL at 10:18

## 2019-01-21 RX ADMIN — OXYCODONE HYDROCHLORIDE 10 MILLIGRAM(S): 5 TABLET ORAL at 09:09

## 2019-01-21 NOTE — DISCHARGE NOTE ADULT - CARE PLAN
Principal Discharge DX:	Abdominal pain, unspecified abdominal location  Goal:	wound healing, symptom control  Assessment and plan of treatment:	Please follow up with Dr. Best in 1~2 weeks. Please call her office to make an appointment Principal Discharge DX:	Abdominal pain, unspecified abdominal location  Goal:	tolerate diet  Assessment and plan of treatment:	diet as tolerated, avoid narcotics, take prescription Movantik to avoid opoid constipation     Please follow up with GI - [facility] in 1-2 weeks please call   [phone] to schedule an appointment     If needed may follow up with Dr. Best in 1~2 weeks. Please call her office to make an appointment  Secondary Diagnosis:	Lung mass  Goal:	follow up with Dr. Reese in 1 week  Assessment and plan of treatment:	please call to schedule an appointment with Dr. Reese in 1 week to discuss biopsy results and treatment plan  Assessment and plan of treatment:	Please follow up with your regular medical doctor in 1 week, please call to schedule an appointment to discuss recent hospitalization

## 2019-01-21 NOTE — PROGRESS NOTE ADULT - SUBJECTIVE AND OBJECTIVE BOX
no  pain    REVIEW OF SYSTEMS:  GEN: no fever,    no chills  RESP: no SOB,   no cough  CVS: no chest pain,   no palpitations  GI: no abdominal pain,   no nausea,   no vomiting,   no constipation,   no diarrhea  : no dysuria,   no frequency  NEURO: no headache,   no dizziness  PSYCH: no depression,   not anxious  Derm : no rash    MEDICATIONS  (STANDING):  ALBUTerol/ipratropium for Nebulization 3 milliLiter(s) Nebulizer every 6 hours  buDESOnide   0.5 milliGRAM(s) Respule 0.5 milliGRAM(s) Inhalation every 12 hours  chlorhexidine 4% Liquid 1 Application(s) Topical <User Schedule>  docusate sodium 100 milliGRAM(s) Oral three times a day  enoxaparin Injectable 30 milliGRAM(s) SubCutaneous daily  influenza   Vaccine 0.5 milliLiter(s) IntraMuscular once  lidocaine   Patch 1 Patch Transdermal daily  naloxegol 25 milliGRAM(s) Oral daily  nicotine -  14 mG/24Hr(s) Patch 1 patch Transdermal daily  pantoprazole    Tablet 40 milliGRAM(s) Oral before breakfast  senna 1 Tablet(s) Oral daily    MEDICATIONS  (PRN):  diazepam    Tablet 2.5 milliGRAM(s) Oral two times a day PRN Anxiety  oxyCODONE    IR 10 milliGRAM(s) Oral every 6 hours PRN Moderate Pain (4 - 6)      Vital Signs Last 24 Hrs  T(C): 36.8 (21 Jan 2019 04:53), Max: 37.2 (20 Jan 2019 18:00)  T(F): 98.3 (21 Jan 2019 04:53), Max: 98.9 (20 Jan 2019 18:00)  HR: 71 (21 Jan 2019 04:53) (71 - 100)  BP: 139/74 (21 Jan 2019 04:53) (129/68 - 147/80)  BP(mean): --  RR: 19 (21 Jan 2019 04:53) (17 - 20)  SpO2: 97% (21 Jan 2019 04:53) (95% - 97%)  CAPILLARY BLOOD GLUCOSE        I&O's Summary    20 Jan 2019 07:01  -  21 Jan 2019 07:00  --------------------------------------------------------  IN: 1160 mL / OUT: 950 mL / NET: 210 mL        PHYSICAL EXAM:  HEAD:  Atraumatic, Normocephalic  NECK: Supple, No   JVD  CHEST/LUNG:   no     rales,     no,    rhonchi  HEART: Regular rate and rhythm;         murmur  ABDOMEN: Soft, Nontender, ;   EXTREMITIES:     no   edema  NEUROLOGY:  alert    LABS:                        11.5   8.04  )-----------( 210      ( 20 Jan 2019 11:32 )             36.6     01-20    134<L>  |  98  |  26<H>  ----------------------------<  87  4.6   |  21<L>  |  1.30    Ca    8.4      20 Jan 2019 09:43  Phos  5.0     01-20  Mg     2.4     01-20                              Consultant(s) Notes Reviewed:      Care Discussed with Consultants/Other Providers:

## 2019-01-21 NOTE — DISCHARGE NOTE ADULT - CARE PROVIDERS DIRECT ADDRESSES
[email or web address]

## 2019-01-21 NOTE — DISCHARGE NOTE ADULT - MEDICATION SUMMARY - MEDICATIONS TO TAKE
I will START or STAY ON the medications listed below when I get home from the hospital:    budesonide 0.5 mg/2 mL inhalation suspension  -- 0.5 milligram(s) inhaled 2 times a day   -- Indication: For breathing    oxyCODONE 10 mg oral tablet  -- 1 tab(s) by mouth every 6 hours, As needed, Moderate Pain (4 - 6)  -- Indication: For chronic pain    acetaminophen 325 mg oral tablet  -- 1 tab(s) by mouth every 6 hours, As Needed for mild pain   -- Indication: For mild pain    Valium  -- 2.5 milligram(s) by mouth once a day (at bedtime), As Needed  -- Indication: For Anxiety    lidocaine 5% topical film  -- Apply on skin to affected area once a day  -- Indication: For chronic pain    naloxegol 25 mg oral tablet  -- 1 tab(s) by mouth once a day  -- Indication: For opioid constipation    MiraLax - oral powder for reconstitution  -- 17 gram(s) by mouth 2 times a day  -- Indication: For constipation     senna oral tablet  -- 2 tab(s) by mouth once a day (at bedtime)  -- Indication: For stool softneer    docusate sodium 100 mg oral capsule  -- 1 cap(s) by mouth 3 times a day  -- Indication: For stool softener    sucralfate 1 g oral tablet  -- 1 tab(s) by mouth 4 times a day  -- Indication: For reflux    simethicone 80 mg oral tablet, chewable  -- 1 tab(s) by mouth 3 times a day  -- Indication: For gas pains    pantoprazole 40 mg oral delayed release tablet  -- tab(s) by mouth 2 times a day  -- It is very important that you take or use this exactly as directed.  Do not skip doses or discontinue unless directed by your doctor.  Obtain medical advice before taking any non-prescription drugs as some may affect the action of this medication.  Swallow whole.  Do not crush.    -- Indication: For reflux    nicotine 14 mg/24 hr transdermal film, extended release  -- 1 patch by transdermal patch once a day  -- Indication: For smoking cessation    Centrum Antioxidant Multiple Vitamins and Minerals oral tablet  -- 1 tab(s) by mouth once a day  -- Indication: For vitamin

## 2019-01-21 NOTE — DISCHARGE NOTE ADULT - HOSPITAL COURSE
74yo F with hx of breast cancer s/p mastectomy 2006, lung cancer, multiple sbos,with most recent 11/2018, s/p Hysterectomy, Liver Mass s/p liver rsxn 2009, S/P Appendectomy, S/P Cholecystectomy, S/P Exploratory Laparotomy,  S/P femoral hernia surgery 2012, who presents with c/o approx 2-3 days of NBNB vomiting, inability to tolerate po liquids or food, and has not passed gas. Her last BM was Sunday and has noticed that her abdomen has slowly increased in size and girth.  Pt last SBO resolved with NGT decompression, and aggressive fluid resuscitation. Pt denies any sick contacts, fevers, chills or diarrhea.  Labs obtained in the ED with metabolic alkalosis and CT Scan with SBO with transititon point in the terminal ileum.  General surgery consulted for management of SBO.   he has had multiple prior admissions for SBO, narcotic associated ileus/constipation, most recently Nov of this year managed nonoperatively with NGT decompression.    On presentation in ED, patient was hypotensive with SBP in 90's which improved to 120's with IV fluid hydration. Pt was found to be markedly distended, emaciated, and NGT was placed with bilious output returned on placement. Lab revealed acute renal failure with Ce increased to 7.3 , patient prior admission show baseline Cre of 1.6 as well as multiple electrolyte derangements. Pt admitted to SICU for close monitoring Pt given PO Narcan.     Pt started having bowel movements- NGT removed started on clears and advanced to LRD. Pt renal failure resolved and Cr trended down. Hypocalcemia and hypokalemia repleted and improved. Pt seen by medicine, cardiology and oncology.     Pt underwent lung biopsy of lung mass - with plans for outpateint follow up with oncology     Pt sen by PT recommended MADDIE - patient refused and opted to go home     Pt to follow up with medicine, oncology and GI.

## 2019-01-21 NOTE — PROGRESS NOTE ADULT - SUBJECTIVE AND OBJECTIVE BOX
Green Team Surgery Progress Note    Interval: Pt had at least 7 BMs yday, no acute events overnight    SUBJECTIVE: Patient seen and examined at the bedside  - Feeling well this morning. Tolerating regular diet without nausea or vomiting  - Pt complains about having too many BMs  - Denies any pain at this time.       OBJECTIVE:     ** PHYSICAL EXAM **    General: NAD, Lying in bed comfortably, alert, oriented x3  Pulm: Non-labored breathing on RA  GI/Abd: Soft, NT/ND, no rebound/guarding    OBJECTIVE:     ** PHYSICAL EXAM **    Gen: NAD, lying comfortably in bed  HEENT: NC/AT  RESP: nonlabored breathing  ABDOMEN: soft, non-distended, non-tender, insision site dressing c/d/i      ** VITAL SIGNS / I&O's **    Vital Signs Last 24 Hrs  T(C): 36.8 (21 Jan 2019 04:53), Max: 37.2 (20 Jan 2019 18:00)  T(F): 98.3 (21 Jan 2019 04:53), Max: 98.9 (20 Jan 2019 18:00)  HR: 71 (21 Jan 2019 04:53) (71 - 100)  BP: 139/74 (21 Jan 2019 04:53) (129/68 - 147/80)  BP(mean): --  RR: 19 (21 Jan 2019 04:53) (17 - 20)  SpO2: 97% (21 Jan 2019 04:53) (95% - 97%)      19 Jan 2019 07:01  -  20 Jan 2019 07:00  --------------------------------------------------------  IN:    Oral Fluid: 480 mL    sodium chloride 0.9%: 600 mL    Solution: 600 mL  Total IN: 1680 mL    OUT:    Voided: 750 mL  Total OUT: 750 mL    Total NET: 930 mL      20 Jan 2019 07:01  -  21 Jan 2019 05:08  --------------------------------------------------------  IN:    Oral Fluid: 1160 mL  Total IN: 1160 mL    OUT:    Voided: 950 mL  Total OUT: 950 mL    Total NET: 210 mL            ** LABS **                          11.5   8.04  )-----------( 210      ( 20 Jan 2019 11:32 )             36.6     20 Jan 2019 09:43    134    |  98     |  26     ----------------------------<  87     4.6     |  21     |  1.30     Ca    8.4        20 Jan 2019 09:43  Phos  5.0       20 Jan 2019 09:43  Mg     2.4       20 Jan 2019 09:43        CAPILLARY BLOOD GLUCOSE                    MEDICATIONS  (STANDING):  ALBUTerol/ipratropium for Nebulization 3 milliLiter(s) Nebulizer every 6 hours  buDESOnide   0.5 milliGRAM(s) Respule 0.5 milliGRAM(s) Inhalation every 12 hours  chlorhexidine 4% Liquid 1 Application(s) Topical <User Schedule>  docusate sodium 100 milliGRAM(s) Oral three times a day  enoxaparin Injectable 30 milliGRAM(s) SubCutaneous daily  influenza   Vaccine 0.5 milliLiter(s) IntraMuscular once  lidocaine   Patch 1 Patch Transdermal daily  naloxegol 25 milliGRAM(s) Oral daily  nicotine -  14 mG/24Hr(s) Patch 1 patch Transdermal daily  pantoprazole    Tablet 40 milliGRAM(s) Oral before breakfast  senna 1 Tablet(s) Oral daily    MEDICATIONS  (PRN):  diazepam    Tablet 2.5 milliGRAM(s) Oral two times a day PRN Anxiety  oxyCODONE    IR 10 milliGRAM(s) Oral every 6 hours PRN Moderate Pain (4 - 6) Green Team Surgery Progress Note    Interval: Pt had at least 7 BMs yday, no acute events overnight    SUBJECTIVE: Patient seen and examined at the bedside  - Feeling the same this morning. Tolerating regular diet without nausea or vomiting  - Pt complains about having too many BMs, pt also complains that she has left calf pain during and after she walks  - Pt states that she wants to get out of the hospital when her leg pain is gone      OBJECTIVE:     ** PHYSICAL EXAM **    General: NAD, Lying in bed comfortably, alert, oriented x3  Pulm: Non-labored breathing on RA  GI/Abd: Soft, NT/ND, no rebound/guarding  EXT: Homans sign negative on left leg, no erythema or swelling, tenderness to touch on calf        ** VITAL SIGNS / I&O's **    Vital Signs Last 24 Hrs  T(C): 36.8 (21 Jan 2019 04:53), Max: 37.2 (20 Jan 2019 18:00)  T(F): 98.3 (21 Jan 2019 04:53), Max: 98.9 (20 Jan 2019 18:00)  HR: 71 (21 Jan 2019 04:53) (71 - 100)  BP: 139/74 (21 Jan 2019 04:53) (129/68 - 147/80)  BP(mean): --  RR: 19 (21 Jan 2019 04:53) (17 - 20)  SpO2: 97% (21 Jan 2019 04:53) (95% - 97%)      19 Jan 2019 07:01  -  20 Jan 2019 07:00  --------------------------------------------------------  IN:    Oral Fluid: 480 mL    sodium chloride 0.9%: 600 mL    Solution: 600 mL  Total IN: 1680 mL    OUT:    Voided: 750 mL  Total OUT: 750 mL    Total NET: 930 mL      20 Jan 2019 07:01  -  21 Jan 2019 05:08  --------------------------------------------------------  IN:    Oral Fluid: 1160 mL  Total IN: 1160 mL    OUT:    Voided: 950 mL  Total OUT: 950 mL    Total NET: 210 mL            ** LABS **                          11.5   8.04  )-----------( 210      ( 20 Jan 2019 11:32 )             36.6     20 Jan 2019 09:43    134    |  98     |  26     ----------------------------<  87     4.6     |  21     |  1.30     Ca    8.4        20 Jan 2019 09:43  Phos  5.0       20 Jan 2019 09:43  Mg     2.4       20 Jan 2019 09:43        CAPILLARY BLOOD GLUCOSE                    MEDICATIONS  (STANDING):  ALBUTerol/ipratropium for Nebulization 3 milliLiter(s) Nebulizer every 6 hours  buDESOnide   0.5 milliGRAM(s) Respule 0.5 milliGRAM(s) Inhalation every 12 hours  chlorhexidine 4% Liquid 1 Application(s) Topical <User Schedule>  docusate sodium 100 milliGRAM(s) Oral three times a day  enoxaparin Injectable 30 milliGRAM(s) SubCutaneous daily  influenza   Vaccine 0.5 milliLiter(s) IntraMuscular once  lidocaine   Patch 1 Patch Transdermal daily  naloxegol 25 milliGRAM(s) Oral daily  nicotine -  14 mG/24Hr(s) Patch 1 patch Transdermal daily  pantoprazole    Tablet 40 milliGRAM(s) Oral before breakfast  senna 1 Tablet(s) Oral daily    MEDICATIONS  (PRN):  diazepam    Tablet 2.5 milliGRAM(s) Oral two times a day PRN Anxiety  oxyCODONE    IR 10 milliGRAM(s) Oral every 6 hours PRN Moderate Pain (4 - 6)

## 2019-01-21 NOTE — DISCHARGE NOTE ADULT - CARE PROVIDER_API CALL
Catarino Rondon), Gastroenterology; Internal Medicine  [address]  [address]  [place], [place] [id number]  Phone: [phone]  Fax: [phone]    Chano Reese), Hematology; Medical Oncology  [address]  [address]  [place] [address]  Phone: [phone]  Fax: [phone]    Lamar Best), ColonRectal Surgery; Surgery  [address]  [address]  [place] [address]  Phone: [phone]  Fax: [phone]

## 2019-01-21 NOTE — PROGRESS NOTE ADULT - ASSESSMENT
73 year old female with PMH Lung mass, chronic Back pain (on Narcotics), history of multiple SBO's, narcotic associated constipation/ileus, COPD, current everyday smoker admitted with abdominal pain and distension, nausea and poor PO intake with associated worsening painful spasms.  Clinically emaciated and severely dehydrated -admitted to SICU  CT + SBO  SHAKIRA - improving    PLAN  -PO diet as tolerated  -Monitor Cr  -PO Movantik as per Surgical recs  -Limit narcotics (discussed with pt)  -PO PPI  -monitor electrolytes and BMs    Agree with Discharge planning    Jake Khan PA-C    [facility]  [phone]

## 2019-01-21 NOTE — PROGRESS NOTE ADULT - ASSESSMENT
ASSESSMENT  73F w/ PMH of multiple abd surgeries and recurrent SBOs, p/w w/ another ep of SBO shown by CT, w/ TP in distal ileum, now with return of bowel function and tolerating regular diet and creatinine down-trending s/p oral narcan with resolution of bowel obstruction, creatinine downtrending and electrolytes within normal ranges    PLAN  - f/u AM labs  - Continue regular diet  - Monitor GI fxn  - continue home meds  - Trend Cr, trending down  - f/u UOP  - Dc planning - PT currently recommending [facility] but will need to touch base with CM regarding disposition - the patient is known to SW and CM as having refused both rehab and home care in the past  - possibly DC today      Green Surgery  [id number] ASSESSMENT  73F w/ PMH of multiple abd surgeries and recurrent SBOs, p/w w/ another ep of SBO shown by CT, w/ TP in distal ileum, now with return of bowel function and tolerating regular diet and creatinine down-trending s/p oral narcan with resolution of bowel obstruction, creatinine downtrending and electrolytes within normal ranges    PLAN  - f/u LLE duplex scan  - f/u AM labs  - Continue regular diet  - Monitor GI fxn  - continue home meds  - Trend Cr, trending down  - Dc planning - PT currently recommending [facility] but will need to touch base with CM regarding disposition - the patient is known to SW and CM as having refused both rehab and home care in the past  - possibly DC today      Green Surgery  [id number]

## 2019-01-21 NOTE — DISCHARGE NOTE ADULT - PLAN OF CARE
wound healing, symptom control Please follow up with Dr. Best in 1~2 weeks. Please call her office to make an appointment tolerate diet diet as tolerated, avoid narcotics, take prescription Movantik to avoid opoid constipation     Please follow up with GI - [facility] in 1-2 weeks please call   [phone] to schedule an appointment     If needed may follow up with Dr. Best in 1~2 weeks. Please call her office to make an appointment follow up with Dr. Reese in 1 week please call to schedule an appointment with Dr. Reese in 1 week to discuss biopsy results and treatment plan Please follow up with your regular medical doctor in 1 week, please call to schedule an appointment to discuss recent hospitalization

## 2019-01-21 NOTE — PROGRESS NOTE ADULT - ASSESSMENT
73 year old female with   PMH chronic Back pain (on Narcotics), history of multiple SBO's, narcotic associated constipation/ileus, COPD,  smoker,  ca  breast,  right  mastectomy   admitted with abdominal pain  /  no  bowel  movement    c/c  cachexia  and , with acute Renal Failure (SHAKIRA) with marked hyperphosphatemia, acidosis  ct  scans  noted/old   RENUKA  cavitary lesion./   SBO,  seen by  oncology, on prior  admissions/ d r forte  oral  feeds/  limit narcotics    resolving SHAKIRA/   s/p  lung bx/ per oncology/ path, pending  doing well/  encouraged  to  ambulate      < from: CT Abdomen and Pelvis No Cont (01.15.19 @ 18:29) >  Chest:  1.  The solid component of the patient's left upper lobe cavitary mass is   increased in size from 12/18/2018 and is compatible with the known   history of lung cancer that extends centrally.    Abdomen and pelvis:  1.  Small bowel obstruction with joint position to decompressed bowel in   the distal ileum; the discrete point of transition is not identified at   CT.  2.  The above findings were discussed with Dr. ePralta by Dr. Negron on   < end of copied text >

## 2019-01-21 NOTE — DISCHARGE NOTE ADULT - PATIENT PORTAL LINK FT
You can access the [facility] Patient Portal, offered by [facility], by registering with the following website: [email or web address]

## 2019-01-22 VITALS
OXYGEN SATURATION: 96 % | SYSTOLIC BLOOD PRESSURE: 136 MMHG | DIASTOLIC BLOOD PRESSURE: 81 MMHG | RESPIRATION RATE: 18 BRPM | TEMPERATURE: 98 F | HEART RATE: 102 BPM

## 2019-01-22 LAB
ANION GAP SERPL CALC-SCNC: 17 MMOL/L — SIGNIFICANT CHANGE UP (ref 5–17)
BUN SERPL-MCNC: 25 MG/DL — HIGH (ref 7–23)
CALCIUM SERPL-MCNC: 9 MG/DL — SIGNIFICANT CHANGE UP (ref 8.4–10.5)
CHLORIDE SERPL-SCNC: 105 MMOL/L — SIGNIFICANT CHANGE UP (ref 96–108)
CO2 SERPL-SCNC: 20 MMOL/L — LOW (ref 22–31)
CREAT SERPL-MCNC: 1.15 MG/DL — SIGNIFICANT CHANGE UP (ref 0.5–1.3)
GLUCOSE SERPL-MCNC: 93 MG/DL — SIGNIFICANT CHANGE UP (ref 70–99)
HCT VFR BLD CALC: 39.4 % — SIGNIFICANT CHANGE UP (ref 34.5–45)
HGB BLD-MCNC: 12.7 G/DL — SIGNIFICANT CHANGE UP (ref 11.5–15.5)
MAGNESIUM SERPL-MCNC: 1.6 MG/DL — SIGNIFICANT CHANGE UP (ref 1.6–2.6)
MCHC RBC-ENTMCNC: 30.9 PG — SIGNIFICANT CHANGE UP (ref 27–34)
MCHC RBC-ENTMCNC: 32.2 GM/DL — SIGNIFICANT CHANGE UP (ref 32–36)
MCV RBC AUTO: 96 FL — SIGNIFICANT CHANGE UP (ref 80–100)
NON-GYNECOLOGICAL CYTOLOGY STUDY: SIGNIFICANT CHANGE UP
PHOSPHATE SERPL-MCNC: 2.8 MG/DL — SIGNIFICANT CHANGE UP (ref 2.5–4.5)
PLATELET # BLD AUTO: 242 K/UL — SIGNIFICANT CHANGE UP (ref 150–400)
POTASSIUM SERPL-MCNC: 4.6 MMOL/L — SIGNIFICANT CHANGE UP (ref 3.5–5.3)
POTASSIUM SERPL-SCNC: 4.6 MMOL/L — SIGNIFICANT CHANGE UP (ref 3.5–5.3)
RBC # BLD: 4.11 M/UL — SIGNIFICANT CHANGE UP (ref 3.8–5.2)
RBC # FLD: 12.5 % — SIGNIFICANT CHANGE UP (ref 10.3–14.5)
SODIUM SERPL-SCNC: 142 MMOL/L — SIGNIFICANT CHANGE UP (ref 135–145)
WBC # BLD: 10.1 K/UL — SIGNIFICANT CHANGE UP (ref 3.8–10.5)
WBC # FLD AUTO: 10.1 K/UL — SIGNIFICANT CHANGE UP (ref 3.8–10.5)

## 2019-01-22 PROCEDURE — 96375 TX/PRO/DX INJ NEW DRUG ADDON: CPT

## 2019-01-22 PROCEDURE — 83935 ASSAY OF URINE OSMOLALITY: CPT

## 2019-01-22 PROCEDURE — 93971 EXTREMITY STUDY: CPT

## 2019-01-22 PROCEDURE — 97162 PT EVAL MOD COMPLEX 30 MIN: CPT

## 2019-01-22 PROCEDURE — 86901 BLOOD TYPING SEROLOGIC RH(D): CPT

## 2019-01-22 PROCEDURE — 82330 ASSAY OF CALCIUM: CPT

## 2019-01-22 PROCEDURE — 94640 AIRWAY INHALATION TREATMENT: CPT

## 2019-01-22 PROCEDURE — 32405: CPT

## 2019-01-22 PROCEDURE — 99285 EMERGENCY DEPT VISIT HI MDM: CPT | Mod: 25

## 2019-01-22 PROCEDURE — 85730 THROMBOPLASTIN TIME PARTIAL: CPT

## 2019-01-22 PROCEDURE — 71250 CT THORAX DX C-: CPT

## 2019-01-22 PROCEDURE — 97110 THERAPEUTIC EXERCISES: CPT

## 2019-01-22 PROCEDURE — 88360 TUMOR IMMUNOHISTOCHEM/MANUAL: CPT

## 2019-01-22 PROCEDURE — 83735 ASSAY OF MAGNESIUM: CPT

## 2019-01-22 PROCEDURE — 85014 HEMATOCRIT: CPT

## 2019-01-22 PROCEDURE — 86900 BLOOD TYPING SEROLOGIC ABO: CPT

## 2019-01-22 PROCEDURE — 84132 ASSAY OF SERUM POTASSIUM: CPT

## 2019-01-22 PROCEDURE — 84295 ASSAY OF SERUM SODIUM: CPT

## 2019-01-22 PROCEDURE — 82803 BLOOD GASES ANY COMBINATION: CPT

## 2019-01-22 PROCEDURE — 85027 COMPLETE CBC AUTOMATED: CPT

## 2019-01-22 PROCEDURE — 74176 CT ABD & PELVIS W/O CONTRAST: CPT

## 2019-01-22 PROCEDURE — 80048 BASIC METABOLIC PNL TOTAL CA: CPT

## 2019-01-22 PROCEDURE — 85610 PROTHROMBIN TIME: CPT

## 2019-01-22 PROCEDURE — 84100 ASSAY OF PHOSPHORUS: CPT

## 2019-01-22 PROCEDURE — 82570 ASSAY OF URINE CREATININE: CPT

## 2019-01-22 PROCEDURE — 82947 ASSAY GLUCOSE BLOOD QUANT: CPT

## 2019-01-22 PROCEDURE — 36000 PLACE NEEDLE IN VEIN: CPT | Mod: LT,RT

## 2019-01-22 PROCEDURE — 96374 THER/PROPH/DIAG INJ IV PUSH: CPT

## 2019-01-22 PROCEDURE — 83605 ASSAY OF LACTIC ACID: CPT

## 2019-01-22 PROCEDURE — 88172 CYTP DX EVAL FNA 1ST EA SITE: CPT

## 2019-01-22 PROCEDURE — 76942 ECHO GUIDE FOR BIOPSY: CPT

## 2019-01-22 PROCEDURE — 88341 IMHCHEM/IMCYTCHM EA ADD ANTB: CPT

## 2019-01-22 PROCEDURE — 88342 IMHCHEM/IMCYTCHM 1ST ANTB: CPT

## 2019-01-22 PROCEDURE — 88305 TISSUE EXAM BY PATHOLOGIST: CPT

## 2019-01-22 PROCEDURE — 82435 ASSAY OF BLOOD CHLORIDE: CPT

## 2019-01-22 PROCEDURE — 84300 ASSAY OF URINE SODIUM: CPT

## 2019-01-22 PROCEDURE — 71045 X-RAY EXAM CHEST 1 VIEW: CPT

## 2019-01-22 PROCEDURE — 86850 RBC ANTIBODY SCREEN: CPT

## 2019-01-22 PROCEDURE — 80053 COMPREHEN METABOLIC PANEL: CPT

## 2019-01-22 PROCEDURE — 97530 THERAPEUTIC ACTIVITIES: CPT

## 2019-01-22 PROCEDURE — 88173 CYTOPATH EVAL FNA REPORT: CPT

## 2019-01-22 PROCEDURE — 96376 TX/PRO/DX INJ SAME DRUG ADON: CPT

## 2019-01-22 RX ORDER — DOCUSATE SODIUM 100 MG
1 CAPSULE ORAL
Qty: 0 | Refills: 0 | DISCHARGE
Start: 2019-01-22

## 2019-01-22 RX ORDER — OXYCODONE HYDROCHLORIDE 5 MG/1
1 TABLET ORAL
Qty: 0 | Refills: 0 | DISCHARGE
Start: 2019-01-22

## 2019-01-22 RX ORDER — ACETAMINOPHEN 500 MG
1 TABLET ORAL
Qty: 0 | Refills: 0 | COMMUNITY
Start: 2019-01-22

## 2019-01-22 RX ADMIN — PANTOPRAZOLE SODIUM 40 MILLIGRAM(S): 20 TABLET, DELAYED RELEASE ORAL at 06:39

## 2019-01-22 RX ADMIN — Medication 1 PATCH: at 06:39

## 2019-01-22 RX ADMIN — LIDOCAINE 1 PATCH: 4 CREAM TOPICAL at 13:14

## 2019-01-22 RX ADMIN — Medication 0.5 MILLIGRAM(S): at 06:39

## 2019-01-22 RX ADMIN — OXYCODONE HYDROCHLORIDE 10 MILLIGRAM(S): 5 TABLET ORAL at 13:14

## 2019-01-22 RX ADMIN — Medication 3 MILLILITER(S): at 06:39

## 2019-01-22 RX ADMIN — OXYCODONE HYDROCHLORIDE 10 MILLIGRAM(S): 5 TABLET ORAL at 03:01

## 2019-01-22 RX ADMIN — OXYCODONE HYDROCHLORIDE 10 MILLIGRAM(S): 5 TABLET ORAL at 03:30

## 2019-01-22 RX ADMIN — Medication 2.5 MILLIGRAM(S): at 02:20

## 2019-01-22 RX ADMIN — OXYCODONE HYDROCHLORIDE 10 MILLIGRAM(S): 5 TABLET ORAL at 09:15

## 2019-01-22 RX ADMIN — Medication 3 MILLILITER(S): at 00:06

## 2019-01-22 RX ADMIN — OXYCODONE HYDROCHLORIDE 10 MILLIGRAM(S): 5 TABLET ORAL at 08:45

## 2019-01-22 NOTE — PROGRESS NOTE ADULT - REASON FOR ADMISSION
Small Bowel Obstruction

## 2019-01-22 NOTE — PROGRESS NOTE ADULT - ASSESSMENT
73 year old female with   PMH chronic Back pain (on Narcotics), history of multiple SBO's, narcotic associated constipation/ileus, COPD,  smoker,  ca  breast,  right  mastectomy   admitted with abdominal pain  /  no  bowel  movement    c/c  cachexia  and , with acute Renal Failure (SHAKIRA) with marked hyperphosphatemia, acidosis  ct  scans  noted/old   RENUKA  cavitary lesion./   SBO,  seen by  oncology, on prior  admissions/ d r forte  oral  feeds/  limit narcotics    resolving SHAKIRA/   s/p  lung bx/ per oncology/ path, pending  doing well/pt   eager to  go home

## 2019-01-22 NOTE — PROGRESS NOTE ADULT - SUBJECTIVE AND OBJECTIVE BOX
Patient is a 73y old  Female who presented with a chief complaint of Small Bowel Obstruction (21 Jan 2019 10:17)    INTERVAL HPI/OVERNIGHT EVENTS:  No nausea or vomiting, tolerating PO diet  no fever or chills    MEDICATIONS  (STANDING):  ALBUTerol/ipratropium for Nebulization 3 milliLiter(s) Nebulizer every 6 hours  buDESOnide   0.5 milliGRAM(s) Respule 0.5 milliGRAM(s) Inhalation every 12 hours  chlorhexidine 4% Liquid 1 Application(s) Topical <User Schedule>  docusate sodium 100 milliGRAM(s) Oral three times a day  enoxaparin Injectable 30 milliGRAM(s) SubCutaneous daily  influenza   Vaccine 0.5 milliLiter(s) IntraMuscular once  lidocaine   Patch 1 Patch Transdermal daily  naloxegol 25 milliGRAM(s) Oral daily  nicotine -  14 mG/24Hr(s) Patch 1 patch Transdermal daily  pantoprazole    Tablet 40 milliGRAM(s) Oral before breakfast  senna 1 Tablet(s) Oral daily    MEDICATIONS  (PRN):  diazepam    Tablet 2.5 milliGRAM(s) Oral two times a day PRN Anxiety  oxyCODONE    IR 10 milliGRAM(s) Oral every 6 hours PRN Moderate Pain (4 - 6)    Allergies  Biaxin (Rash)  Cipro (Headache)  Flagyl (Headache)  penicillin (Rash; Swelling)  sulfa drugs (Unknown)    Review of Systems:  General:  No fevers, chills, night sweats  CV:  No pain, palpitations, hypo/hypertension  Resp:  No dyspnea, tachypnea, wheezing +COPD +lung mass  :  No pain, bleeding, incontinence, +arevalo  Muscle: chronic back pain  Neuro:  No weakness, tingling, memory problems  Psych:  No fatigue, insomnia, mood problems, depression  Endocrine:  No polyuria, polydypsia, cold/heat intolerance  Heme:  No petechiae, ecchymosis, easy bruisability  Skin:  No rash, tattoos, scars, edema    T(F): 98 (01-22-19 @ 06:01), Max: 98.5 (01-21-19 @ 09:32)  HR: 99 (01-22-19 @ 06:01) (70 - 100)  BP: 132/80 (01-22-19 @ 06:01) (132/80 - 148/81)  RR: 18 (01-22-19 @ 06:01) (18 - 18)  SpO2: 94% (01-22-19 @ 06:01) (94% - 97%)  Wt(kg): --  ,   I&O's Summary    21 Jan 2019 07:01  -  22 Jan 2019 07:00  --------------------------------------------------------  IN: 1300 mL / OUT: 501 mL / NET: 799 mL    PHYSICAL EXAM:  Constitutional: thin chronically ill appearing female   Neck: No LAD, supple no JVD  Respiratory: decreased BS b/l, distant BS  Cardiovascular: S1 and S2, regular  Gastrointestinal: softly distended NT. no rebound or rigidity +multiple surgical scars   Extremities: +clubbing no edema  Vascular: palpable  Neurological: A/O x 3, no focal deficits  Psychiatric: anxious  Skin: No rashes good turgor    LABS:                      11.5   8.04  )-----------( 210      ( 20 Jan 2019 11:32 )             36.6     01-21    137  |  102  |  26<H>  ----------------------------<  111<H>  4.8   |  23  |  1.46<H>    Ca    8.2<L>      21 Jan 2019 10:08  Phos  2.7     01-21  Mg     1.5     01-21    RADIOLOGY & ADDITIONAL TESTS:

## 2019-01-22 NOTE — PROGRESS NOTE ADULT - SUBJECTIVE AND OBJECTIVE BOX
Green Team Surgery Progress Note    Interval: LLE duplex was negative for DVT yesterday. Creatinine back to baseline No acute overnight events.    SUBJECTIVE: Patient seen and examined at the bedside. Feeling well this morning. Tolerating regular diet without nausea or vomiting. Denies any abdominal pain at this time. Has passed flatus and has had 4 bowel movements recorded in the last 24 hours - decreasing in frequency. Voiding appropriately.     VITALS  T(C): 36.7 (01-22-19 @ 01:25), Max: 36.9 (01-21-19 @ 09:32)  HR: 100 (01-22-19 @ 01:25) (70 - 100)  BP: 144/76 (01-22-19 @ 01:25) (138/70 - 148/81)  RR: 18 (01-22-19 @ 01:25) (18 - 19)  SpO2: 97% (01-22-19 @ 01:25) (95% - 97%)    Is/Os    01-20 @ 07:01 - 01-21 @ 07:00  --------------------------------------------------------  IN:    Oral Fluid: 1160 mL  Total IN: 1160 mL    OUT:    Voided: 950 mL  Total OUT: 950 mL    Total NET: 210 mL      01-21 @ 07:01  -  01-22 @ 02:33  --------------------------------------------------------  IN:    Oral Fluid: 1250 mL    Solution: 50 mL  Total IN: 1300 mL    OUT:    Emesis: 200 mL    Stool: 1 mL    Voided: 300 mL  Total OUT: 501 mL    Total NET: 799 mL    PHYSICAL EXAM:   General: NAD, Lying in bed comfortably, alert, oriented x3  Pulm: Non-labored breathing  GI/Abd: Soft, NT/ND, no rebound/guarding    MEDICATIONS (STANDING): acetaminophen   Tablet .. 325 milliGRAM(s) Oral every 6 hours  ALBUTerol/ipratropium for Nebulization 3 milliLiter(s) Nebulizer every 6 hours  buDESOnide   0.5 milliGRAM(s) Respule 0.5 milliGRAM(s) Inhalation every 12 hours  docusate sodium 100 milliGRAM(s) Oral three times a day  enoxaparin Injectable 30 milliGRAM(s) SubCutaneous daily  influenza   Vaccine 0.5 milliLiter(s) IntraMuscular once  naloxegol 25 milliGRAM(s) Oral daily  nicotine -  14 mG/24Hr(s) Patch 1 patch Transdermal daily  pantoprazole    Tablet 40 milliGRAM(s) Oral before breakfast  senna 1 Tablet(s) Oral daily    MEDICATIONS (PRN):diazepam    Tablet 2.5 milliGRAM(s) Oral two times a day PRN Anxiety  oxyCODONE    IR 10 milliGRAM(s) Oral every 6 hours PRN Moderate Pain (4 - 6)    LABS    BMP (01-21 @ 10:08)             137     |  102     |  26<H> 		Ca++ --      Ca 8.2<L>             ---------------------------------( 111<H>		Mg 1.5<L>             4.8     |  23      |  1.46<H>			Ph 2.7

## 2019-01-22 NOTE — PROGRESS NOTE ADULT - ASSESSMENT
73F w/ PMH of multiple abd surgeries and recurrent SBOs, p/w w/ another ep of SBO shown by CT, w/ TP in distal ileum, now with return of bowel function and tolerating regular diet and creatinine down-trending s/p oral narcan with resolution of bowel obstruction, creatinine downtrending and electrolytes within normal ranges    PLAN  - Continue regular diet  - Monitor GI fxn  - continue home meds  - Trend Cr - has stabilized in 1.3-1.5 range  - DVT ppx  - Dc planning for discharge home later today as patient is refusing subacute rehab

## 2019-01-22 NOTE — PROGRESS NOTE ADULT - PROVIDER SPECIALTY LIST ADULT
Cardiology
Gastroenterology
Heme/Onc
Internal Medicine
SICU
SICU
Surgery
Gastroenterology

## 2019-01-22 NOTE — PROGRESS NOTE ADULT - ASSESSMENT
73 year old female with PMH Lung mass, chronic Back pain (on Narcotics), history of multiple SBO's, narcotic associated constipation/ileus, COPD, current everyday smoker admitted with abdominal pain and distension, nausea and poor PO intake with associated worsening painful spasms.  Clinically emaciated and severely dehydrated admitted to SICU, CT + SBO now much improved.    Rec  -PO diet as tolerated  -Monitor Cr  -PO Movantik as per Surgical recs  -Limit narcotics (discussed with pt)  -PO PPI  -Monitor electrolytes and BMs  -Agree with Discharge planning

## 2019-01-22 NOTE — PROGRESS NOTE ADULT - SUBJECTIVE AND OBJECTIVE BOX
REVIEW OF SYSTEMS:  GEN: no fever,    no chills  RESP: no SOB,   no cough  CVS: no chest pain,   no palpitations  GI: no abdominal pain,   no nausea,   no vomiting,   no constipation,   no diarrhea  : no dysuria,   no frequency  NEURO: no headache,   no dizziness  PSYCH: no depression,   not anxious  Derm : no rash    MEDICATIONS  (STANDING):  acetaminophen   Tablet .. 325 milliGRAM(s) Oral every 6 hours  ALBUTerol/ipratropium for Nebulization 3 milliLiter(s) Nebulizer every 6 hours  buDESOnide   0.5 milliGRAM(s) Respule 0.5 milliGRAM(s) Inhalation every 12 hours  chlorhexidine 4% Liquid 1 Application(s) Topical <User Schedule>  docusate sodium 100 milliGRAM(s) Oral three times a day  enoxaparin Injectable 30 milliGRAM(s) SubCutaneous daily  influenza   Vaccine 0.5 milliLiter(s) IntraMuscular once  lidocaine   Patch 1 Patch Transdermal daily  naloxegol 25 milliGRAM(s) Oral daily  nicotine -  14 mG/24Hr(s) Patch 1 patch Transdermal daily  pantoprazole    Tablet 40 milliGRAM(s) Oral before breakfast  senna 1 Tablet(s) Oral daily    MEDICATIONS  (PRN):  diazepam    Tablet 2.5 milliGRAM(s) Oral two times a day PRN Anxiety  oxyCODONE    IR 10 milliGRAM(s) Oral every 6 hours PRN Moderate Pain (4 - 6)      Vital Signs Last 24 Hrs  T(C): 36.7 (22 Jan 2019 06:01), Max: 36.9 (21 Jan 2019 09:32)  T(F): 98 (22 Jan 2019 06:01), Max: 98.5 (21 Jan 2019 09:32)  HR: 99 (22 Jan 2019 06:01) (70 - 100)  BP: 132/80 (22 Jan 2019 06:01) (132/80 - 148/81)  BP(mean): --  RR: 18 (22 Jan 2019 06:01) (18 - 18)  SpO2: 94% (22 Jan 2019 06:01) (94% - 97%)  CAPILLARY BLOOD GLUCOSE        I&O's Summary    21 Jan 2019 07:01  -  22 Jan 2019 07:00  --------------------------------------------------------  IN: 1300 mL / OUT: 501 mL / NET: 799 mL        PHYSICAL EXAM:  HEAD:  Atraumatic, Normocephalic  NECK: Supple, No   JVD  CHEST/LUNG:   no     rales,     no,    rhonchi  HEART: Regular rate and rhythm;         murmur  ABDOMEN: Soft, Nontender, ;   EXTREMITIES:    no    edema  NEUROLOGY:  alert    LABS:                        11.5   8.04  )-----------( 210      ( 20 Jan 2019 11:32 )             36.6     01-21    137  |  102  |  26<H>  ----------------------------<  111<H>  4.8   |  23  |  1.46<H>    Ca    8.2<L>      21 Jan 2019 10:08  Phos  2.7     01-21  Mg     1.5     01-21                              Consultant(s) Notes Reviewed:      Care Discussed with Consultants/Other Providers:

## 2019-02-04 ENCOUNTER — OUTPATIENT (OUTPATIENT)
Dept: OUTPATIENT SERVICES | Facility: HOSPITAL | Age: 74
LOS: 1 days | Discharge: HOME | End: 2019-02-04
Payer: MEDICARE

## 2019-02-05 ENCOUNTER — APPOINTMENT (OUTPATIENT)
Dept: RADIATION ONCOLOGY | Facility: CLINIC | Age: 74
End: 2019-02-05
Payer: MEDICARE

## 2019-02-05 VITALS
OXYGEN SATURATION: 100 % | WEIGHT: 85.54 LBS | HEART RATE: 86 BPM | SYSTOLIC BLOOD PRESSURE: 172 MMHG | DIASTOLIC BLOOD PRESSURE: 81 MMHG | BODY MASS INDEX: 16.71 KG/M2 | RESPIRATION RATE: 16 BRPM

## 2019-02-05 DIAGNOSIS — Z92.21 PERSONAL HISTORY OF ANTINEOPLASTIC CHEMOTHERAPY: ICD-10-CM

## 2019-02-05 DIAGNOSIS — Z92.3 PERSONAL HISTORY OF IRRADIATION: ICD-10-CM

## 2019-02-05 PROCEDURE — 99205 OFFICE O/P NEW HI 60 MIN: CPT | Mod: 25

## 2019-02-05 PROCEDURE — 77263 THER RADIOLOGY TX PLNG CPLX: CPT

## 2019-02-05 NOTE — REASON FOR VISIT
[Consideration of Curative Therapy] : consideration of curative therapy for [Lung Cancer] : lung cancer

## 2019-02-05 NOTE — VITALS
[Maximal Pain Intensity: 0/10] : 0/10 [Least Pain Intensity: 0/10] : 0/10 [60: Requires occasional assistance, but is able to care for most of his/her needs] : 60: Requires occasional assistance, but is able to care for most of his/her needs [ECOG Performance Status: 2 - Ambulatory and capable of all self care but unable to carry out any work activities] : Performance Status: 2 - Ambulatory and capable of all self care but unable to carry out any work activities. Up and about more than 50% of waking hours [Date: ____________] : Patient's last distress assessment performed on [unfilled]. [7 - Distress Level] : Distress Level: 7 [Patient given social work contact information and resource sheet] : Patient was given social work contact information and resource sheet

## 2019-03-07 PROCEDURE — 77338 DESIGN MLC DEVICE FOR IMRT: CPT | Mod: 26

## 2019-03-07 PROCEDURE — 77300 RADIATION THERAPY DOSE PLAN: CPT | Mod: 26

## 2019-03-07 PROCEDURE — 77301 RADIOTHERAPY DOSE PLAN IMRT: CPT | Mod: 26

## 2019-03-14 PROCEDURE — 77387B: CUSTOM | Mod: 26

## 2019-03-14 PROCEDURE — 77427 RADIATION TX MANAGEMENT X5: CPT

## 2019-03-15 VITALS
SYSTOLIC BLOOD PRESSURE: 152 MMHG | WEIGHT: 86 LBS | HEART RATE: 85 BPM | BODY MASS INDEX: 16.8 KG/M2 | DIASTOLIC BLOOD PRESSURE: 84 MMHG | RESPIRATION RATE: 16 BRPM

## 2019-03-15 VITALS — OXYGEN SATURATION: 99 %

## 2019-03-15 PROCEDURE — 77387B: CUSTOM | Mod: 26

## 2019-03-15 RX ORDER — TRAMADOL HYDROCHLORIDE 50 MG/1
50 TABLET, COATED ORAL
Qty: 60 | Refills: 0 | Status: ACTIVE | COMMUNITY
Start: 2019-03-15 | End: 1900-01-01

## 2019-03-15 NOTE — REVIEW OF SYSTEMS
[Fatigue: Grade 1 - Fatigue relieved by rest] : Fatigue: Grade 1 - Fatigue relieved by rest [Cough: Grade 1 - Mild symptoms; nonprescription intervention indicated] : Cough: Grade 1 - Mild symptoms; nonprescription intervention indicated [Dyspnea: Grade 1 - Shortness of breath with moderate exertion] : Dyspnea: Grade 1 - Shortness of breath with moderate exertion

## 2019-03-18 PROCEDURE — 77387B: CUSTOM | Mod: 26

## 2019-03-19 PROCEDURE — 77387B: CUSTOM | Mod: 26

## 2019-03-20 PROCEDURE — 77014: CPT | Mod: 26

## 2019-03-21 VITALS
BODY MASS INDEX: 16.99 KG/M2 | SYSTOLIC BLOOD PRESSURE: 149 MMHG | HEART RATE: 97 BPM | RESPIRATION RATE: 18 BRPM | DIASTOLIC BLOOD PRESSURE: 86 MMHG | WEIGHT: 86.97 LBS

## 2019-03-21 PROCEDURE — 77387B: CUSTOM | Mod: 26

## 2019-03-21 PROCEDURE — 77427 RADIATION TX MANAGEMENT X5: CPT

## 2019-03-21 NOTE — PHYSICAL EXAM
[General Appearance - In No Acute Distress] : in no acute distress [Thin] : thin [] : no respiratory distress

## 2019-03-21 NOTE — REVIEW OF SYSTEMS
[Constipation: Grade 0] : Constipation: Grade 0 [Nausea: Grade 1 - Loss of appetite without alteration in eating habits] : Nausea: Grade 1 - Loss of appetite without alteration in eating habits [Vomiting: Grade 0] : Vomiting: Grade 0 [Fatigue: Grade 1 - Fatigue relieved by rest] : Fatigue: Grade 1 - Fatigue relieved by rest [Cough: Grade 1 - Mild symptoms; nonprescription intervention indicated] : Cough: Grade 1 - Mild symptoms; nonprescription intervention indicated [Dyspnea: Grade 1 - Shortness of breath with moderate exertion] : Dyspnea: Grade 1 - Shortness of breath with moderate exertion [Dermatitis Radiation: Grade 0] : Dermatitis Radiation: Grade 0

## 2019-03-21 NOTE — VITALS
[Maximal Pain Intensity: 7/10] : 7/10 [Least Pain Intensity: 2/10] : 2/10 [Pain Description/Quality: ___] : Pain description/quality: [unfilled] [Pain Duration: ___] : Pain duration: [unfilled] [Pain Location: ___] : Pain Location: [unfilled] [Pain Interferes with ADLs] : Pain interferes with activities of daily living. [Opioid] : opioid [60: Requires occasional assistance, but is able to care for most of his/her needs] : 60: Requires occasional assistance, but is able to care for most of his/her needs [ECOG Performance Status: 2 - Ambulatory and capable of all self care but unable to carry out any work activities] : Performance Status: 2 - Ambulatory and capable of all self care but unable to carry out any work activities. Up and about more than 50% of waking hours

## 2019-03-22 PROCEDURE — 77387B: CUSTOM | Mod: 26

## 2019-03-25 PROCEDURE — 77387B: CUSTOM | Mod: 26

## 2019-03-26 PROCEDURE — 77387B: CUSTOM | Mod: 26

## 2019-03-27 PROCEDURE — 77014: CPT | Mod: 26

## 2019-03-29 VITALS
DIASTOLIC BLOOD PRESSURE: 74 MMHG | SYSTOLIC BLOOD PRESSURE: 136 MMHG | BODY MASS INDEX: 16.72 KG/M2 | WEIGHT: 85.63 LBS | HEART RATE: 91 BPM | RESPIRATION RATE: 18 BRPM

## 2019-03-29 PROCEDURE — 77387B: CUSTOM | Mod: 26

## 2019-04-01 PROCEDURE — 77387B: CUSTOM | Mod: 26

## 2019-04-01 PROCEDURE — 77427 RADIATION TX MANAGEMENT X5: CPT

## 2019-04-02 PROCEDURE — 77387B: CUSTOM | Mod: 26

## 2019-04-03 VITALS
HEART RATE: 99 BPM | RESPIRATION RATE: 18 BRPM | DIASTOLIC BLOOD PRESSURE: 72 MMHG | BODY MASS INDEX: 16.23 KG/M2 | OXYGEN SATURATION: 90 % | WEIGHT: 83.11 LBS | SYSTOLIC BLOOD PRESSURE: 116 MMHG

## 2019-04-03 DIAGNOSIS — K20.9 ESOPHAGITIS, UNSPECIFIED: ICD-10-CM

## 2019-04-03 PROCEDURE — 77387B: CUSTOM | Mod: 26

## 2019-04-03 RX ORDER — SUCRALFATE 1 G/10ML
1 SUSPENSION ORAL 4 TIMES DAILY
Qty: 600 | Refills: 5 | Status: ACTIVE | COMMUNITY
Start: 2019-04-03 | End: 1900-01-01

## 2019-04-03 NOTE — VITALS
[Maximal Pain Intensity: 9/10] : 9/10 [Least Pain Intensity: 4/10] : 4/10 [Pain Description/Quality: ___] : Pain description/quality: [unfilled] [Pain Duration: ___] : Pain duration: [unfilled] [Pain Location: ___] : Pain Location: [unfilled] [Pain Interferes with ADLs] : Pain interferes with activities of daily living. [Opioid] : opioid [60: Requires occasional assistance, but is able to care for most of his/her needs] : 60: Requires occasional assistance, but is able to care for most of his/her needs [ECOG Performance Status: 2 - Ambulatory and capable of all self care but unable to carry out any work activities] : Performance Status: 2 - Ambulatory and capable of all self care but unable to carry out any work activities. Up and about more than 50% of waking hours

## 2019-04-05 ENCOUNTER — RX CHANGE (OUTPATIENT)
Age: 74
End: 2019-04-05

## 2019-04-05 RX ORDER — SUCRALFATE
POWDER (GRAM) MISCELLANEOUS
Qty: 1 | Refills: 1 | Status: ACTIVE | OUTPATIENT
Start: 2019-04-05

## 2019-04-08 PROCEDURE — 77014: CPT | Mod: 26

## 2019-04-10 ENCOUNTER — OTHER (OUTPATIENT)
Age: 74
End: 2019-04-10

## 2019-04-14 NOTE — HISTORY OF PRESENT ILLNESS
[FreeTextEntry1] : 3/29/19\par -tolerated tx\par -saw  and had blood drawn today\par -no coughing or sob at this time\par -feeling weak\par -vitals stable\par \par \par \par 3/21/19\par -tolerating tx\par -eating well\par -occ sob on exertion\par -using a walker to get around\par -pt has pain in back due to herniated and bulging disc  7/10 using oxycodone. Has not tried the tramadol\par -has pain in left breast with a firm mass? 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 Reports fatigue. Reports cough which can be productive as times. SOB on exertion, unchanged from baseline.

## 2019-04-14 NOTE — HISTORY OF PRESENT ILLNESS
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 Reports fatigue. Reports cough which can be productive as times. SOB on exertion, unchanged from baseline.

## 2019-04-14 NOTE — HISTORY OF PRESENT ILLNESS
[FreeTextEntry1] : 4/3/19\par -tolerating tx\par -pt very fatigued and weak\par -eating little and having nausea and some vomiting over the weekend-using zofran with some relief\par -having pain in back and left breast-taking oxycodone with some relief\par \par \par 3/29/19\par -tolerated tx\par -saw  and had blood drawn today\par -no coughing or sob at this time\par -feeling weak\par -vitals stable\par \par \par 3/21/19\par -tolerating tx\par -eating well\par -occ sob on exertion\par -using a walker to get around\par -pt has pain in back due to herniated and bulging disc  7/10 using oxycodone. Has not tried the tramadol\par -has pain in left breast with a firm mass? d-seen by  and reportedly was benign\par \par

## 2019-04-14 NOTE — DISEASE MANAGEMENT
[Clinical] : TNM Stage: c [TTNM] : 3 [NTNM] : 1 [MTNM] : 0 [IIIA] : IIIA [de-identified] : [id number] [de-identified] : left lung [de-identified] : [id number]

## 2019-04-14 NOTE — DISEASE MANAGEMENT
[Clinical] : TNM Stage: c [TTNM] : 3 [NTNM] : 1 [MTNM] : 0 [IIIA] : IIIA [de-identified] : 6 tx/2400cGy [de-identified] : left lung [de-identified] : [id number]

## 2019-04-14 NOTE — DISEASE MANAGEMENT
[Clinical] : TNM Stage: c [TTNM] : 3 [NTNM] : 1 [MTNM] : 0 [de-identified] : 6 tx/2400cGy [IIIA] : IIIA [de-identified] : [id number] [de-identified] : left lung

## 2019-04-14 NOTE — HISTORY OF PRESENT ILLNESS
[FreeTextEntry1] : 3/21/19\par -tolerating tx\par -eating well\par -occ sob on exertion\par -using a walker to get around\par -pt has pain in back due to herniated and bulging disc  7/10 using oxycodone. Has not tried the tramadol\par -has pain in left breast with a firm mass? 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 Reports fatigue. Reports cough which can be productive as times. SOB on exertion, unchanged from baseline.

## 2019-04-14 NOTE — REVIEW OF SYSTEMS
[Constipation: Grade 0] : Constipation: Grade 0 [Diarrhea: Grade 0] : Diarrhea: Grade 0 [Nausea: Grade 1 - Loss of appetite without alteration in eating habits] : Nausea: Grade 1 - Loss of appetite without alteration in eating habits [Vomiting: Grade 0] : Vomiting: Grade 0 [Fatigue: Grade 2 - Fatigue not relieved by rest; limiting instrumental ADL] : Fatigue: Grade 2 - Fatigue not relieved by rest; limiting instrumental ADL [Cough: Grade 1 - Mild symptoms; nonprescription intervention indicated] : Cough: Grade 1 - Mild symptoms; nonprescription intervention indicated [Dyspnea: Grade 1 - Shortness of breath with moderate exertion] : Dyspnea: Grade 1 - Shortness of breath with moderate exertion [Dermatitis Radiation: Grade 0] : Dermatitis Radiation: Grade 0 [FreeTextEntry1] : white mucus

## 2019-04-14 NOTE — DISEASE MANAGEMENT
[Clinical] : TNM Stage: c [NTNM] : 1 [TTNM] : 3 [MTNM] : 0 [IIIA] : IIIA [de-identified] : 14tx/5600cGy [de-identified] : left lung [de-identified] : [id number]

## 2019-04-16 ENCOUNTER — RX RENEWAL (OUTPATIENT)
Age: 74
End: 2019-04-16

## 2019-04-18 ENCOUNTER — INPATIENT (INPATIENT)
Facility: HOSPITAL | Age: 74
LOS: 3 days | Discharge: HOME | DRG: 394 | End: 2019-04-22
Attending: INTERNAL MEDICINE | Admitting: SURGERY
Payer: MEDICARE

## 2019-04-18 VITALS
OXYGEN SATURATION: 94 % | RESPIRATION RATE: 18 BRPM | HEIGHT: 62 IN | WEIGHT: 85.1 LBS | TEMPERATURE: 98 F | HEART RATE: 74 BPM | SYSTOLIC BLOOD PRESSURE: 182 MMHG | DIASTOLIC BLOOD PRESSURE: 94 MMHG

## 2019-04-18 DIAGNOSIS — K56.609 UNSPECIFIED INTESTINAL OBSTRUCTION, UNSPECIFIED AS TO PARTIAL VERSUS COMPLETE OBSTRUCTION: ICD-10-CM

## 2019-04-18 LAB
ALBUMIN SERPL ELPH-MCNC: 3.5 G/DL — SIGNIFICANT CHANGE UP (ref 3.3–5)
ALP SERPL-CCNC: 203 U/L — HIGH (ref 40–120)
ALT FLD-CCNC: 22 U/L — SIGNIFICANT CHANGE UP (ref 10–45)
ANION GAP SERPL CALC-SCNC: 16 MMOL/L — SIGNIFICANT CHANGE UP (ref 5–17)
APTT BLD: 42.8 SEC — HIGH (ref 27.5–36.3)
AST SERPL-CCNC: 39 U/L — SIGNIFICANT CHANGE UP (ref 10–40)
BASOPHILS # BLD AUTO: 0 K/UL — SIGNIFICANT CHANGE UP (ref 0–0.2)
BASOPHILS NFR BLD AUTO: 0.6 % — SIGNIFICANT CHANGE UP (ref 0–2)
BILIRUB SERPL-MCNC: 0.3 MG/DL — SIGNIFICANT CHANGE UP (ref 0.2–1.2)
BUN SERPL-MCNC: 18 MG/DL — SIGNIFICANT CHANGE UP (ref 7–23)
CALCIUM SERPL-MCNC: 7.9 MG/DL — LOW (ref 8.4–10.5)
CHLORIDE SERPL-SCNC: 103 MMOL/L — SIGNIFICANT CHANGE UP (ref 96–108)
CO2 SERPL-SCNC: 21 MMOL/L — LOW (ref 22–31)
CREAT SERPL-MCNC: 1.13 MG/DL — SIGNIFICANT CHANGE UP (ref 0.5–1.3)
EOSINOPHIL # BLD AUTO: 0 K/UL — SIGNIFICANT CHANGE UP (ref 0–0.5)
EOSINOPHIL NFR BLD AUTO: 0.5 % — SIGNIFICANT CHANGE UP (ref 0–6)
GAS PNL BLDV: SIGNIFICANT CHANGE UP
GLUCOSE SERPL-MCNC: 141 MG/DL — HIGH (ref 70–99)
HCT VFR BLD CALC: 40.5 % — SIGNIFICANT CHANGE UP (ref 34.5–45)
HGB BLD-MCNC: 12.6 G/DL — SIGNIFICANT CHANGE UP (ref 11.5–15.5)
INR BLD: 1.14 RATIO — SIGNIFICANT CHANGE UP (ref 0.88–1.16)
LIDOCAIN IGE QN: 9 U/L — SIGNIFICANT CHANGE UP (ref 7–60)
LYMPHOCYTES # BLD AUTO: 0.2 K/UL — LOW (ref 1–3.3)
LYMPHOCYTES # BLD AUTO: 3.1 % — LOW (ref 13–44)
MCHC RBC-ENTMCNC: 29.3 PG — SIGNIFICANT CHANGE UP (ref 27–34)
MCHC RBC-ENTMCNC: 31.1 GM/DL — LOW (ref 32–36)
MCV RBC AUTO: 94.3 FL — SIGNIFICANT CHANGE UP (ref 80–100)
MONOCYTES # BLD AUTO: 0.2 K/UL — SIGNIFICANT CHANGE UP (ref 0–0.9)
MONOCYTES NFR BLD AUTO: 3.2 % — SIGNIFICANT CHANGE UP (ref 2–14)
NEUTROPHILS # BLD AUTO: 6.6 K/UL — SIGNIFICANT CHANGE UP (ref 1.8–7.4)
NEUTROPHILS NFR BLD AUTO: 92.6 % — HIGH (ref 43–77)
PLATELET # BLD AUTO: 297 K/UL — SIGNIFICANT CHANGE UP (ref 150–400)
POTASSIUM SERPL-MCNC: 6.3 MMOL/L — CRITICAL HIGH (ref 3.5–5.3)
POTASSIUM SERPL-SCNC: 6.3 MMOL/L — CRITICAL HIGH (ref 3.5–5.3)
PROT SERPL-MCNC: 7.7 G/DL — SIGNIFICANT CHANGE UP (ref 6–8.3)
PROTHROM AB SERPL-ACNC: 13.2 SEC — HIGH (ref 10–12.9)
RBC # BLD: 4.29 M/UL — SIGNIFICANT CHANGE UP (ref 3.8–5.2)
RBC # FLD: 15.5 % — HIGH (ref 10.3–14.5)
SODIUM SERPL-SCNC: 140 MMOL/L — SIGNIFICANT CHANGE UP (ref 135–145)
WBC # BLD: 7.1 K/UL — SIGNIFICANT CHANGE UP (ref 3.8–10.5)
WBC # FLD AUTO: 7.1 K/UL — SIGNIFICANT CHANGE UP (ref 3.8–10.5)

## 2019-04-18 PROCEDURE — 74176 CT ABD & PELVIS W/O CONTRAST: CPT | Mod: 26

## 2019-04-18 PROCEDURE — 99285 EMERGENCY DEPT VISIT HI MDM: CPT | Mod: 25,GC

## 2019-04-18 PROCEDURE — 43753 TX GASTRO INTUB W/ASP: CPT | Mod: GC

## 2019-04-18 PROCEDURE — 71045 X-RAY EXAM CHEST 1 VIEW: CPT | Mod: 26,76

## 2019-04-18 RX ORDER — HYDROMORPHONE HYDROCHLORIDE 2 MG/ML
1 INJECTION INTRAMUSCULAR; INTRAVENOUS; SUBCUTANEOUS ONCE
Qty: 0 | Refills: 0 | Status: DISCONTINUED | OUTPATIENT
Start: 2019-04-18 | End: 2019-04-18

## 2019-04-18 RX ORDER — MORPHINE SULFATE 50 MG/1
4 CAPSULE, EXTENDED RELEASE ORAL ONCE
Qty: 0 | Refills: 0 | Status: DISCONTINUED | OUTPATIENT
Start: 2019-04-18 | End: 2019-04-18

## 2019-04-18 RX ORDER — SODIUM CHLORIDE 9 MG/ML
1000 INJECTION, SOLUTION INTRAVENOUS ONCE
Qty: 0 | Refills: 0 | Status: COMPLETED | OUTPATIENT
Start: 2019-04-18 | End: 2019-04-18

## 2019-04-18 RX ORDER — ONDANSETRON 8 MG/1
4 TABLET, FILM COATED ORAL ONCE
Qty: 0 | Refills: 0 | Status: COMPLETED | OUTPATIENT
Start: 2019-04-18 | End: 2019-04-18

## 2019-04-18 RX ORDER — SODIUM CHLORIDE 9 MG/ML
1000 INJECTION, SOLUTION INTRAVENOUS
Qty: 0 | Refills: 0 | Status: DISCONTINUED | OUTPATIENT
Start: 2019-04-18 | End: 2019-04-20

## 2019-04-18 RX ORDER — HEPARIN SODIUM 5000 [USP'U]/ML
5000 INJECTION INTRAVENOUS; SUBCUTANEOUS EVERY 8 HOURS
Qty: 0 | Refills: 0 | Status: DISCONTINUED | OUTPATIENT
Start: 2019-04-18 | End: 2019-04-22

## 2019-04-18 RX ORDER — MORPHINE SULFATE 50 MG/1
6 CAPSULE, EXTENDED RELEASE ORAL ONCE
Qty: 0 | Refills: 0 | Status: DISCONTINUED | OUTPATIENT
Start: 2019-04-18 | End: 2019-04-18

## 2019-04-18 RX ADMIN — HYDROMORPHONE HYDROCHLORIDE 1 MILLIGRAM(S): 2 INJECTION INTRAMUSCULAR; INTRAVENOUS; SUBCUTANEOUS at 19:15

## 2019-04-18 RX ADMIN — ONDANSETRON 4 MILLIGRAM(S): 8 TABLET, FILM COATED ORAL at 17:57

## 2019-04-18 RX ADMIN — SODIUM CHLORIDE 125 MILLILITER(S): 9 INJECTION, SOLUTION INTRAVENOUS at 23:52

## 2019-04-18 RX ADMIN — MORPHINE SULFATE 4 MILLIGRAM(S): 50 CAPSULE, EXTENDED RELEASE ORAL at 17:57

## 2019-04-18 RX ADMIN — HYDROMORPHONE HYDROCHLORIDE 1 MILLIGRAM(S): 2 INJECTION INTRAMUSCULAR; INTRAVENOUS; SUBCUTANEOUS at 21:12

## 2019-04-18 RX ADMIN — SODIUM CHLORIDE 2000 MILLILITER(S): 9 INJECTION, SOLUTION INTRAVENOUS at 17:56

## 2019-04-18 RX ADMIN — MORPHINE SULFATE 4 MILLIGRAM(S): 50 CAPSULE, EXTENDED RELEASE ORAL at 18:27

## 2019-04-18 RX ADMIN — MORPHINE SULFATE 6 MILLIGRAM(S): 50 CAPSULE, EXTENDED RELEASE ORAL at 18:56

## 2019-04-18 RX ADMIN — MORPHINE SULFATE 6 MILLIGRAM(S): 50 CAPSULE, EXTENDED RELEASE ORAL at 18:28

## 2019-04-18 NOTE — ED PROVIDER NOTE - ATTENDING CONTRIBUTION TO CARE
Attending MD Quezada:  I personally have seen and examined this patient.  Resident note reviewed and agree on plan of care and except where noted.  See HPI, PE, and MDM for details.

## 2019-04-18 NOTE — ED PROVIDER NOTE - OBJECTIVE STATEMENT
74 y/o female with COPD, mx abdominal sx, back pain on chronic narcotic use and recurrent SBOs here with generalized abdominal pain and distention since last night. Assc with nausea no vomiting. No diarrhea or melena. Last BM 2 hours prior. No cp, sob, worsening back pain, or urinary sx.

## 2019-04-18 NOTE — ED PROCEDURE NOTE - ATTENDING CONTRIBUTION TO CARE
Attending MD Quezada: Risks, benefit and alternatives of procedure explained to patient and patient demonstrated verbal understanding and consent.  I was present during the key portions of the procedure.

## 2019-04-18 NOTE — H&P ADULT - ATTENDING COMMENTS
I have seen and examined the patient.  I agree with the surgical resident's note.  I have reviewed all labs and reviewed the CT images    Eddie Hopper contact information [phone]

## 2019-04-18 NOTE — ED ADULT NURSE NOTE - OBJECTIVE STATEMENT
73 y f came to the ed c/o abdominal pain. patient states the pain started yesterday but became severe today. also c/o nausea but denies vomiting. patient states having hx of bowel obstruction but says this does not feel like that type of pain. denies any constipation or diarrhea. denies fevers, chills, chest pain, sob. skin is warm and dry. denies pain/burning on urination.

## 2019-04-18 NOTE — ED PROVIDER NOTE - NS ED ROS FT
CONSTITUTIONAL: No fevers, no chills, no lightheadedness, no dizziness  Eyes: no visual changes  Ears: no ear drainage, no ear pain  Nose: no nasal congestion  Mouth/Throat: no sore throat  CV: No chest pain, no palpitations  PULM: No SOB, no cough  GI: see hpi  : no dysuria, no hematuria  SKIN: no rashes.  NEURO: no headache, no focal weakness or numbness  PSYCHIATRIC: no known mental health issues.

## 2019-04-18 NOTE — H&P ADULT - ASSESSMENT
73yF pmhx recurrent SBOs, lung ca, COPD presents with abdominal pain since last night, now with SBO with questionable internal hernia demonstrated on CT A/P.  Hemodynamically stable.     -

## 2019-04-18 NOTE — H&P ADULT - NSICDXPASTMEDICALHX_GEN_ALL_CORE_FT
PAST MEDICAL HISTORY:  Ankle Fracture right anle fx s/p cast 2009    Asthma     Back pain     Bowel obstruction multiple hospitalizations    Breast Cancer     Chronic Pain Following Surgery or Procedure following right breast mastectomy    Emphysema     History of Small Bowel Obstruction 1/2010    Kidney stone     Narcotic Dependence     Narcotic Dysmotile Cilia Syndrome     S/P Chemotherapy, Time Since Greater than 12 Weeks     S/P Radiation Therapy

## 2019-04-18 NOTE — H&P ADULT - NSHPLABSRESULTS_GEN_ALL_CORE
12.6   7.1   )-----------( 297      ( 18 Apr 2019 18:08 )             40.5   04-18    140  |  103  |  18  ----------------------------<  141<H>  6.3<HH>   |  21<L>  |  1.13    Ca    7.9<L>      18 Apr 2019 18:08    TPro  7.7  /  Alb  3.5  /  TBili  0.3  /  DBili  x   /  AST  39  /  ALT  22  /  AlkPhos  203<H>  04-18

## 2019-04-18 NOTE — ED PROVIDER NOTE - PROGRESS NOTE DETAILS
Anjelica Naidu MD, PGY1: Patient received at resident sign out. Pt has SBO, NG tube placed under lidocaine jell, prepa, tolerate well to the procedure, 200 mL of gastric contents has been withdrawn. admission to Dr. Hopper

## 2019-04-18 NOTE — H&P ADULT - PROBLEM SELECTOR PLAN 1
-Admit to Dr Hopper  -NGT with continuous suction  -NPO  -IVF  -c/w IV equivalents of home meds while NPO  - Strict Is and Os   - Serial ab exams  - discussed plan with attending Dr. Hopper

## 2019-04-18 NOTE — H&P ADULT - NSHPPHYSICALEXAM_GEN_ALL_CORE
General: Frail appearing, NAD, NGT in place with bilious output, pleasant health aide at beside.  Respiratory: Non-labored breathing.  Nasal cannula in place 2L.  SpO2 94%.   Abdomen:  Markedly distended, tympanic, ttp in upper abdomen, otherwise nttp.

## 2019-04-18 NOTE — H&P ADULT - HISTORY OF PRESENT ILLNESS
73yF with extensive pmhx most notable for  recurrent SBOs, lung ca (s/p chemo/rad), COPD presents with abdominal pain since last night.  Pt reports the pain feels like her last SBO; however, she has been passing flatus.  Her last BM was this afternoon.  Pt endorses nausea.  She states her last admission for SBO was in january of this year.  She denies vomiting, diarrhea, constipation, melena, hematochezia, loss of appetite, fevers, chills, night sweats.

## 2019-04-18 NOTE — ED PROVIDER NOTE - CLINICAL SUMMARY MEDICAL DECISION MAKING FREE TEXT BOX
Attending MD Quezada: 72 y/o female with COPD and current smoker with more than 30 packs year history, complicated surgical history (hysterectomy, appendectomy, cholecystectomy, femoral hernia repair, mastectomy and ex-lap with Lisa x 2), back pain on chronic narcotic use and recurrent SBO presented with abdominal pain, exam with moderate distention and mild diffuse abd ttp with hypoactive BS, high suspicion for recurrent SBO. Plan for CT a/p, IV fluids, analgesia and reassessment

## 2019-04-18 NOTE — H&P ADULT - NSICDXPASTSURGICALHX_GEN_ALL_CORE_FT
PAST SURGICAL HISTORY:  History of Hysterectomy 1998    Liver Mass s/p liver rsxn s/p resection 2009    S/P Appendectomy     S/P Cholecystectomy     S/P Exploratory Laparotomy     S/P hernia surgery femoral hernia - 2012    S/P Right Mastectomy 2006

## 2019-04-18 NOTE — ED PROVIDER NOTE - PHYSICAL EXAMINATION
Attending MD Pilar:    Gen:  moaning in pain, awake and alert, conversant  Neck: supple, no swelling, trachea midline  CV: heart with reg rhythm, no obvious murmur appreciated   Resp: CTAB, breathing comfortably  Abd: soft, moderate distention, mild diffuse ttp, hypoactive BS  Extremities: extremities warm to the touch, no peripheral edema   Msk: no extremity deformities or bony tenderness  Pysch: appropriate affect    Neuro: moves all extremities spontaneously, no gross motor or sensory deficits

## 2019-04-19 ENCOUNTER — TRANSCRIPTION ENCOUNTER (OUTPATIENT)
Age: 74
End: 2019-04-19

## 2019-04-19 LAB
ANION GAP SERPL CALC-SCNC: 14 MMOL/L — SIGNIFICANT CHANGE UP (ref 5–17)
ANION GAP SERPL CALC-SCNC: 15 MMOL/L — SIGNIFICANT CHANGE UP (ref 5–17)
APPEARANCE UR: CLEAR — SIGNIFICANT CHANGE UP
BILIRUB UR-MCNC: NEGATIVE — SIGNIFICANT CHANGE UP
BUN SERPL-MCNC: 16 MG/DL — SIGNIFICANT CHANGE UP (ref 7–23)
BUN SERPL-MCNC: 18 MG/DL — SIGNIFICANT CHANGE UP (ref 7–23)
CALCIUM SERPL-MCNC: 7.8 MG/DL — LOW (ref 8.4–10.5)
CALCIUM SERPL-MCNC: 7.8 MG/DL — LOW (ref 8.4–10.5)
CHLORIDE SERPL-SCNC: 102 MMOL/L — SIGNIFICANT CHANGE UP (ref 96–108)
CHLORIDE SERPL-SCNC: 102 MMOL/L — SIGNIFICANT CHANGE UP (ref 96–108)
CO2 SERPL-SCNC: 24 MMOL/L — SIGNIFICANT CHANGE UP (ref 22–31)
CO2 SERPL-SCNC: 24 MMOL/L — SIGNIFICANT CHANGE UP (ref 22–31)
COLOR SPEC: SIGNIFICANT CHANGE UP
CREAT SERPL-MCNC: 1.13 MG/DL — SIGNIFICANT CHANGE UP (ref 0.5–1.3)
CREAT SERPL-MCNC: 1.19 MG/DL — SIGNIFICANT CHANGE UP (ref 0.5–1.3)
DIFF PNL FLD: ABNORMAL
GLUCOSE SERPL-MCNC: 113 MG/DL — HIGH (ref 70–99)
GLUCOSE SERPL-MCNC: 94 MG/DL — SIGNIFICANT CHANGE UP (ref 70–99)
GLUCOSE UR QL: NEGATIVE — SIGNIFICANT CHANGE UP
HCT VFR BLD CALC: 38.4 % — SIGNIFICANT CHANGE UP (ref 34.5–45)
HCV AB S/CO SERPL IA: 0.15 S/CO — SIGNIFICANT CHANGE UP (ref 0–0.99)
HCV AB SERPL-IMP: SIGNIFICANT CHANGE UP
HGB BLD-MCNC: 11.5 G/DL — SIGNIFICANT CHANGE UP (ref 11.5–15.5)
KETONES UR-MCNC: NEGATIVE — SIGNIFICANT CHANGE UP
LEUKOCYTE ESTERASE UR-ACNC: ABNORMAL
MAGNESIUM SERPL-MCNC: 1 MG/DL — CRITICAL LOW (ref 1.6–2.6)
MCHC RBC-ENTMCNC: 28.5 PG — SIGNIFICANT CHANGE UP (ref 27–34)
MCHC RBC-ENTMCNC: 29.9 GM/DL — LOW (ref 32–36)
MCV RBC AUTO: 95.3 FL — SIGNIFICANT CHANGE UP (ref 80–100)
NITRITE UR-MCNC: NEGATIVE — SIGNIFICANT CHANGE UP
PH UR: 6 — SIGNIFICANT CHANGE UP (ref 5–8)
PHOSPHATE SERPL-MCNC: 3.7 MG/DL — SIGNIFICANT CHANGE UP (ref 2.5–4.5)
PLATELET # BLD AUTO: 287 K/UL — SIGNIFICANT CHANGE UP (ref 150–400)
POTASSIUM SERPL-MCNC: 4.6 MMOL/L — SIGNIFICANT CHANGE UP (ref 3.5–5.3)
POTASSIUM SERPL-MCNC: 5.4 MMOL/L — HIGH (ref 3.5–5.3)
POTASSIUM SERPL-SCNC: 4.6 MMOL/L — SIGNIFICANT CHANGE UP (ref 3.5–5.3)
POTASSIUM SERPL-SCNC: 5.4 MMOL/L — HIGH (ref 3.5–5.3)
PROT UR-MCNC: ABNORMAL
RBC # BLD: 4.03 M/UL — SIGNIFICANT CHANGE UP (ref 3.8–5.2)
RBC # FLD: 16 % — HIGH (ref 10.3–14.5)
SODIUM SERPL-SCNC: 140 MMOL/L — SIGNIFICANT CHANGE UP (ref 135–145)
SODIUM SERPL-SCNC: 141 MMOL/L — SIGNIFICANT CHANGE UP (ref 135–145)
SP GR SPEC: 1.01 — SIGNIFICANT CHANGE UP (ref 1.01–1.02)
UROBILINOGEN FLD QL: NEGATIVE — SIGNIFICANT CHANGE UP
WBC # BLD: 7.35 K/UL — SIGNIFICANT CHANGE UP (ref 3.8–10.5)
WBC # FLD AUTO: 7.35 K/UL — SIGNIFICANT CHANGE UP (ref 3.8–10.5)

## 2019-04-19 PROCEDURE — 71250 CT THORAX DX C-: CPT | Mod: 26

## 2019-04-19 RX ORDER — ACETAMINOPHEN 500 MG
750 TABLET ORAL ONCE
Qty: 0 | Refills: 0 | Status: COMPLETED | OUTPATIENT
Start: 2019-04-19 | End: 2019-04-19

## 2019-04-19 RX ORDER — MAGNESIUM SULFATE 500 MG/ML
2 VIAL (ML) INJECTION ONCE
Qty: 0 | Refills: 0 | Status: COMPLETED | OUTPATIENT
Start: 2019-04-19 | End: 2019-04-19

## 2019-04-19 RX ORDER — IPRATROPIUM/ALBUTEROL SULFATE 18-103MCG
3 AEROSOL WITH ADAPTER (GRAM) INHALATION EVERY 6 HOURS
Qty: 0 | Refills: 0 | Status: DISCONTINUED | OUTPATIENT
Start: 2019-04-19 | End: 2019-04-22

## 2019-04-19 RX ORDER — NALTREXONE HYDROCHLORIDE 50 MG/1
50 TABLET, FILM COATED ORAL ONCE
Qty: 0 | Refills: 0 | Status: COMPLETED | OUTPATIENT
Start: 2019-04-19 | End: 2019-04-19

## 2019-04-19 RX ORDER — TIOTROPIUM BROMIDE 18 UG/1
1 CAPSULE ORAL; RESPIRATORY (INHALATION) DAILY
Qty: 0 | Refills: 0 | Status: DISCONTINUED | OUTPATIENT
Start: 2019-04-19 | End: 2019-04-22

## 2019-04-19 RX ORDER — BUDESONIDE, MICRONIZED 100 %
0.25 POWDER (GRAM) MISCELLANEOUS
Qty: 0 | Refills: 0 | Status: DISCONTINUED | OUTPATIENT
Start: 2019-04-19 | End: 2019-04-22

## 2019-04-19 RX ORDER — HYDROMORPHONE HYDROCHLORIDE 2 MG/ML
0.5 INJECTION INTRAMUSCULAR; INTRAVENOUS; SUBCUTANEOUS ONCE
Qty: 0 | Refills: 0 | Status: DISCONTINUED | OUTPATIENT
Start: 2019-04-19 | End: 2019-04-19

## 2019-04-19 RX ORDER — ALBUTEROL 90 UG/1
1 AEROSOL, METERED ORAL EVERY 4 HOURS
Qty: 0 | Refills: 0 | Status: DISCONTINUED | OUTPATIENT
Start: 2019-04-19 | End: 2019-04-22

## 2019-04-19 RX ORDER — MORPHINE SULFATE 50 MG/1
2 CAPSULE, EXTENDED RELEASE ORAL ONCE
Qty: 0 | Refills: 0 | Status: DISCONTINUED | OUTPATIENT
Start: 2019-04-19 | End: 2019-04-19

## 2019-04-19 RX ORDER — NICOTINE POLACRILEX 2 MG
1 GUM BUCCAL DAILY
Qty: 0 | Refills: 0 | Status: DISCONTINUED | OUTPATIENT
Start: 2019-04-19 | End: 2019-04-22

## 2019-04-19 RX ORDER — OXYCODONE HYDROCHLORIDE 5 MG/1
10 TABLET ORAL ONCE
Qty: 0 | Refills: 0 | Status: DISCONTINUED | OUTPATIENT
Start: 2019-04-19 | End: 2019-04-19

## 2019-04-19 RX ORDER — INFLUENZA VIRUS VACCINE 15; 15; 15; 15 UG/.5ML; UG/.5ML; UG/.5ML; UG/.5ML
0.5 SUSPENSION INTRAMUSCULAR ONCE
Qty: 0 | Refills: 0 | Status: DISCONTINUED | OUTPATIENT
Start: 2019-04-19 | End: 2019-04-22

## 2019-04-19 RX ORDER — OXYCODONE HYDROCHLORIDE 5 MG/1
10 TABLET ORAL THREE TIMES A DAY
Qty: 0 | Refills: 0 | Status: DISCONTINUED | OUTPATIENT
Start: 2019-04-19 | End: 2019-04-20

## 2019-04-19 RX ORDER — HYDROMORPHONE HYDROCHLORIDE 2 MG/ML
1 INJECTION INTRAMUSCULAR; INTRAVENOUS; SUBCUTANEOUS ONCE
Qty: 0 | Refills: 0 | Status: DISCONTINUED | OUTPATIENT
Start: 2019-04-19 | End: 2019-04-19

## 2019-04-19 RX ADMIN — Medication 2.5 MILLIGRAM(S): at 19:07

## 2019-04-19 RX ADMIN — HYDROMORPHONE HYDROCHLORIDE 0.5 MILLIGRAM(S): 2 INJECTION INTRAMUSCULAR; INTRAVENOUS; SUBCUTANEOUS at 04:09

## 2019-04-19 RX ADMIN — Medication 1 MILLIGRAM(S): at 12:23

## 2019-04-19 RX ADMIN — Medication 20 MILLIGRAM(S): at 22:18

## 2019-04-19 RX ADMIN — Medication 0.5 MILLIGRAM(S): at 14:06

## 2019-04-19 RX ADMIN — Medication 0.5 MILLIGRAM(S): at 16:14

## 2019-04-19 RX ADMIN — Medication 3 MILLILITER(S): at 22:18

## 2019-04-19 RX ADMIN — HYDROMORPHONE HYDROCHLORIDE 0.5 MILLIGRAM(S): 2 INJECTION INTRAMUSCULAR; INTRAVENOUS; SUBCUTANEOUS at 04:51

## 2019-04-19 RX ADMIN — SODIUM CHLORIDE 125 MILLILITER(S): 9 INJECTION, SOLUTION INTRAVENOUS at 06:31

## 2019-04-19 RX ADMIN — Medication 750 MILLIGRAM(S): at 03:17

## 2019-04-19 RX ADMIN — HEPARIN SODIUM 5000 UNIT(S): 5000 INJECTION INTRAVENOUS; SUBCUTANEOUS at 22:18

## 2019-04-19 RX ADMIN — HYDROMORPHONE HYDROCHLORIDE 1 MILLIGRAM(S): 2 INJECTION INTRAMUSCULAR; INTRAVENOUS; SUBCUTANEOUS at 04:06

## 2019-04-19 RX ADMIN — HEPARIN SODIUM 5000 UNIT(S): 5000 INJECTION INTRAVENOUS; SUBCUTANEOUS at 05:02

## 2019-04-19 RX ADMIN — Medication 1 PATCH: at 07:27

## 2019-04-19 RX ADMIN — Medication 1 PATCH: at 05:12

## 2019-04-19 RX ADMIN — OXYCODONE HYDROCHLORIDE 10 MILLIGRAM(S): 5 TABLET ORAL at 16:40

## 2019-04-19 RX ADMIN — MORPHINE SULFATE 2 MILLIGRAM(S): 50 CAPSULE, EXTENDED RELEASE ORAL at 01:59

## 2019-04-19 RX ADMIN — HYDROMORPHONE HYDROCHLORIDE 1 MILLIGRAM(S): 2 INJECTION INTRAMUSCULAR; INTRAVENOUS; SUBCUTANEOUS at 03:19

## 2019-04-19 RX ADMIN — Medication 300 MILLIGRAM(S): at 08:43

## 2019-04-19 RX ADMIN — Medication 50 GRAM(S): at 06:30

## 2019-04-19 RX ADMIN — MORPHINE SULFATE 2 MILLIGRAM(S): 50 CAPSULE, EXTENDED RELEASE ORAL at 01:23

## 2019-04-19 RX ADMIN — OXYCODONE HYDROCHLORIDE 10 MILLIGRAM(S): 5 TABLET ORAL at 22:19

## 2019-04-19 RX ADMIN — OXYCODONE HYDROCHLORIDE 10 MILLIGRAM(S): 5 TABLET ORAL at 16:09

## 2019-04-19 RX ADMIN — Medication 300 MILLIGRAM(S): at 02:12

## 2019-04-19 RX ADMIN — OXYCODONE HYDROCHLORIDE 10 MILLIGRAM(S): 5 TABLET ORAL at 22:50

## 2019-04-19 RX ADMIN — Medication 750 MILLIGRAM(S): at 09:00

## 2019-04-19 RX ADMIN — NALTREXONE HYDROCHLORIDE 50 MILLIGRAM(S): 50 TABLET, FILM COATED ORAL at 11:45

## 2019-04-19 NOTE — CONSULT NOTE ADULT - SUBJECTIVE AND OBJECTIVE BOX
CHIEF COMPLAINT:Patient is a 73y old  Female who presents with a chief complaint of constipation, SBO rule out (19 Apr 2019 16:31)      HPI: pt is well known to me with hx of cad, htn with hx of copd with multiple hx of sbo, chronic opiotes use.  while in wer pt with increase bp of 158/124.  73yF with extensive pmhx most notable for  recurrent SBOs, lung ca (s/p chemo/rad), COPD presents with abdominal pain since last night.  Pt reports the pain feels like her last SBO; however, she has been passing flatus.  Her last BM was this afternoon.  Pt endorses nausea.  She states her last admission for SBO was in january of this year.  She denies vomiting, diarrhea, constipation, melena, hematochezia, loss of appetite, fevers, chills, night sweats. (18 Apr 2019 22:32)      PAST MEDICAL & SURGICAL HISTORY:  Back pain  Bowel obstruction: multiple hospitalizations  Kidney stone  Emphysema  Narcotic Dependence  S/P Radiation Therapy  S/P Chemotherapy, Time Since Greater than 12 Weeks  Narcotic Dysmotile Cilia Syndrome  Chronic Pain Following Surgery or Procedure: following right breast mastectomy  Ankle Fracture: right anle fx s/p cast 2009  History of Small Bowel Obstruction: 1/2010  Asthma  Breast Cancer  S/P hernia surgery: femoral hernia - 2012  S/P Exploratory Laparotomy  S/P Appendectomy  S/P Cholecystectomy  Liver Mass s/p liver rsxn: s/p resection 2009  History of Hysterectomy: 1998  S/P Right Mastectomy: 2006      MEDICATIONS  (STANDING):  heparin  Injectable 5000 Unit(s) SubCutaneous every 8 hours  influenza   Vaccine 0.5 milliLiter(s) IntraMuscular once  lactated ringers. 1000 milliLiter(s) (125 mL/Hr) IV Continuous <Continuous>  nicotine -  14 mG/24Hr(s) Patch 1 patch Transdermal daily    MEDICATIONS  (PRN):      FAMILY HISTORY:  No pertinent family history in first degree relatives      SOCIAL HISTORY:    [ ] Non-smoker  [ ] Smoker  [ ] Alcohol    Allergies    Biaxin (Rash)  Cipro (Headache)  Flagyl (Headache)  penicillin (Rash; Swelling)  sulfa drugs (Unknown)    Intolerances    	    REVIEW OF SYSTEMS:  CONSTITUTIONAL: No fever, weight loss, or fatigue  EYES: No eye pain, visual disturbances, or discharge  ENT:  No difficulty hearing, tinnitus, vertigo; No sinus or throat pain  NECK: No pain or stiffness  RESPIRATORY: No cough, wheezing, chills or hemoptysis; + Shortness of Breath  CARDIOVASCULAR: No chest pain, palpitations, passing out, dizziness, or leg swelling  GASTROINTESTINAL: + abdominal or epigastric pain. No nausea, vomiting, or hematemesis; No diarrhea or constipation. No melena or hematochezia.  GENITOURINARY: No dysuria, frequency, hematuria, or incontinence  NEUROLOGICAL: No headaches, memory loss, loss of strength, numbness, or tremors  SKIN: No itching, burning, rashes, or lesions   LYMPH Nodes: No enlarged glands  ENDOCRINE: No heat or cold intolerance; No hair loss  MUSCULOSKELETAL: No joint pain or swelling; No muscle, back, or extremity pain  PSYCHIATRIC: No depression, anxiety, mood swings, or difficulty sleeping  HEME/LYMPH: No easy bruising, or bleeding gums  ALLERGY AND IMMUNOLOGIC: No hives or eczema	    [ ] All others negative	  [ ] Unable to obtain    PHYSICAL EXAM:  T(C): 37.2 (04-19-19 @ 14:09), Max: 37.2 (04-19-19 @ 14:09)  HR: 104 (04-19-19 @ 16:06) (62 - 104)  BP: 152/94 (04-19-19 @ 16:06) (144/72 - 201/95)  RR: 18 (04-19-19 @ 16:06) (17 - 20)  SpO2: 98% (04-19-19 @ 16:06) (94% - 98%)  Wt(kg): --  I&O's Summary    19 Apr 2019 07:01  -  19 Apr 2019 17:12  --------------------------------------------------------  IN: 0 mL / OUT: 900 mL / NET: -900 mL        Appearance: Normal	  HEENT:   Normal oral mucosa, PERRL, EOMI	  Lymphatic: No lymphadenopathy  Cardiovascular: Normal S1 S2, No JVD, +murmurs, No edema  Respiratory: Lungs clear to auscultation	  Psychiatry: A & O x 3, Mood & affect appropriate  Gastrointestinal:  Soft, + diffuse tenderness.  Skin: No rashes, No ecchymoses, No cyanosis	  Neurologic: Non-focal  Extremities: Normal range of motion, No clubbing, cyanosis or edema  Vascular: Peripheral pulses palpable 2+ bilaterally    TELEMETRY: 	    ECG:  	  RADIOLOGY:  OTHER: 	  	  LABS:	 	    CARDIAC MARKERS:                              11.5   7.35  )-----------( 287      ( 19 Apr 2019 09:38 )             38.4     04-19    141  |  102  |  16  ----------------------------<  94  4.6   |  24  |  1.19    Ca    7.8<L>      19 Apr 2019 05:29  Phos  3.7     04-19  Mg     1.0     04-19    TPro  7.7  /  Alb  3.5  /  TBili  0.3  /  DBili  x   /  AST  39  /  ALT  22  /  AlkPhos  203<H>  04-18    proBNP:   Lipid Profile:   HgA1c:   TSH:   PT/INR - ( 18 Apr 2019 18:08 )   PT: 13.2 sec;   INR: 1.14 ratio         PTT - ( 18 Apr 2019 18:08 )  PTT:42.8 sec    PREVIOUS DIAGNOSTIC TESTING:    < from: CT Abdomen and Pelvis No Cont (04.18.19 @ 19:45) >  IMPRESSION: Small bowel obstruction with question of internal hernia.    New mild hydronephrosis.    Findings were discussed with  8:00 PM on 4/18/2019.    < end of copied text >

## 2019-04-19 NOTE — PROGRESS NOTE ADULT - ASSESSMENT
The patient is definitely less distended and continues to have bowel movements and passing gas. The patient is definitely less distended and continues to have bowel movements and passing gas. Will lgive an ampule of Narcan which has worked before.

## 2019-04-19 NOTE — CONSULT NOTE ADULT - SUBJECTIVE AND OBJECTIVE BOX
HPI:  73yF with   pmhx,   recurrent SBOs, lung ca (s/p chemo/rad), COPD,  c/c  cachexia,  c/c pain, opoid  dependent    presents with abdominal pain since last night.  Pt reports the pain feels like her last SBO; however, she has been passing flatus.  Her last BM was this afternoon.  Pt endorses nausea.    She states her last admission for SBO was in january of this year.  She denies vomiting, diarrhea, constipation, melena, hematochezia, loss of appetite, fevers, chills, night sweats. (2019 22:32)      REVIEW OF SYSTEMS:  GEN: no fever,    no chills  RESP:  SOB,   no cough  CVS: no chest pain,   no palpitations  GI: no abdominal pain,   no nausea,   no vomiting,   no constipation,   no diarrhea  : no dysuria,   no frequency  NEURO: no headache,   no dizziness  PSYCH: no depression,   not anxious  Derm : no rash    Allergies    Biaxin (Rash)  Cipro (Headache)  Flagyl (Headache)  penicillin (Rash; Swelling)  sulfa drugs (Unknown)    Intolerances        CAPILLARY BLOOD GLUCOSE        I&O's Summary< from: CT Abdomen and Pelvis No Cont (19 @ 19:45) >    IMPRESSION: Small bowel obstruction with question of internal hernia.    New mild hydronephrosis.    Findings we    < end of copied text >      2019 07:01  -  2019 17:06  --------------------------------------------------------  IN: 0 mL / OUT: 900 mL / NET: -900 mL        Vital Signs Last 24 Hrs  T(C): 37.2 (2019 14:09), Max: 37.2 (2019 14:09)  T(F): 98.9 (2019 14:09), Max: 98.9 (2019 14:09)  HR: 104 (2019 16:06) (62 - 104)  BP: 152/94 (2019 16:06) (144/72 - 201/95)  BP(mean): --  RR: 18 (2019 16:06) (17 - 20)  SpO2: 98% (2019 16:06) (94% - 98%)  PHYSICAL EXAM:  GENERAL: NAD,   HEAD:  Atraumatic, Normocephalic  EYES: EOMI,  NECK: Supple, No JVD  CHEST/LUNG b/l rhonchi  HEART: Regular rate and rhythm;        murmur  ABDOMEN: Soft, Nontender,   EXTREMITIES:     no    edema  NEUROLOGY:  alert    MEDICATIONS  (STANDING):  heparin  Injectable 5000 Unit(s) SubCutaneous every 8 hours  influenza   Vaccine 0.5 milliLiter(s) IntraMuscular once  lactated ringers. 1000 milliLiter(s) (125 mL/Hr) IV Continuous <Continuous>  nicotine -  14 mG/24Hr(s) Patch 1 patch Transdermal daily    MEDICATIONS  (PRN):    LABS:                        11.5   7.35  )-----------( 287      ( 2019 09:38 )             38.4     04-19    141  |  102  |  16  ----------------------------<  94  4.6   |  24  |  1.19    Ca    7.8<L>      2019 05:29  Phos  3.7     -  Mg     1.0         TPro  7.7  /  Alb  3.5  /  TBili  0.3  /  DBili  x   /  AST  39  /  ALT  22  /  AlkPhos  203<H>  -18    PT/INR - ( 2019 18:08 )   PT: 13.2 sec;   INR: 1.14 ratio         PTT - ( 2019 18:08 )  PTT:42.8 sec      Urinalysis Basic - ( 2019 22:37 )    Color: Light Yellow / Appearance: Clear / S.013 / pH: x  Gluc: x / Ketone: Negative  / Bili: Negative / Urobili: Negative   Blood: x / Protein: Trace / Nitrite: Negative   Leuk Esterase: Small / RBC: 3 /hpf / WBC 5 /HPF   Sq Epi: x / Non Sq Epi: 1 /hpf / Bacteria: Many          -18 @ 18:06  4.4  <20              Consultant(s) Notes Reviewed:      Care Discussed with Consultants/Other Providers:  Plan:

## 2019-04-19 NOTE — PROGRESS NOTE ADULT - SUBJECTIVE AND OBJECTIVE BOX
Dr. Eddie Hopper contact information [phone]    Post Op Day#:     Subjective:     Procedure:      Vital Signs Last 24 Hrs  T(C): 36.9 (19 Apr 2019 07:45), Max: 36.9 (19 Apr 2019 07:45)  T(F): 98.5 (19 Apr 2019 07:45), Max: 98.5 (19 Apr 2019 07:45)  HR: 70 (19 Apr 2019 07:45) (62 - 74)  BP: 162/80 (19 Apr 2019 07:45) (144/72 - 201/95)  BP(mean): --  RR: 17 (19 Apr 2019 07:45) (17 - 20)  SpO2: 98% (19 Apr 2019 07:45) (94% - 98%)    I&O's Summary      I&O's Detail                            12.6   7.1   )-----------( 297      ( 18 Apr 2019 18:08 )             40.5       04-19    141  |  102  |  16  ----------------------------<  94  4.6   |  24  |  1.19    Ca    7.8<L>      19 Apr 2019 05:29  Phos  3.7     04-19  Mg     1.0     04-19    TPro  7.7  /  Alb  3.5  /  TBili  0.3  /  DBili  x   /  AST  39  /  ALT  22  /  AlkPhos  203<H>  04-18      INR: INR: 1.14 ratio (04-18 @ 18:08)      Radiology:    Physical Exam:    General:  Appears stated age, well-groomed, well-nourished, no distress    Abdomen:  Less distended - soft    Neuro/Psych:  Alert, oriented to time, place and person

## 2019-04-19 NOTE — DISCHARGE NOTE PROVIDER - HOSPITAL COURSE
73yF with extensive pmhx most notable for  recurrent SBOs, lung ca (s/p chemo/rad), COPD presents with abdominal pain since last night.  Pt reports the pain feels like her last SBO; however, she has been passing flatus.  Her last BM was this afternoon.  Pt endorses nausea.  She states her last admission for SBO was in january of this year.  She denies vomiting, diarrhea, constipation, melena, hematochezia, loss of appetite, fevers, chills, night sweats.         An NG tube was placed. Patient was given a dose of oral naltrexone and subsequently had 3 bowel movements. Patient became upset and removed her NG tube, began complaining of pain stating that she has not taken her home oxycodone since before she came into the hospital. She was given a dose of po oxycodone and a regular diet.

## 2019-04-19 NOTE — CHART NOTE - NSCHARTNOTEFT_GEN_A_CORE
Patient given dose of po narcan with subsequent large bowel movement. Multiple fluid bowel movements over the following hour. Patient feeling better and requesting to go home. Patient awake and alert but orientation waxes and wanes. Abdominal exam remains non-concerning, nondistened and nontender. SBO likely resolved. Will monitor.    [facility]  [id number]

## 2019-04-19 NOTE — PROVIDER CONTACT NOTE (OTHER) - RECOMMENDATIONS
[facility] surgery wants patient to receive Naltrexone via NG tube and then assess patient.  waiting for pharmacy to send medication.

## 2019-04-19 NOTE — PROVIDER CONTACT NOTE (OTHER) - ACTION/TREATMENT ORDERED:
As per MD confiscate drugs for patient safety as per hospital policy. Meds to be taken to pharmacy for identification As per MD confiscate drugs for patient safety as per hospital policy. Meds to be placed in valuable envelope and sent to security As per MD confiscate drugs for patient safety as per hospital policy. Meds to be placed in valuable envelope and sent to security. GUI Faith made aware of situation

## 2019-04-19 NOTE — PROVIDER CONTACT NOTE (OTHER) - SITUATION
Patient agitated and restless. getting OOB even after instructing not to, as she is unsteady on her feet.

## 2019-04-19 NOTE — CONSULT NOTE ADULT - ASSESSMENT
pt is well known to me with hx of htn, opiates use with abdominal pain and sbo with sig elevated bp  observe for analgesic/opiates withdrawal  add iv Vasotec for bp control  dvt prophylaxis  looking for ecg  pt has no chest pain.

## 2019-04-19 NOTE — PROVIDER CONTACT NOTE (OTHER) - ASSESSMENT
AO x 4. Unable to check Vitals at this time as patient is agitated. NG tube clamped as patient received medication via NG tube.

## 2019-04-19 NOTE — PROGRESS NOTE ADULT - SUBJECTIVE AND OBJECTIVE BOX
General Surgery Progress Note     Subjective: Patient was seen and examined during morning rounds. 73yF with extensive pmhx most notable for  recurrent SBOs, lung ca (s/p chemo/rad), COPD presents with abdominal pain since last night.  Pt reports the pain feels like her last SBO; however, she has been passing flatus.  Her last BM was this afternoon.  Pt endorses nausea.  She states her last admission for SBO was in january of this year.  She denies vomiting, diarrhea, constipation, melena, hematochezia, loss of appetite, fevers, chills, night sweats.     Objective:    Physical Exam:  Gen: Frail appearing, awake and alert  Head/Neck: NC/AT, NGT in place w/bileous output  Resp: CTABL, nonlabored. Nasal cannula in place 2L.  SpO2 94%.   Abdomen:  Distended, tympanic, TTP in upper abdomen, no guarding or rebound tenderness  Extremities no edema, gross sensation intact, gross motor function intact          MEDICATIONS  (STANDING):  heparin  Injectable 5000 Unit(s) SubCutaneous every 8 hours  influenza   Vaccine 0.5 milliLiter(s) IntraMuscular once  lactated ringers. 1000 milliLiter(s) (125 mL/Hr) IV Continuous <Continuous>    MEDICATIONS  (PRN):      Vital Signs Last 24 Hrs  T(C): 36.8 (18 Apr 2019 23:54), Max: 36.8 (18 Apr 2019 23:54)  T(F): 98.3 (18 Apr 2019 23:54), Max: 98.3 (18 Apr 2019 23:54)  HR: 63 (18 Apr 2019 23:54) (62 - 74)  BP: 144/72 (18 Apr 2019 23:54) (144/72 - 201/95)  BP(mean): --  RR: 20 (18 Apr 2019 23:54) (18 - 20)  SpO2: 98% (18 Apr 2019 23:54) (94% - 98%)        LABS:                        12.6   7.1   )-----------( 297      ( 18 Apr 2019 18:08 )             40.5     04-18    140  |  102  |  18  ----------------------------<  113<H>  5.4<H>   |  24  |  1.13    Ca    7.8<L>      18 Apr 2019 23:54    TPro  7.7  /  Alb  3.5  /  TBili  0.3  /  DBili  x   /  AST  39  /  ALT  22  /  AlkPhos  203<H>  04-18

## 2019-04-19 NOTE — CONSULT NOTE ADULT - ASSESSMENT
73 year old female with   PMH chronic Back pain (on Narcotics), history of multiple SBO's, narcotic associated constipation/ileus, COPD,  smoker,    ca  breast,  right  mastectomy. RENUKA cavitary lesion on ct.  c/c cachexia  path from  1/19, with NSCC with squamous  features     admitted with abdominal pain  , from SBO    ct scan noted,  SBO, with ?  internal hernia/  npo/ iv fluids   now in e r with sob from wheezing/  pt is an active smoker  cxr.  clear   iv solumedrol and nebs  rpt ct chest/  oncology d r ohri called  dvt ppx     < from: CT Chest No Cont (01.15.19 @ 18:14) >  MPRESSION:   Chest:  1.  The solid component of the patient's left upper lobe cavitary mass is   increased in size from 12/18/2018 and is compatible with the known   history of lung cancer that extends centrally.  < end of copied text >      Cytopathology - Non Gyn Report (01.18.19 @ 11:43)    Immunohistochemical stains.  The immunophenotype together with the cytomorphology supports the  diagnosis squamous cell carcinoma.    Final Diagnosis  LUNG, LEFT UPPER LOBE, US GUIDED CORE BIOPSY AND FNA  POSITIVE FOR MALIGNANT CELLS.  Non-small cell carcinoma, with squamous features (see note).  Additional studies pending.

## 2019-04-19 NOTE — PROGRESS NOTE ADULT - ASSESSMENT
73yF pmhx recurrent SBOs, lung ca, COPD presents with abdominal pain since last night, now with SBO with questionable internal hernia demonstrated on CT A/P.  Hemodynamically stable.     -NGT with continuous suction  -NPO  -IVF  -c/w IV equivalents of home meds while NPO  -Strict Is and Os   -Serial ab exams  - Dispo: inpatient surgical unit

## 2019-04-20 LAB
ANION GAP SERPL CALC-SCNC: 20 MMOL/L — HIGH (ref 5–17)
BUN SERPL-MCNC: 15 MG/DL — SIGNIFICANT CHANGE UP (ref 7–23)
CALCIUM SERPL-MCNC: 8.8 MG/DL — SIGNIFICANT CHANGE UP (ref 8.4–10.5)
CHLORIDE SERPL-SCNC: 103 MMOL/L — SIGNIFICANT CHANGE UP (ref 96–108)
CO2 SERPL-SCNC: 22 MMOL/L — SIGNIFICANT CHANGE UP (ref 22–31)
CREAT SERPL-MCNC: 1.16 MG/DL — SIGNIFICANT CHANGE UP (ref 0.5–1.3)
GLUCOSE SERPL-MCNC: 82 MG/DL — SIGNIFICANT CHANGE UP (ref 70–99)
HCT VFR BLD CALC: 36.9 % — SIGNIFICANT CHANGE UP (ref 34.5–45)
HGB BLD-MCNC: 11.7 G/DL — SIGNIFICANT CHANGE UP (ref 11.5–15.5)
MAGNESIUM SERPL-MCNC: 1.8 MG/DL — SIGNIFICANT CHANGE UP (ref 1.6–2.6)
MCHC RBC-ENTMCNC: 29.1 PG — SIGNIFICANT CHANGE UP (ref 27–34)
MCHC RBC-ENTMCNC: 31.7 GM/DL — LOW (ref 32–36)
MCV RBC AUTO: 91.8 FL — SIGNIFICANT CHANGE UP (ref 80–100)
PHOSPHATE SERPL-MCNC: 2.8 MG/DL — SIGNIFICANT CHANGE UP (ref 2.5–4.5)
PLATELET # BLD AUTO: 272 K/UL — SIGNIFICANT CHANGE UP (ref 150–400)
POTASSIUM SERPL-MCNC: 3.6 MMOL/L — SIGNIFICANT CHANGE UP (ref 3.5–5.3)
POTASSIUM SERPL-SCNC: 3.6 MMOL/L — SIGNIFICANT CHANGE UP (ref 3.5–5.3)
RBC # BLD: 4.02 M/UL — SIGNIFICANT CHANGE UP (ref 3.8–5.2)
RBC # FLD: 15.6 % — HIGH (ref 10.3–14.5)
SODIUM SERPL-SCNC: 145 MMOL/L — SIGNIFICANT CHANGE UP (ref 135–145)
WBC # BLD: 7.6 K/UL — SIGNIFICANT CHANGE UP (ref 3.8–10.5)
WBC # FLD AUTO: 7.6 K/UL — SIGNIFICANT CHANGE UP (ref 3.8–10.5)

## 2019-04-20 RX ORDER — OXYCODONE HYDROCHLORIDE 5 MG/1
5 TABLET ORAL THREE TIMES A DAY
Qty: 0 | Refills: 0 | Status: DISCONTINUED | OUTPATIENT
Start: 2019-04-20 | End: 2019-04-22

## 2019-04-20 RX ORDER — DIAZEPAM 5 MG
0.5 TABLET ORAL ONCE
Qty: 0 | Refills: 0 | Status: DISCONTINUED | OUTPATIENT
Start: 2019-04-20 | End: 2019-04-20

## 2019-04-20 RX ORDER — LIDOCAINE 4 G/100G
1 CREAM TOPICAL ONCE
Qty: 0 | Refills: 0 | Status: COMPLETED | OUTPATIENT
Start: 2019-04-20 | End: 2019-04-20

## 2019-04-20 RX ADMIN — Medication 0.5 MILLIGRAM(S): at 08:59

## 2019-04-20 RX ADMIN — Medication 0.25 MILLIGRAM(S): at 17:39

## 2019-04-20 RX ADMIN — HEPARIN SODIUM 5000 UNIT(S): 5000 INJECTION INTRAVENOUS; SUBCUTANEOUS at 14:53

## 2019-04-20 RX ADMIN — Medication 20 MILLIGRAM(S): at 05:15

## 2019-04-20 RX ADMIN — OXYCODONE HYDROCHLORIDE 5 MILLIGRAM(S): 5 TABLET ORAL at 12:43

## 2019-04-20 RX ADMIN — LIDOCAINE 1 PATCH: 4 CREAM TOPICAL at 19:40

## 2019-04-20 RX ADMIN — Medication 3 MILLILITER(S): at 05:38

## 2019-04-20 RX ADMIN — OXYCODONE HYDROCHLORIDE 5 MILLIGRAM(S): 5 TABLET ORAL at 12:13

## 2019-04-20 RX ADMIN — Medication 2.5 MILLIGRAM(S): at 05:14

## 2019-04-20 RX ADMIN — Medication 3 MILLILITER(S): at 23:02

## 2019-04-20 RX ADMIN — HEPARIN SODIUM 5000 UNIT(S): 5000 INJECTION INTRAVENOUS; SUBCUTANEOUS at 05:16

## 2019-04-20 RX ADMIN — Medication 0.25 MILLIGRAM(S): at 05:14

## 2019-04-20 RX ADMIN — Medication 1 TABLET(S): at 12:12

## 2019-04-20 RX ADMIN — OXYCODONE HYDROCHLORIDE 10 MILLIGRAM(S): 5 TABLET ORAL at 03:50

## 2019-04-20 RX ADMIN — Medication 1 PATCH: at 05:15

## 2019-04-20 RX ADMIN — Medication 2.5 MILLIGRAM(S): at 17:37

## 2019-04-20 RX ADMIN — Medication 1 PATCH: at 19:54

## 2019-04-20 RX ADMIN — Medication 0.5 MILLIGRAM(S): at 08:13

## 2019-04-20 RX ADMIN — OXYCODONE HYDROCHLORIDE 10 MILLIGRAM(S): 5 TABLET ORAL at 04:20

## 2019-04-20 RX ADMIN — Medication 3 MILLILITER(S): at 12:13

## 2019-04-20 RX ADMIN — Medication 0.5 MILLIGRAM(S): at 18:21

## 2019-04-20 RX ADMIN — HEPARIN SODIUM 5000 UNIT(S): 5000 INJECTION INTRAVENOUS; SUBCUTANEOUS at 21:03

## 2019-04-20 RX ADMIN — Medication 20 MILLIGRAM(S): at 21:03

## 2019-04-20 RX ADMIN — Medication 3 MILLILITER(S): at 17:40

## 2019-04-20 RX ADMIN — Medication 1 PATCH: at 12:13

## 2019-04-20 RX ADMIN — Medication 0.5 MILLIGRAM(S): at 02:22

## 2019-04-20 RX ADMIN — OXYCODONE HYDROCHLORIDE 5 MILLIGRAM(S): 5 TABLET ORAL at 21:10

## 2019-04-20 RX ADMIN — Medication 0.5 MILLIGRAM(S): at 22:40

## 2019-04-20 RX ADMIN — OXYCODONE HYDROCHLORIDE 5 MILLIGRAM(S): 5 TABLET ORAL at 20:05

## 2019-04-20 RX ADMIN — Medication 20 MILLIGRAM(S): at 14:53

## 2019-04-20 NOTE — CHART NOTE - NSCHARTNOTEFT_GEN_A_CORE
Upon Nutritional Assessment by the Registered Dietitian your patient was determined to meet criteria / has evidence of the following diagnosis/diagnoses:          [ ]  Mild Protein Calorie Malnutrition        [x ]  Moderate Protein Calorie Malnutrition        [ ] Severe Protein Calorie Malnutrition        [ ] Unspecified Protein Calorie Malnutrition        [ ] Underweight / BMI <19        [ ] Morbid Obesity / BMI > 40      Findings as based on:  [x ] Comprehensive nutrition assessment: history of significant weight loss within minimal weight gain   [x ] Nutrition Focused Physical Exam: Mild-moderate muscle mass and body fat depletion   [ ] Other:       Nutrition Plan/Recommendations:  Continue regular diet, review high calorie, high protein nutrition therapy education as needed        PROVIDER Section:     By signing this assessment you are acknowledging and agree with the diagnosis/diagnoses assigned by the Registered Dietitian    Comments:

## 2019-04-20 NOTE — CONSULT NOTE ADULT - ASSESSMENT
Patient with RENUKA cavitary mass, she has been a smoker, has emphysema, FEV1 830 cc.  Biopsy was done in Jan 2019  Cytopathology - Non Gyn Report (01.18.19 @ 11:43)    Cytopathology - Non Gyn Report:   ACCESSION No:  [id number]    GEORGIE JOLLEY                     3        Cytopathology Addendum Report          Addendum  Immunohistochemical stains.    Immunostain for p 40 is positive in tumor cells.  TTF-1 is negative.  PD-L1 stains less than 1% of tumor cells.    The immunophenotype together with the cytomorphology supports the  diagnosis squamous cell carcinoma.    Final Diagnosis  LUNG, LEFT UPPER LOBE, US GUIDED CORE BIOPSY AND FNA  POSITIVE FOR MALIGNANT CELLS.  Non-small cell carcinoma, with squamous features (see note).  Additional studies pending.    Note: Cytology slides, core biopsy specimen and cell block are  moderately cellular specimen composed of syncytial    She was thought to be and is a poor candidate for any surgical intervention due to   poor surgical candidate due to malnutrition, healing ability, chest wall invasion and did not want it     She was a poor candidate for chemo and not a candidate for single agent immunotherapy. Thus RT was planned Patient was admitted with SBO.   No resolved.  Has been anxious  She has h/o  RENUKA cavitary mass, she has been a smoker, has emphysema, FEV1 830 cc.  Biopsy was done in Jan 2019  Cytopathology - Non Gyn Report (01.18.19 @ 11:43)    Cytopathology - Non Gyn Report:   ACCESSION No:  [id number]    GEORGIE JOLLEY                     3        Cytopathology Addendum Report          Addendum  Immunohistochemical stains.    Immunostain for p 40 is positive in tumor cells.  TTF-1 is negative.  PD-L1 stains less than 1% of tumor cells.    The immunophenotype together with the cytomorphology supports the  diagnosis squamous cell carcinoma.    Final Diagnosis  LUNG, LEFT UPPER LOBE, US GUIDED CORE BIOPSY AND FNA  POSITIVE FOR MALIGNANT CELLS.  Non-small cell carcinoma, with squamous features (see note).  Additional studies pending.    Note: Cytology slides, core biopsy specimen and cell block are  moderately cellular specimen composed of syncytial    She was thought to be and is a poor candidate for any surgical intervention due to   poor surgical candidate due to malnutrition, healing ability, chest wall invasion and did not want it     She was a poor candidate for chemo and not a candidate for single agent immunotherapy. Thus RT was planned.  Patient stated that she finished RT, does not remember when.   However I checked records and she has finished.  She was also told to follow up in office on d/c  In office she also had low B12 which was supplemented.  Rest as per chart

## 2019-04-20 NOTE — DIETITIAN INITIAL EVALUATION ADULT. - ENERGY NEEDS
Pt seen for nutrition consult for BMI <18. Pt with PMH including recurrent SBO, lung cancer S/P chemotherapy and radiation, COPD admitted with abdominal pain found to have recurrent SBO.    Ht: 5'2", Wt: 84.9 lbs, BMI: 15.5 kg/m2, IBW: 110 lbs +/- 10%, %IBW: 77%  No edema, Skin noted with stage 1 sacral pressure injury

## 2019-04-20 NOTE — DIETITIAN INITIAL EVALUATION ADULT. - NS AS NUTRI INTERV MEALS SNACK
1. Continue regular diet, pt declined nutrition supplements or food preferences at this time, 2. Consider adding daily multivitamin to ensure adequate micronutrient intake, 3. Continue to encourage po intake and obtain/honor food preferences as able, 4. Monitor PO intake, diet tolerance, weight trends, labs, and skin integrity, 5. RD to remain available as needed

## 2019-04-20 NOTE — DIETITIAN INITIAL EVALUATION ADULT. - NS AS NUTRI INTERV ED CONTENT
Briefly reviewed high calorie, high protein nutrition therapy at time of visit, pt minimally receptive, accepted written materials to review at her leisure

## 2019-04-20 NOTE — DIETITIAN INITIAL EVALUATION ADULT. - ORAL INTAKE PTA
Pt reports eating normally PTA however stated she will typically eat 1.5-2 meals per day consisting of foods prepared by her aide. Pt did not wish to supply diet recall at this time, pt does not take nutrition supplements.

## 2019-04-20 NOTE — PROGRESS NOTE ADULT - SUBJECTIVE AND OBJECTIVE BOX
CARDIOLOGY     PROGRESS  NOTE   ________________________________________________    CHIEF COMPLAINT:Patient is a 73y old  Female who presents with a chief complaint of SBO (20 Apr 2019 05:01)  no complain.  	  REVIEW OF SYSTEMS:  CONSTITUTIONAL: No fever, weight loss, or fatigue  EYES: No eye pain, visual disturbances, or discharge  ENT:  No difficulty hearing, tinnitus, vertigo; No sinus or throat pain  NECK: No pain or stiffness  RESPIRATORY: No cough, wheezing, chills or hemoptysis; No Shortness of Breath  CARDIOVASCULAR: No chest pain, palpitations, passing out, dizziness, or leg swelling  GASTROINTESTINAL: + abdominal or epigastric pain. No nausea, vomiting, or hematemesis; No diarrhea or constipation. No melena or hematochezia.  GENITOURINARY: No dysuria, frequency, hematuria, or incontinence  NEUROLOGICAL: No headaches, memory loss, loss of strength, numbness, or tremors  SKIN: No itching, burning, rashes, or lesions   LYMPH Nodes: No enlarged glands  ENDOCRINE: No heat or cold intolerance; No hair loss  MUSCULOSKELETAL: No joint pain or swelling; No muscle, back, or extremity pain  PSYCHIATRIC: No depression, anxiety, mood swings, or difficulty sleeping  HEME/LYMPH: No easy bruising, or bleeding gums  ALLERGY AND IMMUNOLOGIC: No hives or eczema	    [ ] All others negative	  [ ] Unable to obtain    PHYSICAL EXAM:  T(C): 36.6 (04-20-19 @ 05:06), Max: 37.2 (04-19-19 @ 14:09)  HR: 89 (04-20-19 @ 05:06) (89 - 107)  BP: 159/89 (04-20-19 @ 05:06) (151/86 - 159/89)  RR: 18 (04-20-19 @ 05:06) (17 - 18)  SpO2: 95% (04-20-19 @ 05:06) (93% - 98%)  Wt(kg): --  I&O's Summary    19 Apr 2019 07:01  -  20 Apr 2019 07:00  --------------------------------------------------------  IN: 1135 mL / OUT: 1850 mL / NET: -715 mL        Appearance: Normal	  HEENT:   Normal oral mucosa, PERRL, EOMI	  Lymphatic: No lymphadenopathy  Cardiovascular: Normal S1 S2, No JVD, N=murmurs, No edema  Respiratory: Lungs clear to auscultation	  Psychiatry: A & O x 3, Mood & affect appropriate  Gastrointestinal:  Soft, +-tender, + BS	  Skin: No rashes, No ecchymoses, No cyanosis	  Neurologic: Non-focal  Extremities: Normal range of motion, No clubbing, cyanosis or edema  Vascular: Peripheral pulses palpable 2+ bilaterally    MEDICATIONS  (STANDING):  ALBUTerol    90 MICROgram(s) HFA Inhaler 1 Puff(s) Inhalation every 4 hours  ALBUTerol/ipratropium for Nebulization 3 milliLiter(s) Nebulizer every 6 hours  buDESOnide   0.25 milliGRAM(s) Respule 0.25 milliGRAM(s) Inhalation two times a day  diazepam    Tablet 0.5 milliGRAM(s) Oral once  enalapril 2.5 milliGRAM(s) Oral every 12 hours  heparin  Injectable 5000 Unit(s) SubCutaneous every 8 hours  influenza   Vaccine 0.5 milliLiter(s) IntraMuscular once  lactated ringers. 1000 milliLiter(s) (65 mL/Hr) IV Continuous <Continuous>  methylPREDNISolone sodium succinate Injectable 20 milliGRAM(s) IV Push three times a day  nicotine -  14 mG/24Hr(s) Patch 1 patch Transdermal daily  tiotropium 18 MICROgram(s) Capsule 1 Capsule(s) Inhalation daily      TELEMETRY: 	    ECG:  	  RADIOLOGY:  OTHER: 	  	  LABS:	 	    CARDIAC MARKERS:                                11.7   7.6   )-----------( 272      ( 20 Apr 2019 08:10 )             36.9     04-20    145  |  103  |  15  ----------------------------<  82  3.6   |  22  |  1.16    Ca    8.8      20 Apr 2019 08:10  Phos  2.8     04-20  Mg     1.8     04-20    TPro  7.7  /  Alb  3.5  /  TBili  0.3  /  DBili  x   /  AST  39  /  ALT  22  /  AlkPhos  203<H>  04-18    proBNP:   Lipid Profile:   HgA1c:   TSH:   PT/INR - ( 18 Apr 2019 18:08 )   PT: 13.2 sec;   INR: 1.14 ratio         PTT - ( 18 Apr 2019 18:08 )  PTT:42.8 sec      Assessment and plan  ---------------------------  cad/htn/sbo  increase Enalapril to 8 8h  f/u lytes closely  hold beta blocker sec to wheezing  dvt prohylaxis

## 2019-04-20 NOTE — CONSULT NOTE ADULT - SUBJECTIVE AND OBJECTIVE BOX
HPI:  73yF with extensive pmhx most notable for  recurrent SBOs, lung ca, COPD presents with abdominal pain since last night.  Pt reports the pain feels like her last SBO; however, she has been passing flatus.  Her last BM was this afternoon.  Pt endorses nausea.  She states her last admission for SBO was in january of this year.  She denies vomiting, diarrhea, constipation, melena, hematochezia, loss of appetite, fevers, chills, night sweats. (18 Apr 2019 22:32)    PAST MEDICAL & SURGICAL HISTORY:  Back pain  Bowel obstruction: multiple hospitalizations  Kidney stone  Emphysema  Narcotic Dependence  S/P Radiation Therapy  S/P Chemotherapy, Time Since Greater than 12 Weeks  Narcotic Dysmotile Cilia Syndrome  Chronic Pain Following Surgery or Procedure: following right breast mastectomy  Ankle Fracture: right anle fx s/p cast 2009  History of Small Bowel Obstruction: 1/2010  Asthma  Breast Cancer  S/P hernia surgery: femoral hernia - 2012  S/P Exploratory Laparotomy  S/P Appendectomy  S/P Cholecystectomy  Liver Mass s/p liver rsxn: s/p resection 2009  History of Hysterectomy: 1998  S/P Right Mastectomy: 2006    Meds:  ALBUTerol    90 MICROgram(s) HFA Inhaler 1 Puff(s) Inhalation every 4 hours  ALBUTerol/ipratropium for Nebulization 3 milliLiter(s) Nebulizer every 6 hours  buDESOnide   0.25 milliGRAM(s) Respule 0.25 milliGRAM(s) Inhalation two times a day  enalapril 2.5 milliGRAM(s) Oral every 12 hours  heparin  Injectable 5000 Unit(s) SubCutaneous every 8 hours  influenza   Vaccine 0.5 milliLiter(s) IntraMuscular once  methylPREDNISolone sodium succinate Injectable 20 milliGRAM(s) IV Push three times a day  multivitamin 1 Tablet(s) Oral daily  nicotine -  14 mG/24Hr(s) Patch 1 patch Transdermal daily  oxyCODONE    IR 5 milliGRAM(s) Oral three times a day PRN Moderate Pain (4 - 6)  tiotropium 18 MICROgram(s) Capsule 1 Capsule(s) Inhalation daily    Labs:  CBC Full  -  ( 20 Apr 2019 08:10 )  WBC Count : 7.6 K/uL  Hemoglobin : 11.7 g/dL  Hematocrit : 36.9 %  Platelet Count - Automated : 272 K/uL  Mean Cell Volume : 91.8 fl  Mean Cell Hemoglobin : 29.1 pg  Mean Cell Hemoglobin Concentration : 31.7 gm/dL  Auto Neutrophil # : x  Auto Lymphocyte # : x  Auto Monocyte # : x  Auto Eosinophil # : x  Auto Basophil # : x  Auto Neutrophil % : x  Auto Lymphocyte % : x  Auto Monocyte % : x  Auto Eosinophil % : x  Auto Basophil % : x    04-20    145  |  103  |  15  ----------------------------<  82  3.6   |  22  |  1.16    Ca    8.8      20 Apr 2019 08:10  Phos  2.8     04-20  Mg     1.8     04-20    TPro  7.7  /  Alb  3.5  /  TBili  0.3  /  DBili  x   /  AST  39  /  ALT  22  /  AlkPhos  203<H>  04-18      Radiology:   < from: CT Chest No Cont (04.19.19 @ 18:50) >  Redemonstration of cavitary mass in the left upper lobe with extension   centrally into the left hilum, specifically the left upper lobe bronchus   and mediastinum. The solid component of the mass along the left lateral   chest wall measures approximately 2.8 x 1.8 cm, stable compared to prior   CT chest from 1/15/2019.    < end of copied text >    No abdominal metastatic disease by CT on 4/18/19        ROS:  No pain, no fever  No lumps in neck or pain  No Sob or chest pain  No palpitations   No abdominal pain. No change in bowel habit. No blood in stools  No weakness in extremities  No leg swelling      Vital Signs Last 24 Hrs  T(C): 36.8 (20 Apr 2019 09:44), Max: 37.2 (19 Apr 2019 14:09)  T(F): 98.3 (20 Apr 2019 09:44), Max: 98.9 (19 Apr 2019 14:09)  HR: 89 (20 Apr 2019 09:44) (89 - 107)  BP: 132/74 (20 Apr 2019 09:44) (132/74 - 159/89)  BP(mean): --  RR: 18 (20 Apr 2019 09:44) (17 - 18)  SpO2: 92% (20 Apr 2019 09:44) (92% - 98%)    Physical Exam:  Patient comfortable  AXOX3  Neck supple, no LN's  Chest: bilateral breath sounds, no wheeze or rales  CVS: regular heart rate without murmur  Abdomen: soft, BS+, no massess or organomegaly  CNS: no gross deficit  Ext: no edema HPI:  73yF with extensive pmhx most notable for  narcotic use, recurrent SBOs, lung ca, COPD was admitted with abdominal pain on 4/18/19.  Her SBO has resolved now.   She has been agitated.  Seen for lung cancer  PAST MEDICAL & SURGICAL HISTORY:  Back pain  Bowel obstruction: multiple hospitalizations  Kidney stone  Emphysema  Narcotic Dependence  S/P Radiation Therapy  S/P Chemotherapy, Time Since Greater than 12 Weeks  Narcotic Dysmotile Cilia Syndrome  Chronic Pain Following Surgery or Procedure: following right breast mastectomy  Ankle Fracture: right anle fx s/p cast 2009  History of Small Bowel Obstruction: 1/2010  Asthma  Breast Cancer  S/P hernia surgery: femoral hernia - 2012  S/P Exploratory Laparotomy  S/P Appendectomy  S/P Cholecystectomy  Liver Mass s/p liver rsxn: s/p resection 2009  History of Hysterectomy: 1998  S/P Right Mastectomy: 2006    Meds:  ALBUTerol    90 MICROgram(s) HFA Inhaler 1 Puff(s) Inhalation every 4 hours  ALBUTerol/ipratropium for Nebulization 3 milliLiter(s) Nebulizer every 6 hours  buDESOnide   0.25 milliGRAM(s) Respule 0.25 milliGRAM(s) Inhalation two times a day  enalapril 2.5 milliGRAM(s) Oral every 12 hours  heparin  Injectable 5000 Unit(s) SubCutaneous every 8 hours  influenza   Vaccine 0.5 milliLiter(s) IntraMuscular once  methylPREDNISolone sodium succinate Injectable 20 milliGRAM(s) IV Push three times a day  multivitamin 1 Tablet(s) Oral daily  nicotine -  14 mG/24Hr(s) Patch 1 patch Transdermal daily  oxyCODONE    IR 5 milliGRAM(s) Oral three times a day PRN Moderate Pain (4 - 6)  tiotropium 18 MICROgram(s) Capsule 1 Capsule(s) Inhalation daily    Labs:  CBC Full  -  ( 20 Apr 2019 08:10 )  WBC Count : 7.6 K/uL  Hemoglobin : 11.7 g/dL  Hematocrit : 36.9 %  Platelet Count - Automated : 272 K/uL  Mean Cell Volume : 91.8 fl  Mean Cell Hemoglobin : 29.1 pg  Mean Cell Hemoglobin Concentration : 31.7 gm/dL  Auto Neutrophil # : x  Auto Lymphocyte # : x  Auto Monocyte # : x  Auto Eosinophil # : x  Auto Basophil # : x  Auto Neutrophil % : x  Auto Lymphocyte % : x  Auto Monocyte % : x  Auto Eosinophil % : x  Auto Basophil % : x    04-20    145  |  103  |  15  ----------------------------<  82  3.6   |  22  |  1.16    Ca    8.8      20 Apr 2019 08:10  Phos  2.8     04-20  Mg     1.8     04-20    TPro  7.7  /  Alb  3.5  /  TBili  0.3  /  DBili  x   /  AST  39  /  ALT  22  /  AlkPhos  203<H>  04-18      Radiology:   < from: CT Chest No Cont (04.19.19 @ 18:50) >  Redemonstration of cavitary mass in the left upper lobe with extension   centrally into the left hilum, specifically the left upper lobe bronchus   and mediastinum. The solid component of the mass along the left lateral   chest wall measures approximately 2.8 x 1.8 cm, stable compared to prior   CT chest from 1/15/2019.    < end of copied text >    No abdominal metastatic disease by CT on 4/18/19        ROS:  She is anxious at present.   States had bM and needs clean up  No SOB or abdominal pain.   No other complaine  Vital Signs Last 24 Hrs  T(C): 36.8 (20 Apr 2019 09:44), Max: 37.2 (19 Apr 2019 14:09)  T(F): 98.3 (20 Apr 2019 09:44), Max: 98.9 (19 Apr 2019 14:09)  HR: 89 (20 Apr 2019 09:44) (89 - 107)  BP: 132/74 (20 Apr 2019 09:44) (132/74 - 159/89)  BP(mean): --  RR: 18 (20 Apr 2019 09:44) (17 - 18)  SpO2: 92% (20 Apr 2019 09:44) (92% - 98%)    Physical Exam:  Anxious  Emaciated  Neck supple, no LN's  Chest: bilateral breath sounds, no wheeze or rales  CVS: regular heart rate without murmur  Abdomen: soft, BS+, no massess or organomegaly  CNS: no gross deficit  Ext: no edema

## 2019-04-20 NOTE — DIETITIAN INITIAL EVALUATION ADULT. - NS AS NUTRI INTERV COLLABORAT
Malnutrition alert placed in chart, team informed of need to sign-diagnosis deferred to attending physician

## 2019-04-20 NOTE — PROGRESS NOTE ADULT - ASSESSMENT
73 year old female with history of heavy opiate use and dependence admitted for SBO rule out, now with full bowel function s/p po naloxone, suggesting opiate induced constipation.    - NPO/IVF for now  - patient removed NGT yesterday afternoon, will not replace for now  - home meds  - monitor mental status  - [facility]   [id number]

## 2019-04-20 NOTE — PROVIDER CONTACT NOTE (OTHER) - ACTION/TREATMENT ORDERED:
covering nurse manager aware. Supervisors aware. pt at this point was transferred to the medicine team.

## 2019-04-20 NOTE — PROVIDER CONTACT NOTE (OTHER) - RECOMMENDATIONS
pt educated on the dangers of smoking in hospitial setting. pt cigarettes were then confiscated as well as the lighter and placed in hospital safe.

## 2019-04-20 NOTE — PROGRESS NOTE ADULT - SUBJECTIVE AND OBJECTIVE BOX
Surgery Progress Note    S:     Patient seen and examined. Patient was planned for discharge yesterday after full return of bowel function when she became intermittently altered and agitated. Remains agitated and altered this morning, fidgeting in bed and stating that she "just needs some tranquilizer to calm her down". Denies pain, unsure of continued flatus but BMs per nursing.    O:    Vital Signs Last 24 Hrs  T(C): 36.9 (19 Apr 2019 22:00), Max: 37.2 (19 Apr 2019 14:09)  T(F): 98.4 (19 Apr 2019 22:00), Max: 98.9 (19 Apr 2019 14:09)  HR: 100 (19 Apr 2019 22:00) (70 - 107)  BP: 155/87 (19 Apr 2019 22:00) (151/86 - 162/80)  BP(mean): --  RR: 18 (19 Apr 2019 22:00) (17 - 18)  SpO2: 95% (19 Apr 2019 22:00) (93% - 98%)    PE  Gen: agitated and fidgeting, seemingly responding to internal stimuli, but still responsive and overall AAOx3  Pulm: ? labored breathing, difficult to assess given patient agitation  Chest: s/p right total mastectomy   GI: soft, nontender, mildly distended      I&O's Detail    19 Apr 2019 07:01  -  20 Apr 2019 05:02  --------------------------------------------------------  IN:    lactated ringers.: 750 mL  Total IN: 750 mL    OUT:    Nasoenteral Tube: 900 mL    Voided: 950 mL  Total OUT: 1850 mL    Total NET: -1100 mL          MEDICATIONS  (STANDING):  ALBUTerol    90 MICROgram(s) HFA Inhaler 1 Puff(s) Inhalation every 4 hours  ALBUTerol/ipratropium for Nebulization 3 milliLiter(s) Nebulizer every 6 hours  buDESOnide   0.25 milliGRAM(s) Respule 0.25 milliGRAM(s) Inhalation two times a day  enalapril 2.5 milliGRAM(s) Oral every 12 hours  heparin  Injectable 5000 Unit(s) SubCutaneous every 8 hours  influenza   Vaccine 0.5 milliLiter(s) IntraMuscular once  lactated ringers. 1000 milliLiter(s) (125 mL/Hr) IV Continuous <Continuous>  methylPREDNISolone sodium succinate Injectable 20 milliGRAM(s) IV Push three times a day  nicotine -  14 mG/24Hr(s) Patch 1 patch Transdermal daily  tiotropium 18 MICROgram(s) Capsule 1 Capsule(s) Inhalation daily    MEDICATIONS  (PRN):  oxyCODONE    IR 10 milliGRAM(s) Oral three times a day PRN Moderate Pain (4 - 6)      Labs:                          11.5   7.35  )-----------( 287      ( 19 Apr 2019 09:38 )             38.4       04-19    141  |  102  |  16  ----------------------------<  94  4.6   |  24  |  1.19    Ca    7.8<L>      19 Apr 2019 05:29  Phos  3.7     04-19  Mg     1.0     04-19    TPro  7.7  /  Alb  3.5  /  TBili  0.3  /  DBili  x   /  AST  39  /  ALT  22  /  AlkPhos  203<H>  04-18      Radiology:

## 2019-04-20 NOTE — DIETITIAN INITIAL EVALUATION ADULT. - PHYSICAL APPEARANCE
Nutrition focused physical exam conducted with verbal consent from patient, exam limited somewhat by pt positioning, pt with evidence of mild muscle mass depletion to temple, severe muscle mass depletion to clavicle, mild fat depletion to triceps, Severe muscle mass depletion to lower extremities

## 2019-04-20 NOTE — PROGRESS NOTE ADULT - SUBJECTIVE AND OBJECTIVE BOX
no  abd pain/ less  sob  REVIEW OF SYSTEMS:  GEN: no fever,    no chills  RESP: no SOB,   no cough  CVS: no chest pain,   no palpitations  GI: no abdominal pain,   no nausea,   no vomiting,   no constipation,   no diarrhea  : no dysuria,   no frequency  NEURO: no headache,   no dizziness  PSYCH: no depression,   not anxious  Derm : no rash    MEDICATIONS  (STANDING):  ALBUTerol    90 MICROgram(s) HFA Inhaler 1 Puff(s) Inhalation every 4 hours  ALBUTerol/ipratropium for Nebulization 3 milliLiter(s) Nebulizer every 6 hours  buDESOnide   0.25 milliGRAM(s) Respule 0.25 milliGRAM(s) Inhalation two times a day  enalapril 2.5 milliGRAM(s) Oral every 12 hours  heparin  Injectable 5000 Unit(s) SubCutaneous every 8 hours  influenza   Vaccine 0.5 milliLiter(s) IntraMuscular once  methylPREDNISolone sodium succinate Injectable 20 milliGRAM(s) IV Push three times a day  nicotine -  14 mG/24Hr(s) Patch 1 patch Transdermal daily  tiotropium 18 MICROgram(s) Capsule 1 Capsule(s) Inhalation daily    MEDICATIONS  (PRN):  oxyCODONE    IR 10 milliGRAM(s) Oral three times a day PRN Moderate Pain (4 - 6)      Vital Signs Last 24 Hrs  T(C): 36.6 (2019 05:06), Max: 37.2 (2019 14:09)  T(F): 97.8 (2019 05:06), Max: 98.9 (2019 14:09)  HR: 89 (2019 05:06) (89 - 107)  BP: 159/89 (2019 05:06) (151/86 - 159/89)  BP(mean): --  RR: 18 (2019 05:06) (17 - 18)  SpO2: 95% (2019 05:06) (93% - 98%)  CAPILLARY BLOOD GLUCOSE        I&O's Summary    2019 07:01  -  2019 07:00  --------------------------------------------------------  IN: 1135 mL / OUT: 1850 mL / NET: -715 mL        PHYSICAL EXAM:  HEAD:  Atraumatic, Normocephalic  NECK: Supple, No   JVD  CHEST/LUNG:   no     rales,     no,    rhonchi  HEART: Regular rate and rhythm;         murmur  ABDOMEN: Soft, Nontender, ;   EXTREMITIES:   no     edema  NEUROLOGY:  alert    LABS:                        11.7   7.6   )-----------( 272      ( 2019 08:10 )             36.9     04-20    145  |  103  |  15  ----------------------------<  82  3.6   |  22  |  1.16    Ca    8.8      2019 08:10  Phos  2.8       Mg     1.8         TPro  7.7  /  Alb  3.5  /  TBili  0.3  /  DBili  x   /  AST  39  /  ALT  22  /  AlkPhos  203<H>  -18    PT/INR - ( 2019 18:08 )   PT: 13.2 sec;   INR: 1.14 ratio         PTT - ( 2019 18:08 )  PTT:42.8 sec      Urinalysis Basic - ( 2019 22:37 )    Color: Light Yellow / Appearance: Clear / S.013 / pH: x  Gluc: x / Ketone: Negative  / Bili: Negative / Urobili: Negative   Blood: x / Protein: Trace / Nitrite: Negative   Leuk Esterase: Small / RBC: 3 /hpf / WBC 5 /HPF   Sq Epi: x / Non Sq Epi: 1 /hpf / Bacteria: Many          -18 @ 18:06  4.4  <20              Consultant(s) Notes Reviewed:      Care Discussed with Consultants/Other Providers:

## 2019-04-21 LAB
-  AMIKACIN: SIGNIFICANT CHANGE UP
-  AMPICILLIN/SULBACTAM: SIGNIFICANT CHANGE UP
-  AMPICILLIN: SIGNIFICANT CHANGE UP
-  AZTREONAM: SIGNIFICANT CHANGE UP
-  CEFAZOLIN: SIGNIFICANT CHANGE UP
-  CEFEPIME: SIGNIFICANT CHANGE UP
-  CEFOXITIN: SIGNIFICANT CHANGE UP
-  CEFTRIAXONE: SIGNIFICANT CHANGE UP
-  CIPROFLOXACIN: SIGNIFICANT CHANGE UP
-  ERTAPENEM: SIGNIFICANT CHANGE UP
-  GENTAMICIN: SIGNIFICANT CHANGE UP
-  IMIPENEM: SIGNIFICANT CHANGE UP
-  LEVOFLOXACIN: SIGNIFICANT CHANGE UP
-  MEROPENEM: SIGNIFICANT CHANGE UP
-  NITROFURANTOIN: SIGNIFICANT CHANGE UP
-  PIPERACILLIN/TAZOBACTAM: SIGNIFICANT CHANGE UP
-  TIGECYCLINE: SIGNIFICANT CHANGE UP
-  TOBRAMYCIN: SIGNIFICANT CHANGE UP
-  TRIMETHOPRIM/SULFAMETHOXAZOLE: SIGNIFICANT CHANGE UP
ANION GAP SERPL CALC-SCNC: 17 MMOL/L — SIGNIFICANT CHANGE UP (ref 5–17)
BUN SERPL-MCNC: 16 MG/DL — SIGNIFICANT CHANGE UP (ref 7–23)
CALCIUM SERPL-MCNC: 8.8 MG/DL — SIGNIFICANT CHANGE UP (ref 8.4–10.5)
CHLORIDE SERPL-SCNC: 102 MMOL/L — SIGNIFICANT CHANGE UP (ref 96–108)
CO2 SERPL-SCNC: 21 MMOL/L — LOW (ref 22–31)
CREAT SERPL-MCNC: 1.27 MG/DL — SIGNIFICANT CHANGE UP (ref 0.5–1.3)
CULTURE RESULTS: SIGNIFICANT CHANGE UP
GLUCOSE SERPL-MCNC: 172 MG/DL — HIGH (ref 70–99)
HCT VFR BLD CALC: 31 % — LOW (ref 34.5–45)
HGB BLD-MCNC: 9.6 G/DL — LOW (ref 11.5–15.5)
MAGNESIUM SERPL-MCNC: 1.6 MG/DL — SIGNIFICANT CHANGE UP (ref 1.6–2.6)
MCHC RBC-ENTMCNC: 29.1 PG — SIGNIFICANT CHANGE UP (ref 27–34)
MCHC RBC-ENTMCNC: 31 GM/DL — LOW (ref 32–36)
MCV RBC AUTO: 93.9 FL — SIGNIFICANT CHANGE UP (ref 80–100)
METHOD TYPE: SIGNIFICANT CHANGE UP
ORGANISM # SPEC MICROSCOPIC CNT: SIGNIFICANT CHANGE UP
ORGANISM # SPEC MICROSCOPIC CNT: SIGNIFICANT CHANGE UP
PHOSPHATE SERPL-MCNC: 2.6 MG/DL — SIGNIFICANT CHANGE UP (ref 2.5–4.5)
PLATELET # BLD AUTO: 228 K/UL — SIGNIFICANT CHANGE UP (ref 150–400)
POTASSIUM SERPL-MCNC: 3.3 MMOL/L — LOW (ref 3.5–5.3)
POTASSIUM SERPL-SCNC: 3.3 MMOL/L — LOW (ref 3.5–5.3)
RBC # BLD: 3.3 M/UL — LOW (ref 3.8–5.2)
RBC # FLD: 16.7 % — HIGH (ref 10.3–14.5)
SODIUM SERPL-SCNC: 140 MMOL/L — SIGNIFICANT CHANGE UP (ref 135–145)
SPECIMEN SOURCE: SIGNIFICANT CHANGE UP
WBC # BLD: 7.05 K/UL — SIGNIFICANT CHANGE UP (ref 3.8–10.5)
WBC # FLD AUTO: 7.05 K/UL — SIGNIFICANT CHANGE UP (ref 3.8–10.5)

## 2019-04-21 RX ORDER — DIAZEPAM 5 MG
2.5 TABLET ORAL ONCE
Qty: 0 | Refills: 0 | Status: DISCONTINUED | OUTPATIENT
Start: 2019-04-21 | End: 2019-04-21

## 2019-04-21 RX ORDER — OXYCODONE HYDROCHLORIDE 5 MG/1
5 TABLET ORAL ONCE
Qty: 0 | Refills: 0 | Status: DISCONTINUED | OUTPATIENT
Start: 2019-04-21 | End: 2019-04-21

## 2019-04-21 RX ORDER — DIAZEPAM 5 MG
1 TABLET ORAL ONCE
Qty: 0 | Refills: 0 | Status: DISCONTINUED | OUTPATIENT
Start: 2019-04-21 | End: 2019-04-21

## 2019-04-21 RX ADMIN — OXYCODONE HYDROCHLORIDE 5 MILLIGRAM(S): 5 TABLET ORAL at 01:40

## 2019-04-21 RX ADMIN — OXYCODONE HYDROCHLORIDE 5 MILLIGRAM(S): 5 TABLET ORAL at 13:56

## 2019-04-21 RX ADMIN — Medication 20 MILLIGRAM(S): at 22:14

## 2019-04-21 RX ADMIN — Medication 1 PATCH: at 12:39

## 2019-04-21 RX ADMIN — Medication 1 PATCH: at 07:00

## 2019-04-21 RX ADMIN — Medication 2.5 MILLIGRAM(S): at 10:59

## 2019-04-21 RX ADMIN — Medication 3 MILLILITER(S): at 17:02

## 2019-04-21 RX ADMIN — Medication 3 MILLILITER(S): at 12:07

## 2019-04-21 RX ADMIN — Medication 1 PATCH: at 12:40

## 2019-04-21 RX ADMIN — Medication 0.25 MILLIGRAM(S): at 05:00

## 2019-04-21 RX ADMIN — HEPARIN SODIUM 5000 UNIT(S): 5000 INJECTION INTRAVENOUS; SUBCUTANEOUS at 22:14

## 2019-04-21 RX ADMIN — OXYCODONE HYDROCHLORIDE 5 MILLIGRAM(S): 5 TABLET ORAL at 22:43

## 2019-04-21 RX ADMIN — OXYCODONE HYDROCHLORIDE 5 MILLIGRAM(S): 5 TABLET ORAL at 01:10

## 2019-04-21 RX ADMIN — HEPARIN SODIUM 5000 UNIT(S): 5000 INJECTION INTRAVENOUS; SUBCUTANEOUS at 13:57

## 2019-04-21 RX ADMIN — OXYCODONE HYDROCHLORIDE 5 MILLIGRAM(S): 5 TABLET ORAL at 08:10

## 2019-04-21 RX ADMIN — HEPARIN SODIUM 5000 UNIT(S): 5000 INJECTION INTRAVENOUS; SUBCUTANEOUS at 04:59

## 2019-04-21 RX ADMIN — LIDOCAINE 1 PATCH: 4 CREAM TOPICAL at 07:44

## 2019-04-21 RX ADMIN — Medication 20 MILLIGRAM(S): at 13:57

## 2019-04-21 RX ADMIN — OXYCODONE HYDROCHLORIDE 5 MILLIGRAM(S): 5 TABLET ORAL at 07:39

## 2019-04-21 RX ADMIN — OXYCODONE HYDROCHLORIDE 5 MILLIGRAM(S): 5 TABLET ORAL at 22:13

## 2019-04-21 RX ADMIN — Medication 20 MILLIGRAM(S): at 04:58

## 2019-04-21 RX ADMIN — Medication 0.25 MILLIGRAM(S): at 17:02

## 2019-04-21 RX ADMIN — Medication 2.5 MILLIGRAM(S): at 17:01

## 2019-04-21 RX ADMIN — Medication 3 MILLILITER(S): at 05:00

## 2019-04-21 RX ADMIN — Medication 0.5 MILLIGRAM(S): at 04:59

## 2019-04-21 RX ADMIN — Medication 0.5 MILLIGRAM(S): at 17:02

## 2019-04-21 RX ADMIN — Medication 2.5 MILLIGRAM(S): at 05:29

## 2019-04-21 RX ADMIN — Medication 1 TABLET(S): at 12:40

## 2019-04-21 RX ADMIN — Medication 1 PATCH: at 19:29

## 2019-04-21 RX ADMIN — LIDOCAINE 1 PATCH: 4 CREAM TOPICAL at 07:45

## 2019-04-21 RX ADMIN — OXYCODONE HYDROCHLORIDE 5 MILLIGRAM(S): 5 TABLET ORAL at 14:26

## 2019-04-21 NOTE — PROGRESS NOTE ADULT - ATTENDING COMMENTS
Sitting up in bed, comfortable. Reports no abd pain, + flatus and BM.  AAOx3.  Abdomen soft, NT, ND.  Resolved recurrent episode of narcotic bowel.  No acute surgical issues.  Pls reconsult as needed.
I have seen and examined the patient.  I agree with the surgical resident's note.  Modifications have been made where needed and orders entered where appropriate.    Eddie Hopper contact information [phone]
I saw and examined the pt and discussed the tx plan with the House Staff. I agree with the exam and plan as documented in the surgery resident's note from today.  Drowsy but arousable after Ativan.  Abdomen soft, NT, ND.  No acute issues from surgical standpoint.   Lamar Best MD

## 2019-04-21 NOTE — PROGRESS NOTE ADULT - SUBJECTIVE AND OBJECTIVE BOX
CARDIOLOGY     PROGRESS  NOTE   ________________________________________________    CHIEF COMPLAINT:Patient is a 73y old  Female who presents with a chief complaint of SBO (21 Apr 2019 07:30)  doing well.  	  REVIEW OF SYSTEMS:  CONSTITUTIONAL: No fever, weight loss, or fatigue  EYES: No eye pain, visual disturbances, or discharge  ENT:  No difficulty hearing, tinnitus, vertigo; No sinus or throat pain  NECK: No pain or stiffness  RESPIRATORY: No cough, wheezing, chills or hemoptysis; No Shortness of Breath  CARDIOVASCULAR: No chest pain, palpitations, passing out, dizziness, or leg swelling  GASTROINTESTINAL: No abdominal or epigastric pain. No nausea, vomiting, or hematemesis; No diarrhea or constipation. No melena or hematochezia.  GENITOURINARY: No dysuria, frequency, hematuria, or incontinence  NEUROLOGICAL: No headaches, memory loss, loss of strength, numbness, or tremors  SKIN: No itching, burning, rashes, or lesions   LYMPH Nodes: No enlarged glands  ENDOCRINE: No heat or cold intolerance; No hair loss  MUSCULOSKELETAL: No joint pain or swelling; No muscle, back, or extremity pain  PSYCHIATRIC: No depression, anxiety, mood swings, or difficulty sleeping  HEME/LYMPH: No easy bruising, or bleeding gums  ALLERGY AND IMMUNOLOGIC: No hives or eczema	    [ ] All others negative	  [ ] Unable to obtain    PHYSICAL EXAM:  T(C): 36.8 (04-21-19 @ 05:13), Max: 37 (04-21-19 @ 00:34)  HR: 85 (04-21-19 @ 05:13) (85 - 102)  BP: 137/66 (04-21-19 @ 05:13) (117/70 - 148/85)  RR: 19 (04-21-19 @ 05:13) (18 - 19)  SpO2: 98% (04-21-19 @ 05:13) (92% - 99%)  Wt(kg): --  I&O's Summary    20 Apr 2019 07:01  -  21 Apr 2019 07:00  --------------------------------------------------------  IN: 1800 mL / OUT: 0 mL / NET: 1800 mL    21 Apr 2019 07:01  -  21 Apr 2019 08:29  --------------------------------------------------------  IN: 360 mL / OUT: 0 mL / NET: 360 mL        Appearance: Normal	  HEENT:   Normal oral mucosa, PERRL, EOMI	  Lymphatic: No lymphadenopathy  Cardiovascular: Normal S1 S2, No JVD, + murmurs, No edema  Respiratory: Lungs clear to auscultation	  Psychiatry: A & O x 3, Mood & affect appropriate  Gastrointestinal:  Soft, Non-tender, + BS	  Skin: No rashes, No ecchymoses, No cyanosis	  Neurologic: Non-focal  Extremities: Normal range of motion, No clubbing, cyanosis or edema  Vascular: Peripheral pulses palpable 2+ bilaterally    MEDICATIONS  (STANDING):  ALBUTerol    90 MICROgram(s) HFA Inhaler 1 Puff(s) Inhalation every 4 hours  ALBUTerol/ipratropium for Nebulization 3 milliLiter(s) Nebulizer every 6 hours  buDESOnide   0.25 milliGRAM(s) Respule 0.25 milliGRAM(s) Inhalation two times a day  enalapril 2.5 milliGRAM(s) Oral every 12 hours  heparin  Injectable 5000 Unit(s) SubCutaneous every 8 hours  influenza   Vaccine 0.5 milliLiter(s) IntraMuscular once  methylPREDNISolone sodium succinate Injectable 20 milliGRAM(s) IV Push three times a day  multivitamin 1 Tablet(s) Oral daily  nicotine -  14 mG/24Hr(s) Patch 1 patch Transdermal daily  tiotropium 18 MICROgram(s) Capsule 1 Capsule(s) Inhalation daily      TELEMETRY: 	    ECG:  	  RADIOLOGY:  OTHER: 	  	  LABS:	 	    CARDIAC MARKERS:                                11.7   7.6   )-----------( 272      ( 20 Apr 2019 08:10 )             36.9     04-20    145  |  103  |  15  ----------------------------<  82  3.6   |  22  |  1.16    Ca    8.8      20 Apr 2019 08:10  Phos  2.8     04-20  Mg     1.8     04-20      proBNP:   Lipid Profile:   HgA1c:   TSH:         Assessment and plan  ---------------------------  doing well  sob is improving no wheezing  nicotine patch  bp is well controlled on enalapril  dvt prophylaxis  dc in am

## 2019-04-21 NOTE — PROGRESS NOTE ADULT - ASSESSMENT
73 year old female with   PMH chronic Back pain (on Narcotics), history of multiple SBO's, narcotic associated constipation/ileus, COPD,  smoker,    ca  breast,  right  mastectomy. RENUKA cavitary lesion on ct.  c/c cachexia  path from  1/19, with NSCC with squamous  features     admitted with abdominal pain  , from SBO    ct scan noted,  SBO, with ?  internal hernia/  per  surg, pt eating/ doing better   had  sob from wheezing/  pt is an active smoker, refusing to quit/  less  sob today   iv solumedrol and nebs   ct chest , noted/  oncology dr hernandez   dvt ppx  start   d/c  planning    f/p  surg, next  week      < from: CT Chest No Cont (04.19.19 @ 18:50)   INTERPRETATION:  Motion degraded evaluation  Redemonstraion on left upper lobe cavitary mass consistent with patients   known hx of lung CA  Mucoid impacted right lower lobe ariways with scattered tree in bud   opacities  < end of copied text >      Cytopathology - Non Gyn Report (01.18.19 @ 11:43)    Immunohistochemical stains.  The immunophenotype together with the cytomorphology supports the  diagnosis squamous cell carcinoma.    Final Diagnosis  LUNG, LEFT UPPER LOBE, US GUIDED CORE BIOPSY AND FNA  POSITIVE FOR MALIGNANT CELLS.  Non-small cell carcinoma, with squamous features (see note).  Additional studies pending.

## 2019-04-21 NOTE — PROGRESS NOTE ADULT - SUBJECTIVE AND OBJECTIVE BOX
comfortable    REVIEW OF SYSTEMS:  GEN: no fever,    no chills  RESP: no SOB,   no cough  CVS: no chest pain,   no palpitations  GI: no abdominal pain,   no nausea,   no vomiting,   no constipation,   no diarrhea  : no dysuria,   no frequency  NEURO: no headache,   no dizziness  PSYCH: no depression,   not anxious  Derm : no rash    MEDICATIONS  (STANDING):  ALBUTerol    90 MICROgram(s) HFA Inhaler 1 Puff(s) Inhalation every 4 hours  ALBUTerol/ipratropium for Nebulization 3 milliLiter(s) Nebulizer every 6 hours  buDESOnide   0.25 milliGRAM(s) Respule 0.25 milliGRAM(s) Inhalation two times a day  enalapril 2.5 milliGRAM(s) Oral every 12 hours  heparin  Injectable 5000 Unit(s) SubCutaneous every 8 hours  influenza   Vaccine 0.5 milliLiter(s) IntraMuscular once  methylPREDNISolone sodium succinate Injectable 20 milliGRAM(s) IV Push three times a day  multivitamin 1 Tablet(s) Oral daily  nicotine -  14 mG/24Hr(s) Patch 1 patch Transdermal daily  tiotropium 18 MICROgram(s) Capsule 1 Capsule(s) Inhalation daily    MEDICATIONS  (PRN):  LORazepam     Tablet 0.5 milliGRAM(s) Oral two times a day PRN Anxiety  oxyCODONE    IR 5 milliGRAM(s) Oral three times a day PRN Moderate Pain (4 - 6)      Vital Signs Last 24 Hrs  T(C): 36.8 (21 Apr 2019 05:13), Max: 37 (21 Apr 2019 00:34)  T(F): 98.2 (21 Apr 2019 05:13), Max: 98.6 (21 Apr 2019 00:34)  HR: 85 (21 Apr 2019 05:13) (85 - 102)  BP: 137/66 (21 Apr 2019 05:13) (117/70 - 148/85)  BP(mean): --  RR: 19 (21 Apr 2019 05:13) (18 - 19)  SpO2: 98% (21 Apr 2019 05:13) (92% - 99%)  CAPILLARY BLOOD GLUCOSE        I&O's Summary    20 Apr 2019 07:01  -  21 Apr 2019 07:00  --------------------------------------------------------  IN: 1800 mL / OUT: 0 mL / NET: 1800 mL    21 Apr 2019 07:01  -  21 Apr 2019 08:30  --------------------------------------------------------  IN: 360 mL / OUT: 0 mL / NET: 360 mL        PHYSICAL EXAM:  HEAD:  Atraumatic, Normocephalic  NECK: Supple, No   JVD  CHEST/LUNG:   no     rales,     no,    rhonchi  HEART: Regular rate and rhythm;         murmur  ABDOMEN: Soft, Nontender, ;   EXTREMITIES:   no     edema  NEUROLOGY:  alert    LABS:                        11.7   7.6   )-----------( 272      ( 20 Apr 2019 08:10 )             36.9     04-20    145  |  103  |  15  ----------------------------<  82  3.6   |  22  |  1.16    Ca    8.8      20 Apr 2019 08:10  Phos  2.8     04-20  Mg     1.8     04-20                              Consultant(s) Notes Reviewed:      Care Discussed with Consultants/Other Providers:

## 2019-04-22 ENCOUNTER — TRANSCRIPTION ENCOUNTER (OUTPATIENT)
Age: 74
End: 2019-04-22

## 2019-04-22 VITALS
TEMPERATURE: 98 F | DIASTOLIC BLOOD PRESSURE: 78 MMHG | RESPIRATION RATE: 18 BRPM | HEART RATE: 88 BPM | SYSTOLIC BLOOD PRESSURE: 148 MMHG | OXYGEN SATURATION: 94 %

## 2019-04-22 LAB
BLD GP AB SCN SERPL QL: NEGATIVE — SIGNIFICANT CHANGE UP
HCT VFR BLD CALC: 31.4 % — LOW (ref 34.5–45)
HCT VFR BLD CALC: 31.5 % — LOW (ref 34.5–45)
HGB BLD-MCNC: 10.4 G/DL — LOW (ref 11.5–15.5)
HGB BLD-MCNC: 9.8 G/DL — LOW (ref 11.5–15.5)
MCHC RBC-ENTMCNC: 29.3 PG — SIGNIFICANT CHANGE UP (ref 27–34)
MCHC RBC-ENTMCNC: 30.6 PG — SIGNIFICANT CHANGE UP (ref 27–34)
MCHC RBC-ENTMCNC: 31.1 GM/DL — LOW (ref 32–36)
MCHC RBC-ENTMCNC: 33 GM/DL — SIGNIFICANT CHANGE UP (ref 32–36)
MCV RBC AUTO: 92.9 FL — SIGNIFICANT CHANGE UP (ref 80–100)
MCV RBC AUTO: 94 FL — SIGNIFICANT CHANGE UP (ref 80–100)
OB PNL STL: NEGATIVE — SIGNIFICANT CHANGE UP
PLATELET # BLD AUTO: 228 K/UL — SIGNIFICANT CHANGE UP (ref 150–400)
PLATELET # BLD AUTO: 265 K/UL — SIGNIFICANT CHANGE UP (ref 150–400)
RBC # BLD: 3.34 M/UL — LOW (ref 3.8–5.2)
RBC # BLD: 3.39 M/UL — LOW (ref 3.8–5.2)
RBC # BLD: 3.39 M/UL — LOW (ref 3.8–5.2)
RBC # FLD: 15.4 % — HIGH (ref 10.3–14.5)
RBC # FLD: 15.5 % — HIGH (ref 10.3–14.5)
RETICS #: 71.4 K/UL — SIGNIFICANT CHANGE UP (ref 25–125)
RETICS/RBC NFR: 2.1 % — SIGNIFICANT CHANGE UP (ref 0.5–2.5)
RH IG SCN BLD-IMP: POSITIVE — SIGNIFICANT CHANGE UP
WBC # BLD: 7.8 K/UL — SIGNIFICANT CHANGE UP (ref 3.8–10.5)
WBC # BLD: 7.8 K/UL — SIGNIFICANT CHANGE UP (ref 3.8–10.5)
WBC # FLD AUTO: 7.8 K/UL — SIGNIFICANT CHANGE UP (ref 3.8–10.5)
WBC # FLD AUTO: 7.8 K/UL — SIGNIFICANT CHANGE UP (ref 3.8–10.5)

## 2019-04-22 PROCEDURE — 81001 URINALYSIS AUTO W/SCOPE: CPT

## 2019-04-22 PROCEDURE — 82947 ASSAY GLUCOSE BLOOD QUANT: CPT

## 2019-04-22 PROCEDURE — 85045 AUTOMATED RETICULOCYTE COUNT: CPT

## 2019-04-22 PROCEDURE — 86901 BLOOD TYPING SEROLOGIC RH(D): CPT

## 2019-04-22 PROCEDURE — 71250 CT THORAX DX C-: CPT

## 2019-04-22 PROCEDURE — 85730 THROMBOPLASTIN TIME PARTIAL: CPT

## 2019-04-22 PROCEDURE — 86803 HEPATITIS C AB TEST: CPT

## 2019-04-22 PROCEDURE — 83735 ASSAY OF MAGNESIUM: CPT

## 2019-04-22 PROCEDURE — 96376 TX/PRO/DX INJ SAME DRUG ADON: CPT | Mod: XU

## 2019-04-22 PROCEDURE — 82803 BLOOD GASES ANY COMBINATION: CPT

## 2019-04-22 PROCEDURE — 87086 URINE CULTURE/COLONY COUNT: CPT

## 2019-04-22 PROCEDURE — 43753 TX GASTRO INTUB W/ASP: CPT

## 2019-04-22 PROCEDURE — 85610 PROTHROMBIN TIME: CPT

## 2019-04-22 PROCEDURE — 82272 OCCULT BLD FECES 1-3 TESTS: CPT

## 2019-04-22 PROCEDURE — 87186 SC STD MICRODIL/AGAR DIL: CPT

## 2019-04-22 PROCEDURE — 86850 RBC ANTIBODY SCREEN: CPT

## 2019-04-22 PROCEDURE — 82330 ASSAY OF CALCIUM: CPT

## 2019-04-22 PROCEDURE — 74176 CT ABD & PELVIS W/O CONTRAST: CPT

## 2019-04-22 PROCEDURE — 94640 AIRWAY INHALATION TREATMENT: CPT

## 2019-04-22 PROCEDURE — 84132 ASSAY OF SERUM POTASSIUM: CPT

## 2019-04-22 PROCEDURE — 85014 HEMATOCRIT: CPT

## 2019-04-22 PROCEDURE — 84295 ASSAY OF SERUM SODIUM: CPT

## 2019-04-22 PROCEDURE — 99285 EMERGENCY DEPT VISIT HI MDM: CPT | Mod: 25

## 2019-04-22 PROCEDURE — 80053 COMPREHEN METABOLIC PANEL: CPT

## 2019-04-22 PROCEDURE — 85027 COMPLETE CBC AUTOMATED: CPT

## 2019-04-22 PROCEDURE — 83690 ASSAY OF LIPASE: CPT

## 2019-04-22 PROCEDURE — 71045 X-RAY EXAM CHEST 1 VIEW: CPT

## 2019-04-22 PROCEDURE — 96374 THER/PROPH/DIAG INJ IV PUSH: CPT | Mod: XU

## 2019-04-22 PROCEDURE — 80048 BASIC METABOLIC PNL TOTAL CA: CPT

## 2019-04-22 PROCEDURE — 82435 ASSAY OF BLOOD CHLORIDE: CPT

## 2019-04-22 PROCEDURE — 86900 BLOOD TYPING SEROLOGIC ABO: CPT

## 2019-04-22 PROCEDURE — 83605 ASSAY OF LACTIC ACID: CPT

## 2019-04-22 PROCEDURE — 84100 ASSAY OF PHOSPHORUS: CPT

## 2019-04-22 PROCEDURE — 96375 TX/PRO/DX INJ NEW DRUG ADDON: CPT | Mod: XU

## 2019-04-22 RX ORDER — LANOLIN ALCOHOL/MO/W.PET/CERES
3 CREAM (GRAM) TOPICAL ONCE
Qty: 0 | Refills: 0 | Status: COMPLETED | OUTPATIENT
Start: 2019-04-22 | End: 2019-04-22

## 2019-04-22 RX ORDER — LIDOCAINE 4 G/100G
2 CREAM TOPICAL DAILY
Qty: 0 | Refills: 0 | Status: DISCONTINUED | OUTPATIENT
Start: 2019-04-22 | End: 2019-04-22

## 2019-04-22 RX ORDER — ALBUTEROL 90 UG/1
1 AEROSOL, METERED ORAL
Qty: 1 | Refills: 0 | OUTPATIENT
Start: 2019-04-22 | End: 2019-05-21

## 2019-04-22 RX ORDER — TIOTROPIUM BROMIDE 18 UG/1
1 CAPSULE ORAL; RESPIRATORY (INHALATION)
Qty: 0 | Refills: 0 | COMMUNITY
Start: 2019-04-22

## 2019-04-22 RX ADMIN — OXYCODONE HYDROCHLORIDE 5 MILLIGRAM(S): 5 TABLET ORAL at 14:28

## 2019-04-22 RX ADMIN — Medication 0.5 MILLIGRAM(S): at 03:04

## 2019-04-22 RX ADMIN — OXYCODONE HYDROCHLORIDE 5 MILLIGRAM(S): 5 TABLET ORAL at 10:00

## 2019-04-22 RX ADMIN — OXYCODONE HYDROCHLORIDE 5 MILLIGRAM(S): 5 TABLET ORAL at 05:50

## 2019-04-22 RX ADMIN — Medication 3 MILLILITER(S): at 05:21

## 2019-04-22 RX ADMIN — OXYCODONE HYDROCHLORIDE 5 MILLIGRAM(S): 5 TABLET ORAL at 05:19

## 2019-04-22 RX ADMIN — Medication 1 PATCH: at 12:10

## 2019-04-22 RX ADMIN — Medication 1 PATCH: at 12:11

## 2019-04-22 RX ADMIN — Medication 20 MILLIGRAM(S): at 05:20

## 2019-04-22 RX ADMIN — Medication 3 MILLILITER(S): at 12:12

## 2019-04-22 RX ADMIN — Medication 3 MILLILITER(S): at 00:32

## 2019-04-22 RX ADMIN — Medication 2.5 MILLIGRAM(S): at 05:20

## 2019-04-22 RX ADMIN — HEPARIN SODIUM 5000 UNIT(S): 5000 INJECTION INTRAVENOUS; SUBCUTANEOUS at 05:20

## 2019-04-22 RX ADMIN — Medication 0.25 MILLIGRAM(S): at 05:21

## 2019-04-22 RX ADMIN — Medication 1 PATCH: at 10:50

## 2019-04-22 RX ADMIN — Medication 3 MILLIGRAM(S): at 01:37

## 2019-04-22 RX ADMIN — OXYCODONE HYDROCHLORIDE 5 MILLIGRAM(S): 5 TABLET ORAL at 14:58

## 2019-04-22 RX ADMIN — Medication 1 TABLET(S): at 12:09

## 2019-04-22 RX ADMIN — LIDOCAINE 2 PATCH: 4 CREAM TOPICAL at 10:42

## 2019-04-22 RX ADMIN — OXYCODONE HYDROCHLORIDE 5 MILLIGRAM(S): 5 TABLET ORAL at 09:30

## 2019-04-22 NOTE — DISCHARGE NOTE NURSING/CASE MANAGEMENT/SOCIAL WORK - NSDCDPATPORTLINK_GEN_ALL_CORE
You can access the [facility] Patient Portal, offered by [facility], by registering with the following website: [email or web address]

## 2019-04-22 NOTE — DISCHARGE NOTE PROVIDER - CARE PROVIDER_API CALL
Chano Reese (MD)  Hematology; Medical Oncology  [address], [address]  [place] [address]  Phone: [phone]  Fax: [phone]  Follow Up Time:

## 2019-04-22 NOTE — DISCHARGE NOTE PROVIDER - NSDCCPCAREPLAN_GEN_ALL_CORE_FT
PRINCIPAL DISCHARGE DIAGNOSIS  Diagnosis: SBO (small bowel obstruction)  Assessment and Plan of Treatment: SBO resolved   Pt tolerating regular diet   please follow up with PCP with in a week

## 2019-04-22 NOTE — PROGRESS NOTE ADULT - SUBJECTIVE AND OBJECTIVE BOX
73yF with extensive pmhx most notable for  narcotic use, recurrent SBOs, lung ca, COPD was admitted with abdominal pain on 4/18/19.  Her SBO has resolved now.   She has been agitated.  Seen for lung cancer and drop in hb noted        PAST MEDICAL & SURGICAL HISTORY:  Back pain  Bowel obstruction: multiple hospitalizations  Kidney stone  Emphysema  Narcotic Dependence  S/P Radiation Therapy  S/P Chemotherapy, Time Since Greater than 12 Weeks  Narcotic Dysmotile Cilia Syndrome  Chronic Pain Following Surgery or Procedure: following right breast mastectomy  Ankle Fracture: right anle fx s/p cast 2009  History of Small Bowel Obstruction: 1/2010  Asthma  Breast Cancer  S/P hernia surgery: femoral hernia - 2012  S/P Exploratory Laparotomy  S/P Appendectomy  S/P Cholecystectomy  Liver Mass s/p liver rsxn: s/p resection 2009  History of Hysterectomy: 1998  S/P Right Mastectomy: 2006    Medications:  ALBUTerol    90 MICROgram(s) HFA Inhaler 1 Puff(s) Inhalation every 4 hours  ALBUTerol/ipratropium for Nebulization 3 milliLiter(s) Nebulizer every 6 hours  buDESOnide   0.25 milliGRAM(s) Respule 0.25 milliGRAM(s) Inhalation two times a day  enalapril 2.5 milliGRAM(s) Oral every 12 hours  heparin  Injectable 5000 Unit(s) SubCutaneous every 8 hours  influenza   Vaccine 0.5 milliLiter(s) IntraMuscular once  lidocaine   Patch 2 Patch Transdermal daily  LORazepam     Tablet 0.5 milliGRAM(s) Oral two times a day PRN Anxiety  multivitamin 1 Tablet(s) Oral daily  nicotine -  14 mG/24Hr(s) Patch 1 patch Transdermal daily  oxyCODONE    IR 5 milliGRAM(s) Oral three times a day PRN Moderate Pain (4 - 6)  tiotropium 18 MICROgram(s) Capsule 1 Capsule(s) Inhalation daily    Labs:  CBC Full  -  ( 22 Apr 2019 08:25 )  WBC Count : 7.8 K/uL  Hemoglobin : 9.8 g/dL  Hematocrit : 31.4 %  Platelet Count - Automated : 228 K/uL  Mean Cell Volume : 94.0 fl  Mean Cell Hemoglobin : 29.3 pg  Mean Cell Hemoglobin Concentration : 31.1 gm/dL  Auto Neutrophil # : x  Auto Lymphocyte # : x  Auto Monocyte # : x  Auto Eosinophil # : x  Auto Basophil # : x  Auto Neutrophil % : x  Auto Lymphocyte % : x  Auto Monocyte % : x  Auto Eosinophil % : x  Auto Basophil % : x    04-21    140  |  102  |  16  ----------------------------<  172<H>  3.3<L>   |  21<L>  |  1.27    Ca    8.8      21 Apr 2019 09:04  Phos  2.6     04-21  Mg     1.6     04-21        Radiology:             ROS:  Patient comfortable without distress  Says want to go home  No SOB or chest pain  No palpitation  No abdominal pain, diarrhaea or constipation. Passing gas, eating  No weakness of extremities  No skin changes or swelling of legs    Vital Signs Last 24 Hrs  T(C): 36.6 (22 Apr 2019 09:33), Max: 37.4 (21 Apr 2019 17:22)  T(F): 97.9 (22 Apr 2019 09:33), Max: 99.4 (21 Apr 2019 17:22)  HR: 64 (22 Apr 2019 09:33) (64 - 97)  BP: 127/64 (22 Apr 2019 09:33) (120/69 - 152/76)  BP(mean): --  RR: 18 (22 Apr 2019 09:33) (18 - 18)  SpO2: 97% (22 Apr 2019 09:33) (96% - 100%)    Physical exam:  Patient alert and oriented, underweight  No distress, but gets anxious  CVS: S1, S2 regular or murmur  Chest: bilateral breath sound without rales  Abdomen: soft, not tender, no organomegaly or masses  No focal neuro deficit  No edema      Assessment and Plan:

## 2019-04-22 NOTE — PROGRESS NOTE ADULT - ASSESSMENT
73 year old female with   PMH chronic Back pain (on Narcotics), history of multiple SBO's, narcotic associated constipation/ileus, COPD,  smoker,    ca  breast,  right  mastectomy. RENUKA cavitary lesion on ct.  c/c cachexia  path from  1/19, with NSCC with squamous  features     admitted with abdominal pain  , from SBO    ct scan noted,  SBO, with ?  internal hernia/  per  surg, pt eating/ doing better   had  sob from wheezing/  pt is an active smoker, refusing to quit/  no wheezing now/    iv solumedrol and nebs   ct chest , noted/  oncology dr hernandez   dvt ppx  follow  rpt hb today/  no gi  bleed            < from: CT Chest No Cont (04.19.19 @ 18:50)   INTERPRETATION:  Motion degraded evaluation  Redemonstraion on left upper lobe cavitary mass consistent with patients   known hx of lung CA  Mucoid impacted right lower lobe ariways with scattered tree in bud   opacities  < end of copied text >      Cytopathology - Non Gyn Report (01.18.19 @ 11:43)    Immunohistochemical stains.  The immunophenotype together with the cytomorphology supports the  diagnosis squamous cell carcinoma.    Final Diagnosis  LUNG, LEFT UPPER LOBE, US GUIDED CORE BIOPSY AND FNA  POSITIVE FOR MALIGNANT CELLS.  Non-small cell carcinoma, with squamous features (see note).  Additional studies pending.

## 2019-04-22 NOTE — PROGRESS NOTE ADULT - SUBJECTIVE AND OBJECTIVE BOX
resting/  REVIEW OF SYSTEMS:  GEN: no fever,    no chills  RESP: no SOB,   no cough  CVS: no chest pain,   no palpitations  GI: no abdominal pain,   no nausea,   no vomiting,   no constipation,   no diarrhea  : no dysuria,   no frequency  NEURO: no headache,   no dizziness  PSYCH: no depression,   not anxious  Derm : no rash    MEDICATIONS  (STANDING):  ALBUTerol    90 MICROgram(s) HFA Inhaler 1 Puff(s) Inhalation every 4 hours  ALBUTerol/ipratropium for Nebulization 3 milliLiter(s) Nebulizer every 6 hours  buDESOnide   0.25 milliGRAM(s) Respule 0.25 milliGRAM(s) Inhalation two times a day  enalapril 2.5 milliGRAM(s) Oral every 12 hours  heparin  Injectable 5000 Unit(s) SubCutaneous every 8 hours  influenza   Vaccine 0.5 milliLiter(s) IntraMuscular once  methylPREDNISolone sodium succinate Injectable 20 milliGRAM(s) IV Push three times a day  multivitamin 1 Tablet(s) Oral daily  nicotine -  14 mG/24Hr(s) Patch 1 patch Transdermal daily  tiotropium 18 MICROgram(s) Capsule 1 Capsule(s) Inhalation daily    MEDICATIONS  (PRN):  LORazepam     Tablet 0.5 milliGRAM(s) Oral two times a day PRN Anxiety  oxyCODONE    IR 5 milliGRAM(s) Oral three times a day PRN Moderate Pain (4 - 6)      Vital Signs Last 24 Hrs  T(C): 36.4 (22 Apr 2019 05:14), Max: 37.4 (21 Apr 2019 17:22)  T(F): 97.5 (22 Apr 2019 05:14), Max: 99.4 (21 Apr 2019 17:22)  HR: 70 (22 Apr 2019 05:14) (64 - 97)  BP: 120/69 (22 Apr 2019 05:14) (120/69 - 152/76)  BP(mean): --  RR: 18 (22 Apr 2019 05:14) (18 - 18)  SpO2: 97% (22 Apr 2019 05:14) (96% - 100%)  CAPILLARY BLOOD GLUCOSE        I&O's Summary    21 Apr 2019 07:01  -  22 Apr 2019 07:00  --------------------------------------------------------  IN: 1380 mL / OUT: 0 mL / NET: 1380 mL        PHYSICAL EXAM:  HEAD:  Atraumatic, Normocephalic  NECK: Supple, No   JVD  CHEST/LUNG:   no     rales,     no,    rhonchi  HEART: Regular rate and rhythm;         murmur  ABDOMEN: Soft, Nontender, ;   EXTREMITIES:     no   edema  NEUROLOGY:  alert    LABS:                        9.6    7.05  )-----------( 228      ( 21 Apr 2019 11:53 )             31.0     04-21    140  |  102  |  16  ----------------------------<  172<H>  3.3<L>   |  21<L>  |  1.27    Ca    8.8      21 Apr 2019 09:04  Phos  2.6     04-21  Mg     1.6     04-21                              Consultant(s) Notes Reviewed:      Care Discussed with Consultants/Other Providers:

## 2019-04-22 NOTE — DISCHARGE NOTE PROVIDER - HOSPITAL COURSE
73yF pmhx recurrent SBOs, lung ca, COPD presents with abdominal pain .     SBO with questionable internal hernia demonstrated on CT A/P . SX consulted , placed NGT and NPO status . Patient given dose of po narcan with subsequent large bowel movement. Multiple fluid bowel movements over the following hour. Noted  cbc  down trending . Repeat stable , no notable  bleeding . OB negative .  HD stable . Pt will follow up with DR Reese .

## 2019-04-22 NOTE — PROGRESS NOTE ADULT - PROVIDER SPECIALTY LIST ADULT
Cardiology
Heme/Onc
Internal Medicine
Internal Medicine
Surgery
Internal Medicine

## 2019-04-22 NOTE — PROGRESS NOTE ADULT - SUBJECTIVE AND OBJECTIVE BOX
CARDIOLOGY     PROGRESS  NOTE   ________________________________________________    CHIEF COMPLAINT:Patient is a 73y old  Female who presents with a chief complaint of SBO (22 Apr 2019 07:36)  no complain.  	  REVIEW OF SYSTEMS:  CONSTITUTIONAL: No fever, weight loss, or fatigue  EYES: No eye pain, visual disturbances, or discharge  ENT:  No difficulty hearing, tinnitus, vertigo; No sinus or throat pain  NECK: No pain or stiffness  RESPIRATORY: No cough, wheezing, chills or hemoptysis; No Shortness of Breath  CARDIOVASCULAR: No chest pain, palpitations, passing out, dizziness, or leg swelling  GASTROINTESTINAL: No abdominal or epigastric pain. No nausea, vomiting, or hematemesis; No diarrhea or constipation. No melena or hematochezia.  GENITOURINARY: No dysuria, frequency, hematuria, or incontinence  NEUROLOGICAL: No headaches, memory loss, loss of strength, numbness, or tremors  SKIN: No itching, burning, rashes, or lesions   LYMPH Nodes: No enlarged glands  ENDOCRINE: No heat or cold intolerance; No hair loss  MUSCULOSKELETAL: No joint pain or swelling; No muscle, back, or extremity pain  PSYCHIATRIC: No depression, anxiety, mood swings, or difficulty sleeping  HEME/LYMPH: No easy bruising, or bleeding gums  ALLERGY AND IMMUNOLOGIC: No hives or eczema	    [ ] All others negative	  [ ] Unable to obtain    PHYSICAL EXAM:  T(C): 36.4 (04-22-19 @ 05:14), Max: 37.4 (04-21-19 @ 17:22)  HR: 70 (04-22-19 @ 05:14) (64 - 97)  BP: 120/69 (04-22-19 @ 05:14) (120/69 - 152/76)  RR: 18 (04-22-19 @ 05:14) (18 - 18)  SpO2: 97% (04-22-19 @ 05:14) (96% - 100%)  Wt(kg): --  I&O's Summary    21 Apr 2019 07:01  -  22 Apr 2019 07:00  --------------------------------------------------------  IN: 1380 mL / OUT: 0 mL / NET: 1380 mL        Appearance: Normal	  HEENT:   Normal oral mucosa, PERRL, EOMI	  Lymphatic: No lymphadenopathy  Cardiovascular: Normal S1 S2, No JVD, N+murmurs, No edema  Respiratory: Lungs clear to auscultation	  Psychiatry: A & O x 3, Mood & affect appropriate  Gastrointestinal:  Soft, Non-tender, + BS	  Skin: No rashes, No ecchymoses, No cyanosis	  Neurologic: Non-focal  Extremities: Normal range of motion, No clubbing, cyanosis or edema  Vascular: Peripheral pulses palpable 2+ bilaterally    MEDICATIONS  (STANDING):  ALBUTerol    90 MICROgram(s) HFA Inhaler 1 Puff(s) Inhalation every 4 hours  ALBUTerol/ipratropium for Nebulization 3 milliLiter(s) Nebulizer every 6 hours  buDESOnide   0.25 milliGRAM(s) Respule 0.25 milliGRAM(s) Inhalation two times a day  enalapril 2.5 milliGRAM(s) Oral every 12 hours  heparin  Injectable 5000 Unit(s) SubCutaneous every 8 hours  influenza   Vaccine 0.5 milliLiter(s) IntraMuscular once  multivitamin 1 Tablet(s) Oral daily  nicotine -  14 mG/24Hr(s) Patch 1 patch Transdermal daily  tiotropium 18 MICROgram(s) Capsule 1 Capsule(s) Inhalation daily      TELEMETRY: 	    ECG:  	  RADIOLOGY:  OTHER: 	  	  LABS:	 	    CARDIAC MARKERS:                                9.6    7.05  )-----------( 228      ( 21 Apr 2019 11:53 )             31.0     04-21    140  |  102  |  16  ----------------------------<  172<H>  3.3<L>   |  21<L>  |  1.27    Ca    8.8      21 Apr 2019 09:04  Phos  2.6     04-21  Mg     1.6     04-21      proBNP:   Lipid Profile:   HgA1c:   TSH:         Assessment and plan  ---------------------------  doing well  bp is well controlled  dc home today CARDIOLOGY     PROGRESS  NOTE   ________________________________________________    CHIEF COMPLAINT:Patient is a 73y old  Female who presents with a chief complaint of SBO (22 Apr 2019 07:36)  no complain.  	  REVIEW OF SYSTEMS:  CONSTITUTIONAL: No fever, weight loss, or fatigue  EYES: No eye pain, visual disturbances, or discharge  ENT:  No difficulty hearing, tinnitus, vertigo; No sinus or throat pain  NECK: No pain or stiffness  RESPIRATORY: No cough, wheezing, chills or hemoptysis; No Shortness of Breath  CARDIOVASCULAR: No chest pain, palpitations, passing out, dizziness, or leg swelling  GASTROINTESTINAL: No abdominal or epigastric pain. No nausea, vomiting, or hematemesis; No diarrhea or constipation. No melena or hematochezia.  GENITOURINARY: No dysuria, frequency, hematuria, or incontinence  NEUROLOGICAL: No headaches, memory loss, loss of strength, numbness, or tremors  SKIN: No itching, burning, rashes, or lesions   LYMPH Nodes: No enlarged glands  ENDOCRINE: No heat or cold intolerance; No hair loss  MUSCULOSKELETAL: No joint pain or swelling; No muscle, back, or extremity pain  PSYCHIATRIC: No depression, anxiety, mood swings, or difficulty sleeping  HEME/LYMPH: No easy bruising, or bleeding gums  ALLERGY AND IMMUNOLOGIC: No hives or eczema	    [ ] All others negative	  [ ] Unable to obtain    PHYSICAL EXAM:  T(C): 36.4 (04-22-19 @ 05:14), Max: 37.4 (04-21-19 @ 17:22)  HR: 70 (04-22-19 @ 05:14) (64 - 97)  BP: 120/69 (04-22-19 @ 05:14) (120/69 - 152/76)  RR: 18 (04-22-19 @ 05:14) (18 - 18)  SpO2: 97% (04-22-19 @ 05:14) (96% - 100%)  Wt(kg): --  I&O's Summary    21 Apr 2019 07:01  -  22 Apr 2019 07:00  --------------------------------------------------------  IN: 1380 mL / OUT: 0 mL / NET: 1380 mL        Appearance: Normal	  HEENT:   Normal oral mucosa, PERRL, EOMI	  Lymphatic: No lymphadenopathy  Cardiovascular: Normal S1 S2, No JVD, N+murmurs, No edema  Respiratory: Lungs clear to auscultation	  Psychiatry: A & O x 3, Mood & affect appropriate  Gastrointestinal:  Soft, Non-tender, + BS	  Skin: No rashes, No ecchymoses, No cyanosis	  Neurologic: Non-focal  Extremities: Normal range of motion, No clubbing, cyanosis or edema  Vascular: Peripheral pulses palpable 2+ bilaterally    MEDICATIONS  (STANDING):  ALBUTerol    90 MICROgram(s) HFA Inhaler 1 Puff(s) Inhalation every 4 hours  ALBUTerol/ipratropium for Nebulization 3 milliLiter(s) Nebulizer every 6 hours  buDESOnide   0.25 milliGRAM(s) Respule 0.25 milliGRAM(s) Inhalation two times a day  enalapril 2.5 milliGRAM(s) Oral every 12 hours  heparin  Injectable 5000 Unit(s) SubCutaneous every 8 hours  influenza   Vaccine 0.5 milliLiter(s) IntraMuscular once  multivitamin 1 Tablet(s) Oral daily  nicotine -  14 mG/24Hr(s) Patch 1 patch Transdermal daily  tiotropium 18 MICROgram(s) Capsule 1 Capsule(s) Inhalation daily      TELEMETRY: 	    ECG:  	  RADIOLOGY:  OTHER: 	  	  LABS:	 	    CARDIAC MARKERS:                                9.6    7.05  )-----------( 228      ( 21 Apr 2019 11:53 )             31.0     04-21    140  |  102  |  16  ----------------------------<  172<H>  3.3<L>   |  21<L>  |  1.27    Ca    8.8      21 Apr 2019 09:04  Phos  2.6     04-21  Mg     1.6     04-21      proBNP:   Lipid Profile:   HgA1c:   TSH:         Assessment and plan  ---------------------------  doing well  bp is well control  decrease hgb repeat cbc if ok will dc

## 2019-04-22 NOTE — PROGRESS NOTE ADULT - ASSESSMENT
Patient was admitted with SBO which has resolved    She has h/o  RENUKA cavitary mass, she has been a smoker, has emphysema, FEV1 830 cc.  Biopsy was done in Jan 2019    Immunostain for p 40 is positive in tumor cells.  TTF-1 is negative.  PD-L1 stains less than 1% of tumor cells.  The immunophenotype together with the cytomorphology supports the  diagnosis squamous cell carcinoma.    She was thought to be and is a poor candidate for any surgical intervention due to  due to malnutrition, healing ability, chest wall invasion and did not want it   She was a poor candidate for chemo and not a candidate for single agent immunotherapy. Thus RT was planned and given    Drop in hb noted, initially from hydration and later?, though stable from yesterday, no obvious bleeding, FOB, retic and repeat CBC planned and d/c plan if stable and no obvious bleed  Follow up office

## 2019-05-03 RX ADMIN — Medication 1 PATCH: at 19:41

## 2019-05-05 ENCOUNTER — INPATIENT (INPATIENT)
Facility: HOSPITAL | Age: 74
LOS: 5 days | Discharge: HOME | DRG: 871 | End: 2019-05-11
Attending: INTERNAL MEDICINE | Admitting: INTERNAL MEDICINE
Payer: MEDICARE

## 2019-05-05 VITALS
SYSTOLIC BLOOD PRESSURE: 99 MMHG | DIASTOLIC BLOOD PRESSURE: 69 MMHG | HEART RATE: 88 BPM | OXYGEN SATURATION: 98 % | RESPIRATION RATE: 18 BRPM | HEIGHT: 62.5 IN | TEMPERATURE: 97 F | WEIGHT: 85.1 LBS

## 2019-05-05 LAB
ALBUMIN SERPL ELPH-MCNC: 3.3 G/DL — SIGNIFICANT CHANGE UP (ref 3.3–5)
ALBUMIN SERPL ELPH-MCNC: 3.9 G/DL — SIGNIFICANT CHANGE UP (ref 3.3–5)
ALBUMIN SERPL ELPH-MCNC: 4 G/DL — SIGNIFICANT CHANGE UP (ref 3.3–5)
ALP SERPL-CCNC: 146 U/L — HIGH (ref 40–120)
ALP SERPL-CCNC: 183 U/L — HIGH (ref 40–120)
ALP SERPL-CCNC: 204 U/L — HIGH (ref 40–120)
ALT FLD-CCNC: 12 U/L — SIGNIFICANT CHANGE UP (ref 10–45)
ALT FLD-CCNC: 20 U/L — SIGNIFICANT CHANGE UP (ref 10–45)
ALT FLD-CCNC: 21 U/L — SIGNIFICANT CHANGE UP (ref 10–45)
ANION GAP SERPL CALC-SCNC: 20 MMOL/L — HIGH (ref 5–17)
ANION GAP SERPL CALC-SCNC: 25 MMOL/L — HIGH (ref 5–17)
ANION GAP SERPL CALC-SCNC: 27 MMOL/L — HIGH (ref 5–17)
ANISOCYTOSIS BLD QL: SLIGHT — SIGNIFICANT CHANGE UP
APTT BLD: 30.2 SEC — SIGNIFICANT CHANGE UP (ref 27.5–36.3)
AST SERPL-CCNC: 11 U/L — SIGNIFICANT CHANGE UP (ref 10–40)
AST SERPL-CCNC: 24 U/L — SIGNIFICANT CHANGE UP (ref 10–40)
AST SERPL-CCNC: 32 U/L — SIGNIFICANT CHANGE UP (ref 10–40)
BASE EXCESS BLDV CALC-SCNC: 7.7 MMOL/L — HIGH (ref -2–2)
BASE EXCESS BLDV CALC-SCNC: 9.6 MMOL/L — HIGH (ref -2–2)
BASOPHILS # BLD AUTO: 0 K/UL — SIGNIFICANT CHANGE UP (ref 0–0.2)
BASOPHILS NFR BLD AUTO: 1 % — SIGNIFICANT CHANGE UP (ref 0–2)
BILIRUB SERPL-MCNC: 0.3 MG/DL — SIGNIFICANT CHANGE UP (ref 0.2–1.2)
BILIRUB SERPL-MCNC: 0.4 MG/DL — SIGNIFICANT CHANGE UP (ref 0.2–1.2)
BILIRUB SERPL-MCNC: 0.5 MG/DL — SIGNIFICANT CHANGE UP (ref 0.2–1.2)
BLD GP AB SCN SERPL QL: NEGATIVE — SIGNIFICANT CHANGE UP
BUN SERPL-MCNC: 54 MG/DL — HIGH (ref 7–23)
CA-I SERPL-SCNC: 0.86 MMOL/L — LOW (ref 1.12–1.3)
CA-I SERPL-SCNC: 0.86 MMOL/L — LOW (ref 1.12–1.3)
CALCIUM SERPL-MCNC: 7 MG/DL — LOW (ref 8.4–10.5)
CALCIUM SERPL-MCNC: 7.9 MG/DL — LOW (ref 8.4–10.5)
CALCIUM SERPL-MCNC: 8.1 MG/DL — LOW (ref 8.4–10.5)
CHLORIDE BLDV-SCNC: 89 MMOL/L — LOW (ref 96–108)
CHLORIDE BLDV-SCNC: 95 MMOL/L — LOW (ref 96–108)
CHLORIDE SERPL-SCNC: 85 MMOL/L — LOW (ref 96–108)
CHLORIDE SERPL-SCNC: 86 MMOL/L — LOW (ref 96–108)
CHLORIDE SERPL-SCNC: 93 MMOL/L — LOW (ref 96–108)
CO2 BLDV-SCNC: 37 MMOL/L — HIGH (ref 22–30)
CO2 BLDV-SCNC: 40 MMOL/L — HIGH (ref 22–30)
CO2 SERPL-SCNC: 26 MMOL/L — SIGNIFICANT CHANGE UP (ref 22–31)
CO2 SERPL-SCNC: 30 MMOL/L — SIGNIFICANT CHANGE UP (ref 22–31)
CO2 SERPL-SCNC: 32 MMOL/L — HIGH (ref 22–31)
CREAT SERPL-MCNC: 3.93 MG/DL — HIGH (ref 0.5–1.3)
CREAT SERPL-MCNC: 3.96 MG/DL — HIGH (ref 0.5–1.3)
CREAT SERPL-MCNC: 4.16 MG/DL — HIGH (ref 0.5–1.3)
EOSINOPHIL # BLD AUTO: 0 K/UL — SIGNIFICANT CHANGE UP (ref 0–0.5)
EOSINOPHIL NFR BLD AUTO: 0 % — SIGNIFICANT CHANGE UP (ref 0–6)
GAS PNL BLDV: 136 MMOL/L — SIGNIFICANT CHANGE UP (ref 136–145)
GAS PNL BLDV: 139 MMOL/L — SIGNIFICANT CHANGE UP (ref 136–145)
GAS PNL BLDV: SIGNIFICANT CHANGE UP
GLUCOSE BLDV-MCNC: 103 MG/DL — HIGH (ref 70–99)
GLUCOSE BLDV-MCNC: 117 MG/DL — HIGH (ref 70–99)
GLUCOSE SERPL-MCNC: 104 MG/DL — HIGH (ref 70–99)
GLUCOSE SERPL-MCNC: 112 MG/DL — HIGH (ref 70–99)
GLUCOSE SERPL-MCNC: 118 MG/DL — HIGH (ref 70–99)
HCO3 BLDV-SCNC: 35 MMOL/L — HIGH (ref 21–29)
HCO3 BLDV-SCNC: 38 MMOL/L — HIGH (ref 21–29)
HCT VFR BLD CALC: 38.1 % — SIGNIFICANT CHANGE UP (ref 34.5–45)
HCT VFR BLDA CALC: 31 % — LOW (ref 39–50)
HCT VFR BLDA CALC: 36 % — LOW (ref 39–50)
HGB BLD CALC-MCNC: 10 G/DL — LOW (ref 11.5–15.5)
HGB BLD CALC-MCNC: 11.6 G/DL — SIGNIFICANT CHANGE UP (ref 11.5–15.5)
HGB BLD-MCNC: 12.2 G/DL — SIGNIFICANT CHANGE UP (ref 11.5–15.5)
INR BLD: 1.18 RATIO — HIGH (ref 0.88–1.16)
LACTATE BLDV-MCNC: 2.4 MMOL/L — HIGH (ref 0.7–2)
LACTATE BLDV-MCNC: 4.1 MMOL/L — CRITICAL HIGH (ref 0.7–2)
LG PLATELETS BLD QL AUTO: SLIGHT — SIGNIFICANT CHANGE UP
LIDOCAIN IGE QN: 21 U/L — SIGNIFICANT CHANGE UP (ref 7–60)
LYMPHOCYTES # BLD AUTO: 0.3 K/UL — LOW (ref 1–3.3)
LYMPHOCYTES # BLD AUTO: 8 % — LOW (ref 13–44)
MACROCYTES BLD QL: SLIGHT — SIGNIFICANT CHANGE UP
MCHC RBC-ENTMCNC: 30.3 PG — SIGNIFICANT CHANGE UP (ref 27–34)
MCHC RBC-ENTMCNC: 32.1 GM/DL — SIGNIFICANT CHANGE UP (ref 32–36)
MCV RBC AUTO: 94.5 FL — SIGNIFICANT CHANGE UP (ref 80–100)
METAMYELOCYTES # FLD: 1 % — HIGH (ref 0–0)
MONOCYTES # BLD AUTO: 0.4 K/UL — SIGNIFICANT CHANGE UP (ref 0–0.9)
MONOCYTES NFR BLD AUTO: 10 % — SIGNIFICANT CHANGE UP (ref 2–14)
NEUTROPHILS # BLD AUTO: 3.3 K/UL — SIGNIFICANT CHANGE UP (ref 1.8–7.4)
NEUTROPHILS NFR BLD AUTO: 75 % — SIGNIFICANT CHANGE UP (ref 43–77)
NEUTS BAND # BLD: 5 % — SIGNIFICANT CHANGE UP (ref 0–8)
NRBC # BLD: 1 /100 — HIGH (ref 0–0)
PCO2 BLDV: 71 MMHG — HIGH (ref 35–50)
PCO2 BLDV: 75 MMHG — HIGH (ref 35–50)
PH BLDV: 7.32 — LOW (ref 7.35–7.45)
PH BLDV: 7.32 — LOW (ref 7.35–7.45)
PLAT MORPH BLD: NORMAL — SIGNIFICANT CHANGE UP
PLATELET # BLD AUTO: 309 K/UL — SIGNIFICANT CHANGE UP (ref 150–400)
PO2 BLDV: 26 MMHG — SIGNIFICANT CHANGE UP (ref 25–45)
PO2 BLDV: 31 MMHG — SIGNIFICANT CHANGE UP (ref 25–45)
POTASSIUM BLDV-SCNC: 3.8 MMOL/L — SIGNIFICANT CHANGE UP (ref 3.5–5.3)
POTASSIUM BLDV-SCNC: 4 MMOL/L — SIGNIFICANT CHANGE UP (ref 3.5–5.3)
POTASSIUM SERPL-MCNC: 4.4 MMOL/L — SIGNIFICANT CHANGE UP (ref 3.5–5.3)
POTASSIUM SERPL-MCNC: 5.1 MMOL/L — SIGNIFICANT CHANGE UP (ref 3.5–5.3)
POTASSIUM SERPL-MCNC: 5.5 MMOL/L — HIGH (ref 3.5–5.3)
POTASSIUM SERPL-SCNC: 4.4 MMOL/L — SIGNIFICANT CHANGE UP (ref 3.5–5.3)
POTASSIUM SERPL-SCNC: 5.1 MMOL/L — SIGNIFICANT CHANGE UP (ref 3.5–5.3)
POTASSIUM SERPL-SCNC: 5.5 MMOL/L — HIGH (ref 3.5–5.3)
PROT SERPL-MCNC: 6.1 G/DL — SIGNIFICANT CHANGE UP (ref 6–8.3)
PROT SERPL-MCNC: 7.8 G/DL — SIGNIFICANT CHANGE UP (ref 6–8.3)
PROT SERPL-MCNC: 8.6 G/DL — HIGH (ref 6–8.3)
PROTHROM AB SERPL-ACNC: 13.5 SEC — HIGH (ref 10–12.9)
RBC # BLD: 4.03 M/UL — SIGNIFICANT CHANGE UP (ref 3.8–5.2)
RBC # FLD: 17.2 % — HIGH (ref 10.3–14.5)
RBC BLD AUTO: SIGNIFICANT CHANGE UP
RH IG SCN BLD-IMP: POSITIVE — SIGNIFICANT CHANGE UP
SAO2 % BLDV: 33 % — LOW (ref 67–88)
SAO2 % BLDV: 42 % — LOW (ref 67–88)
SODIUM SERPL-SCNC: 138 MMOL/L — SIGNIFICANT CHANGE UP (ref 135–145)
SODIUM SERPL-SCNC: 143 MMOL/L — SIGNIFICANT CHANGE UP (ref 135–145)
SODIUM SERPL-SCNC: 143 MMOL/L — SIGNIFICANT CHANGE UP (ref 135–145)
WBC # BLD: 4 K/UL — SIGNIFICANT CHANGE UP (ref 3.8–10.5)
WBC # FLD AUTO: 4 K/UL — SIGNIFICANT CHANGE UP (ref 3.8–10.5)

## 2019-05-05 PROCEDURE — 74176 CT ABD & PELVIS W/O CONTRAST: CPT | Mod: 26

## 2019-05-05 PROCEDURE — 76937 US GUIDE VASCULAR ACCESS: CPT | Mod: 26

## 2019-05-05 PROCEDURE — 99291 CRITICAL CARE FIRST HOUR: CPT | Mod: GC

## 2019-05-05 PROCEDURE — 93308 TTE F-UP OR LMTD: CPT | Mod: 26

## 2019-05-05 PROCEDURE — 71045 X-RAY EXAM CHEST 1 VIEW: CPT | Mod: 26

## 2019-05-05 RX ORDER — SODIUM CHLORIDE 9 MG/ML
1000 INJECTION, SOLUTION INTRAVENOUS ONCE
Qty: 0 | Refills: 0 | Status: COMPLETED | OUTPATIENT
Start: 2019-05-05 | End: 2019-05-05

## 2019-05-05 RX ORDER — NICOTINE POLACRILEX 2 MG
1 GUM BUCCAL ONCE
Qty: 0 | Refills: 0 | Status: COMPLETED | OUTPATIENT
Start: 2019-05-05 | End: 2019-05-05

## 2019-05-05 RX ORDER — ONDANSETRON 8 MG/1
4 TABLET, FILM COATED ORAL ONCE
Qty: 0 | Refills: 0 | Status: COMPLETED | OUTPATIENT
Start: 2019-05-05 | End: 2019-05-05

## 2019-05-05 RX ORDER — IPRATROPIUM/ALBUTEROL SULFATE 18-103MCG
3 AEROSOL WITH ADAPTER (GRAM) INHALATION ONCE
Qty: 0 | Refills: 0 | Status: COMPLETED | OUTPATIENT
Start: 2019-05-05 | End: 2019-05-05

## 2019-05-05 RX ORDER — HYDROMORPHONE HYDROCHLORIDE 2 MG/ML
0.5 INJECTION INTRAMUSCULAR; INTRAVENOUS; SUBCUTANEOUS ONCE
Qty: 0 | Refills: 0 | Status: DISCONTINUED | OUTPATIENT
Start: 2019-05-05 | End: 2019-05-05

## 2019-05-05 RX ORDER — AZTREONAM 2 G
500 VIAL (EA) INJECTION ONCE
Qty: 0 | Refills: 0 | Status: COMPLETED | OUTPATIENT
Start: 2019-05-05 | End: 2019-05-05

## 2019-05-05 RX ORDER — MAGNESIUM SULFATE 500 MG/ML
1 VIAL (ML) INJECTION ONCE
Qty: 0 | Refills: 0 | Status: COMPLETED | OUTPATIENT
Start: 2019-05-05 | End: 2019-05-05

## 2019-05-05 RX ADMIN — SODIUM CHLORIDE 1000 MILLILITER(S): 9 INJECTION, SOLUTION INTRAVENOUS at 19:24

## 2019-05-05 RX ADMIN — Medication 1 PATCH: at 22:14

## 2019-05-05 RX ADMIN — SODIUM CHLORIDE 1000 MILLILITER(S): 9 INJECTION, SOLUTION INTRAVENOUS at 23:58

## 2019-05-05 RX ADMIN — Medication 3 MILLILITER(S): at 19:24

## 2019-05-05 RX ADMIN — Medication 3 MILLILITER(S): at 22:45

## 2019-05-05 RX ADMIN — Medication 100 GRAM(S): at 23:57

## 2019-05-05 RX ADMIN — ONDANSETRON 4 MILLIGRAM(S): 8 TABLET, FILM COATED ORAL at 19:23

## 2019-05-05 RX ADMIN — SODIUM CHLORIDE 1000 MILLILITER(S): 9 INJECTION, SOLUTION INTRAVENOUS at 18:30

## 2019-05-05 RX ADMIN — HYDROMORPHONE HYDROCHLORIDE 0.5 MILLIGRAM(S): 2 INJECTION INTRAMUSCULAR; INTRAVENOUS; SUBCUTANEOUS at 22:00

## 2019-05-05 RX ADMIN — HYDROMORPHONE HYDROCHLORIDE 0.5 MILLIGRAM(S): 2 INJECTION INTRAMUSCULAR; INTRAVENOUS; SUBCUTANEOUS at 21:30

## 2019-05-05 NOTE — ED PROVIDER NOTE - OBJECTIVE STATEMENT
74 yo female with PMPH of recurrent SBOs, with possible internal hernia on CT previously, lung cancer s/p radiation last was 2 weeks ago, COPD, presents to the ED BIBA for n/v x 3 days, abd pain, dehydration, chronic back pain. Pt noted to be hypotensive to 60's/palb by [facility], given zofran 4mg IV around 5:45 about 20 min PTA in ED with no significant improvement of nausea. Pt called her aid who called 911. Pt states last BM was yesterday she thinks, believes she passed gas earlier today. A&O x3 but very weak and cachectic appearing. Pt on oxycodone 10mg PRN at home and valium 5mg PRN for back pain.     Allergies: Ciprofloxacin, penicillin, macrobid, clindamycin   PMD/Onc: Dr. Mary Ann Ríos Rake  Surgeon: Dr. Livingston

## 2019-05-05 NOTE — ED PROVIDER NOTE - ATTENDING CONTRIBUTION TO CARE
Attending MD Ana Cuevas:  I personally have seen and examined this patient.  Resident note reviewed and agree on plan of care and except where noted.  See HPI, PE, and MDM for details.

## 2019-05-05 NOTE — ED PROVIDER NOTE - CARE PLAN
Principal Discharge DX:	Bowel obstruction  Secondary Diagnosis:	SHAKIRA (acute kidney injury) Principal Discharge DX:	Bowel obstruction  Secondary Diagnosis:	SHAKIRA (acute kidney injury)  Secondary Diagnosis:	Septic shock

## 2019-05-05 NOTE — ED PROVIDER NOTE - CRITICAL CARE PROVIDED
documentation/direct patient care (not related to procedure)/additional history taking/interpretation of diagnostic studies/consultation with other physicians/conducted a detailed discussion of DNR status

## 2019-05-05 NOTE — ED PROVIDER NOTE - PROGRESS NOTE DETAILS
Attending Ana Cuevas: pt found to have sig renal failure. is making urine, will bladder scan to evaluate, pt does not want arevalo. pt is passing gas currently. receiving IV hydration with improvement in bp. will need admission, monitoring or renal function Attending Ana Cuevas: pt had large bowel movement. still has mild abdominl apain. ct showing concern for possible SBO, pt did have a bowel movement after SBO. pt does not want NG tube. will d/w surgery, will repeat electrolytes and ree-jacky Attending Ana Cuevas: d/w surgery, no operative plan at this time. lactate improving. CO2 elevated. pt is awake and following commands. will continue to monitor as with low BP and concern for SBO would avoid bipap. Attending Ana Cuevas: long conversation with pt. she is full code. does not want an NGT but agrees to a arevalo. BP with systolic in upper 90's. awake and following commands. will continue hydration. place arevalo Dr. Dennison contacted - will admit to his service.  Surgery will follow along.  - Zoya Metcalf DO Pt with episode of 5 sec nonsustained ?atrial tachycardia caught on tele monitoring - will put patient on tele for admission.

## 2019-05-05 NOTE — ED ADULT NURSE NOTE - INTERVENTIONS DEFINITIONS
Instruct patient to call for assistance/Physically safe environment: no spills, clutter or unnecessary equipment/Provide visual clues: red socks/Non-slip footwear when patient is off stretcher

## 2019-05-05 NOTE — ED ADULT NURSE NOTE - OBJECTIVE STATEMENT
73 year old female brought in by EMS c/o of nausea, vomiting and inability to tolerate po.  Patient has h/o SBO and last SBO was about 2 weeks ago.  Patient called her aide because of her symptoms and the aide called 911.  Patient arrived on 2L NC by EMS and has h/o lung cancer on radiation, but not normally on oxygen.  Patient denies: chest pain, fevers, blood in emesis or stool.  Abdomen is distended and tender to palpation in the upper quadrants b/l.  Bed in lowest position and fall band on.

## 2019-05-05 NOTE — ED PROVIDER NOTE - CLINICAL SUMMARY MEDICAL DECISION MAKING FREE TEXT BOX
EKG, IV fluid, antiemetics, labs, UA, CT abd/pelvis. EKG, IV fluid, antiemetics, labs, UA, CT abd/pelvis.  Attending Ana Cuevas: 72 y/o female with multple medical issues including h/o UTI, and frequent bowel obstructions presenting with abdominal distention, and vomiting. upon arrival pt hypotensive and ill appearing. pocus shows a line predominance and distended bowels. concern for possible obstruction. pt placed on monitor and IVF given. afebrile rectally. with h/o frequent UTI and critical appearance broad spectrum abx ordered. ct performed showing evidence of obstruction. pt refused NGT and surgery consulted. blood work showed sig SHAKIRA and worsening lactic acidosis. arevalo placed to monitor renal output. BP responding to IVF and with predominance of a lines and dry mucuos membranes will continue iv hydration. may require ICU level of care as full code.

## 2019-05-06 DIAGNOSIS — N39.0 URINARY TRACT INFECTION, SITE NOT SPECIFIED: ICD-10-CM

## 2019-05-06 DIAGNOSIS — R57.9 SHOCK, UNSPECIFIED: ICD-10-CM

## 2019-05-06 DIAGNOSIS — I25.10 ATHEROSCLEROTIC HEART DISEASE OF NATIVE CORONARY ARTERY WITHOUT ANGINA PECTORIS: ICD-10-CM

## 2019-05-06 DIAGNOSIS — J44.9 CHRONIC OBSTRUCTIVE PULMONARY DISEASE, UNSPECIFIED: ICD-10-CM

## 2019-05-06 DIAGNOSIS — C34.12 MALIGNANT NEOPLASM OF UPPER LOBE, LEFT BRONCHUS OR LUNG: ICD-10-CM

## 2019-05-06 DIAGNOSIS — F17.200 NICOTINE DEPENDENCE, UNSPECIFIED, UNCOMPLICATED: ICD-10-CM

## 2019-05-06 DIAGNOSIS — F11.20 OPIOID DEPENDENCE, UNCOMPLICATED: ICD-10-CM

## 2019-05-06 DIAGNOSIS — K56.609 UNSPECIFIED INTESTINAL OBSTRUCTION, UNSPECIFIED AS TO PARTIAL VERSUS COMPLETE OBSTRUCTION: ICD-10-CM

## 2019-05-06 DIAGNOSIS — N17.9 ACUTE KIDNEY FAILURE, UNSPECIFIED: ICD-10-CM

## 2019-05-06 DIAGNOSIS — Z29.9 ENCOUNTER FOR PROPHYLACTIC MEASURES, UNSPECIFIED: ICD-10-CM

## 2019-05-06 DIAGNOSIS — R64 CACHEXIA: ICD-10-CM

## 2019-05-06 LAB
ALBUMIN SERPL ELPH-MCNC: 2.5 G/DL — LOW (ref 3.3–5)
ALBUMIN SERPL ELPH-MCNC: 2.8 G/DL — LOW (ref 3.3–5)
ALP SERPL-CCNC: 118 U/L — SIGNIFICANT CHANGE UP (ref 40–120)
ALP SERPL-CCNC: 131 U/L — HIGH (ref 40–120)
ALT FLD-CCNC: 12 U/L — SIGNIFICANT CHANGE UP (ref 10–45)
ALT FLD-CCNC: 14 U/L — SIGNIFICANT CHANGE UP (ref 10–45)
ANION GAP SERPL CALC-SCNC: 16 MMOL/L — SIGNIFICANT CHANGE UP (ref 5–17)
ANION GAP SERPL CALC-SCNC: 18 MMOL/L — HIGH (ref 5–17)
APPEARANCE UR: ABNORMAL
APTT BLD: 34 SEC — SIGNIFICANT CHANGE UP (ref 27.5–36.3)
AST SERPL-CCNC: 12 U/L — SIGNIFICANT CHANGE UP (ref 10–40)
AST SERPL-CCNC: 15 U/L — SIGNIFICANT CHANGE UP (ref 10–40)
BACTERIA # UR AUTO: ABNORMAL
BASOPHILS # BLD AUTO: 0 K/UL — SIGNIFICANT CHANGE UP (ref 0–0.2)
BASOPHILS NFR BLD AUTO: 0 % — SIGNIFICANT CHANGE UP (ref 0–2)
BILIRUB SERPL-MCNC: 0.3 MG/DL — SIGNIFICANT CHANGE UP (ref 0.2–1.2)
BILIRUB SERPL-MCNC: 0.3 MG/DL — SIGNIFICANT CHANGE UP (ref 0.2–1.2)
BILIRUB UR-MCNC: NEGATIVE — SIGNIFICANT CHANGE UP
BUN SERPL-MCNC: 50 MG/DL — HIGH (ref 7–23)
BUN SERPL-MCNC: 51 MG/DL — HIGH (ref 7–23)
CALCIUM SERPL-MCNC: 6.6 MG/DL — LOW (ref 8.4–10.5)
CALCIUM SERPL-MCNC: 6.7 MG/DL — LOW (ref 8.4–10.5)
CHLORIDE SERPL-SCNC: 96 MMOL/L — SIGNIFICANT CHANGE UP (ref 96–108)
CHLORIDE SERPL-SCNC: 98 MMOL/L — SIGNIFICANT CHANGE UP (ref 96–108)
CHLORIDE UR-SCNC: <35 MMOL/L — SIGNIFICANT CHANGE UP
CK MB CFR SERPL CALC: 1.5 NG/ML — SIGNIFICANT CHANGE UP (ref 0–3.8)
CK SERPL-CCNC: 33 U/L — SIGNIFICANT CHANGE UP (ref 25–170)
CO2 SERPL-SCNC: 26 MMOL/L — SIGNIFICANT CHANGE UP (ref 22–31)
CO2 SERPL-SCNC: 27 MMOL/L — SIGNIFICANT CHANGE UP (ref 22–31)
COLOR SPEC: YELLOW — SIGNIFICANT CHANGE UP
CREAT ?TM UR-MCNC: 106 MG/DL — SIGNIFICANT CHANGE UP
CREAT SERPL-MCNC: 3.35 MG/DL — HIGH (ref 0.5–1.3)
CREAT SERPL-MCNC: 3.48 MG/DL — HIGH (ref 0.5–1.3)
DIFF PNL FLD: ABNORMAL
EOSINOPHIL # BLD AUTO: 0 K/UL — SIGNIFICANT CHANGE UP (ref 0–0.5)
EOSINOPHIL NFR BLD AUTO: 1 % — SIGNIFICANT CHANGE UP (ref 0–6)
EPI CELLS # UR: 0 /HPF — SIGNIFICANT CHANGE UP
GAS PNL BLDA: SIGNIFICANT CHANGE UP
GAS PNL BLDA: SIGNIFICANT CHANGE UP
GLUCOSE SERPL-MCNC: 104 MG/DL — HIGH (ref 70–99)
GLUCOSE SERPL-MCNC: 114 MG/DL — HIGH (ref 70–99)
GLUCOSE UR QL: NEGATIVE — SIGNIFICANT CHANGE UP
HCT VFR BLD CALC: 27.1 % — LOW (ref 34.5–45)
HCT VFR BLD CALC: 29.3 % — LOW (ref 34.5–45)
HGB BLD-MCNC: 8.7 G/DL — LOW (ref 11.5–15.5)
HGB BLD-MCNC: 9.6 G/DL — LOW (ref 11.5–15.5)
HYALINE CASTS # UR AUTO: 18 /LPF — HIGH (ref 0–2)
INR BLD: 1.25 RATIO — HIGH (ref 0.88–1.16)
KETONES UR-MCNC: NEGATIVE — SIGNIFICANT CHANGE UP
LEUKOCYTE ESTERASE UR-ACNC: ABNORMAL
LYMPHOCYTES # BLD AUTO: 0.2 K/UL — LOW (ref 1–3.3)
LYMPHOCYTES # BLD AUTO: 10 % — LOW (ref 13–44)
MAGNESIUM SERPL-MCNC: 1.5 MG/DL — LOW (ref 1.6–2.6)
MCHC RBC-ENTMCNC: 30.8 PG — SIGNIFICANT CHANGE UP (ref 27–34)
MCHC RBC-ENTMCNC: 31.1 PG — SIGNIFICANT CHANGE UP (ref 27–34)
MCHC RBC-ENTMCNC: 32.2 GM/DL — SIGNIFICANT CHANGE UP (ref 32–36)
MCHC RBC-ENTMCNC: 32.8 GM/DL — SIGNIFICANT CHANGE UP (ref 32–36)
MCV RBC AUTO: 94.6 FL — SIGNIFICANT CHANGE UP (ref 80–100)
MCV RBC AUTO: 95.4 FL — SIGNIFICANT CHANGE UP (ref 80–100)
MONOCYTES # BLD AUTO: 0.3 K/UL — SIGNIFICANT CHANGE UP (ref 0–0.9)
MONOCYTES NFR BLD AUTO: 10 % — SIGNIFICANT CHANGE UP (ref 2–14)
NEUTROPHILS # BLD AUTO: 2.3 K/UL — SIGNIFICANT CHANGE UP (ref 1.8–7.4)
NEUTROPHILS NFR BLD AUTO: 62 % — SIGNIFICANT CHANGE UP (ref 43–77)
NITRITE UR-MCNC: NEGATIVE — SIGNIFICANT CHANGE UP
OSMOLALITY UR: 343 MOS/KG — SIGNIFICANT CHANGE UP (ref 300–900)
PH UR: 6 — SIGNIFICANT CHANGE UP (ref 5–8)
PHOSPHATE SERPL-MCNC: 5.6 MG/DL — HIGH (ref 2.5–4.5)
PLATELET # BLD AUTO: 195 K/UL — SIGNIFICANT CHANGE UP (ref 150–400)
PLATELET # BLD AUTO: 222 K/UL — SIGNIFICANT CHANGE UP (ref 150–400)
POTASSIUM SERPL-MCNC: 4.4 MMOL/L — SIGNIFICANT CHANGE UP (ref 3.5–5.3)
POTASSIUM SERPL-MCNC: 4.7 MMOL/L — SIGNIFICANT CHANGE UP (ref 3.5–5.3)
POTASSIUM SERPL-SCNC: 4.4 MMOL/L — SIGNIFICANT CHANGE UP (ref 3.5–5.3)
POTASSIUM SERPL-SCNC: 4.7 MMOL/L — SIGNIFICANT CHANGE UP (ref 3.5–5.3)
POTASSIUM UR-SCNC: 46 MMOL/L — SIGNIFICANT CHANGE UP
PROT ?TM UR-MCNC: 101 MG/DL — HIGH (ref 0–12)
PROT SERPL-MCNC: 5 G/DL — LOW (ref 6–8.3)
PROT SERPL-MCNC: 5.6 G/DL — LOW (ref 6–8.3)
PROT UR-MCNC: 100 — SIGNIFICANT CHANGE UP
PROT/CREAT UR-RTO: 1 RATIO — HIGH (ref 0–0.2)
PROTHROM AB SERPL-ACNC: 14.4 SEC — HIGH (ref 10–12.9)
RAPID RVP RESULT: SIGNIFICANT CHANGE UP
RBC # BLD: 2.84 M/UL — LOW (ref 3.8–5.2)
RBC # BLD: 3.1 M/UL — LOW (ref 3.8–5.2)
RBC # FLD: 16.8 % — HIGH (ref 10.3–14.5)
RBC # FLD: 16.9 % — HIGH (ref 10.3–14.5)
RBC CASTS # UR COMP ASSIST: 4 /HPF — SIGNIFICANT CHANGE UP (ref 0–4)
SODIUM SERPL-SCNC: 140 MMOL/L — SIGNIFICANT CHANGE UP (ref 135–145)
SODIUM SERPL-SCNC: 141 MMOL/L — SIGNIFICANT CHANGE UP (ref 135–145)
SODIUM UR-SCNC: 25 MMOL/L — SIGNIFICANT CHANGE UP
SP GR SPEC: 1.02 — SIGNIFICANT CHANGE UP (ref 1.01–1.02)
TROPONIN T, HIGH SENSITIVITY RESULT: 35 NG/L — SIGNIFICANT CHANGE UP (ref 0–51)
UROBILINOGEN FLD QL: NEGATIVE — SIGNIFICANT CHANGE UP
WBC # BLD: 2.9 K/UL — LOW (ref 3.8–10.5)
WBC # BLD: 3.6 K/UL — LOW (ref 3.8–10.5)
WBC # FLD AUTO: 2.9 K/UL — LOW (ref 3.8–10.5)
WBC # FLD AUTO: 3.6 K/UL — LOW (ref 3.8–10.5)
WBC UR QL: 157 /HPF — HIGH (ref 0–5)

## 2019-05-06 PROCEDURE — 99291 CRITICAL CARE FIRST HOUR: CPT

## 2019-05-06 PROCEDURE — 76775 US EXAM ABDO BACK WALL LIM: CPT | Mod: 26

## 2019-05-06 PROCEDURE — 99233 SBSQ HOSP IP/OBS HIGH 50: CPT

## 2019-05-06 PROCEDURE — 99406 BEHAV CHNG SMOKING 3-10 MIN: CPT

## 2019-05-06 PROCEDURE — 99223 1ST HOSP IP/OBS HIGH 75: CPT | Mod: 25

## 2019-05-06 RX ORDER — OXYCODONE AND ACETAMINOPHEN 5; 325 MG/1; MG/1
1 TABLET ORAL ONCE
Qty: 0 | Refills: 0 | Status: DISCONTINUED | OUTPATIENT
Start: 2019-05-06 | End: 2019-05-06

## 2019-05-06 RX ORDER — FENTANYL CITRATE 50 UG/ML
25 INJECTION INTRAVENOUS ONCE
Qty: 0 | Refills: 0 | Status: DISCONTINUED | OUTPATIENT
Start: 2019-05-06 | End: 2019-05-06

## 2019-05-06 RX ORDER — BUDESONIDE, MICRONIZED 100 %
0.5 POWDER (GRAM) MISCELLANEOUS
Qty: 0 | Refills: 0 | Status: DISCONTINUED | OUTPATIENT
Start: 2019-05-06 | End: 2019-05-11

## 2019-05-06 RX ORDER — MAGNESIUM SULFATE 500 MG/ML
1 VIAL (ML) INJECTION ONCE
Qty: 0 | Refills: 0 | Status: COMPLETED | OUTPATIENT
Start: 2019-05-06 | End: 2019-05-06

## 2019-05-06 RX ORDER — SODIUM CHLORIDE 9 MG/ML
1000 INJECTION, SOLUTION INTRAVENOUS ONCE
Qty: 0 | Refills: 0 | Status: DISCONTINUED | OUTPATIENT
Start: 2019-05-06 | End: 2019-05-06

## 2019-05-06 RX ORDER — LIDOCAINE 4 G/100G
1 CREAM TOPICAL DAILY
Qty: 0 | Refills: 0 | Status: DISCONTINUED | OUTPATIENT
Start: 2019-05-06 | End: 2019-05-11

## 2019-05-06 RX ORDER — ONDANSETRON 8 MG/1
4 TABLET, FILM COATED ORAL EVERY 6 HOURS
Qty: 0 | Refills: 0 | Status: DISCONTINUED | OUTPATIENT
Start: 2019-05-06 | End: 2019-05-11

## 2019-05-06 RX ORDER — CHLORHEXIDINE GLUCONATE 213 G/1000ML
1 SOLUTION TOPICAL DAILY
Qty: 0 | Refills: 0 | Status: DISCONTINUED | OUTPATIENT
Start: 2019-05-06 | End: 2019-05-07

## 2019-05-06 RX ORDER — PANTOPRAZOLE SODIUM 20 MG/1
40 TABLET, DELAYED RELEASE ORAL
Qty: 0 | Refills: 0 | Status: DISCONTINUED | OUTPATIENT
Start: 2019-05-06 | End: 2019-05-11

## 2019-05-06 RX ORDER — HEPARIN SODIUM 5000 [USP'U]/ML
5000 INJECTION INTRAVENOUS; SUBCUTANEOUS EVERY 12 HOURS
Qty: 0 | Refills: 0 | Status: DISCONTINUED | OUTPATIENT
Start: 2019-05-06 | End: 2019-05-11

## 2019-05-06 RX ORDER — PIPERACILLIN AND TAZOBACTAM 4; .5 G/20ML; G/20ML
3.38 INJECTION, POWDER, LYOPHILIZED, FOR SOLUTION INTRAVENOUS ONCE
Qty: 0 | Refills: 0 | Status: COMPLETED | OUTPATIENT
Start: 2019-05-06 | End: 2019-05-06

## 2019-05-06 RX ORDER — METHYLNALTREXONE BROMIDE 12 MG/.6ML
6 INJECTION, SOLUTION SUBCUTANEOUS ONCE
Qty: 0 | Refills: 0 | Status: COMPLETED | OUTPATIENT
Start: 2019-05-06 | End: 2019-05-06

## 2019-05-06 RX ORDER — MIDODRINE HYDROCHLORIDE 2.5 MG/1
20 TABLET ORAL EVERY 8 HOURS
Qty: 0 | Refills: 0 | Status: DISCONTINUED | OUTPATIENT
Start: 2019-05-06 | End: 2019-05-09

## 2019-05-06 RX ORDER — NOREPINEPHRINE BITARTRATE/D5W 8 MG/250ML
0.05 PLASTIC BAG, INJECTION (ML) INTRAVENOUS
Qty: 8 | Refills: 0 | Status: DISCONTINUED | OUTPATIENT
Start: 2019-05-06 | End: 2019-05-06

## 2019-05-06 RX ORDER — SODIUM CHLORIDE 9 MG/ML
1000 INJECTION, SOLUTION INTRAVENOUS
Qty: 0 | Refills: 0 | Status: DISCONTINUED | OUTPATIENT
Start: 2019-05-06 | End: 2019-05-06

## 2019-05-06 RX ORDER — INFLUENZA VIRUS VACCINE 15; 15; 15; 15 UG/.5ML; UG/.5ML; UG/.5ML; UG/.5ML
0.5 SUSPENSION INTRAMUSCULAR ONCE
Qty: 0 | Refills: 0 | Status: DISCONTINUED | OUTPATIENT
Start: 2019-05-06 | End: 2019-05-11

## 2019-05-06 RX ORDER — VANCOMYCIN HCL 1 G
750 VIAL (EA) INTRAVENOUS ONCE
Qty: 0 | Refills: 0 | Status: COMPLETED | OUTPATIENT
Start: 2019-05-06 | End: 2019-05-06

## 2019-05-06 RX ORDER — HEPARIN SODIUM 5000 [USP'U]/ML
5000 INJECTION INTRAVENOUS; SUBCUTANEOUS EVERY 8 HOURS
Qty: 0 | Refills: 0 | Status: DISCONTINUED | OUTPATIENT
Start: 2019-05-06 | End: 2019-05-06

## 2019-05-06 RX ORDER — PIPERACILLIN AND TAZOBACTAM 4; .5 G/20ML; G/20ML
3.38 INJECTION, POWDER, LYOPHILIZED, FOR SOLUTION INTRAVENOUS EVERY 12 HOURS
Qty: 0 | Refills: 0 | Status: DISCONTINUED | OUTPATIENT
Start: 2019-05-06 | End: 2019-05-10

## 2019-05-06 RX ORDER — IPRATROPIUM/ALBUTEROL SULFATE 18-103MCG
3 AEROSOL WITH ADAPTER (GRAM) INHALATION EVERY 6 HOURS
Qty: 0 | Refills: 0 | Status: DISCONTINUED | OUTPATIENT
Start: 2019-05-06 | End: 2019-05-11

## 2019-05-06 RX ORDER — CHLORHEXIDINE GLUCONATE 213 G/1000ML
1 SOLUTION TOPICAL
Qty: 0 | Refills: 0 | Status: DISCONTINUED | OUTPATIENT
Start: 2019-05-06 | End: 2019-05-11

## 2019-05-06 RX ORDER — NICOTINE POLACRILEX 2 MG
1 GUM BUCCAL DAILY
Qty: 0 | Refills: 0 | Status: DISCONTINUED | OUTPATIENT
Start: 2019-05-06 | End: 2019-05-11

## 2019-05-06 RX ADMIN — Medication 0.5 MILLIGRAM(S): at 17:22

## 2019-05-06 RX ADMIN — Medication 3 MILLILITER(S): at 11:48

## 2019-05-06 RX ADMIN — LIDOCAINE 1 PATCH: 4 CREAM TOPICAL at 12:08

## 2019-05-06 RX ADMIN — SODIUM CHLORIDE 75 MILLILITER(S): 9 INJECTION, SOLUTION INTRAVENOUS at 09:15

## 2019-05-06 RX ADMIN — Medication 3 MILLILITER(S): at 06:42

## 2019-05-06 RX ADMIN — ONDANSETRON 4 MILLIGRAM(S): 8 TABLET, FILM COATED ORAL at 10:33

## 2019-05-06 RX ADMIN — Medication 3.62 MICROGRAM(S)/KG/MIN: at 03:35

## 2019-05-06 RX ADMIN — Medication 250 MILLIGRAM(S): at 06:55

## 2019-05-06 RX ADMIN — MIDODRINE HYDROCHLORIDE 20 MILLIGRAM(S): 2.5 TABLET ORAL at 14:13

## 2019-05-06 RX ADMIN — Medication 1 PATCH: at 19:13

## 2019-05-06 RX ADMIN — METHYLNALTREXONE BROMIDE 6 MILLIGRAM(S): 12 INJECTION, SOLUTION SUBCUTANEOUS at 10:24

## 2019-05-06 RX ADMIN — OXYCODONE AND ACETAMINOPHEN 1 TABLET(S): 5; 325 TABLET ORAL at 17:49

## 2019-05-06 RX ADMIN — OXYCODONE AND ACETAMINOPHEN 1 TABLET(S): 5; 325 TABLET ORAL at 18:30

## 2019-05-06 RX ADMIN — Medication 50 MILLIGRAM(S): at 01:10

## 2019-05-06 RX ADMIN — PIPERACILLIN AND TAZOBACTAM 25 GRAM(S): 4; .5 INJECTION, POWDER, LYOPHILIZED, FOR SOLUTION INTRAVENOUS at 21:28

## 2019-05-06 RX ADMIN — MIDODRINE HYDROCHLORIDE 20 MILLIGRAM(S): 2.5 TABLET ORAL at 21:02

## 2019-05-06 RX ADMIN — Medication 1 PATCH: at 21:31

## 2019-05-06 RX ADMIN — Medication 3 MILLILITER(S): at 23:47

## 2019-05-06 RX ADMIN — CHLORHEXIDINE GLUCONATE 1 APPLICATION(S): 213 SOLUTION TOPICAL at 06:08

## 2019-05-06 RX ADMIN — PIPERACILLIN AND TAZOBACTAM 200 GRAM(S): 4; .5 INJECTION, POWDER, LYOPHILIZED, FOR SOLUTION INTRAVENOUS at 06:09

## 2019-05-06 RX ADMIN — LIDOCAINE 1 PATCH: 4 CREAM TOPICAL at 23:19

## 2019-05-06 RX ADMIN — SODIUM CHLORIDE 333.33 MILLILITER(S): 9 INJECTION, SOLUTION INTRAVENOUS at 01:10

## 2019-05-06 RX ADMIN — LIDOCAINE 1 PATCH: 4 CREAM TOPICAL at 19:13

## 2019-05-06 RX ADMIN — PANTOPRAZOLE SODIUM 40 MILLIGRAM(S): 20 TABLET, DELAYED RELEASE ORAL at 08:19

## 2019-05-06 RX ADMIN — Medication 3 MILLILITER(S): at 17:22

## 2019-05-06 RX ADMIN — HEPARIN SODIUM 5000 UNIT(S): 5000 INJECTION INTRAVENOUS; SUBCUTANEOUS at 06:08

## 2019-05-06 RX ADMIN — HEPARIN SODIUM 5000 UNIT(S): 5000 INJECTION INTRAVENOUS; SUBCUTANEOUS at 17:48

## 2019-05-06 RX ADMIN — Medication 100 GRAM(S): at 08:19

## 2019-05-06 RX ADMIN — CHLORHEXIDINE GLUCONATE 1 APPLICATION(S): 213 SOLUTION TOPICAL at 12:26

## 2019-05-06 RX ADMIN — Medication 1 PATCH: at 06:16

## 2019-05-06 RX ADMIN — Medication 1 PATCH: at 21:13

## 2019-05-06 NOTE — ED ADULT NURSE REASSESSMENT NOTE - NS ED NURSE REASSESS COMMENT FT1
Indwelling urinary catheter placed using aseptic technique. Sterile equipment utilized. Second RN present to confirm sterility. Draining to gravity - initial output of 200ml. Secured w/ stat lock to R upper leg. Explained procedure as it was being done - Pt tolerated procedure well. Comfort and safety provided.
Levophed infusion started per MD orders. Pt. hypotensive post 4L LR of fluid resuscitation. Pt. total output 250mL. Pt. unable to tolerate more fluid as per MD Metcalf.
Report received from change of shift RN Denise at bedside. Assisted patient with bedpan, cleaned and assessed skin. Pt. has stage 1 gluteal pressure ulcer and right hip stage 1 pressure ulcer. Patient is hypotensive, receiving fluids as ordered, MDs aware. Pt. is complaining of pain in hands she reports is chronic and is requesting pain medication. Dr. Metcalf aware and discussed with patient she has to hold until her BP stabilizes. Will reassess and medicate per MD orders.
MICU resident MD left bedside to go discuss pt. with her attending for decision of whether or not to admit to MICU. Patient's BP is 94/47.
MICU MD at bedside assessing patient. Pt. has fluctuating BPs. MDs aware. Patient is awake, alert, a&ox3, mentating well.
Inpatient provider Elvia at bedside assessing patient and plan is to have MICU consult patient. Patient appears comfortable and is a&ox3. Will continue to monitor.
Patient became hypotensive and is receiving IV Fluids per Dr. Metcalf orders. Patient is Patient is awake, alert, a&ox3, complaining of chronic pain in back, but is aware she can not receive narcotics while hypotensive. Dr. Metcalf aware of patient's pain. Pt. is admitted waiting for bed assignment. Will continue to monitor and reassess patient.
pt. became hypotensive to 80s/30s and is now 82/40. ED MD - Dr. Metcalf aware. Inpatient provider Elvia paged and is on her way to assess patient.

## 2019-05-06 NOTE — H&P ADULT - NSHPPOAPULMEMBOLUS_GEN_A_CORE
Chief complaint:   Chief Complaint   Patient presents with   • Derm Problem     Bilateral arms, a little to stomach, started on Saturday.  Alone.         Vitals:  Visit Vitals  /82 (BP Location: E, Patient Position: Sitting, Cuff Size: Large Adult)   Pulse 72   Temp 97.8 °F (36.6 °C) (Oral)   Resp 16   Wt 85 kg   SpO2 97%   BMI 28.49 kg/m²       HISTORY OF PRESENT ILLNESS     Herber Olivares is a 40 year old male that presents with itchy rash to bilateral arms.  States it started Saturday after putting plastic on the windows of his home.  States as soon as he was done his arms started itching.  Never used this brand before.  Slight rash on his abdomen also.  No other changes with anything. No one else has a rash around him.  He has been using over the counter hydrocortisone and taking benadryl.  States it helps but wears off and itch comes back.          Other significant problems:  Patient Active Problem List    Diagnosis Date Noted   • MDD (major depressive disorder), recurrent episode, mild (CMS/[facility]) 10/17/2017     Priority: Low   • Neuropathy 08/03/2017     Priority: Low   • Failed back syndrome of lumbar spine 08/03/2017     Priority: Low   • Malaise and fatigue 06/08/2017     Priority: Low   • Generalized anxiety disorder 01/09/2017     Priority: Low   • Chronic right-sided thoracic back pain 06/01/2016     Priority: Low   • Migraine with aura and without status migrainosus, not intractable 06/01/2016     Priority: Low   • Depression 01/11/2016     Priority: Low   • Chronic neck pain 09/23/2015     Priority: Low   • Cervical disc disease      Priority: Low   • Fibromyalgia      Priority: Low   • Degenerative disc disease      Priority: Low       PAST MEDICAL, FAMILY AND SOCIAL HISTORY     Medications:  Current Outpatient Prescriptions   Medication   • pregabalin (LYRICA) 100 MG capsule   • FLUoxetine 60 MG tablet   • cyclobenzaprine (FLEXERIL) 10 MG tablet   • DIAZepam (VALIUM) 2 MG tablet   •  cetirizine (ZYRTEC) 10 MG tablet   • rizatriptan (MAXALT) 10 MG tablet     No current facility-administered medications for this visit.        Allergies:  ALLERGIES:   Allergen Reactions   • Compazine CARDIAC DISTURBANCES and Other (See Comments)     Feels like things are crawling on him   • Cymbalta [Duloxetine Hcl] INSOMNIA     Constipation,insomnia, wt gain, bladder   • Gabapentin DIZZINESS     Syncope, dizziness   • Wellbutrin [Bupropion] CONSTIPATION     Ineffective.  Constipation       Past Medical  History/Surgeries:  Past Medical History:   Diagnosis Date   • Cervical disc disease     postlaminectomy syndrome   • Chronic sinusitis    • Degenerative disc disease    • Fibromyalgia 2013   • Insomnia    • Major depression    • Migraines 1996   • TBI (traumatic brain injury) (CMS/[facility]) 01/01/1991    left frontal lobe while in middle school       Past Surgical History:   Procedure Laterality Date   • BACK SURGERY  01/01/2001    fusion   • CERVICAL DISC SURGERY  07/02/2014    c3-c4   • CRANIOTOMY  01/01/1991    left frontal fx,  plate placed   • HAND SURGERY Right 01/01/1998    plates   • LUMBAR LAMINECTOMY  2005    l5-s1   • NECK SURGERY  01/01/2009    or in 2007       Family History:  Family History   Problem Relation Age of Onset   • Breast cancer Mother 72   • Cancer Mother      skin   • Arthritis Mother    • Heart disease Father      A fib   • Arthritis Father      Knees   • Hypertension Father    • Diabetes Brother        Social History:  Social History   Substance Use Topics   • Smoking status: Never Smoker   • Smokeless tobacco: Never Used   • Alcohol use Yes      Comment: occ       REVIEW OF SYSTEMS     Review of Systems   All other systems reviewed and are negative.      PHYSICAL EXAM     Physical Exam   Constitutional: He is oriented to person, place, and time. He appears well-developed and well-nourished. No distress.   Neurological: He is oriented to person, place, and time.   Skin: Skin is warm and  dry. Rash (erythematous macular rash to bilateral inner forearms and slight area to abdomen. ) noted. He is not diaphoretic.   Nursing note and vitals reviewed.      ASSESSMENT/PLAN     (L25.9) Contact dermatitis, unspecified contact dermatitis type, unspecified trigger  (primary encounter diagnosis)  Comment:   Plan: methylPREDNISolone (MEDROL, J CARLOS,) 4 MG tablet,         betamethasone valerate (VALISONE) 0.1 % cream          Continue with benadryl also  Follow up with primary provider or ER/Urgent Care if symptoms don't improve or if symptoms significantly worsen within the next 2 weeks.    Patient Instructions     Contact Dermatitis  Contact dermatitis is a skin rash caused by something that touches the skin and makes it irritated and inflamed.  Your skin may be red, swollen, dry, and may be cracked. Blisters may form and ooze. The rash will itch.  Contact dermatitis can form on the face and neck, backs of hands, forearms, genitals, and lower legs.  People can get contact dermatitis from lots of sources. These include:  · Plants such as poison ivy, oak, or sumac  · Chemicals in hair dyes and rinses, soaps, solvents, waxes, fingernail polish, and deodorants   · Jewelry or watchbands made of nickel  Contact dermatitis is not passed from person to person.  Talk with your health care provider about what may have caused the rash. You will need to avoid the source of your rash in the future to prevent it from coming back.  Treatment is done to relieve itching and prevent the rash from coming back. The rash should go away in a few days to a few weeks.  Home care  The health care provider may prescribe medications to relieve swelling and itching. Follow all instructions when using these medications.  General care:  · Avoid anything that heats up your skin, such as hot showers or baths, or direct sunlight. This can make itching worse.  · Apply cold compresses to soothe your sores to help relieve your symptoms. Do this for  30 minutes 3 to 4 times a day. You can make a cold compress by soaking a cloth in cold water. Squeeze out excess water. You can add colloidal oatmeal to the water to help reduce itching. For severe itching in a small area, apply an ice pack wrapped in a thin towel. Do this for 20 minutes 3 to 4 times a day.  · You can also try wet dressings. One way to do this is to wear a wet piece of clothing under a dry one. Wear a damp shirt under a dry shirt if your upper body is affected. This can relieve itching and prevent you from scratching the affected area.  · You can also help relieve large areas of itching by taking a lukewarm bath with colloidal oatmeal added to the water.  · Use hydrocortisone cream for redness and irritation, unless another medicine was prescribed. You can also use benzocaine anesthetic cream or spray. Calamine lotion can also relieve mild symptoms.  · Use oral diphenhydramine to help reduce itching. This is an antihistamine you can buy at drug and grocery stores. It can make you sleepy, so use lower doses during the daytime. Or you can use loratadine. This is an antihistamine that will not make you sleepy. Do not use diphenhydramine if you have glaucoma or have trouble urinating due to an enlarged prostate.  · If your rash is caused by a plant, make sure to wash your skin and the clothes you were wearing when you came into contact with the plant. This is to wash away the plant oils that gave you the rash and prevent more or worse symptoms.  · Stay away from the substance or object that causes your symptoms. If you can’t avoid it, wear gloves or some other type of protection.  Follow-up care  Follow up with your health care provider.  When to seek medical advice  Call your health care provider right away if any of these occur:  · Spreading of the rash to other parts of your body  · Severe swelling of your face, eyelids, mouth, throat or tongue  · Trouble urinating due to swelling in the genital  area  · Fever of 100.4°F (38°C) or higher  · Redness or swelling that gets worse  · Pain that gets worse  · Foul-smelling fluid leaking from the skin  · Yellow-brown crusts on the open blisters     © 5823-0249 [employer]. [address] [place] [address], [place] [address]. All rights reserved. This information is not intended as a substitute for professional medical care. Always follow your healthcare professional's instructions.    Interested in decreasing your wait time in the Walk-in/Urgent Care Clinic?  Same day reservations can now be made on line.  Go to [email or web address] to see the wait times at each [facility]gent Care and to make a reservation at the site that is most convenient for you. We will do our best to honor your reservation time but please understand that wait times are subject to change once you arrive at the clinic.        Thank you for visiting the [facility], [place].    It is difficult to recognize all elements of any illness or injury in a single visit. The examination, treatment and x-rays received are on a preliminary basis only. A radiologist will also review your x-rays for final reading. Call your primary care provider if you have questions or problems before your next appointment. If you are unable to  reach your Primary Care Provider (PCP), please call or return to the Walk-In Clinic.  If symptoms worsen or do not resolve please follow-up with your Primary Care Provider (PCP), Walk-In or the nearest  Emergency Room for emergency symptoms.  If you are unsure of whom to follow up with, call [phone] and ask to speak with your PCP, the Walk-In nurse or the on-call Provider if you are calling after hours.      If you are referred to a specialist or scheduled for a test, our Referrals department will call you with your appointment date and time within 3 business days. If you have not heard from them in this time frame, please call [phone] and ask for  the Referrals department.     Test results: Unless otherwise instructed, you should be notified of test results within one week. Please call our office if you do not hear from us within this time frame at [phone].     Hours:  Monday through Friday: 7:00 am to 7:00 pm  Saturday: 7:00 am to 3:00 pm  Sunday: 7:00 am to 3:00 pm.    The Walk-in is open 365 days a year!  Holiday hours may vary, please call [phone] on Holidays.    Thank you again for visiting [facility].  Kathi Meza NP    Help us to grow our quality of service!  We want to improve - and you can help!  You may receive a survey in the mail.  This is your opportunity to tell us what excellent service you received and where we could use improvement.  We value your input!                no

## 2019-05-06 NOTE — H&P ADULT - PROBLEM SELECTOR PLAN 2
Patient is oliguric, with SHAKIRA on CKD stage 3. Suspect likely prerenal due to hypovolemia, ATN possible due to hypotension however FeNA was 0.6% (based on labs obtained after 3L)- less likely.   UA did not identify muddy brown casts, but was positive for leukocyte esterase and bacteria suspicions for UTI.   Check renal and bladder US.   Repeat CMP.   Continue arevalo catheter. Trend strict I&Os.  Pt was unable to have CT performed with contrast due to renal failure.

## 2019-05-06 NOTE — DIETITIAN INITIAL EVALUATION ADULT. - ENERGY NEEDS
Ht: 62"  Wt: 80  BMI: 14.7 kg/m2   IBW: 110 (+/-10%)     73% IBW  Edema: none      Skin: no pressure injuries documented

## 2019-05-06 NOTE — H&P ADULT - PROBLEM SELECTOR PLAN 1
The patient has persistent hypotension, oliguria, SHAKIRA on CKD and elevated serum lactate on admission leading to concern for shock state.   Hgb 12 (appears concentrated compared to previous Hgb of 9-10 from 4/2019) thus unlikely hemorrhagic. Unlikely to be septic shock given lack of fever, tachycardia, or leukocytosis. Unlikely anaphylactic shock given no history of allergen exposure. Unlikely neurogenic shock given lack of focal neurologic deficits  Obstructive shock unlikely given pt's not tachypneic, hypoxic.   May be hypovolemic.   Pt received 4L IV LR without improvement in BP, now SBP in 70s. MICU consulted. Starting 5th IVLR and will start levophed if BP persistently low.  Await MICU recommendations.

## 2019-05-06 NOTE — CONSULT NOTE ADULT - ASSESSMENT
73 year old female with PMH Lung mass, chronic Back pain (on Narcotics), history of multiple SBO's, narcotic associated constipation/ileus, COPD, current everyday smoker admitted with abdominal pain and distension, nausea and poor PO intake and CT findings of SBO.  Prior similar admissions improved after PO NArcan or Movantik/Relistor. Known poor home meds compliance as pt does not like SE of diarrhea.  Clinically not obstructed - passing flatus and has had BMs  Suspect Narcotic Associated Dysmotility - was on Naloxegol 25 mg PO daily    RECS:  -refusing NGT, but if develops Nausea/Vomiting would rec. NGT  -NPO  -enema or disimpaction today as per discussion with  MICU team  -Once has additional BMs, can try PO clears and monitor clinically  -Monitor electrolytes and Cr and UO  -Naloxegol 25 mg PO Daily  -IV hydration    Meds compliance reinforced with pt  Discussed with Surgical attendings  Discussed with MICU team and attending    Jake Khan PA-C    [facility]  [phone]  After hours and weekend coverage [phone]

## 2019-05-06 NOTE — PROGRESS NOTE ADULT - SUBJECTIVE AND OBJECTIVE BOX
Patient examined and case reviewed in detail on bedside rounds  Critically ill on pressor with urosepsis/partial SBO/advanced stage lung cancer  Frequent bedside visits with therapy change today. Crit Care Time Today 35 min +

## 2019-05-06 NOTE — DIETITIAN INITIAL EVALUATION ADULT. - ETIOLOGY
inability to meet estimated nutrition needs in context of altered GI function w/ multiple SBO, catabolic illness w/ lung Ca

## 2019-05-06 NOTE — H&P ADULT - NSHPLABSRESULTS_GEN_ALL_CORE
Labs, imaging and EKG personally reviewed by me.     CBC is notable for 5% band neutrophils. CMP found low chloride of 86, elevated SCr of 4.16. Procalcitonin elevated at 0.63. Serum magnesium low of 1.4, phos elevated at 8.3.   Lactate elevated at 4.1, repeat decreased to 2.4.   UA positive for bacteria, leukocyte esterase.    Telemetry- 13 beats of atrial tachycardia    LABS:                        12.2   4.0   )-----------( 309      ( 05 May 2019 19:10 )             38.1     Hgb Trend: 12.2<--  05    143  |  93<L>  |  54<H>  ----------------------------<  104<H>  4.4   |  30  |  3.93<H>    Ca    7.0<L>      05 May 2019 22:30  Phos  8.3       Mg     1.4         TPro  6.1  /  Alb  3.3  /  TBili  0.3  /  DBili  x   /  AST  11  /  ALT  12  /  AlkPhos  146<H>      Creatinine Trend: 3.93<--, 4.16<--, 3.96<--, 1.27<--, 1.16<--, 1.19<--  PT/INR - ( 05 May 2019 18:31 )   PT: 13.5 sec;   INR: 1.18 ratio         PTT - ( 05 May 2019 18:31 )  PTT:30.2 sec  Urinalysis Basic - ( 06 May 2019 00:40 )    Color: Yellow / Appearance: Slightly Turbid / S.016 / pH: x  Gluc: x / Ketone: Negative  / Bili: Negative / Urobili: Negative   Blood: x / Protein: 100 / Nitrite: Negative   Leuk Esterase: Large / RBC: 4 /hpf /  /HPF   Sq Epi: x / Non Sq Epi: 0 /hpf / Bacteria: Many    Venous Blood Gas:   @ 22:30  7.32/71/31/35/42  VBG Lactate: 2.4  Venous Blood Gas:   @ 19:10  7.32/75/26/38/33  VBG Lactate: 4.1    < from: CT Abdomen and Pelvis No Cont (19 @ 20:08) >    PROCEDURE:   CT of the Abdomen and Pelvis was performed without intravenous contrast.   Intravenous contrast: None.  Oral contrast: None.  Sagittal and coronal reformats were performed.    FINDINGS:    LOWER CHEST: Within normal limits.    LIVER: Right hepatectomy  BILE DUCTS: Unchanged intrahepatic dilation.  GALLBLADDER: Cholecystectomy.  SPLEEN: Within normal limits.  PANCREAS: Within normal limits.  ADRENALS: Within normal limits.  KIDNEYS/URETERS: Multiple bilateral 2 mm nonobstructing calculi.    BLADDER: Within normal limits.  REPRODUCTIVE ORGANS: Limited evaluation.    BOWEL: Interval mild increase in diffuse small bowel and gastric distention. A transition point is again questioned in the right josh-abdomen where there was previous swirling. Swirling is too lesser degree on the current study. Appendix not visualized. No significant bowel wall thickening or pneumatosis.  PERITONEUM: No ascites. No free air.  VESSELS:  Atherosclerosis  RETROPERITONEUM: No lymphadenopathy.    ABDOMINAL WALL: Within normal limits.  BONES: Mild degenerative changes.    IMPRESSION:  Suboptimal study due to the absence of oral and IV contrast.    Small bowel obstruction mildly worsened since prior study without clear transition point. Questionable transition point in the right upper quadrant with previously seen swirling was. No free air or ascites.  < end of copied text >

## 2019-05-06 NOTE — H&P ADULT - HISTORY OF PRESENT ILLNESS
This patient is a 73yoF with PMH of recurrent SBOs, lung cancer s/p RTx and active smoker, COPD (no home O2), breast CA s/p R mastectomy and chemotherapy (2006), chronic lower back pain 2/2 herniated disc with opioid dependence, ?CAD who presents to the ED with complaint of nausea and vomiting for 4 days. She endorsed mild abdominal pain. She was unable to tolerate PO. She endorsed having nonbloody bowel movements and has been passing gas. She denied any fever or chills, dysuria, hematuria, changes in urine output. Pt reports no changes in medications since her last hospitalization.     Pt was  hospitalized from 4/18-4/22/2019 for episode of SBO eval, suspected opioid induced constipation, during which she had NGT placement and was kept NPO, with IVF hydration.       In the ED, T 97.3F, HR 88, BP 99/69, RR 18, SpO2 98% RA   She was given aztreonam 500mg IV, dilaudid 0.5mg I, Magnesium sulfate 1g IV, nicotine patch, zofran 4mg IVP , LR 4L, duoneb x2 This patient is a 73yoF with PMH of recurrent SBOs, lung cancer s/p RTx and active smoker, COPD (no home O2), breast CA s/p R mastectomy and chemotherapy (2006), chronic lower back pain 2/2 herniated disc with opioid dependence, ?CAD who presents to the ED with complaint of nausea and vomiting for 4 days. She endorsed mild abdominal pain. She was unable to tolerate PO. She endorsed having nonbloody bowel movements and has been passing gas. She denied any fever or chills, dysuria, hematuria, changes in urine output. Pt reports no changes in medications since her last hospitalization. Pt reports that she had not been offered surgery for her lung cancer due to her frailty. She is not interested in pursuing surgical intervention at this time.    Pt was  hospitalized from 4/18-4/22/2019 for episode of SBO eval, suspected opioid induced constipation, during which she had NGT placement and was kept NPO, with IVF hydration.       In the ED, T 97.3F, HR 88, BP 99/69, RR 18, SpO2 98% RA   She was given aztreonam 500mg IV, dilaudid 0.5mg I, Magnesium sulfate 1g IV, nicotine patch, zofran 4mg IVP , LR 4L, duoneb x2

## 2019-05-06 NOTE — CONSULT NOTE ADULT - ASSESSMENT
73 year old female with squamous cell lung cancer s/p RT completed 2 weeks ago, breast cancer s/p chemo and mastectomy, COPD and recurrent SBO admitted for shock state likely 2/2 sepsis from UTI versus PE with acute renal failure.    #Neuro  - AAOx3, mentating at baseline    #CV  - Shock state- likely from sepsis from UTI, versus PE in the setting of malignancy  - C/w Levo to maintain MAP>65  - Will evaluate LV function and check for pulmonary edema on bedside POCUS  - Hold home enalapril  - Patient unable to go for CT angio with IV contrast due to SHAKIRA to evaluate for PE- will order VQ scan  - Repeat EKG- patient reportedly with brief period of ATach earlier in the evening    #Pulm  - VQ scan to evaluate for possible PE  - Patient with history of lung cancer s/p radiation  - C/w supplemental oxygen PRN goal >88%  - Duonebs q6h  - C/w budesonide    #GI  - History of SBO- no clear transition point on CTAP, patient passing flatus and having bowel movements  - Keep NPO for now  - Zofran PRN for nausea/vomiting    #Renal  - Acute renal failure with oliguria- creatinine 3.93, increased from 1.1 at baseline- likely 2/2 ATN from decreased PO intake, nausea, vomiting  - FeNa 0.6%  - S/p 4L LR- monitor urine output via [place]av[place]ria  - US Renal  - Nephrology consult in AM    #ID  - UA grossly positive, urine culture sent  - Past urine culture from 4/19/19 positive for Klebsiella pneumoniae  - C/w vancomycin and Zosyn  - Send blood cultures, procalcitonin    #Endo  - No acute issues    #Heme  - H/H at baseline 8-9  - DVT prophylaxis with HSQ 73 year old female with squamous cell lung cancer s/p RT completed 2 weeks ago, breast cancer s/p chemo and mastectomy, COPD and recurrent SBO admitted for shock state likely 2/2 sepsis from UTI versus PE with acute renal failure.    #Neuro  - AAOx3, mentating at baseline    #CV  - Shock state- likely from sepsis from UTI, versus PE in the setting of malignancy  - C/w Levo to maintain MAP>65  - Will evaluate LV function and check for pulmonary edema on bedside POCUS  - Hold home enalapril  - Patient unable to go for CT angio with IV contrast due to SHAKIRA to evaluate for PE- will order VQ scan  - Repeat EKG- patient reportedly with brief period of ATach earlier in the evening    #Pulm  - VQ scan to evaluate for possible PE  - Patient with history of lung cancer s/p radiation  - C/w supplemental oxygen PRN goal >88%  - Duonebs q6h  - C/w budesonide    #GI  - History of SBO- no clear transition point on CTAP, patient passing flatus and having bowel movements  - Keep NPO for now  - Zofran PRN for nausea/vomiting    #Renal  - Acute renal failure with oliguria- creatinine 3.93, increased from 1.1 at baseline- likely 2/2 ATN from decreased PO intake, nausea, vomiting  - FeNa 0.6%  - S/p 4L LR- monitor urine output via [place]av[place]ria  - US Renal  - Nephrology consult in AM    #ID  - UA grossly positive, urine culture sent  - Past urine culture from 4/19/19 positive for Klebsiella pneumoniae  - C/w vancomycin and Zosyn  - Procalcitonin 0.63  - Send blood cultures    #Endo  - No acute issues    #Heme  - H/H at baseline 8-9  - DVT prophylaxis with HSQ 73 year old female with squamous cell lung cancer s/p RT completed 2 weeks ago, breast cancer s/p chemo and mastectomy, COPD and recurrent SBO admitted for shock state likely 2/2 sepsis from UTI versus PE with acute renal failure.    #Neuro  - AAOx3, mentating at baseline    #CV  - Shock state- likely from sepsis from UTI, versus PE in the setting of malignancy  - C/w Levo to maintain MAP>65  - Will evaluate LV function and check for pulmonary edema on bedside POCUS  - Hold home enalapril  - Patient unable to go for CT angio with IV contrast due to SHAKIRA to evaluate for PE- will order VQ scan  - Repeat EKG- patient reportedly with brief period of ATach earlier in the evening    #Pulm  - VQ scan to evaluate for possible PE  - Patient with history of lung cancer s/p radiation  - C/w supplemental oxygen PRN goal >88%  - Duonebs q6h  - C/w budesonide    #GI  - History of SBO- no clear transition point on CTAP, patient passing flatus and having bowel movements  - Keep NPO for now  - Zofran PRN for nausea/vomiting    #Renal  - Acute renal failure with oliguria- creatinine 3.93, increased from 1.1 at baseline- likely 2/2 ATN from decreased PO intake, nausea, vomiting  - FeNa 0.6%  - S/p 4L LR- monitor urine output via [place]ria  - US Renal  - Nephrology consult in AM    #ID  - UA grossly positive, urine culture sent  - Past urine culture from 4/19/19 positive for Klebsiella pneumoniae  - CXR with no clear consolidations noted  - C/w vancomycin and Zosyn  - Procalcitonin 0.63  - Send blood cultures    #Endo  - No acute issues    #Heme  - H/H at baseline 8-9  - DVT prophylaxis with HSQ 73 year old female with squamous cell lung cancer s/p RT completed 2 weeks ago, breast cancer s/p chemo and mastectomy, COPD and recurrent SBO admitted for shock state likely 2/2 sepsis from UTI versus PE with acute renal failure.    #Neuro  - AAOx3, mentating at baseline    #CV  - Shock state- likely from sepsis from UTI, versus PE in the setting of malignancy  - C/w Levo to maintain MAP>65  - Will evaluate LV function and check for pulmonary edema on bedside POCUS  - Hold home enalapril  - Patient unable to go for CT angio with IV contrast due to SHAKIRA to evaluate for PE- will order VQ scan  - Repeat EKG- patient reportedly with brief period of ATach earlier in the evening    #Pulm  - VQ scan to evaluate for possible PE  - Patient with history of lung cancer s/p radiation  - C/w supplemental oxygen PRN goal >88%  - Duonebs q6h  - C/w budesonide    #GI  - History of SBO- no clear transition point on CTAP, patient passing flatus and having bowel movements  - Keep NPO for now  - Zofran PRN for nausea/vomiting    #Renal  - Acute renal failure with oliguria- creatinine 3.93, increased from 1.1 at baseline- likely 2/2 ATN from decreased PO intake, nausea, vomiting  - FeNa 0.6%  - S/p 4L LR- monitor urine output via [place]av[place]ria  - US Renal  - Nephrology consult in AM    #ID  - UA grossly positive, urine culture sent  - Past urine culture from 4/19/19 positive for Klebsiella pneumoniae  - CXR with no clear consolidations noted  - C/w Zosyn  - Procalcitonin 0.63  - Send blood cultures    #Endo  - No acute issues    #Heme  - H/H at baseline 8-9  - DVT prophylaxis with HSQ 73 year old female with squamous cell lung cancer s/p RT completed 2 weeks ago, breast cancer s/p chemo and mastectomy, COPD and recurrent SBO admitted for shock state likely 2/2 sepsis from UTI versus PE with acute renal failure.    #Neuro  - AAOx3, mentating at baseline    #CV  - Shock state- likely from sepsis from UTI, versus PE in the setting of malignancy  - C/w Levo to maintain MAP>65  - Will evaluate LV function and check for pulmonary edema on bedside POCUS  - Hold home enalapril  - Check troponins  - Patient unable to go for CT angio with IV contrast due to SHAKIRA to evaluate for PE- will order VQ scan  - Repeat EKG- patient reportedly with brief period of ATach earlier in the evening    #Pulm  - VQ scan to evaluate for possible PE  - Patient with history of lung cancer s/p radiation  - C/w supplemental oxygen PRN goal >88%  - Duonebs q6h  - C/w budesonide    #GI  - History of SBO- no clear transition point on CTAP, patient passing flatus and having bowel movements  - Keep NPO for now  - Zofran PRN for nausea/vomiting    #Renal  - Acute renal failure with oliguria- creatinine 3.93, increased from 1.1 at baseline- likely 2/2 ATN from decreased PO intake, nausea, vomiting  - FeNa 0.6%  - S/p 4L LR- monitor urine output via [place]av[place]ria  - US Renal  - Nephrology consult in AM    #ID  - UA grossly positive, urine culture sent  - Past urine culture from 4/19/19 positive for Klebsiella pneumoniae  - CXR with no clear consolidations noted  - C/w Zosyn  - Procalcitonin 0.63  - Send blood cultures    #Endo  - No acute issues    #Heme  - H/H at baseline 8-9  - DVT prophylaxis with HSQ 73 year old female with squamous cell lung cancer s/p RT completed 2 weeks ago, breast cancer s/p chemo and mastectomy, COPD and recurrent SBO admitted for shock state likely 2/2 sepsis from UTI versus PE with acute renal failure.    #Neuro  - AAOx3, mentating at baseline    #CV  - Shock state- likely from sepsis from UTI, versus PE in the setting of malignancy  - C/w Levo to maintain MAP>65  - Will evaluate LV function and check for pulmonary edema on bedside POCUS  - Hold home enalapril  - Check troponins  - Patient unable to go for CT angio with IV contrast due to SHAKIRA to evaluate for PE- will order VQ scan  - Repeat EKG- patient reportedly with brief period of ATach earlier in the evening    #Pulm  - VQ scan to evaluate for possible PE  - Patient with history of lung cancer s/p radiation  - C/w supplemental oxygen PRN goal >88%  - Duonebs q6h  - C/w budesonide    #GI  - History of SBO- no clear transition point on CTAP, patient passing flatus and having bowel movements  - Keep NPO for now  - Zofran PRN for nausea/vomiting    #Renal  - Acute renal failure with oliguria- creatinine 3.93, increased from 1.1 at baseline- likely 2/2 ATN from decreased PO intake, nausea, vomiting  - FeNa 0.6%  - S/p 4L LR- monitor urine output via [place]av[place]ria  - US Renal  - Nephrology consult in AM    #ID  - UA grossly positive, urine culture sent  - Past urine culture from 4/19/19 positive for Klebsiella pneumoniae  - CXR with no clear consolidations noted- awaiting final read  - C/w vancomycin and Zosyn to cover for HCAP and UTI  - Procalcitonin 0.63  - Send blood cultures    #Endo  - No acute issues    #Heme  - H/H at baseline 8-9  - DVT prophylaxis with HSQ 73 year old female with squamous cell lung cancer s/p RT completed 2 weeks ago, breast cancer s/p chemo and mastectomy, COPD and recurrent SBO admitted for shock state likely 2/2 sepsis from UTI versus PE with acute renal failure.    #Neuro  - AAOx3, mentating at baseline    #CV  - Shock state- likely from sepsis from UTI, versus PE in the setting of malignancy  - C/w Levo to maintain MAP>65  - Will evaluate LV function and check for pulmonary edema on bedside POCUS  - Hold home enalapril  - Check troponins  - Patient unable to go for CT angio with IV contrast due to SHAKIRA to evaluate for PE- will order VQ scan  - Repeat EKG- patient reportedly with brief period of ATach earlier in the evening    #Pulm  - VQ scan to evaluate for possible PE  - Patient with history of lung cancer s/p radiation  - C/w supplemental oxygen PRN goal >88%  - Duonebs q6h  - C/w budesonide    #GI  - History of SBO- no clear transition point on CTAP, patient passing flatus and having bowel movements  - Keep NPO for now  - Zofran PRN for nausea/vomiting    #Renal  - Acute renal failure with oliguria- creatinine 3.93, increased from 1.1 at baseline- likely 2/2 ATN from decreased PO intake, nausea, vomiting  - FeNa 0.6%  - S/p 4L LR- monitor urine output via [place]avarria  - US Renal  - Nephrology consult in AM    #ID  - UA grossly positive, urine culture sent  - Past urine culture from 4/19/19 positive for Klebsiella pneumoniae  - CXR with no clear consolidations noted- awaiting final read  - C/w vancomycin and Zosyn to cover for HCAP and UTI  - Procalcitonin 0.63  - Send blood cultures  - Check RVP    #Endo  - No acute issues    #Heme  - H/H at baseline 8-9  - DVT prophylaxis with HSQ 73 year old female with squamous cell lung cancer s/p RT completed 2 weeks ago, breast cancer s/p chemo and mastectomy, COPD and recurrent SBO admitted for shock state likely 2/2 sepsis from UTI versus PE with acute renal failure.    #Neuro  - AAOx3, mentating at baseline    #CV  - Shock state- likely from sepsis from UTI, versus PE in the setting of malignancy  - C/w Levo to maintain MAP>65  - Will evaluate LV function and check for pulmonary edema on bedside POCUS  - Hold home enalapril  - Troponin 35  - Patient unable to go for CT angio with IV contrast due to SHAKIRA to evaluate for PE- VQ scan may not be accurate in setting of patient's lung malignancy  - Repeat EKG- patient reportedly with brief period of ATach earlier in the evening    #Pulm  - Patient with history of lung cancer s/p radiation  - C/w supplemental oxygen PRN, goal >88%  - Duonebs q6h  - C/w budesonide    #GI  - History of SBO- no clear transition point on CTAP, patient passing flatus and having bowel movements  - Keep NPO for now  - Zofran PRN for nausea/vomiting  - Advance diet as tolerated    #Renal  - Acute renal failure with oliguria- creatinine 3.93, increased from 1.1 at baseline- likely 2/2 ATN from decreased PO intake, nausea, vomiting  - FeNa 0.6%  - S/p 4L LR- monitor urine output via [place]avarria  - Renal US ordered  - Nephrology consult in AM    #ID  - UA grossly positive, urine culture sent  - Past urine culture from 4/19/19 positive for Klebsiella pneumoniae  - CXR with no clear consolidations noted- awaiting final read  - C/w vancomycin and Zosyn to cover for HCAP and UTI  - Procalcitonin 0.63  - Send blood cultures  - Check RVP    #Endo  - No acute issues    #Heme  - H/H at baseline 8-9  - DVT prophylaxis with HSQ

## 2019-05-06 NOTE — CONSULT NOTE ADULT - ATTENDING COMMENTS
Patient is seen and examined.   74 yo woman with squamous cell lung ca (s/p RT), chronic back pain on oxycodone, anxiety on chronic benzos, complicated surgical history (hysterectomy, appendectomy, cholecystectomy, ex-lap x2), breast CA (s/p R mastectomy 2006, chemoRT), liver mass resection in 2009, multiple prior SBO due to narcotic-associated ileus/constipation (last admission 4/2019) admitted with septic/hypovolemic shock requiring pressor support and SBO  Neuro: c/w oxycodone at decreased dose  CV: septic/hypovolemic shock; s/p volume resuscitation, now on low dose pressors, midodrine when able to tolerate PO  Resp: no acute issues  ID: +UA, on zosyn. f/u cultures  GI: chronic SBO, slightly worse from before. No surgical intervention at this time. Relistor  Renal: SHAKIRA on CKD, likely pre-renal vs ATN. Hold ACE Monitor UO and Cr  Onc: squamous cell lung ca s/p RT.

## 2019-05-06 NOTE — DIETITIAN INITIAL EVALUATION ADULT. - PERTINENT MEDS FT
MEDICATIONS  (STANDING):  ALBUTerol/ipratropium for Nebulization 3 milliLiter(s) Nebulizer every 6 hours  buDESOnide   0.5 milliGRAM(s) Respule 0.5 milliGRAM(s) Inhalation two times a day  chlorhexidine 2% Cloths 1 Application(s) Topical daily  chlorhexidine 4% Liquid 1 Application(s) Topical <User Schedule>  heparin  Injectable 5000 Unit(s) SubCutaneous every 12 hours  influenza   Vaccine 0.5 milliLiter(s) IntraMuscular once  lactated ringers. 1000 milliLiter(s) (75 mL/Hr) IV Continuous <Continuous>  lidocaine   Patch 1 Patch Transdermal daily  midodrine 20 milliGRAM(s) Oral every 8 hours  nicotine -  14 mG/24Hr(s) Patch 1 patch Transdermal daily  norepinephrine Infusion 0.05 MICROgram(s)/kG/Min (3.619 mL/Hr) IV Continuous <Continuous>  pantoprazole    Tablet 40 milliGRAM(s) Oral before breakfast  piperacillin/tazobactam IVPB. 3.375 Gram(s) IV Intermittent every 12 hours    MEDICATIONS  (PRN):  ondansetron Injectable 4 milliGRAM(s) IV Push every 6 hours PRN Nausea and/or Vomiting

## 2019-05-06 NOTE — H&P ADULT - PROBLEM SELECTOR PLAN 10
VTE ppx: IMPROVE score of 4 (based on decreased mobility, cancer, age) heparin subQ q12h   Activity: bedrest for now  Diet: NPO for now

## 2019-05-06 NOTE — DIETITIAN INITIAL EVALUATION ADULT. - ADHERENCE
No diet restrictions, friend/aide shops and prepares meals for her. Has a list at home of high calorie, high protein foods to consume but states that she has never eaten some of them before (i.e. peanut butter), doesn't drink milk. Has been eating mashed potatoes w/butter as night snack.  Has tried several supplements in the past, health shakes, magic cup ice cream, Ensure but didn't like the smell/taste of them, doesn't want to try any other supplements.

## 2019-05-06 NOTE — CONSULT NOTE ADULT - SUBJECTIVE AND OBJECTIVE BOX
General Surgery Consult  Consulting surgical team: [facility]  Consulting attending: Ward    HPI:  This patient is a 73yoF with PMH of recurrent SBOs, lung cancer s/p RTx and active smoker, COPD (no home O2), breast CA s/p R mastectomy and chemotherapy (), chronic lower back pain 2/2 herniated disc with opioid dependence, ?CAD who presents to the ED with complaint of nausea and vomiting for 4 days. She endorsed mild abdominal pain. She was unable to tolerate PO. She endorsed having nonbloody bowel movements and has been passing gas. She denied any fever or chills, dysuria, hematuria, changes in urine output. Pt reports no changes in medications since her last hospitalization.     At [facility] she had a large bowel movement, after which she reports her nausea improved and her abdomen felt better. She is refusing an NGT at this time. She has repeat SBOs and she refuses surgical intervention, stating that she is too frail.     PAST MEDICAL HISTORY:  Back pain  Bowel obstruction  Kidney stone  Emphysema  Narcotic Dependence  Breast Cancer  S/P Radiation Therapy  S/P Chemotherapy, Time Since Greater than 12 Weeks  Narcotic Dysmotile Cilia Syndrome  Chronic Pain Following Surgery or Procedure  Ankle Fracture  History of Small Bowel Obstruction  Asthma  Breast Cancer      PAST SURGICAL HISTORY:  S/P hernia surgery  S/P Exploratory Laparotomy  S/P Appendectomy  S/P Cholecystectomy  Liver Mass s/p liver rsxn  History of Hysterectomy  History of Right Mastectomy  S/P Right Mastectomy      MEDICATIONS:  chlorhexidine 2% Cloths 1 Application(s) Topical daily  chlorhexidine 4% Liquid 1 Application(s) Topical <User Schedule>  heparin  Injectable 5000 Unit(s) SubCutaneous every 8 hours  lactated ringers Bolus 1000 milliLiter(s) IV Bolus once  lidocaine   Patch 1 Patch Transdermal daily  norepinephrine Infusion 0.05 MICROgram(s)/kG/Min IV Continuous <Continuous>      ALLERGIES:  Biaxin (Rash)  Cipro (Headache)  Flagyl (Headache)  penicillin (Rash; Swelling)  sulfa drugs (Unknown)      VITALS & I/Os:  Vital Signs Last 24 Hrs  T(C): 36.4 (06 May 2019 00:12), Max: 36.5 (05 May 2019 20:35)  T(F): 97.5 (06 May 2019 00:12), Max: 97.7 (05 May 2019 20:35)  HR: 77 (06 May 2019 04:01) (69 - 88)  BP: 116/48 (06 May 2019 04:10) (77/41 - 116/48)  BP(mean): 64 (06 May 2019 04:10) (54 - 66)  RR: 12 (06 May 2019 04:01) (12 - 22)  SpO2: 97% (06 May 2019 04:01) (88% - 100%)    I&O's Summary    05 May 2019 07:01  -  06 May 2019 04:12  --------------------------------------------------------  IN: 3500 mL / OUT: 250 mL / NET: 3250 mL        PHYSICAL EXAM:  General: No acute distress  Respiratory: Nonlabored  Cardiovascular: RRR  Abdominal: Soft, nondistended, nontender. No rebound or guarding. No organomegaly, no palpable mass.  Extremities: Warm    LABS:                        8.7    2.9   )-----------( 195      ( 06 May 2019 03:06 )             27.1     05-06    141  |  96  |  51<H>  ----------------------------<  104<H>  4.4   |  27  |  3.48<H>    Ca    6.6<L>      06 May 2019 03:06  Phos  8.3     05-05  Mg     1.4     05-05    TPro  5.0<L>  /  Alb  2.5<L>  /  TBili  0.3  /  DBili  x   /  AST  12  /  ALT  12  /  AlkPhos  118  -06    Lactate:  05-05 @ 22:30  2.4  05-05 @ 19:10  4.1    PT/INR - ( 05 May 2019 18:31 )   PT: 13.5 sec;   INR: 1.18 ratio         PTT - ( 05 May 2019 18:31 )  PTT:30.2 sec  ABG - ( 06 May 2019 03:11 )  pH, Arterial: 7.36  pH, Blood: x     /  pCO2: 53    /  pO2: 57    / HCO3: 29    / Base Excess: 3.5   /  SaO2: 84          Urinalysis Basic - ( 06 May 2019 00:40 )    Color: Yellow / Appearance: Slightly Turbid / S.016 / pH: x  Gluc: x / Ketone: Negative  / Bili: Negative / Urobili: Negative   Blood: x / Protein: 100 / Nitrite: Negative   Leuk Esterase: Large / RBC: 4 /hpf /  /HPF   Sq Epi: x / Non Sq Epi: 0 /hpf / Bacteria: Many        IMAGING:  Pending General Surgery Consult  Consulting surgical team: [facility]  Consulting attending: Ward    HPI:  This patient is a 73yoF with PMH of recurrent SBOs, lung cancer s/p RTx and active smoker, COPD (no home O2), breast CA s/p R mastectomy and chemotherapy (), chronic lower back pain 2/2 herniated disc with opioid dependence, ?CAD who presents to the ED with complaint of nausea and vomiting for 4 days. She endorsed mild abdominal pain. She was unable to tolerate PO. She endorsed having nonbloody bowel movements and has been passing gas. She denied any fever or chills, dysuria, hematuria, changes in urine output. Pt reports no changes in medications since her last hospitalization.     At [facility] she had a large bowel movement, after which she reports her nausea improved and her abdomen felt better. She is refusing an NGT at this time. She has repeat admits with CTs read as SBO's but all have resolved with either oral narcan or Movantic/Relistor.  Patient has known narcotic bowel but does not take meds at home because she does not like the diarrhea.  This results in repeat readmissions for the same problem     PAST MEDICAL HISTORY:  Back pain  Bowel obstruction  Kidney stone  Emphysema  Narcotic Dependence  Breast Cancer  S/P Radiation Therapy  S/P Chemotherapy, Time Since Greater than 12 Weeks  Narcotic Dysmotile Cilia Syndrome  Chronic Pain Following Surgery or Procedure  Ankle Fracture  History of Small Bowel Obstruction  Asthma  Breast Cancer      PAST SURGICAL HISTORY:  S/P hernia surgery  S/P Exploratory Laparotomy  S/P Appendectomy  S/P Cholecystectomy  Liver Mass s/p liver rsxn  History of Hysterectomy  History of Right Mastectomy  S/P Right Mastectomy      MEDICATIONS:  chlorhexidine 2% Cloths 1 Application(s) Topical daily  chlorhexidine 4% Liquid 1 Application(s) Topical <User Schedule>  heparin  Injectable 5000 Unit(s) SubCutaneous every 8 hours  lactated ringers Bolus 1000 milliLiter(s) IV Bolus once  lidocaine   Patch 1 Patch Transdermal daily  norepinephrine Infusion 0.05 MICROgram(s)/kG/Min IV Continuous <Continuous>      ALLERGIES:  Biaxin (Rash)  Cipro (Headache)  Flagyl (Headache)  penicillin (Rash; Swelling)  sulfa drugs (Unknown)      VITALS & I/Os:  Vital Signs Last 24 Hrs  T(C): 36.4 (06 May 2019 00:12), Max: 36.5 (05 May 2019 20:35)  T(F): 97.5 (06 May 2019 00:12), Max: 97.7 (05 May 2019 20:35)  HR: 77 (06 May 2019 04:01) (69 - 88)  BP: 116/48 (06 May 2019 04:10) (77/41 - 116/48)  BP(mean): 64 (06 May 2019 04:10) (54 - 66)  RR: 12 (06 May 2019 04:01) (12 - 22)  SpO2: 97% (06 May 2019 04:01) (88% - 100%)    I&O's Summary    05 May 2019 07:01  -  06 May 2019 04:12  --------------------------------------------------------  IN: 3500 mL / OUT: 250 mL / NET: 3250 mL        PHYSICAL EXAM:  General: No acute distress  Respiratory: Nonlabored  Cardiovascular: RRR  Abdominal: Soft, nondistended, nontender. No rebound or guarding. No organomegaly, no palpable mass.  Extremities: Warm    LABS:                        8.7    2.9   )-----------( 195      ( 06 May 2019 03:06 )             27.1     05-06    141  |  96  |  51<H>  ----------------------------<  104<H>  4.4   |  27  |  3.48<H>    Ca    6.6<L>      06 May 2019 03:06  Phos  8.3     05-  Mg     1.4     05-    TPro  5.0<L>  /  Alb  2.5<L>  /  TBili  0.3  /  DBili  x   /  AST  12  /  ALT  12  /  AlkPhos  118  -    Lactate:  05-05 @ 22:30  2.4  05-05 @ 19:10  4.1    PT/INR - ( 05 May 2019 18:31 )   PT: 13.5 sec;   INR: 1.18 ratio         PTT - ( 05 May 2019 18:31 )  PTT:30.2 sec  ABG - ( 06 May 2019 03:11 )  pH, Arterial: 7.36  pH, Blood: x     /  pCO2: 53    /  pO2: 57    / HCO3: 29    / Base Excess: 3.5   /  SaO2: 84          Urinalysis Basic - ( 06 May 2019 00:40 )    Color: Yellow / Appearance: Slightly Turbid / S.016 / pH: x  Gluc: x / Ketone: Negative  / Bili: Negative / Urobili: Negative   Blood: x / Protein: 100 / Nitrite: Negative   Leuk Esterase: Large / RBC: 4 /hpf /  /HPF   Sq Epi: x / Non Sq Epi: 0 /hpf / Bacteria: Many        IMAGING:  Pending

## 2019-05-06 NOTE — H&P ADULT - NSHPSOCIALHISTORY_GEN_ALL_CORE
The patient lives alone. She gets assistance from an aide for grocery shopping twice a week.  She denies history of alcohol or drug use.  She smokes 1pack cigarettes daily for the last 56 years.

## 2019-05-06 NOTE — H&P ADULT - PROBLEM SELECTOR PLAN 4
Patient at this point denies N/V, abdominal pain and has no abdominal tenderness on exam, is passing gas and stool, thus low clinical suspicion for small bowel obstruction.  Keep NPO for now. Pt declines NG tube.  Monitor for recurrence of nausea and vomiting.  Pt was seen by general surgery team in ED, follow up recommendations.

## 2019-05-06 NOTE — CONSULT NOTE ADULT - ATTENDING COMMENTS
I have seen and examined the patient.  I agree with the surgical resident's note.  Will follow with medicine.    Eddie Hopper contact information [phone] I have seen and examined the patient.  I agree with the surgical resident's note.    Eddie Hopper contact information [phone]    She has repeat admits with CTs read as SBO's but all have resolved with either oral narcan or Movantic/Relistor.  Patient has known narcotic bowel but does not take meds at home because she does not like the diarrhea.  This results in repeat readmissions for the same problem.  Patient is followed by Dr. Rondon from GI when admitted and Dr. Rondon group should be consulted.  Their PA, Jake Soliman, has been notified of the admit.  Will sign off of case.  Narcotic bowel to be managed by GI.  Please call back PRN    Garrett Livingston  Colorectal Surgery  [phone]

## 2019-05-06 NOTE — H&P ADULT - ASSESSMENT
This patient is a 73yoF with PMH of recurrent SBOs, lung cancer s/p RTx and active smoker, COPD (no home O2), breast CA s/p R mastectomy and chemotherapy (2006), chronic lower back pain 2/2 herniated disc with opioid dependence, ?CAD who presents to the ED with complaint of nausea and vomiting for 4 days. She endorsed mild abdominal pain. She was unable to tolerate PO. She endorsed having nonbloody bowel movements and has been passing gas. She denied any fever or chills, dysuria, hematuria, changes in urine output. Pt reports no changes in medications since her last hospitalization.     Pt was  hospitalized from 4/18-4/22/2019 for episode of SBO eval, suspected opioid induced constipation, during which she had NGT placement and was kept NPO, with IVF hydration.       In the ED, T 97.3F, HR 88, BP 99/69, RR 18, SpO2 98% RA   She was given aztreonam 500mg IV, dilaudid 0.5mg I, Magnesium sulfate 1g IV, nicotine patch, zofran 4mg IVP , LR 4L, duoneb x2 This patient is a 73yoF with PMH of recurrent SBOs, lung cancer s/p RTx and active smoker, COPD (no home O2), breast CA s/p R mastectomy and chemotherapy (2006), chronic lower back pain 2/2 herniated disc with opioid dependence, ?CAD who presents to the ED with complaint of nausea and vomiting for 4 days, initially suspected to have small bowel obstruction, now without abdominal symptoms, but with SHAKIRA on CKD stage 3, oliguria, elevated lactate and persistent hypotension concerning for unspecified shock.

## 2019-05-06 NOTE — H&P ADULT - PROBLEM SELECTOR PLAN 5
Restart home COPD regimen with tiotropium qd , budesonide BID, albuterol PRN.  Continue nicotine patch while inpatient.

## 2019-05-06 NOTE — CONSULT NOTE ADULT - SUBJECTIVE AND OBJECTIVE BOX
HPI:  73yoF with PMH of recurrent SBOs, lung cancer s/p RTx and active smoker, COPD (no home O2),   breast CA s/p R mastectomy and chemotherapy (), chronic lower back pain 2/2 herniated disc with opioid dependence,   who presents to the ED with complaint of nausea and vomiting for 4 days. She endorsed mild abdominal pain. She was unable to tolerate PO. She endorsed having nonbloody bowel movements and has been passing gas. She denied any fever or chills, dysuria, hematuria   She is not interested in pursuing surgical intervention at this time.    Pt was  hospitalized from -2019 for episode of SBO eval, suspected opioid induced constipation, during which she had NGT placement and was kept NPO, with IVF hydration.       In the ED, T 97.3F, HR 88, BP 99/69, RR 18, SpO2 98% RA   She was given aztreonam 500mg IV, dilaudid 0.5mg I, Magnesium sulfate 1g IV, nicotine patch, zofran 4mg IVP , LR 4L, duoneb x2 (06 May 2019 01:25)      REVIEW OF SYSTEMS:  GEN: no fever,    no chills  RESP: no SOB,   no cough  CVS: no chest pain,   no palpitations  GI: no abdominal pain,   no nausea,   no vomiting,   no constipation,   no diarrhea  : no dysuria,   no frequency  NEURO: no headache,   no dizziness  PSYCH: no depression,   not anxious  Derm : no rash    Allergies    Biaxin (Rash)  Cipro (Headache)  Flagyl (Headache)  penicillin (Rash; Swelling)  sulfa drugs (Unknown)    Intolerances        CAPILLARY BLOOD GLUCOSE        I&O's Summary    05 May 2019 07:01  -  06 May 2019 07:00  --------------------------------------------------------  IN: 3865 mL / OUT: 300 mL / NET: 3565 mL        Vital Signs Last 24 Hrs  T(C): 36.5 (06 May 2019 04:55), Max: 36.5 (05 May 2019 20:35)  T(F): 97.7 (06 May 2019 04:55), Max: 97.7 (05 May 2019 20:35)  HR: 67 (06 May 2019 08:15) (67 - 88)  BP: 115/56 (06 May 2019 08:15) (77/41 - 116/56)  BP(mean): 74 (06 May 2019 08:15) (54 - 81)  RR: 37 (06 May 2019 08:15) (12 - 40)  SpO2: 100% (06 May 2019 08:15) (88% - 100%)  PHYSICAL EXAM:  GENERAL: NAD,   HEAD:  Atraumatic, Normocephalic/ c/c  cachexia  EYES: EOMI,  NECK: Supple, No JVD  CHEST/LUNG: Clear to auscultation bilaterally; No wheeze  HEART: Regular rate and rhythm;        murmur  ABDOMEN: Soft, Nontender,   EXTREMITIES:     no    edema  NEUROLOGY:  alert    MEDICATIONS  (STANDING):  ALBUTerol/ipratropium for Nebulization 3 milliLiter(s) Nebulizer every 6 hours  buDESOnide   0.5 milliGRAM(s) Respule 0.5 milliGRAM(s) Inhalation two times a day  chlorhexidine 2% Cloths 1 Application(s) Topical daily  chlorhexidine 4% Liquid 1 Application(s) Topical <User Schedule>  heparin  Injectable 5000 Unit(s) SubCutaneous every 12 hours  influenza   Vaccine 0.5 milliLiter(s) IntraMuscular once  lactated ringers. 1000 milliLiter(s) (75 mL/Hr) IV Continuous <Continuous>  lidocaine   Patch 1 Patch Transdermal daily  methylnaltrexone Injectable 6 milliGRAM(s) SubCutaneous once  midodrine 20 milliGRAM(s) Oral every 8 hours  nicotine -  14 mG/24Hr(s) Patch 1 patch Transdermal daily  norepinephrine Infusion 0.05 MICROgram(s)/kG/Min (3.619 mL/Hr) IV Continuous <Continuous>  pantoprazole    Tablet 40 milliGRAM(s) Oral before breakfast  piperacillin/tazobactam IVPB. 3.375 Gram(s) IV Intermittent every 12 hours    MEDICATIONS  (PRN):  ondansetron Injectable 4 milliGRAM(s) IV Push every 6 hours PRN Nausea and/or Vomiting    LABS:                        9.6    3.6   )-----------( 222      ( 06 May 2019 05:34 )             29.3     05-06    140  |  98  |  50<H>  ----------------------------<  114<H>  4.7   |  26  |  3.35<H>    Ca    6.7<L>      06 May 2019 05:34  Phos  5.6     05  Mg     1.5         TPro  5.6<L>  /  Alb  2.8<L>  /  TBili  0.3  /  DBili  x   /  AST  15  /  ALT  14  /  AlkPhos  131<H>      PT/INR - ( 06 May 2019 05:34 )   PT: 14.4 sec;   INR: 1.25 ratio         PTT - ( 06 May 2019 05:34 )  PTT:34.0 sec  CARDIAC MARKERS ( 06 May 2019 03:06 )  x     / x     / 33 U/L / x     / 1.5 ng/mL      Urinalysis Basic - ( 06 May 2019 00:40 )    Color: Yellow / Appearance: Slightly Turbid / S.016 / pH: x  Gluc: x / Ketone: Negative  / Bili: Negative / Urobili: Negative   Blood: x / Protein: 100 / Nitrite: Negative   Leuk Esterase: Large / RBC: 4 /hpf /  /HPF   Sq Epi: x / Non Sq Epi: 0 /hpf / Bacteria: Many        ABG - ( 06 May 2019 05:39 )  pH, Arterial: 7.40  pH, Blood: x     /  pCO2: 47    /  pO2: 113   / HCO3: 29    / Base Excess: 4.0   /  SaO2: 97                - @ 22:30  3.8  31              Consultant(s) Notes Reviewed:      Care Discussed with Consultants/Other Providers:  Plan:

## 2019-05-06 NOTE — CONSULT NOTE ADULT - SUBJECTIVE AND OBJECTIVE BOX
MICU ACCEPT NOTE    CHIEF COMPLAINT: Nausea and vomiting    HPI: Patient is a 73 year old female with squamous cell lung cancer s/p RT completed 2 weeks ago, breast cancer s/p chemo and mastectomy, COPD and recurrent SBO presenting with nausea and vomiting. Patient's symptoms started 3 days ago. She reports not eating or drinking anything since then due to fear of vomiting. She denies abdominal pain, fevers or chills. She has been having regular bowel movements. She denies dysuria or urinary frequency but has been urinating less. She was last seen by her PCP on Thursday and reports that her blood pressure was "normal" at her visit.    ED vitals: Temp 97.5 HR 74 BP 94/46 RR 16 O2 95% on room air. CTAP showed no acute changes with prior surgeries noted. UA returned positive, labs significant for creatinine 3.93, increased from baseline of 1.1. Patient's BP dropped in the ED from 110s to 70s despite receiving 4L LR. Last BP 77/51 and patient was started on Levophed.    PAST MEDICAL & SURGICAL HISTORY:  Back pain  Bowel obstruction: multiple hospitalizations  Kidney stone  Emphysema  Narcotic Dependence  S/P Radiation Therapy  S/P Chemotherapy, Time Since Greater than 12 Weeks  Narcotic Dysmotile Cilia Syndrome  Chronic Pain Following Surgery or Procedure: following right breast mastectomy  Ankle Fracture: right anle fx s/p cast   History of Small Bowel Obstruction: 2010  Asthma  Breast Cancer  S/P hernia surgery: femoral hernia -   S/P Exploratory Laparotomy  S/P Appendectomy  S/P Cholecystectomy  Liver Mass s/p liver rsxn: s/p resection   History of Hysterectomy:   S/P Right Mastectomy:       FAMILY HISTORY:  No pertinent family history in first degree relatives      SOCIAL HISTORY:  Smoking: [ ] Never Smoked [ ] Former Smoker (__ packs x ___ years) [X] Current Smoker  (1 packs x 56 years)  Substance Use: [X] Never Used [ ] Used ____  EtOH Use: Denies  Marital Status: [ ] Single [ ]  [ ]  [ ]   Sexual History:   Occupation:  Recent Travel:  Country of Birth:  Advance Directives:    Allergies    Biaxin (Rash)  Cipro (Headache)  Flagyl (Headache)  penicillin (Rash; Swelling)  sulfa drugs (Unknown)    Intolerances    HOME MEDICATIONS: Reviewed    REVIEW OF SYSTEMS:  Constitutional: [X] negative [ ] fevers [ ] chills [ ] weight loss [ ] weight gain  HEENT: [ ] negative [ ] dry eyes [ ] eye irritation [ ] postnasal drip [ ] nasal congestion  CV: [ ] negative  [ ] chest pain [ ] orthopnea [ ] palpitations [ ] murmur  Resp: [X] negative [ ] cough [ ] shortness of breath [ ] dyspnea [ ] wheezing [ ] sputum [ ] hemoptysis  GI: [ ] negative [X] nausea [X] vomiting [ ] diarrhea [ ] constipation [ ] abd pain [ ] dysphagia   : [X] negative [ ] dysuria [ ] nocturia [ ] hematuria [ ] increased urinary frequency  Musculoskeletal: [ ] negative [ ] back pain [ ] myalgias [ ] arthralgias [ ] fracture  Skin: [ ] negative [ ] rash [ ] itch  Neurological: [X] negative [ ] headache [ ] dizziness [ ] syncope [ ] weakness [ ] numbness  Psychiatric: [ ] negative [ ] anxiety [ ] depression  Endocrine: [ ] negative [ ] diabetes [ ] thyroid problem  Hematologic/Lymphatic: [ ] negative [ ] anemia [ ] bleeding problem  Allergic/Immunologic: [ ] negative [ ] itchy eyes [ ] nasal discharge [ ] hives [ ] angioedema  [ ] All other systems negative  [ ] Unable to assess ROS because ________    OBJECTIVE:  ICU Vital Signs Last 24 Hrs  T(C): 36.4 (06 May 2019 00:12), Max: 36.5 (05 May 2019 20:35)  T(F): 97.5 (06 May 2019 00:12), Max: 97.7 (05 May 2019 20:35)  HR: 72 (06 May 2019 03:28) (69 - 88)  BP: 77/41 (06 May 2019 03:28) (77/41 - 103/63)  BP(mean): --  ABP: --  ABP(mean): --  RR: 14 (06 May 2019 03:28) (14 - 22)  SpO2: 94% (06 May 2019 03:28) (88% - 100%)         @ 07:01  -  - @ 03:41  --------------------------------------------------------  IN: 3500 mL / OUT: 250 mL / NET: 3250 mL      CAPILLARY BLOOD GLUCOSE    PHYSICAL EXAM:  General: Elderly female, cachectic, resting in bed comfortably, on 2L NC  HEENT: PERRL, EOMI  Lymph Nodes: No lymphadenopathy  Neck: Supple, no JVD  Respiratory: Wheezes in b/l lung fields, rhonchi in right middle and lower lobes  Cardiovascular: +S1, S2, regular rate and rhythm, no murmurs, rubs or gallops  Abdomen: Soft, nontender, nondistended, normal bowel sounds in all 4 quadrants  Extremities: Full ROM, no joint swelling  Skin: No rashes or edema  Neurological: AAOx3, follows commands, 5/5 strength in all extremities  Psychiatry: Appropriate affect    LINES: [facility]    HOSPITAL MEDICATIONS:    norepinephrine Infusion 0.05 MICROgram(s)/kG/Min IV Continuous <Continuous>  lactated ringers Bolus 1000 milliLiter(s) IV Bolus once  lidocaine   Patch 1 Patch Transdermal daily    LABS:                        8.7    2.9   )-----------( 195      ( 06 May 2019 03:06 )             27.1     Hgb Trend: 8.7<--, 12.2<--  05-05    143  |  93<L>  |  54<H>  ----------------------------<  104<H>  4.4   |  30  |  3.93<H>    Ca    7.0<L>      05 May 2019 22:30  Phos  8.3     05-05  Mg     1.4     -    TPro  6.1  /  Alb  3.3  /  TBili  0.3  /  DBili  x   /  AST  11  /  ALT  12  /  AlkPhos  146<H>  05-05    Creatinine Trend: 3.93<--, 4.16<--, 3.96<--, 1.27<--, 1.16<--, 1.19<--  PT/INR - ( 05 May 2019 18:31 )   PT: 13.5 sec;   INR: 1.18 ratio         PTT - ( 05 May 2019 18:31 )  PTT:30.2 sec  Urinalysis Basic - ( 06 May 2019 00:40 )    Color: Yellow / Appearance: Slightly Turbid / S.016 / pH: x  Gluc: x / Ketone: Negative  / Bili: Negative / Urobili: Negative   Blood: x / Protein: 100 / Nitrite: Negative   Leuk Esterase: Large / RBC: 4 /hpf /  /HPF   Sq Epi: x / Non Sq Epi: 0 /hpf / Bacteria: Many      Arterial Blood Gas:   @ 03:11  7.36/53/57/29/84/3.5  ABG lactate: --    Venous Blood Gas:   @ 22:30  7.32/71/31/35/42  VBG Lactate: 2.4  Venous Blood Gas:   @ 19:10  7.32/75/26/38/33  VBG Lactate: 4.1      MICROBIOLOGY: Urine culture  sent    RADIOLOGY:  < from: CT Abdomen and Pelvis No Cont (19 @ 20:08) >  FINDINGS:    LOWER CHEST: Within normal limits.    LIVER: Right hepatectomy  BILE DUCTS: Unchanged intrahepatic dilation.  GALLBLADDER: Cholecystectomy.  SPLEEN: Within normal limits.  PANCREAS: Within normal limits.  ADRENALS: Within normal limits.  KIDNEYS/URETERS: Multiple bilateral 2 mm nonobstructing calculi.    BLADDER: Within normallimits.  REPRODUCTIVE ORGANS: Limited evaluation.    BOWEL: Interval mild increase in diffuse small bowel and gastric   distention. A transition point is again questioned in the right   hemiabdomen where there was previous swirling. Swirling is too lesser   degree on the current study Appendix not visualized. No significant bowel   wall thickening or pneumatosis.  PERITONEUM: No ascites. No free air.  VESSELS:  Atherosclerosis  RETROPERITONEUM: No lymphadenopathy.    ABDOMINAL WALL: Within normallimits.  BONES: Mild degenerative changes.    IMPRESSION:  Suboptimal study due to the absence of oral and IV contrast.    Small bowel obstruction mildly worsened since prior study without clear   transition point. Questionable transition point in the right upper   quadrant with previously seen swirling was. No free air or ascites.    EKG: MICU ACCEPT NOTE    CHIEF COMPLAINT: Nausea and vomiting    HPI: Patient is a 73 year old female with squamous cell lung cancer s/p RT completed 2 weeks ago, breast cancer s/p chemo and mastectomy, COPD and recurrent SBO presenting with nausea and vomiting. Patient's symptoms started 3 days ago. She reports not eating or drinking anything since then due to fear of vomiting. She denies abdominal pain, fevers or chills. She has been having regular bowel movements. She denies dysuria or urinary frequency but has been urinating less. She was last seen by her PCP on Thursday and reports that her blood pressure was "normal" at her visit.    Patient had a recent hospitalization from  to  for abdominal pain and constipation and was admitted for possible SBO. She began to pass bowel movements after being given Narcan and was discharged home.     ED vitals: Temp 97.5 HR 74 BP 94/46 RR 16 O2 95% on room air. CTAP showed no acute changes with prior surgeries noted. UA returned positive, labs significant for creatinine 3.93, increased from baseline of 1.1. Patient's BP dropped in the ED from 110s to 70s despite receiving 4L LR. Last BP 77/51 and patient was started on Levophed.    PAST MEDICAL & SURGICAL HISTORY:  Back pain  Bowel obstruction: multiple hospitalizations  Kidney stone  Emphysema  Narcotic Dependence  S/P Radiation Therapy  S/P Chemotherapy, Time Since Greater than 12 Weeks  Narcotic Dysmotile Cilia Syndrome  Chronic Pain Following Surgery or Procedure: following right breast mastectomy  Ankle Fracture: right anle fx s/p cast   History of Small Bowel Obstruction: 2010  Asthma  Breast Cancer  S/P hernia surgery: femoral hernia -   S/P Exploratory Laparotomy  S/P Appendectomy  S/P Cholecystectomy  Liver Mass s/p liver rsxn: s/p resection   History of Hysterectomy:   S/P Right Mastectomy:       FAMILY HISTORY:  No pertinent family history in first degree relatives      SOCIAL HISTORY:  Smoking: [ ] Never Smoked [ ] Former Smoker (__ packs x ___ years) [X] Current Smoker  (1 packs x 56 years)  Substance Use: [X] Never Used [ ] Used ____  EtOH Use: Denies  Marital Status: [ ] Single [ ]  [ ]  [ ]   Sexual History:   Occupation:  Recent Travel:  Country of Birth:  Advance Directives:    Allergies    Biaxin (Rash)  Cipro (Headache)  Flagyl (Headache)  penicillin (Rash; Swelling)  sulfa drugs (Unknown)    Intolerances    HOME MEDICATIONS: Reviewed    REVIEW OF SYSTEMS:  Constitutional: [X] negative [ ] fevers [ ] chills [ ] weight loss [ ] weight gain  HEENT: [ ] negative [ ] dry eyes [ ] eye irritation [ ] postnasal drip [ ] nasal congestion  CV: [ ] negative  [ ] chest pain [ ] orthopnea [ ] palpitations [ ] murmur  Resp: [X] negative [ ] cough [ ] shortness of breath [ ] dyspnea [ ] wheezing [ ] sputum [ ] hemoptysis  GI: [ ] negative [X] nausea [X] vomiting [ ] diarrhea [ ] constipation [ ] abd pain [ ] dysphagia   : [X] negative [ ] dysuria [ ] nocturia [ ] hematuria [ ] increased urinary frequency  Musculoskeletal: [ ] negative [ ] back pain [ ] myalgias [ ] arthralgias [ ] fracture  Skin: [ ] negative [ ] rash [ ] itch  Neurological: [X] negative [ ] headache [ ] dizziness [ ] syncope [ ] weakness [ ] numbness  Psychiatric: [ ] negative [ ] anxiety [ ] depression  Endocrine: [ ] negative [ ] diabetes [ ] thyroid problem  Hematologic/Lymphatic: [ ] negative [ ] anemia [ ] bleeding problem  Allergic/Immunologic: [ ] negative [ ] itchy eyes [ ] nasal discharge [ ] hives [ ] angioedema  [ ] All other systems negative  [ ] Unable to assess ROS because ________    OBJECTIVE:  ICU Vital Signs Last 24 Hrs  T(C): 36.4 (06 May 2019 00:12), Max: 36.5 (05 May 2019 20:35)  T(F): 97.5 (06 May 2019 00:12), Max: 97.7 (05 May 2019 20:35)  HR: 72 (06 May 2019 03:28) (69 - 88)  BP: 77/41 (06 May 2019 03:28) (77/41 - 103/63)  BP(mean): --  ABP: --  ABP(mean): --  RR: 14 (06 May 2019 03:28) (14 - 22)  SpO2: 94% (06 May 2019 03:28) (88% - 100%)        05-05 @ 07:01  -  05-06 @ 03:41  --------------------------------------------------------  IN: 3500 mL / OUT: 250 mL / NET: 3250 mL      CAPILLARY BLOOD GLUCOSE    PHYSICAL EXAM:  General: Elderly female, cachectic, resting in bed comfortably, on 2L NC  HEENT: PERRL, EOMI  Lymph Nodes: No lymphadenopathy  Neck: Supple, no JVD  Respiratory: Wheezes in b/l lung fields, rhonchi in right middle and lower lobes  Cardiovascular: +S1, S2, regular rate and rhythm, no murmurs, rubs or gallops  Abdomen: Soft, nontender, nondistended, normal bowel sounds in all 4 quadrants  Extremities: Full ROM, no joint swelling  Skin: No rashes or edema  Neurological: AAOx3, follows commands, 5/5 strength in all extremities  Psychiatry: Appropriate affect    LINES: [facility] MEDICATIONS:    norepinephrine Infusion 0.05 MICROgram(s)/kG/Min IV Continuous <Continuous>  lactated ringers Bolus 1000 milliLiter(s) IV Bolus once  lidocaine   Patch 1 Patch Transdermal daily    LABS:                        8.7    2.9   )-----------( 195      ( 06 May 2019 03:06 )             27.1     Hgb Trend: 8.7<--, 12.2<--  05-05    143  |  93<L>  |  54<H>  ----------------------------<  104<H>  4.4   |  30  |  3.93<H>    Ca    7.0<L>      05 May 2019 22:30  Phos  8.3     05-05  Mg     1.4     05-05    TPro  6.1  /  Alb  3.3  /  TBili  0.3  /  DBili  x   /  AST  11  /  ALT  12  /  AlkPhos  146<H>  05-05    Creatinine Trend: 3.93<--, 4.16<--, 3.96<--, 1.27<--, 1.16<--, 1.19<--  PT/INR - ( 05 May 2019 18:31 )   PT: 13.5 sec;   INR: 1.18 ratio         PTT - ( 05 May 2019 18:31 )  PTT:30.2 sec  Urinalysis Basic - ( 06 May 2019 00:40 )    Color: Yellow / Appearance: Slightly Turbid / S.016 / pH: x  Gluc: x / Ketone: Negative  / Bili: Negative / Urobili: Negative   Blood: x / Protein: 100 / Nitrite: Negative   Leuk Esterase: Large / RBC: 4 /hpf /  /HPF   Sq Epi: x / Non Sq Epi: 0 /hpf / Bacteria: Many      Arterial Blood Gas:   @ 03:11  7.36/53/57/29/84/3.5  ABG lactate: --    Venous Blood Gas:   @ 22:30  7.32/71/31/35/42  VBG Lactate: 2.4  Venous Blood Gas:   @ 19:10  7.32/75/26/38/33  VBG Lactate: 4.1      MICROBIOLOGY: Urine culture  sent    RADIOLOGY:  < from: CT Abdomen and Pelvis No Cont (19 @ 20:08) >  FINDINGS:    LOWER CHEST: Within normal limits.    LIVER: Right hepatectomy  BILE DUCTS: Unchanged intrahepatic dilation.  GALLBLADDER: Cholecystectomy.  SPLEEN: Within normal limits.  PANCREAS: Within normal limits.  ADRENALS: Within normal limits.  KIDNEYS/URETERS: Multiple bilateral 2 mm nonobstructing calculi.    BLADDER: Within normallimits.  REPRODUCTIVE ORGANS: Limited evaluation.    BOWEL: Interval mild increase in diffuse small bowel and gastric   distention. A transition point is again questioned in the right   hemiabdomen where there was previous swirling. Swirling is too lesser   degree on the current study Appendix not visualized. No significant bowel   wall thickening or pneumatosis.  PERITONEUM: No ascites. No free air.  VESSELS:  Atherosclerosis  RETROPERITONEUM: No lymphadenopathy.    ABDOMINAL WALL: Within normallimits.  BONES: Mild degenerative changes.    IMPRESSION:  Suboptimal study due to the absence of oral and IV contrast.    Small bowel obstruction mildly worsened since prior study without clear   transition point. Questionable transition point in the right upper   quadrant with previously seen swirling was. No free air or ascites.    EKG: MICU ACCEPT NOTE    CHIEF COMPLAINT: Nausea and vomiting    HPI: Patient is a 73 year old female with squamous cell lung cancer s/p RT completed 2 weeks ago, breast cancer s/p chemo and mastectomy, hemangiopericytoma x2 s/p resection, chronic back pain on chronic opioids, COPD, active smoker and recurrent SBO presenting with nausea and vomiting. Patient's symptoms started 3 days ago. She reports not eating or drinking anything since then due to fear of vomiting. She denies abdominal pain, fevers or chills. She has been having regular bowel movements. She denies dysuria or urinary frequency but has been urinating less. She was last seen by her PCP on Thursday and reports that her blood pressure was "normal" at her visit.    Patient had a recent hospitalization from  to  for abdominal pain and constipation and was admitted for possible SBO. She began to pass bowel movements after being given Narcan and was discharged home.     ED vitals: Temp 97.5 HR 74 BP 94/46 RR 16 O2 95% on room air. CTAP showed no acute changes with prior surgeries noted. UA returned positive, labs significant for creatinine 3.93, increased from baseline of 1.1. Patient's BP dropped in the ED from 110s to 70s despite receiving 4L LR. Last BP 77/51 and patient was started on Levophed.    PAST MEDICAL & SURGICAL HISTORY:  Back pain  Bowel obstruction: multiple hospitalizations  Kidney stone  Emphysema  Narcotic Dependence  S/P Radiation Therapy  S/P Chemotherapy, Time Since Greater than 12 Weeks  Narcotic Dysmotile Cilia Syndrome  Chronic Pain Following Surgery or Procedure: following right breast mastectomy  Ankle Fracture: right anle fx s/p cast   History of Small Bowel Obstruction: 2010  Asthma  Breast Cancer  S/P hernia surgery: femoral hernia -   S/P Exploratory Laparotomy  S/P Appendectomy  S/P Cholecystectomy  Liver Mass s/p liver rsxn: s/p resection   History of Hysterectomy:   S/P Right Mastectomy:       FAMILY HISTORY:  No pertinent family history in first degree relatives      SOCIAL HISTORY:  Smoking: [ ] Never Smoked [ ] Former Smoker (__ packs x ___ years) [X] Current Smoker  (1 packs x 56 years)  Substance Use: [X] Never Used [ ] Used ____  EtOH Use: Denies  Marital Status: [ ] Single [ ]  [ ]  [ ]   Sexual History:   Occupation:  Recent Travel:  Country of Birth:  Advance Directives:    Allergies    Biaxin (Rash)  Cipro (Headache)  Flagyl (Headache)  penicillin (Rash; Swelling)  sulfa drugs (Unknown)    Intolerances    HOME MEDICATIONS: Reviewed    REVIEW OF SYSTEMS:  Constitutional: [X] negative [ ] fevers [ ] chills [ ] weight loss [ ] weight gain  HEENT: [ ] negative [ ] dry eyes [ ] eye irritation [ ] postnasal drip [ ] nasal congestion  CV: [ ] negative  [ ] chest pain [ ] orthopnea [ ] palpitations [ ] murmur  Resp: [X] negative [ ] cough [ ] shortness of breath [ ] dyspnea [ ] wheezing [ ] sputum [ ] hemoptysis  GI: [ ] negative [X] nausea [X] vomiting [ ] diarrhea [ ] constipation [ ] abd pain [ ] dysphagia   : [X] negative [ ] dysuria [ ] nocturia [ ] hematuria [ ] increased urinary frequency  Musculoskeletal: [ ] negative [ ] back pain [ ] myalgias [ ] arthralgias [ ] fracture  Skin: [ ] negative [ ] rash [ ] itch  Neurological: [X] negative [ ] headache [ ] dizziness [ ] syncope [ ] weakness [ ] numbness  Psychiatric: [ ] negative [ ] anxiety [ ] depression  Endocrine: [ ] negative [ ] diabetes [ ] thyroid problem  Hematologic/Lymphatic: [ ] negative [ ] anemia [ ] bleeding problem  Allergic/Immunologic: [ ] negative [ ] itchy eyes [ ] nasal discharge [ ] hives [ ] angioedema  [ ] All other systems negative  [ ] Unable to assess ROS because ________    OBJECTIVE:  ICU Vital Signs Last 24 Hrs  T(C): 36.4 (06 May 2019 00:12), Max: 36.5 (05 May 2019 20:35)  T(F): 97.5 (06 May 2019 00:12), Max: 97.7 (05 May 2019 20:35)  HR: 72 (06 May 2019 03:28) (69 - 88)  BP: 77/41 (06 May 2019 03:28) (77/41 - 103/63)  BP(mean): --  ABP: --  ABP(mean): --  RR: 14 (06 May 2019 03:28) (14 - 22)  SpO2: 94% (06 May 2019 03:28) (88% - 100%)         @ 07:01  -   @ 03:41  --------------------------------------------------------  IN: 3500 mL / OUT: 250 mL / NET: 3250 mL      CAPILLARY BLOOD GLUCOSE    PHYSICAL EXAM:  General: Elderly female, cachectic, resting in bed comfortably, on 2L NC  HEENT: PERRL, EOMI  Lymph Nodes: No lymphadenopathy  Neck: Supple, no JVD  Respiratory: Wheezes in b/l lung fields, rhonchi in right middle and lower lobes  Cardiovascular: +S1, S2, regular rate and rhythm, no murmurs, rubs or gallops  Abdomen: Soft, nontender, nondistended, normal bowel sounds in all 4 quadrants  Extremities: Full ROM, no joint swelling  Skin: No rashes or edema  Neurological: AAOx3, follows commands, 5/5 strength in all extremities  Psychiatry: Appropriate affect    LINES: [facility] MEDICATIONS:    norepinephrine Infusion 0.05 MICROgram(s)/kG/Min IV Continuous <Continuous>  lactated ringers Bolus 1000 milliLiter(s) IV Bolus once  lidocaine   Patch 1 Patch Transdermal daily    LABS:                        8.7    2.9   )-----------( 195      ( 06 May 2019 03:06 )             27.1     Hgb Trend: 8.7<--, 12.2<--  05-05    143  |  93<L>  |  54<H>  ----------------------------<  104<H>  4.4   |  30  |  3.93<H>    Ca    7.0<L>      05 May 2019 22:30  Phos  8.3     05-05  Mg     1.4     05-05    TPro  6.1  /  Alb  3.3  /  TBili  0.3  /  DBili  x   /  AST  11  /  ALT  12  /  AlkPhos  146<H>  0505    Creatinine Trend: 3.93<--, 4.16<--, 3.96<--, 1.27<--, 1.16<--, 1.19<--  PT/INR - ( 05 May 2019 18:31 )   PT: 13.5 sec;   INR: 1.18 ratio         PTT - ( 05 May 2019 18:31 )  PTT:30.2 sec  Urinalysis Basic - ( 06 May 2019 00:40 )    Color: Yellow / Appearance: Slightly Turbid / S.016 / pH: x  Gluc: x / Ketone: Negative  / Bili: Negative / Urobili: Negative   Blood: x / Protein: 100 / Nitrite: Negative   Leuk Esterase: Large / RBC: 4 /hpf /  /HPF   Sq Epi: x / Non Sq Epi: 0 /hpf / Bacteria: Many      Arterial Blood Gas:   @ 03:11  7.36/53/57/29/84/3.5  ABG lactate: --    Venous Blood Gas:   @ 22:30  7.32/71/31/35/42  VBG Lactate: 2.4  Venous Blood Gas:   @ 19:10  7.32/75/26/38/33  VBG Lactate: 4.1      MICROBIOLOGY: Urine culture  sent    RADIOLOGY:  < from: CT Abdomen and Pelvis No Cont (19 @ 20:08) >  FINDINGS:    LOWER CHEST: Within normal limits.    LIVER: Right hepatectomy  BILE DUCTS: Unchanged intrahepatic dilation.  GALLBLADDER: Cholecystectomy.  SPLEEN: Within normal limits.  PANCREAS: Within normal limits.  ADRENALS: Within normal limits.  KIDNEYS/URETERS: Multiple bilateral 2 mm nonobstructing calculi.    BLADDER: Within normallimits.  REPRODUCTIVE ORGANS: Limited evaluation.    BOWEL: Interval mild increase in diffuse small bowel and gastric   distention. A transition point is again questioned in the right   hemiabdomen where there was previous swirling. Swirling is too lesser   degree on the current study Appendix not visualized. No significant bowel   wall thickening or pneumatosis.  PERITONEUM: No ascites. No free air.  VESSELS:  Atherosclerosis  RETROPERITONEUM: No lymphadenopathy.    ABDOMINAL WALL: Within normallimits.  BONES: Mild degenerative changes.    IMPRESSION:  Suboptimal study due to the absence of oral and IV contrast.    Small bowel obstruction mildly worsened since prior study without clear   transition point. Questionable transition point in the right upper   quadrant with previously seen swirling was. No free air or ascites.    EKG:

## 2019-05-06 NOTE — H&P ADULT - PROBLEM SELECTOR PLAN 7
Cachexia is likely multifactorial due to nausea/vomiting, known lung cancer, COPD.   Will request nutrition consult while inpatient.

## 2019-05-06 NOTE — H&P ADULT - PROBLEM SELECTOR PLAN 3
Pt had UCx positive for Klebsiella in 4/2019, with ampicillin resistance.   Obtain blood cultures x2.   Will cover potential UTI with meropenem given patient's extensive allergies and tenuous status.

## 2019-05-06 NOTE — CHART NOTE - NSCHARTNOTEFT_GEN_A_CORE
Upon Nutritional Assessment by the Registered Dietitian your patient was determined to meet criteria / has evidence of the following diagnosis/diagnoses:          [ ]  Mild Protein Calorie Malnutrition        [ ]  Moderate Protein Calorie Malnutrition        [x ] Severe Protein Calorie Malnutrition        [ ] Unspecified Protein Calorie Malnutrition        [ ] Underweight / BMI <19        [ ] Morbid Obesity / BMI > 40      Findings as based on:  [x ] Comprehensive nutrition assessment   [x ] Nutrition Focused Physical Exam  [ ] Other:       Nutrition Plan/Recommendations:  continue soft diet, pt refusing all po supplements at this time, recommend multivitamin         PROVIDER Section:     By signing this assessment you are acknowledging and agree with the diagnosis/diagnoses assigned by the Registered Dietitian    Comments:

## 2019-05-06 NOTE — H&P ADULT - NSHPPHYSICALEXAM_GEN_ALL_CORE
Vital Signs Last 24 Hrs  T(C): 36.4 (06 May 2019 00:12), Max: 36.5 (05 May 2019 20:35)  T(F): 97.5 (06 May 2019 00:12), Max: 97.7 (05 May 2019 20:35)  HR: 75 (06 May 2019 02:50) (73 - 88)  BP: 83/41 (06 May 2019 02:50) (83/41 - 103/63)  BP(mean): --  RR: 14 (06 May 2019 02:50) (14 - 22)  SpO2: 95% (06 May 2019 02:50) (88% - 100%)    PHYSICAL EXAM:  GENERAL: moderate distress due to back pain, pt with cachexia   HEAD:  Atraumatic, Normocephalic  EYES: EOMI, PERRLA, conjunctiva and sclera clear  ENMT: No oropharyngeal exudates, erythema or lesions,  dry mucous membranes, poor dentition  NECK: Supple, no cervical lymphadenopathy, no JVD  NERVOUS SYSTEM:  Alert & Oriented X3, CN II-XII intact, 5/5 BUE and BLE motor strength, full sensation to light touch   CHEST/LUNG: Exam limited because patient declining changes in positioning due to lumbar back pain- on anterior auscultation lungs are clear without wheezes or rhonchi  HEART: Regular rate and rhythm; No murmurs, rubs, or gallops  ABDOMEN: Soft, nontender to palpation, mildly distended and dull to percussion,   abdominal surgical scars visible  EXTREMITIES:  2+ radial Pulses, + clubbing, no edema  LYMPH: No cervical lymphadenopathy noted  SKIN: No rashes or lesions

## 2019-05-06 NOTE — DIETITIAN INITIAL EVALUATION ADULT. - OTHER INFO
Pt reported usual wt of 118lb about 15 months ago, recent wt hx 1/17/19 80 lb, 4/18 84.9 lb, current dosing wt 5/6 79.8 lb. Pt w/ 32% wt loss in 15 months.  Pt seen for: consult for BMI < 18   Adm dx: septic shock, UTI, SHAKIRA. Lung Ca w/ RT 2 weeks ago.  Per surgery note "multiple admits with CTs read as SBO's but all have resolved with either oral narcan or Movantic/Relistor.  Patient has known narcotic bowel but does not take meds at home because she does not like the diarrhea.  This results in repeat readmissions for the same problem"   GI issues: denies N/V  Last BM: 5/6     Food allergies/Intolerances: NKFA   Vit/supplement PTA: multivitamin

## 2019-05-06 NOTE — CONSULT NOTE ADULT - ASSESSMENT
73 year old female with   PMH chronic Back pain (on Narcotics), history of multiple SBO's, narcotic associated constipation/ileus, COPD,  smoker,    ca  breast,  right  mastectomy. RENUKA cavitary lesion on ct,  .  c/c cachexia  path from  1/19, with NSCC with squamous  features,  s/p  RT     admitted with abdominal pain / vomiting   , from SBO    ct scan noted,  SBO, / syrg  to  f/p    pt is an active smoker, refusing to quit   ct chest , 4/19/18  noted/  oncology dr hernandez   dvt ppxct  abdomen, noted   iv fluids/  zosyn/ pressors     < from: CT Abdomen and Pelvis No Cont (05.05.19 @ 20:08) >  IMPRESSION:  Suboptimal study due to the absence of oral and IV contrast.  Small bowel obstruction mildly worsened since prior study without clear   transition point. Questionable transition point in the right upper   quadrant with previously seen swirling was. No free air or ascites  < end of copied text >      < from: CT Chest No Cont (04.19.19 @ 18:50)   INTERPRETATION:  Motion degraded evaluation  Redemonstraion on left upper lobe cavitary mass consistent with patients   known hx of lung CA  Mucoid impacted right lower lobe ariways with scattered tree in bud   opacities  < end of copied text >      Cytopathology - Non Gyn Report (01.18.19 @ 11:43)    Immunohistochemical stains.  The immunophenotype together with the cytomorphology supports the  diagnosis squamous cell carcinoma.    Final Diagnosis  LUNG, LEFT UPPER LOBE, US GUIDED CORE BIOPSY AND FNA  POSITIVE FOR MALIGNANT CELLS.  Non-small cell carcinoma, with squamous features (see note).  Additional studies pending.

## 2019-05-06 NOTE — H&P ADULT - PROBLEM SELECTOR PLAN 8
Hold opioids due to hypotension.  ISTOP reviewed- Reference #: [id number]   Diazepam 5mg quantity 30, 30 day supply  oxycodone hcl 10mg tablet quantity 120, 30 day supply

## 2019-05-06 NOTE — H&P ADULT - PROBLEM SELECTOR PLAN 6
Notify Dr. Reese regarding patient's admission in AM.  Patient had recently completed 3 weeks of radiation therapy. Will require oncology follow up.

## 2019-05-06 NOTE — H&P ADULT - PROBLEM SELECTOR PLAN 9
Spent 5 minutes discussing the dangers of smoking, including patient's known lung cancer. Patient is not interested in smoking cessation.

## 2019-05-06 NOTE — CONSULT NOTE ADULT - SUBJECTIVE AND OBJECTIVE BOX
CHIEF COMPLAINT:Patient is a 73y old  Female who presents with a chief complaint of nausea and vomiting (06 May 2019 08:53)      HPI:  This patient is a 73yoF with PMH of recurrent SBOs, lung cancer s/p RTx and active smoker, COPD (no home O2), breast CA s/p R mastectomy and chemotherapy (2006), chronic lower back pain 2/2 herniated disc with opioid dependence, ?CAD who presents to the ED with complaint of nausea and vomiting for 4 days. She endorsed mild abdominal pain. She was unable to tolerate PO. She endorsed having nonbloody bowel movements and has been passing gas. She denied any fever or chills, dysuria, hematuria, changes in urine output. Pt reports no changes in medications since her last hospitalization. Pt reports that she had not been offered surgery for her lung cancer due to her frailty. She is not interested in pursuing surgical intervention at this time.    Pt was  hospitalized from 4/18-4/22/2019 for episode of SBO eval, suspected opioid induced constipation, during which she had NGT placement and was kept NPO, with IVF hydration.       In the ED, T 97.3F, HR 88, BP 99/69, RR 18, SpO2 98% RA   She was given aztreonam 500mg IV, dilaudid 0.5mg I, Magnesium sulfate 1g IV, nicotine patch, zofran 4mg IVP , LR 4L, duoneb x2 (06 May 2019 01:25)  pt was primarily admitted to me and sent to icu, sec to hypotension.    PAST MEDICAL & SURGICAL HISTORY:  Back pain  Bowel obstruction: multiple hospitalizations  Kidney stone  Emphysema  Narcotic Dependence  S/P Radiation Therapy  S/P Chemotherapy, Time Since Greater than 12 Weeks  Narcotic Dysmotile Cilia Syndrome  Chronic Pain Following Surgery or Procedure: following right breast mastectomy  Ankle Fracture: right anle fx s/p cast 2009  History of Small Bowel Obstruction: 1/2010  Asthma  Breast Cancer  S/P hernia surgery: femoral hernia - 2012  S/P Exploratory Laparotomy  S/P Appendectomy  S/P Cholecystectomy  Liver Mass s/p liver rsxn: s/p resection 2009  History of Hysterectomy: 1998  S/P Right Mastectomy: 2006      MEDICATIONS  (STANDING):  ALBUTerol/ipratropium for Nebulization 3 milliLiter(s) Nebulizer every 6 hours  buDESOnide   0.5 milliGRAM(s) Respule 0.5 milliGRAM(s) Inhalation two times a day  chlorhexidine 2% Cloths 1 Application(s) Topical daily  chlorhexidine 4% Liquid 1 Application(s) Topical <User Schedule>  heparin  Injectable 5000 Unit(s) SubCutaneous every 12 hours  influenza   Vaccine 0.5 milliLiter(s) IntraMuscular once  lactated ringers. 1000 milliLiter(s) (75 mL/Hr) IV Continuous <Continuous>  lidocaine   Patch 1 Patch Transdermal daily  methylnaltrexone Injectable 6 milliGRAM(s) SubCutaneous once  midodrine 20 milliGRAM(s) Oral every 8 hours  nicotine -  14 mG/24Hr(s) Patch 1 patch Transdermal daily  norepinephrine Infusion 0.05 MICROgram(s)/kG/Min (3.619 mL/Hr) IV Continuous <Continuous>  pantoprazole    Tablet 40 milliGRAM(s) Oral before breakfast  piperacillin/tazobactam IVPB. 3.375 Gram(s) IV Intermittent every 12 hours    MEDICATIONS  (PRN):  ondansetron Injectable 4 milliGRAM(s) IV Push every 6 hours PRN Nausea and/or Vomiting      FAMILY HISTORY:  No pertinent family history in first degree relatives      SOCIAL HISTORY:    [ ] Non-smoker  [ ] Smoker  [ ] Alcohol    Allergies    Biaxin (Rash)  Cipro (Headache)  Flagyl (Headache)  penicillin (Rash; Swelling)  sulfa drugs (Unknown)    Intolerances    	    REVIEW OF SYSTEMS:  CONSTITUTIONAL: No fever, weight loss, or fatigue  EYES: No eye pain, visual disturbances, or discharge  ENT:  No difficulty hearing, tinnitus, vertigo; No sinus or throat pain  NECK: No pain or stiffness  RESPIRATORY: No cough, wheezing, chills or hemoptysis; + Shortness of Breath  CARDIOVASCULAR: No chest pain, palpitations, passing out, dizziness, or leg swelling  GASTROINTESTINAL: No abdominal or epigastric pain. No nausea, vomiting, or hematemesis; No diarrhea or constipation. No melena or hematochezia.  GENITOURINARY: No dysuria, frequency, hematuria, or incontinence  NEUROLOGICAL: No headaches, memory loss, loss of strength, numbness, or tremors  SKIN: No itching, burning, rashes, or lesions   LYMPH Nodes: No enlarged glands  ENDOCRINE: No heat or cold intolerance; No hair loss  MUSCULOSKELETAL: No joint pain or swelling; No muscle, back, or extremity pain  PSYCHIATRIC: No depression, anxiety, mood swings, or difficulty sleeping  HEME/LYMPH: No easy bruising, or bleeding gums  ALLERGY AND IMMUNOLOGIC: No hives or eczema	    [ ] All others negative	  [ ] Unable to obtain    PHYSICAL EXAM:  T(C): 36.7 (05-06-19 @ 08:00), Max: 36.7 (05-06-19 @ 08:00)  HR: 70 (05-06-19 @ 09:30) (66 - 88)  BP: 113/53 (05-06-19 @ 09:30) (77/41 - 116/56)  RR: 16 (05-06-19 @ 09:30) (12 - 40)  SpO2: 94% (05-06-19 @ 09:30) (88% - 100%)  Wt(kg): --  I&O's Summary    05 May 2019 07:01  -  06 May 2019 07:00  --------------------------------------------------------  IN: 3865 mL / OUT: 300 mL / NET: 3565 mL        Appearance: Normal	  HEENT:   Normal oral mucosa, PERRL, EOMI	  Lymphatic: No lymphadenopathy  Cardiovascular: Normal S1 S2, No JVD, + murmurs, No edema  Respiratory: decrease bs  Psychiatry: A & O x 3, Mood & affect appropriate  Gastrointestinal:  Soft, Non-tender, + BS	  Skin: No rashes, No ecchymoses, No cyanosis	  Neurologic: Non-focal  Extremities: Normal range of motion, No clubbing, cyanosis or edema  Vascular: Peripheral pulses palpable 2+ bilaterally    TELEMETRY: 	    ECG:  	  RADIOLOGY:  OTHER: 	  	  LABS:	 	    CARDIAC MARKERS:  CARDIAC MARKERS ( 06 May 2019 03:06 )  x     / x     / 33 U/L / x     / 1.5 ng/mL                              9.6    3.6   )-----------( 222      ( 06 May 2019 05:34 )             29.3     05-06    140  |  98  |  50<H>  ----------------------------<  114<H>  4.7   |  26  |  3.35<H>    Ca    6.7<L>      06 May 2019 05:34  Phos  5.6     05-06  Mg     1.5     05-06    TPro  5.6<L>  /  Alb  2.8<L>  /  TBili  0.3  /  DBili  x   /  AST  15  /  ALT  14  /  AlkPhos  131<H>  05-06    proBNP:   Lipid Profile:   HgA1c:   TSH:   PT/INR - ( 06 May 2019 05:34 )   PT: 14.4 sec;   INR: 1.25 ratio         PTT - ( 06 May 2019 05:34 )  PTT:34.0 sec    PREVIOUS DIAGNOSTIC TESTING:    < from: 12 Lead ECG (05.05.19 @ 18:38) >  Diagnosis Line SINUS RHYTHM WITH SHORT HI  RIGHT ATRIAL ENLARGEMENT  SEPTAL INFARCT , AGE UNDETERMINED  ABNORMAL ECG    < end of copied text >  < from: 12 Lead ECG (05.05.19 @ 18:38) >  Diagnosis Line SINUS RHYTHM WITH SHORT HI  RIGHT ATRIAL ENLARGEMENT  SEPTAL INFARCT , AGE UNDETERMINED  ABNORMAL ECG    Troponin T, High Sensitivity (05.06.19 @ 03:06)    Troponin T, High Sensitivity Result: 35: Rapid upward or downward changes in high-sensitivity troponin levels  suggest acute myocardial injury. Renal impairment may cause sustained  troponin elevations.  Normal: <6 - 14 ng/L  Indeterminate: 15-51 ng/L  Elevated: > 51 ng/L  See [email or web address]/test/[email or web address] on the [facility] intranet for more  information ng/L

## 2019-05-06 NOTE — CONSULT NOTE ADULT - ASSESSMENT
73F with multiple SBOs, presents with SBO however appears to show signs of relief of obstruction  - Patient with new SHAKIRA, positive UA  - Monitor abdominal exam and keep NPO for now. Lactate improving with resuscitation   - No surgical intervention at this time.   - D/w Dr. Hopper    3300 73F with multiple admits with CTs read as SBO's but all have resolved with either oral narcan or Movantic/Relistor.  Patient has known narcotic bowel but does not take meds at home because she does not like the diarrhea.  This results in repeat readmissions for the same problem     - Patient with new SHAKIRA, positive UA  - Monitor abdominal exam and keep NPO for now. Lactate improving with resuscitation   - No surgical intervention at this time.   - D/w Dr. Hopper    [id number]

## 2019-05-06 NOTE — H&P ADULT - NSHPREVIEWOFSYSTEMS_GEN_ALL_CORE
REVIEW OF SYSTEMS  CONSTITUTIONAL: No fever, no chills, no fatigue  EYES: No eye pain, no vision changes  ENMT:  No difficulty hearing, no throat pain  RESPIRATORY: + chronic cough with white sputum production, + chronic shortness of breath at baseline  CARDIOVASCULAR: No chest pain, + palpitations while in ambulance now resolved, no leg swelling  GASTROINTESTINAL: + mild abdominal pain, + nausea and vomiting now resolved, + multiple bowel movements, no melena, no hematochezia.  GENITOURINARY: No dysuria, no hematuria  NEUROLOGICAL: No headaches, no loss of strength, no numbness  SKIN: No itching, no rashes, no lesions   MUSCULOSKELETAL: No joint pain, no joint swelling; + chronic lumbar back pain  HEME/LYMPH: No easy bruising, bleeding

## 2019-05-06 NOTE — PROVIDER CONTACT NOTE (OTHER) - ACTION/TREATMENT ORDERED:
MD Johnson ultrasounded patient's L arm for more PIV access, no access was able to be obtained. L AC #20g assessed for vasopressor usability and approved for now for vasopressor support

## 2019-05-06 NOTE — PROVIDER CONTACT NOTE (OTHER) - ASSESSMENT
pt remains on pressors and patient has 1 PIV access. L AC #20g that is not by policy allowed for vasopressor support

## 2019-05-06 NOTE — DIETITIAN INITIAL EVALUATION ADULT. - PHYSICAL APPEARANCE
emaciated/Nutrition Focused Physical Assessment performed w/ pt consent. Noted severe muscle loss in temporal, clavicle, interosseous, patella areas, severe fat loss in orbital, tricep area.

## 2019-05-06 NOTE — DIETITIAN INITIAL EVALUATION ADULT. - PROBLEM SELECTOR PLAN 8
Hold opioids due to hypotension.  ISTOP reviewed- Reference #: [id number]   Diazepam 5mg quantity 30, 30 day supply  oxycodone hcl 10mg tablet quantity 120, 30 day supply

## 2019-05-06 NOTE — CONSULT NOTE ADULT - ASSESSMENT
Shock state- likely from sepsis from UTI, versus PE in the setting of malignancy  - C/w Levo to maintain MAP>65  - Will evaluate LV function and check for pulmonary edema on bedside POCUS  - Hold home enalapril  - Troponin 35  - Patient unable to go for CT angio with IV contrast due to SHAKIRA to evaluate for PE- VQ scan may not be accurate in setting of patient's lung malignancy  - Repeat EKG- patient reportedly with brief period of ATach earlier in the evening  - ARf sec to hypotension/sepsis  -repeat ecg doubt MI awaiting echo to check wall motion

## 2019-05-06 NOTE — CONSULT NOTE ADULT - SUBJECTIVE AND OBJECTIVE BOX
Patient is a 73y old  Female who presented with a chief complaint of nausea and vomiting (06 May 2019 09:42)      HPI:  This patient is a 73yoF with PMH of recurrent SBOs, lung cancer s/p RTx and active smoker, COPD (no home O2), breast CA s/p R mastectomy and chemotherapy (), chronic lower back pain 2/2 herniated disc with opioid dependence, ?CAD who presents to the ED with complaint of nausea and vomiting for 4 days. She endorsed mild abdominal pain. She was unable to tolerate PO. She endorsed having nonbloody bowel movements and has been passing gas. She denied any fever or chills, dysuria, hematuria, changes in urine output. Pt reports no changes in medications since her last hospitalization. Pt reports that she had not been offered surgery for her lung cancer due to her frailty. She is not interested in pursuing surgical intervention at this time.    Pt was  hospitalized from -2019 for episode of SBO eval, suspected opioid induced constipation, during which she had NGT placement and was kept NPO, with IVF hydration.       In the ED, T 97.3F, HR 88, BP 99/69, RR 18, SpO2 98% RA   She was given aztreonam 500mg IV, dilaudid 0.5mg I, Magnesium sulfate 1g IV, nicotine patch, zofran 4mg IVP , LR 4L, duoneb x2 (06 May 2019 01:25)    Large BM after Ct scan in ER - admitted to MICU  feels hungry  Passing flatus  no nausea or vomiting  Per MICU team, large amount of stool in rectum on bedside US and significant amount of fluid in stomach on bedside US but also noted with +peristalsis on US    Pt. admits compliance with home bowel regimen  also on narcotics for chronic pain      PAST MEDICAL & SURGICAL HISTORY:  Back pain  Bowel obstruction: multiple hospitalizations  Kidney stone  Emphysema  Narcotic Dependence  S/P Radiation Therapy  S/P Chemotherapy, Time Since Greater than 12 Weeks  Narcotic Dysmotile Cilia Syndrome  Chronic Pain Following Surgery or Procedure: following right breast mastectomy  Ankle Fracture: right anle fx s/p cast   History of Small Bowel Obstruction: 2010  Asthma  Breast Cancer  S/P hernia surgery: femoral hernia -   S/P Exploratory Laparotomy  S/P Appendectomy  S/P Cholecystectomy  Liver Mass s/p liver rsxn: s/p resection   History of Hysterectomy:   S/P Right Mastectomy: 2006      Allergies  Biaxin (Rash)  Cipro (Headache)  Flagyl (Headache)  penicillin (Rash; Swelling)  sulfa drugs (Unknown)      MEDICATIONS  (STANDING):  ALBUTerol/ipratropium for Nebulization 3 milliLiter(s) Nebulizer every 6 hours  buDESOnide   0.5 milliGRAM(s) Respule 0.5 milliGRAM(s) Inhalation two times a day  chlorhexidine 2% Cloths 1 Application(s) Topical daily  chlorhexidine 4% Liquid 1 Application(s) Topical <User Schedule>  heparin  Injectable 5000 Unit(s) SubCutaneous every 12 hours  influenza   Vaccine 0.5 milliLiter(s) IntraMuscular once  lactated ringers. 1000 milliLiter(s) (75 mL/Hr) IV Continuous <Continuous>  lidocaine   Patch 1 Patch Transdermal daily  midodrine 20 milliGRAM(s) Oral every 8 hours  nicotine -  14 mG/24Hr(s) Patch 1 patch Transdermal daily  norepinephrine Infusion 0.05 MICROgram(s)/kG/Min (3.619 mL/Hr) IV Continuous <Continuous>  pantoprazole    Tablet 40 milliGRAM(s) Oral before breakfast  piperacillin/tazobactam IVPB. 3.375 Gram(s) IV Intermittent every 12 hours    MEDICATIONS  (PRN):  ondansetron Injectable 4 milliGRAM(s) IV Push every 6 hours PRN Nausea and/or Vomiting      Social History:  Marital Status:  (   )    (  X ) Single    (   )    (  )   Lives with: ( X ) alone  (  ) children   (  ) spouse   (  ) parents  (  ) other    Substance Use (street drugs): (X  ) never used  (  ) other:  Tobacco Usage:  (   ) never smoked   (   ) former smoker   ( X  ) current smoker  (     ) pack year  (        ) last cigarette date  Alcohol Usage: denies      Family History   IBD (  ) Yes   (X  ) No  GI Malignancy (  )  Yes    (X ) No      Advanced Directives: ( X    ) None    (      ) DNR    (     ) DNI    (     ) Health Care Proxy:     Review of Systems:    General:  No fevers, chills, night sweats  CV:  No pain, palpitations, hypo/hypertension  Resp:  No dyspnea,+cough +COPD +lung cancer  GI:  see HPI  :  No pain, bleeding, incontinence, nocturia  Muscle:  +intermittent muscle spasms +chronic back pain  Neuro:  No weakness, tingling, memory problems  Psych:  No fatigue, insomnia, mood problems, depression  Endocrine:  No polyuria, polydypsia, cold/heat intolerance  Heme:  No petechiae, ecchymosis, easy bruisability  Skin:  No rash, tattoos, scars, edema      Vital Signs Last 24 Hrs  T(C): 36.7 (06 May 2019 08:00), Max: 36.7 (06 May 2019 08:00)  T(F): 98.1 (06 May 2019 08:00), Max: 98.1 (06 May 2019 08:00)  HR: 74 (06 May 2019 10:15) (66 - 88)  BP: 114/53 (06 May 2019 10:15) (77/41 - 116/56)  BP(mean): 76 (06 May 2019 10:15) (54 - 82)  RR: 44 (06 May 2019 10:15) (12 - 44)  SpO2: 91% (06 May 2019 10:15) (88% - 100%)    PHYSICAL EXAM:  Constitutional: thin chronically ill appearing female sl anxious appearing  Neck: No LAD, supple no JVD  Respiratory: decreased BS b/l, distant BS  Cardiovascular: S1 and S2, regular  Gastrointestinal: protuberant abdomen, softly distended +muscle wasting +prominent abdominal vasculature NT no rebound or rigidity +multiple surgical scars +incisional hernias, reducible  Extremities: +clubbing  +pulses  Vascular: palpable  Neurological: A/O x 3, no focal deficits  Psychiatric: Normal mood, normal affect  Skin: No rashes +decreased turgor        LABS:                        9.6    3.6   )-----------( 222      ( 06 May 2019 05:34 )             29.3     05-06    140  |  98  |  50<H>  ----------------------------<  114<H>  4.7   |  26  |  3.35<H>    Ca    6.7<L>      06 May 2019 05:34  Phos  5.6     05-06  Mg     1.5     05-06    TPro  5.6<L>  /  Alb  2.8<L>  /  TBili  0.3  /  DBili  x   /  AST  15  /  ALT  14  /  AlkPhos  131<H>  05-06    PT/INR - ( 06 May 2019 05:34 )   PT: 14.4 sec;   INR: 1.25 ratio    PTT - ( 06 May 2019 05:34 )  PTT:34.0 sec    Urinalysis Basic - ( 06 May 2019 00:40 )    Color: Yellow / Appearance: Slightly Turbid / S.016 / pH: x  Gluc: x / Ketone: Negative  / Bili: Negative / Urobili: Negative   Blood: x / Protein: 100 / Nitrite: Negative   Leuk Esterase: Large / RBC: 4 /hpf /  /HPF   Sq Epi: x / Non Sq Epi: 0 /hpf / Bacteria: Many        RADIOLOGY & ADDITIONAL TESTS:  < from: CT Abdomen and Pelvis No Cont (19 @ 20:08) >  FINDINGS:    LOWER CHEST: Within normal limits.    LIVER: Right hepatectomy  BILE DUCTS: Unchanged intrahepatic dilation.  GALLBLADDER: Cholecystectomy.  SPLEEN: Within normal limits.  PANCREAS: Within normal limits.  ADRENALS: Within normal limits.  KIDNEYS/URETERS: Multiple bilateral 2 mm nonobstructing calculi.    BLADDER: Within normallimits.  REPRODUCTIVE ORGANS: Limited evaluation.    BOWEL: Interval mild increase in diffuse small bowel and gastric   distention. A transition point is again questioned in the right   hemiabdomen where there was previous swirling. Swirling is too lesser   degree on the current study Appendix not visualized. No significant bowel   wall thickening or pneumatosis.  PERITONEUM: No ascites. No free air.  VESSELS:  Atherosclerosis  RETROPERITONEUM: No lymphadenopathy.    ABDOMINAL WALL: Within normallimits.  BONES: Mild degenerative changes.    IMPRESSION:  Suboptimal study due to the absence of oral and IV contrast.    Small bowel obstruction mildly worsened since prior study without clear   transition point. Questionable transition point in the right upper   quadrant with previously seen swirling was. No free air or ascites.

## 2019-05-07 ENCOUNTER — TRANSCRIPTION ENCOUNTER (OUTPATIENT)
Age: 74
End: 2019-05-07

## 2019-05-07 LAB
ALBUMIN SERPL ELPH-MCNC: 2.6 G/DL — LOW (ref 3.3–5)
ALP SERPL-CCNC: 116 U/L — SIGNIFICANT CHANGE UP (ref 40–120)
ALT FLD-CCNC: 15 U/L — SIGNIFICANT CHANGE UP (ref 10–45)
ANION GAP SERPL CALC-SCNC: 13 MMOL/L — SIGNIFICANT CHANGE UP (ref 5–17)
APTT BLD: 32.2 SEC — SIGNIFICANT CHANGE UP (ref 27.5–36.3)
AST SERPL-CCNC: 14 U/L — SIGNIFICANT CHANGE UP (ref 10–40)
BILIRUB SERPL-MCNC: 0.2 MG/DL — SIGNIFICANT CHANGE UP (ref 0.2–1.2)
BUN SERPL-MCNC: 45 MG/DL — HIGH (ref 7–23)
CALCIUM SERPL-MCNC: 6.9 MG/DL — LOW (ref 8.4–10.5)
CHLORIDE SERPL-SCNC: 102 MMOL/L — SIGNIFICANT CHANGE UP (ref 96–108)
CO2 SERPL-SCNC: 24 MMOL/L — SIGNIFICANT CHANGE UP (ref 22–31)
CREAT SERPL-MCNC: 3.05 MG/DL — HIGH (ref 0.5–1.3)
GLUCOSE SERPL-MCNC: 126 MG/DL — HIGH (ref 70–99)
HCT VFR BLD CALC: 27.4 % — LOW (ref 34.5–45)
HGB BLD-MCNC: 8.9 G/DL — LOW (ref 11.5–15.5)
INR BLD: 1.19 RATIO — HIGH (ref 0.88–1.16)
MAGNESIUM SERPL-MCNC: 1.9 MG/DL — SIGNIFICANT CHANGE UP (ref 1.6–2.6)
MCHC RBC-ENTMCNC: 31 PG — SIGNIFICANT CHANGE UP (ref 27–34)
MCHC RBC-ENTMCNC: 32.5 GM/DL — SIGNIFICANT CHANGE UP (ref 32–36)
MCV RBC AUTO: 95.4 FL — SIGNIFICANT CHANGE UP (ref 80–100)
PHOSPHATE SERPL-MCNC: 4.1 MG/DL — SIGNIFICANT CHANGE UP (ref 2.5–4.5)
PLATELET # BLD AUTO: 211 K/UL — SIGNIFICANT CHANGE UP (ref 150–400)
POTASSIUM SERPL-MCNC: 3.8 MMOL/L — SIGNIFICANT CHANGE UP (ref 3.5–5.3)
POTASSIUM SERPL-SCNC: 3.8 MMOL/L — SIGNIFICANT CHANGE UP (ref 3.5–5.3)
PROT SERPL-MCNC: 5.4 G/DL — LOW (ref 6–8.3)
PROTHROM AB SERPL-ACNC: 13.8 SEC — HIGH (ref 10–12.9)
RBC # BLD: 2.87 M/UL — LOW (ref 3.8–5.2)
RBC # FLD: 16.7 % — HIGH (ref 10.3–14.5)
SODIUM SERPL-SCNC: 139 MMOL/L — SIGNIFICANT CHANGE UP (ref 135–145)
WBC # BLD: 5 K/UL — SIGNIFICANT CHANGE UP (ref 3.8–10.5)
WBC # FLD AUTO: 5 K/UL — SIGNIFICANT CHANGE UP (ref 3.8–10.5)

## 2019-05-07 PROCEDURE — 99233 SBSQ HOSP IP/OBS HIGH 50: CPT | Mod: GC

## 2019-05-07 RX ORDER — DIAZEPAM 5 MG
2 TABLET ORAL ONCE
Qty: 0 | Refills: 0 | Status: DISCONTINUED | OUTPATIENT
Start: 2019-05-07 | End: 2019-05-07

## 2019-05-07 RX ORDER — OXYCODONE HYDROCHLORIDE 5 MG/1
5 TABLET ORAL ONCE
Qty: 0 | Refills: 0 | Status: DISCONTINUED | OUTPATIENT
Start: 2019-05-07 | End: 2019-05-07

## 2019-05-07 RX ORDER — SIMETHICONE 80 MG/1
80 TABLET, CHEWABLE ORAL ONCE
Qty: 0 | Refills: 0 | Status: COMPLETED | OUTPATIENT
Start: 2019-05-07 | End: 2019-05-07

## 2019-05-07 RX ADMIN — OXYCODONE HYDROCHLORIDE 5 MILLIGRAM(S): 5 TABLET ORAL at 08:31

## 2019-05-07 RX ADMIN — OXYCODONE HYDROCHLORIDE 5 MILLIGRAM(S): 5 TABLET ORAL at 09:00

## 2019-05-07 RX ADMIN — MIDODRINE HYDROCHLORIDE 20 MILLIGRAM(S): 2.5 TABLET ORAL at 05:05

## 2019-05-07 RX ADMIN — OXYCODONE HYDROCHLORIDE 5 MILLIGRAM(S): 5 TABLET ORAL at 18:02

## 2019-05-07 RX ADMIN — Medication 1 PATCH: at 12:02

## 2019-05-07 RX ADMIN — LIDOCAINE 1 PATCH: 4 CREAM TOPICAL at 12:12

## 2019-05-07 RX ADMIN — OXYCODONE HYDROCHLORIDE 5 MILLIGRAM(S): 5 TABLET ORAL at 03:23

## 2019-05-07 RX ADMIN — Medication 1 PATCH: at 07:06

## 2019-05-07 RX ADMIN — Medication 2 MILLIGRAM(S): at 02:19

## 2019-05-07 RX ADMIN — Medication 3 MILLILITER(S): at 22:40

## 2019-05-07 RX ADMIN — ONDANSETRON 4 MILLIGRAM(S): 8 TABLET, FILM COATED ORAL at 04:04

## 2019-05-07 RX ADMIN — ONDANSETRON 4 MILLIGRAM(S): 8 TABLET, FILM COATED ORAL at 13:35

## 2019-05-07 RX ADMIN — Medication 1 PATCH: at 19:49

## 2019-05-07 RX ADMIN — SIMETHICONE 80 MILLIGRAM(S): 80 TABLET, CHEWABLE ORAL at 16:20

## 2019-05-07 RX ADMIN — Medication 1 PATCH: at 19:50

## 2019-05-07 RX ADMIN — HEPARIN SODIUM 5000 UNIT(S): 5000 INJECTION INTRAVENOUS; SUBCUTANEOUS at 18:03

## 2019-05-07 RX ADMIN — Medication 0.5 MILLIGRAM(S): at 06:00

## 2019-05-07 RX ADMIN — Medication 1 PATCH: at 23:12

## 2019-05-07 RX ADMIN — PANTOPRAZOLE SODIUM 40 MILLIGRAM(S): 20 TABLET, DELAYED RELEASE ORAL at 08:32

## 2019-05-07 RX ADMIN — LIDOCAINE 1 PATCH: 4 CREAM TOPICAL at 19:39

## 2019-05-07 RX ADMIN — MIDODRINE HYDROCHLORIDE 20 MILLIGRAM(S): 2.5 TABLET ORAL at 14:28

## 2019-05-07 RX ADMIN — OXYCODONE HYDROCHLORIDE 5 MILLIGRAM(S): 5 TABLET ORAL at 19:39

## 2019-05-07 RX ADMIN — HEPARIN SODIUM 5000 UNIT(S): 5000 INJECTION INTRAVENOUS; SUBCUTANEOUS at 05:05

## 2019-05-07 RX ADMIN — PIPERACILLIN AND TAZOBACTAM 25 GRAM(S): 4; .5 INJECTION, POWDER, LYOPHILIZED, FOR SOLUTION INTRAVENOUS at 21:40

## 2019-05-07 RX ADMIN — CHLORHEXIDINE GLUCONATE 1 APPLICATION(S): 213 SOLUTION TOPICAL at 05:06

## 2019-05-07 RX ADMIN — Medication 3 MILLILITER(S): at 18:03

## 2019-05-07 RX ADMIN — Medication 0.5 MILLIGRAM(S): at 18:21

## 2019-05-07 RX ADMIN — OXYCODONE HYDROCHLORIDE 5 MILLIGRAM(S): 5 TABLET ORAL at 01:23

## 2019-05-07 RX ADMIN — Medication 3 MILLILITER(S): at 06:00

## 2019-05-07 RX ADMIN — PIPERACILLIN AND TAZOBACTAM 25 GRAM(S): 4; .5 INJECTION, POWDER, LYOPHILIZED, FOR SOLUTION INTRAVENOUS at 12:13

## 2019-05-07 RX ADMIN — Medication 3 MILLILITER(S): at 12:12

## 2019-05-07 NOTE — PROGRESS NOTE ADULT - SUBJECTIVE AND OBJECTIVE BOX
CARDIOLOGY     PROGRESS  NOTE   ________________________________________________    CHIEF COMPLAINT:Patient is a 73y old  Female who presents with a chief complaint of nausea and vomiting (07 May 2019 08:17)  no complain.  	  REVIEW OF SYSTEMS:  CONSTITUTIONAL: No fever, weight loss, or fatigue  EYES: No eye pain, visual disturbances, or discharge  ENT:  No difficulty hearing, tinnitus, vertigo; No sinus or throat pain  NECK: No pain or stiffness  RESPIRATORY: No cough, wheezing, chills or hemoptysis; No Shortness of Breath  CARDIOVASCULAR: No chest pain, palpitations, passing out, dizziness, or leg swelling  GASTROINTESTINAL: No abdominal or epigastric pain. No nausea, vomiting, or hematemesis; No diarrhea or constipation. No melena or hematochezia.  GENITOURINARY: No dysuria, frequency, hematuria, or incontinence  NEUROLOGICAL: No headaches, memory loss, loss of strength, numbness, or tremors  SKIN: No itching, burning, rashes, or lesions   LYMPH Nodes: No enlarged glands  ENDOCRINE: No heat or cold intolerance; No hair loss  MUSCULOSKELETAL: No joint pain or swelling; No muscle, back, or extremity pain  PSYCHIATRIC: No depression, anxiety, mood swings, or difficulty sleeping  HEME/LYMPH: No easy bruising, or bleeding gums  ALLERGY AND IMMUNOLOGIC: No hives or eczema	    [ ] All others negative	  [x ] Unable to obtain    PHYSICAL EXAM:  T(C): 36.7 (05-07-19 @ 08:00), Max: 37 (05-07-19 @ 00:00)  HR: 76 (05-07-19 @ 08:00) (58 - 92)  BP: 124/58 (05-07-19 @ 08:00) (83/44 - 165/67)  RR: 28 (05-07-19 @ 08:00) (17 - 50)  SpO2: 98% (05-07-19 @ 08:00) (91% - 100%)  Wt(kg): --  I&O's Summary    06 May 2019 07:01  -  07 May 2019 07:00  --------------------------------------------------------  IN: 1948.4 mL / OUT: 935 mL / NET: 1013.4 mL    07 May 2019 07:01  -  07 May 2019 09:40  --------------------------------------------------------  IN: 0 mL / OUT: 50 mL / NET: -50 mL        Appearance: Normal	  HEENT:   Normal oral mucosa, PERRL, EOMI	  Lymphatic: No lymphadenopathy  Cardiovascular: Normal S1 S2, No JVD, + murmurs, No edema  Respiratory: Lungs clear to auscultation	  Psychiatry: A & O x 3, Mood & affect appropriate  Gastrointestinal:  Soft, Non-tender, + BS	  Skin: No rashes, No ecchymoses, No cyanosis	  Neurologic: Non-focal  Extremities: Normal range of motion, No clubbing, cyanosis or edema  Vascular: Peripheral pulses palpable 2+ bilaterally    MEDICATIONS  (STANDING):  ALBUTerol/ipratropium for Nebulization 3 milliLiter(s) Nebulizer every 6 hours  buDESOnide   0.5 milliGRAM(s) Respule 0.5 milliGRAM(s) Inhalation two times a day  chlorhexidine 2% Cloths 1 Application(s) Topical daily  chlorhexidine 4% Liquid 1 Application(s) Topical <User Schedule>  heparin  Injectable 5000 Unit(s) SubCutaneous every 12 hours  influenza   Vaccine 0.5 milliLiter(s) IntraMuscular once  lidocaine   Patch 1 Patch Transdermal daily  midodrine 20 milliGRAM(s) Oral every 8 hours  nicotine -  14 mG/24Hr(s) Patch 1 patch Transdermal daily  pantoprazole    Tablet 40 milliGRAM(s) Oral before breakfast  piperacillin/tazobactam IVPB. 3.375 Gram(s) IV Intermittent every 12 hours      TELEMETRY: 	    ECG:  	  RADIOLOGY:  OTHER: 	  	  LABS:	 	    CARDIAC MARKERS:  CARDIAC MARKERS ( 06 May 2019 03:06 )  x     / x     / 33 U/L / x     / 1.5 ng/mL                                8.9    5.0   )-----------( 211      ( 07 May 2019 00:19 )             27.4     05-07    139  |  102  |  45<H>  ----------------------------<  126<H>  3.8   |  24  |  3.05<H>    Ca    6.9<L>      07 May 2019 00:19  Phos  4.1     05-07  Mg     1.9     05-07    TPro  5.4<L>  /  Alb  2.6<L>  /  TBili  0.2  /  DBili  x   /  AST  14  /  ALT  15  /  AlkPhos  116  05-07    proBNP:   Lipid Profile:   HgA1c:   TSH:   PT/INR - ( 07 May 2019 00:19 )   PT: 13.8 sec;   INR: 1.19 ratio         PTT - ( 07 May 2019 00:19 )  PTT:32.2 sec      Assessment and plan  ---------------------------  decrease midodrine to 10 mg q8h  dvt prophylaxis  renal eval  gentle hydration  echo

## 2019-05-07 NOTE — CHART NOTE - NSCHARTNOTEFT_GEN_A_CORE
MICU Transfer Note    Transfer from: MICU    Transfer to: (  ) Medicine    (  ) Telemetry     (   ) RCU        (    ) Palliative         (   ) Stroke Unit          (   ) __________________    Accepting Physician:      MICU COURSE:    73 year old female with squamous cell lung cancer s/p RT completed 2 weeks ago, breast cancer s/p chemo and mastectomy, hemangiopericytoma x2 s/p resection, chronic back pain on chronic opioids, COPD, active smoker and recurrent SBO who presented with nausea and vomiting. Symptoms started 3 days ago. She reports not eating or drinking anything since then due to fear of vomiting.  Patient had a recent hospitalization from 4/18 to 4/22 for abdominal pain and constipation and was admitted for possible SBO. She began to pass bowel movements after being given Narcan and was discharged home.     ED Course: Temp 97.5 HR 74 BP 94/46 RR 16 O2 95% on room air. CTAP showed no acute changes with prior surgeries noted. UA returned positive, labs significant for creatinine 3.93, increased from baseline of 1.1. Patient's BP dropped in the ED from 110s to 70s despite receiving 4L LR. Last BP 77/51 and patient was started on Levophed. Admit to MICU for septic shock likley secondary to UTI. Urine culture currently pending.  On Zosyn for broad spectrum coverage. Upon transfer to MICU, patient was weaned off Levophed and started on Midodrine.      Surgery consulted by ER for recurrent SBO concern.  Patient primarily managed by [facility], also currently consulting on patient.  Patient with large stool burden, given relistor with good effect.  Tolerating soft diet during day; advanced to regular.            ASSESSMENT & PLAN:             For Followup:          Vital Signs Last 24 Hrs  T(C): 37 (07 May 2019 00:00), Max: 37 (07 May 2019 00:00)  T(F): 98.6 (07 May 2019 00:00), Max: 98.6 (07 May 2019 00:00)  HR: 64 (07 May 2019 02:00) (58 - 92)  BP: 162/70 (07 May 2019 02:00) (77/41 - 165/67)  BP(mean): 101 (07 May 2019 02:00) (54 - 101)  RR: 42 (07 May 2019 02:00) (12 - 50)  SpO2: 94% (07 May 2019 02:00) (91% - 100%)  I&O's Summary    05 May 2019 07:01  -  06 May 2019 07:00  --------------------------------------------------------  IN: 3865 mL / OUT: 300 mL / NET: 3565 mL    06 May 2019 07:01  -  07 May 2019 03:06  --------------------------------------------------------  IN: 1748.4 mL / OUT: 745 mL / NET: 1003.4 mL        MEDICATIONS  (STANDING):  ALBUTerol/ipratropium for Nebulization 3 milliLiter(s) Nebulizer every 6 hours  buDESOnide   0.5 milliGRAM(s) Respule 0.5 milliGRAM(s) Inhalation two times a day  chlorhexidine 2% Cloths 1 Application(s) Topical daily  chlorhexidine 4% Liquid 1 Application(s) Topical <User Schedule>  heparin  Injectable 5000 Unit(s) SubCutaneous every 12 hours  influenza   Vaccine 0.5 milliLiter(s) IntraMuscular once  lidocaine   Patch 1 Patch Transdermal daily  midodrine 20 milliGRAM(s) Oral every 8 hours  nicotine -  14 mG/24Hr(s) Patch 1 patch Transdermal daily  pantoprazole    Tablet 40 milliGRAM(s) Oral before breakfast  piperacillin/tazobactam IVPB. 3.375 Gram(s) IV Intermittent every 12 hours    MEDICATIONS  (PRN):  ondansetron Injectable 4 milliGRAM(s) IV Push every 6 hours PRN Nausea and/or Vomiting        LABS                                            8.9                   Neurophils% (auto):   x      (05-07 @ 00:19):    5.0  )-----------(211          Lymphocytes% (auto):  x                                             27.4                   Eosinphils% (auto):   x        Manual%: Neutrophils x    ; Lymphocytes x    ; Eosinophils x    ; Bands%: x    ; Blasts x                                    139    |  102    |  45                  Calcium: 6.9   / iCa: x      (05-07 @ 00:19)    ----------------------------<  126       Magnesium: 1.9                              3.8     |  24     |  3.05             Phosphorous: 4.1      TPro  5.4    /  Alb  2.6    /  TBili  0.2    /  DBili  x      /  AST  14     /  ALT  15     /  AlkPhos  116    07 May 2019 00:19    ( 05-07 @ 00:19 )   PT: 13.8 sec;   INR: 1.19 ratio  aPTT: 32.2 sec MICU Transfer Note    Transfer from: MICU    Transfer to: (x) Medicine    (  ) Telemetry     (   ) RCU        (    ) Palliative         (   ) Stroke Unit          (   ) __________________    Accepting Physician:      MICU COURSE:    73 year old female with squamous cell lung cancer s/p RT completed 2 weeks ago, breast cancer s/p chemo and mastectomy, hemangiopericytoma x2 s/p resection, chronic back pain on chronic opioids, COPD, active smoker and recurrent SBO who presented with nausea and vomiting. Symptoms started 3 days ago. She reports not eating or drinking anything since then due to fear of vomiting.  Patient had a recent hospitalization from 4/18 to 4/22 for abdominal pain and constipation and was admitted for possible SBO. She began to pass bowel movements after being given Narcan and was discharged home.     ED Course: Temp 97.5 HR 74 BP 94/46 RR 16 O2 95% on room air. CTAP showed no acute changes with prior surgeries noted. UA returned positive, labs significant for creatinine 3.93, increased from baseline of 1.1. Patient's BP dropped in the ED from 110s to 70s despite receiving 4L LR. Last BP 77/51 and patient was started on Levophed. Admit to MICU for septic shock likley secondary to UTI. Urine culture currently pending.  On Zosyn for broad spectrum coverage. Upon transfer to MICU, patient was weaned off Levophed and started on Midodrine.      Surgery consulted by ER for recurrent SBO concern.  Patient primarily managed by [facility] group, also currently consulting on patient.  Patient with large stool burden, given relistor with good effect.  Tolerating soft diet during day; advanced to regular.          Vital Signs Last 24 Hrs  T(C): 37 (07 May 2019 00:00), Max: 37 (07 May 2019 00:00)  T(F): 98.6 (07 May 2019 00:00), Max: 98.6 (07 May 2019 00:00)  HR: 64 (07 May 2019 02:00) (58 - 92)  BP: 162/70 (07 May 2019 02:00) (77/41 - 165/67)  BP(mean): 101 (07 May 2019 02:00) (54 - 101)  RR: 42 (07 May 2019 02:00) (12 - 50)  SpO2: 94% (07 May 2019 02:00) (91% - 100%)  I&O's Summary    05 May 2019 07:01  -  06 May 2019 07:00  --------------------------------------------------------  IN: 3865 mL / OUT: 300 mL / NET: 3565 mL    06 May 2019 07:01  -  07 May 2019 03:06  --------------------------------------------------------  IN: 1748.4 mL / OUT: 745 mL / NET: 1003.4 mL        MEDICATIONS  (STANDING):  ALBUTerol/ipratropium for Nebulization 3 milliLiter(s) Nebulizer every 6 hours  buDESOnide   0.5 milliGRAM(s) Respule 0.5 milliGRAM(s) Inhalation two times a day  chlorhexidine 2% Cloths 1 Application(s) Topical daily  chlorhexidine 4% Liquid 1 Application(s) Topical <User Schedule>  heparin  Injectable 5000 Unit(s) SubCutaneous every 12 hours  influenza   Vaccine 0.5 milliLiter(s) IntraMuscular once  lidocaine   Patch 1 Patch Transdermal daily  midodrine 20 milliGRAM(s) Oral every 8 hours  nicotine -  14 mG/24Hr(s) Patch 1 patch Transdermal daily  pantoprazole    Tablet 40 milliGRAM(s) Oral before breakfast  piperacillin/tazobactam IVPB. 3.375 Gram(s) IV Intermittent every 12 hours    MEDICATIONS  (PRN):  ondansetron Injectable 4 milliGRAM(s) IV Push every 6 hours PRN Nausea and/or Vomiting        LABS                                            8.9                   Neurophils% (auto):   x      (05-07 @ 00:19):    5.0  )-----------(211          Lymphocytes% (auto):  x                                             27.4                   Eosinphils% (auto):   x        Manual%: Neutrophils x    ; Lymphocytes x    ; Eosinophils x    ; Bands%: x    ; Blasts x                                    139    |  102    |  45                  Calcium: 6.9   / iCa: x      (05-07 @ 00:19)    ----------------------------<  126       Magnesium: 1.9                              3.8     |  24     |  3.05             Phosphorous: 4.1      TPro  5.4    /  Alb  2.6    /  TBili  0.2    /  DBili  x      /  AST  14     /  ALT  15     /  AlkPhos  116    07 May 2019 00:19    ( 05-07 @ 00:19 )   PT: 13.8 sec;   INR: 1.19 ratio  aPTT: 32.2 sec MICU Transfer Note    Transfer from: MICU    Transfer to: (x) Medicine    (  ) Telemetry     (   ) RCU        (    ) Palliative         (   ) Stroke Unit          (   ) __________________    Accepting Physician: Dr. AIXA Dennison      MICU COURSE:    73 year old female with squamous cell lung cancer s/p RT completed 2 weeks ago, breast cancer s/p chemo and mastectomy, hemangiopericytoma x2 s/p resection, chronic back pain on chronic opioids, COPD, active smoker and recurrent SBO who presented with nausea and vomiting. Symptoms started 3 days ago. She reports not eating or drinking anything since then due to fear of vomiting.  Patient had a recent hospitalization from 4/18 to 4/22 for abdominal pain and constipation and was admitted for possible SBO. She began to pass bowel movements after being given Narcan and was discharged home.     ED Course: Temp 97.5 HR 74 BP 94/46 RR 16 O2 95% on room air. CTAP showed no acute changes with prior surgeries noted. UA returned positive, labs significant for creatinine 3.93, increased from baseline of 1.1. Patient's BP dropped in the ED from 110s to 70s despite receiving 4L LR. Last BP 77/51 and patient was started on Levophed. Admit to MICU for septic shock likley secondary to UTI. Urine culture currently pending.  On Zosyn for broad spectrum coverage. Upon transfer to MICU, patient was weaned off Levophed and started on Midodrine.      Surgery consulted by ER for recurrent SBO concern.  Patient primarily managed by [facility], also currently consulting on patient.  Patient with large stool burden, given relistor with good effect.  Tolerating soft diet during day; advanced to regular.          Vital Signs Last 24 Hrs  T(C): 37 (07 May 2019 00:00), Max: 37 (07 May 2019 00:00)  T(F): 98.6 (07 May 2019 00:00), Max: 98.6 (07 May 2019 00:00)  HR: 64 (07 May 2019 02:00) (58 - 92)  BP: 162/70 (07 May 2019 02:00) (77/41 - 165/67)  BP(mean): 101 (07 May 2019 02:00) (54 - 101)  RR: 42 (07 May 2019 02:00) (12 - 50)  SpO2: 94% (07 May 2019 02:00) (91% - 100%)  I&O's Summary    05 May 2019 07:01  -  06 May 2019 07:00  --------------------------------------------------------  IN: 3865 mL / OUT: 300 mL / NET: 3565 mL    06 May 2019 07:01  -  07 May 2019 03:06  --------------------------------------------------------  IN: 1748.4 mL / OUT: 745 mL / NET: 1003.4 mL        MEDICATIONS  (STANDING):  ALBUTerol/ipratropium for Nebulization 3 milliLiter(s) Nebulizer every 6 hours  buDESOnide   0.5 milliGRAM(s) Respule 0.5 milliGRAM(s) Inhalation two times a day  chlorhexidine 2% Cloths 1 Application(s) Topical daily  chlorhexidine 4% Liquid 1 Application(s) Topical <User Schedule>  heparin  Injectable 5000 Unit(s) SubCutaneous every 12 hours  influenza   Vaccine 0.5 milliLiter(s) IntraMuscular once  lidocaine   Patch 1 Patch Transdermal daily  midodrine 20 milliGRAM(s) Oral every 8 hours  nicotine -  14 mG/24Hr(s) Patch 1 patch Transdermal daily  pantoprazole    Tablet 40 milliGRAM(s) Oral before breakfast  piperacillin/tazobactam IVPB. 3.375 Gram(s) IV Intermittent every 12 hours    MEDICATIONS  (PRN):  ondansetron Injectable 4 milliGRAM(s) IV Push every 6 hours PRN Nausea and/or Vomiting        LABS                                            8.9                   Neurophils% (auto):   x      (05-07 @ 00:19):    5.0  )-----------(211          Lymphocytes% (auto):  x                                             27.4                   Eosinphils% (auto):   x        Manual%: Neutrophils x    ; Lymphocytes x    ; Eosinophils x    ; Bands%: x    ; Blasts x                                    139    |  102    |  45                  Calcium: 6.9   / iCa: x      (05-07 @ 00:19)    ----------------------------<  126       Magnesium: 1.9                              3.8     |  24     |  3.05             Phosphorous: 4.1      TPro  5.4    /  Alb  2.6    /  TBili  0.2    /  DBili  x      /  AST  14     /  ALT  15     /  AlkPhos  116    07 May 2019 00:19    ( 05-07 @ 00:19 )   PT: 13.8 sec;   INR: 1.19 ratio  aPTT: 32.2 sec MICU Transfer Note    Transfer from: MICU    Transfer to: (x) Medicine    (  ) Telemetry     (   ) RCU        (    ) Palliative         (   ) Stroke Unit          (   ) __________________    Accepting Physician: Dr. AIAX Dennison      MICU COURSE:    73 year old female with squamous cell lung cancer s/p RT completed 2 weeks ago, breast cancer s/p chemo and mastectomy, hemangiopericytoma x2 s/p resection, chronic back pain on chronic opioids, COPD, active smoker and recurrent SBO who presented with nausea and vomiting. Symptoms started 3 days ago. She reports not eating or drinking anything since then due to fear of vomiting.  Patient had a recent hospitalization from 4/18 to 4/22 for abdominal pain and constipation and was admitted for possible SBO. She began to pass bowel movements after being given Narcan and was discharged home.     ED Course: Temp 97.5 HR 74 BP 94/46 RR 16 O2 95% on room air. CTAP showed no acute changes with prior surgeries noted. UA returned positive, labs significant for creatinine 3.93, increased from baseline of 1.1. Patient's BP dropped in the ED from 110s to 70s despite receiving 4L LR. Last BP 77/51 and patient was started on Levophed. Admit to MICU for septic shock likley secondary to UTI. Urine culture currently pending.  On Zosyn for broad spectrum coverage. Upon transfer to MICU, patient was weaned off Levophed and started on Midodrine.      Surgery consulted by ER for recurrent SBO concern.  Patient primarily managed by [facility] group, also currently consulting on patient.  Patient with large stool burden, given relistor with good effect.  Tolerating soft diet during day; advanced to regular.          Vital Signs Last 24 Hrs  T(C): 37 (07 May 2019 00:00), Max: 37 (07 May 2019 00:00)  T(F): 98.6 (07 May 2019 00:00), Max: 98.6 (07 May 2019 00:00)  HR: 64 (07 May 2019 02:00) (58 - 92)  BP: 162/70 (07 May 2019 02:00) (77/41 - 165/67)  BP(mean): 101 (07 May 2019 02:00) (54 - 101)  RR: 42 (07 May 2019 02:00) (12 - 50)  SpO2: 94% (07 May 2019 02:00) (91% - 100%)  I&O's Summary    05 May 2019 07:01  -  06 May 2019 07:00  --------------------------------------------------------  IN: 3865 mL / OUT: 300 mL / NET: 3565 mL    06 May 2019 07:01  -  07 May 2019 03:06  --------------------------------------------------------  IN: 1748.4 mL / OUT: 745 mL / NET: 1003.4 mL        MEDICATIONS  (STANDING):  ALBUTerol/ipratropium for Nebulization 3 milliLiter(s) Nebulizer every 6 hours  buDESOnide   0.5 milliGRAM(s) Respule 0.5 milliGRAM(s) Inhalation two times a day  chlorhexidine 2% Cloths 1 Application(s) Topical daily  chlorhexidine 4% Liquid 1 Application(s) Topical <User Schedule>  heparin  Injectable 5000 Unit(s) SubCutaneous every 12 hours  influenza   Vaccine 0.5 milliLiter(s) IntraMuscular once  lidocaine   Patch 1 Patch Transdermal daily  midodrine 20 milliGRAM(s) Oral every 8 hours  nicotine -  14 mG/24Hr(s) Patch 1 patch Transdermal daily  pantoprazole    Tablet 40 milliGRAM(s) Oral before breakfast  piperacillin/tazobactam IVPB. 3.375 Gram(s) IV Intermittent every 12 hours    MEDICATIONS  (PRN):  ondansetron Injectable 4 milliGRAM(s) IV Push every 6 hours PRN Nausea and/or Vomiting        LABS                                            8.9                   Neurophils% (auto):   x      (05-07 @ 00:19):    5.0  )-----------(211          Lymphocytes% (auto):  x                                             27.4                   Eosinphils% (auto):   x        Manual%: Neutrophils x    ; Lymphocytes x    ; Eosinophils x    ; Bands%: x    ; Blasts x                                    139    |  102    |  45                  Calcium: 6.9   / iCa: x      (05-07 @ 00:19)    ----------------------------<  126       Magnesium: 1.9                              3.8     |  24     |  3.05             Phosphorous: 4.1      TPro  5.4    /  Alb  2.6    /  TBili  0.2    /  DBili  x      /  AST  14     /  ALT  15     /  AlkPhos  116    07 May 2019 00:19    ( 05-07 @ 00:19 )   PT: 13.8 sec;   INR: 1.19 ratio  aPTT: 32.2 sec    · Assessment	  73 year old female with squamous cell lung cancer s/p RT completed 2 weeks ago, breast cancer s/p chemo and mastectomy, COPD and recurrent SBO admitted for shock state likely 2/2 sepsis from UTI versus PE with acute renal failure.    #Neuro  - AAOx3, mentating at baseline    #CV  - Shock state- likely from sepsis from UTI, versus PE in the setting of malignancy  - Now off pressors, blood pressure being maintained on oral midodrine   - Hold home enalapril  - Troponin 35  - Patient unable to go for CT angio with IV contrast due to SHAKIRA to evaluate for PE- VQ scan may not be accurate in setting of patient's lung malignancy    #Pulm  - Patient with history of lung cancer s/p radiation  - C/w supplemental oxygen PRN, goal >88%  - Duonebs q6h  - C/w budesonide    #GI  - History of SBO- no clear transition point on CTAP, patient passing flatus and having bowel movements  - Pt has been advanced to full diet.   - Having multiple loose BMs overnight, tolerating PO   - Good response to methylnatrexone   - Zofran PRN for nausea/vomiting      #Renal  - Acute renal failure with oliguria- creatinine 3.93, increased from 1.1 at baseline- likely 2/2 ATN from decreased PO intake, nausea, vomiting  - FeNa 0.6%  - S/p 4L LR- monitor urine output via [place]  - Renal US ordered  - Nephrology consult in AM    #ID  - UA grossly positive, urine culture sent  - Past urine culture from 4/19/19 positive for Klebsiella pneumoniae  - CXR with no clear consolidations noted- awaiting final read  - C/w vancomycin and Zosyn to cover for HCAP and UTI  - Procalcitonin 0.63  - Send blood cultures  - Check RVP    #Endo  - No acute issues    #Heme  - H/H at baseline 8-9  - DVT prophylaxis with HSQ MICU Transfer Note    Transfer from: MICU    Transfer to: (x) Medicine    (  ) Telemetry     (   ) RCU        (    ) Palliative         (   ) Stroke Unit          (   ) __________________    Accepting Physician: Dr. AIXA Dennison      MICU COURSE:    73 year old female with squamous cell lung cancer s/p RT completed 2 weeks ago, breast cancer s/p chemo and mastectomy, hemangiopericytoma x2 s/p resection, chronic back pain on chronic opioids, COPD, active smoker and recurrent SBO who presented with nausea and vomiting. Symptoms started 3 days ago. She reports not eating or drinking anything since then due to fear of vomiting.  Patient had a recent hospitalization from 4/18 to 4/22 for abdominal pain and constipation and was admitted for possible SBO. She began to pass bowel movements after being given Narcan and was discharged home.     ED Course: Temp 97.5 HR 74 BP 94/46 RR 16 O2 95% on room air. CTAP showed no acute changes with prior surgeries noted. UA returned positive, labs significant for creatinine 3.93, increased from baseline of 1.1. Patient's BP dropped in the ED from 110s to 70s despite receiving 4L LR. Last BP 77/51 and patient was started on Levophed. Admit to MICU for septic shock likley secondary to UTI. Urine culture currently pending.  On Zosyn for broad spectrum coverage. Upon transfer to MICU, patient was weaned off Levophed and started on Midodrine.      Surgery consulted by ER for recurrent SBO concern.  Patient primarily managed by [facility] group, also currently consulting on patient.  Patient with large stool burden, given relistor with good effect.  Tolerating soft diet during day; advanced to regular.          Vital Signs Last 24 Hrs  T(C): 37 (07 May 2019 00:00), Max: 37 (07 May 2019 00:00)  T(F): 98.6 (07 May 2019 00:00), Max: 98.6 (07 May 2019 00:00)  HR: 64 (07 May 2019 02:00) (58 - 92)  BP: 162/70 (07 May 2019 02:00) (77/41 - 165/67)  BP(mean): 101 (07 May 2019 02:00) (54 - 101)  RR: 42 (07 May 2019 02:00) (12 - 50)  SpO2: 94% (07 May 2019 02:00) (91% - 100%)  I&O's Summary    05 May 2019 07:01  -  06 May 2019 07:00  --------------------------------------------------------  IN: 3865 mL / OUT: 300 mL / NET: 3565 mL    06 May 2019 07:01  -  07 May 2019 03:06  --------------------------------------------------------  IN: 1748.4 mL / OUT: 745 mL / NET: 1003.4 mL        MEDICATIONS  (STANDING):  ALBUTerol/ipratropium for Nebulization 3 milliLiter(s) Nebulizer every 6 hours  buDESOnide   0.5 milliGRAM(s) Respule 0.5 milliGRAM(s) Inhalation two times a day  chlorhexidine 2% Cloths 1 Application(s) Topical daily  chlorhexidine 4% Liquid 1 Application(s) Topical <User Schedule>  heparin  Injectable 5000 Unit(s) SubCutaneous every 12 hours  influenza   Vaccine 0.5 milliLiter(s) IntraMuscular once  lidocaine   Patch 1 Patch Transdermal daily  midodrine 20 milliGRAM(s) Oral every 8 hours  nicotine -  14 mG/24Hr(s) Patch 1 patch Transdermal daily  pantoprazole    Tablet 40 milliGRAM(s) Oral before breakfast  piperacillin/tazobactam IVPB. 3.375 Gram(s) IV Intermittent every 12 hours    MEDICATIONS  (PRN):  ondansetron Injectable 4 milliGRAM(s) IV Push every 6 hours PRN Nausea and/or Vomiting        LABS                                            8.9                   Neurophils% (auto):   x      (05-07 @ 00:19):    5.0  )-----------(211          Lymphocytes% (auto):  x                                             27.4                   Eosinphils% (auto):   x        Manual%: Neutrophils x    ; Lymphocytes x    ; Eosinophils x    ; Bands%: x    ; Blasts x                                    139    |  102    |  45                  Calcium: 6.9   / iCa: x      (05-07 @ 00:19)    ----------------------------<  126       Magnesium: 1.9                              3.8     |  24     |  3.05             Phosphorous: 4.1      TPro  5.4    /  Alb  2.6    /  TBili  0.2    /  DBili  x      /  AST  14     /  ALT  15     /  AlkPhos  116    07 May 2019 00:19    ( 05-07 @ 00:19 )   PT: 13.8 sec;   INR: 1.19 ratio  aPTT: 32.2 sec    · Assessment	  73 year old female with squamous cell lung cancer s/p RT completed 2 weeks ago, breast cancer s/p chemo and mastectomy, COPD and recurrent SBO admitted for shock state likely 2/2 sepsis from UTI versus PE with acute renal failure.    #Neuro  - AAOx3, mentating at baseline    #CV  - Shock state- likely from sepsis from UTI, versus PE in the setting of malignancy  - Now off pressors, blood pressure being maintained on oral midodrine   - Hold home enalapril  - Troponin 35  - Patient unable to go for CT angio with IV contrast due to SHAKIRA to evaluate for PE- VQ scan may not be accurate in setting of patient's lung malignancy    #Pulm  - Patient with history of lung cancer s/p radiation  - C/w supplemental oxygen PRN, goal >88%  - Duonebs q6h  - C/w budesonide    #GI  - History of SBO- no clear transition point on CTAP, patient passing flatus and having bowel movements  - Pt has been advanced to full diet.   - Having multiple loose BMs overnight, tolerating PO   - Good response to methylnatrexone   - Zofran PRN for nausea/vomiting      #Renal  - Acute renal failure with oliguria- likely 2/2 ATN from decreased PO intake, nausea, vomiting  - Oral fluids  - BUN/Creat trending down   - Nephrology consult    #ID  - UA grossly positive, urine culture sent  - Past urine culture from 4/19/19 positive for Klebsiella pneumoniae  - CXR with no clear consolidations noted- awaiting final read  - C/w vancomycin and Zosyn to cover for HCAP and UTI  - Narrow antibiotics when urine culture results  - Procalcitonin 0.63  - Follow up blood cultures  - Check RVP    #Endo  - No acute issues    #Heme  - H/H at baseline 8-9  - DVT prophylaxis with HSQ MICU Transfer Note    Transfer from: MICU    Transfer to: (x) Medicine    (  ) Telemetry     (   ) RCU        (    ) Palliative         (   ) Stroke Unit          (   ) __________________    Accepting Physician: Dr. AIXA Dennison      MICU COURSE:    73 year old female with squamous cell lung cancer s/p RT completed 2 weeks ago, breast cancer s/p chemo and mastectomy, hemangiopericytoma x2 s/p resection, chronic back pain on chronic opioids, COPD, active smoker and recurrent SBO who presented with nausea and vomiting. Symptoms started 3 days ago. She reports not eating or drinking anything since then due to fear of vomiting.  Patient had a recent hospitalization from 4/18 to 4/22 for abdominal pain and constipation and was admitted for possible SBO. She began to pass bowel movements after being given Narcan and was discharged home.     ED Course: Temp 97.5 HR 74 BP 94/46 RR 16 O2 95% on room air. CTAP showed no acute changes with prior surgeries noted. UA returned positive, labs significant for creatinine 3.93, increased from baseline of 1.1. Patient's BP dropped in the ED from 110s to 70s despite receiving 4L LR. Last BP 77/51 and patient was started on Levophed. Admit to MICU for septic shock likley secondary to UTI. Urine culture currently pending.  On Zosyn for broad spectrum coverage. Upon transfer to MICU, patient was weaned off Levophed and started on Midodrine.      Surgery consulted by ER for recurrent SBO concern.  Patient primarily managed by [facility] group, also currently consulting on patient.  Patient with large stool burden, given relistor with good effect.  Tolerating soft diet during day; advanced to regular.          Vital Signs Last 24 Hrs  T(C): 37 (07 May 2019 00:00), Max: 37 (07 May 2019 00:00)  T(F): 98.6 (07 May 2019 00:00), Max: 98.6 (07 May 2019 00:00)  HR: 64 (07 May 2019 02:00) (58 - 92)  BP: 162/70 (07 May 2019 02:00) (77/41 - 165/67)  BP(mean): 101 (07 May 2019 02:00) (54 - 101)  RR: 42 (07 May 2019 02:00) (12 - 50)  SpO2: 94% (07 May 2019 02:00) (91% - 100%)  I&O's Summary    05 May 2019 07:01  -  06 May 2019 07:00  --------------------------------------------------------  IN: 3865 mL / OUT: 300 mL / NET: 3565 mL    06 May 2019 07:01  -  07 May 2019 03:06  --------------------------------------------------------  IN: 1748.4 mL / OUT: 745 mL / NET: 1003.4 mL        MEDICATIONS  (STANDING):  ALBUTerol/ipratropium for Nebulization 3 milliLiter(s) Nebulizer every 6 hours  buDESOnide   0.5 milliGRAM(s) Respule 0.5 milliGRAM(s) Inhalation two times a day  chlorhexidine 2% Cloths 1 Application(s) Topical daily  chlorhexidine 4% Liquid 1 Application(s) Topical <User Schedule>  heparin  Injectable 5000 Unit(s) SubCutaneous every 12 hours  influenza   Vaccine 0.5 milliLiter(s) IntraMuscular once  lidocaine   Patch 1 Patch Transdermal daily  midodrine 20 milliGRAM(s) Oral every 8 hours  nicotine -  14 mG/24Hr(s) Patch 1 patch Transdermal daily  pantoprazole    Tablet 40 milliGRAM(s) Oral before breakfast  piperacillin/tazobactam IVPB. 3.375 Gram(s) IV Intermittent every 12 hours    MEDICATIONS  (PRN):  ondansetron Injectable 4 milliGRAM(s) IV Push every 6 hours PRN Nausea and/or Vomiting        LABS                                            8.9                   Neurophils% (auto):   x      (05-07 @ 00:19):    5.0  )-----------(211          Lymphocytes% (auto):  x                                             27.4                   Eosinphils% (auto):   x        Manual%: Neutrophils x    ; Lymphocytes x    ; Eosinophils x    ; Bands%: x    ; Blasts x                                    139    |  102    |  45                  Calcium: 6.9   / iCa: x      (05-07 @ 00:19)    ----------------------------<  126       Magnesium: 1.9                              3.8     |  24     |  3.05             Phosphorous: 4.1      TPro  5.4    /  Alb  2.6    /  TBili  0.2    /  DBili  x      /  AST  14     /  ALT  15     /  AlkPhos  116    07 May 2019 00:19    ( 05-07 @ 00:19 )   PT: 13.8 sec;   INR: 1.19 ratio  aPTT: 32.2 sec    · Assessment	  73 year old female with squamous cell lung cancer s/p RT completed 2 weeks ago, breast cancer s/p chemo and mastectomy, COPD and recurrent SBO admitted for shock state likely 2/2 sepsis from UTI versus PE with acute renal failure.    #Neuro  - AAOx3, mentating at baseline  - Oxycodone IR 5mg PRN for back pain     #CV  - Shock state- likely from sepsis from UTI, versus PE in the setting of malignancy  - Now off pressors, blood pressure being maintained on oral midodrine   - Hold home enalapril  - Troponin 35  - Patient unable to go for CT angio with IV contrast due to SHAKIRA to evaluate for PE- VQ scan may not be accurate in setting of patient's lung malignancy    #Pulm  - Patient with history of lung cancer s/p radiation  - C/w supplemental oxygen PRN, goal >88%  - Duonebs q6h  - C/w budesonide    #GI  - History of SBO- no clear transition point on CTAP, patient passing flatus and having bowel movements  - Pt has been advanced to full diet.   - Having multiple loose BMs overnight, tolerating PO   - Good response to methylnatrexone   - Zofran PRN for nausea/vomiting      #Renal  - Acute renal failure with oliguria- likely 2/2 ATN from decreased PO intake, nausea, vomiting  - Oral fluids  - BUN/Creat trending down   - Nephrology consult    #ID  - UA grossly positive, urine culture sent  - Past urine culture from 4/19/19 positive for Klebsiella pneumoniae  - CXR with no clear consolidations noted- awaiting final read  - C/w Zosyn for UTI  - Narrow antibiotics when urine culture results  - Procalcitonin 0.63  - Follow up blood cultures  - Check RVP    #Endo  - No acute issues    #Heme  - H/H at baseline 8-9  - DVT prophylaxis with HSQ MICU Transfer Note    Transfer from: MICU    Transfer to: (x) Medicine    (  ) Telemetry     (   ) RCU        (    ) Palliative         (   ) Stroke Unit          (   ) __________________    Accepting Physician: Dr. AIXA Dennison      MICU COURSE:    73 year old female with squamous cell lung cancer s/p RT completed 2 weeks ago, breast cancer s/p chemo and mastectomy, hemangiopericytoma x2 s/p resection, chronic back pain on chronic opioids, COPD, active smoker and recurrent SBO who presented with nausea and vomiting. Symptoms started 3 days ago. She reports not eating or drinking anything since then due to fear of vomiting.  Patient had a recent hospitalization from 4/18 to 4/22 for abdominal pain and constipation and was admitted for possible SBO. She began to pass bowel movements after being given Narcan and was discharged home.     ED Course: Temp 97.5 HR 74 BP 94/46 RR 16 O2 95% on room air. CTAP showed no acute changes with prior surgeries noted. UA returned positive, labs significant for creatinine 3.93, increased from baseline of 1.1. Patient's BP dropped in the ED from 110s to 70s despite receiving 4L LR. Last BP 77/51 and patient was started on Levophed. Admit to MICU for septic shock likley secondary to UTI. Urine culture currently pending.  On Zosyn for broad spectrum coverage. Upon transfer to MICU, patient was weaned off Levophed and started on Midodrine.      Surgery consulted by ER for recurrent SBO concern.  Patient primarily managed by [facility] group, also currently consulting on patient.  Patient with large stool burden, given relistor with good effect.  Tolerating soft diet during day; advanced to regular.          Vital Signs Last 24 Hrs  T(C): 37 (07 May 2019 00:00), Max: 37 (07 May 2019 00:00)  T(F): 98.6 (07 May 2019 00:00), Max: 98.6 (07 May 2019 00:00)  HR: 64 (07 May 2019 02:00) (58 - 92)  BP: 162/70 (07 May 2019 02:00) (77/41 - 165/67)  BP(mean): 101 (07 May 2019 02:00) (54 - 101)  RR: 42 (07 May 2019 02:00) (12 - 50)  SpO2: 94% (07 May 2019 02:00) (91% - 100%)  I&O's Summary    05 May 2019 07:01  -  06 May 2019 07:00  --------------------------------------------------------  IN: 3865 mL / OUT: 300 mL / NET: 3565 mL    06 May 2019 07:01  -  07 May 2019 03:06  --------------------------------------------------------  IN: 1748.4 mL / OUT: 745 mL / NET: 1003.4 mL        MEDICATIONS  (STANDING):  ALBUTerol/ipratropium for Nebulization 3 milliLiter(s) Nebulizer every 6 hours  buDESOnide   0.5 milliGRAM(s) Respule 0.5 milliGRAM(s) Inhalation two times a day  chlorhexidine 2% Cloths 1 Application(s) Topical daily  chlorhexidine 4% Liquid 1 Application(s) Topical <User Schedule>  heparin  Injectable 5000 Unit(s) SubCutaneous every 12 hours  influenza   Vaccine 0.5 milliLiter(s) IntraMuscular once  lidocaine   Patch 1 Patch Transdermal daily  midodrine 20 milliGRAM(s) Oral every 8 hours  nicotine -  14 mG/24Hr(s) Patch 1 patch Transdermal daily  pantoprazole    Tablet 40 milliGRAM(s) Oral before breakfast  piperacillin/tazobactam IVPB. 3.375 Gram(s) IV Intermittent every 12 hours    MEDICATIONS  (PRN):  ondansetron Injectable 4 milliGRAM(s) IV Push every 6 hours PRN Nausea and/or Vomiting        LABS                                            8.9                   Neurophils% (auto):   x      (05-07 @ 00:19):    5.0  )-----------(211          Lymphocytes% (auto):  x                                             27.4                   Eosinphils% (auto):   x        Manual%: Neutrophils x    ; Lymphocytes x    ; Eosinophils x    ; Bands%: x    ; Blasts x                                    139    |  102    |  45                  Calcium: 6.9   / iCa: x      (05-07 @ 00:19)    ----------------------------<  126       Magnesium: 1.9                              3.8     |  24     |  3.05             Phosphorous: 4.1      TPro  5.4    /  Alb  2.6    /  TBili  0.2    /  DBili  x      /  AST  14     /  ALT  15     /  AlkPhos  116    07 May 2019 00:19    ( 05-07 @ 00:19 )   PT: 13.8 sec;   INR: 1.19 ratio  aPTT: 32.2 sec    · Assessment	  73 year old female with squamous cell lung cancer s/p RT completed 2 weeks ago, breast cancer s/p chemo and mastectomy, COPD and recurrent SBO admitted for shock state likely 2/2 sepsis from UTI versus PE with acute renal failure.    #Neuro  - AAOx3, mentating at baseline  - Oxycodone IR 5mg PRN for back pain     #CV  - Shock state- likely from sepsis from UTI, versus PE in the setting of malignancy  - Now off pressors, blood pressure being maintained on oral midodrine   - Hold home enalapril  - Troponin 35  - Patient unable to go for CT angio with IV contrast due to SHAKIRA to evaluate for PE- VQ scan may not be accurate in setting of patient's lung malignancy    #Pulm  - Patient with history of lung cancer s/p radiation  - C/w supplemental oxygen PRN, goal >88%  - Duonebs q6h  - C/w budesonide    #GI  - History of SBO- no clear transition point on CTAP, patient passing flatus and having bowel movements  - Pt has been advanced to full diet.   - Having multiple loose BMs overnight, tolerating PO   - Good response to methylnatrexone   - Zofran PRN for nausea/vomiting      #Renal  - Acute renal failure with oliguria- likely 2/2 ATN from decreased PO intake, nausea, vomiting  - Oral fluids  - BUN/Creat trending down   - Nephrology consult    #ID  - UA grossly positive, urine culture sent  - Past urine culture from 4/19/19 positive for Klebsiella pneumoniae  - CXR with no clear consolidations noted- awaiting final read  - C/w Zosyn for UTI  - Narrow antibiotics when urine culture results  - Procalcitonin 0.63  - Follow up blood cultures  - Check RVP    #Endo  - No acute issues  - Can check AM cortisol to evaluate adrenal suppression due inhaled steroids    #Heme  - H/H at baseline 8-9  - DVT prophylaxis with HSQ

## 2019-05-07 NOTE — DISCHARGE NOTE NURSING/CASE MANAGEMENT/SOCIAL WORK - NSDCDPATPORTLINK_GEN_ALL_CORE
You can access the [facility] Portal, offered by [facility], by registering with the following website: [email or web address]

## 2019-05-07 NOTE — PROGRESS NOTE ADULT - ASSESSMENT
73 year old female with   PMH chronic Back pain (on Narcotics), history of multiple SBO's, narcotic associated constipation/ileus, COPD,  smoker,    ca  breast,  right  mastectomy. RENUKA cavitary lesion on ct,  .  c/c cachexia  path from  1/19, with NSCC with squamous  features,  s/p  RT     admitted with abdominal pain / vomiting , resolved,   , from SBO    ct scan noted,  SBO, / syrg  to  f/p    pt is an active smoker, refusing to quit   ct chest , 4/19/18  noted/  oncology dr hernandez   dvt ppxct  abdomen, noted   iv fluids/  zosyn/  on Midodrine  SHAKIRA, resolving  po diet     < from: CT Abdomen and Pelvis No Cont (05.05.19 @ 20:08) >  IMPRESSION:  Suboptimal study due to the absence of oral and IV contrast.  Small bowel obstruction mildly worsened since prior study without clear   transition point. Questionable transition point in the right upper   quadrant with previously seen swirling was. No free air or ascites  < end of copied text >      < from: CT Chest No Cont (04.19.19 @ 18:50)   INTERPRETATION:  Motion degraded evaluation  Redemonstraion on left upper lobe cavitary mass consistent with patients   known hx of lung CA  Mucoid impacted right lower lobe ariways with scattered tree in bud   opacities  < end of copied text >      Cytopathology - Non Gyn Report (01.18.19 @ 11:43)    Immunohistochemical stains.  The immunophenotype together with the cytomorphology supports the  diagnosis squamous cell carcinoma.    Final Diagnosis  LUNG, LEFT UPPER LOBE, US GUIDED CORE BIOPSY AND FNA  POSITIVE FOR MALIGNANT CELLS.  Non-small cell carcinoma, with squamous features (see note).  Additional studies pending.

## 2019-05-07 NOTE — PROGRESS NOTE ADULT - SUBJECTIVE AND OBJECTIVE BOX
doing better    REVIEW OF SYSTEMS:  GEN: no fever,    no chills  RESP: no SOB,   no cough  CVS: no chest pain,   no palpitations  GI: no abdominal pain,   no nausea,   no vomiting,   no constipation,   no diarrhea  : no dysuria,   no frequency  NEURO: no headache,   no dizziness  PSYCH: no depression,   not anxious  Derm : no rash    MEDICATIONS  (STANDING):  ALBUTerol/ipratropium for Nebulization 3 milliLiter(s) Nebulizer every 6 hours  buDESOnide   0.5 milliGRAM(s) Respule 0.5 milliGRAM(s) Inhalation two times a day  chlorhexidine 2% Cloths 1 Application(s) Topical daily  chlorhexidine 4% Liquid 1 Application(s) Topical <User Schedule>  heparin  Injectable 5000 Unit(s) SubCutaneous every 12 hours  influenza   Vaccine 0.5 milliLiter(s) IntraMuscular once  lidocaine   Patch 1 Patch Transdermal daily  midodrine 20 milliGRAM(s) Oral every 8 hours  nicotine -  14 mG/24Hr(s) Patch 1 patch Transdermal daily  oxyCODONE    IR 5 milliGRAM(s) Oral once  pantoprazole    Tablet 40 milliGRAM(s) Oral before breakfast  piperacillin/tazobactam IVPB. 3.375 Gram(s) IV Intermittent every 12 hours    MEDICATIONS  (PRN):  ondansetron Injectable 4 milliGRAM(s) IV Push every 6 hours PRN Nausea and/or Vomiting      Vital Signs Last 24 Hrs  T(C): 36.7 (07 May 2019 08:00), Max: 37 (07 May 2019 00:00)  T(F): 98 (07 May 2019 08:00), Max: 98.6 (07 May 2019 00:00)  HR: 76 (07 May 2019 08:00) (58 - 92)  BP: 124/58 (07 May 2019 08:00) (83/44 - 165/67)  BP(mean): 84 (07 May 2019 08:00) (59 - 101)  RR: 28 (07 May 2019 08:00) (16 - 50)  SpO2: 98% (07 May 2019 08:00) (91% - 100%)  CAPILLARY BLOOD GLUCOSE        I&O's Summary    06 May 2019 07:01  -  07 May 2019 07:00  --------------------------------------------------------  IN: 1948.4 mL / OUT: 935 mL / NET: 1013.4 mL    07 May 2019 07:01  -  07 May 2019 08:17  --------------------------------------------------------  IN: 0 mL / OUT: 50 mL / NET: -50 mL        PHYSICAL EXAM:  HEAD:  Atraumatic, Normocephalic  NECK: Supple, No   JVD  CHEST/LUNG:   no     rales,     no,    rhonchi  HEART: Regular rate and rhythm;         murmur  ABDOMEN: Soft, Nontender, ;   EXTREMITIES:    no    edema  NEUROLOGY:  alert    LABS:                        8.9    5.0   )-----------( 211      ( 07 May 2019 00:19 )             27.4     05-07    139  |  102  |  45<H>  ----------------------------<  126<H>  3.8   |  24  |  3.05<H>    Ca    6.9<L>      07 May 2019 00:19  Phos  4.1     05-07  Mg     1.9     05-07    TPro  5.4<L>  /  Alb  2.6<L>  /  TBili  0.2  /  DBili  x   /  AST  14  /  ALT  15  /  AlkPhos  116  05-07    PT/INR - ( 07 May 2019 00:19 )   PT: 13.8 sec;   INR: 1.19 ratio         PTT - ( 07 May 2019 00:19 )  PTT:32.2 sec  CARDIAC MARKERS ( 06 May 2019 03:06 )  x     / x     / 33 U/L / x     / 1.5 ng/mL      Urinalysis Basic - ( 06 May 2019 00:40 )    Color: Yellow / Appearance: Slightly Turbid / S.016 / pH: x  Gluc: x / Ketone: Negative  / Bili: Negative / Urobili: Negative   Blood: x / Protein: 100 / Nitrite: Negative   Leuk Esterase: Large / RBC: 4 /hpf /  /HPF   Sq Epi: x / Non Sq Epi: 0 /hpf / Bacteria: Many        ABG - ( 06 May 2019 05:39 )  pH, Arterial: 7.40  pH, Blood: x     /  pCO2: 47    /  pO2: 113   / HCO3: 29    / Base Excess: 4.0   /  SaO2: 97                - @ 22:30  3.8  31              Consultant(s) Notes Reviewed:      Care Discussed with Consultants/Other Providers:

## 2019-05-07 NOTE — PROGRESS NOTE ADULT - SUBJECTIVE AND OBJECTIVE BOX
Patient is a 73y old  Female who presented with a chief complaint of nausea and vomiting (07 May 2019 09:40)      INTERVAL HPI/OVERNIGHT EVENTS:  Multiple BMs  no nausea or vomiting  tolerating PO diet  no abdominal pain  no spasms or cramps    MEDICATIONS  (STANDING):  ALBUTerol/ipratropium for Nebulization 3 milliLiter(s) Nebulizer every 6 hours  buDESOnide   0.5 milliGRAM(s) Respule 0.5 milliGRAM(s) Inhalation two times a day  chlorhexidine 2% Cloths 1 Application(s) Topical daily  chlorhexidine 4% Liquid 1 Application(s) Topical <User Schedule>  heparin  Injectable 5000 Unit(s) SubCutaneous every 12 hours  influenza   Vaccine 0.5 milliLiter(s) IntraMuscular once  lidocaine   Patch 1 Patch Transdermal daily  midodrine 20 milliGRAM(s) Oral every 8 hours  nicotine -  14 mG/24Hr(s) Patch 1 patch Transdermal daily  pantoprazole    Tablet 40 milliGRAM(s) Oral before breakfast  piperacillin/tazobactam IVPB. 3.375 Gram(s) IV Intermittent every 12 hours    MEDICATIONS  (PRN):  ondansetron Injectable 4 milliGRAM(s) IV Push every 6 hours PRN Nausea and/or Vomiting      Allergies  Biaxin (Rash)  Cipro (Headache)  Flagyl (Headache)  penicillin (Rash; Swelling)  sulfa drugs (Unknown)      Review of Systems:  General:  No fevers, chills, night sweats  CV:  No pain, palpitations, hypo/hypertension  Resp:  No dyspnea,+cough +COPD +lung cancer  GI:  see HPI  :  No pain, bleeding, incontinence, nocturia  Muscle:  +intermittent muscle spasms +chronic back pain  Neuro:  No weakness, tingling, memory problems  Psych:  No fatigue, insomnia, mood problems, depression  Endocrine:  No polyuria, polydypsia, cold/heat intolerance  Heme:  No petechiae, ecchymosis, easy bruisability  Skin:  No rash, tattoos, scars, edema    Vital Signs Last 24 Hrs  T(C): 36.7 (07 May 2019 08:00), Max: 37 (07 May 2019 00:00)  T(F): 98 (07 May 2019 08:00), Max: 98.6 (07 May 2019 00:00)  HR: 75 (07 May 2019 10:00) (58 - 92)  BP: 149/67 (07 May 2019 10:00) (83/44 - 165/67)  BP(mean): 96 (07 May 2019 10:00) (59 - 101)  RR: 25 (07 May 2019 10:00) (17 - 50)  SpO2: 99% (07 May 2019 10:00) (91% - 100%)    PHYSICAL EXAM:  Constitutional: thin chronically ill appearing female non toxic  Neck: No LAD, supple no JVD  Respiratory: decreased BS b/l, distant BS  Cardiovascular: S1 and S2, regular  Gastrointestinal: protuberant abdomen, softly distended +muscle wasting +prominent abdominal vasculature NT no rebound or rigidity +multiple surgical scars   Extremities: +clubbing  +pulses  Vascular: palpable  Neurological: A/O x 3, no focal deficits  Psychiatric: Normal mood, normal affect  Skin: No rashes +good turgor    LABS:                        8.9    5.0   )-----------( 211      ( 07 May 2019 00:19 )             27.4     05-07    139  |  102  |  45<H>  ----------------------------<  126<H>  3.8   |  24  |  3.05<H>    Ca    6.9<L>      07 May 2019 00:19  Phos  4.1     05-07  Mg     1.9     05-07    TPro  5.4<L>  /  Alb  2.6<L>  /  TBili  0.2  /  DBili  x   /  AST  14  /  ALT  15  /  AlkPhos  116  05-07    PT/INR - ( 07 May 2019 00:19 )   PT: 13.8 sec;   INR: 1.19 ratio    PTT - ( 07 May 2019 00:19 )  PTT:32.2 sec        RADIOLOGY & ADDITIONAL TESTS: Patient is a 73y old  Female who presented with a chief complaint of nausea and vomiting (07 May 2019 09:40)    INTERVAL HPI/OVERNIGHT EVENTS:  Multiple BMs  no nausea or vomiting  tolerating PO diet  no abdominal pain  no spasms or cramps    MEDICATIONS  (STANDING):  ALBUTerol/ipratropium for Nebulization 3 milliLiter(s) Nebulizer every 6 hours  buDESOnide   0.5 milliGRAM(s) Respule 0.5 milliGRAM(s) Inhalation two times a day  chlorhexidine 2% Cloths 1 Application(s) Topical daily  chlorhexidine 4% Liquid 1 Application(s) Topical <User Schedule>  heparin  Injectable 5000 Unit(s) SubCutaneous every 12 hours  influenza   Vaccine 0.5 milliLiter(s) IntraMuscular once  lidocaine   Patch 1 Patch Transdermal daily  midodrine 20 milliGRAM(s) Oral every 8 hours  nicotine -  14 mG/24Hr(s) Patch 1 patch Transdermal daily  pantoprazole    Tablet 40 milliGRAM(s) Oral before breakfast  piperacillin/tazobactam IVPB. 3.375 Gram(s) IV Intermittent every 12 hours    MEDICATIONS  (PRN):  ondansetron Injectable 4 milliGRAM(s) IV Push every 6 hours PRN Nausea and/or Vomiting    Allergies  Biaxin (Rash)  Cipro (Headache)  Flagyl (Headache)  penicillin (Rash; Swelling)  sulfa drugs (Unknown)    Review of Systems:  General:  No fevers, chills, night sweats  CV:  No pain, palpitations, hypo/hypertension  Resp:  No dyspnea,+cough +COPD +lung cancer  GI:  see HPI  :  No pain, bleeding, incontinence, nocturia  Muscle:  +intermittent muscle spasms +chronic back pain  Neuro:  No weakness, tingling, memory problems  Psych:  No fatigue, insomnia, mood problems, depression  Endocrine:  No polyuria, polydypsia, cold/heat intolerance  Heme:  No petechiae, ecchymosis, easy bruisability  Skin:  No rash, tattoos, scars, edema    Vital Signs Last 24 Hrs  T(C): 36.7 (07 May 2019 08:00), Max: 37 (07 May 2019 00:00)  T(F): 98 (07 May 2019 08:00), Max: 98.6 (07 May 2019 00:00)  HR: 75 (07 May 2019 10:00) (58 - 92)  BP: 149/67 (07 May 2019 10:00) (83/44 - 165/67)  BP(mean): 96 (07 May 2019 10:00) (59 - 101)  RR: 25 (07 May 2019 10:00) (17 - 50)  SpO2: 99% (07 May 2019 10:00) (91% - 100%)    PHYSICAL EXAM:  Constitutional: thin chronically ill appearing female non toxic  Neck: No LAD, supple no JVD  Respiratory: decreased BS b/l, distant BS  Cardiovascular: S1 and S2, regular  Gastrointestinal: protuberant abdomen, softly distended +muscle wasting +prominent abdominal vasculature NT no rebound or rigidity +multiple surgical scars   Extremities: +clubbing  +pulses  Vascular: palpable  Neurological: A/O x 3, no focal deficits  Psychiatric: Normal mood, normal affect  Skin: No rashes +good turgor    LABS:                      8.9    5.0   )-----------( 211      ( 07 May 2019 00:19 )             27.4     05-07    139  |  102  |  45<H>  ----------------------------<  126<H>  3.8   |  24  |  3.05<H>    Ca    6.9<L>      07 May 2019 00:19  Phos  4.1     05-07  Mg     1.9     05-07    TPro  5.4<L>  /  Alb  2.6<L>  /  TBili  0.2  /  DBili  x   /  AST  14  /  ALT  15  /  AlkPhos  116  05-07    PT/INR - ( 07 May 2019 00:19 )   PT: 13.8 sec;   INR: 1.19 ratio    PTT - ( 07 May 2019 00:19 )  PTT:32.2 sec    RADIOLOGY & ADDITIONAL TESTS:

## 2019-05-07 NOTE — PROGRESS NOTE ADULT - ASSESSMENT
73 year old female with PMH Lung mass, chronic Back pain (on Narcotics), history of multiple SBO's, narcotic associated constipation/ileus, COPD, current everyday smoker admitted with abdominal pain and distension, nausea and poor PO intake and CT findings of SBO.  Prior similar admissions improved after PO NArcan or Movantik/Relistor. Known poor home meds compliance as pt does not like SE of diarrhea.  Clinically not obstructed - passing flatus and has had BMs  Suspect Narcotic Associated Dysmotility - was on Naloxegol 25 mg PO daily    RECS:  -PO diet as tolerated  -Monitor electrolytes and Cr and UO  -Recommend Naloxegol 25 mg PO Daily  -IV hydration    Meds compliance reinforced with pt  Discussed with MICU team    Jake Khan PA-C    [facility]  [phone]  After hours and weekend coverage [phone]

## 2019-05-07 NOTE — DISCHARGE NOTE NURSING/CASE MANAGEMENT/SOCIAL WORK - NSDCPEWEB_GEN_ALL_CORE
[place] website --- [email or web address] website --- [email or web address]

## 2019-05-07 NOTE — PROGRESS NOTE ADULT - SUBJECTIVE AND OBJECTIVE BOX
INTERVAL HPI/OVERNIGHT EVENTS:    SUBJECTIVE: Patient seen and examined at bedside.     CONSTITUTIONAL: No weakness, fevers or chills  EYES/ENT: No visual changes;  No vertigo or throat pain   NECK: No pain or stiffness  RESPIRATORY: No cough, wheezing, hemoptysis; No shortness of breath  CARDIOVASCULAR: No chest pain or palpitations  GASTROINTESTINAL: No abdominal or epigastric pain. No nausea, vomiting, or hematemesis; No diarrhea or constipation. No melena or hematochezia.  GENITOURINARY: No dysuria, frequency or hematuria  NEUROLOGICAL: No numbness or weakness  SKIN: No itching, rashes    OBJECTIVE:    VITAL SIGNS:  ICU Vital Signs Last 24 Hrs  T(C): 36.9 (07 May 2019 04:00), Max: 37 (07 May 2019 00:00)  T(F): 98.4 (07 May 2019 04:00), Max: 98.6 (07 May 2019 00:00)  HR: 63 (07 May 2019 06:00) (58 - 92)  BP: 125/58 (07 May 2019 06:00) (78/42 - 165/67)  BP(mean): 84 (07 May 2019 06:00) (55 - 101)  ABP: --  ABP(mean): --  RR: 47 (07 May 2019 06:00) (16 - 50)  SpO2: 97% (07 May 2019 06:00) (91% - 100%)        05-06 @ 07:01  -  -07 @ 07:00  --------------------------------------------------------  IN: 1948.4 mL / OUT: 885 mL / NET: 1063.4 mL      CAPILLARY BLOOD GLUCOSE          PHYSICAL EXAM:    General: NAD  HEENT: NC/AT; PERRL, clear conjunctiva  Neck: supple  Respiratory: CTA b/l  Cardiovascular: +S1/S2; RRR  Abdomen: soft, NT/ND; +BS x4  Extremities: WWP, 2+ peripheral pulses b/l; no LE edema  Skin: normal color and turgor; no rash  Neurological:    MEDICATIONS:  MEDICATIONS  (STANDING):  ALBUTerol/ipratropium for Nebulization 3 milliLiter(s) Nebulizer every 6 hours  buDESOnide   0.5 milliGRAM(s) Respule 0.5 milliGRAM(s) Inhalation two times a day  chlorhexidine 2% Cloths 1 Application(s) Topical daily  chlorhexidine 4% Liquid 1 Application(s) Topical <User Schedule>  heparin  Injectable 5000 Unit(s) SubCutaneous every 12 hours  influenza   Vaccine 0.5 milliLiter(s) IntraMuscular once  lidocaine   Patch 1 Patch Transdermal daily  midodrine 20 milliGRAM(s) Oral every 8 hours  nicotine -  14 mG/24Hr(s) Patch 1 patch Transdermal daily  pantoprazole    Tablet 40 milliGRAM(s) Oral before breakfast  piperacillin/tazobactam IVPB. 3.375 Gram(s) IV Intermittent every 12 hours    MEDICATIONS  (PRN):  ondansetron Injectable 4 milliGRAM(s) IV Push every 6 hours PRN Nausea and/or Vomiting      ALLERGIES:  Allergies    Biaxin (Rash)  Cipro (Headache)  Flagyl (Headache)  penicillin (Rash; Swelling)  sulfa drugs (Unknown)    Intolerances        LABS:                        8.9    5.0   )-----------( 211      ( 07 May 2019 00:19 )             27.4     05-07    139  |  102  |  45<H>  ----------------------------<  126<H>  3.8   |  24  |  3.05<H>    Ca    6.9<L>      07 May 2019 00:19  Phos  4.1     05-07  Mg     1.9     -    TPro  5.4<L>  /  Alb  2.6<L>  /  TBili  0.2  /  DBili  x   /  AST  14  /  ALT  15  /  AlkPhos  116  05-07    PT/INR - ( 07 May 2019 00:19 )   PT: 13.8 sec;   INR: 1.19 ratio         PTT - ( 07 May 2019 00:19 )  PTT:32.2 sec  Urinalysis Basic - ( 06 May 2019 00:40 )    Color: Yellow / Appearance: Slightly Turbid / S.016 / pH: x  Gluc: x / Ketone: Negative  / Bili: Negative / Urobili: Negative   Blood: x / Protein: 100 / Nitrite: Negative   Leuk Esterase: Large / RBC: 4 /hpf /  /HPF   Sq Epi: x / Non Sq Epi: 0 /hpf / Bacteria: Many        RADIOLOGY & ADDITIONAL TESTS: Reviewed. INTERVAL HPI/OVERNIGHT EVENTS:    SUBJECTIVE: Patient seen and examined at bedside. Pt had multiple loose BMs overnight. Tolerating soft diet overnight. Complaining of chronic back pain     OBJECTIVE:    VITAL SIGNS:  ICU Vital Signs Last 24 Hrs  T(C): 36.9 (07 May 2019 04:00), Max: 37 (07 May 2019 00:00)  T(F): 98.4 (07 May 2019 04:00), Max: 98.6 (07 May 2019 00:00)  HR: 63 (07 May 2019 06:00) (58 - 92)  BP: 125/58 (07 May 2019 06:00) (78/42 - 165/67)  BP(mean): 84 (07 May 2019 06:00) (55 - 101)  ABP: --  ABP(mean): --  RR: 47 (07 May 2019 06:00) (16 - 50)  SpO2: 97% (07 May 2019 06:00) (91% - 100%)        05-06 @ 07:01  -  05-07 @ 07:00  --------------------------------------------------------  IN: 1948.4 mL / OUT: 885 mL / NET: 1063.4 mL      CAPILLARY BLOOD GLUCOSE          PHYSICAL EXAM:    General: NAD  HEENT: NC/AT; PERRL, clear conjunctiva  Neck: supple  Respiratory: CTA b/l  Cardiovascular: +S1/S2; RRR  Abdomen: soft, NT/ND; +BS x4  Extremities: WWP, 2+ peripheral pulses b/l; no LE edema  Skin: normal color and turgor; no rash  Neurological: AAOx3     MEDICATIONS:  MEDICATIONS  (STANDING):  ALBUTerol/ipratropium for Nebulization 3 milliLiter(s) Nebulizer every 6 hours  buDESOnide   0.5 milliGRAM(s) Respule 0.5 milliGRAM(s) Inhalation two times a day  chlorhexidine 2% Cloths 1 Application(s) Topical daily  chlorhexidine 4% Liquid 1 Application(s) Topical <User Schedule>  heparin  Injectable 5000 Unit(s) SubCutaneous every 12 hours  influenza   Vaccine 0.5 milliLiter(s) IntraMuscular once  lidocaine   Patch 1 Patch Transdermal daily  midodrine 20 milliGRAM(s) Oral every 8 hours  nicotine -  14 mG/24Hr(s) Patch 1 patch Transdermal daily  pantoprazole    Tablet 40 milliGRAM(s) Oral before breakfast  piperacillin/tazobactam IVPB. 3.375 Gram(s) IV Intermittent every 12 hours    MEDICATIONS  (PRN):  ondansetron Injectable 4 milliGRAM(s) IV Push every 6 hours PRN Nausea and/or Vomiting      ALLERGIES:  Allergies    Biaxin (Rash)  Cipro (Headache)  Flagyl (Headache)  penicillin (Rash; Swelling)  sulfa drugs (Unknown)    Intolerances        LABS:                        8.9    5.0   )-----------( 211      ( 07 May 2019 00:19 )             27.4     05-07    139  |  102  |  45<H>  ----------------------------<  126<H>  3.8   |  24  |  3.05<H>    Ca    6.9<L>      07 May 2019 00:19  Phos  4.1     05-07  Mg     1.9     05-07    TPro  5.4<L>  /  Alb  2.6<L>  /  TBili  0.2  /  DBili  x   /  AST  14  /  ALT  15  /  AlkPhos  116  05-07    PT/INR - ( 07 May 2019 00:19 )   PT: 13.8 sec;   INR: 1.19 ratio         PTT - ( 07 May 2019 00:19 )  PTT:32.2 sec  Urinalysis Basic - ( 06 May 2019 00:40 )    Color: Yellow / Appearance: Slightly Turbid / S.016 / pH: x  Gluc: x / Ketone: Negative  / Bili: Negative / Urobili: Negative   Blood: x / Protein: 100 / Nitrite: Negative   Leuk Esterase: Large / RBC: 4 /hpf /  /HPF   Sq Epi: x / Non Sq Epi: 0 /hpf / Bacteria: Many        RADIOLOGY & ADDITIONAL TESTS: Reviewed.

## 2019-05-07 NOTE — PROGRESS NOTE ADULT - ASSESSMENT
· Assessment	  73 year old female with squamous cell lung cancer s/p RT completed 2 weeks ago, breast cancer s/p chemo and mastectomy, COPD and recurrent SBO admitted for shock state likely 2/2 sepsis from UTI versus PE with acute renal failure.    #Neuro  - AAOx3, mentating at baseline  - Oxycodone IR 5mg PRN for back pain     #CV  - Shock state- likely from sepsis from UTI, versus PE in the setting of malignancy  - Now off pressors, blood pressure being maintained on oral midodrine   - Hold home enalapril  - Troponin 35  - Patient unable to go for CT angio with IV contrast due to SHAKIRA to evaluate for PE- VQ scan may not be accurate in setting of patient's lung malignancy    #Pulm  - Patient with history of lung cancer s/p radiation  - C/w supplemental oxygen PRN, goal >88%  - Duonebs q6h  - C/w budesonide    #GI  - History of SBO- no clear transition point on CTAP, patient passing flatus and having bowel movements  - Pt has been advanced to full diet.   - Having multiple loose BMs overnight, tolerating PO   - Good response to methylnatrexone   - Zofran PRN for nausea/vomiting      #Renal  - Acute renal failure with oliguria- likely 2/2 ATN from decreased PO intake, nausea, vomiting  - Oral fluids  - BUN/Creat trending down   - Nephrology consult    #ID  - UA grossly positive, urine culture sent  - Past urine culture from 4/19/19 positive for Klebsiella pneumoniae  - CXR with no clear consolidations noted- awaiting final read  - C/w Zosyn for UTI  - Narrow antibiotics when urine culture results  - Procalcitonin 0.63  - Follow up blood cultures  - Check RVP    #Endo  - No acute issues    #Heme  - H/H at baseline 8-9  - DVT prophylaxis with HSQ.

## 2019-05-08 LAB
-  AMIKACIN: SIGNIFICANT CHANGE UP
-  AMPICILLIN/SULBACTAM: SIGNIFICANT CHANGE UP
-  AMPICILLIN: SIGNIFICANT CHANGE UP
-  AZTREONAM: SIGNIFICANT CHANGE UP
-  CEFAZOLIN: SIGNIFICANT CHANGE UP
-  CEFEPIME: SIGNIFICANT CHANGE UP
-  CEFOXITIN: SIGNIFICANT CHANGE UP
-  CEFTRIAXONE: SIGNIFICANT CHANGE UP
-  CIPROFLOXACIN: SIGNIFICANT CHANGE UP
-  ERTAPENEM: SIGNIFICANT CHANGE UP
-  GENTAMICIN: SIGNIFICANT CHANGE UP
-  IMIPENEM: SIGNIFICANT CHANGE UP
-  LEVOFLOXACIN: SIGNIFICANT CHANGE UP
-  MEROPENEM: SIGNIFICANT CHANGE UP
-  NITROFURANTOIN: SIGNIFICANT CHANGE UP
-  PIPERACILLIN/TAZOBACTAM: SIGNIFICANT CHANGE UP
-  TIGECYCLINE: SIGNIFICANT CHANGE UP
-  TOBRAMYCIN: SIGNIFICANT CHANGE UP
-  TRIMETHOPRIM/SULFAMETHOXAZOLE: SIGNIFICANT CHANGE UP
ALBUMIN SERPL ELPH-MCNC: 2.9 G/DL — LOW (ref 3.3–5)
ALP SERPL-CCNC: 114 U/L — SIGNIFICANT CHANGE UP (ref 40–120)
ALT FLD-CCNC: 13 U/L — SIGNIFICANT CHANGE UP (ref 10–45)
ANION GAP SERPL CALC-SCNC: 13 MMOL/L — SIGNIFICANT CHANGE UP (ref 5–17)
AST SERPL-CCNC: 14 U/L — SIGNIFICANT CHANGE UP (ref 10–40)
BILIRUB SERPL-MCNC: 0.2 MG/DL — SIGNIFICANT CHANGE UP (ref 0.2–1.2)
BUN SERPL-MCNC: 29 MG/DL — HIGH (ref 7–23)
CALCIUM SERPL-MCNC: 8.1 MG/DL — LOW (ref 8.4–10.5)
CHLORIDE SERPL-SCNC: 111 MMOL/L — HIGH (ref 96–108)
CO2 SERPL-SCNC: 22 MMOL/L — SIGNIFICANT CHANGE UP (ref 22–31)
CREAT SERPL-MCNC: 2.51 MG/DL — HIGH (ref 0.5–1.3)
CULTURE RESULTS: SIGNIFICANT CHANGE UP
GLUCOSE SERPL-MCNC: 104 MG/DL — HIGH (ref 70–99)
HCT VFR BLD CALC: 29.9 % — LOW (ref 34.5–45)
HGB BLD-MCNC: 9.5 G/DL — LOW (ref 11.5–15.5)
MCHC RBC-ENTMCNC: 30.9 PG — SIGNIFICANT CHANGE UP (ref 27–34)
MCHC RBC-ENTMCNC: 31.9 GM/DL — LOW (ref 32–36)
MCV RBC AUTO: 96.9 FL — SIGNIFICANT CHANGE UP (ref 80–100)
METHOD TYPE: SIGNIFICANT CHANGE UP
ORGANISM # SPEC MICROSCOPIC CNT: SIGNIFICANT CHANGE UP
ORGANISM # SPEC MICROSCOPIC CNT: SIGNIFICANT CHANGE UP
PLATELET # BLD AUTO: 213 K/UL — SIGNIFICANT CHANGE UP (ref 150–400)
POTASSIUM SERPL-MCNC: 3.8 MMOL/L — SIGNIFICANT CHANGE UP (ref 3.5–5.3)
POTASSIUM SERPL-SCNC: 3.8 MMOL/L — SIGNIFICANT CHANGE UP (ref 3.5–5.3)
PROT SERPL-MCNC: 6.1 G/DL — SIGNIFICANT CHANGE UP (ref 6–8.3)
RBC # BLD: 3.08 M/UL — LOW (ref 3.8–5.2)
RBC # FLD: 16.3 % — HIGH (ref 10.3–14.5)
SODIUM SERPL-SCNC: 146 MMOL/L — HIGH (ref 135–145)
SPECIMEN SOURCE: SIGNIFICANT CHANGE UP
WBC # BLD: 4.8 K/UL — SIGNIFICANT CHANGE UP (ref 3.8–10.5)
WBC # FLD AUTO: 4.8 K/UL — SIGNIFICANT CHANGE UP (ref 3.8–10.5)

## 2019-05-08 PROCEDURE — 99232 SBSQ HOSP IP/OBS MODERATE 35: CPT | Mod: GC

## 2019-05-08 RX ORDER — DIAZEPAM 5 MG
2 TABLET ORAL ONCE
Qty: 0 | Refills: 0 | Status: DISCONTINUED | OUTPATIENT
Start: 2019-05-08 | End: 2019-05-09

## 2019-05-08 RX ORDER — OXYCODONE HYDROCHLORIDE 5 MG/1
5 TABLET ORAL EVERY 6 HOURS
Qty: 0 | Refills: 0 | Status: DISCONTINUED | OUTPATIENT
Start: 2019-05-08 | End: 2019-05-11

## 2019-05-08 RX ORDER — DIAZEPAM 5 MG
2 TABLET ORAL ONCE
Qty: 0 | Refills: 0 | Status: DISCONTINUED | OUTPATIENT
Start: 2019-05-08 | End: 2019-05-08

## 2019-05-08 RX ORDER — SODIUM CHLORIDE 9 MG/ML
1000 INJECTION INTRAMUSCULAR; INTRAVENOUS; SUBCUTANEOUS
Qty: 0 | Refills: 0 | Status: DISCONTINUED | OUTPATIENT
Start: 2019-05-08 | End: 2019-05-09

## 2019-05-08 RX ORDER — OXYCODONE HYDROCHLORIDE 5 MG/1
5 TABLET ORAL ONCE
Qty: 0 | Refills: 0 | Status: DISCONTINUED | OUTPATIENT
Start: 2019-05-08 | End: 2019-05-08

## 2019-05-08 RX ORDER — NALOXEGOL OXALATE 12.5 MG/1
12.5 TABLET, FILM COATED ORAL DAILY
Qty: 0 | Refills: 0 | Status: DISCONTINUED | OUTPATIENT
Start: 2019-05-08 | End: 2019-05-08

## 2019-05-08 RX ORDER — OXYCODONE HYDROCHLORIDE 5 MG/1
2.5 TABLET ORAL ONCE
Qty: 0 | Refills: 0 | Status: DISCONTINUED | OUTPATIENT
Start: 2019-05-08 | End: 2019-05-08

## 2019-05-08 RX ORDER — LACTOBACILLUS ACIDOPHILUS 100MM CELL
1 CAPSULE ORAL
Qty: 0 | Refills: 0 | Status: DISCONTINUED | OUTPATIENT
Start: 2019-05-08 | End: 2019-05-11

## 2019-05-08 RX ORDER — SODIUM CHLORIDE 9 MG/ML
1000 INJECTION INTRAMUSCULAR; INTRAVENOUS; SUBCUTANEOUS
Qty: 0 | Refills: 0 | Status: DISCONTINUED | OUTPATIENT
Start: 2019-05-08 | End: 2019-05-08

## 2019-05-08 RX ORDER — ACETAMINOPHEN 500 MG
975 TABLET ORAL ONCE
Qty: 0 | Refills: 0 | Status: COMPLETED | OUTPATIENT
Start: 2019-05-08 | End: 2019-05-08

## 2019-05-08 RX ORDER — NALOXEGOL OXALATE 12.5 MG/1
25 TABLET, FILM COATED ORAL DAILY
Qty: 0 | Refills: 0 | Status: DISCONTINUED | OUTPATIENT
Start: 2019-05-08 | End: 2019-05-08

## 2019-05-08 RX ADMIN — OXYCODONE HYDROCHLORIDE 5 MILLIGRAM(S): 5 TABLET ORAL at 17:07

## 2019-05-08 RX ADMIN — OXYCODONE HYDROCHLORIDE 5 MILLIGRAM(S): 5 TABLET ORAL at 17:30

## 2019-05-08 RX ADMIN — LIDOCAINE 1 PATCH: 4 CREAM TOPICAL at 19:00

## 2019-05-08 RX ADMIN — OXYCODONE HYDROCHLORIDE 2.5 MILLIGRAM(S): 5 TABLET ORAL at 21:31

## 2019-05-08 RX ADMIN — Medication 1 PATCH: at 11:12

## 2019-05-08 RX ADMIN — Medication 2 MILLIGRAM(S): at 02:42

## 2019-05-08 RX ADMIN — HEPARIN SODIUM 5000 UNIT(S): 5000 INJECTION INTRAVENOUS; SUBCUTANEOUS at 17:09

## 2019-05-08 RX ADMIN — Medication 0.5 MILLIGRAM(S): at 17:08

## 2019-05-08 RX ADMIN — LIDOCAINE 1 PATCH: 4 CREAM TOPICAL at 00:07

## 2019-05-08 RX ADMIN — OXYCODONE HYDROCHLORIDE 2.5 MILLIGRAM(S): 5 TABLET ORAL at 22:15

## 2019-05-08 RX ADMIN — SODIUM CHLORIDE 50 MILLILITER(S): 9 INJECTION INTRAMUSCULAR; INTRAVENOUS; SUBCUTANEOUS at 21:32

## 2019-05-08 RX ADMIN — OXYCODONE HYDROCHLORIDE 5 MILLIGRAM(S): 5 TABLET ORAL at 11:35

## 2019-05-08 RX ADMIN — MIDODRINE HYDROCHLORIDE 20 MILLIGRAM(S): 2.5 TABLET ORAL at 21:33

## 2019-05-08 RX ADMIN — Medication 1 TABLET(S): at 17:08

## 2019-05-08 RX ADMIN — CHLORHEXIDINE GLUCONATE 1 APPLICATION(S): 213 SOLUTION TOPICAL at 06:49

## 2019-05-08 RX ADMIN — SODIUM CHLORIDE 50 MILLILITER(S): 9 INJECTION INTRAMUSCULAR; INTRAVENOUS; SUBCUTANEOUS at 10:34

## 2019-05-08 RX ADMIN — HEPARIN SODIUM 5000 UNIT(S): 5000 INJECTION INTRAVENOUS; SUBCUTANEOUS at 06:49

## 2019-05-08 RX ADMIN — Medication 3 MILLILITER(S): at 17:08

## 2019-05-08 RX ADMIN — Medication 3 MILLILITER(S): at 23:31

## 2019-05-08 RX ADMIN — Medication 3 MILLILITER(S): at 06:48

## 2019-05-08 RX ADMIN — Medication 1 PATCH: at 19:46

## 2019-05-08 RX ADMIN — PANTOPRAZOLE SODIUM 40 MILLIGRAM(S): 20 TABLET, DELAYED RELEASE ORAL at 06:48

## 2019-05-08 RX ADMIN — OXYCODONE HYDROCHLORIDE 5 MILLIGRAM(S): 5 TABLET ORAL at 23:30

## 2019-05-08 RX ADMIN — MIDODRINE HYDROCHLORIDE 20 MILLIGRAM(S): 2.5 TABLET ORAL at 06:48

## 2019-05-08 RX ADMIN — Medication 1 PATCH: at 11:08

## 2019-05-08 RX ADMIN — OXYCODONE HYDROCHLORIDE 5 MILLIGRAM(S): 5 TABLET ORAL at 06:48

## 2019-05-08 RX ADMIN — Medication 0.5 MILLIGRAM(S): at 06:48

## 2019-05-08 RX ADMIN — MIDODRINE HYDROCHLORIDE 20 MILLIGRAM(S): 2.5 TABLET ORAL at 14:32

## 2019-05-08 RX ADMIN — Medication 3 MILLILITER(S): at 11:09

## 2019-05-08 RX ADMIN — PIPERACILLIN AND TAZOBACTAM 25 GRAM(S): 4; .5 INJECTION, POWDER, LYOPHILIZED, FOR SOLUTION INTRAVENOUS at 21:33

## 2019-05-08 RX ADMIN — PIPERACILLIN AND TAZOBACTAM 25 GRAM(S): 4; .5 INJECTION, POWDER, LYOPHILIZED, FOR SOLUTION INTRAVENOUS at 10:38

## 2019-05-08 RX ADMIN — LIDOCAINE 1 PATCH: 4 CREAM TOPICAL at 11:08

## 2019-05-08 RX ADMIN — OXYCODONE HYDROCHLORIDE 5 MILLIGRAM(S): 5 TABLET ORAL at 11:06

## 2019-05-08 NOTE — PROGRESS NOTE ADULT - ASSESSMENT
73 year old female with PMH Lung mass, chronic Back pain (on Narcotics), history of multiple SBO's, narcotic associated constipation/ileus, COPD, current everyday smoker admitted with abdominal pain and distension, nausea and poor PO intake and CT findings of SBO.  Prior similar admissions improved after PO NArcan or Movantik/Relistor. Known poor home meds compliance as pt does not like SE of diarrhea.  Clinically not obstructed - passing flatus and has had BMs  Suspect Narcotic Associated Dysmotility - was on Naloxegol 25 mg PO daily previous admissions with good response but pt stopped taking Rx at home due to SE of diarrhea  Diarrhea, on Zosyn. No colitis on CT scan. Suspect Abx associated diarrhea  If develops fever or leukocytosis then can check stool r/o C diff  SHAKIRA - improving    RECS:  -PO diet as tolerated  -Monitor electrolytes and Cr and UO  -Pain management  -PT/ OOB mobility  -Recommend Naloxegol 25 mg PO Daily at  discharge (d/c for now). Will hold off on dosing now as pt having >12 stools yesterday without any Movantik Rx. Suspect AAD    Meds compliance reinforced with pt  Discussed with Medicine NP    Jake Khan PA-C    [facility]  [phone]  After hours and weekend coverage [phone]

## 2019-05-08 NOTE — PROGRESS NOTE ADULT - ASSESSMENT
73 year old female with   PMH chronic Back pain (on Narcotics), history of multiple SBO's, narcotic associated constipation/ileus, COPD,  smoker,    ca  breast,  right  mastectomy. RENUKA cavitary lesion on ct,  .  c/c cachexia  path from  1/19, with NSCC with squamous  features,  s/p  RT     admitted with abdominal pain / vomiting , resolved,   , from SBO    ct scan noted,  SBO, / syrg  to  f/p    pt is an active smoker, refusing to quit   ct chest , 4/19/18  noted/  oncology dr hernandez   dvt ppx  ct  abdomen, noted   iv fluids/  zosyn/  on Midodrine  SHAKIRA, resolving/  renal    po diet/  pt to  ambulate     < from: CT Abdomen and Pelvis No Cont (05.05.19 @ 20:08) >  IMPRESSION:  Suboptimal study due to the absence of oral and IV contrast.  Small bowel obstruction mildly worsened since prior study without clear   transition point. Questionable transition point in the right upper   quadrant with previously seen swirling was. No free air or ascites  < end of copied text >      < from: CT Chest No Cont (04.19.19 @ 18:50)   INTERPRETATION:  Motion degraded evaluation  Redemonstraion on left upper lobe cavitary mass consistent with patients   known hx of lung CA  Mucoid impacted right lower lobe ariways with scattered tree in bud   opacities  < end of copied text >      Cytopathology - Non Gyn Report (01.18.19 @ 11:43)    Immunohistochemical stains.  The immunophenotype together with the cytomorphology supports the  diagnosis squamous cell carcinoma.    Final Diagnosis  LUNG, LEFT UPPER LOBE, US GUIDED CORE BIOPSY AND FNA  POSITIVE FOR MALIGNANT CELLS.  Non-small cell carcinoma, with squamous features (see note).  Additional studies pending.

## 2019-05-08 NOTE — PROGRESS NOTE ADULT - SUBJECTIVE AND OBJECTIVE BOX
CARDIOLOGY     PROGRESS  NOTE   ________________________________________________    CHIEF COMPLAINT:Patient is a 73y old  Female who presents with a chief complaint of nausea and vomiting (07 May 2019 10:53)  doing better, no complain.  	  REVIEW OF SYSTEMS:  CONSTITUTIONAL: No fever, weight loss, or fatigue  EYES: No eye pain, visual disturbances, or discharge  ENT:  No difficulty hearing, tinnitus, vertigo; No sinus or throat pain  NECK: No pain or stiffness  RESPIRATORY: No cough, wheezing, chills or hemoptysis; No Shortness of Breath  CARDIOVASCULAR: No chest pain, palpitations, passing out, dizziness, or leg swelling  GASTROINTESTINAL: No abdominal or epigastric pain. No nausea, vomiting, or hematemesis; No diarrhea or constipation. No melena or hematochezia.  GENITOURINARY: No dysuria, frequency, hematuria, or incontinence  NEUROLOGICAL: No headaches, memory loss, loss of strength, numbness, or tremors  SKIN: No itching, burning, rashes, or lesions   LYMPH Nodes: No enlarged glands  ENDOCRINE: No heat or cold intolerance; No hair loss  MUSCULOSKELETAL: No joint pain or swelling; No muscle, back, or extremity pain  PSYCHIATRIC: No depression, anxiety, mood swings, or difficulty sleeping  HEME/LYMPH: No easy bruising, or bleeding gums  ALLERGY AND IMMUNOLOGIC: No hives or eczema	    [ ] All others negative	  [ ] Unable to obtain    PHYSICAL EXAM:  T(C): 36.7 (05-08-19 @ 05:46), Max: 36.8 (05-07-19 @ 12:06)  HR: 59 (05-08-19 @ 05:46) (59 - 76)  BP: 100/53 (05-08-19 @ 05:46) (100/53 - 149/67)  RR: 18 (05-08-19 @ 05:46) (18 - 28)  SpO2: 97% (05-08-19 @ 05:46) (92% - 99%)  Wt(kg): --  I&O's Summary    07 May 2019 07:01  -  08 May 2019 07:00  --------------------------------------------------------  IN: 360 mL / OUT: 130 mL / NET: 230 mL        Appearance: Normal	  HEENT:   Normal oral mucosa, PERRL, EOMI	  Lymphatic: No lymphadenopathy  Cardiovascular: Normal S1 S2, No JVD, + murmurs, No edema  Respiratory: Lungs clear to auscultation	  Psychiatry: A & O x 3, Mood & affect appropriate  Gastrointestinal:  Soft, Non-tender, + BS	  Skin: No rashes, No ecchymoses, No cyanosis	  Neurologic: Non-focal  Extremities: Normal range of motion, No clubbing, cyanosis or edema  Vascular: Peripheral pulses palpable 2+ bilaterally    MEDICATIONS  (STANDING):  ALBUTerol/ipratropium for Nebulization 3 milliLiter(s) Nebulizer every 6 hours  buDESOnide   0.5 milliGRAM(s) Respule 0.5 milliGRAM(s) Inhalation two times a day  chlorhexidine 4% Liquid 1 Application(s) Topical <User Schedule>  heparin  Injectable 5000 Unit(s) SubCutaneous every 12 hours  influenza   Vaccine 0.5 milliLiter(s) IntraMuscular once  lidocaine   Patch 1 Patch Transdermal daily  midodrine 20 milliGRAM(s) Oral every 8 hours  naloxegol 25 milliGRAM(s) Oral daily  nicotine -  14 mG/24Hr(s) Patch 1 patch Transdermal daily  pantoprazole    Tablet 40 milliGRAM(s) Oral before breakfast  piperacillin/tazobactam IVPB. 3.375 Gram(s) IV Intermittent every 12 hours      TELEMETRY: 	    ECG:  	  RADIOLOGY:  OTHER: 	  	  LABS:	 	    CARDIAC MARKERS:                                8.9    5.0   )-----------( 211      ( 07 May 2019 00:19 )             27.4     05-07    139  |  102  |  45<H>  ----------------------------<  126<H>  3.8   |  24  |  3.05<H>    Ca    6.9<L>      07 May 2019 00:19  Phos  4.1     05-07  Mg     1.9     05-07    TPro  5.4<L>  /  Alb  2.6<L>  /  TBili  0.2  /  DBili  x   /  AST  14  /  ALT  15  /  AlkPhos  116  05-07    proBNP:   Lipid Profile:   HgA1c:   TSH:   PT/INR - ( 07 May 2019 00:19 )   PT: 13.8 sec;   INR: 1.19 ratio         PTT - ( 07 May 2019 00:19 )  PTT:32.2 sec      Assessment and plan  ---------------------------  sbo  acute renal failure  renal eval  renal; consult  gentle hydration

## 2019-05-08 NOTE — PROGRESS NOTE ADULT - SUBJECTIVE AND OBJECTIVE BOX
no  cp/sob    REVIEW OF SYSTEMS:  GEN: no fever,    no chills  RESP: no SOB,   no cough  CVS: no chest pain,   no palpitations  GI: no abdominal pain,   no nausea,   no vomiting,   no constipation,   no diarrhea  : no dysuria,   no frequency  NEURO: no headache,   no dizziness  PSYCH: no depression,   not anxious  Derm : no rash    MEDICATIONS  (STANDING):  ALBUTerol/ipratropium for Nebulization 3 milliLiter(s) Nebulizer every 6 hours  buDESOnide   0.5 milliGRAM(s) Respule 0.5 milliGRAM(s) Inhalation two times a day  chlorhexidine 4% Liquid 1 Application(s) Topical <User Schedule>  heparin  Injectable 5000 Unit(s) SubCutaneous every 12 hours  influenza   Vaccine 0.5 milliLiter(s) IntraMuscular once  lidocaine   Patch 1 Patch Transdermal daily  midodrine 20 milliGRAM(s) Oral every 8 hours  naloxegol 25 milliGRAM(s) Oral daily  nicotine -  14 mG/24Hr(s) Patch 1 patch Transdermal daily  pantoprazole    Tablet 40 milliGRAM(s) Oral before breakfast  piperacillin/tazobactam IVPB. 3.375 Gram(s) IV Intermittent every 12 hours    MEDICATIONS  (PRN):  ondansetron Injectable 4 milliGRAM(s) IV Push every 6 hours PRN Nausea and/or Vomiting      Vital Signs Last 24 Hrs  T(C): 36.7 (08 May 2019 05:46), Max: 36.8 (07 May 2019 12:06)  T(F): 98.1 (08 May 2019 05:46), Max: 98.2 (07 May 2019 12:06)  HR: 59 (08 May 2019 05:46) (59 - 75)  BP: 100/53 (08 May 2019 05:46) (100/53 - 149/67)  BP(mean): 96 (07 May 2019 10:00) (86 - 96)  RR: 18 (08 May 2019 05:46) (18 - 25)  SpO2: 97% (08 May 2019 05:46) (92% - 99%)  CAPILLARY BLOOD GLUCOSE        I&O's Summary    07 May 2019 07:01  -  08 May 2019 07:00  --------------------------------------------------------  IN: 360 mL / OUT: 130 mL / NET: 230 mL        PHYSICAL EXAM:  HEAD:  Atraumatic, Normocephalic  NECK: Supple, No   JVD  CHEST/LUNG:   no     rales,     no,    rhonchi  HEART: Regular rate and rhythm;         murmur  ABDOMEN: Soft, Nontender, ;   EXTREMITIES:    no    edema  NEUROLOGY:  alert    LABS:                        8.9    5.0   )-----------( 211      ( 07 May 2019 00:19 )             27.4     05-07    139  |  102  |  45<H>  ----------------------------<  126<H>  3.8   |  24  |  3.05<H>    Ca    6.9<L>      07 May 2019 00:19  Phos  4.1     05-07  Mg     1.9     05-07    TPro  5.4<L>  /  Alb  2.6<L>  /  TBili  0.2  /  DBili  x   /  AST  14  /  ALT  15  /  AlkPhos  116  05-07    PT/INR - ( 07 May 2019 00:19 )   PT: 13.8 sec;   INR: 1.19 ratio         PTT - ( 07 May 2019 00:19 )  PTT:32.2 sec                        Consultant(s) Notes Reviewed:      Care Discussed with Consultants/Other Providers:

## 2019-05-08 NOTE — CONSULT NOTE ADULT - SUBJECTIVE AND OBJECTIVE BOX
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

## 2019-05-08 NOTE — PROGRESS NOTE ADULT - SUBJECTIVE AND OBJECTIVE BOX
Patient is a 73y old  Female who presented with a chief complaint of nausea and vomiting (08 May 2019 08:14)      INTERVAL HPI/OVERNIGHT EVENTS:  transferred out of MICU yesterday  Multiple BMs yesterday - loose (>12 in total)  still with abdominal distension - but at baseline  no abdominal pain, nausea or vomiting  Tolerating Regular diet    Still with back and chest wall pain - chronic, unchanged  no fever or chills  Chavarria d/c'ed yesterday no dysuria. On Abx for +GNR UTI    MEDICATIONS  (STANDING):  ALBUTerol/ipratropium for Nebulization 3 milliLiter(s) Nebulizer every 6 hours  buDESOnide   0.5 milliGRAM(s) Respule 0.5 milliGRAM(s) Inhalation two times a day  chlorhexidine 4% Liquid 1 Application(s) Topical <User Schedule>  heparin  Injectable 5000 Unit(s) SubCutaneous every 12 hours  influenza   Vaccine 0.5 milliLiter(s) IntraMuscular once  lidocaine   Patch 1 Patch Transdermal daily  midodrine 20 milliGRAM(s) Oral every 8 hours  naloxegol 25 milliGRAM(s) Oral daily  nicotine -  14 mG/24Hr(s) Patch 1 patch Transdermal daily  pantoprazole    Tablet 40 milliGRAM(s) Oral before breakfast  piperacillin/tazobactam IVPB. 3.375 Gram(s) IV Intermittent every 12 hours    MEDICATIONS  (PRN):  ondansetron Injectable 4 milliGRAM(s) IV Push every 6 hours PRN Nausea and/or Vomiting  oxyCODONE    IR 5 milliGRAM(s) Oral every 6 hours PRN Moderate Pain (4 - 6)      Allergies    Biaxin (Rash)  Cipro (Headache)  Flagyl (Headache)  penicillin (Rash; Swelling)  sulfa drugs (Unknown)      Review of Systems:  General:  No fevers, chills, night sweats  CV:  No pain, palpitations, hypo/hypertension  Resp:  No dyspnea,+cough +COPD +lung cancer  GI:  see HPI  :  No pain, bleeding, incontinence, nocturia  Muscle:  +intermittent muscle spasms +chronic back pain  Neuro:  No weakness, tingling, memory problems  Psych:  No fatigue, insomnia, mood problems, depression  Endocrine:  No polyuria, polydypsia, cold/heat intolerance  Heme:  No petechiae, ecchymosis, easy bruisability  Skin:  No rash, tattoos, scars, edema    Vital Signs Last 24 Hrs  T(C): 36.7 (08 May 2019 05:46), Max: 36.8 (07 May 2019 12:06)  T(F): 98.1 (08 May 2019 05:46), Max: 98.2 (07 May 2019 12:06)  HR: 59 (08 May 2019 05:46) (59 - 75)  BP: 100/53 (08 May 2019 05:46) (100/53 - 149/67)  BP(mean): 96 (07 May 2019 10:00) (96 - 96)  RR: 18 (08 May 2019 05:46) (18 - 25)  SpO2: 97% (08 May 2019 05:46) (92% - 99%)    PHYSICAL EXAM:  Constitutional: thin chronically ill appearing female non toxic. complete >75% of breakfast tray  Neck: No LAD, supple no JVD  Respiratory: decreased BS b/l, distant BS  Cardiovascular: S1 and S2, regular  Gastrointestinal: protuberant abdomen, softly distended +muscle wasting +prominent abdominal vasculature NT no rebound or rigidity +multiple surgical scars   Extremities: +clubbing  +pulses  Vascular: palpable  Neurological: A/O x 3, no focal deficits  Psychiatric: Normal mood, normal affect  Skin: No rashes +good turgor    LABS:                        8.9    5.0   )-----------( 211      ( 07 May 2019 00:19 )             27.4     05-07    139  |  102  |  45<H>  ----------------------------<  126<H>  3.8   |  24  |  3.05<H>    Ca    6.9<L>      07 May 2019 00:19  Phos  4.1     05-07  Mg     1.9     05-07    TPro  5.4<L>  /  Alb  2.6<L>  /  TBili  0.2  /  DBili  x   /  AST  14  /  ALT  15  /  AlkPhos  116  05-07    PT/INR - ( 07 May 2019 00:19 )   PT: 13.8 sec;   INR: 1.19 ratio    PTT - ( 07 May 2019 00:19 )  PTT:32.2 sec      RADIOLOGY & ADDITIONAL TESTS:

## 2019-05-08 NOTE — CONSULT NOTE ADULT - ASSESSMENT
UTI abx per medicine  acute kidney failure ? from above exacerbated by n/v/diarrhea ileus  gentle hydration, renal evaluation in progress    lung cancer s/p ebrt due for pet/ct as outpatient    anemia chronic likely worse from kidney failure and hydration  b12 deficiency s/p recent b12 supplementation

## 2019-05-08 NOTE — CONSULT NOTE ADULT - SUBJECTIVE AND OBJECTIVE BOX
[facility] [place] [facility]   MD VICENTA LEONARD MD RUORU WONG, PA    TEL:  OFFICE: [phone]  DR MAYER CELL: [phone]  DONNA WORKMAN CELL: [phone]  DR. 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     PAST HISTORY  --------------------------------------------------------------------------------  PAST MEDICAL & SURGICAL HISTORY:  Back pain  Bowel obstruction: multiple hospitalizations  Kidney stone  Emphysema  Narcotic Dependence  S/P Radiation Therapy  S/P Chemotherapy, Time Since Greater than 12 Weeks  Narcotic Dysmotile Cilia Syndrome  Chronic Pain Following Surgery or Procedure: following right breast mastectomy  Ankle Fracture: right anle fx s/p cast 2009  History of Small Bowel Obstruction: 1/2010  Asthma  Breast Cancer  S/P hernia surgery: femoral hernia - 2012  S/P Exploratory Laparotomy  S/P Appendectomy  S/P Cholecystectomy  Liver Mass s/p liver rsxn: s/p resection 2009  History of Hysterectomy: 1998  S/P Right Mastectomy: 2006    FAMILY HISTORY:  No pertinent family history in first degree relatives    PAST SOCIAL HISTORY:    ALLERGIES & MEDICATIONS  --------------------------------------------------------------------------------  Allergies    Biaxin (Rash)  Cipro (Headache)  Flagyl (Headache)  penicillin (Rash; Swelling)  sulfa drugs (Unknown)    Intolerances      Standing Inpatient Medications  ALBUTerol/ipratropium for Nebulization 3 milliLiter(s) Nebulizer every 6 hours  buDESOnide   0.5 milliGRAM(s) Respule 0.5 milliGRAM(s) Inhalation two times a day  chlorhexidine 4% Liquid 1 Application(s) Topical <User Schedule>  heparin  Injectable 5000 Unit(s) SubCutaneous every 12 hours  influenza   Vaccine 0.5 milliLiter(s) IntraMuscular once  lidocaine   Patch 1 Patch Transdermal daily  midodrine 20 milliGRAM(s) Oral every 8 hours  nicotine -  14 mG/24Hr(s) Patch 1 patch Transdermal daily  pantoprazole    Tablet 40 milliGRAM(s) Oral before breakfast  piperacillin/tazobactam IVPB. 3.375 Gram(s) IV Intermittent every 12 hours  sodium chloride 0.9%. 1000 milliLiter(s) IV Continuous <Continuous>    PRN Inpatient Medications  ondansetron Injectable 4 milliGRAM(s) IV Push every 6 hours PRN  oxyCODONE    IR 5 milliGRAM(s) Oral every 6 hours PRN      REVIEW OF SYSTEMS  --------------------------------------------------------------------------------  Gen: No fevers/chills  Skin: No rashes  Head/Eyes/Ears: Normal hearing,  Normal vision   Respiratory: No dyspnea, cough  CV: No chest pain  GI: No abdominal pain, diarrhea, constipation, nausea, vomiting  : No dysuria, hematuria  MSK: No  edema  Heme: No easy bruising or bleeding  Psych: No significant depression    All other systems were reviewed and are negative, except as noted.    VITALS/PHYSICAL EXAM  --------------------------------------------------------------------------------  T(C): 36.7 (05-08-19 @ 05:46), Max: 36.8 (05-07-19 @ 12:06)  HR: 59 (05-08-19 @ 05:46) (59 - 69)  BP: 100/53 (05-08-19 @ 05:46) (100/53 - 132/70)  RR: 18 (05-08-19 @ 05:46) (18 - 25)  SpO2: 97% (05-08-19 @ 05:46) (92% - 97%)  Wt(kg): --        05-07-19 @ 07:01  -  05-08-19 @ 07:00  --------------------------------------------------------  IN: 360 mL / OUT: 130 mL / NET: 230 mL      Physical Exam:  	Gen: NAD  	HEENT: MMM  	Pulm: CTA B/L  	CV: S1S2  	Abd: Soft, +BS   	Ext: No LE edema B/L  	Neuro: Awake  	Skin: Warm and dry  	Vascular access:    LABS/STUDIES  --------------------------------------------------------------------------------              8.9    5.0   >-----------<  211      [05-07-19 @ 00:19]              27.4     139  |  102  |  45  ----------------------------<  126      [05-07-19 @ 00:19]  3.8   |  24  |  3.05        Ca     6.9     [05-07-19 @ 00:19]      Mg     1.9     [05-07-19 @ 00:19]      Phos  4.1     [05-07-19 @ 00:19]    TPro  5.4  /  Alb  2.6  /  TBili  0.2  /  DBili  x   /  AST  14  /  ALT  15  /  AlkPhos  116  [05-07-19 @ 00:19]    PT/INR: PT 13.8 , INR 1.19       [05-07-19 @ 00:19]  PTT: 32.2       [05-07-19 @ 00:19]      Creatinine Trend:  SCr 3.05 [05-07 @ 00:19]  SCr 3.35 [05-06 @ 05:34]  SCr 3.48 [05-06 @ 03:06]  SCr 3.93 [05-05 @ 22:30]  SCr 4.16 [05-05 @ 19:10]    Urinalysis - [05-06-19 @ 00:40]      Color Yellow / Appearance Slightly Turbid / SG 1.016 / pH 6.0      Gluc Negative / Ketone Negative  / Bili Negative / Urobili Negative       Blood Moderate / Protein 100 / Leuk Est Large / Nitrite Negative      RBC 4 /  / Hyaline 18 / Gran  / Sq Epi  / Non Sq Epi 0 / Bacteria Many    Urine Creatinine 106      [05-06-19 @ 00:40]  Urine Protein 101      [05-06-19 @ 00:40]  Urine Sodium 25      [05-06-19 @ 00:40]  Urine Potassium 46      [05-06-19 @ 00:40]  Urine Chloride <35      [05-06-19 @ 00:40]  Urine Osmolality 343      [05-06-19 @ 00:40]      HCV 0.15, Nonreact      [04-19-19 @ 12:32] [facility] [place] [facility]   MD VICENTA LEONARD MD RUORU WONG, PA    TEL:  OFFICE: [phone]  DR MAYER CELL: [phone]  DONNA WORKMAN CELL: [phone]  DR. 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     PAST HISTORY  --------------------------------------------------------------------------------  PAST MEDICAL & SURGICAL HISTORY:  Back pain  Bowel obstruction: multiple hospitalizations  Kidney stone  Emphysema  Narcotic Dependence  S/P Radiation Therapy  S/P Chemotherapy, Time Since Greater than 12 Weeks  Narcotic Dysmotile Cilia Syndrome  Chronic Pain Following Surgery or Procedure: following right breast mastectomy  Ankle Fracture: right anle fx s/p cast 2009  History of Small Bowel Obstruction: 1/2010  Asthma  Breast Cancer  S/P hernia surgery: femoral hernia - 2012  S/P Exploratory Laparotomy  S/P Appendectomy  S/P Cholecystectomy  Liver Mass s/p liver rsxn: s/p resection 2009  History of Hysterectomy: 1998  S/P Right Mastectomy: 2006    FAMILY HISTORY:  No pertinent family history in first degree relatives    PAST SOCIAL HISTORY:    ALLERGIES & MEDICATIONS  --------------------------------------------------------------------------------  Allergies    Biaxin (Rash)  Cipro (Headache)  Flagyl (Headache)  penicillin (Rash; Swelling)  sulfa drugs (Unknown)    Intolerances      Standing Inpatient Medications  ALBUTerol/ipratropium for Nebulization 3 milliLiter(s) Nebulizer every 6 hours  buDESOnide   0.5 milliGRAM(s) Respule 0.5 milliGRAM(s) Inhalation two times a day  chlorhexidine 4% Liquid 1 Application(s) Topical <User Schedule>  heparin  Injectable 5000 Unit(s) SubCutaneous every 12 hours  influenza   Vaccine 0.5 milliLiter(s) IntraMuscular once  lidocaine   Patch 1 Patch Transdermal daily  midodrine 20 milliGRAM(s) Oral every 8 hours  nicotine -  14 mG/24Hr(s) Patch 1 patch Transdermal daily  pantoprazole    Tablet 40 milliGRAM(s) Oral before breakfast  piperacillin/tazobactam IVPB. 3.375 Gram(s) IV Intermittent every 12 hours  sodium chloride 0.9%. 1000 milliLiter(s) IV Continuous <Continuous>    PRN Inpatient Medications  ondansetron Injectable 4 milliGRAM(s) IV Push every 6 hours PRN  oxyCODONE    IR 5 milliGRAM(s) Oral every 6 hours PRN      REVIEW OF SYSTEMS  --------------------------------------------------------------------------------  Gen: No fevers/chills  Skin: No rashes  Head/Eyes/Ears: Normal hearing,  Normal vision   Respiratory: No dyspnea, cough  CV: No chest pain  GI: No abdominal pain,   : No dysuria, hematuria  MSK: No  edema  Heme: No easy bruising or bleeding  Psych: No significant depression    All other systems were reviewed and are negative, except as noted.    VITALS/PHYSICAL EXAM  --------------------------------------------------------------------------------  T(C): 36.7 (05-08-19 @ 05:46), Max: 36.8 (05-07-19 @ 12:06)  HR: 59 (05-08-19 @ 05:46) (59 - 69)  BP: 100/53 (05-08-19 @ 05:46) (100/53 - 132/70)  RR: 18 (05-08-19 @ 05:46) (18 - 25)  SpO2: 97% (05-08-19 @ 05:46) (92% - 97%)  Wt(kg): --        05-07-19 @ 07:01  -  05-08-19 @ 07:00  --------------------------------------------------------  IN: 360 mL / OUT: 130 mL / NET: 230 mL      Physical Exam:  	Gen: NAD  	HEENT: MMM  	Pulm: CTA B/L  	CV: S1S2  	Abd: Soft, +BS   	Ext: No LE edema B/L  	Neuro: Awake  	Skin: Warm and dry  	NETTIE arevalo  LABS/STUDIES  --------------------------------------------------------------------------------              8.9    5.0   >-----------<  211      [05-07-19 @ 00:19]              27.4     139  |  102  |  45  ----------------------------<  126      [05-07-19 @ 00:19]  3.8   |  24  |  3.05        Ca     6.9     [05-07-19 @ 00:19]      Mg     1.9     [05-07-19 @ 00:19]      Phos  4.1     [05-07-19 @ 00:19]    TPro  5.4  /  Alb  2.6  /  TBili  0.2  /  DBili  x   /  AST  14  /  ALT  15  /  AlkPhos  116  [05-07-19 @ 00:19]    PT/INR: PT 13.8 , INR 1.19       [05-07-19 @ 00:19]  PTT: 32.2       [05-07-19 @ 00:19]      Creatinine Trend:  SCr 3.05 [05-07 @ 00:19]  SCr 3.35 [05-06 @ 05:34]  SCr 3.48 [05-06 @ 03:06]  SCr 3.93 [05-05 @ 22:30]  SCr 4.16 [05-05 @ 19:10]    Urinalysis - [05-06-19 @ 00:40]      Color Yellow / Appearance Slightly Turbid / SG 1.016 / pH 6.0      Gluc Negative / Ketone Negative  / Bili Negative / Urobili Negative       Blood Moderate / Protein 100 / Leuk Est Large / Nitrite Negative      RBC 4 /  / Hyaline 18 / Gran  / Sq Epi  / Non Sq Epi 0 / Bacteria Many    Urine Creatinine 106      [05-06-19 @ 00:40]  Urine Protein 101      [05-06-19 @ 00:40]  Urine Sodium 25      [05-06-19 @ 00:40]  Urine Potassium 46      [05-06-19 @ 00:40]  Urine Chloride <35      [05-06-19 @ 00:40]  Urine Osmolality 343      [05-06-19 @ 00:40]      HCV 0.15, Nonreact      [04-19-19 @ 12:32]

## 2019-05-08 NOTE — CONSULT NOTE ADULT - REASON FOR ADMISSION
nausea and vomiting

## 2019-05-09 LAB
ANION GAP SERPL CALC-SCNC: 14 MMOL/L — SIGNIFICANT CHANGE UP (ref 5–17)
BUN SERPL-MCNC: 25 MG/DL — HIGH (ref 7–23)
CALCIUM SERPL-MCNC: 8.2 MG/DL — LOW (ref 8.4–10.5)
CHLORIDE SERPL-SCNC: 109 MMOL/L — HIGH (ref 96–108)
CO2 SERPL-SCNC: 22 MMOL/L — SIGNIFICANT CHANGE UP (ref 22–31)
CREAT SERPL-MCNC: 2.38 MG/DL — HIGH (ref 0.5–1.3)
GLUCOSE SERPL-MCNC: 80 MG/DL — SIGNIFICANT CHANGE UP (ref 70–99)
HCT VFR BLD CALC: 27.9 % — LOW (ref 34.5–45)
HGB BLD-MCNC: 8.3 G/DL — LOW (ref 11.5–15.5)
MAGNESIUM SERPL-MCNC: 1.4 MG/DL — LOW (ref 1.6–2.6)
MCHC RBC-ENTMCNC: 29.2 PG — SIGNIFICANT CHANGE UP (ref 27–34)
MCHC RBC-ENTMCNC: 29.7 GM/DL — LOW (ref 32–36)
MCV RBC AUTO: 98.2 FL — SIGNIFICANT CHANGE UP (ref 80–100)
PHOSPHATE SERPL-MCNC: 1.8 MG/DL — LOW (ref 2.5–4.5)
PLATELET # BLD AUTO: 192 K/UL — SIGNIFICANT CHANGE UP (ref 150–400)
POTASSIUM SERPL-MCNC: 4.1 MMOL/L — SIGNIFICANT CHANGE UP (ref 3.5–5.3)
POTASSIUM SERPL-SCNC: 4.1 MMOL/L — SIGNIFICANT CHANGE UP (ref 3.5–5.3)
RBC # BLD: 2.84 M/UL — LOW (ref 3.8–5.2)
RBC # FLD: 18 % — HIGH (ref 10.3–14.5)
SODIUM SERPL-SCNC: 145 MMOL/L — SIGNIFICANT CHANGE UP (ref 135–145)
WBC # BLD: 3.76 K/UL — LOW (ref 3.8–10.5)
WBC # FLD AUTO: 3.76 K/UL — LOW (ref 3.8–10.5)

## 2019-05-09 PROCEDURE — 99233 SBSQ HOSP IP/OBS HIGH 50: CPT

## 2019-05-09 RX ORDER — OXYCODONE HYDROCHLORIDE 5 MG/1
5 TABLET ORAL ONCE
Refills: 0 | Status: DISCONTINUED | OUTPATIENT
Start: 2019-05-09 | End: 2019-05-09

## 2019-05-09 RX ORDER — MAGNESIUM SULFATE 500 MG/ML
2 VIAL (ML) INJECTION ONCE
Refills: 0 | Status: COMPLETED | OUTPATIENT
Start: 2019-05-09 | End: 2019-05-09

## 2019-05-09 RX ORDER — OXYCODONE HYDROCHLORIDE 5 MG/1
2.5 TABLET ORAL ONCE
Refills: 0 | Status: DISCONTINUED | OUTPATIENT
Start: 2019-05-09 | End: 2019-05-09

## 2019-05-09 RX ORDER — MIDODRINE HYDROCHLORIDE 2.5 MG/1
10 TABLET ORAL THREE TIMES A DAY
Refills: 0 | Status: DISCONTINUED | OUTPATIENT
Start: 2019-05-09 | End: 2019-05-11

## 2019-05-09 RX ORDER — SODIUM CHLORIDE 9 MG/ML
1000 INJECTION INTRAMUSCULAR; INTRAVENOUS; SUBCUTANEOUS
Refills: 0 | Status: DISCONTINUED | OUTPATIENT
Start: 2019-05-09 | End: 2019-05-10

## 2019-05-09 RX ADMIN — OXYCODONE HYDROCHLORIDE 5 MILLIGRAM(S): 5 TABLET ORAL at 09:40

## 2019-05-09 RX ADMIN — Medication 62.5 MILLIMOLE(S): at 11:44

## 2019-05-09 RX ADMIN — HEPARIN SODIUM 5000 UNIT(S): 5000 INJECTION INTRAVENOUS; SUBCUTANEOUS at 05:54

## 2019-05-09 RX ADMIN — Medication 50 GRAM(S): at 09:42

## 2019-05-09 RX ADMIN — Medication 1 PATCH: at 11:35

## 2019-05-09 RX ADMIN — Medication 3 MILLILITER(S): at 05:32

## 2019-05-09 RX ADMIN — Medication 0.5 MILLIGRAM(S): at 18:26

## 2019-05-09 RX ADMIN — PIPERACILLIN AND TAZOBACTAM 25 GRAM(S): 4; .5 INJECTION, POWDER, LYOPHILIZED, FOR SOLUTION INTRAVENOUS at 21:41

## 2019-05-09 RX ADMIN — OXYCODONE HYDROCHLORIDE 5 MILLIGRAM(S): 5 TABLET ORAL at 06:15

## 2019-05-09 RX ADMIN — PANTOPRAZOLE SODIUM 40 MILLIGRAM(S): 20 TABLET, DELAYED RELEASE ORAL at 05:54

## 2019-05-09 RX ADMIN — Medication 0.5 MILLIGRAM(S): at 06:31

## 2019-05-09 RX ADMIN — SODIUM CHLORIDE 50 MILLILITER(S): 9 INJECTION INTRAMUSCULAR; INTRAVENOUS; SUBCUTANEOUS at 15:58

## 2019-05-09 RX ADMIN — LIDOCAINE 1 PATCH: 4 CREAM TOPICAL at 11:44

## 2019-05-09 RX ADMIN — LIDOCAINE 1 PATCH: 4 CREAM TOPICAL at 21:08

## 2019-05-09 RX ADMIN — SODIUM CHLORIDE 50 MILLILITER(S): 9 INJECTION INTRAMUSCULAR; INTRAVENOUS; SUBCUTANEOUS at 08:39

## 2019-05-09 RX ADMIN — PIPERACILLIN AND TAZOBACTAM 25 GRAM(S): 4; .5 INJECTION, POWDER, LYOPHILIZED, FOR SOLUTION INTRAVENOUS at 09:29

## 2019-05-09 RX ADMIN — OXYCODONE HYDROCHLORIDE 5 MILLIGRAM(S): 5 TABLET ORAL at 14:18

## 2019-05-09 RX ADMIN — Medication 3 MILLILITER(S): at 23:11

## 2019-05-09 RX ADMIN — Medication 3 MILLILITER(S): at 11:43

## 2019-05-09 RX ADMIN — Medication 1 TABLET(S): at 16:01

## 2019-05-09 RX ADMIN — MIDODRINE HYDROCHLORIDE 10 MILLIGRAM(S): 2.5 TABLET ORAL at 21:41

## 2019-05-09 RX ADMIN — OXYCODONE HYDROCHLORIDE 5 MILLIGRAM(S): 5 TABLET ORAL at 14:35

## 2019-05-09 RX ADMIN — Medication 3 MILLILITER(S): at 18:28

## 2019-05-09 RX ADMIN — Medication 1 TABLET(S): at 08:39

## 2019-05-09 RX ADMIN — OXYCODONE HYDROCHLORIDE 5 MILLIGRAM(S): 5 TABLET ORAL at 09:55

## 2019-05-09 RX ADMIN — OXYCODONE HYDROCHLORIDE 2.5 MILLIGRAM(S): 5 TABLET ORAL at 18:26

## 2019-05-09 RX ADMIN — SODIUM CHLORIDE 50 MILLILITER(S): 9 INJECTION INTRAMUSCULAR; INTRAVENOUS; SUBCUTANEOUS at 16:01

## 2019-05-09 RX ADMIN — SODIUM CHLORIDE 50 MILLILITER(S): 9 INJECTION INTRAMUSCULAR; INTRAVENOUS; SUBCUTANEOUS at 21:41

## 2019-05-09 RX ADMIN — CHLORHEXIDINE GLUCONATE 1 APPLICATION(S): 213 SOLUTION TOPICAL at 08:43

## 2019-05-09 RX ADMIN — OXYCODONE HYDROCHLORIDE 5 MILLIGRAM(S): 5 TABLET ORAL at 05:31

## 2019-05-09 RX ADMIN — OXYCODONE HYDROCHLORIDE 5 MILLIGRAM(S): 5 TABLET ORAL at 00:15

## 2019-05-09 RX ADMIN — HEPARIN SODIUM 5000 UNIT(S): 5000 INJECTION INTRAVENOUS; SUBCUTANEOUS at 18:30

## 2019-05-09 RX ADMIN — Medication 1 PATCH: at 21:08

## 2019-05-09 RX ADMIN — Medication 1 PATCH: at 07:49

## 2019-05-09 RX ADMIN — Medication 2 MILLIGRAM(S): at 01:33

## 2019-05-09 RX ADMIN — Medication 1 PATCH: at 11:43

## 2019-05-09 NOTE — PHYSICAL THERAPY INITIAL EVALUATION ADULT - ADDITIONAL COMMENTS
Pt lives in garden apartment alone with no steps to enter and no steps to negotiate inside. Pt is independent with all functional mobility and used a RW to ambulate in the street. Pt has "friend" that assists her as needed, typically comes Wed/Thurs/Fri

## 2019-05-09 NOTE — PROGRESS NOTE ADULT - ASSESSMENT
73 year old female with   PMH chronic Back pain (on Narcotics), history of multiple SBO's, narcotic associated constipation/ileus, COPD,  smoker,    ca  breast,  right  mastectomy. RENUKA cavitary lesion on ct,  .  c/c cachexia  path from  1/19, with NSCC with squamous  features,  s/p  RT     admitted with abdominal pain / vomiting , resolved,   , from SBO    ct scan noted,  SBO, / syrg  to  f/p    pt is an active smoker, refusing to quit   ct chest , 4/19/18  noted/  oncology dr hernandez   dvt ppx  ct  abdomen, noted   iv fluids/   on Midodrine  SHAKIRA, resolving  creatinine is  2.3  po diet/  pt to  ambulate/  d/c  zosyn  after today's  dose     < from: CT Abdomen and Pelvis No Cont (05.05.19 @ 20:08) >  IMPRESSION:  Suboptimal study due to the absence of oral and IV contrast.  Small bowel obstruction mildly worsened since prior study without clear   transition point. Questionable transition point in the right upper   quadrant with previously seen swirling was. No free air or ascites  < end of copied text >      < from: CT Chest No Cont (04.19.19 @ 18:50)   INTERPRETATION:  Motion degraded evaluation  Redemonstraion on left upper lobe cavitary mass consistent with patients   known hx of lung CA  Mucoid impacted right lower lobe ariways with scattered tree in bud   opacities  < end of copied text >      Cytopathology - Non Gyn Report (01.18.19 @ 11:43)    Immunohistochemical stains.  The immunophenotype together with the cytomorphology supports the  diagnosis squamous cell carcinoma.    Final Diagnosis  LUNG, LEFT UPPER LOBE, US GUIDED CORE BIOPSY AND FNA  POSITIVE FOR MALIGNANT CELLS.  Non-small cell carcinoma, with squamous features (see note).  Additional studies pending. 73 year old female with   PMH chronic Back pain (on Narcotics), history of multiple SBO's, narcotic associated constipation/ileus, COPD,  smoker,    ca  breast,  right  mastectomy. RENUKA cavitary lesion on ct,  .  c/c cachexia  path from  1/19, with NSCC with squamous  features,  s/p  RT     admitted with abdominal pain / vomiting , resolved,   , from SBO    ct scan noted,  SBO, / syrg  to  f/p    pt is an active smoker, refusing to quit   ct chest , 4/19/18  noted/  oncology dr hernandez   dvt ppx  ct  abdomen, noted   iv fluids/   on Midodrine  SHAKIRA, resolving  creatinine is  2.3  po diet/  pt to  ambulate/ uti/ klebsiella.   d/c  zosyn  after today's  dose     < from: CT Abdomen and Pelvis No Cont (05.05.19 @ 20:08) >  IMPRESSION:  Suboptimal study due to the absence of oral and IV contrast.  Small bowel obstruction mildly worsened since prior study without clear   transition point. Questionable transition point in the right upper   quadrant with previously seen swirling was. No free air or ascites  < end of copied text >      < from: CT Chest No Cont (04.19.19 @ 18:50)   INTERPRETATION:  Motion degraded evaluation  Redemonstraion on left upper lobe cavitary mass consistent with patients   known hx of lung CA  Mucoid impacted right lower lobe ariways with scattered tree in bud   opacities  < end of copied text >      Cytopathology - Non Gyn Report (01.18.19 @ 11:43)    Immunohistochemical stains.  The immunophenotype together with the cytomorphology supports the  diagnosis squamous cell carcinoma.    Final Diagnosis  LUNG, LEFT UPPER LOBE, US GUIDED CORE BIOPSY AND FNA  POSITIVE FOR MALIGNANT CELLS.  Non-small cell carcinoma, with squamous features (see note).  Additional studies pending.

## 2019-05-09 NOTE — PROGRESS NOTE ADULT - SUBJECTIVE AND OBJECTIVE BOX
[facility]   MD VICNETA LEONARD MD RUORU WONG, PA    TEL:  OFFICE: [phone]  DR MAYER CELL: [phone]  DONNA WORKMAN CELL: [phone]  DR. PARTIDA CELL: [phone]    RENAL FOLLOW UP NOTE  --------------------------------------------------------------------------------  HPI:      Pt seen and examined at bedside.   Brenda MENDEZ, chest pain     PAST HISTORY  --------------------------------------------------------------------------------  No significant changes to PMH, PSH, FHx, SHx, unless otherwise noted    ALLERGIES & MEDICATIONS  --------------------------------------------------------------------------------  Allergies    Biaxin (Rash)  Cipro (Headache)  Flagyl (Headache)  penicillin (Rash; Swelling)  sulfa drugs (Unknown)    Intolerances      Standing Inpatient Medications  ALBUTerol/ipratropium for Nebulization 3 milliLiter(s) Nebulizer every 6 hours  buDESOnide   0.5 milliGRAM(s) Respule 0.5 milliGRAM(s) Inhalation two times a day  chlorhexidine 4% Liquid 1 Application(s) Topical <User Schedule>  heparin  Injectable 5000 Unit(s) SubCutaneous every 12 hours  influenza   Vaccine 0.5 milliLiter(s) IntraMuscular once  lactobacillus acidophilus 1 Tablet(s) Oral two times a day with meals  lidocaine   Patch 1 Patch Transdermal daily  midodrine 10 milliGRAM(s) Oral three times a day  nicotine -  14 mG/24Hr(s) Patch 1 patch Transdermal daily  oxyCODONE    IR 2.5 milliGRAM(s) Oral once  pantoprazole    Tablet 40 milliGRAM(s) Oral before breakfast  piperacillin/tazobactam IVPB. 3.375 Gram(s) IV Intermittent every 12 hours  sodium chloride 0.9%. 1000 milliLiter(s) IV Continuous <Continuous>  sodium phosphate IVPB 15 milliMole(s) IV Intermittent once    PRN Inpatient Medications  ondansetron Injectable 4 milliGRAM(s) IV Push every 6 hours PRN  oxyCODONE    IR 5 milliGRAM(s) Oral every 6 hours PRN      REVIEW OF SYSTEMS  --------------------------------------------------------------------------------  General: no fever  CVS: no chest pain  RESP: no sob, no cough  ABD: no abdominal pain      VITALS/PHYSICAL EXAM  --------------------------------------------------------------------------------  T(C): 36.7 (05-09-19 @ 05:39), Max: 36.9 (05-08-19 @ 19:26)  HR: 64 (05-09-19 @ 05:39) (64 - 86)  BP: 150/65 (05-09-19 @ 05:39) (133/71 - 150/65)  RR: 18 (05-09-19 @ 05:39) (18 - 18)  SpO2: 99% (05-09-19 @ 05:39) (97% - 99%)  Wt(kg): --        05-08-19 @ 07:01  -  05-09-19 @ 07:00  --------------------------------------------------------  IN: 1960 mL / OUT: 0 mL / NET: 1960 mL    05-09-19 @ 07:01  -  05-09-19 @ 10:23  --------------------------------------------------------  IN: 240 mL / OUT: 0 mL / NET: 240 mL      Physical Exam:  	Gen: NAD  	HEENT: MMM  	Pulm: CTA B/L  	CV: S1S2  	Abd: Soft, +BS  	Ext: No LE edema B/L                      Neuro: Awake   	Skin: Warm and Dry   	    LABS/STUDIES  --------------------------------------------------------------------------------              8.3    3.76  >-----------<  192      [05-09-19 @ 09:17]              27.9     145  |  109  |  25  ----------------------------<  80      [05-09-19 @ 07:10]  4.1   |  22  |  2.38        Ca     8.2     [05-09-19 @ 07:10]      Mg     1.4     [05-09-19 @ 07:10]      Phos  1.8     [05-09-19 @ 07:10]    TPro  6.1  /  Alb  2.9  /  TBili  0.2  /  DBili  x   /  AST  14  /  ALT  13  /  AlkPhos  114  [05-08-19 @ 17:13]          Creatinine Trend:  SCr 2.38 [05-09 @ 07:10]  SCr 2.51 [05-08 @ 17:13]  SCr 3.05 [05-07 @ 00:19]  SCr 3.35 [05-06 @ 05:34]  SCr 3.48 [05-06 @ 03:06]    Urinalysis - [05-06-19 @ 00:40]      Color Yellow / Appearance Slightly Turbid / SG 1.016 / pH 6.0      Gluc Negative / Ketone Negative  / Bili Negative / Urobili Negative       Blood Moderate / Protein 100 / Leuk Est Large / Nitrite Negative      RBC 4 /  / Hyaline 18 / Gran  / Sq Epi  / Non Sq Epi 0 / Bacteria Many    Urine Creatinine 106      [05-06-19 @ 00:40]  Urine Protein 101      [05-06-19 @ 00:40]  Urine Sodium 25      [05-06-19 @ 00:40]  Urine Potassium 46      [05-06-19 @ 00:40]  Urine Chloride <35      [05-06-19 @ 00:40]  Urine Osmolality 343      [05-06-19 @ 00:40]      HCV 0.15, Nonreact      [04-19-19 @ 12:32]

## 2019-05-09 NOTE — PROGRESS NOTE ADULT - ASSESSMENT
73 year old female with PMH Lung mass, chronic Back pain (on Narcotics), history of multiple SBO's, narcotic associated constipation/ileus, COPD, current everyday smoker admitted with abdominal pain and distension, nausea and poor PO intake and CT findings of SBO.  Prior similar admissions improved after PO NArcan or Movantik/Relistor. Known poor home meds compliance as pt does not like SE of diarrhea.  Clinically not obstructed - passing flatus and has had BMs  Suspect Narcotic Associated Dysmotility - was on Naloxegol 25 mg PO daily previous admissions with good response but pt stopped taking Rx at home due to SE of diarrhea  Diarrhea, on Zosyn. No colitis on CT scan. Suspect Abx associated diarrhea  If develops fever or leukocytosis then can check stool r/o C diff  SHAKIRA - improving    RECS:  -PO diet as tolerated  -Monitor electrolytes and Cr and UO  -IV hydration  -Pain management  -Replete electrolytes (hypocalcemia, hypokalemia and hypophosphatemia all likely 2/2/GI losses, discussed with Medicine NP)  -PT/ OOB mobility  -Recommend Naloxegol 12.5 mg PO Daily at  discharge (d/c for now). Will hold off on dosing now as pt having >12 stools yesterday without any Movantik Rx. Suspect AAD  -Continue Bacid BID    Meds compliance reinforced with pt  Discussed with Medicine NP    Jake Khan PA-C    [facility]  [phone]  After hours and weekend coverage [phone]

## 2019-05-09 NOTE — PROGRESS NOTE ADULT - SUBJECTIVE AND OBJECTIVE BOX
Patient is a 73y old  Female who presented with a chief complaint of nausea and vomiting (09 May 2019 08:13)      INTERVAL HPI/OVERNIGHT EVENTS:  multiple loose brown stools yesterday and this morning~12-15 stools 5/8/19  Did receive dose of Movantik yesterday morning, subsequently d/c'ed given increased stooling  tolerating PO with good appetite  no abdominal pain, decreased distension  no fever or chills    MEDICATIONS  (STANDING):  ALBUTerol/ipratropium for Nebulization 3 milliLiter(s) Nebulizer every 6 hours  buDESOnide   0.5 milliGRAM(s) Respule 0.5 milliGRAM(s) Inhalation two times a day  chlorhexidine 4% Liquid 1 Application(s) Topical <User Schedule>  heparin  Injectable 5000 Unit(s) SubCutaneous every 12 hours  influenza   Vaccine 0.5 milliLiter(s) IntraMuscular once  lactobacillus acidophilus 1 Tablet(s) Oral two times a day with meals  lidocaine   Patch 1 Patch Transdermal daily  midodrine 10 milliGRAM(s) Oral three times a day  nicotine -  14 mG/24Hr(s) Patch 1 patch Transdermal daily  oxyCODONE    IR 2.5 milliGRAM(s) Oral once  pantoprazole    Tablet 40 milliGRAM(s) Oral before breakfast  piperacillin/tazobactam IVPB. 3.375 Gram(s) IV Intermittent every 12 hours  sodium chloride 0.9%. 1000 milliLiter(s) (50 mL/Hr) IV Continuous <Continuous>  sodium phosphate IVPB 15 milliMole(s) IV Intermittent once    MEDICATIONS  (PRN):  ondansetron Injectable 4 milliGRAM(s) IV Push every 6 hours PRN Nausea and/or Vomiting  oxyCODONE    IR 5 milliGRAM(s) Oral every 6 hours PRN Moderate Pain (4 - 6)      Allergies  Biaxin (Rash)  Cipro (Headache)  Flagyl (Headache)  penicillin (Rash; Swelling)  sulfa drugs (Unknown)        Review of Systems:  General:  No fevers, chills, night sweats  CV:  No pain, palpitations, hypo/hypertension  Resp:  No dyspnea,+cough +COPD +lung cancer  GI:  see HPI  :  No pain, bleeding, incontinence, nocturia  Muscle:  +intermittent muscle spasms +chronic back pain  Neuro:  No weakness, tingling, memory problems  Psych:  No fatigue, insomnia, mood problems, depression  Endocrine:  No polyuria, polydypsia, cold/heat intolerance  Heme:  No petechiae, ecchymosis, easy bruisability  Skin:  No rash, tattoos, scars, edema    Vital Signs Last 24 Hrs  T(C): 36.7 (09 May 2019 05:39), Max: 36.9 (08 May 2019 19:26)  T(F): 98 (09 May 2019 05:39), Max: 98.5 (08 May 2019 19:26)  HR: 64 (09 May 2019 05:39) (64 - 86)  BP: 150/65 (09 May 2019 05:39) (133/71 - 150/65)  BP(mean): --  RR: 18 (09 May 2019 05:39) (18 - 18)  SpO2: 99% (09 May 2019 05:39) (97% - 99%)    PHYSICAL EXAM:  Constitutional: thin chronically ill appearing female non toxic. appears anxious  Neck: No LAD, supple no JVD  Respiratory: decreased BS b/l, distant BS  Cardiovascular: S1 and S2, regular  Gastrointestinal: protuberant abdomen, softly distended (decreased distension from prior exams)+muscle wasting +prominent abdominal vasculature NT no rebound or rigidity +multiple surgical scars   Extremities: +clubbing  +pulses  Vascular: palpable  Neurological: A/O x 3, no focal deficits  Psychiatric: Normal mood, normal affect  Skin: No rashes +good turgor    LABS:                        8.3    3.76  )-----------( 192      ( 09 May 2019 09:17 )             27.9     05-09    145  |  109<H>  |  25<H>  ----------------------------<  80  4.1   |  22  |  2.38<H>    Ca    8.2<L>      09 May 2019 07:10  Phos  1.8     05-09  Mg     1.4     05-09    TPro  6.1  /  Alb  2.9<L>  /  TBili  0.2  /  DBili  x   /  AST  14  /  ALT  13  /  AlkPhos  114  05-08          RADIOLOGY & ADDITIONAL TESTS:

## 2019-05-09 NOTE — CHART NOTE - NSCHARTNOTEFT_GEN_A_CORE
Nutrition follow up     Pt seen for malnutrition follow up     Hospital course as per chart: Pt 72 y/o F with PMH: back pain, small bowel obstruction, kidney stone, emphysema, narcotic dependence, breast cancer, S/P right mastectomy, radiation therapy, and chemotherapy (2006), asthma, COPD, active smoker, admitted with nausea and vomiting, found with SHAKIRA on CKD stage 3 - resolving, oliguria, elevated lactate and persistent hypotension, diarrhea, tolerating PO with good appetite per gastroenterology note, electrolytes being repleted as needed.    Source: Patient [x]    Family [ ]     other [x]; Medical record    Pt reports improved appetite and PO intake, states consuming >75% of meals. Noted 60% PO intake x 1 time on (05/07) as per flow sheets. Offered nutritional supplementation to optimize kcal and protein intake - pt refused all supplements, states consuming ice cream every night. Denies difficulty chewing/swallowing. Denies further nausea or vomiting at this time. Reports diarrhea with last BM today (08/09) - pt states "everything is being taken care of". Provided recommendations to optimize PO and protein intake. Attempted to obtain food preferences - pt did not have any at this time.     Diet: Regular     Enteral /Parenteral Nutrition: n/a    Current Weight: (05/06) 79.8 pounds -> (05/08) 97.4 pounds -?accuracy of weight fluctuations, will continue to monitor.   % Weight Change: n/a    Pertinent Medications: MEDICATIONS  (STANDING):  ALBUTerol/ipratropium for Nebulization 3 milliLiter(s) Nebulizer every 6 hours  buDESOnide   0.5 milliGRAM(s) Respule 0.5 milliGRAM(s) Inhalation two times a day  chlorhexidine 4% Liquid 1 Application(s) Topical <User Schedule>  heparin  Injectable 5000 Unit(s) SubCutaneous every 12 hours  influenza   Vaccine 0.5 milliLiter(s) IntraMuscular once  lactobacillus acidophilus 1 Tablet(s) Oral two times a day with meals  lidocaine   Patch 1 Patch Transdermal daily  midodrine 10 milliGRAM(s) Oral three times a day  nicotine -  14 mG/24Hr(s) Patch 1 patch Transdermal daily  oxyCODONE    IR 2.5 milliGRAM(s) Oral once  pantoprazole    Tablet 40 milliGRAM(s) Oral before breakfast  piperacillin/tazobactam IVPB. 3.375 Gram(s) IV Intermittent every 12 hours  sodium chloride 0.9%. 1000 milliLiter(s) (50 mL/Hr) IV Continuous <Continuous>  sodium phosphate IVPB 15 milliMole(s) IV Intermittent once    MEDICATIONS  (PRN):  ondansetron Injectable 4 milliGRAM(s) IV Push every 6 hours PRN Nausea and/or Vomiting  oxyCODONE    IR 5 milliGRAM(s) Oral every 6 hours PRN Moderate Pain (4 - 6)    Pertinent Labs: (05/09) Cr  2.38 mg/dL<H> BUN 25 mg/dL<H> Phos 1.8 mg/dL<L>     Skin: no noted pressure injuries as per documentation.   No noted edema as per flow sheets.     Estimated Needs:   [x] no change since previous assessment  [ ] recalculated:     Previous Nutrition Diagnosis: [x] Malnutrition; severe     Nutrition Diagnosis is [x] ongoing - being addressed with liberalized regular diet and PO intake encouragement.   Goal: Pt to meet > 80% of estimated nutrition needs    New Nutrition Diagnosis: [x] not applicable    Interventions:     1. Recommend continue regular diet, will continue to monitor electrolytes and adjust as needed.   2. Encourage PO intake and obtain food preferences (pt did not have any at this time).   3. Continue to monitor and replete electrolytes as needed.   4. Recommend Multivitamin to optimize nutrient intake. Spoke to NP.   5. Provided recommendations to optimize PO and protein intake, recommended small frequent meals by ordering nutrient-dense snacks and leaving food away from tray for later consumption during the day or between meals and to start with protein; reviewed foods with protein.  6. Continue to obtain weights to identify changes if any.      Monitoring and Evaluation:     [x] PO intake [x] Tolerance to diet prescription [x] weights [x] follow up per protocol    RD remains available.  Katja Green, MS, RDN, #[phone]

## 2019-05-09 NOTE — PROGRESS NOTE ADULT - SUBJECTIVE AND OBJECTIVE BOX
CARDIOLOGY     PROGRESS  NOTE   ________________________________________________    CHIEF COMPLAINT:Patient is a 73y old  Female who presents with a chief complaint of nausea and vomiting (08 May 2019 10:49)  no complain.  	  REVIEW OF SYSTEMS:  CONSTITUTIONAL: No fever, weight loss, or fatigue  EYES: No eye pain, visual disturbances, or discharge  ENT:  No difficulty hearing, tinnitus, vertigo; No sinus or throat pain  NECK: No pain or stiffness  RESPIRATORY: No cough, wheezing, chills or hemoptysis; No Shortness of Breath  CARDIOVASCULAR: No chest pain, palpitations, passing out, dizziness, or leg swelling  GASTROINTESTINAL: No abdominal or epigastric pain. No nausea, vomiting, or hematemesis; No diarrhea or constipation. No melena or hematochezia.  GENITOURINARY: No dysuria, frequency, hematuria, or incontinence  NEUROLOGICAL: No headaches, memory loss, loss of strength, numbness, or tremors  SKIN: No itching, burning, rashes, or lesions   LYMPH Nodes: No enlarged glands  ENDOCRINE: No heat or cold intolerance; No hair loss  MUSCULOSKELETAL: No joint pain or swelling; No muscle, back, or extremity pain  PSYCHIATRIC: No depression, anxiety, mood swings, or difficulty sleeping  HEME/LYMPH: No easy bruising, or bleeding gums  ALLERGY AND IMMUNOLOGIC: No hives or eczema	    [ ] All others negative	  [ ] Unable to obtain    PHYSICAL EXAM:  T(C): 36.7 (05-09-19 @ 05:39), Max: 36.9 (05-08-19 @ 19:26)  HR: 64 (05-09-19 @ 05:39) (64 - 86)  BP: 150/65 (05-09-19 @ 05:39) (133/71 - 150/65)  RR: 18 (05-09-19 @ 05:39) (18 - 18)  SpO2: 99% (05-09-19 @ 05:39) (97% - 99%)  Wt(kg): --  I&O's Summary    08 May 2019 07:01  -  09 May 2019 07:00  --------------------------------------------------------  IN: 1260 mL / OUT: 0 mL / NET: 1260 mL        Appearance: Normal	  HEENT:   Normal oral mucosa, PERRL, EOMI	  Lymphatic: No lymphadenopathy  Cardiovascular: Normal S1 S2, No JVD, + murmurs, No edema  Respiratory: Lungs clear to auscultation	  Psychiatry: A & O x 3, Mood & affect appropriate  Gastrointestinal:  Soft, Non-tender, + BS	  Skin: No rashes, No ecchymoses, No cyanosis	  Neurologic: Non-focal  Extremities: Normal range of motion, No clubbing, cyanosis or edema  Vascular: Peripheral pulses palpable 2+ bilaterally    MEDICATIONS  (STANDING):  ALBUTerol/ipratropium for Nebulization 3 milliLiter(s) Nebulizer every 6 hours  buDESOnide   0.5 milliGRAM(s) Respule 0.5 milliGRAM(s) Inhalation two times a day  chlorhexidine 4% Liquid 1 Application(s) Topical <User Schedule>  heparin  Injectable 5000 Unit(s) SubCutaneous every 12 hours  influenza   Vaccine 0.5 milliLiter(s) IntraMuscular once  lactobacillus acidophilus 1 Tablet(s) Oral two times a day with meals  lidocaine   Patch 1 Patch Transdermal daily  midodrine 20 milliGRAM(s) Oral every 8 hours  nicotine -  14 mG/24Hr(s) Patch 1 patch Transdermal daily  oxyCODONE    IR 2.5 milliGRAM(s) Oral once  pantoprazole    Tablet 40 milliGRAM(s) Oral before breakfast  piperacillin/tazobactam IVPB. 3.375 Gram(s) IV Intermittent every 12 hours  sodium chloride 0.9%. 1000 milliLiter(s) (50 mL/Hr) IV Continuous <Continuous>      TELEMETRY: 	    ECG:  	  RADIOLOGY:  OTHER: 	  	  LABS:	 	    CARDIAC MARKERS:                                9.5    4.8   )-----------( 213      ( 08 May 2019 17:13 )             29.9     05-08    146<H>  |  111<H>  |  29<H>  ----------------------------<  104<H>  3.8   |  22  |  2.51<H>    Ca    8.1<L>      08 May 2019 17:13    TPro  6.1  /  Alb  2.9<L>  /  TBili  0.2  /  DBili  x   /  AST  14  /  ALT  13  /  AlkPhos  114  05-08    proBNP:   Lipid Profile:   HgA1c:   TSH:         Assessment and plan  ---------------------------  decrease midodrine  ATN cause of ckd, improving  'gentle hydration  dvt prophylaxis  renal/ med appreciated

## 2019-05-09 NOTE — PHYSICAL THERAPY INITIAL EVALUATION ADULT - PRECAUTIONS/LIMITATIONS, REHAB EVAL
CXR:No acute focal consolidation. Redemonstration of left hilar hazy opacity consistent with known cavitary lesion. CTAbdomen/Pelvis:Suboptimal study due to the absence of oral and IV contrast.Small bowel obstruction mildly worsened since prior study without clear transition point. Questionable transition point in the right upper quadrant with previously seen swirling was. No free air or ascites. no known precautions/limitations/CXR:No acute focal consolidation. Redemonstration of left hilar hazy opacity consistent with known cavitary lesion. CTAbdomen/Pelvis:Suboptimal study due to the absence of oral and IV contrast.Small bowel obstruction mildly worsened since prior study without clear transition point. Questionable transition point in the right upper quadrant with previously seen swirling was. No free air or ascites.

## 2019-05-09 NOTE — PHYSICAL THERAPY INITIAL EVALUATION ADULT - PERTINENT HX OF CURRENT PROBLEM, REHAB EVAL
73yoF  presents to the ED with c/o nausea and vomiting for 4 days. She endorsed mild abdominal pain. She was unable to tolerate PO. Pt was  hospitalized from 4/18-4/22/2019 for episode of SBO eval, suspected opioid induced constipation, during which she had NGT placement and was kept NPO, with IVF hydration.

## 2019-05-09 NOTE — PROGRESS NOTE ADULT - ASSESSMENT
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   NAM  Pt with NAM likely pre-renal from GI losses  Scr 1.2 on 4/21/2019-- elevated to 3.9 on 5/52019   Renal function improving with IVF  COntinue IVF   Monitor renal function  Avoid further nephrotoxics, NSAIDS RCA    Hypocalcemia  likely sec to hypoalbunemia  COrrected calcium WNL  Monitor serum Ca    Hyperphosphatemia  likely sec to Renal failure  NOW HYPOPHOSPHATEMIC TODAY -- repleted sodium Phosphate   MOnitor serum PO4    HypoMagnesemia  Repleted by primary team  Monitor serum Mg

## 2019-05-09 NOTE — PROVIDER CONTACT NOTE (MEDICATION) - DATE AND TIME:
Spoke with patient's  to inform patient can present for ultrasound today. Will present to clinic at 11:30 for U/S.
09-May-2019 06:00

## 2019-05-09 NOTE — PROGRESS NOTE ADULT - SUBJECTIVE AND OBJECTIVE BOX
no vomitiing    REVIEW OF SYSTEMS:  GEN: no fever,    no chills  RESP: no SOB,   no cough  CVS: no chest pain,   no palpitations  GI: no abdominal pain,   no nausea,   no vomiting,   no constipation,   no diarrhea  : no dysuria,   no frequency  NEURO: no headache,   no dizziness  PSYCH: no depression,   not anxious  Derm : no rash    MEDICATIONS  (STANDING):  ALBUTerol/ipratropium for Nebulization 3 milliLiter(s) Nebulizer every 6 hours  buDESOnide   0.5 milliGRAM(s) Respule 0.5 milliGRAM(s) Inhalation two times a day  chlorhexidine 4% Liquid 1 Application(s) Topical <User Schedule>  heparin  Injectable 5000 Unit(s) SubCutaneous every 12 hours  influenza   Vaccine 0.5 milliLiter(s) IntraMuscular once  lactobacillus acidophilus 1 Tablet(s) Oral two times a day with meals  lidocaine   Patch 1 Patch Transdermal daily  midodrine 10 milliGRAM(s) Oral three times a day  nicotine -  14 mG/24Hr(s) Patch 1 patch Transdermal daily  oxyCODONE    IR 2.5 milliGRAM(s) Oral once  pantoprazole    Tablet 40 milliGRAM(s) Oral before breakfast  piperacillin/tazobactam IVPB. 3.375 Gram(s) IV Intermittent every 12 hours  sodium chloride 0.9%. 1000 milliLiter(s) (50 mL/Hr) IV Continuous <Continuous>    MEDICATIONS  (PRN):  ondansetron Injectable 4 milliGRAM(s) IV Push every 6 hours PRN Nausea and/or Vomiting  oxyCODONE    IR 5 milliGRAM(s) Oral every 6 hours PRN Moderate Pain (4 - 6)      Vital Signs Last 24 Hrs  T(C): 36.7 (09 May 2019 05:39), Max: 36.9 (08 May 2019 19:26)  T(F): 98 (09 May 2019 05:39), Max: 98.5 (08 May 2019 19:26)  HR: 64 (09 May 2019 05:39) (64 - 86)  BP: 150/65 (09 May 2019 05:39) (133/71 - 150/65)  BP(mean): --  RR: 18 (09 May 2019 05:39) (18 - 18)  SpO2: 99% (09 May 2019 05:39) (97% - 99%)  CAPILLARY BLOOD GLUCOSE        I&O's Summary    08 May 2019 07:01  -  09 May 2019 07:00  --------------------------------------------------------  IN: 1960 mL / OUT: 0 mL / NET: 1960 mL        PHYSICAL EXAM:  HEAD:  Atraumatic, Normocephalic  NECK: Supple, No   JVD  CHEST/LUNG:   no     rales,     no,    rhonchi  HEART: Regular rate and rhythm;         murmur  ABDOMEN: Soft, Nontender, ;   EXTREMITIES:    no    edema  NEUROLOGY:  alert    LABS:                        9.5    4.8   )-----------( 213      ( 08 May 2019 17:13 )             29.9     05-09    145  |  109<H>  |  25<H>  ----------------------------<  80  4.1   |  22  |  2.38<H>    Ca    8.2<L>      09 May 2019 07:10  Phos  1.8     05-09  Mg     1.4     05-09    TPro  6.1  /  Alb  2.9<L>  /  TBili  0.2  /  DBili  x   /  AST  14  /  ALT  13  /  AlkPhos  114  05-08                            Consultant(s) Notes Reviewed:      Care Discussed with Consultants/Other Providers:

## 2019-05-10 LAB
ANION GAP SERPL CALC-SCNC: 13 MMOL/L — SIGNIFICANT CHANGE UP (ref 5–17)
ANION GAP SERPL CALC-SCNC: 9 MMOL/L — SIGNIFICANT CHANGE UP (ref 5–17)
BUN SERPL-MCNC: 21 MG/DL — SIGNIFICANT CHANGE UP (ref 7–23)
BUN SERPL-MCNC: 26 MG/DL — HIGH (ref 7–23)
CALCIUM SERPL-MCNC: 5.9 MG/DL — CRITICAL LOW (ref 8.4–10.5)
CALCIUM SERPL-MCNC: 8.3 MG/DL — LOW (ref 8.4–10.5)
CHLORIDE SERPL-SCNC: 110 MMOL/L — HIGH (ref 96–108)
CHLORIDE SERPL-SCNC: 118 MMOL/L — HIGH (ref 96–108)
CO2 SERPL-SCNC: 17 MMOL/L — LOW (ref 22–31)
CO2 SERPL-SCNC: 18 MMOL/L — LOW (ref 22–31)
CREAT SERPL-MCNC: 1.73 MG/DL — HIGH (ref 0.5–1.3)
CREAT SERPL-MCNC: 2.27 MG/DL — HIGH (ref 0.5–1.3)
GLUCOSE SERPL-MCNC: 63 MG/DL — LOW (ref 70–99)
GLUCOSE SERPL-MCNC: 88 MG/DL — SIGNIFICANT CHANGE UP (ref 70–99)
HCT VFR BLD CALC: 24.9 % — LOW (ref 34.5–45)
HCT VFR BLD CALC: 30.3 % — LOW (ref 34.5–45)
HGB BLD-MCNC: 7.1 G/DL — LOW (ref 11.5–15.5)
HGB BLD-MCNC: 9.6 G/DL — LOW (ref 11.5–15.5)
MAGNESIUM SERPL-MCNC: 1.2 MG/DL — LOW (ref 1.6–2.6)
MAGNESIUM SERPL-MCNC: 1.6 MG/DL — SIGNIFICANT CHANGE UP (ref 1.6–2.6)
MCHC RBC-ENTMCNC: 28.5 GM/DL — LOW (ref 32–36)
MCHC RBC-ENTMCNC: 29 PG — SIGNIFICANT CHANGE UP (ref 27–34)
MCHC RBC-ENTMCNC: 30.1 PG — SIGNIFICANT CHANGE UP (ref 27–34)
MCHC RBC-ENTMCNC: 31.5 GM/DL — LOW (ref 32–36)
MCV RBC AUTO: 101.6 FL — HIGH (ref 80–100)
MCV RBC AUTO: 95.6 FL — SIGNIFICANT CHANGE UP (ref 80–100)
PHOSPHATE SERPL-MCNC: 1.4 MG/DL — LOW (ref 2.5–4.5)
PHOSPHATE SERPL-MCNC: 1.8 MG/DL — LOW (ref 2.5–4.5)
PLATELET # BLD AUTO: 145 K/UL — LOW (ref 150–400)
PLATELET # BLD AUTO: 216 K/UL — SIGNIFICANT CHANGE UP (ref 150–400)
POTASSIUM SERPL-MCNC: 3.3 MMOL/L — LOW (ref 3.5–5.3)
POTASSIUM SERPL-MCNC: 4.2 MMOL/L — SIGNIFICANT CHANGE UP (ref 3.5–5.3)
POTASSIUM SERPL-SCNC: 3.3 MMOL/L — LOW (ref 3.5–5.3)
POTASSIUM SERPL-SCNC: 4.2 MMOL/L — SIGNIFICANT CHANGE UP (ref 3.5–5.3)
RBC # BLD: 2.45 M/UL — LOW (ref 3.8–5.2)
RBC # BLD: 3.17 M/UL — LOW (ref 3.8–5.2)
RBC # FLD: 16.2 % — HIGH (ref 10.3–14.5)
RBC # FLD: 17.8 % — HIGH (ref 10.3–14.5)
SODIUM SERPL-SCNC: 141 MMOL/L — SIGNIFICANT CHANGE UP (ref 135–145)
SODIUM SERPL-SCNC: 144 MMOL/L — SIGNIFICANT CHANGE UP (ref 135–145)
WBC # BLD: 3.74 K/UL — LOW (ref 3.8–10.5)
WBC # BLD: 4.1 K/UL — SIGNIFICANT CHANGE UP (ref 3.8–10.5)
WBC # FLD AUTO: 3.74 K/UL — LOW (ref 3.8–10.5)
WBC # FLD AUTO: 4.1 K/UL — SIGNIFICANT CHANGE UP (ref 3.8–10.5)

## 2019-05-10 PROCEDURE — 99231 SBSQ HOSP IP/OBS SF/LOW 25: CPT | Mod: GC

## 2019-05-10 RX ORDER — DIAZEPAM 5 MG
2 TABLET ORAL ONCE
Refills: 0 | Status: DISCONTINUED | OUTPATIENT
Start: 2019-05-10 | End: 2019-05-10

## 2019-05-10 RX ORDER — OXYCODONE HYDROCHLORIDE 5 MG/1
5 TABLET ORAL ONCE
Refills: 0 | Status: DISCONTINUED | OUTPATIENT
Start: 2019-05-10 | End: 2019-05-10

## 2019-05-10 RX ORDER — SODIUM CHLORIDE 9 MG/ML
1000 INJECTION, SOLUTION INTRAVENOUS
Refills: 0 | Status: DISCONTINUED | OUTPATIENT
Start: 2019-05-10 | End: 2019-05-11

## 2019-05-10 RX ORDER — ACETAMINOPHEN 500 MG
650 TABLET ORAL ONCE
Refills: 0 | Status: COMPLETED | OUTPATIENT
Start: 2019-05-10 | End: 2019-05-10

## 2019-05-10 RX ADMIN — PANTOPRAZOLE SODIUM 40 MILLIGRAM(S): 20 TABLET, DELAYED RELEASE ORAL at 06:05

## 2019-05-10 RX ADMIN — SODIUM CHLORIDE 50 MILLILITER(S): 9 INJECTION, SOLUTION INTRAVENOUS at 23:06

## 2019-05-10 RX ADMIN — OXYCODONE HYDROCHLORIDE 5 MILLIGRAM(S): 5 TABLET ORAL at 16:35

## 2019-05-10 RX ADMIN — Medication 2 MILLIGRAM(S): at 23:25

## 2019-05-10 RX ADMIN — MIDODRINE HYDROCHLORIDE 10 MILLIGRAM(S): 2.5 TABLET ORAL at 06:41

## 2019-05-10 RX ADMIN — OXYCODONE HYDROCHLORIDE 5 MILLIGRAM(S): 5 TABLET ORAL at 12:33

## 2019-05-10 RX ADMIN — OXYCODONE HYDROCHLORIDE 5 MILLIGRAM(S): 5 TABLET ORAL at 16:05

## 2019-05-10 RX ADMIN — Medication 3 MILLILITER(S): at 18:06

## 2019-05-10 RX ADMIN — Medication 1 PATCH: at 12:04

## 2019-05-10 RX ADMIN — Medication 0.5 MILLIGRAM(S): at 06:03

## 2019-05-10 RX ADMIN — HEPARIN SODIUM 5000 UNIT(S): 5000 INJECTION INTRAVENOUS; SUBCUTANEOUS at 06:05

## 2019-05-10 RX ADMIN — LIDOCAINE 1 PATCH: 4 CREAM TOPICAL at 12:04

## 2019-05-10 RX ADMIN — OXYCODONE HYDROCHLORIDE 5 MILLIGRAM(S): 5 TABLET ORAL at 00:01

## 2019-05-10 RX ADMIN — SODIUM CHLORIDE 50 MILLILITER(S): 9 INJECTION, SOLUTION INTRAVENOUS at 15:42

## 2019-05-10 RX ADMIN — MIDODRINE HYDROCHLORIDE 10 MILLIGRAM(S): 2.5 TABLET ORAL at 14:22

## 2019-05-10 RX ADMIN — OXYCODONE HYDROCHLORIDE 5 MILLIGRAM(S): 5 TABLET ORAL at 06:01

## 2019-05-10 RX ADMIN — Medication 1 PATCH: at 08:28

## 2019-05-10 RX ADMIN — Medication 1 TABLET(S): at 18:06

## 2019-05-10 RX ADMIN — Medication 650 MILLIGRAM(S): at 02:15

## 2019-05-10 RX ADMIN — Medication 3 MILLILITER(S): at 06:02

## 2019-05-10 RX ADMIN — CHLORHEXIDINE GLUCONATE 1 APPLICATION(S): 213 SOLUTION TOPICAL at 08:37

## 2019-05-10 RX ADMIN — OXYCODONE HYDROCHLORIDE 5 MILLIGRAM(S): 5 TABLET ORAL at 00:46

## 2019-05-10 RX ADMIN — Medication 1 TABLET(S): at 08:37

## 2019-05-10 RX ADMIN — Medication 650 MILLIGRAM(S): at 01:38

## 2019-05-10 RX ADMIN — HEPARIN SODIUM 5000 UNIT(S): 5000 INJECTION INTRAVENOUS; SUBCUTANEOUS at 18:06

## 2019-05-10 RX ADMIN — Medication 3 MILLILITER(S): at 23:06

## 2019-05-10 RX ADMIN — Medication 3 MILLILITER(S): at 12:03

## 2019-05-10 RX ADMIN — OXYCODONE HYDROCHLORIDE 5 MILLIGRAM(S): 5 TABLET ORAL at 20:40

## 2019-05-10 RX ADMIN — MIDODRINE HYDROCHLORIDE 10 MILLIGRAM(S): 2.5 TABLET ORAL at 21:39

## 2019-05-10 RX ADMIN — Medication 0.5 MILLIGRAM(S): at 18:06

## 2019-05-10 RX ADMIN — OXYCODONE HYDROCHLORIDE 5 MILLIGRAM(S): 5 TABLET ORAL at 12:03

## 2019-05-10 RX ADMIN — Medication 1 PATCH: at 19:36

## 2019-05-10 RX ADMIN — OXYCODONE HYDROCHLORIDE 5 MILLIGRAM(S): 5 TABLET ORAL at 21:37

## 2019-05-10 RX ADMIN — LIDOCAINE 1 PATCH: 4 CREAM TOPICAL at 19:36

## 2019-05-10 RX ADMIN — Medication 62.5 MILLIMOLE(S): at 18:06

## 2019-05-10 RX ADMIN — LIDOCAINE 1 PATCH: 4 CREAM TOPICAL at 06:41

## 2019-05-10 RX ADMIN — Medication 1 PATCH: at 11:00

## 2019-05-10 NOTE — PROGRESS NOTE ADULT - ASSESSMENT
73 year old female with PMH Lung mass, chronic Back pain (on Narcotics), history of multiple SBO's, narcotic associated constipation/ileus, COPD, current everyday smoker admitted with abdominal pain and distension, nausea and poor PO intake and CT findings of SBO.  Prior similar admissions improved after PO NArcan or Movantik/Relistor. Known poor home meds compliance as pt does not like SE of diarrhea.  Clinically not obstructed - passing flatus and has had BMs  Suspect Narcotic Associated Dysmotility - was on Naloxegol 25 mg PO daily previous admissions with good response but pt stopped taking Rx at home due to SE of diarrhea  Diarrhea, on Zosyn. No colitis on CT scan. Suspect Abx associated diarrhea ; slowly improving  SHAKIRA - improving    RECS:  -repeat AM labs ; drawn above IV line this morning; discussed with Medicine NP. Plan to hold IVF x1 hour and repeat labs STAT  -PO diet as tolerated  -Monitor electrolytes and Cr and UO  -IV hydration  -Pain management  -PT/ OOB mobility  -Continue Bacid BID  -Recommend Naloxegol 12.5 mg PO Daily  upon discharge once diarrhea resolves. Will hold off on dosing now as pt having significant stooling    Meds compliance reinforced with pt  Discussed with Medicine NP, Medicine, Cardiology and Hem/Onc attendings    Please call over weekend prn with GI concerns   GI service : [phone]    Jake Khan PA-C    [facility]  [phone]  After hours and weekend coverage [phone]

## 2019-05-10 NOTE — PROGRESS NOTE ADULT - SUBJECTIVE AND OBJECTIVE BOX
lung cancer, uti  hpi 73 year old woman h/o breast cancer s/p mastectomy hemangiopericytomas x 2 abd surgeries recent lung cancer s/p ebrt  now with uti n/v renal failure resolving  some diarrhea this am  pmh sh fh unchanged 7 pt ros diarrhea anxiety otherwise neg  physical  cachectic  vs 97.8 79 129/65 18 99 sat  lungs occ wheeze  cor s1s2  abd soft nontender  ext no edema    data mag 1.2 k 3.3 cr 1.7 calcium 5.9 alb 2.9 phos 1.4

## 2019-05-10 NOTE — PROGRESS NOTE ADULT - SUBJECTIVE AND OBJECTIVE BOX
no compalints  REVIEW OF SYSTEMS:  GEN: no fever,    no chills  RESP: no SOB,   no cough  CVS: no chest pain,   no palpitations  GI: no abdominal pain,   no nausea,   no vomiting,   no constipation,   no diarrhea  : no dysuria,   no frequency  NEURO: no headache,   no dizziness  PSYCH: no depression,   not anxious  Derm : no rash    MEDICATIONS  (STANDING):  ALBUTerol/ipratropium for Nebulization 3 milliLiter(s) Nebulizer every 6 hours  buDESOnide   0.5 milliGRAM(s) Respule 0.5 milliGRAM(s) Inhalation two times a day  chlorhexidine 4% Liquid 1 Application(s) Topical <User Schedule>  heparin  Injectable 5000 Unit(s) SubCutaneous every 12 hours  influenza   Vaccine 0.5 milliLiter(s) IntraMuscular once  lactobacillus acidophilus 1 Tablet(s) Oral two times a day with meals  lidocaine   Patch 1 Patch Transdermal daily  midodrine 10 milliGRAM(s) Oral three times a day  nicotine -  14 mG/24Hr(s) Patch 1 patch Transdermal daily  pantoprazole    Tablet 40 milliGRAM(s) Oral before breakfast  piperacillin/tazobactam IVPB. 3.375 Gram(s) IV Intermittent every 12 hours  sodium chloride 0.9%. 1000 milliLiter(s) (50 mL/Hr) IV Continuous <Continuous>    MEDICATIONS  (PRN):  ondansetron Injectable 4 milliGRAM(s) IV Push every 6 hours PRN Nausea and/or Vomiting  oxyCODONE    IR 5 milliGRAM(s) Oral every 6 hours PRN Moderate Pain (4 - 6)      Vital Signs Last 24 Hrs  T(C): 36.6 (10 May 2019 06:30), Max: 37.1 (09 May 2019 19:27)  T(F): 97.8 (10 May 2019 06:30), Max: 98.7 (09 May 2019 19:27)  HR: 79 (10 May 2019 06:30) (79 - 100)  BP: 129/65 (10 May 2019 06:30) (110/50 - 141/72)  BP(mean): --  RR: 18 (10 May 2019 06:30) (18 - 18)  SpO2: 99% (10 May 2019 06:30) (95% - 99%)  CAPILLARY BLOOD GLUCOSE        I&O's Summary    09 May 2019 07:01  -  10 May 2019 07:00  --------------------------------------------------------  IN: 1320 mL / OUT: 0 mL / NET: 1320 mL        PHYSICAL EXAM:  HEAD:  Atraumatic, Normocephalic  NECK: Supple, No   JVD  CHEST/LUNG:   no     rales,     no,    rhonchi  HEART: Regular rate and rhythm;         murmur  ABDOMEN: Soft, Nontender, ;   EXTREMITIES:     no   edema  NEUROLOGY:  alert    LABS:                        8.3    3.76  )-----------( 192      ( 09 May 2019 09:17 )             27.9     05-09    145  |  109<H>  |  25<H>  ----------------------------<  80  4.1   |  22  |  2.38<H>    Ca    8.2<L>      09 May 2019 07:10  Phos  1.8     05-09  Mg     1.4     05-09    TPro  6.1  /  Alb  2.9<L>  /  TBili  0.2  /  DBili  x   /  AST  14  /  ALT  13  /  AlkPhos  114  05-08                            Consultant(s) Notes Reviewed:      Care Discussed with Consultants/Other Providers:

## 2019-05-10 NOTE — PROGRESS NOTE ADULT - SUBJECTIVE AND OBJECTIVE BOX
Patient is a 73y old  Female who presented with a chief complaint of nausea and vomiting (10 May 2019 08:42)      INTERVAL HPI/OVERNIGHT EVENTS:  appetite very good  no abdominal pain  diarrhea improving - only 7-8 stools yesterday (previously ~12BMs). Pt also reports havign to "squeeze" to pass stool, previously passing BMs spontaneously  stools light brown in color    Very tearful and anxious today "I have to go home!"  No fever or chills, no Dysuria or hematuria    MEDICATIONS  (STANDING):  ALBUTerol/ipratropium for Nebulization 3 milliLiter(s) Nebulizer every 6 hours  buDESOnide   0.5 milliGRAM(s) Respule 0.5 milliGRAM(s) Inhalation two times a day  chlorhexidine 4% Liquid 1 Application(s) Topical <User Schedule>  heparin  Injectable 5000 Unit(s) SubCutaneous every 12 hours  influenza   Vaccine 0.5 milliLiter(s) IntraMuscular once  lactobacillus acidophilus 1 Tablet(s) Oral two times a day with meals  lidocaine   Patch 1 Patch Transdermal daily  midodrine 10 milliGRAM(s) Oral three times a day  nicotine -  14 mG/24Hr(s) Patch 1 patch Transdermal daily  pantoprazole    Tablet 40 milliGRAM(s) Oral before breakfast  piperacillin/tazobactam IVPB. 3.375 Gram(s) IV Intermittent every 12 hours  sodium chloride 0.9%. 1000 milliLiter(s) (50 mL/Hr) IV Continuous <Continuous>    MEDICATIONS  (PRN):  ondansetron Injectable 4 milliGRAM(s) IV Push every 6 hours PRN Nausea and/or Vomiting  oxyCODONE    IR 5 milliGRAM(s) Oral every 6 hours PRN Moderate Pain (4 - 6)      Allergies  Biaxin (Rash)  Cipro (Headache)  Flagyl (Headache)  penicillin (Rash; Swelling)  sulfa drugs (Unknown)      Review of Systems:  General:  No fevers, chills, night sweats  CV:  No pain, palpitations, hypo/hypertension  Resp:  No dyspnea,+cough +COPD +lung cancer  GI:  see HPI  :  No pain, bleeding, incontinence, nocturia  Muscle:  +intermittent muscle spasms +chronic back pain  Neuro:  No weakness, tingling, memory problems  Psych:  No fatigue, insomnia, mood problems, depression  Endocrine:  No polyuria, polydypsia, cold/heat intolerance  Heme:  No petechiae, ecchymosis, easy bruisability  Skin:  No rash, tattoos, scars, edema    Vital Signs Last 24 Hrs  T(C): 36.6 (10 May 2019 06:30), Max: 37.1 (09 May 2019 19:27)  T(F): 97.8 (10 May 2019 06:30), Max: 98.7 (09 May 2019 19:27)  HR: 79 (10 May 2019 06:30) (79 - 100)  BP: 129/65 (10 May 2019 06:30) (110/50 - 141/72)  BP(mean): --  RR: 18 (10 May 2019 06:30) (18 - 18)  SpO2: 99% (10 May 2019 06:30) (95% - 99%)    PHYSICAL EXAM:  Constitutional: thin chronically ill appearing female non toxic. appears anxious, occ. tearful  Neck: No LAD, supple no JVD  Respiratory: decreased BS b/l, distant BS  Cardiovascular: S1 and S2, regular  Gastrointestinal: protuberant abdomen, softly distended (decreased distension from prior exams)+muscle wasting +prominent abdominal vasculature NT no rebound or rigidity +multiple surgical scars   Extremities: +clubbing  +pulses +muscle wasting  Vascular: palpable  Neurological: A/O x 3, no focal deficits  Psychiatric: anxious, occ. tearful  Skin: No rashes +good turgor    LABS:                        7.1    3.74  )-----------( 145      ( 10 May 2019 08:07 )             24.9     Hemoglobin: 8.3 g/dL <L> [11.5 - 15.5] (05-09 @ 09:17)  Hemoglobin: 9.5 g/dL <L> [11.5 - 15.5] (05-08 @ 17:13)        05-10    144  |  118<H>  |  21  ----------------------------<  63<L>  3.3<L>   |  17<L>  |  1.73<H>    Ca    5.9<LL>      10 May 2019 07:05  Phos  1.4     05-10  Mg     1.2     05-10    TPro  6.1  /  Alb  2.9<L>  /  TBili  0.2  /  DBili  x   /  AST  14  /  ALT  13  /  AlkPhos  114  05-08      RADIOLOGY & ADDITIONAL TESTS:

## 2019-05-10 NOTE — PROGRESS NOTE ADULT - ATTENDING COMMENTS
Agree with above management.
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  Hold off on meds for narcotic bowel syndrome and constipation.  Monitor diarrhea.  Check Cdiff if persists or febrile or unexplained leukocytosis.
As above.
Catarino Rondon MD, FACP, FACG, AGAF  [facility]  [phone]     After hours and weekend coverage GI service : [phone]
Patient examined and case reviewed in detail on bedside rounds

## 2019-05-10 NOTE — PROGRESS NOTE ADULT - ASSESSMENT
73 year old female with   PMH chronic Back pain (on Narcotics), history of multiple SBO's, narcotic associated constipation/ileus, COPD,  smoker,    ca  breast,  right  mastectomy. RENUKA cavitary lesion on ct,  .  c/c cachexia  path from  1/19, with NSCC with squamous  features,  s/p  RT     admitted with abdominal pain / vomiting , resolved,   , from SBO    ct scan noted,  SBO, / syrg  to  f/p    pt is an active smoker, refusing to quit   ct chest , 4/19/18  noted/  oncology dr hernandez   dvt ppx  ct  abdomen, noted   iv fluids/   on Midodrine  SHAKIRA, resolving  creatinine is  2.3  po diet/  pt to  ambulate/ uti/ klebsiella. ,  zosyn completed  plan,  d/c  today     < from: CT Abdomen and Pelvis No Cont (05.05.19 @ 20:08) >  IMPRESSION:  Suboptimal study due to the absence of oral and IV contrast.  Small bowel obstruction mildly worsened since prior study without clear   transition point. Questionable transition point in the right upper   quadrant with previously seen swirling was. No free air or ascites  < end of copied text >      < from: CT Chest No Cont (04.19.19 @ 18:50)   INTERPRETATION:  Motion degraded evaluation  Redemonstraion on left upper lobe cavitary mass consistent with patients   known hx of lung CA  Mucoid impacted right lower lobe ariways with scattered tree in bud   opacities  < end of copied text >      Cytopathology - Non Gyn Report (01.18.19 @ 11:43)    Immunohistochemical stains.  The immunophenotype together with the cytomorphology supports the  diagnosis squamous cell carcinoma.    Final Diagnosis  LUNG, LEFT UPPER LOBE, US GUIDED CORE BIOPSY AND FNA  POSITIVE FOR MALIGNANT CELLS.  Non-small cell carcinoma, with squamous features (see note).  Additional studies pending. 73 year old female with   PMH chronic Back pain (on Narcotics), history of multiple SBO's, narcotic associated constipation/ileus, COPD,  smoker,    ca  breast,  right  mastectomy. RENUKA cavitary lesion on ct,  .  c/c cachexia  path from  1/19, with NSCC with squamous  features,  s/p  RT     admitted with abdominal pain / vomiting , resolved,   , from SBO    ct scan noted,  SBO, / syrg  to  f/p    pt is an active smoker, refusing to quit   ct chest , 4/19/18  noted/  oncology dr hernandez   dvt ppx  ct  abdomen, noted     on Midodrine  SHAKIRA, resolving  creatinine is  2.3  po diet/  pt to  ambulate/ uti/ klebsiella. ,  zosyn completed  hb is  7/ awaiting rpt labs     < from: CT Abdomen and Pelvis No Cont (05.05.19 @ 20:08) >  IMPRESSION:  Suboptimal study due to the absence of oral and IV contrast.  Small bowel obstruction mildly worsened since prior study without clear   transition point. Questionable transition point in the right upper   quadrant with previously seen swirling was. No free air or ascites  < end of copied text >      < from: CT Chest No Cont (04.19.19 @ 18:50)   INTERPRETATION:  Motion degraded evaluation  Redemonstraion on left upper lobe cavitary mass consistent with patients   known hx of lung CA  Mucoid impacted right lower lobe ariways with scattered tree in bud   opacities  < end of copied text >      Cytopathology - Non Gyn Report (01.18.19 @ 11:43)    Immunohistochemical stains.  The immunophenotype together with the cytomorphology supports the  diagnosis squamous cell carcinoma.    Final Diagnosis  LUNG, LEFT UPPER LOBE, US GUIDED CORE BIOPSY AND FNA  POSITIVE FOR MALIGNANT CELLS.  Non-small cell carcinoma, with squamous features (see note).  Additional studies pending.

## 2019-05-10 NOTE — PROGRESS NOTE ADULT - ASSESSMENT
lung cancer s/p ebrt outpatient pet/ct    uti, ileus diarrhea/n/v improving   renal failure improving  replete and recheck electrolytes  pt eager to go home but tried to impress on her that diarrhea should be at least a bit better if she is to remain at home

## 2019-05-10 NOTE — PROGRESS NOTE ADULT - SUBJECTIVE AND OBJECTIVE BOX
CARDIOLOGY     PROGRESS  NOTE   ________________________________________________    CHIEF COMPLAINT:Patient is a 73y old  Female who presents with a chief complaint of nausea and vomiting (09 May 2019 10:23)  doing well, tolerating Po.  	  REVIEW OF SYSTEMS:  CONSTITUTIONAL: No fever, weight loss, or fatigue  EYES: No eye pain, visual disturbances, or discharge  ENT:  No difficulty hearing, tinnitus, vertigo; No sinus or throat pain  NECK: No pain or stiffness  RESPIRATORY: No cough, wheezing, chills or hemoptysis; No Shortness of Breath  CARDIOVASCULAR: No chest pain, palpitations, passing out, dizziness, or leg swelling  GASTROINTESTINAL: No abdominal or epigastric pain. No nausea, vomiting, or hematemesis; No diarrhea or constipation. No melena or hematochezia.  GENITOURINARY: No dysuria, frequency, hematuria, or incontinence  NEUROLOGICAL: No headaches, memory loss, loss of strength, numbness, or tremors  SKIN: No itching, burning, rashes, or lesions   LYMPH Nodes: No enlarged glands  ENDOCRINE: No heat or cold intolerance; No hair loss  MUSCULOSKELETAL: No joint pain or swelling; No muscle, back, or extremity pain  PSYCHIATRIC: No depression, anxiety, mood swings, or difficulty sleeping  HEME/LYMPH: No easy bruising, or bleeding gums  ALLERGY AND IMMUNOLOGIC: No hives or eczema	    [ ] All others negative	  [ ] Unable to obtain    PHYSICAL EXAM:  T(C): 36.6 (05-10-19 @ 06:30), Max: 37.1 (05-09-19 @ 19:27)  HR: 79 (05-10-19 @ 06:30) (79 - 100)  BP: 129/65 (05-10-19 @ 06:30) (110/50 - 141/72)  RR: 18 (05-10-19 @ 06:30) (18 - 18)  SpO2: 99% (05-10-19 @ 06:30) (95% - 99%)  Wt(kg): --  I&O's Summary    09 May 2019 07:01  -  10 May 2019 07:00  --------------------------------------------------------  IN: 720 mL / OUT: 0 mL / NET: 720 mL        Appearance: Normal	  HEENT:   Normal oral mucosa, PERRL, EOMI	  Lymphatic: No lymphadenopathy  Cardiovascular: Normal S1 S2, No JVD, + murmurs, No edema  Respiratory: Lungs clear to auscultation	  Psychiatry: A & O x 3, Mood & affect appropriate  Gastrointestinal:  Soft, Non-tender, + BS	  Skin: No rashes, No ecchymoses, No cyanosis	  Neurologic: Non-focal  Extremities: Normal range of motion, No clubbing, cyanosis or edema  Vascular: Peripheral pulses palpable 2+ bilaterally    MEDICATIONS  (STANDING):  ALBUTerol/ipratropium for Nebulization 3 milliLiter(s) Nebulizer every 6 hours  buDESOnide   0.5 milliGRAM(s) Respule 0.5 milliGRAM(s) Inhalation two times a day  chlorhexidine 4% Liquid 1 Application(s) Topical <User Schedule>  heparin  Injectable 5000 Unit(s) SubCutaneous every 12 hours  influenza   Vaccine 0.5 milliLiter(s) IntraMuscular once  lactobacillus acidophilus 1 Tablet(s) Oral two times a day with meals  lidocaine   Patch 1 Patch Transdermal daily  midodrine 10 milliGRAM(s) Oral three times a day  nicotine -  14 mG/24Hr(s) Patch 1 patch Transdermal daily  pantoprazole    Tablet 40 milliGRAM(s) Oral before breakfast  piperacillin/tazobactam IVPB. 3.375 Gram(s) IV Intermittent every 12 hours  sodium chloride 0.9%. 1000 milliLiter(s) (50 mL/Hr) IV Continuous <Continuous>      TELEMETRY: 	    ECG:  	  RADIOLOGY:  OTHER: 	  	  LABS:	 	    CARDIAC MARKERS:                                8.3    3.76  )-----------( 192      ( 09 May 2019 09:17 )             27.9     05-09    145  |  109<H>  |  25<H>  ----------------------------<  80  4.1   |  22  |  2.38<H>    Ca    8.2<L>      09 May 2019 07:10  Phos  1.8     05-09  Mg     1.4     05-09    TPro  6.1  /  Alb  2.9<L>  /  TBili  0.2  /  DBili  x   /  AST  14  /  ALT  13  /  AlkPhos  114  05-08    proBNP:   Lipid Profile:   HgA1c:   TSH:         Assessment and plan  ---------------------------  decrease midodrine dose  increase fluid intake  acute renal failure awaiting renal function result

## 2019-05-10 NOTE — PROGRESS NOTE ADULT - SUBJECTIVE AND OBJECTIVE BOX
Dr. Mosquera  Office [phone]  Cell [phone]  Akiko RICH  Cell [phone]      Patient is a 73y old  Female who presents with a chief complaint of nausea and vomiting (10 May 2019 09:33)      Patient seen and examined at bedside. No chest pain/sob, c/o diarrhea    VITALS:  T(F): 98.2 (05-10-19 @ 14:00), Max: 98.7 (05-09-19 @ 19:27)  HR: 81 (05-10-19 @ 14:00)  BP: 139/79 (05-10-19 @ 14:00)  RR: 18 (05-10-19 @ 14:00)  SpO2: 98% (05-10-19 @ 14:00)  Wt(kg): --    05-09 @ 07:01  -  05-10 @ 07:00  --------------------------------------------------------  IN: 1320 mL / OUT: 0 mL / NET: 1320 mL    05-10 @ 07:01  -  05-10 @ 15:11  --------------------------------------------------------  IN: 480 mL / OUT: 0 mL / NET: 480 mL          PHYSICAL EXAM:  Constitutional: NAD  Neck: No JVD  Respiratory: CTAB, no wheezes, rales or rhonchi  Cardiovascular: S1, S2, RRR  Gastrointestinal: BS+, soft, NT/ND  Extremities: No peripheral edema    Hospital Medications:   MEDICATIONS  (STANDING):  ALBUTerol/ipratropium for Nebulization 3 milliLiter(s) Nebulizer every 6 hours  buDESOnide   0.5 milliGRAM(s) Respule 0.5 milliGRAM(s) Inhalation two times a day  chlorhexidine 4% Liquid 1 Application(s) Topical <User Schedule>  heparin  Injectable 5000 Unit(s) SubCutaneous every 12 hours  influenza   Vaccine 0.5 milliLiter(s) IntraMuscular once  lactobacillus acidophilus 1 Tablet(s) Oral two times a day with meals  lidocaine   Patch 1 Patch Transdermal daily  midodrine 10 milliGRAM(s) Oral three times a day  nicotine -  14 mG/24Hr(s) Patch 1 patch Transdermal daily  pantoprazole    Tablet 40 milliGRAM(s) Oral before breakfast      LABS:  05-10    141  |  110<H>  |  26<H>  ----------------------------<  88  4.2   |  18<L>  |  2.27<H>    Ca    8.3<L>      10 May 2019 12:07  Phos  1.8     05-10  Mg     1.6     05-10    TPro  6.1  /  Alb  2.9<L>  /  TBili  0.2  /  DBili      /  AST  14  /  ALT  13  /  AlkPhos  114  05-08    Creatinine Trend: 2.27 <--, 1.73 <--, 2.38 <--, 2.51 <--, 3.05 <--, 3.35 <--, 3.48 <--, 3.93 <--, 4.16 <--, 3.96 <--    Phosphorus Level, Serum: 1.8 mg/dL (05-10 @ 12:07)  Phosphorus Level, Serum: 1.4 mg/dL (05-10 @ 07:05)                              9.6    4.1   )-----------( 216      ( 10 May 2019 12:07 )             30.3     Urine Studies:  Urinalysis - [05-06-19 @ 00:40]      Color Yellow / Appearance Slightly Turbid / SG 1.016 / pH 6.0      Gluc Negative / Ketone Negative  / Bili Negative / Urobili Negative       Blood Moderate / Protein 100 / Leuk Est Large / Nitrite Negative      RBC 4 /  / Hyaline 18 / Gran  / Sq Epi  / Non Sq Epi 0 / Bacteria Many    Urine Creatinine 106      [05-06-19 @ 00:40]  Urine Protein 101      [05-06-19 @ 00:40]  Urine Sodium 25      [05-06-19 @ 00:40]  Urine Potassium 46      [05-06-19 @ 00:40]  Urine Chloride <35      [05-06-19 @ 00:40]  Urine Osmolality 343      [05-06-19 @ 00:40]      HCV 0.15, Nonreact      [04-19-19 @ 12:32]      RADIOLOGY & ADDITIONAL STUDIES:

## 2019-05-10 NOTE — PROGRESS NOTE ADULT - ASSESSMENT
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   NAM  Pt with NAM likely pre-renal from GI losses  Scr 1.2 on 4/21/2019-- elevated to 3.9 on 5/52019   Renal function was improving with IVF, worsened today, likely sec to dairrhea  Consider RL 75cc/hr for 1L  Monitor renal function  Avoid further nephrotoxics, NSAIDS RCA    Hypocalcemia  likely sec to hypoalbuminemia  COrrected calcium WNL  Monitor serum Ca    Hyperphosphatemia  likely sec to Renal failure  NOW HYPOPHOSPHATEMIC -- replete sodium Phosphate   MOnitor serum PO4    HypoMagnesemia  Replete MgSO4 1gm IV  Monitor serum Mg

## 2019-05-11 ENCOUNTER — TRANSCRIPTION ENCOUNTER (OUTPATIENT)
Age: 74
End: 2019-05-11

## 2019-05-11 VITALS
HEART RATE: 102 BPM | DIASTOLIC BLOOD PRESSURE: 71 MMHG | SYSTOLIC BLOOD PRESSURE: 117 MMHG | TEMPERATURE: 97 F | RESPIRATION RATE: 18 BRPM | OXYGEN SATURATION: 94 %

## 2019-05-11 LAB
ANION GAP SERPL CALC-SCNC: 14 MMOL/L — SIGNIFICANT CHANGE UP (ref 5–17)
BUN SERPL-MCNC: 28 MG/DL — HIGH (ref 7–23)
CALCIUM SERPL-MCNC: 8.5 MG/DL — SIGNIFICANT CHANGE UP (ref 8.4–10.5)
CHLORIDE SERPL-SCNC: 106 MMOL/L — SIGNIFICANT CHANGE UP (ref 96–108)
CO2 SERPL-SCNC: 18 MMOL/L — LOW (ref 22–31)
CREAT SERPL-MCNC: 1.93 MG/DL — HIGH (ref 0.5–1.3)
CULTURE RESULTS: SIGNIFICANT CHANGE UP
GLUCOSE SERPL-MCNC: 86 MG/DL — SIGNIFICANT CHANGE UP (ref 70–99)
POTASSIUM SERPL-MCNC: 4.4 MMOL/L — SIGNIFICANT CHANGE UP (ref 3.5–5.3)
POTASSIUM SERPL-SCNC: 4.4 MMOL/L — SIGNIFICANT CHANGE UP (ref 3.5–5.3)
SODIUM SERPL-SCNC: 138 MMOL/L — SIGNIFICANT CHANGE UP (ref 135–145)
SPECIMEN SOURCE: SIGNIFICANT CHANGE UP

## 2019-05-11 PROCEDURE — 83605 ASSAY OF LACTIC ACID: CPT

## 2019-05-11 PROCEDURE — 85730 THROMBOPLASTIN TIME PARTIAL: CPT

## 2019-05-11 PROCEDURE — 83735 ASSAY OF MAGNESIUM: CPT

## 2019-05-11 PROCEDURE — 97161 PT EVAL LOW COMPLEX 20 MIN: CPT

## 2019-05-11 PROCEDURE — 87633 RESP VIRUS 12-25 TARGETS: CPT

## 2019-05-11 PROCEDURE — 80053 COMPREHEN METABOLIC PANEL: CPT

## 2019-05-11 PROCEDURE — 85014 HEMATOCRIT: CPT

## 2019-05-11 PROCEDURE — 82947 ASSAY GLUCOSE BLOOD QUANT: CPT

## 2019-05-11 PROCEDURE — 87798 DETECT AGENT NOS DNA AMP: CPT

## 2019-05-11 PROCEDURE — 83935 ASSAY OF URINE OSMOLALITY: CPT

## 2019-05-11 PROCEDURE — 82435 ASSAY OF BLOOD CHLORIDE: CPT

## 2019-05-11 PROCEDURE — 96375 TX/PRO/DX INJ NEW DRUG ADDON: CPT | Mod: XU

## 2019-05-11 PROCEDURE — 84132 ASSAY OF SERUM POTASSIUM: CPT

## 2019-05-11 PROCEDURE — 94640 AIRWAY INHALATION TREATMENT: CPT

## 2019-05-11 PROCEDURE — 85610 PROTHROMBIN TIME: CPT

## 2019-05-11 PROCEDURE — 86850 RBC ANTIBODY SCREEN: CPT

## 2019-05-11 PROCEDURE — 96374 THER/PROPH/DIAG INJ IV PUSH: CPT | Mod: XU

## 2019-05-11 PROCEDURE — 83690 ASSAY OF LIPASE: CPT

## 2019-05-11 PROCEDURE — 71045 X-RAY EXAM CHEST 1 VIEW: CPT

## 2019-05-11 PROCEDURE — 84484 ASSAY OF TROPONIN QUANT: CPT

## 2019-05-11 PROCEDURE — 82436 ASSAY OF URINE CHLORIDE: CPT

## 2019-05-11 PROCEDURE — 82570 ASSAY OF URINE CREATININE: CPT

## 2019-05-11 PROCEDURE — 87581 M.PNEUMON DNA AMP PROBE: CPT

## 2019-05-11 PROCEDURE — 82550 ASSAY OF CK (CPK): CPT

## 2019-05-11 PROCEDURE — 81001 URINALYSIS AUTO W/SCOPE: CPT

## 2019-05-11 PROCEDURE — 84145 PROCALCITONIN (PCT): CPT

## 2019-05-11 PROCEDURE — 51702 INSERT TEMP BLADDER CATH: CPT

## 2019-05-11 PROCEDURE — 84133 ASSAY OF URINE POTASSIUM: CPT

## 2019-05-11 PROCEDURE — 82803 BLOOD GASES ANY COMBINATION: CPT

## 2019-05-11 PROCEDURE — 93005 ELECTROCARDIOGRAM TRACING: CPT | Mod: XU

## 2019-05-11 PROCEDURE — 82553 CREATINE MB FRACTION: CPT

## 2019-05-11 PROCEDURE — 76775 US EXAM ABDO BACK WALL LIM: CPT

## 2019-05-11 PROCEDURE — 85027 COMPLETE CBC AUTOMATED: CPT

## 2019-05-11 PROCEDURE — 86901 BLOOD TYPING SEROLOGIC RH(D): CPT

## 2019-05-11 PROCEDURE — 74176 CT ABD & PELVIS W/O CONTRAST: CPT

## 2019-05-11 PROCEDURE — 87486 CHLMYD PNEUM DNA AMP PROBE: CPT

## 2019-05-11 PROCEDURE — 76937 US GUIDE VASCULAR ACCESS: CPT

## 2019-05-11 PROCEDURE — 84100 ASSAY OF PHOSPHORUS: CPT

## 2019-05-11 PROCEDURE — 82330 ASSAY OF CALCIUM: CPT

## 2019-05-11 PROCEDURE — 86900 BLOOD TYPING SEROLOGIC ABO: CPT

## 2019-05-11 PROCEDURE — 84156 ASSAY OF PROTEIN URINE: CPT

## 2019-05-11 PROCEDURE — 99291 CRITICAL CARE FIRST HOUR: CPT | Mod: 25

## 2019-05-11 PROCEDURE — 87186 SC STD MICRODIL/AGAR DIL: CPT

## 2019-05-11 PROCEDURE — 84300 ASSAY OF URINE SODIUM: CPT

## 2019-05-11 PROCEDURE — 87040 BLOOD CULTURE FOR BACTERIA: CPT

## 2019-05-11 PROCEDURE — 80048 BASIC METABOLIC PNL TOTAL CA: CPT

## 2019-05-11 PROCEDURE — 84295 ASSAY OF SERUM SODIUM: CPT

## 2019-05-11 PROCEDURE — 87086 URINE CULTURE/COLONY COUNT: CPT

## 2019-05-11 PROCEDURE — 82565 ASSAY OF CREATININE: CPT

## 2019-05-11 PROCEDURE — 93308 TTE F-UP OR LMTD: CPT

## 2019-05-11 RX ORDER — ACETAMINOPHEN 500 MG
650 TABLET ORAL ONCE
Refills: 0 | Status: COMPLETED | OUTPATIENT
Start: 2019-05-11 | End: 2019-05-11

## 2019-05-11 RX ORDER — LACTOBACILLUS ACIDOPHILUS 100MM CELL
1 CAPSULE ORAL
Qty: 60 | Refills: 0
Start: 2019-05-11 | End: 2019-06-09

## 2019-05-11 RX ORDER — DIAZEPAM 5 MG
2 TABLET ORAL ONCE
Refills: 0 | Status: DISCONTINUED | OUTPATIENT
Start: 2019-05-11 | End: 2019-05-11

## 2019-05-11 RX ORDER — OXYCODONE HYDROCHLORIDE 5 MG/1
5 TABLET ORAL ONCE
Refills: 0 | Status: DISCONTINUED | OUTPATIENT
Start: 2019-05-11 | End: 2019-05-11

## 2019-05-11 RX ORDER — MIDODRINE HYDROCHLORIDE 2.5 MG/1
1 TABLET ORAL
Qty: 90 | Refills: 0
Start: 2019-05-11 | End: 2019-06-09

## 2019-05-11 RX ADMIN — OXYCODONE HYDROCHLORIDE 5 MILLIGRAM(S): 5 TABLET ORAL at 01:12

## 2019-05-11 RX ADMIN — CHLORHEXIDINE GLUCONATE 1 APPLICATION(S): 213 SOLUTION TOPICAL at 06:06

## 2019-05-11 RX ADMIN — Medication 1 PATCH: at 10:41

## 2019-05-11 RX ADMIN — OXYCODONE HYDROCHLORIDE 5 MILLIGRAM(S): 5 TABLET ORAL at 07:00

## 2019-05-11 RX ADMIN — Medication 650 MILLIGRAM(S): at 08:10

## 2019-05-11 RX ADMIN — Medication 2 MILLIGRAM(S): at 02:43

## 2019-05-11 RX ADMIN — OXYCODONE HYDROCHLORIDE 5 MILLIGRAM(S): 5 TABLET ORAL at 05:47

## 2019-05-11 RX ADMIN — Medication 650 MILLIGRAM(S): at 07:37

## 2019-05-11 RX ADMIN — Medication 1 PATCH: at 11:34

## 2019-05-11 RX ADMIN — OXYCODONE HYDROCHLORIDE 5 MILLIGRAM(S): 5 TABLET ORAL at 02:24

## 2019-05-11 RX ADMIN — MIDODRINE HYDROCHLORIDE 10 MILLIGRAM(S): 2.5 TABLET ORAL at 05:49

## 2019-05-11 RX ADMIN — Medication 3 MILLILITER(S): at 05:48

## 2019-05-11 RX ADMIN — LIDOCAINE 1 PATCH: 4 CREAM TOPICAL at 11:33

## 2019-05-11 RX ADMIN — Medication 3 MILLILITER(S): at 11:34

## 2019-05-11 RX ADMIN — LIDOCAINE 1 PATCH: 4 CREAM TOPICAL at 00:48

## 2019-05-11 RX ADMIN — Medication 1 TABLET(S): at 07:41

## 2019-05-11 RX ADMIN — MIDODRINE HYDROCHLORIDE 10 MILLIGRAM(S): 2.5 TABLET ORAL at 13:40

## 2019-05-11 RX ADMIN — Medication 0.5 MILLIGRAM(S): at 05:48

## 2019-05-11 RX ADMIN — Medication 1 PATCH: at 11:33

## 2019-05-11 RX ADMIN — OXYCODONE HYDROCHLORIDE 5 MILLIGRAM(S): 5 TABLET ORAL at 10:39

## 2019-05-11 RX ADMIN — PANTOPRAZOLE SODIUM 40 MILLIGRAM(S): 20 TABLET, DELAYED RELEASE ORAL at 06:06

## 2019-05-11 RX ADMIN — OXYCODONE HYDROCHLORIDE 5 MILLIGRAM(S): 5 TABLET ORAL at 11:20

## 2019-05-11 RX ADMIN — HEPARIN SODIUM 5000 UNIT(S): 5000 INJECTION INTRAVENOUS; SUBCUTANEOUS at 05:48

## 2019-05-11 NOTE — PROGRESS NOTE ADULT - REASON FOR ADMISSION
nausea and vomiting

## 2019-05-11 NOTE — DISCHARGE NOTE PROVIDER - NSDCCPCAREPLAN_GEN_ALL_CORE_FT
PRINCIPAL DISCHARGE DIAGNOSIS  Diagnosis: Septic shock  Assessment and Plan of Treatment: s/p course of fluids / s/p labs trended  S/p MICU admit and BP support with sx resolved  Hypotesnion and started on Midodrine  Will followup with out-pt MD          SECONDARY DISCHARGE DIAGNOSES  Diagnosis: SHAKIRA (acute kidney injury)  Assessment and Plan of Treatment: seen and follwed by nephrology  / s/p IV fluids  - resolving    Diagnosis: UTI (urinary tract infection)  Assessment and Plan of Treatment: UTI (urinary tract infection)  s/p cousre of antibiotics    Diagnosis: Smoker  Assessment and Plan of Treatment: Smoker  smoking cessation education daily with urging pt to stop  nicotene patch

## 2019-05-11 NOTE — DISCHARGE NOTE PROVIDER - NSDCQMSTAIRS_GEN_ALL_CORE
BODY FLUID CULTURE - pleural fluid gram stain  positive polymorphonucleocytes, gram negative rods seen No

## 2019-05-11 NOTE — PROGRESS NOTE ADULT - PROVIDER SPECIALTY LIST ADULT
Cardiology
Critical Care
Gastroenterology
Heme/Onc
Internal Medicine
Internal Medicine
MICU
Nephrology
Nephrology
Internal Medicine
Internal Medicine
Gastroenterology
Internal Medicine

## 2019-05-11 NOTE — PROGRESS NOTE ADULT - SUBJECTIVE AND OBJECTIVE BOX
no vomiting    REVIEW OF SYSTEMS:  GEN: no fever,    no chills  RESP: no SOB,   no cough  CVS: no chest pain,   no palpitations  GI: no abdominal pain,   no nausea,   no vomiting,   no constipation,   no diarrhea  : no dysuria,   no frequency  NEURO: no headache,   no dizziness  PSYCH: no depression,   not anxious  Derm : no rash    MEDICATIONS  (STANDING):  ALBUTerol/ipratropium for Nebulization 3 milliLiter(s) Nebulizer every 6 hours  buDESOnide   0.5 milliGRAM(s) Respule 0.5 milliGRAM(s) Inhalation two times a day  chlorhexidine 4% Liquid 1 Application(s) Topical <User Schedule>  heparin  Injectable 5000 Unit(s) SubCutaneous every 12 hours  influenza   Vaccine 0.5 milliLiter(s) IntraMuscular once  lactated ringers. 1000 milliLiter(s) (50 mL/Hr) IV Continuous <Continuous>  lactobacillus acidophilus 1 Tablet(s) Oral two times a day with meals  lidocaine   Patch 1 Patch Transdermal daily  midodrine 10 milliGRAM(s) Oral three times a day  nicotine -  14 mG/24Hr(s) Patch 1 patch Transdermal daily  pantoprazole    Tablet 40 milliGRAM(s) Oral before breakfast    MEDICATIONS  (PRN):  ondansetron Injectable 4 milliGRAM(s) IV Push every 6 hours PRN Nausea and/or Vomiting  oxyCODONE    IR 5 milliGRAM(s) Oral every 6 hours PRN Moderate Pain (4 - 6)      Vital Signs Last 24 Hrs  T(C): 36.9 (11 May 2019 05:44), Max: 36.9 (10 May 2019 21:44)  T(F): 98.4 (11 May 2019 05:44), Max: 98.4 (10 May 2019 21:44)  HR: 77 (11 May 2019 05:44) (77 - 95)  BP: 116/65 (11 May 2019 05:44) (116/65 - 139/79)  BP(mean): --  RR: 18 (11 May 2019 05:44) (18 - 18)  SpO2: 97% (11 May 2019 05:44) (97% - 98%)  CAPILLARY BLOOD GLUCOSE        I&O's Summary    10 May 2019 07:01  -  11 May 2019 07:00  --------------------------------------------------------  IN: 980 mL / OUT: 200 mL / NET: 780 mL        PHYSICAL EXAM:  HEAD:  Atraumatic, Normocephalic  NECK: Supple, No   JVD  CHEST/LUNG:   no     rales,     no,    rhonchi  HEART: Regular rate and rhythm;         murmur  ABDOMEN: Soft, Nontender, ;   EXTREMITIES:    no    edema  NEUROLOGY:  alert    LABS:                        9.6    4.1   )-----------( 216      ( 10 May 2019 12:07 )             30.3     05-10    141  |  110<H>  |  26<H>  ----------------------------<  88  4.2   |  18<L>  |  2.27<H>    Ca    8.3<L>      10 May 2019 12:07  Phos  1.8     05-10  Mg     1.6     05-10                              Consultant(s) Notes Reviewed:      Care Discussed with Consultants/Other Providers:

## 2019-05-11 NOTE — PROGRESS NOTE ADULT - SUBJECTIVE AND OBJECTIVE BOX
CARDIOLOGY     PROGRESS  NOTE   ________________________________________________    CHIEF COMPLAINT:Patient is a 73y old  Female who presents with a chief complaint of nausea and vomiting (11 May 2019 07:58)  doing better.  	  REVIEW OF SYSTEMS:  CONSTITUTIONAL: No fever, weight loss, or fatigue  EYES: No eye pain, visual disturbances, or discharge  ENT:  No difficulty hearing, tinnitus, vertigo; No sinus or throat pain  NECK: No pain or stiffness  RESPIRATORY: No cough, wheezing, chills or hemoptysis; No Shortness of Breath  CARDIOVASCULAR: No chest pain, palpitations, passing out, dizziness, or leg swelling  GASTROINTESTINAL: No abdominal or epigastric pain. No nausea, vomiting, or hematemesis; No diarrhea or constipation. No melena or hematochezia.  GENITOURINARY: No dysuria, frequency, hematuria, or incontinence  NEUROLOGICAL: No headaches, memory loss, loss of strength, numbness, or tremors  SKIN: No itching, burning, rashes, or lesions   LYMPH Nodes: No enlarged glands  ENDOCRINE: No heat or cold intolerance; No hair loss  MUSCULOSKELETAL: No joint pain or swelling; No muscle, back, or extremity pain  PSYCHIATRIC: No depression, anxiety, mood swings, or difficulty sleeping  HEME/LYMPH: No easy bruising, or bleeding gums  ALLERGY AND IMMUNOLOGIC: No hives or eczema	    [ ] All others negative	  [ ] Unable to obtain    PHYSICAL EXAM:  T(C): 36.9 (05-11-19 @ 05:44), Max: 36.9 (05-10-19 @ 21:44)  HR: 77 (05-11-19 @ 05:44) (77 - 95)  BP: 116/65 (05-11-19 @ 05:44) (116/65 - 139/79)  RR: 18 (05-11-19 @ 05:44) (18 - 18)  SpO2: 97% (05-11-19 @ 05:44) (97% - 98%)  Wt(kg): --  I&O's Summary    10 May 2019 07:01  -  11 May 2019 07:00  --------------------------------------------------------  IN: 980 mL / OUT: 200 mL / NET: 780 mL        Appearance: Normal	  HEENT:   Normal oral mucosa, PERRL, EOMI	  Lymphatic: No lymphadenopathy  Cardiovascular: Normal S1 S2, No JVD, + murmurs, No edema  Respiratory: Lungs clear to auscultation	  Psychiatry: A & O x 3, Mood & affect appropriate  Gastrointestinal:  Soft, Non-tender, + BS	  Skin: No rashes, No ecchymoses, No cyanosis	  Neurologic: Non-focal  Extremities: Normal range of motion, No clubbing, cyanosis or edema  Vascular: Peripheral pulses palpable 2+ bilaterally    MEDICATIONS  (STANDING):  ALBUTerol/ipratropium for Nebulization 3 milliLiter(s) Nebulizer every 6 hours  buDESOnide   0.5 milliGRAM(s) Respule 0.5 milliGRAM(s) Inhalation two times a day  chlorhexidine 4% Liquid 1 Application(s) Topical <User Schedule>  heparin  Injectable 5000 Unit(s) SubCutaneous every 12 hours  influenza   Vaccine 0.5 milliLiter(s) IntraMuscular once  lactated ringers. 1000 milliLiter(s) (50 mL/Hr) IV Continuous <Continuous>  lactobacillus acidophilus 1 Tablet(s) Oral two times a day with meals  lidocaine   Patch 1 Patch Transdermal daily  midodrine 10 milliGRAM(s) Oral three times a day  nicotine -  14 mG/24Hr(s) Patch 1 patch Transdermal daily  pantoprazole    Tablet 40 milliGRAM(s) Oral before breakfast      TELEMETRY: 	    ECG:  	  RADIOLOGY:  OTHER: 	  	  LABS:	 	    CARDIAC MARKERS:                                9.6    4.1   )-----------( 216      ( 10 May 2019 12:07 )             30.3     05-10    141  |  110<H>  |  26<H>  ----------------------------<  88  4.2   |  18<L>  |  2.27<H>    Ca    8.3<L>      10 May 2019 12:07  Phos  1.8     05-10  Mg     1.6     05-10      proBNP:   Lipid Profile:   HgA1c:   TSH:         Assessment and plan  ---------------------------  acute renal failure, improving  continue midodrine  increase fluid intake  pt wants to go home

## 2019-05-11 NOTE — DISCHARGE NOTE PROVIDER - CARE PROVIDER_API CALL
Chano Reese (MD)  Hematology; Medical Oncology  [address] [facility], [address]  [place] [address]  Phone: [phone]  Fax: [phone]  Follow Up Time:

## 2019-05-11 NOTE — PROGRESS NOTE ADULT - ASSESSMENT
73 year old female with   PMH chronic Back pain (on Narcotics), history of multiple SBO's, narcotic associated constipation/ileus, COPD,  smoker,    ca  breast,  right  mastectomy. RENUKA cavitary lesion on ct,  .  c/c cachexia  path from  1/19, with NSCC with squamous  features,  s/p  RT     admitted with abdominal pain / vomiting , resolved,   , from SBO    ct scan noted,  SBO, / syrg  to  f/p    pt is an active smoker, refusing to quit   ct chest , 4/19/18  noted/  oncology dr hernandez   dvt ppx  ct  abdomen, noted     on Midodrine  SHAKIRA, resolving  creatinine is  2.2  po diet/  pt to  ambulate/ uti/ klebsiella. ,  zosyn completed  cbc stable/  plan,  d/c/ f/p  with d r ohri     < from: CT Abdomen and Pelvis No Cont (05.05.19 @ 20:08) >  IMPRESSION:  Suboptimal study due to the absence of oral and IV contrast.  Small bowel obstruction mildly worsened since prior study without clear   transition point. Questionable transition point in the right upper   quadrant with previously seen swirling was. No free air or ascites  < end of copied text >      < from: CT Chest No Cont (04.19.19 @ 18:50)   INTERPRETATION:  Motion degraded evaluation  Redemonstraion on left upper lobe cavitary mass consistent with patients   known hx of lung CA  Mucoid impacted right lower lobe ariways with scattered tree in bud   opacities  < end of copied text >      Cytopathology - Non Gyn Report (01.18.19 @ 11:43)    Immunohistochemical stains.  The immunophenotype together with the cytomorphology supports the  diagnosis squamous cell carcinoma.    Final Diagnosis  LUNG, LEFT UPPER LOBE, US GUIDED CORE BIOPSY AND FNA  POSITIVE FOR MALIGNANT CELLS.  Non-small cell carcinoma, with squamous features (see note).  Additional studies pending.

## 2019-05-11 NOTE — DISCHARGE NOTE PROVIDER - HOSPITAL COURSE
This is a 73 year old female with squamous cell lung cancer s/p RT completed 2 weeks prior to admission; breast cancer s/p chemo and mastectomy, COPD and recurrent SBO admitted for shock state likely 2/2 sepsis from UTI versus PE with acute renal failure. Pt. admitted with Dx: Septic shock 2/2 UTI    GI dysmotility 2/2 narcotics, SHAKIRA. Pt. was initially admitted to MICU with blood pressure support and then tx from MICU on 5/7. the pt. had a brief course of IV antibiotics when urine culture results and then dc'd; Chavarria D/C'd early in MICU; pt urinated on her own, no straight cath needed    and I&O stable.  Seen and followed by GI GI: Recommend Naloxegol 25 mg PO Daily but subsequently held off on dosing now as pt was having loose stool and med dc'd as per GI. Pt. was seen and followed by renal (SHAKIRA) with ongoing IV fluids and improvement of cr. (5/11 - 1.93). On 5/9 pt completed zosyn, had continued IVF's, and electrolyte repletion. Pt. was seen by PT and is planned for discharge home. On 5/11 with labs repeated and normalizing, pt. planned for d/c after discussion with Dr. Dennison. On 5/11 Labs and discharge meds reviewed with Dr. Dennison for discharge home and pt. was dc'd.

## 2019-05-16 ENCOUNTER — APPOINTMENT (OUTPATIENT)
Dept: RADIATION ONCOLOGY | Facility: CLINIC | Age: 74
End: 2019-05-16
Payer: MEDICARE

## 2019-05-16 VITALS
BODY MASS INDEX: 16.36 KG/M2 | DIASTOLIC BLOOD PRESSURE: 68 MMHG | SYSTOLIC BLOOD PRESSURE: 109 MMHG | RESPIRATION RATE: 16 BRPM | WEIGHT: 83.77 LBS

## 2019-05-16 DIAGNOSIS — C34.92 MALIGNANT NEOPLASM OF UNSPECIFIED PART OF LEFT BRONCHUS OR LUNG: ICD-10-CM

## 2019-05-16 PROCEDURE — 99024 POSTOP FOLLOW-UP VISIT: CPT | Mod: GC

## 2019-05-16 NOTE — VITALS
[Maximal Pain Intensity: 0/10] : 0/10 [60: Requires occasional assistance, but is able to care for most of his/her needs] : 60: Requires occasional assistance, but is able to care for most of his/her needs [Least Pain Intensity: 0/10] : 0/10 [ECOG Performance Status: 2 - Ambulatory and capable of all self care but unable to carry out any work activities] : Performance Status: 2 - Ambulatory and capable of all self care but unable to carry out any work activities. Up and about more than 50% of waking hours

## 2019-05-16 NOTE — REASON FOR VISIT
[Post-Treatment Evaluation] : post-treatment evaluation for [Lung Cancer] : lung cancer [Formal Caregiver] : formal caregiver

## 2019-05-22 ENCOUNTER — INPATIENT (INPATIENT)
Facility: HOSPITAL | Age: 74
LOS: 4 days | Discharge: HOME | DRG: 871 | End: 2019-05-27
Attending: INTERNAL MEDICINE | Admitting: INTERNAL MEDICINE
Payer: MEDICARE

## 2019-05-22 VITALS
OXYGEN SATURATION: 100 % | DIASTOLIC BLOOD PRESSURE: 54 MMHG | HEIGHT: 62 IN | RESPIRATION RATE: 20 BRPM | HEART RATE: 62 BPM | TEMPERATURE: 97 F | SYSTOLIC BLOOD PRESSURE: 70 MMHG | WEIGHT: 82.01 LBS

## 2019-05-22 DIAGNOSIS — A41.9 SEPSIS, UNSPECIFIED ORGANISM: ICD-10-CM

## 2019-05-22 LAB
ALBUMIN SERPL ELPH-MCNC: 4.3 G/DL — SIGNIFICANT CHANGE UP (ref 3.3–5)
ALP SERPL-CCNC: 183 U/L — HIGH (ref 40–120)
ALT FLD-CCNC: 21 U/L — SIGNIFICANT CHANGE UP (ref 10–45)
ANION GAP SERPL CALC-SCNC: 25 MMOL/L — HIGH (ref 5–17)
APPEARANCE UR: ABNORMAL
APTT BLD: 36.8 SEC — HIGH (ref 27.5–36.3)
AST SERPL-CCNC: 16 U/L — SIGNIFICANT CHANGE UP (ref 10–40)
BACTERIA # UR AUTO: ABNORMAL
BASE EXCESS BLDV CALC-SCNC: -4.2 MMOL/L — LOW (ref -2–2)
BILIRUB SERPL-MCNC: 0.4 MG/DL — SIGNIFICANT CHANGE UP (ref 0.2–1.2)
BILIRUB UR-MCNC: NEGATIVE — SIGNIFICANT CHANGE UP
BLD GP AB SCN SERPL QL: NEGATIVE — SIGNIFICANT CHANGE UP
BUN SERPL-MCNC: 56 MG/DL — HIGH (ref 7–23)
CA-I SERPL-SCNC: 1.01 MMOL/L — LOW (ref 1.12–1.3)
CALCIUM SERPL-MCNC: 8.6 MG/DL — SIGNIFICANT CHANGE UP (ref 8.4–10.5)
CHLORIDE BLDV-SCNC: 103 MMOL/L — SIGNIFICANT CHANGE UP (ref 96–108)
CHLORIDE SERPL-SCNC: 98 MMOL/L — SIGNIFICANT CHANGE UP (ref 96–108)
CO2 BLDV-SCNC: 26 MMOL/L — SIGNIFICANT CHANGE UP (ref 22–30)
CO2 SERPL-SCNC: 21 MMOL/L — LOW (ref 22–31)
COLOR SPEC: YELLOW — SIGNIFICANT CHANGE UP
CREAT SERPL-MCNC: 4.41 MG/DL — HIGH (ref 0.5–1.3)
DIFF PNL FLD: ABNORMAL
EPI CELLS # UR: 1 — SIGNIFICANT CHANGE UP
GAS PNL BLDV: 143 MMOL/L — SIGNIFICANT CHANGE UP (ref 136–145)
GAS PNL BLDV: SIGNIFICANT CHANGE UP
GIANT PLATELETS BLD QL SMEAR: PRESENT — SIGNIFICANT CHANGE UP
GLUCOSE BLDV-MCNC: 167 MG/DL — HIGH (ref 70–99)
GLUCOSE SERPL-MCNC: 172 MG/DL — HIGH (ref 70–99)
GLUCOSE UR QL: NEGATIVE — SIGNIFICANT CHANGE UP
HCO3 BLDV-SCNC: 24 MMOL/L — SIGNIFICANT CHANGE UP (ref 21–29)
HCT VFR BLD CALC: 38 % — SIGNIFICANT CHANGE UP (ref 34.5–45)
HCT VFR BLDA CALC: 36 % — LOW (ref 39–50)
HGB BLD CALC-MCNC: 11.7 G/DL — SIGNIFICANT CHANGE UP (ref 11.5–15.5)
HGB BLD-MCNC: 12.2 G/DL — SIGNIFICANT CHANGE UP (ref 11.5–15.5)
HYALINE CASTS # UR AUTO: 1 /LPF — SIGNIFICANT CHANGE UP (ref 0–7)
INR BLD: 1.21 RATIO — HIGH (ref 0.88–1.16)
KETONES UR-MCNC: NEGATIVE — SIGNIFICANT CHANGE UP
LACTATE BLDV-MCNC: 2.5 MMOL/L — HIGH (ref 0.7–2)
LACTATE BLDV-MCNC: 3.9 MMOL/L — HIGH (ref 0.7–2)
LEUKOCYTE ESTERASE UR-ACNC: ABNORMAL
LG PLATELETS BLD QL AUTO: SLIGHT — SIGNIFICANT CHANGE UP
LYMPHOCYTES # BLD AUTO: 0.5 K/UL — LOW (ref 1–3.3)
LYMPHOCYTES # BLD AUTO: 9 % — LOW (ref 13–44)
MCHC RBC-ENTMCNC: 30.5 PG — SIGNIFICANT CHANGE UP (ref 27–34)
MCHC RBC-ENTMCNC: 32.2 GM/DL — SIGNIFICANT CHANGE UP (ref 32–36)
MCV RBC AUTO: 94.7 FL — SIGNIFICANT CHANGE UP (ref 80–100)
MONOCYTES # BLD AUTO: 0.3 K/UL — SIGNIFICANT CHANGE UP (ref 0–0.9)
MONOCYTES NFR BLD AUTO: 2 % — SIGNIFICANT CHANGE UP (ref 2–14)
MYELOCYTES NFR BLD: 1 % — HIGH (ref 0–0)
NEUTROPHILS # BLD AUTO: 6.4 K/UL — SIGNIFICANT CHANGE UP (ref 1.8–7.4)
NEUTROPHILS NFR BLD AUTO: 88 % — HIGH (ref 43–77)
NITRITE UR-MCNC: NEGATIVE — SIGNIFICANT CHANGE UP
PCO2 BLDV: 61 MMHG — HIGH (ref 35–50)
PH BLDV: 7.22 — LOW (ref 7.35–7.45)
PH UR: 6 — SIGNIFICANT CHANGE UP (ref 5–8)
PLAT MORPH BLD: NORMAL — SIGNIFICANT CHANGE UP
PLATELET # BLD AUTO: 385 K/UL — SIGNIFICANT CHANGE UP (ref 150–400)
PO2 BLDV: 23 MMHG — LOW (ref 25–45)
POTASSIUM BLDV-SCNC: 3.9 MMOL/L — SIGNIFICANT CHANGE UP (ref 3.5–5.3)
POTASSIUM SERPL-MCNC: 3.9 MMOL/L — SIGNIFICANT CHANGE UP (ref 3.5–5.3)
POTASSIUM SERPL-SCNC: 3.9 MMOL/L — SIGNIFICANT CHANGE UP (ref 3.5–5.3)
PROT SERPL-MCNC: 8.2 G/DL — SIGNIFICANT CHANGE UP (ref 6–8.3)
PROT UR-MCNC: ABNORMAL
PROTHROM AB SERPL-ACNC: 14 SEC — HIGH (ref 10–12.9)
RBC # BLD: 4.01 M/UL — SIGNIFICANT CHANGE UP (ref 3.8–5.2)
RBC # FLD: 19.3 % — HIGH (ref 10.3–14.5)
RBC BLD AUTO: SIGNIFICANT CHANGE UP
RBC CASTS # UR COMP ASSIST: 5 /HPF — HIGH (ref 0–4)
RH IG SCN BLD-IMP: POSITIVE — SIGNIFICANT CHANGE UP
SAO2 % BLDV: 25 % — LOW (ref 67–88)
SODIUM SERPL-SCNC: 144 MMOL/L — SIGNIFICANT CHANGE UP (ref 135–145)
SP GR SPEC: 1.02 — SIGNIFICANT CHANGE UP (ref 1.01–1.02)
UROBILINOGEN FLD QL: NEGATIVE — SIGNIFICANT CHANGE UP
WBC # BLD: 7.2 K/UL — SIGNIFICANT CHANGE UP (ref 3.8–10.5)
WBC # FLD AUTO: 7.2 K/UL — SIGNIFICANT CHANGE UP (ref 3.8–10.5)
WBC UR QL: 42 /HPF — HIGH (ref 0–5)

## 2019-05-22 PROCEDURE — 93010 ELECTROCARDIOGRAM REPORT: CPT | Mod: GC

## 2019-05-22 PROCEDURE — 99291 CRITICAL CARE FIRST HOUR: CPT | Mod: GC

## 2019-05-22 PROCEDURE — 74176 CT ABD & PELVIS W/O CONTRAST: CPT | Mod: 26

## 2019-05-22 PROCEDURE — 71045 X-RAY EXAM CHEST 1 VIEW: CPT | Mod: 26

## 2019-05-22 RX ORDER — ONDANSETRON 8 MG/1
4 TABLET, FILM COATED ORAL ONCE
Refills: 0 | Status: COMPLETED | OUTPATIENT
Start: 2019-05-22 | End: 2019-05-22

## 2019-05-22 RX ORDER — HEPARIN SODIUM 5000 [USP'U]/ML
5000 INJECTION INTRAVENOUS; SUBCUTANEOUS EVERY 12 HOURS
Refills: 0 | Status: DISCONTINUED | OUTPATIENT
Start: 2019-05-22 | End: 2019-05-22

## 2019-05-22 RX ORDER — ACETAMINOPHEN 500 MG
650 TABLET ORAL EVERY 6 HOURS
Refills: 0 | Status: DISCONTINUED | OUTPATIENT
Start: 2019-05-22 | End: 2019-05-22

## 2019-05-22 RX ORDER — NICOTINE POLACRILEX 2 MG
1 GUM BUCCAL DAILY
Refills: 0 | Status: DISCONTINUED | OUTPATIENT
Start: 2019-05-22 | End: 2019-05-22

## 2019-05-22 RX ORDER — CEFEPIME 1 G/1
1000 INJECTION, POWDER, FOR SOLUTION INTRAMUSCULAR; INTRAVENOUS ONCE
Refills: 0 | Status: COMPLETED | OUTPATIENT
Start: 2019-05-22 | End: 2019-05-23

## 2019-05-22 RX ORDER — FENTANYL CITRATE 50 UG/ML
50 INJECTION INTRAVENOUS ONCE
Refills: 0 | Status: DISCONTINUED | OUTPATIENT
Start: 2019-05-22 | End: 2019-05-22

## 2019-05-22 RX ORDER — DIAZEPAM 5 MG
2 TABLET ORAL ONCE
Refills: 0 | Status: DISCONTINUED | OUTPATIENT
Start: 2019-05-22 | End: 2019-05-22

## 2019-05-22 RX ORDER — CEFEPIME 1 G/1
INJECTION, POWDER, FOR SOLUTION INTRAMUSCULAR; INTRAVENOUS
Refills: 0 | Status: DISCONTINUED | OUTPATIENT
Start: 2019-05-22 | End: 2019-05-22

## 2019-05-22 RX ORDER — TIOTROPIUM BROMIDE 18 UG/1
1 CAPSULE ORAL; RESPIRATORY (INHALATION) DAILY
Refills: 0 | Status: DISCONTINUED | OUTPATIENT
Start: 2019-05-22 | End: 2019-05-22

## 2019-05-22 RX ORDER — ACETAMINOPHEN 500 MG
650 TABLET ORAL EVERY 6 HOURS
Refills: 0 | Status: DISCONTINUED | OUTPATIENT
Start: 2019-05-22 | End: 2019-05-27

## 2019-05-22 RX ORDER — MORPHINE SULFATE 50 MG/1
4 CAPSULE, EXTENDED RELEASE ORAL ONCE
Refills: 0 | Status: DISCONTINUED | OUTPATIENT
Start: 2019-05-22 | End: 2019-05-22

## 2019-05-22 RX ORDER — BUDESONIDE, MICRONIZED 100 %
0.5 POWDER (GRAM) MISCELLANEOUS
Refills: 0 | Status: DISCONTINUED | OUTPATIENT
Start: 2019-05-22 | End: 2019-05-22

## 2019-05-22 RX ORDER — SODIUM CHLORIDE 9 MG/ML
1000 INJECTION, SOLUTION INTRAVENOUS ONCE
Refills: 0 | Status: COMPLETED | OUTPATIENT
Start: 2019-05-22 | End: 2019-05-22

## 2019-05-22 RX ORDER — ALBUTEROL 90 UG/1
1 AEROSOL, METERED ORAL ONCE
Refills: 0 | Status: COMPLETED | OUTPATIENT
Start: 2019-05-22 | End: 2019-05-23

## 2019-05-22 RX ORDER — ALBUTEROL 90 UG/1
1 AEROSOL, METERED ORAL ONCE
Refills: 0 | Status: DISCONTINUED | OUTPATIENT
Start: 2019-05-22 | End: 2019-05-22

## 2019-05-22 RX ORDER — HEPARIN SODIUM 5000 [USP'U]/ML
5000 INJECTION INTRAVENOUS; SUBCUTANEOUS EVERY 12 HOURS
Refills: 0 | Status: DISCONTINUED | OUTPATIENT
Start: 2019-05-22 | End: 2019-05-27

## 2019-05-22 RX ORDER — SODIUM CHLORIDE 9 MG/ML
1000 INJECTION INTRAMUSCULAR; INTRAVENOUS; SUBCUTANEOUS
Refills: 0 | Status: DISCONTINUED | OUTPATIENT
Start: 2019-05-22 | End: 2019-05-26

## 2019-05-22 RX ORDER — DIAZEPAM 5 MG
2 TABLET ORAL ONCE
Refills: 0 | Status: DISCONTINUED | OUTPATIENT
Start: 2019-05-22 | End: 2019-05-23

## 2019-05-22 RX ORDER — NICOTINE POLACRILEX 2 MG
1 GUM BUCCAL DAILY
Refills: 0 | Status: DISCONTINUED | OUTPATIENT
Start: 2019-05-22 | End: 2019-05-27

## 2019-05-22 RX ORDER — TIOTROPIUM BROMIDE 18 UG/1
1 CAPSULE ORAL; RESPIRATORY (INHALATION) ONCE
Refills: 0 | Status: COMPLETED | OUTPATIENT
Start: 2019-05-22 | End: 2019-05-22

## 2019-05-22 RX ORDER — CEFEPIME 1 G/1
1000 INJECTION, POWDER, FOR SOLUTION INTRAMUSCULAR; INTRAVENOUS ONCE
Refills: 0 | Status: COMPLETED | OUTPATIENT
Start: 2019-05-22 | End: 2019-05-22

## 2019-05-22 RX ORDER — BUDESONIDE, MICRONIZED 100 %
0.5 POWDER (GRAM) MISCELLANEOUS ONCE
Refills: 0 | Status: COMPLETED | OUTPATIENT
Start: 2019-05-22 | End: 2019-05-22

## 2019-05-22 RX ORDER — SODIUM CHLORIDE 9 MG/ML
1000 INJECTION INTRAMUSCULAR; INTRAVENOUS; SUBCUTANEOUS ONCE
Refills: 0 | Status: COMPLETED | OUTPATIENT
Start: 2019-05-22 | End: 2019-05-22

## 2019-05-22 RX ADMIN — FENTANYL CITRATE 50 MICROGRAM(S): 50 INJECTION INTRAVENOUS at 15:07

## 2019-05-22 RX ADMIN — SODIUM CHLORIDE 1000 MILLILITER(S): 9 INJECTION INTRAMUSCULAR; INTRAVENOUS; SUBCUTANEOUS at 15:45

## 2019-05-22 RX ADMIN — HEPARIN SODIUM 5000 UNIT(S): 5000 INJECTION INTRAVENOUS; SUBCUTANEOUS at 23:49

## 2019-05-22 RX ADMIN — Medication 0.5 MILLIGRAM(S): at 20:35

## 2019-05-22 RX ADMIN — MORPHINE SULFATE 4 MILLIGRAM(S): 50 CAPSULE, EXTENDED RELEASE ORAL at 18:49

## 2019-05-22 RX ADMIN — FENTANYL CITRATE 50 MICROGRAM(S): 50 INJECTION INTRAVENOUS at 20:09

## 2019-05-22 RX ADMIN — SODIUM CHLORIDE 1000 MILLILITER(S): 9 INJECTION, SOLUTION INTRAVENOUS at 16:35

## 2019-05-22 RX ADMIN — Medication 1 PATCH: at 20:34

## 2019-05-22 RX ADMIN — ONDANSETRON 4 MILLIGRAM(S): 8 TABLET, FILM COATED ORAL at 15:10

## 2019-05-22 RX ADMIN — ONDANSETRON 4 MILLIGRAM(S): 8 TABLET, FILM COATED ORAL at 18:50

## 2019-05-22 RX ADMIN — TIOTROPIUM BROMIDE 1 CAPSULE(S): 18 CAPSULE ORAL; RESPIRATORY (INHALATION) at 20:35

## 2019-05-22 RX ADMIN — SODIUM CHLORIDE 1000 MILLILITER(S): 9 INJECTION, SOLUTION INTRAVENOUS at 15:34

## 2019-05-22 RX ADMIN — FENTANYL CITRATE 50 MICROGRAM(S): 50 INJECTION INTRAVENOUS at 18:50

## 2019-05-22 RX ADMIN — ONDANSETRON 4 MILLIGRAM(S): 8 TABLET, FILM COATED ORAL at 22:09

## 2019-05-22 RX ADMIN — SODIUM CHLORIDE 75 MILLILITER(S): 9 INJECTION INTRAMUSCULAR; INTRAVENOUS; SUBCUTANEOUS at 23:49

## 2019-05-22 RX ADMIN — CEFEPIME 100 MILLIGRAM(S): 1 INJECTION, POWDER, FOR SOLUTION INTRAMUSCULAR; INTRAVENOUS at 15:34

## 2019-05-22 RX ADMIN — FENTANYL CITRATE 50 MICROGRAM(S): 50 INJECTION INTRAVENOUS at 16:52

## 2019-05-22 RX ADMIN — MORPHINE SULFATE 4 MILLIGRAM(S): 50 CAPSULE, EXTENDED RELEASE ORAL at 17:14

## 2019-05-22 RX ADMIN — CEFEPIME 1000 MILLIGRAM(S): 1 INJECTION, POWDER, FOR SOLUTION INTRAMUSCULAR; INTRAVENOUS at 16:05

## 2019-05-22 RX ADMIN — SODIUM CHLORIDE 1000 MILLILITER(S): 9 INJECTION INTRAMUSCULAR; INTRAVENOUS; SUBCUTANEOUS at 14:45

## 2019-05-22 NOTE — H&P ADULT - HISTORY OF PRESENT ILLNESS
CHIEF COMPLAINT:Patient is a 73y old  Female who presents with a chief complaint of abdominal pain.    HPI:  73F PMHx of Breast Ca s/p right mastectomy chemotherapy tx (2006), Lung Ca, COPD (no home O2), hysterectomy, appendectomy, cholecystectomy, femoral hernia repair, multiple SBOs w/ resection, opioid dependence, p/w nausea/vomiting x 2-3 days. Associated w/ mild abdominal distention. LNBM 5/18, has not been passing flatus. Also w/ complaint of chronic lower sacrum pain that is achy in nature. Denies any fevers, chest pain, sob, headaches, diarrhea/constipation.    PAST MEDICAL & SURGICAL HISTORY:  Back pain  Bowel obstruction: multiple hospitalizations  Kidney stone  Emphysema  Narcotic Dependence  S/P Radiation Therapy  S/P Chemotherapy, Time Since Greater than 12 Weeks  Narcotic Dysmotile Cilia Syndrome  Chronic Pain Following Surgery or Procedure: following right breast mastectomy  Ankle Fracture: right anle fx s/p cast 2009  cad  History of Small Bowel Obstruction: 1/2010  Asthma  Breast Cancer  S/P hernia surgery: femoral hernia - 2012  S/P Exploratory Laparotomy  S/P Appendectomy  S/P Cholecystectomy  Liver Mass s/p liver rsxn: s/p resection 2009  History of Hysterectomy: 1998  S/P Right Mastectomy: 2006      MEDICATIONS  (STANDING):  cefepime   IVPB        MEDICATIONS  (PRN):      FAMILY HISTORY:  No pertinent family history in first degree relatives      SOCIAL HISTORY:    [ ] Non-smoker  [ ] Smoker  [ ] Alcohol    Allergies    Biaxin (Rash)  Cipro (Headache)  Flagyl (Headache)  penicillin (Rash; Swelling)  sulfa drugs (Unknown)    Intolerances    	    REVIEW OF SYSTEMS:  CONSTITUTIONAL: No fever, weight loss, or fatigue  EYES: No eye pain, visual disturbances, or discharge  ENT:  No difficulty hearing, tinnitus, vertigo; No sinus or throat pain  NECK: No pain or stiffness  RESPIRATORY: No cough, wheezing, chills or hemoptysis; + Shortness of Breath  CARDIOVASCULAR: No chest pain, palpitations, passing out, dizziness, or leg swelling  GASTROINTESTINAL: + abdominal or epigastric pain. No nausea, vomiting, or hematemesis; No diarrhea or constipation. No melena or hematochezia.  GENITOURINARY: No dysuria, frequency, hematuria, or incontinence  NEUROLOGICAL: No headaches, memory loss, loss of strength, numbness, or tremors  SKIN: No itching, burning, rashes, or lesions   LYMPH Nodes: No enlarged glands  ENDOCRINE: No heat or cold intolerance; No hair loss  MUSCULOSKELETAL: No joint pain or swelling; No muscle, back, or extremity pain  PSYCHIATRIC: No depression, anxiety, mood swings, or difficulty sleeping  HEME/LYMPH: No easy bruising, or bleeding gums  ALLERGY AND IMMUNOLOGIC: No hives or eczema	    [ ] All others negative	  [ ] Unable to obtain    PHYSICAL EXAM:  T(C): 36.6 (05-22-19 @ 19:54), Max: 36.6 (05-22-19 @ 18:20)  HR: 90 (05-22-19 @ 19:54) (62 - 90)  BP: 99/70 (05-22-19 @ 19:54) (70/54 - 139/63)  RR: 17 (05-22-19 @ 19:54) (12 - 20)  SpO2: 98% (05-22-19 @ 19:54) (98% - 100%)  Wt(kg): --  I&O's Summary      Appearance: Normal	  HEENT:   Normal oral mucosa, PERRL, EOMI	  Lymphatic: No lymphadenopathy  Cardiovascular: Normal S1 S2, No JVD, + murmurs, No edema  Respiratory: Lungs clear to auscultation	  Psychiatry: A & O x 3, Mood & affect appropriate  Gastrointestinal: + distended, + BS	  Skin: No rashes, No ecchymoses, No cyanosis	  Neurologic: Non-focal  Extremities: Normal range of motion, No clubbing, cyanosis or edema  Vascular: Peripheral pulses palpable 2+ bilaterally    TELEMETRY: 	    ECG:  	  RADIOLOGY:  OTHER: 	  	  LABS:	 	    CARDIAC MARKERS:                              12.2   7.2   )-----------( 385      ( 22 May 2019 15:20 )             38.0     05-22    144  |  98  |  56<H>  ----------------------------<  172<H>  3.9   |  21<L>  |  4.41<H>    Ca    8.6      22 May 2019 15:20    TPro  8.2  /  Alb  4.3  /  TBili  0.4  /  DBili  x   /  AST  16  /  ALT  21  /  AlkPhos  183<H>  05-22    proBNP:   Lipid Profile:   HgA1c:   TSH:   PT/INR - ( 22 May 2019 15:20 )   PT: 14.0 sec;   INR: 1.21 ratio         PTT - ( 22 May 2019 15:20 )  PTT:36.8 sec    PREVIOUS DIAGNOSTIC TESTING:    < from: CT Abdomen and Pelvis No Cont (05.05.19 @ 20:08) >  Suboptimal study due to the absence of oral and IV contrast.    Small bowel obstruction mildly worsened since prior study without clear   transition point. Questionable transition point in the right upper   quadrant with previously seen swirling was. No free air or ascites.    < end of copied text >

## 2019-05-22 NOTE — H&P ADULT - ASSESSMENT
r/o SBO  awaiting ct /surgery eval  urosepsis  iv abx   cad stable at this time  acute renal failure  renal eval/ivf  check UO  bladder scan as neede

## 2019-05-22 NOTE — ED ADULT NURSE NOTE - OBJECTIVE STATEMENT
72 y/o F, A&Ox4, enters ED w/ c/o vomiting. Pt. presents cachetic. Pt. is hypotensive - 70s/30s upon arrival to ED. Pt. is mentating well. IV access difficult to establish. Right 20 g EJ placed by MD Bianchi. IV fluids initiated immediately. Pt.'s BP improved to 100s/40s and then 114/60s. Pt. was recently seen and discharged for a UTI. 74 y/o F, A&Ox4, enters ED w/ c/o nausea/vomiting. Hx. of breast ca s/p right mastectomy, asthma, cholecystectomy, appendectomy, femoral hernia repair, lung ca (not on chemo - last radiation about 6 weeks ago). Pt. reports vomiting started on Sunday/Monday and reports 2-3 episodes a day. No blood noted in emesis. Pt. presents cachetic and weak. Pt. is hypotensive - 70s/30s upon arrival to ED. Pt. is mentating well. IV access difficult to establish. Right 20 g EJ placed by MD Bianchi. IV fluids initiated immediately. Pt.'s BP improved to 100s/40s and then 114/60s. Pt. was recently seen and discharged for a UTI. Pt. denies dysuria/hematuria. Pt. reports lower back pain. Negative CVA tenderness. Pt. also reports intermittent abdominal pain, but none currently. Abdomen soft, round, distended. Pt. reports last BM was Saturday. No blood noted. No fever, however, pt. reports intermittent chills. Pt. appears very dry. Skin cool to touch, dry and intact. Safety and comfort provided. No chest pain/SOB. EKG done. Pt. placed on CM - NSR to the 90s. Aid at bedside. 72 y/o F, A&Ox4, enters ED w/ c/o nausea/vomiting. Hx. of breast ca s/p right mastectomy, asthma, cholecystectomy, appendectomy, femoral hernia repair, lung ca (not on chemo - last radiation about 6 weeks ago). Pt. reports vomiting started on Sunday/Monday and reports 2-3 episodes a day. No blood noted in emesis. Pt. presents cachetic and weak. Pt. is hypotensive - 70s/30s upon arrival to ED. Pt. is mentating well. IV access difficult to establish. Right 20 g EJ placed by MD Bianchi. IV fluids initiated immediately. Pt.'s BP improved to 100s/40s and then 114/60s. Pt. was recently seen and discharged for a UTI. Pt. denies dysuria/hematuria. Pt. reports lower back pain. Negative CVA tenderness. Pt. also reports intermittent abdominal pain, but none currently. Abdomen soft, round, distended. Pt. reports last BM was Saturday. No blood noted. No fever, however, pt. reports intermittent chills. Pt. appears very dry. Skin cool to touch, dry and intact. Safety and comfort provided. No chest pain/SOB. Pt. not on home O2. EKG done. Pt. placed on CM - NSR to the 90s. Aid at bedside.

## 2019-05-22 NOTE — ED PROVIDER NOTE - ENMT, MLM
Airway patent, Nasal mucosa clear. Mouth with normal mucosa. Throat has no vesicles, no oropharyngeal exudates and uvula is midline. oral: mmdry

## 2019-05-22 NOTE — ED PROVIDER NOTE - CONSTITUTIONAL, MLM
normal... Cachetic and ill appearing, awake, alert, oriented to person, place, time/situation and in moderate distress.

## 2019-05-22 NOTE — ED PROVIDER NOTE - CARE PLAN
Principal Discharge DX:	Severe sepsis with acute organ dysfunction  Secondary Diagnosis:	Urinary tract infection without hematuria, site unspecified

## 2019-05-22 NOTE — ED PROVIDER NOTE - CLINICAL SUMMARY MEDICAL DECISION MAKING FREE TEXT BOX
73f with extensive pmh, pw abdominal pain, n/v, hypotension. CT AP, cbc/cmp/BCxx2/UA/UCX/ IVF/admit. DDx sepsis/uti, dehydration, failure to thrive, obstruction, abdominal pathology. Dr. Bianchi (Attending Physician)  73f with extensive pmh recent uti causing septic shock multiple sbo's, pw abdominal pain, n/v, hypotension. CT AP, cbc/cmp/BCxx2/UA/UCX/ IVF/admit. DDx sepsis/uti, dehydration, failure to thrive, obstruction, abdominal pathology.

## 2019-05-22 NOTE — CONSULT NOTE ADULT - ASSESSMENT
73 year old female with   PMH,    chronic Back pain (on Narcotics), history of multiple SBO's, narcotic associated constipation/ileus, COPD,  smoker,    ca  breast,  right  mastectomy. RENUKA cavitary lesion on ct, ,  c/c cachexia  path from  1/19, with NSCC with squamous  features,  s/p  RT     admitted with abdominal pain / vomiting, most likely  from SBO  SHAKIRA on CKD 2    ct scan  pending /    surg   eval    pt is an active smoker, refusing to quit   ct chest , 4/19/18  noted/  oncology dr hernandez   dvt ppx   iv fluids/  NPO/  protonix   may  need  ngt. if  vomiting persists  avoid  narcotics, if  possible     < from: CT Abdomen and Pelvis No Cont (05.05.19 @ 20:08) >  IMPRESSION:  Suboptimal study due to the absence of oral and IV contrast.  Small bowel obstruction mildly worsened since prior study without clear   transition point. Questionable transition point in the right upper   quadrant with previously seen swirling was. No free air or ascites  < end of copied text >      < from: CT Chest No Cont (04.19.19 @ 18:50)   INTERPRETATION:  Motion degraded evaluation  Redemonstraion on left upper lobe cavitary mass consistent with patients   known hx of lung CA  Mucoid impacted right lower lobe ariways with scattered tree in bud   opacities  < end of copied text >      Cytopathology - Non Gyn Report (01.18.19 @ 11:43)    Immunohistochemical stains.  The immunophenotype together with the cytomorphology supports the  diagnosis squamous cell carcinoma.    Final Diagnosis  LUNG, LEFT UPPER LOBE, US GUIDED CORE BIOPSY AND FNA  POSITIVE FOR MALIGNANT CELLS.  Non-small cell carcinoma, with squamous features (see note).  Additional studies pending.

## 2019-05-22 NOTE — CONSULT NOTE ADULT - SUBJECTIVE AND OBJECTIVE BOX
GENERAL SURGERY CONSULT NOTE    Patient is a 73y old  Female who presents with a chief complaint of vomiting     HPI: 73F with history of recurrent SBOs, lung cancer s/p RTx and active smoker, COPD (no home O2), breast CA s/p R mastectomy and chemotherapy (), chronic lower back pain 2/2 herniated disc with opioid dependence, ?CAD who presents to the ER with vomiting. Patient's last bowel movement was  and has been having multiple episodes of non-bloody emesis since. In the ER, patient was found to be hypotensive with SHAKIRA and pyuria concerning for sepsis like from UTI. Patient was given Cefepime and fluid bolus. CT scan was performed which re-demonstrated the small bowel obstruction with questionable transition point without free air. Of note, patient is well known to the [facility] team and was last seen by Dr. Lu on 2019 for similar symptoms. Per documentation, she has repeat admits with CTs read as SBO's but all have resolved with either oral narcan or Movantic/Relistor. Patient has known narcotic bowel but does not take meds at home because she does not like the diarrhea, which results in repeat readmissions for the same problem. Patient is at her baseline mentals status when seen, endorses nausea or vomiting but no abdominal pain.     10-points review of system performed with pertinent negative and positive findings documented in the HPI     PAST MEDICAL & SURGICAL HISTORY:  Back pain  Bowel obstruction: multiple hospitalizations  Kidney stone  Emphysema  Narcotic Dependence  S/P Radiation Therapy  S/P Chemotherapy, Time Since Greater than 12 Weeks  Narcotic Dysmotile Cilia Syndrome  Chronic Pain Following Surgery or Procedure: following right breast mastectomy  Ankle Fracture: right anle fx s/p cast   History of Small Bowel Obstruction: 2010  Asthma  Breast Cancer  S/P hernia surgery: femoral hernia -   S/P Exploratory Laparotomy  S/P Appendectomy  S/P Cholecystectomy  Liver Mass s/p liver rsxn: s/p resection   History of Hysterectomy:   S/P Right Mastectomy:     FAMILY HISTORY: No pertinent family history in first degree relatives    SOCIAL HISTORY: No pertinent social history    Home Medications:  acetaminophen 325 mg oral tablet: 1 tab(s) orally every 6 hours, As Needed for mild pain  (06 May 2019 01:19)  Centrum Antioxidant Multiple Vitamins and Minerals oral tablet: 1 tab(s) orally once a day (06 May 2019 01:19)  docusate sodium 100 mg oral capsule: 1 cap(s) orally 3 times a day (06 May 2019 01:19)  lidocaine 5% topical film: Apply topically to affected area once a day (06 May 2019 01:19)  MiraLax - oral powder for reconstitution: 17 gram(s) orally 2 times a day (06 May 2019 01:19)  nicotine 14 mg/24 hr transdermal film, extended release: 1 patch transdermal once a day (06 May 2019 01:19)  oxyCODONE 10 mg oral tablet: 1 tab(s) orally every 6 hours, As needed, Moderate Pain (4 - 6) (06 May 2019 01:19)  senna oral tablet: 2 tab(s) orally once a day (at bedtime) (06 May 2019 01:)  tiotropium 18 mcg inhalation capsule: 1 cap(s) inhaled once a day (06 May 2019 01:)  Valium: 2.5 milligram(s) orally once a day (at bedtime), As Needed (06 May 2019 01:19)    Allergies:  Biaxin (Rash)  Cipro (Headache)  Flagyl (Headache)  penicillin (Rash; Swelling)  sulfa drugs (Unknown)      Vital Signs Last 24 Hrs  T(C): 36.6 (22 May 2019 19:54), Max: 36.6 (22 May 2019 18:20)  T(F): 97.8 (22 May 2019 19:54), Max: 97.9 (22 May 2019 18:20)  HR: 90 (22 May 2019 19:54) (62 - 90)  BP: 99/70 (22 May 2019 19:54) (70/54 - 139/63)  BP(mean): --  RR: 17 (22 May 2019 19:54) (12 - 20)  SpO2: 98% (22 May 2019 19:54) (98% - 100%)  Daily Height in cm: 157.48 (22 May 2019 14:21)    Daily     Exam:  General:  HEENT:  Resp:  Chest:   Abd:  Ext:  Neuro:                          12.2   7.2   )-----------( 385      ( 22 May 2019 15:20 )             38.0     05-22    144  |  98  |  56<H>  ----------------------------<  172<H>  3.9   |  21<L>  |  4.41<H>    Ca    8.6      22 May 2019 15:20    TPro  8.2  /  Alb  4.3  /  TBili  0.4  /  DBili  x   /  AST  16  /  ALT  21  /  AlkPhos  183<H>  05-22    PT/INR - ( 22 May 2019 15:20 )   PT: 14.0 sec;   INR: 1.21 ratio         PTT - ( 22 May 2019 15:20 )  PTT:36.8 sec  Urinalysis Basic - ( 22 May 2019 15:45 )    Color: Yellow / Appearance: Slightly Turbid / S.016 / pH: x  Gluc: x / Ketone: Negative  / Bili: Negative / Urobili: Negative   Blood: x / Protein: 30 mg/dL / Nitrite: Negative   Leuk Esterase: Large / RBC: 5 /hpf / WBC 42 /HPF   Sq Epi: x / Non Sq Epi: 1 / Bacteria: Many        IMAGING STUDIES: GENERAL SURGERY CONSULT NOTE    Patient is a 73y old  Female who presents with a chief complaint of vomiting     HPI: 73F with history of recurrent SBOs, lung cancer s/p RTx and active smoker, COPD (no home O2), breast CA s/p R mastectomy and chemotherapy (), chronic lower back pain 2/2 herniated disc with opioid dependence, ?CAD who presents to the ER with vomiting. Patient's last bowel movement was  and has been having multiple episodes of non-bloody emesis since. In the ER, patient was found to be hypotensive with SHAKIRA and pyuria concerning for sepsis like from UTI. Patient was given Cefepime and fluid bolus. CT scan was performed which re-demonstrated the small bowel obstruction with questionable transition point without free air. Of note, patient is well known to the [facility] team and was last seen by Dr. Lu on 2019 for similar symptoms. Per documentation, she has repeat admits with CTs read as SBO's but all have resolved with either oral narcan or Movantic/Relistor. Patient has known narcotic bowel but does not take meds at home because she does not like the diarrhea, which results in repeat readmissions for the same problem. Patient is at her baseline mentals status when seen, endorses nausea or vomiting but no abdominal pain.     10-points review of system performed with pertinent negative and positive findings documented in the HPI     PAST MEDICAL & SURGICAL HISTORY:  Back pain  Bowel obstruction: multiple hospitalizations  Kidney stone  Emphysema  Narcotic Dependence  S/P Radiation Therapy  S/P Chemotherapy, Time Since Greater than 12 Weeks  Narcotic Dysmotile Cilia Syndrome  Chronic Pain Following Surgery or Procedure: following right breast mastectomy  Ankle Fracture: right anle fx s/p cast   History of Small Bowel Obstruction: 2010  Asthma  Breast Cancer  S/P hernia surgery: femoral hernia -   S/P Exploratory Laparotomy  S/P Appendectomy  S/P Cholecystectomy  Liver Mass s/p liver rsxn: s/p resection   History of Hysterectomy:   S/P Right Mastectomy:     FAMILY HISTORY: No pertinent family history in first degree relatives    SOCIAL HISTORY: No pertinent social history    Home Medications:  acetaminophen 325 mg oral tablet: 1 tab(s) orally every 6 hours, As Needed for mild pain  (06 May 2019 01:19)  Centrum Antioxidant Multiple Vitamins and Minerals oral tablet: 1 tab(s) orally once a day (06 May 2019 01:19)  docusate sodium 100 mg oral capsule: 1 cap(s) orally 3 times a day (06 May 2019 01:19)  lidocaine 5% topical film: Apply topically to affected area once a day (06 May 2019 01:19)  MiraLax - oral powder for reconstitution: 17 gram(s) orally 2 times a day (06 May 2019 01:19)  nicotine 14 mg/24 hr transdermal film, extended release: 1 patch transdermal once a day (06 May 2019 01:19)  oxyCODONE 10 mg oral tablet: 1 tab(s) orally every 6 hours, As needed, Moderate Pain (4 - 6) (06 May 2019 01:19)  senna oral tablet: 2 tab(s) orally once a day (at bedtime) (06 May 2019 01:19)  tiotropium 18 mcg inhalation capsule: 1 cap(s) inhaled once a day (06 May 2019 01:)  Valium: 2.5 milligram(s) orally once a day (at bedtime), As Needed (06 May 2019 01:19)    Allergies:  Biaxin (Rash)  Cipro (Headache)  Flagyl (Headache)  penicillin (Rash; Swelling)  sulfa drugs (Unknown)      Vital Signs Last 24 Hrs  T(C): 36.6 (22 May 2019 19:54), Max: 36.6 (22 May 2019 18:20)  T(F): 97.8 (22 May 2019 19:54), Max: 97.9 (22 May 2019 18:20)  HR: 90 (22 May 2019 19:54) (62 - 90)  BP: 99/70 (22 May 2019 19:54) (70/54 - 139/63)  BP(mean): --  RR: 17 (22 May 2019 19:54) (12 - 20)  SpO2: 98% (22 May 2019 19:54) (98% - 100%)  Daily Height in cm: 157.48 (22 May 2019 14:21)    Daily     Exam:   General: nontoxic appearing, not in acute distress  Respiratory: unlabored breathing on oxygen supplement via NC   Abd: abdomen softly distended, nontender                          12.2   7.2   )-----------( 385      ( 22 May 2019 15:20 )             38.0     05-22    144  |  98  |  56<H>  ----------------------------<  172<H>  3.9   |  21<L>  |  4.41<H>    Ca    8.6      22 May 2019 15:20    TPro  8.2  /  Alb  4.3  /  TBili  0.4  /  DBili  x   /  AST  16  /  ALT  21  /  AlkPhos  183<H>  -    PT/INR - ( 22 May 2019 15:20 )   PT: 14.0 sec;   INR: 1.21 ratio      PTT - ( 22 May 2019 15:20 )  PTT:36.8 sec    Urinalysis Basic - ( 22 May 2019 15:45 )  Color: Yellow / Appearance: Slightly Turbid / S.016 / pH: x  Gluc: x / Ketone: Negative  / Bili: Negative / Urobili: Negative   Blood: x / Protein: 30 mg/dL / Nitrite: Negative   Leuk Esterase: Large / RBC: 5 /hpf / WBC 42 /HPF   Sq Epi: x / Non Sq Epi: 1 / Bacteria: Many    IMAGING STUDIES:  EXAM:  CT ABDOMEN AND PELVIS                        PROCEDURE DATE:  2019      FINDINGS:  LOWER CHEST: Redemonstration of mucoid impacted right lower lobe   bronchials with scattered tree-in-bud opacities, similar in appearance to   the prior exam 2019. Tree-in-bud opacities in the left lower lobe   are slightly increased compared to the prior exam.  Evaluation of the solid organs and vasculature is limited without   intravenous contrast.  LIVER: Status post right hepatectomy.  BILE DUCTS: Unchanged intrahepatic ductal dilatation.  GALLBLADDER: Cholecystectomy.  SPLEEN: Within normal limits.  PANCREAS: Within normal limits.  ADRENALS: Within normal limits.  KIDNEYS/URETERS: Bilateral intrarenal calculi. No hydronephrosis.  BLADDER: Underdistended and not well visualized.  REPRODUCTIVE ORGANS: Not well visualized.  BOWEL: Significant dilatation of the stomach and small bowel throughout   the abdomen, increased compared to the prior exam 2019. Question   transition point in the right mid hemiabdomen (series 602 image [id number]),   similar in appearance to the prior exam. Stool within the colon which is   compressed secondary to dilated small bowel loops.  PERITONEUM: No pneumoperitoneum. No significant ascites. Mild swirling   within the right hemiabdomen, decreased compared to prior exam 2019   and similar in appearance to prior exam 2019.  VESSELS:  Diffuse atherosclerotic disease of the abdominal aorta.  RETROPERITONEUM: No definite lymphadenopathy.    ABDOMINAL WALL: Within normal limits.  BONES: Mild degenerative changes of the spine.    IMPRESSION:   Suboptimal studysecondary to absence of oral and IV contrast.  Small bowel obstruction with increased dilatation of the small bowel   compared to the prior exam 2019. Question transition point in the   right mid abdomen. No free intraperitoneal air.

## 2019-05-22 NOTE — ED ADULT NURSE REASSESSMENT NOTE - NS ED NURSE REASSESS COMMENT FT1
Pt provided with barrier cream for reddened area around her sacrum. Pt provided with mouth wash and ice chips, pt aware of NPO status at this time. Safety and comfort measures maintained.

## 2019-05-22 NOTE — CONSULT NOTE ADULT - SUBJECTIVE AND OBJECTIVE BOX
HPI:     73F   PMHx of Breast Ca s/p right mastectomy chemotherapy tx (), Lung Ca, COPD (no home O2), hysterectomy, appendectomy, cholecystectomy, femoral hernia repair, multiple SBOs w/ resection, opioid dependence,    p/w nausea/vomiting x 2-3 days., w/ mild abdominal distention. , last  bm was  on  , has not been passing flatus.   Also w/ complaint of chronic lower sacrum pain that is achy in nature . Denies any fevers, chest pain, sob, headaches, diarrhea/constipation.   Surgeon: Dr. Livingstno	    awaiting  ct  abdomen in er      REVIEW OF SYSTEMS:  GEN: no fever,    no chills  RESP: no SOB,   no cough  CVS: no chest pain,   no palpitations  GI: abdominal pain,   nausea,   vomiting,   no constipation,   no diarrhea  : no dysuria,   no frequency  NEURO: no headache,   no dizziness  PSYCH: no depression,   not anxious  Derm : no rash    Allergies    Biaxin (Rash)  Cipro (Headache)  Flagyl (Headache)  penicillin (Rash; Swelling)  sulfa drugs (Unknown)    Intolerances        CAPILLARY BLOOD GLUCOSE        I&O's Summary      Vital Signs Last 24 Hrs  T(C): 36.6 (22 May 2019 19:54), Max: 36.6 (22 May 2019 18:20)  T(F): 97.8 (22 May 2019 19:54), Max: 97.9 (22 May 2019 18:20)  HR: 90 (22 May 2019 19:54) (62 - 90)  BP: 99/70 (22 May 2019 19:54) (70/54 - 139/63)  BP(mean): --  RR: 17 (22 May 2019 19:54) (12 - 20)  SpO2: 98% (22 May 2019 19:54) (98% - 100%)  PHYSICAL EXAM:  GENERAL: NAD,   HEAD:  Atraumatic, Normocephalic  EYES: EOMI,  NECK: Supple, No JVD  CHEST/LUNG: Clear to auscultation bilaterally; No wheeze  HEART: Regular rate and rhythm;        murmur  ABDOMEN: Soft,  mild discomfort  on palpation, ,. distended   EXTREMITIES:    no     edema  NEUROLOGY:  alert    MEDICATIONS  (STANDING):  cefepime   IVPB        MEDICATIONS  (PRN):    LABS:                        12.2   7.2   )-----------( 385      ( 22 May 2019 15:20 )             38.0         144  |  98  |  56<H>  ----------------------------<  172<H>  3.9   |  21<L>  |  4.41<H>    Ca    8.6      22 May 2019 15:20    TPro  8.2  /  Alb  4.3  /  TBili  0.4  /  DBili  x   /  AST  16  /  ALT  21  /  AlkPhos  183<H>      PT/INR - ( 22 May 2019 15:20 )   PT: 14.0 sec;   INR: 1.21 ratio         PTT - ( 22 May 2019 15:20 )  PTT:36.8 sec      Urinalysis Basic - ( 22 May 2019 15:45 )    Color: Yellow / Appearance: Slightly Turbid / S.016 / pH: x  Gluc: x / Ketone: Negative  / Bili: Negative / Urobili: Negative   Blood: x / Protein: 30 mg/dL / Nitrite: Negative   Leuk Esterase: Large / RBC: 5 /hpf / WBC 42 /HPF   Sq Epi: x / Non Sq Epi: 1 / Bacteria: Many           @ 15:10  3.9  23              Consultant(s) Notes Reviewed:      Care Discussed with Consultants/Other Providers:  Plan:

## 2019-05-22 NOTE — ED PROVIDER NOTE - OBJECTIVE STATEMENT
73F PMHx of Breast Ca s/p right mastectomy, chemo/rad tx, asthma, hysterectomy, appendectomy, cholecystectomy, femoral hernia repair p/w abdominal pain. 73F PMHx of Breast Ca s/p right mastectomy and radiation tx, asthma, hysterectomy, appendectomy, cholecystectomy, femoral hernia repair, multiple SBOs w/ resection p/w nausea/vomiting x 2-3 days. Associated w/ mild abdominal distention. LNBM 5/18, has not been passing flatus. Also w/ complaint of chronic lower sacrum pain that is achy in nature. Denies any fevers, chest pain, sob, headaches, diarrhea/constipation. 73F PMHx of Breast Ca s/p right mastectomy chemotherapy tx (2006), Lung Ca, COPD (no home O2), hysterectomy, appendectomy, cholecystectomy, femoral hernia repair, multiple SBOs w/ resection, opioid dependence, p/w nausea/vomiting x 2-3 days. Associated w/ mild abdominal distention. LNBM 5/18, has not been passing flatus. Also w/ complaint of chronic lower sacrum pain that is achy in nature. Denies any fevers, chest pain, sob, headaches, diarrhea/constipation.    Surgeon: Dr. Livingston

## 2019-05-22 NOTE — CONSULT NOTE ADULT - ASSESSMENT
Patient is followed by Dr. Rondon from GI when admitted and Dr. Rondon group should be consulted.  Their PA, Jake Soliman, has been notified of the admit.  Will sign off of case.  Narcotic bowel to be managed by GI.  Please call back PRN 74 yo female well known to the service with history of narcotic bowel presented with nausea and vomiting, found to be septic and SHAKIRA likely from UTI, CT scan showed SBO without free air, patient hemodynamically stable with benign abdominal exam     - known narcotic bowel with repeat admits with CTs read as SBO's but all have resolved with either oral narcan or Movantic/Relistor, no surgical intervention   - Patient is followed by Dr. Rondon from GI, please notify Dr. Rondon   - patient discussed with Dr. Fang

## 2019-05-22 NOTE — ED PROCEDURE NOTE - ATTENDING CONTRIBUTION TO CARE
Dr. Bianchi (Attending Physician)  I supervised the above procedure and agree with the documented note.

## 2019-05-22 NOTE — ED PROVIDER NOTE - PROGRESS NOTE DETAILS
jasmin mullen fellow: reporting mild sacral pain and nausea, no vomiting. treated w zofran and fentanyl. ABD: Soft, (+) mild distention, (-) tenderness, no rebound/guarding, jasmin mullen fellow: reporting mild sacral pain and nausea, no vomiting. treated w zofran and fentanyl. ABD: Soft, (+) mild distention, (-) tenderness, no rebound/guarding, Pending CTAP called CT Tech, next in line. likely SBO. attending [place]: surgery consulted for SBO.

## 2019-05-22 NOTE — ED ADULT NURSE REASSESSMENT NOTE - NS ED NURSE REASSESS COMMENT FT1
Pt straight catheterized with second RN observing for urine specimen and rectal temp done as documented. Dr. Bianchi aware that temperature is low. Warm sheets and blankets placed on pt and positioned for comfort. LR and Cefepime infusing as ordered. BP improving. Pt aware of plan of care- awaiting lab results and CT scan. Patient's aide at bedside. Both are encouraged to communicate any needs to staff.

## 2019-05-22 NOTE — ED ADULT NURSE REASSESSMENT NOTE - NS ED NURSE REASSESS COMMENT FT1
Pt reports passing gas. Pt reports pain in her sacrum area, more barrier cream applied. Pt reports nausea, Zofran provided as per MD order. Pt provided with an extra blanket. Pt requests the lights stay on and the door stays open at this time, these interventions were provided for pt comfort. Call bell within reach.

## 2019-05-22 NOTE — ED ADULT NURSE NOTE - INTERVENTIONS DEFINITIONS
Stretcher in lowest position, wheels locked, appropriate side rails in place/Physically safe environment: no spills, clutter or unnecessary equipment/Instruct patient to call for assistance

## 2019-05-22 NOTE — ED ADULT NURSE REASSESSMENT NOTE - NS ED NURSE REASSESS COMMENT FT1
Upon return from CT scan pt placed back on cardiac monitor showing NSR to 90. Pt normotensive at this time. Pt on 3L O2 via NC sating 98%. Pt reports feeling better after receiving the tylenol. Pt denies any nausea at this time. Safety and comfort measures maintained.

## 2019-05-22 NOTE — ED PROVIDER NOTE - ATTENDING CONTRIBUTION TO CARE
Dr. Bianchi (Attending Physician)  I performed a history and physical exam of the patient and discussed their management with the resident. I reviewed the resident's note and agree with the documented findings and plan of care. My medical decision making and observations are found above.

## 2019-05-23 LAB
BASE EXCESS BLDV CALC-SCNC: 2.2 MMOL/L — HIGH (ref -2–2)
CO2 BLDV-SCNC: 31 MMOL/L — HIGH (ref 22–30)
GAS PNL BLDV: SIGNIFICANT CHANGE UP
HCO3 BLDV-SCNC: 29 MMOL/L — SIGNIFICANT CHANGE UP (ref 21–29)
PCO2 BLDV: 60 MMHG — HIGH (ref 35–50)
PH BLDV: 7.31 — LOW (ref 7.35–7.45)
PO2 BLDV: 21 MMHG — LOW (ref 25–45)
SAO2 % BLDV: 24 % — LOW (ref 67–88)

## 2019-05-23 RX ORDER — ACETAMINOPHEN 500 MG
750 TABLET ORAL ONCE
Refills: 0 | Status: COMPLETED | OUTPATIENT
Start: 2019-05-23 | End: 2019-05-23

## 2019-05-23 RX ORDER — LIDOCAINE 4 G/100G
1 CREAM TOPICAL EVERY 24 HOURS
Refills: 0 | Status: DISCONTINUED | OUTPATIENT
Start: 2019-05-23 | End: 2019-05-27

## 2019-05-23 RX ORDER — ACETAMINOPHEN 500 MG
650 TABLET ORAL ONCE
Refills: 0 | Status: COMPLETED | OUTPATIENT
Start: 2019-05-23 | End: 2019-05-23

## 2019-05-23 RX ORDER — ONDANSETRON 8 MG/1
4 TABLET, FILM COATED ORAL ONCE
Refills: 0 | Status: DISCONTINUED | OUTPATIENT
Start: 2019-05-23 | End: 2019-05-27

## 2019-05-23 RX ORDER — PANTOPRAZOLE SODIUM 20 MG/1
40 TABLET, DELAYED RELEASE ORAL DAILY
Refills: 0 | Status: DISCONTINUED | OUTPATIENT
Start: 2019-05-23 | End: 2019-05-27

## 2019-05-23 RX ORDER — ACETAMINOPHEN 500 MG
500 TABLET ORAL ONCE
Refills: 0 | Status: COMPLETED | OUTPATIENT
Start: 2019-05-23 | End: 2019-05-23

## 2019-05-23 RX ADMIN — Medication 260 MILLIGRAM(S): at 11:22

## 2019-05-23 RX ADMIN — CEFEPIME 100 MILLIGRAM(S): 1 INJECTION, POWDER, FOR SOLUTION INTRAMUSCULAR; INTRAVENOUS at 05:16

## 2019-05-23 RX ADMIN — Medication 650 MILLIGRAM(S): at 11:52

## 2019-05-23 RX ADMIN — Medication 1 PATCH: at 23:57

## 2019-05-23 RX ADMIN — ALBUTEROL 1 PUFF(S): 90 AEROSOL, METERED ORAL at 06:18

## 2019-05-23 RX ADMIN — Medication 200 MILLIGRAM(S): at 22:45

## 2019-05-23 RX ADMIN — PANTOPRAZOLE SODIUM 40 MILLIGRAM(S): 20 TABLET, DELAYED RELEASE ORAL at 12:56

## 2019-05-23 RX ADMIN — Medication 1 PATCH: at 12:56

## 2019-05-23 RX ADMIN — HEPARIN SODIUM 5000 UNIT(S): 5000 INJECTION INTRAVENOUS; SUBCUTANEOUS at 05:16

## 2019-05-23 RX ADMIN — Medication 750 MILLIGRAM(S): at 05:42

## 2019-05-23 RX ADMIN — Medication 2 MILLIGRAM(S): at 02:38

## 2019-05-23 RX ADMIN — LIDOCAINE 1 PATCH: 4 CREAM TOPICAL at 10:41

## 2019-05-23 RX ADMIN — LIDOCAINE 1 PATCH: 4 CREAM TOPICAL at 23:56

## 2019-05-23 RX ADMIN — Medication 500 MILLIGRAM(S): at 23:15

## 2019-05-23 RX ADMIN — LIDOCAINE 1 PATCH: 4 CREAM TOPICAL at 23:57

## 2019-05-23 RX ADMIN — Medication 300 MILLIGRAM(S): at 05:05

## 2019-05-23 RX ADMIN — HEPARIN SODIUM 5000 UNIT(S): 5000 INJECTION INTRAVENOUS; SUBCUTANEOUS at 19:14

## 2019-05-23 NOTE — PROGRESS NOTE ADULT - ASSESSMENT
73 year old female with   PMH,    chronic Back pain (on Narcotics), history of multiple SBO's, narcotic associated constipation/ileus, COPD,  smoker,    ca  breast,  right  mastectomy. RENUKA cavitary lesion on ct, ,  c/c cachexia  path from  1/19, with NSCC with squamous  features,  s/p  RT     admitted with abdominal pain / vomiting,   from SBO  SHAKIRA on CKD 2/ dehydration    ct scan  with sbo/ mesenteric   swirling/  pulm opacities    pt is an active smoker, refusing to quit   ct chest , 4/19/18  noted/  oncology dr hernandez   dvt ppx   iv fluids/  NPO/  protonix   may  need  ngt. if  vomiting persists  avoid  narcotics, if  possible  labspending       < from: CT Abdomen and Pelvis No Cont (05.22.19 @ 19:30) >  IMPRESSION:   Suboptimal studysecondary to absence of oral and IV contrast.  Small bowel obstruction with increased dilatation of the small bowel   compared to the prior exam 5/5/2019. Question transition point in the   right mid abdomen. No free intraperitoneal air.    Unchanged tree-in-bud opacities in the right lower lobe with mild   increased tree-in-bud opacities in the left lower lobe compared to prior   CT chest 4/19/2019.  < end of copied text >      < from: CT Chest No Cont (04.19.19 @ 18:50)   INTERPRETATION:  Motion degraded evaluation  Redemonstraion on left upper lobe cavitary mass consistent with patients   known hx of lung CA  Mucoid impacted right lower lobe ariways with scattered tree in bud   opacities  < end of copied text >      Cytopathology - Non Gyn Report (01.18.19 @ 11:43)    Immunohistochemical stains.  The immunophenotype together with the cytomorphology supports the  diagnosis squamous cell carcinoma.    Final Diagnosis  LUNG, LEFT UPPER LOBE, US GUIDED CORE BIOPSY AND FNA  POSITIVE FOR MALIGNANT CELLS.  Non-small cell carcinoma, with squamous features (see note).  Additional studies pending. 73 year old female with   PMH,    chronic Back pain (on Narcotics), history of multiple SBO's, narcotic associated constipation/ileus, COPD,  smoker,    ca  breast,  right  mastectomy. RNEUKA cavitary lesion on ct, ,  c/c cachexia  path from  1/19, with NSCC with squamous  features,  s/p  RT     admitted with abdominal pain / vomiting,   from SBO  SHAKIRA on CKD 2/ dehydration    ct scan  with sbo/ mesenteric   swirling/  pulm opacities    pt is an active smoker, refusing to quit   ct chest , 4/19/18  noted/  oncology dr hernandez   dvt ppx   iv fluids/  NPO/  protonix  avoid  narcotics, if  possible  bowel movements / start  clear  liquid diet           < from: CT Abdomen and Pelvis No Cont (05.22.19 @ 19:30) >  IMPRESSION:   Suboptimal studysecondary to absence of oral and IV contrast.  Small bowel obstruction with increased dilatation of the small bowel   compared to the prior exam 5/5/2019. Question transition point in the   right mid abdomen. No free intraperitoneal air.    Unchanged tree-in-bud opacities in the right lower lobe with mild   increased tree-in-bud opacities in the left lower lobe compared to prior   CT chest 4/19/2019.  < end of copied text >      < from: CT Chest No Cont (04.19.19 @ 18:50)   INTERPRETATION:  Motion degraded evaluation  Redemonstraion on left upper lobe cavitary mass consistent with patients   known hx of lung CA  Mucoid impacted right lower lobe ariways with scattered tree in bud   opacities  < end of copied text >      Cytopathology - Non Gyn Report (01.18.19 @ 11:43)    Immunohistochemical stains.  The immunophenotype together with the cytomorphology supports the  diagnosis squamous cell carcinoma.    Final Diagnosis  LUNG, LEFT UPPER LOBE, US GUIDED CORE BIOPSY AND FNA  POSITIVE FOR MALIGNANT CELLS.  Non-small cell carcinoma, with squamous features (see note).  Additional studies pending.

## 2019-05-23 NOTE — ED ADULT NURSE REASSESSMENT NOTE - NS ED NURSE REASSESS COMMENT FT1
Pt reports pain in her upper back. NP Nely made aware. As per NP and MD, pt not to receive any more opioids at this time. NP also made aware about three loose stools, as per NP no RN interventions needed at this time. Pt cleaned and provided with new depends and sheets. Safety and comfort measures maintained.

## 2019-05-23 NOTE — PROGRESS NOTE ADULT - SUBJECTIVE AND OBJECTIVE BOX
les  abd pain/  nausea  REVIEW OF SYSTEMS:  GEN: no fever,    no chills  RESP: no SOB,   no cough  CVS: no chest pain,   no palpitations  GI: no abdominal pain,   no nausea,   no vomiting,   no constipation,   no diarrhea  : no dysuria,   no frequency  NEURO: no headache,   no dizziness  PSYCH: no depression,   not anxious  Derm : no rash    MEDICATIONS  (STANDING):  heparin  Injectable 5000 Unit(s) SubCutaneous every 12 hours  nicotine  14 mG/24 Hr(s) Transdermal Patch - Peds 1 Patch Transdermal daily  sodium chloride 0.9%. 1000 milliLiter(s) (75 mL/Hr) IV Continuous <Continuous>    MEDICATIONS  (PRN):  acetaminophen   Tablet .. 650 milliGRAM(s) Oral every 6 hours PRN Moderate Pain (4 - 6)      Vital Signs Last 24 Hrs  T(C): 36.9 (23 May 2019 05:07), Max: 36.9 (23 May 2019 05:07)  T(F): 98.5 (23 May 2019 05:07), Max: 98.5 (23 May 2019 05:07)  HR: 73 (23 May 2019 05:07) (62 - 93)  BP: 101/53 (23 May 2019 05:07) (70/54 - 139/63)  BP(mean): --  RR: 17 (23 May 2019 05:07) (12 - 20)  SpO2: 96% (23 May 2019 05:07) (96% - 100%)  CAPILLARY BLOOD GLUCOSE        I&O's Summary      PHYSICAL EXAM:  HEAD:  Atraumatic, Normocephalic  NECK: Supple, No   JVD  CHEST/LUNG:   no     rales,     no,    rhonchi  HEART: Regular rate and rhythm;         murmur  ABDOMEN: Soft, Nontender,  distended mild;   EXTREMITIES:     no   edema  NEUROLOGY:  alert    LABS:                        12.2   7.2   )-----------( 385      ( 22 May 2019 15:20 )             38.0     05-    144  |  98  |  56<H>  ----------------------------<  172<H>  3.9   |  21<L>  |  4.41<H>    Ca    8.6      22 May 2019 15:20    TPro  8.2  /  Alb  4.3  /  TBili  0.4  /  DBili  x   /  AST  16  /  ALT  21  /  AlkPhos  183<H>  05-22    PT/INR - ( 22 May 2019 15:20 )   PT: 14.0 sec;   INR: 1.21 ratio         PTT - ( 22 May 2019 15:20 )  PTT:36.8 sec      Urinalysis Basic - ( 22 May 2019 15:45 )    Color: Yellow / Appearance: Slightly Turbid / S.016 / pH: x  Gluc: x / Ketone: Negative  / Bili: Negative / Urobili: Negative   Blood: x / Protein: 30 mg/dL / Nitrite: Negative   Leuk Esterase: Large / RBC: 5 /hpf / WBC 42 /HPF   Sq Epi: x / Non Sq Epi: 1 / Bacteria: Many           @ 23:47  2.2  21              Consultant(s) Notes Reviewed:      Care Discussed with Consultants/Other Providers: less  abd pain/  nausea  mlple  bowel movements  REVIEW OF SYSTEMS:  GEN: no fever,    no chills  RESP: no SOB,   no cough  CVS: no chest pain,   no palpitations  GI: no abdominal pain,   no nausea,   no vomiting,   no constipation,   no diarrhea  : no dysuria,   no frequency  NEURO: no headache,   no dizziness  PSYCH: no depression,   not anxious  Derm : no rash    MEDICATIONS  (STANDING):  heparin  Injectable 5000 Unit(s) SubCutaneous every 12 hours  nicotine  14 mG/24 Hr(s) Transdermal Patch - Peds 1 Patch Transdermal daily  sodium chloride 0.9%. 1000 milliLiter(s) (75 mL/Hr) IV Continuous <Continuous>    MEDICATIONS  (PRN):  acetaminophen   Tablet .. 650 milliGRAM(s) Oral every 6 hours PRN Moderate Pain (4 - 6)      Vital Signs Last 24 Hrs  T(C): 36.9 (23 May 2019 05:07), Max: 36.9 (23 May 2019 05:07)  T(F): 98.5 (23 May 2019 05:07), Max: 98.5 (23 May 2019 05:07)  HR: 73 (23 May 2019 05:07) (62 - 93)  BP: 101/53 (23 May 2019 05:07) (70/54 - 139/63)  BP(mean): --  RR: 17 (23 May 2019 05:07) (12 - 20)  SpO2: 96% (23 May 2019 05:07) (96% - 100%)  CAPILLARY BLOOD GLUCOSE        I&O's Summary      PHYSICAL EXAM:  HEAD:  Atraumatic, Normocephalic  NECK: Supple, No   JVD  CHEST/LUNG:   no     rales,     no,    rhonchi  HEART: Regular rate and rhythm;         murmur  ABDOMEN: Soft, Nontender,  distended mild;   EXTREMITIES:     no   edema/  raynauds , of  left fingers  NEUROLOGY:  alert    LABS:                        12.2   7.2   )-----------( 385      ( 22 May 2019 15:20 )             38.0     -    144  |  98  |  56<H>  ----------------------------<  172<H>  3.9   |  21<L>  |  4.41<H>    Ca    8.6      22 May 2019 15:20    TPro  8.2  /  Alb  4.3  /  TBili  0.4  /  DBili  x   /  AST  16  /  ALT  21  /  AlkPhos  183<H>  05-22    PT/INR - ( 22 May 2019 15:20 )   PT: 14.0 sec;   INR: 1.21 ratio         PTT - ( 22 May 2019 15:20 )  PTT:36.8 sec      Urinalysis Basic - ( 22 May 2019 15:45 )    Color: Yellow / Appearance: Slightly Turbid / S.016 / pH: x  Gluc: x / Ketone: Negative  / Bili: Negative / Urobili: Negative   Blood: x / Protein: 30 mg/dL / Nitrite: Negative   Leuk Esterase: Large / RBC: 5 /hpf / WBC 42 /HPF   Sq Epi: x / Non Sq Epi: 1 / Bacteria: Many           @ 23:47  2.2  21              Consultant(s) Notes Reviewed:      Care Discussed with Consultants/Other Providers:

## 2019-05-23 NOTE — CHART NOTE - NSCHARTNOTEFT_GEN_A_CORE
Patent admitted with narcotic SBO, SHAKIRA, sepsis, UTI.  Patient asking for opioid medications for chronic back pain and asking to eat.  Patient seen and examined at bedside.   No vomiting or nausea overnight. Had three lose BMs.  Alert and oriented, cachectic, NAD, non-toxic appearance, abdominal exam unchanged.  Discussed with Surgery, Dr. Nolasco and recommends to keep NPO and to avoid opioids at this time pending Dr. Fang's service evaluation in am.  Spoke with patient. Patient agreeing to take IV tylenol at this time pending Surgery recommnedations  serial abdominal exams  IVF  F/u with Attending in am    Nely Lowe, NP  Medicine  [id number]

## 2019-05-23 NOTE — CONSULT NOTE ADULT - SUBJECTIVE AND OBJECTIVE BOX
hpi  73 year old woman with fourth malignancy s/p radiation therapy recently here with recurrent s/sx sbo urosepsis renal failure  pmh sh fh unchanged comp ros urinating passing flatus otherwise neg  physical  elderly  cachectic  vs t98.6 74 194/54 86 99 sat  lungs clear  cor s1s2  abd soft nontender  ext no edema  skin warm, dry  psych oriented little memory of yesterday    data wbc 7200 hgb 12 hct 38 plt [id number] p 9 l 2 m inr 1.2 ptt 36.8  cr 4.41 alk phos 183 u/a lg le wbc's bacteria  bld cx ng urine cx gr than [id number] Klebsiella

## 2019-05-23 NOTE — ED ADULT NURSE REASSESSMENT NOTE - NS ED NURSE REASSESS COMMENT FT1
Pt c/o wanting to eat and is c/o increased pain. LUPE Mckay called at 04:10 and was told about pt complaints. She states that per surgery, pt is to remain NPO and will only be ordered tylenol for pain (no narcotics). Pt is refusing tylenol because "It doesn't do anything so I don't want it." Lupe Mckay states she will see pt in person to assess.

## 2019-05-23 NOTE — ED ADULT NURSE REASSESSMENT NOTE - NS ED NURSE REASSESS COMMENT FT1
Pt had a bowel movement. Pt cleaned and provided with new sheets and depends. Barrier cream provided. Safety and comfort measures maintained. Call bell within reach.

## 2019-05-23 NOTE — PROGRESS NOTE ADULT - SUBJECTIVE AND OBJECTIVE BOX
CARDIOLOGY     PROGRESS  NOTE   ________________________________________________    CHIEF COMPLAINT:Patient is a 73y old  Female who presents with a chief complaint of abdominal pain/ ARF (23 May 2019 08:17)  doing better.  	  REVIEW OF SYSTEMS:  CONSTITUTIONAL: No fever, weight loss, or fatigue  EYES: No eye pain, visual disturbances, or discharge  ENT:  No difficulty hearing, tinnitus, vertigo; No sinus or throat pain  NECK: No pain or stiffness  RESPIRATORY: No cough, wheezing, chills or hemoptysis; No Shortness of Breath  CARDIOVASCULAR: No chest pain, palpitations, passing out, dizziness, or leg swelling  GASTROINTESTINAL: + abdominal or epigastric pain. No nausea, vomiting, or hematemesis; No diarrhea or constipation. No melena or hematochezia.  GENITOURINARY: No dysuria, frequency, hematuria, or incontinence  NEUROLOGICAL: No headaches, memory loss, loss of strength, numbness, or tremors  SKIN: No itching, burning, rashes, or lesions   LYMPH Nodes: No enlarged glands  ENDOCRINE: No heat or cold intolerance; No hair loss  MUSCULOSKELETAL: No joint pain or swelling; No muscle, back, or extremity pain  PSYCHIATRIC: No depression, anxiety, mood swings, or difficulty sleeping  HEME/LYMPH: No easy bruising, or bleeding gums  ALLERGY AND IMMUNOLOGIC: No hives or eczema	    [ ] All others negative	  [ ] Unable to obtain    PHYSICAL EXAM:  T(C): 37 (05-23-19 @ 08:40), Max: 37 (05-23-19 @ 08:40)  HR: 74 (05-23-19 @ 08:40) (62 - 93)  BP: 194/54 (05-23-19 @ 08:40) (70/54 - 194/54)  RR: 86 (05-23-19 @ 08:40) (12 - 86)  SpO2: 99% (05-23-19 @ 08:40) (96% - 100%)  Wt(kg): --  I&O's Summary      Appearance: Normal	  HEENT:   Normal oral mucosa, PERRL, EOMI	  Lymphatic: No lymphadenopathy  Cardiovascular: Normal S1 S2, No JVD, + murmurs, No edema  Respiratory: Lungs clear to auscultation	  Psychiatry: A & O x 3, Mood & affect appropriate  Gastrointestinal:  Soft, Non-tender, + BS	  Skin: No rashes, No ecchymoses, No cyanosis	  Neurologic: Non-focal  Extremities: Normal range of motion, No clubbing, cyanosis or edema  Vascular: Peripheral pulses palpable 2+ bilaterally    MEDICATIONS  (STANDING):  heparin  Injectable 5000 Unit(s) SubCutaneous every 12 hours  nicotine  14 mG/24 Hr(s) Transdermal Patch - Peds 1 Patch Transdermal daily  pantoprazole  Injectable 40 milliGRAM(s) IV Push daily  sodium chloride 0.9%. 1000 milliLiter(s) (75 mL/Hr) IV Continuous <Continuous>      TELEMETRY: 	    ECG:  	  RADIOLOGY:  OTHER: 	  	  LABS:	 	    CARDIAC MARKERS:                                12.2   7.2   )-----------( 385      ( 22 May 2019 15:20 )             38.0     05-22    144  |  98  |  56<H>  ----------------------------<  172<H>  3.9   |  21<L>  |  4.41<H>    Ca    8.6      22 May 2019 15:20    TPro  8.2  /  Alb  4.3  /  TBili  0.4  /  DBili  x   /  AST  16  /  ALT  21  /  AlkPhos  183<H>  05-22    proBNP:   Lipid Profile:   HgA1c:   TSH:   PT/INR - ( 22 May 2019 15:20 )   PT: 14.0 sec;   INR: 1.21 ratio         PTT - ( 22 May 2019 15:20 )  PTT:36.8 sec      Assessment and plan  ---------------------------  ARF continue ivf awaiting lytes today  dvt prophylaxis  surgery follow up  dvt prophylaxis

## 2019-05-23 NOTE — CONSULT NOTE ADULT - ASSESSMENT
nonsmall cell lung cancer stage I s/p ebrt  await outpt pet/ct next month for followup    sbo vs ileus thought to be from narcotics in past but much less narcotic use  likely from mult previous surgeries  now wants to eat  consider advance diet    urinary tract infection, renal failure on abx  iv hydration  nephrology evaluation

## 2019-05-23 NOTE — CHART NOTE - NSCHARTNOTEFT_GEN_A_CORE
Received a call from NP regarding diet and pain recs as pt is refusing tylenol and not amenable to any other soln other than narcotics.     This is a 74 yo female well known to the service with history of narcotic bowel presenting with nausea and vomiting, found to be septic and SHAKIRA likely from UTI, CT scan showed SBO without free air, patient hemodynamically stable with benign abdominal exam.    At this time, recommending to keep NPO and avoid narcotics. Will be seen by Dr. Fang's team this AM.     General Surgery Green Team [phone]

## 2019-05-23 NOTE — ED ADULT NURSE REASSESSMENT NOTE - NS ED NURSE REASSESS COMMENT FT1
Pt got a bed assignment, after making the pt aware of her bed status pt is refusing this bed at this time. Pt refuses any window beds due to claustrophobia. Pt requests a bed by the door, charge nurse aware, logistics aware. Pt reports "I would rather stay in the ED in the hallway for 2 days than have a window bed". Safety and comfort measures maintained.

## 2019-05-24 LAB
-  AMIKACIN: SIGNIFICANT CHANGE UP
-  AMPICILLIN/SULBACTAM: SIGNIFICANT CHANGE UP
-  AMPICILLIN: SIGNIFICANT CHANGE UP
-  AZTREONAM: SIGNIFICANT CHANGE UP
-  CEFEPIME: SIGNIFICANT CHANGE UP
-  CEFOXITIN: SIGNIFICANT CHANGE UP
-  CEFTRIAXONE: SIGNIFICANT CHANGE UP
-  CIPROFLOXACIN: SIGNIFICANT CHANGE UP
-  ERTAPENEM: SIGNIFICANT CHANGE UP
-  GENTAMICIN: SIGNIFICANT CHANGE UP
-  IMIPENEM: SIGNIFICANT CHANGE UP
-  LEVOFLOXACIN: SIGNIFICANT CHANGE UP
-  MEROPENEM: SIGNIFICANT CHANGE UP
-  NITROFURANTOIN: SIGNIFICANT CHANGE UP
-  PIPERACILLIN/TAZOBACTAM: SIGNIFICANT CHANGE UP
-  TIGECYCLINE: SIGNIFICANT CHANGE UP
-  TOBRAMYCIN: SIGNIFICANT CHANGE UP
-  TRIMETHOPRIM/SULFAMETHOXAZOLE: SIGNIFICANT CHANGE UP
ANION GAP SERPL CALC-SCNC: 17 MMOL/L — SIGNIFICANT CHANGE UP (ref 5–17)
BUN SERPL-MCNC: 54 MG/DL — HIGH (ref 7–23)
CALCIUM SERPL-MCNC: 7 MG/DL — LOW (ref 8.4–10.5)
CHLORIDE SERPL-SCNC: 107 MMOL/L — SIGNIFICANT CHANGE UP (ref 96–108)
CO2 SERPL-SCNC: 17 MMOL/L — LOW (ref 22–31)
CREAT SERPL-MCNC: 3.09 MG/DL — HIGH (ref 0.5–1.3)
CULTURE RESULTS: SIGNIFICANT CHANGE UP
GLUCOSE SERPL-MCNC: 74 MG/DL — SIGNIFICANT CHANGE UP (ref 70–99)
METHOD TYPE: SIGNIFICANT CHANGE UP
ORGANISM # SPEC MICROSCOPIC CNT: SIGNIFICANT CHANGE UP
ORGANISM # SPEC MICROSCOPIC CNT: SIGNIFICANT CHANGE UP
POTASSIUM SERPL-MCNC: 3.7 MMOL/L — SIGNIFICANT CHANGE UP (ref 3.5–5.3)
POTASSIUM SERPL-SCNC: 3.7 MMOL/L — SIGNIFICANT CHANGE UP (ref 3.5–5.3)
SODIUM SERPL-SCNC: 141 MMOL/L — SIGNIFICANT CHANGE UP (ref 135–145)
SPECIMEN SOURCE: SIGNIFICANT CHANGE UP

## 2019-05-24 RX ORDER — IPRATROPIUM/ALBUTEROL SULFATE 18-103MCG
3 AEROSOL WITH ADAPTER (GRAM) INHALATION EVERY 6 HOURS
Refills: 0 | Status: DISCONTINUED | OUTPATIENT
Start: 2019-05-24 | End: 2019-05-27

## 2019-05-24 RX ORDER — ACETAMINOPHEN 500 MG
750 TABLET ORAL ONCE
Refills: 0 | Status: COMPLETED | OUTPATIENT
Start: 2019-05-24 | End: 2019-05-24

## 2019-05-24 RX ORDER — IPRATROPIUM/ALBUTEROL SULFATE 18-103MCG
3 AEROSOL WITH ADAPTER (GRAM) INHALATION ONCE
Refills: 0 | Status: COMPLETED | OUTPATIENT
Start: 2019-05-24 | End: 2019-05-24

## 2019-05-24 RX ADMIN — HEPARIN SODIUM 5000 UNIT(S): 5000 INJECTION INTRAVENOUS; SUBCUTANEOUS at 05:14

## 2019-05-24 RX ADMIN — SODIUM CHLORIDE 75 MILLILITER(S): 9 INJECTION INTRAMUSCULAR; INTRAVENOUS; SUBCUTANEOUS at 23:24

## 2019-05-24 RX ADMIN — HEPARIN SODIUM 5000 UNIT(S): 5000 INJECTION INTRAVENOUS; SUBCUTANEOUS at 18:19

## 2019-05-24 RX ADMIN — Medication 3 MILLILITER(S): at 00:18

## 2019-05-24 RX ADMIN — Medication 300 MILLIGRAM(S): at 15:41

## 2019-05-24 RX ADMIN — LIDOCAINE 1 PATCH: 4 CREAM TOPICAL at 20:10

## 2019-05-24 RX ADMIN — Medication 3 MILLILITER(S): at 15:04

## 2019-05-24 RX ADMIN — Medication 1 PATCH: at 21:11

## 2019-05-24 RX ADMIN — Medication 750 MILLIGRAM(S): at 22:40

## 2019-05-24 RX ADMIN — PANTOPRAZOLE SODIUM 40 MILLIGRAM(S): 20 TABLET, DELAYED RELEASE ORAL at 14:22

## 2019-05-24 RX ADMIN — Medication 300 MILLIGRAM(S): at 22:10

## 2019-05-24 RX ADMIN — Medication 750 MILLIGRAM(S): at 16:11

## 2019-05-24 RX ADMIN — SODIUM CHLORIDE 75 MILLILITER(S): 9 INJECTION INTRAMUSCULAR; INTRAVENOUS; SUBCUTANEOUS at 10:20

## 2019-05-24 RX ADMIN — Medication 1 PATCH: at 14:15

## 2019-05-24 RX ADMIN — LIDOCAINE 1 PATCH: 4 CREAM TOPICAL at 14:21

## 2019-05-24 RX ADMIN — Medication 1 PATCH: at 07:56

## 2019-05-24 NOTE — PROGRESS NOTE ADULT - ASSESSMENT
lung cancer s/p radiation  pet/ct as outpt june 5    sbo/ileus clinically improved after hydration, antiemetics  eating a meal    renal failure ?from dehydration and uti  await repeat renal function and electrolytes  if not resolving, nephrology evaluation  uti on abx

## 2019-05-24 NOTE — PROGRESS NOTE ADULT - SUBJECTIVE AND OBJECTIVE BOX
SURGERY Progress Note  GEORGIE JOLLEY    S: Patient seen at bedside.  She is passing flatus, having bowel movements, voiding.  She is hungry, wants her diet to be advanced.  Denies n/v.     O:  T(C): 36.7 (05-24-19 @ 05:39), Max: 37.1 (05-23-19 @ 13:16)  HR: 78 (05-24-19 @ 05:39) (69 - 85)  BP: 111/64 (05-24-19 @ 05:39) (96/59 - 125/65)  RR: 16 (05-24-19 @ 05:39) (16 - 19)  SpO2: 97% (05-24-19 @ 05:39) (94% - 99%)      Physical Exam:  Gen: NAD  Neuro: Follows commands  Resp: No acute respiratory distress, normal effort  CV: Normal rate, regular rhythm  Abd: Soft, NT, ND    Labs:          ABG (05-22 @ 21:10)      /  /  /  /  / %     Lactate:   2.5<H>    VBG (05-22 @ 23:47)     7.31<L> / 60<H> / 21<L> / 29 / 2.2<H> / 24<L>%    -> .Urine Culture (05-22 @ 20:46)     NG    NG    >100,000 CFU/ml Gram Negative Rods    -> .Blood Culture (05-22 @ 17:56)     NG    NG    No growth to date.

## 2019-05-24 NOTE — PROGRESS NOTE ADULT - ASSESSMENT
73 year old female with   PMH,    chronic Back pain (on Narcotics), history of multiple SBO's, narcotic associated constipation/ileus, COPD,  smoker,    ca  breast,  right  mastectomy. RENUKA cavitary lesion on ct, ,  c/c cachexia  path from  1/19, with NSCC with squamous  features,  s/p  RT     admitted with abdominal pain / vomiting,   from SBO  SHAKIRA on CKD 2/ dehydration    ct scan  with sbo/ mesenteric   swirling/  pulm opacities    pt is an active smoker, refusing to quit   ct chest , 4/19/18  noted/  oncology dr hernandez   dvt ppx   iv fluids/   protonix  avoid  narcotics, if  possible  bowel movements /on po diet         < from: CT Abdomen and Pelvis No Cont (05.22.19 @ 19:30) >  IMPRESSION:   Suboptimal studysecondary to absence of oral and IV contrast.  Small bowel obstruction with increased dilatation of the small bowel   compared to the prior exam 5/5/2019. Question transition point in the   right mid abdomen. No free intraperitoneal air.    Unchanged tree-in-bud opacities in the right lower lobe with mild   increased tree-in-bud opacities in the left lower lobe compared to prior   CT chest 4/19/2019.  < end of copied text >      < from: CT Chest No Cont (04.19.19 @ 18:50)   INTERPRETATION:  Motion degraded evaluation  Redemonstraion on left upper lobe cavitary mass consistent with patients   known hx of lung CA  Mucoid impacted right lower lobe ariways with scattered tree in bud   opacities  < end of copied text >      Cytopathology - Non Gyn Report (01.18.19 @ 11:43)    Immunohistochemical stains.  The immunophenotype together with the cytomorphology supports the  diagnosis squamous cell carcinoma.    Final Diagnosis  LUNG, LEFT UPPER LOBE, US GUIDED CORE BIOPSY AND FNA  POSITIVE FOR MALIGNANT CELLS.  Non-small cell carcinoma, with squamous features (see note).  Additional studies pending.

## 2019-05-24 NOTE — PROGRESS NOTE ADULT - SUBJECTIVE AND OBJECTIVE BOX
CARDIOLOGY     PROGRESS  NOTE   ________________________________________________    CHIEF COMPLAINT:Patient is a 73y old  Female who presents with a chief complaint of abdominal pain/ ARF (24 May 2019 07:29)  doing better.  	  REVIEW OF SYSTEMS:  CONSTITUTIONAL: No fever, weight loss, or fatigue  EYES: No eye pain, visual disturbances, or discharge  ENT:  No difficulty hearing, tinnitus, vertigo; No sinus or throat pain  NECK: No pain or stiffness  RESPIRATORY: No cough, wheezing, chills or hemoptysis; + Shortness of Breath  CARDIOVASCULAR: No chest pain, palpitations, passing out, dizziness, or leg swelling  GASTROINTESTINAL: No abdominal or epigastric pain. No nausea, vomiting, or hematemesis; No diarrhea or constipation. No melena or hematochezia.  GENITOURINARY: No dysuria, frequency, hematuria, or incontinence  NEUROLOGICAL: No headaches, memory loss, loss of strength, numbness, or tremors  SKIN: No itching, burning, rashes, or lesions   LYMPH Nodes: No enlarged glands  ENDOCRINE: No heat or cold intolerance; No hair loss  MUSCULOSKELETAL: No joint pain or swelling; No muscle, back, or extremity pain  PSYCHIATRIC: No depression, anxiety, mood swings, or difficulty sleeping  HEME/LYMPH: No easy bruising, or bleeding gums  ALLERGY AND IMMUNOLOGIC: No hives or eczema	    [ ] All others negative	  [ ] Unable to obtain    PHYSICAL EXAM:  T(C): 36.7 (05-24-19 @ 05:39), Max: 37.1 (05-23-19 @ 13:16)  HR: 78 (05-24-19 @ 05:39) (69 - 85)  BP: 111/64 (05-24-19 @ 05:39) (96/59 - 125/65)  RR: 16 (05-24-19 @ 05:39) (16 - 19)  SpO2: 97% (05-24-19 @ 05:39) (94% - 99%)  Wt(kg): --  I&O's Summary    23 May 2019 07:01  -  24 May 2019 07:00  --------------------------------------------------------  IN: 1240 mL / OUT: 0 mL / NET: 1240 mL        Appearance: Normal	  HEENT:   Normal oral mucosa, PERRL, EOMI	  Lymphatic: No lymphadenopathy  Cardiovascular: Normal S1 S2, No JVD, + murmurs, + edema  Respiratory: rhonchi	  Psychiatry: A & O x 3, Mood & affect appropriate  Gastrointestinal:  Soft, Non-tender, + BS	  Skin: No rashes, No ecchymoses, No cyanosis	  Neurologic: Non-focal  Extremities: Normal range of motion, No clubbing, cyanosis or edema  Vascular: Peripheral pulses palpable 2+ bilaterally    MEDICATIONS  (STANDING):  heparin  Injectable 5000 Unit(s) SubCutaneous every 12 hours  lidocaine   Patch 1 Patch Transdermal every 24 hours  nicotine  14 mG/24 Hr(s) Transdermal Patch - Peds 1 Patch Transdermal daily  ondansetron Injectable 4 milliGRAM(s) IV Push once  pantoprazole  Injectable 40 milliGRAM(s) IV Push daily  sodium chloride 0.9%. 1000 milliLiter(s) (75 mL/Hr) IV Continuous <Continuous>      TELEMETRY: 	    ECG:  	  RADIOLOGY:  OTHER: 	  	  LABS:	 	    CARDIAC MARKERS:                                12.2   7.2   )-----------( 385      ( 22 May 2019 15:20 )             38.0     05-22    144  |  98  |  56<H>  ----------------------------<  172<H>  3.9   |  21<L>  |  4.41<H>    Ca    8.6      22 May 2019 15:20    TPro  8.2  /  Alb  4.3  /  TBili  0.4  /  DBili  x   /  AST  16  /  ALT  21  /  AlkPhos  183<H>  05-22    proBNP:   Lipid Profile:   HgA1c:   TSH:   PT/INR - ( 22 May 2019 15:20 )   PT: 14.0 sec;   INR: 1.21 ratio         PTT - ( 22 May 2019 15:20 )  PTT:36.8 sec      Assessment and plan  ---------------------------  cad  sbo  doing better  continue current meds  dvt prophylaxis CARDIOLOGY     PROGRESS  NOTE   ________________________________________________    CHIEF COMPLAINT:Patient is a 73y old  Female who presents with a chief complaint of abdominal pain/ ARF (24 May 2019 07:29)  doing better.  	  REVIEW OF SYSTEMS:  CONSTITUTIONAL: No fever, weight loss, or fatigue  EYES: No eye pain, visual disturbances, or discharge  ENT:  No difficulty hearing, tinnitus, vertigo; No sinus or throat pain  NECK: No pain or stiffness  RESPIRATORY: No cough, wheezing, chills or hemoptysis; + Shortness of Breath  CARDIOVASCULAR: No chest pain, palpitations, passing out, dizziness, or leg swelling  GASTROINTESTINAL: No abdominal or epigastric pain. No nausea, vomiting, or hematemesis; No diarrhea or constipation. No melena or hematochezia.  GENITOURINARY: No dysuria, frequency, hematuria, or incontinence  NEUROLOGICAL: No headaches, memory loss, loss of strength, numbness, or tremors  SKIN: No itching, burning, rashes, or lesions   LYMPH Nodes: No enlarged glands  ENDOCRINE: No heat or cold intolerance; No hair loss  MUSCULOSKELETAL: No joint pain or swelling; No muscle, back, or extremity pain  PSYCHIATRIC: No depression, anxiety, mood swings, or difficulty sleeping  HEME/LYMPH: No easy bruising, or bleeding gums  ALLERGY AND IMMUNOLOGIC: No hives or eczema	    [ ] All others negative	  [ ] Unable to obtain    PHYSICAL EXAM:  T(C): 36.7 (05-24-19 @ 05:39), Max: 37.1 (05-23-19 @ 13:16)  HR: 78 (05-24-19 @ 05:39) (69 - 85)  BP: 111/64 (05-24-19 @ 05:39) (96/59 - 125/65)  RR: 16 (05-24-19 @ 05:39) (16 - 19)  SpO2: 97% (05-24-19 @ 05:39) (94% - 99%)  Wt(kg): --  I&O's Summary    23 May 2019 07:01  -  24 May 2019 07:00  --------------------------------------------------------  IN: 1240 mL / OUT: 0 mL / NET: 1240 mL        Appearance: Normal	  HEENT:   Normal oral mucosa, PERRL, EOMI	  Lymphatic: No lymphadenopathy  Cardiovascular: Normal S1 S2, No JVD, + murmurs, + edema  Respiratory: rhonchi	  Psychiatry: A & O x 3, Mood & affect appropriate  Gastrointestinal:  Soft, Non-tender, + BS	  Skin: No rashes, No ecchymoses, No cyanosis	  Neurologic: Non-focal  Extremities: Normal range of motion, No clubbing, cyanosis or edema  Vascular: Peripheral pulses palpable 2+ bilaterally    MEDICATIONS  (STANDING):  heparin  Injectable 5000 Unit(s) SubCutaneous every 12 hours  lidocaine   Patch 1 Patch Transdermal every 24 hours  nicotine  14 mG/24 Hr(s) Transdermal Patch - Peds 1 Patch Transdermal daily  ondansetron Injectable 4 milliGRAM(s) IV Push once  pantoprazole  Injectable 40 milliGRAM(s) IV Push daily  sodium chloride 0.9%. 1000 milliLiter(s) (75 mL/Hr) IV Continuous <Continuous>      TELEMETRY: 	    ECG:  	  RADIOLOGY:  OTHER: 	  	  LABS:	 	    CARDIAC MARKERS:                                12.2   7.2   )-----------( 385      ( 22 May 2019 15:20 )             38.0     05-22    144  |  98  |  56<H>  ----------------------------<  172<H>  3.9   |  21<L>  |  4.41<H>    Ca    8.6      22 May 2019 15:20    TPro  8.2  /  Alb  4.3  /  TBili  0.4  /  DBili  x   /  AST  16  /  ALT  21  /  AlkPhos  183<H>  05-22    proBNP:   Lipid Profile:   HgA1c:   TSH:   PT/INR - ( 22 May 2019 15:20 )   PT: 14.0 sec;   INR: 1.21 ratio         PTT - ( 22 May 2019 15:20 )  PTT:36.8 sec      Assessment and plan  ---------------------------  cad  sbo  doing better  continue current meds  dvt prophylaxis  ARF awaiting renal function

## 2019-05-24 NOTE — PROGRESS NOTE ADULT - SUBJECTIVE AND OBJECTIVE BOX
lung cancer, ileus/sbo  hpi 73 year old woman with recurrent episodes ileus/sbo here with another as well as uti and renal failure  recent finished radiation for localized lung cancer  pmh sh fh unchanged 7 pt ros eating a meal no n/v otherwise neg  physical  cachectic  vs t98 78 111/64 16 97 sat  lungs clear  cor s1s2  abd soft nontender  ext no edema  skin warm, dry    data pending

## 2019-05-24 NOTE — PROGRESS NOTE ADULT - PROVIDER SPECIALTY LIST ADULT
Principal Discharge DX:	Sigmoid diverticulitis  Goal:	Full recovery  Assessment and plan of treatment:	-Continue eating your regular low fiber diet as tolerated and drinking plenty of fluids.   -For pain, you may take over-the-counter Tylenol as labeled. For breakthrough pain you may take Percocet, which contains Tylenol. Do NOT exceed 4000mg acetaminophen/Tylenol total within 24 hours. Do NOT take Advil, Motrin, or NSAIDs. Please take Colace 1 tab twice daily while taking narcotic pain medication to avoid constipation.  -No heavy lifting >20 pounds or strenuous exercise.  -You may shower but NO baths and NO swimming. Keep your incisions clean & dry. Avoid a direct stream of water over incision sites. Do not scrub. Pat dry when done.  -You have been prescribed oral antibiotics. Please be sure to complete the entire course as directed.  -Please continue to empty your SAGE drain regularly and record the color and volume of the output. You will discuss this with your surgeon at your follow up appointment.  -Contact your doctor or go to the ER for fever > 101.5, chills, nausea, vomiting, chest pain, shortness of breath, pain not controlled by medication or excessive bleeding.  -Please follow up with Dr. Garcia in 2-3 weeks; you may call the office to make an appointment at your earliest convenience. Surgery

## 2019-05-24 NOTE — PROGRESS NOTE ADULT - SUBJECTIVE AND OBJECTIVE BOX
no abd pain    REVIEW OF SYSTEMS:  GEN: no fever,    no chills  RESP: no SOB,   no cough  CVS: no chest pain,   no palpitations  GI: no abdominal pain,   no nausea,   no vomiting,   no constipation,   no diarrhea  : no dysuria,   no frequency  NEURO: no headache,   no dizziness  PSYCH: no depression,   not anxious  Derm : no rash    MEDICATIONS  (STANDING):  heparin  Injectable 5000 Unit(s) SubCutaneous every 12 hours  lidocaine   Patch 1 Patch Transdermal every 24 hours  nicotine  14 mG/24 Hr(s) Transdermal Patch - Peds 1 Patch Transdermal daily  ondansetron Injectable 4 milliGRAM(s) IV Push once  pantoprazole  Injectable 40 milliGRAM(s) IV Push daily  sodium chloride 0.9%. 1000 milliLiter(s) (75 mL/Hr) IV Continuous <Continuous>    MEDICATIONS  (PRN):  acetaminophen   Tablet .. 650 milliGRAM(s) Oral every 6 hours PRN Moderate Pain (4 - 6)      Vital Signs Last 24 Hrs  T(C): 36.7 (24 May 2019 05:39), Max: 37.1 (23 May 2019 13:16)  T(F): 98 (24 May 2019 05:39), Max: 98.7 (23 May 2019 13:16)  HR: 78 (24 May 2019 05:39) (69 - 85)  BP: 111/64 (24 May 2019 05:39) (96/59 - 125/65)  BP(mean): --  RR: 16 (24 May 2019 05:39) (16 - 19)  SpO2: 97% (24 May 2019 05:39) (94% - 99%)  CAPILLARY BLOOD GLUCOSE        I&O's Summary    23 May 2019 07:01  -  24 May 2019 07:00  --------------------------------------------------------  IN: 1240 mL / OUT: 0 mL / NET: 1240 mL        PHYSICAL EXAM:  HEAD:  Atraumatic, Normocephalic  NECK: Supple, No   JVD  CHEST/LUNG:   no     rales,     no,    rhonchi  HEART: Regular rate and rhythm;         murmur  ABDOMEN: Soft, Nontender, ;   EXTREMITIES:   no     edema  NEUROLOGY:  alert    LABS:                        12.2   7.2   )-----------( 385      ( 22 May 2019 15:20 )             38.0         144  |  98  |  56<H>  ----------------------------<  172<H>  3.9   |  21<L>  |  4.41<H>    Ca    8.6      22 May 2019 15:20    TPro  8.2  /  Alb  4.3  /  TBili  0.4  /  DBili  x   /  AST  16  /  ALT  21  /  AlkPhos  183<H>      PT/INR - ( 22 May 2019 15:20 )   PT: 14.0 sec;   INR: 1.21 ratio         PTT - ( 22 May 2019 15:20 )  PTT:36.8 sec      Urinalysis Basic - ( 22 May 2019 15:45 )    Color: Yellow / Appearance: Slightly Turbid / S.016 / pH: x  Gluc: x / Ketone: Negative  / Bili: Negative / Urobili: Negative   Blood: x / Protein: 30 mg/dL / Nitrite: Negative   Leuk Esterase: Large / RBC: 5 /hpf / WBC 42 /HPF   Sq Epi: x / Non Sq Epi: 1 / Bacteria: Many           @ 23:47  2.2  21              Consultant(s) Notes Reviewed:      Care Discussed with Consultants/Other Providers:

## 2019-05-24 NOTE — PROGRESS NOTE ADULT - ASSESSMENT
72yo F with r/o SBO 72yo F with r/o SBO  - Patient with benign abdominal exam  - Passing flatus/BMs, advance diet as tolerated  - Avoid opioids  - Contact with additional questions    [phone]

## 2019-05-25 LAB
ANION GAP SERPL CALC-SCNC: 18 MMOL/L — HIGH (ref 5–17)
BUN SERPL-MCNC: 45 MG/DL — HIGH (ref 7–23)
CALCIUM SERPL-MCNC: 7.3 MG/DL — LOW (ref 8.4–10.5)
CHLORIDE SERPL-SCNC: 111 MMOL/L — HIGH (ref 96–108)
CO2 SERPL-SCNC: 15 MMOL/L — LOW (ref 22–31)
CREAT SERPL-MCNC: 2.46 MG/DL — HIGH (ref 0.5–1.3)
GLUCOSE SERPL-MCNC: 68 MG/DL — LOW (ref 70–99)
HCT VFR BLD CALC: 33.2 % — LOW (ref 34.5–45)
HGB BLD-MCNC: 10 G/DL — LOW (ref 11.5–15.5)
MCHC RBC-ENTMCNC: 29.9 PG — SIGNIFICANT CHANGE UP (ref 27–34)
MCHC RBC-ENTMCNC: 30.1 GM/DL — LOW (ref 32–36)
MCV RBC AUTO: 99.1 FL — SIGNIFICANT CHANGE UP (ref 80–100)
PLATELET # BLD AUTO: 211 K/UL — SIGNIFICANT CHANGE UP (ref 150–400)
POTASSIUM SERPL-MCNC: 3.9 MMOL/L — SIGNIFICANT CHANGE UP (ref 3.5–5.3)
POTASSIUM SERPL-SCNC: 3.9 MMOL/L — SIGNIFICANT CHANGE UP (ref 3.5–5.3)
RBC # BLD: 3.35 M/UL — LOW (ref 3.8–5.2)
RBC # FLD: 20.1 % — HIGH (ref 10.3–14.5)
SODIUM SERPL-SCNC: 144 MMOL/L — SIGNIFICANT CHANGE UP (ref 135–145)
TSH SERPL-MCNC: 3.07 UIU/ML — SIGNIFICANT CHANGE UP (ref 0.27–4.2)
WBC # BLD: 3.95 K/UL — SIGNIFICANT CHANGE UP (ref 3.8–10.5)
WBC # FLD AUTO: 3.95 K/UL — SIGNIFICANT CHANGE UP (ref 3.8–10.5)

## 2019-05-25 RX ORDER — ACETAMINOPHEN 500 MG
500 TABLET ORAL ONCE
Refills: 0 | Status: COMPLETED | OUTPATIENT
Start: 2019-05-25 | End: 2019-05-25

## 2019-05-25 RX ORDER — ACETAMINOPHEN 500 MG
1000 TABLET ORAL ONCE
Refills: 0 | Status: DISCONTINUED | OUTPATIENT
Start: 2019-05-25 | End: 2019-05-25

## 2019-05-25 RX ORDER — ACETAMINOPHEN 500 MG
750 TABLET ORAL ONCE
Refills: 0 | Status: DISCONTINUED | OUTPATIENT
Start: 2019-05-25 | End: 2019-05-25

## 2019-05-25 RX ORDER — OXYCODONE HYDROCHLORIDE 5 MG/1
5 TABLET ORAL ONCE
Refills: 0 | Status: DISCONTINUED | OUTPATIENT
Start: 2019-05-25 | End: 2019-05-25

## 2019-05-25 RX ORDER — ACETAMINOPHEN 500 MG
750 TABLET ORAL ONCE
Refills: 0 | Status: COMPLETED | OUTPATIENT
Start: 2019-05-25 | End: 2019-05-25

## 2019-05-25 RX ADMIN — Medication 1 PATCH: at 12:11

## 2019-05-25 RX ADMIN — Medication 750 MILLIGRAM(S): at 11:15

## 2019-05-25 RX ADMIN — Medication 300 MILLIGRAM(S): at 10:54

## 2019-05-25 RX ADMIN — OXYCODONE HYDROCHLORIDE 5 MILLIGRAM(S): 5 TABLET ORAL at 00:22

## 2019-05-25 RX ADMIN — Medication 1 PATCH: at 07:07

## 2019-05-25 RX ADMIN — Medication 3 MILLILITER(S): at 00:35

## 2019-05-25 RX ADMIN — PANTOPRAZOLE SODIUM 40 MILLIGRAM(S): 20 TABLET, DELAYED RELEASE ORAL at 12:11

## 2019-05-25 RX ADMIN — LIDOCAINE 1 PATCH: 4 CREAM TOPICAL at 10:00

## 2019-05-25 RX ADMIN — HEPARIN SODIUM 5000 UNIT(S): 5000 INJECTION INTRAVENOUS; SUBCUTANEOUS at 18:16

## 2019-05-25 RX ADMIN — Medication 1 PATCH: at 22:00

## 2019-05-25 RX ADMIN — LIDOCAINE 1 PATCH: 4 CREAM TOPICAL at 19:19

## 2019-05-25 RX ADMIN — OXYCODONE HYDROCHLORIDE 5 MILLIGRAM(S): 5 TABLET ORAL at 00:52

## 2019-05-25 RX ADMIN — Medication 200 MILLIGRAM(S): at 22:18

## 2019-05-25 RX ADMIN — LIDOCAINE 1 PATCH: 4 CREAM TOPICAL at 23:10

## 2019-05-25 RX ADMIN — Medication 500 MILLIGRAM(S): at 23:00

## 2019-05-25 RX ADMIN — Medication 3 MILLILITER(S): at 22:06

## 2019-05-25 RX ADMIN — HEPARIN SODIUM 5000 UNIT(S): 5000 INJECTION INTRAVENOUS; SUBCUTANEOUS at 05:00

## 2019-05-25 RX ADMIN — LIDOCAINE 1 PATCH: 4 CREAM TOPICAL at 02:21

## 2019-05-25 NOTE — PROGRESS NOTE ADULT - SUBJECTIVE AND OBJECTIVE BOX
CARDIOLOGY     PROGRESS  NOTE   ________________________________________________    CHIEF COMPLAINT:Patient is a 73y old  Female who presents with a chief complaint of abdominal pain/ ARF (24 May 2019 08:58)  doing better.  	  REVIEW OF SYSTEMS:  CONSTITUTIONAL: No fever, weight loss, or fatigue  EYES: No eye pain, visual disturbances, or discharge  ENT:  No difficulty hearing, tinnitus, vertigo; No sinus or throat pain  NECK: No pain or stiffness  RESPIRATORY: No cough, wheezing, chills or hemoptysis; No Shortness of Breath  CARDIOVASCULAR: No chest pain, palpitations, passing out, dizziness, or leg swelling  GASTROINTESTINAL: No abdominal or epigastric pain. No nausea, vomiting, or hematemesis; No diarrhea or constipation. No melena or hematochezia.  GENITOURINARY: No dysuria, frequency, hematuria, or incontinence  NEUROLOGICAL: No headaches, memory loss, loss of strength, numbness, or tremors  SKIN: No itching, burning, rashes, or lesions   LYMPH Nodes: No enlarged glands  ENDOCRINE: No heat or cold intolerance; No hair loss  MUSCULOSKELETAL: No joint pain or swelling; No muscle, back, or extremity pain  PSYCHIATRIC: No depression, anxiety, mood swings, or difficulty sleeping  HEME/LYMPH: No easy bruising, or bleeding gums  ALLERGY AND IMMUNOLOGIC: No hives or eczema	    [ ] All others negative	  [ ] Unable to obtain    PHYSICAL EXAM:  T(C): 37.1 (05-25-19 @ 04:58), Max: 37.1 (05-25-19 @ 04:58)  HR: 64 (05-25-19 @ 04:58) (64 - 79)  BP: 106/61 (05-25-19 @ 04:58) (106/61 - 135/94)  RR: 18 (05-25-19 @ 04:58) (18 - 18)  SpO2: 95% (05-25-19 @ 04:58) (95% - 100%)  Wt(kg): --  I&O's Summary    23 May 2019 07:01  -  24 May 2019 07:00  --------------------------------------------------------  IN: 1240 mL / OUT: 0 mL / NET: 1240 mL    24 May 2019 07:01  -  25 May 2019 06:45  --------------------------------------------------------  IN: 2638 mL / OUT: 1200 mL / NET: 1438 mL        Appearance: Normal	  HEENT:   Normal oral mucosa, PERRL, EOMI	  Lymphatic: No lymphadenopathy  Cardiovascular: Normal S1 S2, No JVD, + murmurs, No edema  Respiratory: Lungs clear to auscultation	  Psychiatry: A & O x 3, Mood & affect appropriate  Gastrointestinal:  Soft, Non-tender, + BS	, no rebound/guadrding  Skin: No rashes, No ecchymoses, No cyanosis	  Neurologic: Non-focal  Extremities: Normal range of motion, No clubbing, cyanosis or edema  Vascular: Peripheral pulses palpable 2+ bilaterally    MEDICATIONS  (STANDING):  heparin  Injectable 5000 Unit(s) SubCutaneous every 12 hours  lidocaine   Patch 1 Patch Transdermal every 24 hours  nicotine  14 mG/24 Hr(s) Transdermal Patch - Peds 1 Patch Transdermal daily  ondansetron Injectable 4 milliGRAM(s) IV Push once  pantoprazole  Injectable 40 milliGRAM(s) IV Push daily  sodium chloride 0.9%. 1000 milliLiter(s) (75 mL/Hr) IV Continuous <Continuous>      TELEMETRY: 	    ECG:  	  RADIOLOGY:  OTHER: 	  	  LABS:	 	    CARDIAC MARKERS:            05-24    141  |  107  |  54<H>  ----------------------------<  74  3.7   |  17<L>  |  3.09<H>    Ca    7.0<L>      24 May 2019 10:34      proBNP:   Lipid Profile:   HgA1c:   TSH:         Assessment and plan  ---------------------------  cad   renal function is improving continue ivf  f/u urine out put  f/u lytes  diet  dvt prophylaxis

## 2019-05-25 NOTE — PROGRESS NOTE ADULT - SUBJECTIVE AND OBJECTIVE BOX
no  complaints    REVIEW OF SYSTEMS:  GEN: no fever,    no chills  RESP: no SOB,   no cough  CVS: no chest pain,   no palpitations  GI: no abdominal pain,   no nausea,   no vomiting,   no constipation,   no diarrhea  : no dysuria,   no frequency  NEURO: no headache,   no dizziness  PSYCH: no depression,   not anxious  Derm : no rash    MEDICATIONS  (STANDING):  heparin  Injectable 5000 Unit(s) SubCutaneous every 12 hours  lidocaine   Patch 1 Patch Transdermal every 24 hours  nicotine  14 mG/24 Hr(s) Transdermal Patch - Peds 1 Patch Transdermal daily  ondansetron Injectable 4 milliGRAM(s) IV Push once  pantoprazole  Injectable 40 milliGRAM(s) IV Push daily  sodium chloride 0.9%. 1000 milliLiter(s) (75 mL/Hr) IV Continuous <Continuous>    MEDICATIONS  (PRN):  acetaminophen   Tablet .. 650 milliGRAM(s) Oral every 6 hours PRN Moderate Pain (4 - 6)  ALBUTerol/ipratropium for Nebulization. 3 milliLiter(s) Nebulizer every 6 hours PRN Shortness of Breath and/or Wheezing      Vital Signs Last 24 Hrs  T(C): 37.1 (25 May 2019 04:58), Max: 37.1 (25 May 2019 04:58)  T(F): 98.7 (25 May 2019 04:58), Max: 98.7 (25 May 2019 04:58)  HR: 64 (25 May 2019 04:58) (64 - 79)  BP: 106/61 (25 May 2019 04:58) (106/61 - 135/94)  BP(mean): --  RR: 18 (25 May 2019 04:58) (18 - 18)  SpO2: 95% (25 May 2019 04:58) (95% - 100%)  CAPILLARY BLOOD GLUCOSE        I&O's Summary    24 May 2019 07:01  -  25 May 2019 07:00  --------------------------------------------------------  IN: 2762 mL / OUT: 1200 mL / NET: 1562 mL        PHYSICAL EXAM:  HEAD:  Atraumatic, Normocephalic  NECK: Supple, No   JVD  CHEST/LUNG:   no     rales,     no,    rhonchi  HEART: Regular rate and rhythm;         murmur  ABDOMEN: Soft, Nontender, ;   EXTREMITIES:    no    edema  NEUROLOGY:  alert    LABS:    05-24    141  |  107  |  54<H>  ----------------------------<  74  3.7   |  17<L>  |  3.09<H>    Ca    7.0<L>      24 May 2019 10:34                              Consultant(s) Notes Reviewed:      Care Discussed with Consultants/Other Providers:

## 2019-05-25 NOTE — PROGRESS NOTE ADULT - ASSESSMENT
73 year old female with   PMH,    chronic Back pain (on Narcotics), history of multiple SBO's, narcotic associated constipation/ileus, COPD,  smoker,    ca  breast,  right  mastectomy. RENUKA cavitary lesion on ct, ,  c/c cachexia  path from  1/19, with NSCC with squamous  features,  s/p  RT     admitted with abdominal pain / vomiting,   from SBO, resolved  SHAKIRA on CKD 2/ dehydration    ct scan  with sbo/ mesenteric   swirling/  pulm opacities    pt is an active smoker, refusing to quit   ct chest , 4/19/18  noted/  oncology dr hernandez   dvt ppx   iv fluids/   protonix  avoid  narcotics, if  possible  bowel movements /on po diet  SHAKIRA,  resolving   bmp, pending         < from: CT Abdomen and Pelvis No Cont (05.22.19 @ 19:30) >  IMPRESSION:   Suboptimal studysecondary to absence of oral and IV contrast.  Small bowel obstruction with increased dilatation of the small bowel   compared to the prior exam 5/5/2019. Question transition point in the   right mid abdomen. No free intraperitoneal air.    Unchanged tree-in-bud opacities in the right lower lobe with mild   increased tree-in-bud opacities in the left lower lobe compared to prior   CT chest 4/19/2019.  < end of copied text >      < from: CT Chest No Cont (04.19.19 @ 18:50)   INTERPRETATION:  Motion degraded evaluation  Redemonstraion on left upper lobe cavitary mass consistent with patients   known hx of lung CA  Mucoid impacted right lower lobe ariways with scattered tree in bud   opacities  < end of copied text >      Cytopathology - Non Gyn Report (01.18.19 @ 11:43)    Immunohistochemical stains.  The immunophenotype together with the cytomorphology supports the  diagnosis squamous cell carcinoma.    Final Diagnosis  LUNG, LEFT UPPER LOBE, US GUIDED CORE BIOPSY AND FNA  POSITIVE FOR MALIGNANT CELLS.  Non-small cell carcinoma, with squamous features (see note).  Additional studies pending.

## 2019-05-26 RX ORDER — ACETAMINOPHEN 500 MG
500 TABLET ORAL ONCE
Refills: 0 | Status: COMPLETED | OUTPATIENT
Start: 2019-05-26 | End: 2019-05-26

## 2019-05-26 RX ORDER — NALOXEGOL OXALATE 12.5 MG/1
12.5 TABLET, FILM COATED ORAL DAILY
Refills: 0 | Status: DISCONTINUED | OUTPATIENT
Start: 2019-05-26 | End: 2019-05-27

## 2019-05-26 RX ORDER — ACETAMINOPHEN 500 MG
650 TABLET ORAL ONCE
Refills: 0 | Status: COMPLETED | OUTPATIENT
Start: 2019-05-26 | End: 2019-05-26

## 2019-05-26 RX ORDER — NALOXEGOL OXALATE 12.5 MG/1
25 TABLET, FILM COATED ORAL DAILY
Refills: 0 | Status: DISCONTINUED | OUTPATIENT
Start: 2019-05-26 | End: 2019-05-26

## 2019-05-26 RX ORDER — OXYCODONE AND ACETAMINOPHEN 5; 325 MG/1; MG/1
1 TABLET ORAL EVERY 12 HOURS
Refills: 0 | Status: DISCONTINUED | OUTPATIENT
Start: 2019-05-26 | End: 2019-05-27

## 2019-05-26 RX ORDER — SODIUM CHLORIDE 9 MG/ML
1000 INJECTION, SOLUTION INTRAVENOUS
Refills: 0 | Status: DISCONTINUED | OUTPATIENT
Start: 2019-05-26 | End: 2019-05-27

## 2019-05-26 RX ADMIN — OXYCODONE AND ACETAMINOPHEN 1 TABLET(S): 5; 325 TABLET ORAL at 11:36

## 2019-05-26 RX ADMIN — OXYCODONE AND ACETAMINOPHEN 1 TABLET(S): 5; 325 TABLET ORAL at 23:39

## 2019-05-26 RX ADMIN — LIDOCAINE 1 PATCH: 4 CREAM TOPICAL at 19:59

## 2019-05-26 RX ADMIN — HEPARIN SODIUM 5000 UNIT(S): 5000 INJECTION INTRAVENOUS; SUBCUTANEOUS at 05:03

## 2019-05-26 RX ADMIN — Medication 3 MILLILITER(S): at 07:41

## 2019-05-26 RX ADMIN — SODIUM CHLORIDE 75 MILLILITER(S): 9 INJECTION, SOLUTION INTRAVENOUS at 17:44

## 2019-05-26 RX ADMIN — SODIUM CHLORIDE 75 MILLILITER(S): 9 INJECTION INTRAMUSCULAR; INTRAVENOUS; SUBCUTANEOUS at 05:03

## 2019-05-26 RX ADMIN — Medication 1 PATCH: at 12:20

## 2019-05-26 RX ADMIN — Medication 1 PATCH: at 07:11

## 2019-05-26 RX ADMIN — OXYCODONE AND ACETAMINOPHEN 1 TABLET(S): 5; 325 TABLET ORAL at 12:16

## 2019-05-26 RX ADMIN — LIDOCAINE 1 PATCH: 4 CREAM TOPICAL at 23:12

## 2019-05-26 RX ADMIN — PANTOPRAZOLE SODIUM 40 MILLIGRAM(S): 20 TABLET, DELAYED RELEASE ORAL at 12:20

## 2019-05-26 RX ADMIN — Medication 1 PATCH: at 12:28

## 2019-05-26 RX ADMIN — Medication 500 MILLIGRAM(S): at 05:55

## 2019-05-26 RX ADMIN — LIDOCAINE 1 PATCH: 4 CREAM TOPICAL at 11:12

## 2019-05-26 RX ADMIN — Medication 650 MILLIGRAM(S): at 21:57

## 2019-05-26 RX ADMIN — HEPARIN SODIUM 5000 UNIT(S): 5000 INJECTION INTRAVENOUS; SUBCUTANEOUS at 17:23

## 2019-05-26 RX ADMIN — Medication 260 MILLIGRAM(S): at 21:26

## 2019-05-26 RX ADMIN — Medication 3 MILLILITER(S): at 20:14

## 2019-05-26 RX ADMIN — NALOXEGOL OXALATE 12.5 MILLIGRAM(S): 12.5 TABLET, FILM COATED ORAL at 14:08

## 2019-05-26 RX ADMIN — Medication 1 PATCH: at 20:00

## 2019-05-26 RX ADMIN — Medication 200 MILLIGRAM(S): at 05:03

## 2019-05-26 NOTE — PROGRESS NOTE ADULT - SUBJECTIVE AND OBJECTIVE BOX
no abd pian/  bowel movements    REVIEW OF SYSTEMS:  GEN: no fever,    no chills  RESP: no SOB,   no cough  CVS: no chest pain,   no palpitations  GI: no abdominal pain,   no nausea,   no vomiting,   no constipation,   no diarrhea  : no dysuria,   no frequency  NEURO: no headache,   no dizziness  PSYCH: no depression,   not anxious  Derm : no rash    MEDICATIONS  (STANDING):  heparin  Injectable 5000 Unit(s) SubCutaneous every 12 hours  lidocaine   Patch 1 Patch Transdermal every 24 hours  nicotine  14 mG/24 Hr(s) Transdermal Patch - Peds 1 Patch Transdermal daily  ondansetron Injectable 4 milliGRAM(s) IV Push once  pantoprazole  Injectable 40 milliGRAM(s) IV Push daily  sodium chloride 0.9%. 1000 milliLiter(s) (75 mL/Hr) IV Continuous <Continuous>    MEDICATIONS  (PRN):  acetaminophen   Tablet .. 650 milliGRAM(s) Oral every 6 hours PRN Moderate Pain (4 - 6)  ALBUTerol/ipratropium for Nebulization. 3 milliLiter(s) Nebulizer every 6 hours PRN Shortness of Breath and/or Wheezing      Vital Signs Last 24 Hrs  T(C): 37 (26 May 2019 04:40), Max: 37 (26 May 2019 04:40)  T(F): 98.6 (26 May 2019 04:40), Max: 98.6 (26 May 2019 04:40)  HR: 77 (26 May 2019 04:40) (69 - 77)  BP: 131/56 (26 May 2019 04:40) (131/56 - 154/74)  BP(mean): --  RR: 16 (26 May 2019 04:40) (16 - 18)  SpO2: 96% (26 May 2019 04:40) (95% - 99%)  CAPILLARY BLOOD GLUCOSE        I&O's Summary    25 May 2019 07:01  -  26 May 2019 07:00  --------------------------------------------------------  IN: 3265 mL / OUT: 450 mL / NET: 2815 mL        PHYSICAL EXAM:  HEAD:  Atraumatic, Normocephalic  NECK: Supple, No   JVD  CHEST/LUNG:   no     rales,     no,    rhonchi  HEART: Regular rate and rhythm;         murmur  ABDOMEN: Soft, Nontender, ;   EXTREMITIES:  no      edema  NEUROLOGY:  alert    LABS:                        10.0   3.95  )-----------( 211      ( 25 May 2019 17:30 )             33.2     05-25    144  |  111<H>  |  45<H>  ----------------------------<  68<L>  3.9   |  15<L>  |  2.46<H>    Ca    7.3<L>      25 May 2019 11:19                      Thyroid Stimulating Hormone, Serum: 3.07 uIU/mL (05-25 @ 17:37)          Consultant(s) Notes Reviewed:      Care Discussed with Consultants/Other Providers:

## 2019-05-26 NOTE — PROVIDER CONTACT NOTE (OTHER) - ACTION/TREATMENT ORDERED:
RN attempted.  NP made aware.  continue to mnt pt status and ensure safety.
NP notified. orders to follow.

## 2019-05-26 NOTE — PROGRESS NOTE ADULT - SUBJECTIVE AND OBJECTIVE BOX
CARDIOLOGY     PROGRESS  NOTE   ________________________________________________    CHIEF COMPLAINT:Patient is a 73y old  Female who presents with a chief complaint of abdominal pain/ ARF (26 May 2019 07:18)  doing better.  	  REVIEW OF SYSTEMS:  CONSTITUTIONAL: No fever, weight loss, or fatigue  EYES: No eye pain, visual disturbances, or discharge  ENT:  No difficulty hearing, tinnitus, vertigo; No sinus or throat pain  NECK: No pain or stiffness  RESPIRATORY: No cough, wheezing, chills or hemoptysis; +  Shortness of Breath  CARDIOVASCULAR: No chest pain, palpitations, passing out, dizziness, or leg swelling  GASTROINTESTINAL: No abdominal or epigastric pain. No nausea, vomiting, or hematemesis; No diarrhea or constipation. No melena or hematochezia.  GENITOURINARY: No dysuria, frequency, hematuria, or incontinence  NEUROLOGICAL: No headaches, memory loss, loss of strength, numbness, or tremors  SKIN: No itching, burning, rashes, or lesions   LYMPH Nodes: No enlarged glands  ENDOCRINE: No heat or cold intolerance; No hair loss  MUSCULOSKELETAL: No joint pain or swelling; No muscle, back, or extremity pain  PSYCHIATRIC: No depression, anxiety, mood swings, or difficulty sleeping  HEME/LYMPH: No easy bruising, or bleeding gums  ALLERGY AND IMMUNOLOGIC: No hives or eczema	    [ ] All others negative	  [ ] Unable to obtain    PHYSICAL EXAM:  T(C): 37 (05-26-19 @ 04:40), Max: 37 (05-26-19 @ 04:40)  HR: 77 (05-26-19 @ 04:40) (69 - 77)  BP: 131/56 (05-26-19 @ 04:40) (131/56 - 154/74)  RR: 16 (05-26-19 @ 04:40) (16 - 18)  SpO2: 96% (05-26-19 @ 04:40) (95% - 99%)  Wt(kg): --  I&O's Summary    25 May 2019 07:01  -  26 May 2019 07:00  --------------------------------------------------------  IN: 3265 mL / OUT: 450 mL / NET: 2815 mL        Appearance: Normal	  HEENT:   Normal oral mucosa, PERRL, EOMI	  Lymphatic: No lymphadenopathy  Cardiovascular: Normal S1 S2, No JVD, + murmurs, No edema  Respiratory:rhonchi	  Psychiatry: A & O x 3, Mood & affect appropriate  Gastrointestinal:  Soft, Non-tender, + BS	  Skin: No rashes, No ecchymoses, No cyanosis	  Neurologic: Non-focal  Extremities: Normal range of motion, No clubbing, cyanosis or edema  Vascular: Peripheral pulses palpable 2+ bilaterally    MEDICATIONS  (STANDING):  heparin  Injectable 5000 Unit(s) SubCutaneous every 12 hours  lidocaine   Patch 1 Patch Transdermal every 24 hours  nicotine  14 mG/24 Hr(s) Transdermal Patch - Peds 1 Patch Transdermal daily  ondansetron Injectable 4 milliGRAM(s) IV Push once  pantoprazole  Injectable 40 milliGRAM(s) IV Push daily  sodium chloride 0.9%. 1000 milliLiter(s) (75 mL/Hr) IV Continuous <Continuous>      TELEMETRY: 	    ECG:  	  RADIOLOGY:  OTHER: 	  	  LABS:	 	    CARDIAC MARKERS:                                10.0   3.95  )-----------( 211      ( 25 May 2019 17:30 )             33.2     05-25    144  |  111<H>  |  45<H>  ----------------------------<  68<L>  3.9   |  15<L>  |  2.46<H>    Ca    7.3<L>      25 May 2019 11:19      proBNP:   Lipid Profile:   HgA1c:   TSH: Thyroid Stimulating Hormone, Serum: 3.07 uIU/mL (05-25 @ 17:37)          Assessment and plan  ---------------------------  acute on chronic renal failure  continue ivf, improving  continue pain meds  dvt prophylaxis  +bm several times yesterday  avoid analgesics

## 2019-05-26 NOTE — CONSULT NOTE ADULT - ASSESSMENT
73F PMHx of Breast Ca s/p right mastectomy chemotherapy tx (2006), Lung Ca, COPD (no home O2), hysterectomy, appendectomy, cholecystectomy, femoral hernia repair, multiple SBOs w/ resection, opioid dependence, p/w nausea/vomiting x 2-3 days. Associated w/ mild abdominal distention. Found to have renal failure,     a/P:  SHAKIRA:  Likely pre-renal  Improving with IVF  Continue IVF 75cc/hr  Monitor renal function    Acidosis:  Non-AG+ AG  Change IVF to 1/2NS with 75meq naHCO3- 75cc/hr

## 2019-05-26 NOTE — PROGRESS NOTE ADULT - ASSESSMENT
73 year old female with   PMH,    chronic Back pain (on Narcotics), history of multiple SBO's, narcotic associated constipation/ileus, COPD,  smoker,    ca  breast,  right  mastectomy. RENUKA cavitary lesion on ct, ,  c/c cachexia  path from  1/19, with NSCC with squamous  features,  s/p  RT     admitted with abdominal pain / vomiting,   from SBO, resolved  SHAKIRA on CKD 2/ dehydration    ct scan  with sbo/ mesenteric   swirling/  pulm opacities    pt is an active smoker, refusing to quit   ct chest , 4/19/18  noted/  oncology dr hernandez   dvt ppx   iv fluids/   protonix  avoid  narcotics, if  possible  bowel movements /on po diet  SHAKIRA,  resolving.  from 4.4  to  2.4/   awaiting  creatinine  today         < from: CT Abdomen and Pelvis No Cont (05.22.19 @ 19:30) >  IMPRESSION:   Suboptimal studysecondary to absence of oral and IV contrast.  Small bowel obstruction with increased dilatation of the small bowel   compared to the prior exam 5/5/2019. Question transition point in the   right mid abdomen. No free intraperitoneal air.    Unchanged tree-in-bud opacities in the right lower lobe with mild   increased tree-in-bud opacities in the left lower lobe compared to prior   CT chest 4/19/2019.  < end of copied text >      < from: CT Chest No Cont (04.19.19 @ 18:50)   INTERPRETATION:  Motion degraded evaluation  Redemonstraion on left upper lobe cavitary mass consistent with patients   known hx of lung CA  Mucoid impacted right lower lobe ariways with scattered tree in bud   opacities  < end of copied text >      Cytopathology - Non Gyn Report (01.18.19 @ 11:43)    Immunohistochemical stains.  The immunophenotype together with the cytomorphology supports the  diagnosis squamous cell carcinoma.    Final Diagnosis  LUNG, LEFT UPPER LOBE, US GUIDED CORE BIOPSY AND FNA  POSITIVE FOR MALIGNANT CELLS.  Non-small cell carcinoma, with squamous features (see note).  Additional studies pending.

## 2019-05-26 NOTE — CONSULT NOTE ADULT - SUBJECTIVE AND OBJECTIVE BOX
Dr. Mosquera  Office [phone]  Cell [phone]  Akiko RICH  Cell [phone]    HPI:  CHIEF COMPLAINT:Patient is a 73y old  Female who presents with a chief complaint of abdominal pain.    HPI:  73F PMHx of Breast Ca s/p right mastectomy chemotherapy tx (), Lung Ca, COPD (no home O2), hysterectomy, appendectomy, cholecystectomy, femoral hernia repair, multiple SBOs w/ resection, opioid dependence, p/w nausea/vomiting x 2-3 days. Associated w/ mild abdominal distention. Found to have renal failure, which is improving, patient requested to see renal. Feels better, has now and then diarrhea, no other complain at present         Allergies:  Biaxin (Rash)  Cipro (Headache)  Flagyl (Headache)  penicillin (Rash; Swelling)  sulfa drugs (Unknown)      PAST MEDICAL & SURGICAL HISTORY:  Back pain  Bowel obstruction: multiple hospitalizations  Kidney stone  Emphysema  Narcotic Dependence  S/P Radiation Therapy  S/P Chemotherapy, Time Since Greater than 12 Weeks  Narcotic Dysmotile Cilia Syndrome  Chronic Pain Following Surgery or Procedure: following right breast mastectomy  Ankle Fracture: right anle fx s/p cast   History of Small Bowel Obstruction: 2010  Asthma  Breast Cancer  S/P hernia surgery: femoral hernia -   S/P Exploratory Laparotomy  S/P Appendectomy  S/P Cholecystectomy  Liver Mass s/p liver rsxn: s/p resection   History of Hysterectomy:   S/P Right Mastectomy:       Home Medications Reviewed    Hospital Medications:   MEDICATIONS  (STANDING):  heparin  Injectable 5000 Unit(s) SubCutaneous every 12 hours  lidocaine   Patch 1 Patch Transdermal every 24 hours  naloxegol 12.5 milliGRAM(s) Oral daily  nicotine  14 mG/24 Hr(s) Transdermal Patch - Peds 1 Patch Transdermal daily  ondansetron Injectable 4 milliGRAM(s) IV Push once  pantoprazole  Injectable 40 milliGRAM(s) IV Push daily  sodium chloride 0.9%. 1000 milliLiter(s) (75 mL/Hr) IV Continuous <Continuous>      SOCIAL HISTORY:  Denies ETOh, Smoking,     FAMILY HISTORY:  No pertinent family history in first degree relatives      REVIEW OF SYSTEMS:  CONSTITUTIONAL: No weakness, fevers or chills  EYES/ENT: No visual changes;  No vertigo or throat pain   NECK: No pain or stiffness  RESPIRATORY: No cough, wheezing, hemoptysis; No shortness of breath  CARDIOVASCULAR: No chest pain or palpitations.  GASTROINTESTINAL: as per HPI  GENITOURINARY: No dysuria, frequency, foamy urine, urinary urgency, incontinence or hematuria  NEUROLOGICAL: No numbness or weakness  SKIN: No itching, burning, rashes, or lesions   VASCULAR: No bilateral lower extremity edema.   All other review of systems is negative unless indicated above.    VITALS:  T(F): 98.6 (19 @ 04:40), Max: 98.6 (19 @ 04:40)  HR: 77 (19 @ 04:40)  BP: 131/56 (19 @ 04:40)  RR: 16 (19 @ 04:40)  SpO2: 96% (19 04:40)  Wt(kg): --     @ 07:01  -   @ 07:00  --------------------------------------------------------  IN: 3265 mL / OUT: 450 mL / NET: 2815 mL     @ 07:01  -   @ 12:33  --------------------------------------------------------  IN: 375 mL / OUT: 200 mL / NET: 175 mL          PHYSICAL EXAM:  Constitutional: NAD  HEENT: anicteric sclera, oropharynx clear, MMM  Neck: No JVD  Respiratory: CTAB, no wheezes, rales or rhonchi  Cardiovascular: S1, S2, RRR  Gastrointestinal: BS+, soft, NT, mildly distended  Extremities: No cyanosis or clubbing. No peripheral edema  Neurological: A/O x 3, no focal deficits  Psychiatric: Normal mood, normal affect  : No CVA tenderness. No arevalo.   Skin: No rashes       LABS:      144  |  111<H>  |  45<H>  ----------------------------<  68<L>  3.9   |  15<L>  |  2.46<H>    Ca    7.3<L>      25 May 2019 11:19      Creatinine Trend: 2.46 <--, 3.09 <--, 4.41 <--                        10.0   3.95  )-----------( 211      ( 25 May 2019 17:30 )             33.2     Urine Studies:  Urinalysis Basic - ( 22 May 2019 15:45 )    Color: Yellow / Appearance: Slightly Turbid / S.016 / pH:   Gluc:  / Ketone: Negative  / Bili: Negative / Urobili: Negative   Blood:  / Protein: 30 mg/dL / Nitrite: Negative   Leuk Esterase: Large / RBC: 5 /hpf / WBC 42 /HPF   Sq Epi:  / Non Sq Epi: 1 / Bacteria: Many          RADIOLOGY & ADDITIONAL STUDIES:

## 2019-05-27 ENCOUNTER — TRANSCRIPTION ENCOUNTER (OUTPATIENT)
Age: 74
End: 2019-05-27

## 2019-05-27 VITALS
OXYGEN SATURATION: 97 % | TEMPERATURE: 98 F | HEART RATE: 86 BPM | RESPIRATION RATE: 18 BRPM | DIASTOLIC BLOOD PRESSURE: 84 MMHG | SYSTOLIC BLOOD PRESSURE: 157 MMHG

## 2019-05-27 LAB
ANION GAP SERPL CALC-SCNC: 13 MMOL/L — SIGNIFICANT CHANGE UP (ref 5–17)
BUN SERPL-MCNC: 30 MG/DL — HIGH (ref 7–23)
CALCIUM SERPL-MCNC: 7.2 MG/DL — LOW (ref 8.4–10.5)
CHLORIDE SERPL-SCNC: 110 MMOL/L — HIGH (ref 96–108)
CO2 SERPL-SCNC: 17 MMOL/L — LOW (ref 22–31)
CREAT SERPL-MCNC: 1.63 MG/DL — HIGH (ref 0.5–1.3)
CULTURE RESULTS: SIGNIFICANT CHANGE UP
CULTURE RESULTS: SIGNIFICANT CHANGE UP
GLUCOSE SERPL-MCNC: 93 MG/DL — SIGNIFICANT CHANGE UP (ref 70–99)
HCT VFR BLD CALC: 28.4 % — LOW (ref 34.5–45)
HGB BLD-MCNC: 8.8 G/DL — LOW (ref 11.5–15.5)
MCHC RBC-ENTMCNC: 30.3 PG — SIGNIFICANT CHANGE UP (ref 27–34)
MCHC RBC-ENTMCNC: 31 GM/DL — LOW (ref 32–36)
MCV RBC AUTO: 97.9 FL — SIGNIFICANT CHANGE UP (ref 80–100)
PLATELET # BLD AUTO: 195 K/UL — SIGNIFICANT CHANGE UP (ref 150–400)
POTASSIUM SERPL-MCNC: 3.8 MMOL/L — SIGNIFICANT CHANGE UP (ref 3.5–5.3)
POTASSIUM SERPL-SCNC: 3.8 MMOL/L — SIGNIFICANT CHANGE UP (ref 3.5–5.3)
RBC # BLD: 2.9 M/UL — LOW (ref 3.8–5.2)
RBC # FLD: 19.9 % — HIGH (ref 10.3–14.5)
SODIUM SERPL-SCNC: 140 MMOL/L — SIGNIFICANT CHANGE UP (ref 135–145)
SPECIMEN SOURCE: SIGNIFICANT CHANGE UP
SPECIMEN SOURCE: SIGNIFICANT CHANGE UP
WBC # BLD: 4.85 K/UL — SIGNIFICANT CHANGE UP (ref 3.8–10.5)
WBC # FLD AUTO: 4.85 K/UL — SIGNIFICANT CHANGE UP (ref 3.8–10.5)

## 2019-05-27 PROCEDURE — 85610 PROTHROMBIN TIME: CPT

## 2019-05-27 PROCEDURE — 96375 TX/PRO/DX INJ NEW DRUG ADDON: CPT | Mod: XU

## 2019-05-27 PROCEDURE — 81001 URINALYSIS AUTO W/SCOPE: CPT

## 2019-05-27 PROCEDURE — 87086 URINE CULTURE/COLONY COUNT: CPT

## 2019-05-27 PROCEDURE — 74176 CT ABD & PELVIS W/O CONTRAST: CPT

## 2019-05-27 PROCEDURE — 86900 BLOOD TYPING SEROLOGIC ABO: CPT

## 2019-05-27 PROCEDURE — 82330 ASSAY OF CALCIUM: CPT

## 2019-05-27 PROCEDURE — 84443 ASSAY THYROID STIM HORMONE: CPT

## 2019-05-27 PROCEDURE — 80053 COMPREHEN METABOLIC PANEL: CPT

## 2019-05-27 PROCEDURE — 96361 HYDRATE IV INFUSION ADD-ON: CPT | Mod: XU

## 2019-05-27 PROCEDURE — 80048 BASIC METABOLIC PNL TOTAL CA: CPT

## 2019-05-27 PROCEDURE — 82803 BLOOD GASES ANY COMBINATION: CPT

## 2019-05-27 PROCEDURE — 87040 BLOOD CULTURE FOR BACTERIA: CPT

## 2019-05-27 PROCEDURE — 87186 SC STD MICRODIL/AGAR DIL: CPT

## 2019-05-27 PROCEDURE — 96365 THER/PROPH/DIAG IV INF INIT: CPT | Mod: XU

## 2019-05-27 PROCEDURE — 83605 ASSAY OF LACTIC ACID: CPT

## 2019-05-27 PROCEDURE — 96376 TX/PRO/DX INJ SAME DRUG ADON: CPT | Mod: XU

## 2019-05-27 PROCEDURE — 86901 BLOOD TYPING SEROLOGIC RH(D): CPT

## 2019-05-27 PROCEDURE — 71045 X-RAY EXAM CHEST 1 VIEW: CPT

## 2019-05-27 PROCEDURE — 85014 HEMATOCRIT: CPT

## 2019-05-27 PROCEDURE — 93005 ELECTROCARDIOGRAM TRACING: CPT | Mod: XU

## 2019-05-27 PROCEDURE — 82947 ASSAY GLUCOSE BLOOD QUANT: CPT

## 2019-05-27 PROCEDURE — 99285 EMERGENCY DEPT VISIT HI MDM: CPT | Mod: 25

## 2019-05-27 PROCEDURE — 86850 RBC ANTIBODY SCREEN: CPT

## 2019-05-27 PROCEDURE — 51701 INSERT BLADDER CATHETER: CPT

## 2019-05-27 PROCEDURE — 85027 COMPLETE CBC AUTOMATED: CPT

## 2019-05-27 PROCEDURE — 82435 ASSAY OF BLOOD CHLORIDE: CPT

## 2019-05-27 PROCEDURE — 85730 THROMBOPLASTIN TIME PARTIAL: CPT

## 2019-05-27 PROCEDURE — 84295 ASSAY OF SERUM SODIUM: CPT

## 2019-05-27 PROCEDURE — 94640 AIRWAY INHALATION TREATMENT: CPT

## 2019-05-27 PROCEDURE — 84132 ASSAY OF SERUM POTASSIUM: CPT

## 2019-05-27 RX ORDER — ACETAMINOPHEN 500 MG
650 TABLET ORAL ONCE
Refills: 0 | Status: COMPLETED | OUTPATIENT
Start: 2019-05-27 | End: 2019-05-27

## 2019-05-27 RX ORDER — NALOXEGOL OXALATE 12.5 MG/1
1 TABLET, FILM COATED ORAL
Qty: 30 | Refills: 0
Start: 2019-05-27 | End: 2019-06-25

## 2019-05-27 RX ORDER — KETOROLAC TROMETHAMINE 30 MG/ML
15 SYRINGE (ML) INJECTION ONCE
Refills: 0 | Status: DISCONTINUED | OUTPATIENT
Start: 2019-05-27 | End: 2019-05-27

## 2019-05-27 RX ADMIN — HEPARIN SODIUM 5000 UNIT(S): 5000 INJECTION INTRAVENOUS; SUBCUTANEOUS at 05:03

## 2019-05-27 RX ADMIN — Medication 15 MILLIGRAM(S): at 06:48

## 2019-05-27 RX ADMIN — NALOXEGOL OXALATE 12.5 MILLIGRAM(S): 12.5 TABLET, FILM COATED ORAL at 12:43

## 2019-05-27 RX ADMIN — Medication 15 MILLIGRAM(S): at 06:11

## 2019-05-27 RX ADMIN — Medication 650 MILLIGRAM(S): at 03:42

## 2019-05-27 RX ADMIN — Medication 1 PATCH: at 06:54

## 2019-05-27 RX ADMIN — SODIUM CHLORIDE 75 MILLILITER(S): 9 INJECTION, SOLUTION INTRAVENOUS at 05:04

## 2019-05-27 RX ADMIN — Medication 1 PATCH: at 13:48

## 2019-05-27 RX ADMIN — Medication 3 MILLILITER(S): at 02:31

## 2019-05-27 RX ADMIN — OXYCODONE AND ACETAMINOPHEN 1 TABLET(S): 5; 325 TABLET ORAL at 12:32

## 2019-05-27 RX ADMIN — Medication 260 MILLIGRAM(S): at 02:08

## 2019-05-27 RX ADMIN — LIDOCAINE 1 PATCH: 4 CREAM TOPICAL at 12:43

## 2019-05-27 RX ADMIN — OXYCODONE AND ACETAMINOPHEN 1 TABLET(S): 5; 325 TABLET ORAL at 11:51

## 2019-05-27 RX ADMIN — OXYCODONE AND ACETAMINOPHEN 1 TABLET(S): 5; 325 TABLET ORAL at 00:16

## 2019-05-27 NOTE — PROGRESS NOTE ADULT - ASSESSMENT
73F PMHx of Breast Ca s/p right mastectomy chemotherapy tx (2006), Lung Ca, COPD (no home O2), hysterectomy, appendectomy, cholecystectomy, femoral hernia repair, multiple SBOs w/ resection, opioid dependence, p/w nausea/vomiting x 2-3 days. Associated w/ mild abdominal distention. Found to have renal failure,     a/P:  SHAKIRA:  Likely pre-renal  Improving with IVF  Continue IVF 75cc/hr  Monitor renal function    Acidosis:  Non-AG+ AG  continue IVF to 1/2NS with 75meq naHCO3- 75cc/hr

## 2019-05-27 NOTE — PROGRESS NOTE ADULT - ASSESSMENT
73 year old female with   PMH,    chronic Back pain (on Narcotics), history of multiple SBO's, narcotic associated constipation/ileus, COPD,  smoker,    ca  breast,  right  mastectomy. RENUKA cavitary lesion on ct, ,  c/c cachexia  path from  1/19, with NSCC with squamous  features,  s/p  RT     admitted with abdominal pain / vomiting,   from SBO, resolved  SHAKIRA on CKD 2/ dehydration    ct scan  with sbo/ mesenteric   swirling/  pulm opacities    pt is an active smoker, refusing to quit   ct chest , 4/19/18  noted/  oncology dr hernandez   dvt ppx   iv fluids/   protonix  avoid  narcotics, if  possible  bowel movements /on po diet  SHAKIRA,  resolving.  from 4.4  to  2.4/   awaiting  creatinine  today/ labs not  done  yesterday  on oxycodone  at pts  insistence         < from: CT Abdomen and Pelvis No Cont (05.22.19 @ 19:30) >  IMPRESSION:   Suboptimal studysecondary to absence of oral and IV contrast.  Small bowel obstruction with increased dilatation of the small bowel   compared to the prior exam 5/5/2019. Question transition point in the   right mid abdomen. No free intraperitoneal air.    Unchanged tree-in-bud opacities in the right lower lobe with mild   increased tree-in-bud opacities in the left lower lobe compared to prior   CT chest 4/19/2019.  < end of copied text >      < from: CT Chest No Cont (04.19.19 @ 18:50)   INTERPRETATION:  Motion degraded evaluation  Redemonstraion on left upper lobe cavitary mass consistent with patients   known hx of lung CA  Mucoid impacted right lower lobe ariways with scattered tree in bud   opacities  < end of copied text >      Cytopathology - Non Gyn Report (01.18.19 @ 11:43)    Immunohistochemical stains.  The immunophenotype together with the cytomorphology supports the  diagnosis squamous cell carcinoma.    Final Diagnosis  LUNG, LEFT UPPER LOBE, US GUIDED CORE BIOPSY AND FNA  POSITIVE FOR MALIGNANT CELLS.  Non-small cell carcinoma, with squamous features (see note).  Additional studies pending.

## 2019-05-27 NOTE — DISCHARGE NOTE PROVIDER - NSDCCPCAREPLAN_GEN_ALL_CORE_FT
PRINCIPAL DISCHARGE DIAGNOSIS  Diagnosis: Severe sepsis with acute organ dysfunction  Assessment and Plan of Treatment: You received IV antibiotic with resolution of your symptoms.  Call your Health care provider upon arrival home to make a one week follow up appointment.  If you develop fever, chills, malaise, or change in mental status call your Health Care Provider or go to the Emergency Department.  Nutrition is important, eat small frequent meals to help ensure you get adequate calories.  Do not stay in bed all day!  Increase your activity daily as tolerated.      SECONDARY DISCHARGE DIAGNOSES  Diagnosis: SBO (small bowel obstruction)  Assessment and Plan of Treatment: You were evaluated by the Surgery team and no obstruction found.  Follow up with your GI doctor in 1-2 weeks. Call for appointment.    Diagnosis: Urinary tract infection without hematuria, site unspecified  Assessment and Plan of Treatment: You received IV antibiotic with resolution of symptoms.  Follow up with your healthcare provider in one week. Call for an appointment.   If you develop a fever, burning on urination, difficulty voiding, call your healthcare provider. PRINCIPAL DISCHARGE DIAGNOSIS  Diagnosis: Severe sepsis with acute organ dysfunction  Assessment and Plan of Treatment: You received IV antibiotic with resolution of your symptoms.  Call your Health care provider upon arrival home to make a one week follow up appointment.  If you develop fever, chills, malaise, or change in mental status call your Health Care Provider or go to the Emergency Department.  Nutrition is important, eat small frequent meals to help ensure you get adequate calories.  Do not stay in bed all day!  Increase your activity daily as tolerated.      SECONDARY DISCHARGE DIAGNOSES  Diagnosis: SBO (small bowel obstruction)  Assessment and Plan of Treatment: You were evaluated by the Surgery team and no surgical intervention needed as condition resolved with medical management.  Follow up with your GI doctor in 1-2 weeks. Call for appointment.    Diagnosis: Urinary tract infection without hematuria, site unspecified  Assessment and Plan of Treatment: You received IV antibiotic with resolution of symptoms.  Follow up with your healthcare provider in one week. Call for an appointment.   If you develop a fever, burning on urination, difficulty voiding, call your healthcare provider.

## 2019-05-27 NOTE — DISCHARGE NOTE PROVIDER - CARE PROVIDERS DIRECT ADDRESSES
,DirectAddress_Unknown,DirectAddress_Unknown,[email or web address],DirectAddress_Unknown

## 2019-05-27 NOTE — PROGRESS NOTE ADULT - REASON FOR ADMISSION
abdominal pain/ ARF

## 2019-05-27 NOTE — DISCHARGE NOTE PROVIDER - HOSPITAL COURSE
73F PMHx of Breast Ca s/p right mastectomy chemotherapy tx (2006), Lung Ca, COPD (no home O2), hysterectomy, appendectomy, cholecystectomy, femoral hernia repair, multiple SBOs w/ resection, opioid dependence, p/w nausea/vomiting x 2-3 days. Associated w/ mild abdominal distention. LNBM 5/18, has not been passing flatus. Also w/ complaint of chronic lower sacrum pain that is achy in nature. 73F PMHx of Breast Ca s/p right mastectomy chemotherapy tx (2006), Lung Ca, COPD (no home O2), hysterectomy, appendectomy, cholecystectomy, femoral hernia repair, multiple SBOs w/ resection, opioid dependence, p/w nausea/vomiting x 2-3 days. Associated w/ mild abdominal distention. LNBM 5/18, has not been passing flatus. Also w/ complaint of chronic lower sacrum pain that is achy in nature.         CT A/P shows SBO; Evaluated by Surgery and no surgical intervention recommended during this visit as condition resolved with medical mgmt. Presenting symptoms now resolved.  Urine cx grew klebsiella uti which responded well to 1 dose IV Cefepime; ARF tx with IVF and shows significant improvement.  Medically cleared for discharge to home by Dr Dennison.

## 2019-05-27 NOTE — PROGRESS NOTE ADULT - SUBJECTIVE AND OBJECTIVE BOX
wants  narcotics  appears  comfortable    REVIEW OF SYSTEMS:  GEN: no fever,    no chills  RESP: no SOB,   no cough  CVS: no chest pain,   no palpitations  GI: no abdominal pain,   no nausea,   no vomiting,   no constipation,   no diarrhea  : no dysuria,   no frequency  NEURO: no headache,   no dizziness  PSYCH: no depression,   not anxious  Derm : no rash    MEDICATIONS  (STANDING):  heparin  Injectable 5000 Unit(s) SubCutaneous every 12 hours  lidocaine   Patch 1 Patch Transdermal every 24 hours  naloxegol 12.5 milliGRAM(s) Oral daily  nicotine  14 mG/24 Hr(s) Transdermal Patch - Peds 1 Patch Transdermal daily  ondansetron Injectable 4 milliGRAM(s) IV Push once  pantoprazole  Injectable 40 milliGRAM(s) IV Push daily  sodium chloride 0.45% 1000 milliLiter(s) (75 mL/Hr) IV Continuous <Continuous>    MEDICATIONS  (PRN):  acetaminophen   Tablet .. 650 milliGRAM(s) Oral every 6 hours PRN Moderate Pain (4 - 6)  ALBUTerol/ipratropium for Nebulization. 3 milliLiter(s) Nebulizer every 6 hours PRN Shortness of Breath and/or Wheezing  oxyCODONE    5 mG/acetaminophen 325 mG 1 Tablet(s) Oral every 12 hours PRN Severe Pain (7 - 10)      Vital Signs Last 24 Hrs  T(C): 36.7 (27 May 2019 04:37), Max: 36.8 (26 May 2019 10:26)  T(F): 98 (27 May 2019 04:37), Max: 98.2 (26 May 2019 10:26)  HR: 85 (27 May 2019 04:37) (74 - 95)  BP: 155/81 (27 May 2019 04:37) (114/82 - 155/81)  BP(mean): --  RR: 18 (27 May 2019 04:37) (16 - 18)  SpO2: 97% (27 May 2019 04:37) (95% - 98%)  CAPILLARY BLOOD GLUCOSE        I&O's Summary    26 May 2019 07:01  -  27 May 2019 07:00  --------------------------------------------------------  IN: 2370 mL / OUT: 700 mL / NET: 1670 mL        PHYSICAL EXAM:  HEAD:  Atraumatic, Normocephalic  NECK: Supple, No   JVD  CHEST/LUNG:   no     rales,     no,    rhonchi  HEART: Regular rate and rhythm;         murmur  ABDOMEN: Soft, Nontender, ;   EXTREMITIES:   no     edema  NEUROLOGY:  alert    LABS:                        10.0   3.95  )-----------( 211      ( 25 May 2019 17:30 )             33.2     05-25    144  |  111<H>  |  45<H>  ----------------------------<  68<L>  3.9   |  15<L>  |  2.46<H>    Ca    7.3<L>      25 May 2019 11:19                      Thyroid Stimulating Hormone, Serum: 3.07 uIU/mL (05-25 @ 17:37)          Consultant(s) Notes Reviewed:      Care Discussed with Consultants/Other Providers:

## 2019-05-27 NOTE — PROGRESS NOTE ADULT - SUBJECTIVE AND OBJECTIVE BOX
CARDIOLOGY     PROGRESS  NOTE   ________________________________________________    CHIEF COMPLAINT:Patient is a 73y old  Female who presents with a chief complaint of abdominal pain/ ARF (27 May 2019 07:38)  doing better.  	  REVIEW OF SYSTEMS:  CONSTITUTIONAL: No fever, weight loss, or fatigue  EYES: No eye pain, visual disturbances, or discharge  ENT:  No difficulty hearing, tinnitus, vertigo; No sinus or throat pain  NECK: No pain or stiffness  RESPIRATORY: No cough, wheezing, chills or hemoptysis; No Shortness of Breath  CARDIOVASCULAR: No chest pain, palpitations, passing out, dizziness, or leg swelling  GASTROINTESTINAL: No abdominal or epigastric pain. No nausea, vomiting, or hematemesis; No diarrhea or constipation. No melena or hematochezia.  GENITOURINARY: No dysuria, frequency, hematuria, or incontinence  NEUROLOGICAL: No headaches, memory loss, loss of strength, numbness, or tremors  SKIN: No itching, burning, rashes, or lesions   LYMPH Nodes: No enlarged glands  ENDOCRINE: No heat or cold intolerance; No hair loss  MUSCULOSKELETAL: No joint pain or swelling; No muscle, back, or extremity pain  PSYCHIATRIC: No depression, anxiety, mood swings, or difficulty sleeping  HEME/LYMPH: No easy bruising, or bleeding gums  ALLERGY AND IMMUNOLOGIC: No hives or eczema	    [ ] All others negative	  [ ] Unable to obtain    PHYSICAL EXAM:  T(C): 36.7 (05-27-19 @ 04:37), Max: 36.8 (05-26-19 @ 10:26)  HR: 85 (05-27-19 @ 04:37) (74 - 95)  BP: 155/81 (05-27-19 @ 04:37) (114/82 - 155/81)  RR: 18 (05-27-19 @ 04:37) (16 - 18)  SpO2: 97% (05-27-19 @ 04:37) (95% - 98%)  Wt(kg): --  I&O's Summary    26 May 2019 07:01  -  27 May 2019 07:00  --------------------------------------------------------  IN: 2370 mL / OUT: 700 mL / NET: 1670 mL        Appearance: Normal	  HEENT:   Normal oral mucosa, PERRL, EOMI	  Lymphatic: No lymphadenopathy  Cardiovascular: Normal S1 S2, No JVD, +murmurs, No edema  Respiratory: Lungs clear to auscultation	  Psychiatry: A & O x 3, Mood & affect appropriate  Gastrointestinal:  Soft, Non-tender, + BS	  Skin: No rashes, No ecchymoses, No cyanosis	  Neurologic: Non-focal  Extremities: Normal range of motion, No clubbing, cyanosis or edema  Vascular: Peripheral pulses palpable 2+ bilaterally    MEDICATIONS  (STANDING):  heparin  Injectable 5000 Unit(s) SubCutaneous every 12 hours  lidocaine   Patch 1 Patch Transdermal every 24 hours  naloxegol 12.5 milliGRAM(s) Oral daily  nicotine  14 mG/24 Hr(s) Transdermal Patch - Peds 1 Patch Transdermal daily  ondansetron Injectable 4 milliGRAM(s) IV Push once  pantoprazole  Injectable 40 milliGRAM(s) IV Push daily  sodium chloride 0.45% 1000 milliLiter(s) (75 mL/Hr) IV Continuous <Continuous>      TELEMETRY: 	    ECG:  	  RADIOLOGY:  OTHER: 	  	  LABS:	 	    CARDIAC MARKERS:                                10.0   3.95  )-----------( 211      ( 25 May 2019 17:30 )             33.2     05-25    144  |  111<H>  |  45<H>  ----------------------------<  68<L>  3.9   |  15<L>  |  2.46<H>    Ca    7.3<L>      25 May 2019 11:19      proBNP:   Lipid Profile:   HgA1c:   TSH: Thyroid Stimulating Hormone, Serum: 3.07 uIU/mL (05-25 @ 17:37)          Assessment and plan  ---------------------------  awaiting renal function if improved dc today  dvt prophylaxis  laxatives  tolerating po well  will add beta blocker if increase bp

## 2019-05-27 NOTE — PROGRESS NOTE ADULT - SUBJECTIVE AND OBJECTIVE BOX
Dr. Mosquera  Office [phone]  Cell [phone]  Akiko RICH  Cell [phone]      Patient is a 73y old  Female who presents with a chief complaint of abdominal pain/ ARF (27 May 2019 10:15)      Patient seen and examined at bedside. No chest pain/sob    VITALS:  T(F): 97.7 (05-27-19 @ 09:45), Max: 98.2 (05-26-19 @ 14:11)  HR: 86 (05-27-19 @ 09:45)  BP: 157/84 (05-27-19 @ 09:45)  RR: 18 (05-27-19 @ 09:45)  SpO2: 97% (05-27-19 @ 09:45)  Wt(kg): --    05-26 @ 07:01  -  05-27 @ 07:00  --------------------------------------------------------  IN: 2370 mL / OUT: 700 mL / NET: 1670 mL    05-27 @ 07:01  -  05-27 @ 13:59  --------------------------------------------------------  IN: 300 mL / OUT: 0 mL / NET: 300 mL          PHYSICAL EXAM:  Constitutional: NAD  Neck: No JVD  Respiratory: CTAB, no wheezes, rales or rhonchi  Cardiovascular: S1, S2, RRR  Gastrointestinal: BS+, soft, NT/ND  Extremities: No peripheral edema    Hospital Medications:   MEDICATIONS  (STANDING):  heparin  Injectable 5000 Unit(s) SubCutaneous every 12 hours  lidocaine   Patch 1 Patch Transdermal every 24 hours  naloxegol 12.5 milliGRAM(s) Oral daily  nicotine  14 mG/24 Hr(s) Transdermal Patch - Peds 1 Patch Transdermal daily  ondansetron Injectable 4 milliGRAM(s) IV Push once  pantoprazole  Injectable 40 milliGRAM(s) IV Push daily  sodium chloride 0.45% 1000 milliLiter(s) (75 mL/Hr) IV Continuous <Continuous>      LABS:  05-27    140  |  110<H>  |  30<H>  ----------------------------<  93  3.8   |  17<L>  |  1.63<H>    Ca    7.2<L>      27 May 2019 07:12      Creatinine Trend: 1.63 <--, 2.46 <--, 3.09 <--, 4.41 <--                                8.8    4.85  )-----------( 195      ( 27 May 2019 08:57 )             28.4     Urine Studies:  Urinalysis - [05-22-19 @ 15:45]      Color Yellow / Appearance Slightly Turbid / SG 1.016 / pH 6.0      Gluc Negative / Ketone Negative  / Bili Negative / Urobili Negative       Blood Small / Protein 30 mg/dL / Leuk Est Large / Nitrite Negative      RBC 5 / WBC 42 / Hyaline 1 / Gran  / Sq Epi  / Non Sq Epi 1 / Bacteria Many      TSH 3.07      [05-25-19 @ 17:37]        RADIOLOGY & ADDITIONAL STUDIES:

## 2019-05-27 NOTE — DISCHARGE NOTE PROVIDER - CARE PROVIDER_API CALL
Catarino Rondon (MD)  Gastroenterology; Internal Medicine  [address] [facility], [address]  [place], [place] [address]  Phone: [phone]  Fax: [phone]  Follow Up Time:     Jonathan Dennison (DO)  Cardiology Medicine  [address] [place] [address], [address]  [place] [address]  Phone: [phone]  Fax: [phone]  Follow Up Time:

## 2019-05-27 NOTE — PHYSICAL THERAPY INITIAL EVALUATION ADULT - PERTINENT HX OF CURRENT PROBLEM, REHAB EVAL
73F PMHx of Breast Ca s/p right mastectomy chemotherapy tx (2006), Lung Ca, COPD (no home O2), hysterectomy, appendectomy, cholecystectomy, femoral hernia repair, multiple SBOs w/ resection, opioid dependence, p/w nausea/vomiting x 2-3 days. Associated w/ mild abdominal distention. LNBM 5/18, has not been passing flatus. Also w/ complaint of chronic lower sacrum pain that is achy in nature.  Pt found to have AR, r/o SBO.

## 2019-05-27 NOTE — DISCHARGE NOTE NURSING/CASE MANAGEMENT/SOCIAL WORK - NSDCDPATPORTLINK_GEN_ALL_CORE
You can access the [facility] Patient Portal, offered by [facility], by registering with the following website: [email or web address]

## 2019-06-05 ENCOUNTER — APPOINTMENT (OUTPATIENT)
Dept: NUCLEAR MEDICINE | Facility: IMAGING CENTER | Age: 74
End: 2019-06-05

## 2019-06-06 ENCOUNTER — INPATIENT (INPATIENT)
Facility: HOSPITAL | Age: 74
LOS: 4 days | Discharge: HOME | DRG: 388 | End: 2019-06-11
Attending: INTERNAL MEDICINE | Admitting: INTERNAL MEDICINE
Payer: MEDICARE

## 2019-06-06 VITALS
HEART RATE: 80 BPM | RESPIRATION RATE: 19 BRPM | WEIGHT: 82.89 LBS | HEIGHT: 63 IN | OXYGEN SATURATION: 90 % | SYSTOLIC BLOOD PRESSURE: 90 MMHG | DIASTOLIC BLOOD PRESSURE: 54 MMHG

## 2019-06-06 LAB
ALBUMIN SERPL ELPH-MCNC: 3.6 G/DL — SIGNIFICANT CHANGE UP (ref 3.3–5)
ALP SERPL-CCNC: 144 U/L — HIGH (ref 40–120)
ALT FLD-CCNC: 41 U/L — SIGNIFICANT CHANGE UP (ref 10–45)
ANION GAP SERPL CALC-SCNC: 28 MMOL/L — HIGH (ref 5–17)
APPEARANCE UR: ABNORMAL
APTT BLD: 29.3 SEC — SIGNIFICANT CHANGE UP (ref 27.5–36.3)
AST SERPL-CCNC: 50 U/L — HIGH (ref 10–40)
BACTERIA # UR AUTO: ABNORMAL
BILIRUB SERPL-MCNC: 0.4 MG/DL — SIGNIFICANT CHANGE UP (ref 0.2–1.2)
BILIRUB UR-MCNC: NEGATIVE — SIGNIFICANT CHANGE UP
BLD GP AB SCN SERPL QL: NEGATIVE — SIGNIFICANT CHANGE UP
BUN SERPL-MCNC: 63 MG/DL — HIGH (ref 7–23)
CALCIUM SERPL-MCNC: 5.8 MG/DL — CRITICAL LOW (ref 8.4–10.5)
CHLORIDE SERPL-SCNC: 88 MMOL/L — LOW (ref 96–108)
CO2 SERPL-SCNC: 23 MMOL/L — SIGNIFICANT CHANGE UP (ref 22–31)
COLOR SPEC: YELLOW — SIGNIFICANT CHANGE UP
CREAT SERPL-MCNC: 5.08 MG/DL — HIGH (ref 0.5–1.3)
DIFF PNL FLD: ABNORMAL
EPI CELLS # UR: 1 /HPF — SIGNIFICANT CHANGE UP
GAS PNL BLDV: SIGNIFICANT CHANGE UP
GAS PNL BLDV: SIGNIFICANT CHANGE UP
GLUCOSE SERPL-MCNC: 340 MG/DL — HIGH (ref 70–99)
GLUCOSE UR QL: NEGATIVE — SIGNIFICANT CHANGE UP
HCT VFR BLD CALC: 33.9 % — LOW (ref 34.5–45)
HGB BLD-MCNC: 10.7 G/DL — LOW (ref 11.5–15.5)
HYALINE CASTS # UR AUTO: 2 /LPF — SIGNIFICANT CHANGE UP (ref 0–2)
INR BLD: 1.7 RATIO — HIGH (ref 0.88–1.16)
KETONES UR-MCNC: NEGATIVE — SIGNIFICANT CHANGE UP
LEUKOCYTE ESTERASE UR-ACNC: ABNORMAL
LYMPHOCYTES # BLD AUTO: 0.2 K/UL — LOW (ref 1–3.3)
LYMPHOCYTES # BLD AUTO: 9 % — LOW (ref 13–44)
MCHC RBC-ENTMCNC: 30.3 PG — SIGNIFICANT CHANGE UP (ref 27–34)
MCHC RBC-ENTMCNC: 31.5 GM/DL — LOW (ref 32–36)
MCV RBC AUTO: 96.4 FL — SIGNIFICANT CHANGE UP (ref 80–100)
MONOCYTES # BLD AUTO: 0.3 K/UL — SIGNIFICANT CHANGE UP (ref 0–0.9)
MONOCYTES NFR BLD AUTO: 10 % — SIGNIFICANT CHANGE UP (ref 2–14)
NEUTROPHILS # BLD AUTO: 2.7 K/UL — SIGNIFICANT CHANGE UP (ref 1.8–7.4)
NEUTROPHILS NFR BLD AUTO: 70 % — SIGNIFICANT CHANGE UP (ref 43–77)
NITRITE UR-MCNC: NEGATIVE — SIGNIFICANT CHANGE UP
NT-PROBNP SERPL-SCNC: 2352 PG/ML — HIGH (ref 0–300)
PH UR: 6 — SIGNIFICANT CHANGE UP (ref 5–8)
PLATELET # BLD AUTO: 267 K/UL — SIGNIFICANT CHANGE UP (ref 150–400)
POTASSIUM SERPL-MCNC: 4 MMOL/L — SIGNIFICANT CHANGE UP (ref 3.5–5.3)
POTASSIUM SERPL-SCNC: 4 MMOL/L — SIGNIFICANT CHANGE UP (ref 3.5–5.3)
PROT SERPL-MCNC: 6.8 G/DL — SIGNIFICANT CHANGE UP (ref 6–8.3)
PROT UR-MCNC: 100 — SIGNIFICANT CHANGE UP
PROTHROM AB SERPL-ACNC: 19.9 SEC — HIGH (ref 10–12.9)
RBC # BLD: 3.52 M/UL — LOW (ref 3.8–5.2)
RBC # FLD: 17.7 % — HIGH (ref 10.3–14.5)
RBC CASTS # UR COMP ASSIST: 2 /HPF — SIGNIFICANT CHANGE UP (ref 0–4)
RH IG SCN BLD-IMP: POSITIVE — SIGNIFICANT CHANGE UP
SODIUM SERPL-SCNC: 139 MMOL/L — SIGNIFICANT CHANGE UP (ref 135–145)
SP GR SPEC: 1.02 — SIGNIFICANT CHANGE UP (ref 1.01–1.02)
TROPONIN T, HIGH SENSITIVITY RESULT: 55 NG/L — HIGH (ref 0–51)
UROBILINOGEN FLD QL: NEGATIVE — SIGNIFICANT CHANGE UP
WBC # BLD: 3.3 K/UL — LOW (ref 3.8–10.5)
WBC # FLD AUTO: 3.3 K/UL — LOW (ref 3.8–10.5)
WBC UR QL: 340 /HPF — HIGH (ref 0–5)

## 2019-06-06 PROCEDURE — 93010 ELECTROCARDIOGRAM REPORT: CPT

## 2019-06-06 PROCEDURE — 71045 X-RAY EXAM CHEST 1 VIEW: CPT | Mod: 26

## 2019-06-06 PROCEDURE — 99291 CRITICAL CARE FIRST HOUR: CPT | Mod: GC

## 2019-06-06 PROCEDURE — 74176 CT ABD & PELVIS W/O CONTRAST: CPT | Mod: 26

## 2019-06-06 RX ORDER — SODIUM CHLORIDE 9 MG/ML
1000 INJECTION, SOLUTION INTRAVENOUS ONCE
Refills: 0 | Status: COMPLETED | OUTPATIENT
Start: 2019-06-06 | End: 2019-06-06

## 2019-06-06 RX ORDER — NOREPINEPHRINE BITARTRATE/D5W 8 MG/250ML
0.05 PLASTIC BAG, INJECTION (ML) INTRAVENOUS
Qty: 8 | Refills: 0 | Status: DISCONTINUED | OUTPATIENT
Start: 2019-06-06 | End: 2019-06-07

## 2019-06-06 RX ORDER — CEFEPIME 1 G/1
1000 INJECTION, POWDER, FOR SOLUTION INTRAMUSCULAR; INTRAVENOUS ONCE
Refills: 0 | Status: COMPLETED | OUTPATIENT
Start: 2019-06-06 | End: 2019-06-06

## 2019-06-06 RX ORDER — DEXTROSE 50 % IN WATER 50 %
50 SYRINGE (ML) INTRAVENOUS ONCE
Refills: 0 | Status: COMPLETED | OUTPATIENT
Start: 2019-06-06 | End: 2019-06-06

## 2019-06-06 RX ORDER — SODIUM CHLORIDE 9 MG/ML
2000 INJECTION INTRAMUSCULAR; INTRAVENOUS; SUBCUTANEOUS ONCE
Refills: 0 | Status: COMPLETED | OUTPATIENT
Start: 2019-06-06 | End: 2019-06-06

## 2019-06-06 RX ORDER — VANCOMYCIN HCL 1 G
1000 VIAL (EA) INTRAVENOUS ONCE
Refills: 0 | Status: COMPLETED | OUTPATIENT
Start: 2019-06-06 | End: 2019-06-06

## 2019-06-06 RX ADMIN — SODIUM CHLORIDE 1000 MILLILITER(S): 9 INJECTION, SOLUTION INTRAVENOUS at 20:02

## 2019-06-06 RX ADMIN — CEFEPIME 100 MILLIGRAM(S): 1 INJECTION, POWDER, FOR SOLUTION INTRAMUSCULAR; INTRAVENOUS at 18:53

## 2019-06-06 RX ADMIN — Medication 250 MILLIGRAM(S): at 19:42

## 2019-06-06 RX ADMIN — SODIUM CHLORIDE 2000 MILLILITER(S): 9 INJECTION INTRAMUSCULAR; INTRAVENOUS; SUBCUTANEOUS at 17:13

## 2019-06-06 RX ADMIN — Medication 125 MILLIGRAM(S): at 19:42

## 2019-06-06 RX ADMIN — Medication 50 MILLILITER(S): at 17:00

## 2019-06-06 NOTE — ED ADULT NURSE REASSESSMENT NOTE - NS ED NURSE REASSESS COMMENT FT1
pt's diaper changed. liquid brown stool noted. patient has unblanchable sacral redness. fresh diaper and linen provided. safety maintained.

## 2019-06-06 NOTE — ED ADULT NURSE REASSESSMENT NOTE - NS ED NURSE REASSESS COMMENT FT1
Pt hard stick. After multiple attempts by both RN and MD, able to get IV in R ankle. Pt hard stick. After multiple attempts by both RN and MD, able to get IV in R ankle by MD Dusty Cuevas.

## 2019-06-06 NOTE — ED ADULT NURSE NOTE - OBJECTIVE STATEMENT
73YOF pmh breast CA, mastectomy right side, lung CA, COPD, hysterectomy, appendectomy, cholecystectomy, femoral hernia repair, multiple SBOs w/ resection, opioid dependence presents to ED c/o abd pain, chest pain for 1.5 days. pt had couple days of vomiting, non bloody. Abd distended, hard, and tender. Pt has cynotic toes and fingers, but pt states she has had that for "years." Denies fever, chills, HA, tingling, numbness, denies blood in stool. Pt placed on cardiac monitor, EKG done.  Pt straight cathed per MD order, specimen sent to the lab.  Safety and comfort measures provided. Aide from home at bedside.

## 2019-06-06 NOTE — ED ADULT NURSE REASSESSMENT NOTE - NS ED NURSE REASSESS COMMENT FT1
report received from YURIY Barrett. Patient was hypotensive systolic 70s. MD Cuevas made aware. Second bolus of NS given to patient using pressure bag. Patient's BP increased to 102/84. Patient appears to be in no acute distress. safety maintained.

## 2019-06-06 NOTE — ED PROVIDER NOTE - OBJECTIVE STATEMENT
73F PMHx of Breast Ca s/p right mastectomy chemotherapy tx (2006), Lung Ca, COPD (no home O2), hysterectomy, appendectomy, cholecystectomy, femoral hernia repair, multiple SBOs w/ resection, opioid dependence, p/w chest and abdominal pain for the last 2 days. Pt has had several episodes of vomiting and has had poor PO intake over the last 2 days as well.

## 2019-06-06 NOTE — ED PROVIDER NOTE - PROGRESS NOTE DETAILS
upon initial eval and story, pt was considered more likely to have bowel obs / dehyration as cause of her hypotension / tachy and that sepsis was not present. after results of cxr / ua we are treating for infection - still unclear if this is sepsis. cont resus and re-evals ctap to determine dx. Elaine PGY1: surgery aware of pt, coming to eval. blood pressure stable on 3L of IVFs. pt declining ngt at this time. states she has been here 3 times for same and does not want to have it. explained risks. not vomiting from her arrival till now. SEPSIS RE-ASSESSMENT : I reassessed the patient, reviewed vital signs. heart RRR, nl S1 and S2, lungs wheeze b/l, no w/r/r, capillary refill normal in all extremities, 2/4 pulses in all extremities, skin warm and dry centrally, cool at fingertips with no tenting. additional 1L LR hanging. surg has seen pt and still pending dispo - transfer of care to night md dr cruz

## 2019-06-06 NOTE — ED PROVIDER NOTE - ATTENDING CONTRIBUTION TO CARE
73 female presenting with multiple complaints - mainly abdominal pain  non-tender abdomen. pt drowsy but arousable, very thin and frail, tachy / hypotensive. exam non-focal, completely with mental faculties when awake  pt states has had multiple bowel obs in past / med list incl midodrine and pt states he bp is usually low. difficult access by nursing - I had to place a line in the ankle. will consider possible central access as well. on primary eval infection less likely than dehydration from bowel obs. labs , ctap, fluid bolus ordered. hr improved w fluids but remaining w soft bp palomo when fluids stopped. on further receiept of results xr w probable pna and ua w uti. treat w abx and surg called for sbo. pt was endorsed to night md at change of shift pending surgical consultation results.

## 2019-06-07 DIAGNOSIS — R10.9 UNSPECIFIED ABDOMINAL PAIN: ICD-10-CM

## 2019-06-07 LAB
C DIFF GDH STL QL: NEGATIVE — SIGNIFICANT CHANGE UP
C DIFF GDH STL QL: SIGNIFICANT CHANGE UP
TROPONIN T, HIGH SENSITIVITY RESULT: 48 NG/L — SIGNIFICANT CHANGE UP (ref 0–51)

## 2019-06-07 PROCEDURE — 99223 1ST HOSP IP/OBS HIGH 75: CPT

## 2019-06-07 RX ORDER — TIOTROPIUM BROMIDE 18 UG/1
1 CAPSULE ORAL; RESPIRATORY (INHALATION) DAILY
Refills: 0 | Status: DISCONTINUED | OUTPATIENT
Start: 2019-06-07 | End: 2019-06-11

## 2019-06-07 RX ORDER — BUDESONIDE, MICRONIZED 100 %
0.5 POWDER (GRAM) MISCELLANEOUS
Refills: 0 | Status: DISCONTINUED | OUTPATIENT
Start: 2019-06-07 | End: 2019-06-11

## 2019-06-07 RX ORDER — ALBUTEROL 90 UG/1
1 AEROSOL, METERED ORAL EVERY 4 HOURS
Refills: 0 | Status: DISCONTINUED | OUTPATIENT
Start: 2019-06-07 | End: 2019-06-11

## 2019-06-07 RX ORDER — NALOXEGOL OXALATE 12.5 MG/1
12.5 TABLET, FILM COATED ORAL DAILY
Refills: 0 | Status: DISCONTINUED | OUTPATIENT
Start: 2019-06-07 | End: 2019-06-07

## 2019-06-07 RX ORDER — SODIUM CHLORIDE 9 MG/ML
1000 INJECTION, SOLUTION INTRAVENOUS
Refills: 0 | Status: DISCONTINUED | OUTPATIENT
Start: 2019-06-07 | End: 2019-06-07

## 2019-06-07 RX ORDER — PANTOPRAZOLE SODIUM 20 MG/1
40 TABLET, DELAYED RELEASE ORAL
Refills: 0 | Status: DISCONTINUED | OUTPATIENT
Start: 2019-06-07 | End: 2019-06-11

## 2019-06-07 RX ORDER — AZTREONAM 2 G
VIAL (EA) INJECTION
Refills: 0 | Status: DISCONTINUED | OUTPATIENT
Start: 2019-06-07 | End: 2019-06-09

## 2019-06-07 RX ORDER — DIAZEPAM 5 MG
2 TABLET ORAL AT BEDTIME
Refills: 0 | Status: DISCONTINUED | OUTPATIENT
Start: 2019-06-07 | End: 2019-06-11

## 2019-06-07 RX ORDER — ACETAMINOPHEN 500 MG
650 TABLET ORAL ONCE
Refills: 0 | Status: COMPLETED | OUTPATIENT
Start: 2019-06-07 | End: 2019-06-07

## 2019-06-07 RX ORDER — HEPARIN SODIUM 5000 [USP'U]/ML
5000 INJECTION INTRAVENOUS; SUBCUTANEOUS EVERY 12 HOURS
Refills: 0 | Status: DISCONTINUED | OUTPATIENT
Start: 2019-06-07 | End: 2019-06-11

## 2019-06-07 RX ORDER — AZTREONAM 2 G
1000 VIAL (EA) INJECTION EVERY 12 HOURS
Refills: 0 | Status: DISCONTINUED | OUTPATIENT
Start: 2019-06-07 | End: 2019-06-09

## 2019-06-07 RX ORDER — ACETAMINOPHEN 500 MG
600 TABLET ORAL ONCE
Refills: 0 | Status: COMPLETED | OUTPATIENT
Start: 2019-06-07 | End: 2019-06-07

## 2019-06-07 RX ORDER — AZTREONAM 2 G
1000 VIAL (EA) INJECTION ONCE
Refills: 0 | Status: COMPLETED | OUTPATIENT
Start: 2019-06-07 | End: 2019-06-07

## 2019-06-07 RX ORDER — VANCOMYCIN HCL 1 G
1000 VIAL (EA) INTRAVENOUS ONCE
Refills: 0 | Status: COMPLETED | OUTPATIENT
Start: 2019-06-07 | End: 2019-06-07

## 2019-06-07 RX ORDER — SODIUM CHLORIDE 9 MG/ML
1000 INJECTION INTRAMUSCULAR; INTRAVENOUS; SUBCUTANEOUS
Refills: 0 | Status: DISCONTINUED | OUTPATIENT
Start: 2019-06-07 | End: 2019-06-10

## 2019-06-07 RX ORDER — NALOXEGOL OXALATE 12.5 MG/1
25 TABLET, FILM COATED ORAL ONCE
Refills: 0 | Status: COMPLETED | OUTPATIENT
Start: 2019-06-07 | End: 2019-06-07

## 2019-06-07 RX ORDER — ACETAMINOPHEN 500 MG
650 TABLET ORAL EVERY 6 HOURS
Refills: 0 | Status: DISCONTINUED | OUTPATIENT
Start: 2019-06-07 | End: 2019-06-11

## 2019-06-07 RX ORDER — NICOTINE POLACRILEX 2 MG
1 GUM BUCCAL DAILY
Refills: 0 | Status: DISCONTINUED | OUTPATIENT
Start: 2019-06-07 | End: 2019-06-07

## 2019-06-07 RX ADMIN — PANTOPRAZOLE SODIUM 40 MILLIGRAM(S): 20 TABLET, DELAYED RELEASE ORAL at 08:45

## 2019-06-07 RX ADMIN — SODIUM CHLORIDE 3000 MILLILITER(S): 9 INJECTION, SOLUTION INTRAVENOUS at 02:20

## 2019-06-07 RX ADMIN — SODIUM CHLORIDE 200 MILLILITER(S): 9 INJECTION, SOLUTION INTRAVENOUS at 00:44

## 2019-06-07 RX ADMIN — SODIUM CHLORIDE 100 MILLILITER(S): 9 INJECTION INTRAMUSCULAR; INTRAVENOUS; SUBCUTANEOUS at 07:08

## 2019-06-07 RX ADMIN — Medication 2 MILLIGRAM(S): at 23:35

## 2019-06-07 RX ADMIN — TIOTROPIUM BROMIDE 1 CAPSULE(S): 18 CAPSULE ORAL; RESPIRATORY (INHALATION) at 11:58

## 2019-06-07 RX ADMIN — Medication 240 MILLIGRAM(S): at 04:52

## 2019-06-07 RX ADMIN — Medication 50 MILLIGRAM(S): at 18:14

## 2019-06-07 RX ADMIN — HEPARIN SODIUM 5000 UNIT(S): 5000 INJECTION INTRAVENOUS; SUBCUTANEOUS at 18:09

## 2019-06-07 RX ADMIN — NALOXEGOL OXALATE 25 MILLIGRAM(S): 12.5 TABLET, FILM COATED ORAL at 04:52

## 2019-06-07 RX ADMIN — Medication 260 MILLIGRAM(S): at 23:35

## 2019-06-07 RX ADMIN — Medication 50 MILLIGRAM(S): at 08:45

## 2019-06-07 RX ADMIN — Medication 250 MILLIGRAM(S): at 20:14

## 2019-06-07 RX ADMIN — Medication 0.5 MILLIGRAM(S): at 18:09

## 2019-06-07 RX ADMIN — Medication 600 MILLIGRAM(S): at 06:55

## 2019-06-07 NOTE — PROVIDER CONTACT NOTE (OTHER) - BACKGROUND
88 y F Dx: Vtach PMH: IBS, gastroparesis, diverticulitis, PAD, lumbar herniated disc, spinal stenosis, HTN, CAD, Afib, DM.

## 2019-06-07 NOTE — CONSULT NOTE ADULT - ASSESSMENT
lung cancer early stage s/p ebrt due for outpt pet/ct   consider f/u ct chest though current apparent pneumonia would likely confuse evaluation of lung cancer f/u    acute renal failure, ?urosepsis  hydration  abx    recurrent ileus  n/v diarrhea  gi reevaluation lena vicente

## 2019-06-07 NOTE — CONSULT NOTE ADULT - ASSESSMENT
72 yo female well known to the service with history of narcotic bowel presented with nausea and vomiting, found to be septic and SHAKIRA likely from UTI, CT scan showed SBO without free air, patient hemodynamically stable with benign abdominal exam     Plan/recommendation:  - known narcotic bowel with repeat admits with CTs read as SBO's but all have resolved with either oral narcan or Movantic/Relistor, no surgical intervention   - Patient is followed by Dr. Rondon from GI, please notify Dr. Rondon   - patient discussed with Dr. Leoncio Frank, PGY-2  Green Team Surgery  p. [phone]

## 2019-06-07 NOTE — H&P ADULT - ASSESSMENT
pt with multiple admission sec to SBO sec to opioid usa and acute renal failure sec to severe dehydration.  ivf  bladder scan  check cultures  SBO surgery appreciated  dvt prophylaxis  avoid opioid  chest pain doubt cardiac /tele   cardiac enzymes  asa daily  echo

## 2019-06-07 NOTE — H&P ADULT - HISTORY OF PRESENT ILLNESS
CHIEF COMPLAINT:Patient is a 73y old  Female who presents with a chief complaint of cp    HPI:  72 yo F PMHx of Breast Ca s/p right mastectomy chemotherapy tx (2006), Lung Ca, COPD (no home O2), hysterectomy, appendectomy, cholecystectomy, femoral hernia repair, multiple SBOs w/ resection, opioid dependence, p/w chest and abdominal pain for the last 2 days. Pt has had several episodes of vomiting and has had poor PO intake over the last 2 days as well.  pt with multiple admission for sbo sec to opioids and  recent admission for renal failure and urosepsis.  +hx of cad.    PAST MEDICAL & SURGICAL HISTORY:  Back pain  Bowel obstruction: multiple hospitalizations  Kidney stone  Emphysema  Narcotic Dependence  S/P Radiation Therapy  S/P Chemotherapy, Time Since Greater than 12 Weeks  Narcotic Dysmotile Cilia Syndrome  Chronic Pain Following Surgery or Procedure: following right breast mastectomy  Ankle Fracture: right anle fx s/p cast 2009  History of Small Bowel Obstruction: 1/2010  Asthma  Breast Cancer  S/P hernia surgery: femoral hernia - 2012  S/P Exploratory Laparotomy  S/P Appendectomy  S/P Cholecystectomy  Liver Mass s/p liver rsxn: s/p resection 2009  History of Hysterectomy: 1998  S/P Right Mastectomy: 2006      MEDICATIONS  (STANDING):  lactated ringers. 1000 milliLiter(s) (200 mL/Hr) IV Continuous <Continuous>    MEDICATIONS  (PRN):      FAMILY HISTORY:  No pertinent family history in first degree relatives      SOCIAL HISTORY:    [ ] Non-smoker  [ ] Smoker  [ ] Alcohol    Allergies    Biaxin (Rash)  Cipro (Headache)  Flagyl (Headache)  penicillin (Rash; Swelling)  sulfa drugs (Unknown)    Intolerances    	    REVIEW OF SYSTEMS:  CONSTITUTIONAL: No fever, weight loss, or fatigue  EYES: No eye pain, visual disturbances, or discharge  ENT:  No difficulty hearing, tinnitus, vertigo; No sinus or throat pain  NECK: No pain or stiffness  RESPIRATORY: No cough, wheezing, chills or hemoptysis; + Shortness of Breath  CARDIOVASCULAR: + chest pain,no  palpitations, passing out, dizziness, or leg swelling  GASTROINTESTINAL: No abdominal or epigastric pain. No nausea, vomiting, or hematemesis; No diarrhea or constipation. No melena or hematochezia.  GENITOURINARY: No dysuria, frequency, hematuria, or incontinence  NEUROLOGICAL: No headaches, memory loss, loss of strength, numbness, or tremors  SKIN: No itching, burning, rashes, or lesions   LYMPH Nodes: No enlarged glands  ENDOCRINE: No heat or cold intolerance; No hair loss  MUSCULOSKELETAL: No joint pain or swelling; No muscle, back, or extremity pain  PSYCHIATRIC: No depression, anxiety, mood swings, or difficulty sleeping  HEME/LYMPH: No easy bruising, or bleeding gums  ALLERGY AND IMMUNOLOGIC: No hives or eczema	    [ ] All others negative	  [ ] Unable to obtain    PHYSICAL EXAM:  T(C): 36.7 (06-07-19 @ 03:30), Max: 37.3 (06-06-19 @ 17:00)  HR: 78 (06-07-19 @ 03:30) (78 - 123)  BP: 101/60 (06-07-19 @ 03:30) (90/54 - 117/68)  RR: 16 (06-07-19 @ 03:30) (16 - 24)  SpO2: 98% (06-07-19 @ 03:30) (88% - 100%)  Wt(kg): --  I&O's Summary    06 Jun 2019 07:01  -  07 Jun 2019 06:37  --------------------------------------------------------  IN: 0 mL / OUT: 3 mL / NET: -3 mL        Appearance: Normal	  HEENT:   Normal oral mucosa, PERRL, EOMI	  Lymphatic: No lymphadenopathy  Cardiovascular: Normal S1 S2, No JVD, + murmurs, No edema  Respiratory: rhonchi	  Psychiatry: A & O x 3, Mood & affect appropriate  Gastrointestinal:  Soft,+ mild tender, + BS	  Skin: No rashes, No ecchymoses, No cyanosis	  Neurologic: Non-focal  Extremities: Normal range of motion, No clubbing, cyanosis or edema  Vascular: Peripheral pulses palpable 2+ bilaterally    TELEMETRY: 	    ECG:  	  RADIOLOGY:  OTHER: 	  	  LABS:	 	    CARDIAC MARKERS:                              10.7   3.3   )-----------( 267      ( 06 Jun 2019 18:47 )             33.9     06-06    139  |  88<L>  |  63<H>  ----------------------------<  340<H>  4.0   |  23  |  5.08<H>    Ca    5.8<LL>      06 Jun 2019 18:47    TPro  6.8  /  Alb  3.6  /  TBili  0.4  /  DBili  x   /  AST  50<H>  /  ALT  41  /  AlkPhos  144<H>  06-06    proBNP: Serum Pro-Brain Natriuretic Peptide: 2352 pg/mL (06-06 @ 18:47)    Lipid Profile:   HgA1c:   TSH:   PT/INR - ( 06 Jun 2019 18:47 )   PT: 19.9 sec;   INR: 1.70 ratio         PTT - ( 06 Jun 2019 18:47 )  PTT:29.3 sec    PREVIOUS DIAGNOSTIC TESTING:    < from: CT Abdomen and Pelvis No Cont (06.06.19 @ 21:07) >  contrast and lack of intra-abdominal fat.    Numerous fluid-filled dilated small bowel loops throughout the abdomen,   as well as, distention of the stomach with apparent transitioning in the   right lower quadrant similar to that seen on prior exam from 5/22/2019.   Small right femoral hernia containing a short segment of small bowel.   Colon is collapsed on the current exam.    New right lower lobe and right middle lobe airspace opacities concerning   for pneumonia.    < from: Xray Chest 1 View- PORTABLE-Urgent (06.06.19 @ 17:41) >  INTERPRETATION:  Right lower lung opacity compatible with pneumonia.    < end of copied text >< from: 12 Lead ECG (06.06.19 @ 16:43) >  Diagnosis Line SINUS TACHYCARDIA WITH SHORT UT  RIGHT ATRIAL ENLARGEMENT  NONSPECIFIC ST AND T WAVE ABNORMALITY  ABNORMAL ECG

## 2019-06-07 NOTE — CONSULT NOTE ADULT - SUBJECTIVE AND OBJECTIVE BOX
[facility] General Surgery Consultation     Patient is a 73y old  Female who presents with a chief complaint of     HPI:  73F with history of narcotic bowel, lung cancer s/p RTx and active smoker, COPD (no home O2), breast CA s/p R mastectomy and chemotherapy (), chronic lower back pain 2/2 herniated disc with opioid dependence, ?CAD who presents to the ER with vomiting for the past 2 days. Patient's last bowel movement was yesterday and passed flatus this morning. In the ED, patient was found to be hypotensive with SHAKIRA and pyuria concerning for sepsis like from UTI. Patient was given Cefepime, Vancomycin and 4L of crystalloid CT scan was performed which re-demonstrated the small bowel obstruction with questionable transition point without free air. Of note, patient is well known to the [facility] and was last seen by Dr. Fang on 2019 for similar symptoms. Per documentation, she has repeat admits with CTs read as SBO's but all have resolved with either oral narcan or Movantic/Relistor. Patient has known narcotic bowel but does not take meds at home because she does not like the diarrhea, which results in repeat readmissions for the same problem. Patient is at her baseline mentals status when seen, endorses nausea or vomiting but no abdominal pain.       PAST MEDICAL & SURGICAL HISTORY:  Back pain  Bowel obstruction: multiple hospitalizations  Kidney stone  Emphysema  Narcotic Dependence  S/P Radiation Therapy  S/P Chemotherapy, Time Since Greater than 12 Weeks  Narcotic Dysmotile Cilia Syndrome  Chronic Pain Following Surgery or Procedure: following right breast mastectomy  Ankle Fracture: right anle fx s/p cast   History of Small Bowel Obstruction: 2010  Asthma  Breast Cancer  S/P hernia surgery: femoral hernia -   S/P Exploratory Laparotomy  S/P Appendectomy  S/P Cholecystectomy  Liver Mass s/p liver rsxn: s/p resection   History of Hysterectomy:   S/P Right Mastectomy:       ROS: 10-point review of systems   FAMILY HISTORY:  No pertinent family history in first degree relatives      SOCIAL HISTORY:    MEDICATIONS  (STANDING):  lactated ringers Bolus 1000 milliLiter(s) IV Bolus once  lactated ringers. 1000 milliLiter(s) (200 mL/Hr) IV Continuous <Continuous>  norepinephrine Infusion 0.05 MICROgram(s)/kG/Min (3.525 mL/Hr) IV Continuous <Continuous>    MEDICATIONS  (PRN):    Allergies    Biaxin (Rash)  Cipro (Headache)  Flagyl (Headache)  penicillin (Rash; Swelling)  sulfa drugs (Unknown)    Intolerances    Vital Signs Last 24 Hrs  T(C): 36.6 (2019 20:18), Max: 37.3 (2019 17:00)  T(F): 97.8 (2019 20:18), Max: 99.2 (2019 17:00)  HR: 100 (2019 23:20) (80 - 123)  BP: 114/93 (2019 23:20) (90/54 - 117/68)  BP(mean): 79 (2019 17:47) (75 - 79)  RR: 16 (2019 23:20) (16 - 24)  SpO2: 96% (2019 23:20) (88% - 100%)  Daily Height in cm: 160.02 (2019 16:13)    Daily     Physical Exam:  General: Well developed, well nourished, No Acute Distress  HEENT: Normocephalic Atraumatic, EOMI bl  Abdominal: Soft, Non-Tender, Distended  Extremities: No Clubbing, cyanosis or edema, FROM    LABS:             10.7   3.3   )-----------( 267      ( 2019 18:47 )             33.9         139  |  88<L>  |  63<H>  ----------------------------<  340<H>  4.0   |  23  |  5.08<H>    Ca    5.8<LL>      2019 18:47    TPro  6.8  /  Alb  3.6  /  TBili  0.4  /  DBili  x   /  AST  50<H>  /  ALT  41  /  AlkPhos  144<H>      PT/INR - ( 2019 18:47 )   PT: 19.9 sec;   INR: 1.70 ratio         PTT - ( 2019 18:47 )  PTT:29.3 sec  Urinalysis Basic - ( 2019 17:49 )    Color: Yellow / Appearance: Slightly Turbid / S.016 / pH: x  Gluc: x / Ketone: Negative  / Bili: Negative / Urobili: Negative   Blood: x / Protein: 100 / Nitrite: Negative   Leuk Esterase: Large / RBC: 2 /hpf /  /HPF   Sq Epi: x / Non Sq Epi: 1 /hpf / Bacteria: Many    Radiographic Findings:     < from: CT Abdomen and Pelvis No Cont (19 @ 21:07) >  FINDINGS:     LOWER CHEST: Patchy right middle and lower lobe consolidations, more   pronounced in the right lower lobe. Multiple tree-in bud opacities in the   visualized bilateral lung; increased compared with prior exam.    Limited evaluation of intra-abdominal and pelvic organs due to lack of IV   contrast and lack of intra-abdominal fat.    LIVER: Status post partial right hepatic lobectomy.  BILE DUCTS: Tubular branching low attenuation within the remaining right   hepatic lobe periphery likely represents dilated biliary ducts.  GALLBLADDER: Not well visualized.  SPLEEN: Within normal limits.  PANCREAS: Atrophic.  ADRENALS: Within normal limits.  KIDNEYS/URETERS: No hydronephrosis. Multiple nonobstructive bilateral   intrarenal calculi. Calculi.    BLADDER: Underdistended and not well visualized.  REPRODUCTIVE ORGANS: Not well visualized.    BOWEL: Evaluation of bowel limited by lack of oral and intravenous   contrast and paucity of intra-abdominal fat. Numerous fluid-filled   dilated small bowel loops throughout the abdomen, as well as distention   of the stomach with apparent transitioning in the right lower quadrant.   Colon is collapsed on the current exam. Appendix is not visualized.  PERITONEUM: No ascites or pneumoperitoneum.  VESSELS:  Atherosclerotic calcifications of the aortoiliac tree.  RETROPERITONEUM: No bulky lymphadenopathy.    ABDOMINAL WALL: Small right femoral hernia containing a short segment of   small bowel.  BONES: Degenerative changes of the spine. Diffuse bony demineralization.    IMPRESSION:     Limited evaluation of intra-abdominal and pelvic organs due to lack of IV   contrast and lack of intra-abdominal fat.    Numerous fluid-filled dilated small bowel loops throughout the abdomen,   as well as, distention of the stomach with apparent transitioning in the   right lower quadrant similar to that seen on prior exam from 2019.   Small right femoral hernia containing a short segment of small bowel.   Colon is collapsed on the current exam.    New right lower lobe and right middle lobe airspace opacities concerning   for pneumonia.    < end of copied text > [facility] General Surgery Consultation     HPI:  73F with history of narcotic bowel, lung cancer s/p RTx and active smoker, COPD (no home O2), breast CA s/p R mastectomy and chemotherapy (), chronic lower back pain 2/2 herniated disc with opioid dependence, ?CAD who presents to the ER with vomiting for the past 2 days. Patient's last bowel movement was yesterday and passed flatus this morning. In the ED, patient was found to be hypotensive with SHAKIRA and pyuria concerning for sepsis like from UTI. Patient was given Cefepime, Vancomycin and 4L of crystalloid CT scan was performed which re-demonstrated the small bowel obstruction with questionable transition point without free air. Of note, patient is well known to the [facility] and was last seen by Dr. Fang on 2019 for similar symptoms. Per documentation, she has repeat admits with CTs read as SBO's but all have resolved with either oral narcan or Movantic/Relistor. Patient has known narcotic bowel but does not take meds at home because she does not like the diarrhea, which results in repeat readmissions for the same problem. Patient is at her baseline mentals status when seen, endorses nausea or vomiting but no abdominal pain.       PAST MEDICAL & SURGICAL HISTORY:  Back pain  Bowel obstruction: multiple hospitalizations  Kidney stone  Emphysema  Narcotic Dependence  S/P Radiation Therapy  S/P Chemotherapy, Time Since Greater than 12 Weeks  Narcotic Dysmotile Cilia Syndrome  Chronic Pain Following Surgery or Procedure: following right breast mastectomy  Ankle Fracture: right anle fx s/p cast   History of Small Bowel Obstruction: 2010  Asthma  Breast Cancer  S/P hernia surgery: femoral hernia -   S/P Exploratory Laparotomy  S/P Appendectomy  S/P Cholecystectomy  Liver Mass s/p liver rsxn: s/p resection   History of Hysterectomy:   S/P Right Mastectomy:       ROS: 10-point review of systems   FAMILY HISTORY:  No pertinent family history in first degree relatives      SOCIAL HISTORY:    MEDICATIONS  (STANDING):  lactated ringers Bolus 1000 milliLiter(s) IV Bolus once  lactated ringers. 1000 milliLiter(s) (200 mL/Hr) IV Continuous <Continuous>  norepinephrine Infusion 0.05 MICROgram(s)/kG/Min (3.525 mL/Hr) IV Continuous <Continuous>    MEDICATIONS  (PRN):    Allergies    Biaxin (Rash)  Cipro (Headache)  Flagyl (Headache)  penicillin (Rash; Swelling)  sulfa drugs (Unknown)    Intolerances    Vital Signs Last 24 Hrs  T(C): 36.6 (2019 20:18), Max: 37.3 (2019 17:00)  T(F): 97.8 (2019 20:18), Max: 99.2 (2019 17:00)  HR: 100 (2019 23:20) (80 - 123)  BP: 114/93 (2019 23:20) (90/54 - 117/68)  BP(mean): 79 (2019 17:47) (75 - 79)  RR: 16 (2019 23:20) (16 - 24)  SpO2: 96% (2019 23:20) (88% - 100%)  Daily Height in cm: 160.02 (2019 16:13)    Daily     Physical Exam:  General: Well developed, well nourished, No Acute Distress  HEENT: Normocephalic Atraumatic, EOMI bl  Abdominal: Soft, Non-Tender, Distended  Extremities: No Clubbing, cyanosis or edema, FROM    LABS:             10.7   3.3   )-----------( 267      ( 2019 18:47 )             33.9         139  |  88<L>  |  63<H>  ----------------------------<  340<H>  4.0   |  23  |  5.08<H>    Ca    5.8<LL>      2019 18:47    TPro  6.8  /  Alb  3.6  /  TBili  0.4  /  DBili  x   /  AST  50<H>  /  ALT  41  /  AlkPhos  144<H>      PT/INR - ( 2019 18:47 )   PT: 19.9 sec;   INR: 1.70 ratio         PTT - ( 2019 18:47 )  PTT:29.3 sec  Urinalysis Basic - ( 2019 17:49 )    Color: Yellow / Appearance: Slightly Turbid / S.016 / pH: x  Gluc: x / Ketone: Negative  / Bili: Negative / Urobili: Negative   Blood: x / Protein: 100 / Nitrite: Negative   Leuk Esterase: Large / RBC: 2 /hpf /  /HPF   Sq Epi: x / Non Sq Epi: 1 /hpf / Bacteria: Many    Radiographic Findings:     < from: CT Abdomen and Pelvis No Cont (19 @ 21:07) >  FINDINGS:     LOWER CHEST: Patchy right middle and lower lobe consolidations, more   pronounced in the right lower lobe. Multiple tree-in bud opacities in the   visualized bilateral lung; increased compared with prior exam.    Limited evaluation of intra-abdominal and pelvic organs due to lack of IV   contrast and lack of intra-abdominal fat.    LIVER: Status post partial right hepatic lobectomy.  BILE DUCTS: Tubular branching low attenuation within the remaining right   hepatic lobe periphery likely represents dilated biliary ducts.  GALLBLADDER: Not well visualized.  SPLEEN: Within normal limits.  PANCREAS: Atrophic.  ADRENALS: Within normal limits.  KIDNEYS/URETERS: No hydronephrosis. Multiple nonobstructive bilateral   intrarenal calculi. Calculi.    BLADDER: Underdistended and not well visualized.  REPRODUCTIVE ORGANS: Not well visualized.    BOWEL: Evaluation of bowel limited by lack of oral and intravenous   contrast and paucity of intra-abdominal fat. Numerous fluid-filled   dilated small bowel loops throughout the abdomen, as well as distention   of the stomach with apparent transitioning in the right lower quadrant.   Colon is collapsed on the current exam. Appendix is not visualized.  PERITONEUM: No ascites or pneumoperitoneum.  VESSELS:  Atherosclerotic calcifications of the aortoiliac tree.  RETROPERITONEUM: No bulky lymphadenopathy.    ABDOMINAL WALL: Small right femoral hernia containing a short segment of   small bowel.  BONES: Degenerative changes of the spine. Diffuse bony demineralization.    IMPRESSION:     Limited evaluation of intra-abdominal and pelvic organs due to lack of IV   contrast and lack of intra-abdominal fat.    Numerous fluid-filled dilated small bowel loops throughout the abdomen,   as well as, distention of the stomach with apparent transitioning in the   right lower quadrant similar to that seen on prior exam from 2019.   Small right femoral hernia containing a short segment of small bowel.   Colon is collapsed on the current exam.    New right lower lobe and right middle lobe airspace opacities concerning   for pneumonia.    < end of copied text >

## 2019-06-07 NOTE — PROVIDER CONTACT NOTE (OTHER) - BACKGROUND
74 yo F Dx: abdominal pain, PMH: bowel obstruction, kidney stone, ankle fx, emphysema, narcotic dependence, s/p radial therapy, s/p chemo, narcotic dysmotile cilia syndrome, chronic pain, breast xc, a

## 2019-06-07 NOTE — CONSULT NOTE ADULT - SUBJECTIVE AND OBJECTIVE BOX
HPI:  CHIEF COMPLAINT:Patient is a 73y old  Female who presents with a chief complaint of cp    HPI:  74 yo F PMHx of Breast Ca s/p right mastectomy chemotherapy tx (), Lung Ca, COPD (no home O2), hysterectomy, appendectomy, cholecystectomy, femoral hernia repair, multiple SBOs w/ resection, opioid dependence,     p/w chest and abdominal pain for the last 2 days. Pt has had several episodes of vomiting and has had poor PO intake over the last 2 days as well.  pt with multiple admission for sbo sec to opioids and  recent admission for renal failure and urosepsis.  +hx of cad.    PAST MEDICAL & SURGICAL HISTORY:  Back pain  Bowel obstruction: multiple hospitalizations  Kidney stone  Emphysema  Narcotic Dependence  S/P Radiation Therapy  S/P Chemotherapy, Time Since Greater than 12 Weeks  Narcotic Dysmotile Cilia Syndrome  Chronic Pain Following Surgery or Procedure: following right breast mastectomy  Ankle Fracture: right anle fx s/p cast   History of Small Bowel Obstruction: 2010  Asthma  Breast Cancer  S/P hernia surgery: femoral hernia -   S/P Exploratory Laparotomy  S/P Appendectomy  S/P Cholecystectomy  Liver Mass s/p liver rsxn: s/p resection   History of Hysterectomy:   S/P Right Mastectomy:       MEDICATIONS  (STANDING):  lactated ringers. 1000 milliLiter(s) (200 mL/Hr) IV Continuous <Continuous>    MEDICATIONS  (PRN):      FAMILY HISTORY:  No pertinent family history in first degree relatives      SOCIAL HISTORY:    [ ] Non-smoker  [ ] Smoker  [ ] Alcohol    Allergies    Biaxin (Rash)  Cipro (Headache)  Flagyl (Headache)  penicillin (Rash; Swelling)  sulfa drugs (Unknown)    Intolerances    	    REVIEW OF SYSTEMS:  CONSTITUTIONAL: No fever, weight loss, or fatigue  EYES: No eye pain, visual disturbances, or discharge  ENT:  No difficulty hearing, tinnitus, vertigo; No sinus or throat pain  NECK: No pain or stiffness  RESPIRATORY: No cough, wheezing, chills or hemoptysis; + Shortness of Breath  CARDIOVASCULAR: + chest pain,no  palpitations, passing out, dizziness, or leg swelling  GASTROINTESTINAL: No abdominal or epigastric pain. No nausea, vomiting, or hematemesis; No diarrhea or constipation. No melena or hematochezia.  GENITOURINARY: No dysuria, frequency, hematuria, or incontinence  NEUROLOGICAL: No headaches, memory loss, loss of strength, numbness, or tremors  SKIN: No itching, burning, rashes, or lesions   LYMPH Nodes: No enlarged glands  ENDOCRINE: No heat or cold intolerance; No hair loss  MUSCULOSKELETAL: No joint pain or swelling; No muscle, back, or extremity pain  PSYCHIATRIC: No depression, anxiety, mood swings, or difficulty sleeping  HEME/LYMPH: No easy bruising, or bleeding gums  ALLERGY AND IMMUNOLOGIC: No hives or eczema	    [ ] All others negative	  [ ] Unable to obtain    PHYSICAL EXAM:  T(C): 36.7 (19 @ 03:30), Max: 37.3 (19 @ 17:00)  HR: 78 (19 @ 03:30) (78 - 123)  BP: 101/60 (19 @ 03:30) (90/54 - 117/68)  RR: 16 (19 @ 03:30) (16 - 24)  SpO2: 98% (19 @ 03:30) (88% - 100%)  Wt(kg): --  I&O's Summary    2019 07:01  -  2019 06:37  --------------------------------------------------------  IN: 0 mL / OUT: 3 mL / NET: -3 mL        Appearance: Normal	  HEENT:   Normal oral mucosa, PERRL, EOMI	  Lymphatic: No lymphadenopathy  Cardiovascular: Normal S1 S2, No JVD, + murmurs, No edema  Respiratory: rhonchi	  Psychiatry: A & O x 3, Mood & affect appropriate  Gastrointestinal:  Soft,+ mild tender, + BS	  Skin: No rashes, No ecchymoses, No cyanosis	  Neurologic: Non-focal  Extremities: Normal range of motion, No clubbing, cyanosis or edema  Vascular: Peripheral pulses palpable 2+ bilaterally    TELEMETRY: 	    ECG:  	  RADIOLOGY:  OTHER: 	  	  LABS:	 	    CARDIAC MARKERS:                              10.7   3.3   )-----------( 267      ( 2019 18:47 )             33.9     -    139  |  88<L>  |  63<H>  ----------------------------<  340<H>  4.0   |  23  |  5.08<H>    Ca    5.8<LL>      2019 18:47    TPro  6.8  /  Alb  3.6  /  TBili  0.4  /  DBili  x   /  AST  50<H>  /  ALT  41  /  AlkPhos  144<H>      proBNP: Serum Pro-Brain Natriuretic Peptide: 2352 pg/mL ( @ 18:47)    Lipid Profile:   HgA1c:   TSH:   PT/INR - ( 2019 18:47 )   PT: 19.9 sec;   INR: 1.70 ratio         PTT - ( 2019 18:47 )  PTT:29.3 sec    PREVIOUS DIAGNOSTIC TESTING:    < from: CT Abdomen and Pelvis No Cont (19 @ 21:07) >  contrast and lack of intra-abdominal fat.    Numerous fluid-filled dilated small bowel loops throughout the abdomen,   as well as, distention of the stomach with apparent transitioning in the   right lower quadrant similar to that seen on prior exam from 2019.   Small right femoral hernia containing a short segment of small bowel.   Colon is collapsed on the current exam.    New right lower lobe and right middle lobe airspace opacities concerning   for pneumonia.    < from: Xray Chest 1 View- PORTABLE-Urgent (19 @ 17:41) >  INTERPRETATION:  Right lower lung opacity compatible with pneumonia.    < end of copied text >< from: 12 Lead ECG (19 @ 16:43) >  Diagnosis Line SINUS TACHYCARDIA WITH SHORT [place]  RIGHT ATRIAL ENLARGEMENT  NONSPECIFIC ST AND T WAVE ABNORMALITY  ABNORMAL ECG (2019 06:37)      REVIEW OF SYSTEMS:  GEN: no fever,    no chills  RESP: no SOB,   no cough  CVS: no chest pain,   no palpitations  GI: no abdominal pain,   no nausea,   no vomiting,   no constipation,   no diarrhea  : no dysuria,   no frequency  NEURO: no headache,   no dizziness  PSYCH: no depression,   not anxious  Derm : no rash    Allergies    Biaxin (Rash)  Cipro (Headache)  Flagyl (Headache)  penicillin (Rash; Swelling)  sulfa drugs (Unknown)    Intolerances        CAPILLARY BLOOD GLUCOSE      POCT Blood Glucose.: 421 mg/dL (2019 17:13)  POCT Blood Glucose.: 45 mg/dL (2019 17:03)  POCT Blood Glucose.: 50 mg/dL (2019 17:00)  POCT Blood Glucose.: 57 mg/dL (2019 16:56)    I&O's Summary    2019 07:01  -  2019 07:00  --------------------------------------------------------  IN: 1500 mL / OUT: 3 mL / NET: 1497 mL        Vital Signs Last 24 Hrs  T(C): 36.7 (2019 03:30), Max: 37.3 (2019 17:00)  T(F): 98 (2019 03:30), Max: 99.2 (2019 17:00)  HR: 78 (2019 03:30) (78 - 123)  BP: 101/60 (2019 03:30) (90/54 - 117/68)  BP(mean): 79 (2019 17:47) (75 - 79)  RR: 16 (2019 03:30) (16 - 24)  SpO2: 98% (2019 03:30) (88% - 100%)  PHYSICAL EXAM:  GENERAL: NAD,   HEAD:  Atraumatic, Normocephalic  EYES: EOMI,  NECK: Supple, No JVD  CHEST/LUNG: Clear to auscultation bilaterally; No wheeze  HEART: Regular rate and rhythm;        murmur  ABDOMEN: Soft, Nontender,   EXTREMITIES:         edema  NEUROLOGY:  alert    MEDICATIONS  (STANDING):  buDESOnide   0.5 milliGRAM(s) Respule 0.5 milliGRAM(s) Inhalation two times a day  heparin  Injectable 5000 Unit(s) SubCutaneous every 12 hours  lactated ringers. 1000 milliLiter(s) (200 mL/Hr) IV Continuous <Continuous>  naloxegol 12.5 milliGRAM(s) Oral daily  nicotine -  14 mG/24Hr(s) Patch 1 patch Transdermal daily  pantoprazole    Tablet 40 milliGRAM(s) Oral before breakfast  sodium chloride 0.9%. 1000 milliLiter(s) (100 mL/Hr) IV Continuous <Continuous>  tiotropium 18 MICROgram(s) Capsule 1 Capsule(s) Inhalation daily    MEDICATIONS  (PRN):  acetaminophen   Tablet .. 650 milliGRAM(s) Oral every 6 hours PRN Moderate Pain (4 - 6)  ALBUTerol    90 MICROgram(s) HFA Inhaler 1 Puff(s) Inhalation every 4 hours PRN Bronchospasm  diazepam    Tablet 2 milliGRAM(s) Oral at bedtime PRN anxiety    LABS:                        10.7   3.3   )-----------( 267      ( 2019 18:47 )             33.9         139  |  88<L>  |  63<H>  ----------------------------<  340<H>  4.0   |  23  |  5.08<H>    Ca    5.8<LL>      2019 18:47    TPro  6.8  /  Alb  3.6  /  TBili  0.4  /  DBili  x   /  AST  50<H>  /  ALT  41  /  AlkPhos  144<H>      PT/INR - ( 2019 18:47 )   PT: 19.9 sec;   INR: 1.70 ratio         PTT - ( 2019 18:47 )  PTT:29.3 sec      Urinalysis Basic - ( 2019 17:49 )    Color: Yellow / Appearance: Slightly Turbid / S.016 / pH: x  Gluc: x / Ketone: Negative  / Bili: Negative / Urobili: Negative   Blood: x / Protein: 100 / Nitrite: Negative   Leuk Esterase: Large / RBC: 2 /hpf /  /HPF   Sq Epi: x / Non Sq Epi: 1 /hpf / Bacteria: Many           @ 22:38  3.8  21      Thyroid Stimulating Hormone, Serum: 3.07 uIU/mL ( @ 17:37)          Consultant(s) Notes Reviewed:      Care Discussed with Consultants/Other Providers:  Plan:

## 2019-06-07 NOTE — CONSULT NOTE ADULT - SUBJECTIVE AND OBJECTIVE BOX
hpi 73 year old woman with h/o multiple malignancies latest early stage lung cancer s/p ebrt no f/u pet yet  multiple admissions for sbo/ileus here for further n/v diarrhea  pmh sh fh unchanged comp ros hungry, no pain less diarrhea otherwise neg  physical  cachectic  vs  98 78 101/60 16 98 sat  lungs clear  cor s1s2  abd soft nontender  ext no edema  skin warm dry  psych anxious      data wbc 3300 hgb 10.7 hct 33.9 plt [id number] p 9 l 10 m 9 bands 1 meta 1 myelo  bun 63 cr 5.0 calcium 5.8 alb 3.6 ast 50 u/a blood and wbc's  ct abd rll transition ?pneumonia

## 2019-06-07 NOTE — CONSULT NOTE ADULT - ASSESSMENT
73 year old female with   PMH,    chronic Back pain (on Narcotics), history of multiple SBO's, narcotic associated constipation/ileus, COPD,  smoker,    ca  breast,  right  mastectomy. RENUKA cavitary lesion on ct, ,  c/c cachexia  path from  1/19, with NSCC with squamous  features,  s/p  RT     admitted with abdominal pain / vomiting,   from SBO/  seen by surgery  SHAKIRA on CKD 2/ dehydration    ct scan  with sbo,  an d right pna    pt is an active smoker, refusing to quit   ct chest , 4/19/18  noted/  oncology dr hernandez   dvt ppx   iv fluids/   protonix  avoid  narcotics, if  possible  follow  cmp today  on zosyn/ pt with pna and  bandemia     < from: CT Abdomen and Pelvis No Cont (06.06.19 @ 21:07) >  IMPRESSION:   Limited evaluation of intra-abdominal and pelvic organs due to lack of IV   contrast and lack of intra-abdominal fat.    Numerous fluid-filled dilated small bowel loops throughout the abdomen,   as well as, distention of the stomach with apparent transitioning in the   right lower quadrant similar to that seen on prior exam from 5/22/2019.   Small right femoral hernia containing a short segment of small bowel.   Colon is collapsed on the current exam.    New right lower lobe and right middle lobe airspace opacities concerning   for pneumonia.  Dr. Morataya discussed the findings with Dr. Adamson at 9:34 PM on 6/6/2019 with   read back.  < end of copied text >                   < from: CT Abdomen and Pelvis No Cont (05.22.19 @ 19:30) >  IMPRESSION:   Suboptimal studysecondary to absence of oral and IV contrast.  Small bowel obstruction with increased dilatation of the small bowel   compared to the prior exam 5/5/2019. Question transition point in the   right mid abdomen. No free intraperitoneal air.    Unchanged tree-in-bud opacities in the right lower lobe with mild   increased tree-in-bud opacities in the left lower lobe compared to prior   CT chest 4/19/2019.  < end of copied text >      < from: CT Chest No Cont (04.19.19 @ 18:50)   INTERPRETATION:  Motion degraded evaluation  Redemonstraion on left upper lobe cavitary mass consistent with patients   known hx of lung CA  Mucoid impacted right lower lobe ariways with scattered tree in bud   opacities  < end of copied text >      Cytopathology - Non Gyn Report (01.18.19 @ 11:43)    Immunohistochemical stains.  The immunophenotype together with the cytomorphology supports the  diagnosis squamous cell carcinoma.    Final Diagnosis  LUNG, LEFT UPPER LOBE, US GUIDED CORE BIOPSY AND FNA  POSITIVE FOR MALIGNANT CELLS.  Non-small cell carcinoma, with squamous features (see note).  Additional studies pending. 73 year old female with   PMH,    chronic Back pain (on Narcotics), history of multiple SBO's, narcotic associated constipation/ileus, COPD,  smoker,    ca  breast,  right  mastectomy. RENUKA cavitary lesion on ct, ,  c/c cachexia  path from  1/19, with NSCC with squamous  features,  s/p  RT     admitted with abdominal pain / vomiting,   from SBO/  seen by surgery  SHAKIRA on CKD 2/ dehydration    ct scan  with sbo,  an d right pna    pt is an active smoker, refusing to quit   ct chest , 4/19/18  noted/  oncology dr hernandez   dvt ppx   iv fluids/   protonix  avoid  narcotics, if  possible  follow  cmp today  on  aztreonam/  vanco, 1  dose/  pt with pna/  gram negative./ and  bandemia     < from: CT Abdomen and Pelvis No Cont (06.06.19 @ 21:07) >  IMPRESSION:   Limited evaluation of intra-abdominal and pelvic organs due to lack of IV   contrast and lack of intra-abdominal fat.    Numerous fluid-filled dilated small bowel loops throughout the abdomen,   as well as, distention of the stomach with apparent transitioning in the   right lower quadrant similar to that seen on prior exam from 5/22/2019.   Small right femoral hernia containing a short segment of small bowel.   Colon is collapsed on the current exam.    New right lower lobe and right middle lobe airspace opacities concerning   for pneumonia.  Dr. Morataya discussed the findings with Dr. Adamson at 9:34 PM on 6/6/2019 with   read back.  < end of copied text >                   < from: CT Abdomen and Pelvis No Cont (05.22.19 @ 19:30) >  IMPRESSION:   Suboptimal studysecondary to absence of oral and IV contrast.  Small bowel obstruction with increased dilatation of the small bowel   compared to the prior exam 5/5/2019. Question transition point in the   right mid abdomen. No free intraperitoneal air.    Unchanged tree-in-bud opacities in the right lower lobe with mild   increased tree-in-bud opacities in the left lower lobe compared to prior   CT chest 4/19/2019.  < end of copied text >      < from: CT Chest No Cont (04.19.19 @ 18:50)   INTERPRETATION:  Motion degraded evaluation  Redemonstraion on left upper lobe cavitary mass consistent with patients   known hx of lung CA  Mucoid impacted right lower lobe ariways with scattered tree in bud   opacities  < end of copied text >      Cytopathology - Non Gyn Report (01.18.19 @ 11:43)    Immunohistochemical stains.  The immunophenotype together with the cytomorphology supports the  diagnosis squamous cell carcinoma.    Final Diagnosis  LUNG, LEFT UPPER LOBE, US GUIDED CORE BIOPSY AND FNA  POSITIVE FOR MALIGNANT CELLS.  Non-small cell carcinoma, with squamous features (see note).  Additional studies pending.

## 2019-06-07 NOTE — CONSULT NOTE ADULT - ASSESSMENT
74 yo F PMHx of Breast Ca s/p right mastectomy chemotherapy tx (2006), Lung Ca, COPD (no home O2), hysterectomy, appendectomy, cholecystectomy, femoral hernia repair, multiple SBOs w/ resection, opioid dependence, p/w chest and abdominal pain. Pt with multiple repeat admissions with CTs read as SBO's but all have resolved with either oral narcan or Movantic/Relistor. Patient has known narcotic bowel dysmotility syndrome.  Now also found to have PNA    No further nausea or abdominal distension - s/p 4+ loose stools  Previous admission, pt noted to have significant Abx associated diarrhea    RECS:  -OK for regular diet  -While on Abx, would hold Movantik doses  -If has persistent diarrhea, can check stool for C diff given multiple admission /ICU/Abx  -Monitor abdominal exam  -PPI daily  -Monitor/replete electrolytes  -Abx per primary team  -Monitor UO and Cr    Discussed with pt and Medicine team  Surgery input noted    Please call over weekend prn with GI concerns   GI service : [phone]    Thank you for the courtesy of this consult.    Jake Khan PA-C    [facility]  [phone]  After hours and weekend coverage [phone]

## 2019-06-08 LAB
-  AMIKACIN: SIGNIFICANT CHANGE UP
-  AMPICILLIN/SULBACTAM: SIGNIFICANT CHANGE UP
-  AMPICILLIN: SIGNIFICANT CHANGE UP
-  AZTREONAM: SIGNIFICANT CHANGE UP
-  CEFEPIME: SIGNIFICANT CHANGE UP
-  CEFOXITIN: SIGNIFICANT CHANGE UP
-  CEFTRIAXONE: SIGNIFICANT CHANGE UP
-  CIPROFLOXACIN: SIGNIFICANT CHANGE UP
-  ERTAPENEM: SIGNIFICANT CHANGE UP
-  GENTAMICIN: SIGNIFICANT CHANGE UP
-  IMIPENEM: SIGNIFICANT CHANGE UP
-  LEVOFLOXACIN: SIGNIFICANT CHANGE UP
-  MEROPENEM: SIGNIFICANT CHANGE UP
-  NITROFURANTOIN: SIGNIFICANT CHANGE UP
-  PIPERACILLIN/TAZOBACTAM: SIGNIFICANT CHANGE UP
-  TIGECYCLINE: SIGNIFICANT CHANGE UP
-  TOBRAMYCIN: SIGNIFICANT CHANGE UP
-  TRIMETHOPRIM/SULFAMETHOXAZOLE: SIGNIFICANT CHANGE UP
ALBUMIN SERPL ELPH-MCNC: 2.9 G/DL — LOW (ref 3.3–5)
ALP SERPL-CCNC: 103 U/L — SIGNIFICANT CHANGE UP (ref 40–120)
ALT FLD-CCNC: 24 U/L — SIGNIFICANT CHANGE UP (ref 10–45)
ANION GAP SERPL CALC-SCNC: 21 MMOL/L — HIGH (ref 5–17)
ANION GAP SERPL CALC-SCNC: 21 MMOL/L — HIGH (ref 5–17)
AST SERPL-CCNC: 17 U/L — SIGNIFICANT CHANGE UP (ref 10–40)
BILIRUB SERPL-MCNC: 0.2 MG/DL — SIGNIFICANT CHANGE UP (ref 0.2–1.2)
BUN SERPL-MCNC: 58 MG/DL — HIGH (ref 7–23)
BUN SERPL-MCNC: 59 MG/DL — HIGH (ref 7–23)
CALCIUM SERPL-MCNC: 5.7 MG/DL — CRITICAL LOW (ref 8.4–10.5)
CALCIUM SERPL-MCNC: 5.9 MG/DL — CRITICAL LOW (ref 8.4–10.5)
CHLORIDE SERPL-SCNC: 101 MMOL/L — SIGNIFICANT CHANGE UP (ref 96–108)
CHLORIDE SERPL-SCNC: 101 MMOL/L — SIGNIFICANT CHANGE UP (ref 96–108)
CO2 SERPL-SCNC: 19 MMOL/L — LOW (ref 22–31)
CO2 SERPL-SCNC: 19 MMOL/L — LOW (ref 22–31)
CREAT SERPL-MCNC: 3.93 MG/DL — HIGH (ref 0.5–1.3)
CREAT SERPL-MCNC: 4.02 MG/DL — HIGH (ref 0.5–1.3)
CULTURE RESULTS: SIGNIFICANT CHANGE UP
GLUCOSE BLDC GLUCOMTR-MCNC: 171 MG/DL — HIGH (ref 70–99)
GLUCOSE BLDC GLUCOMTR-MCNC: 70 MG/DL — SIGNIFICANT CHANGE UP (ref 70–99)
GLUCOSE SERPL-MCNC: 105 MG/DL — HIGH (ref 70–99)
GLUCOSE SERPL-MCNC: 96 MG/DL — SIGNIFICANT CHANGE UP (ref 70–99)
HCT VFR BLD CALC: 29.6 % — LOW (ref 34.5–45)
HCT VFR BLD CALC: 29.7 % — LOW (ref 34.5–45)
HGB BLD-MCNC: 9 G/DL — LOW (ref 11.5–15.5)
HGB BLD-MCNC: 9.5 G/DL — LOW (ref 11.5–15.5)
MAGNESIUM SERPL-MCNC: 1 MG/DL — CRITICAL LOW (ref 1.6–2.6)
MCHC RBC-ENTMCNC: 29.8 PG — SIGNIFICANT CHANGE UP (ref 27–34)
MCHC RBC-ENTMCNC: 30.4 GM/DL — LOW (ref 32–36)
MCHC RBC-ENTMCNC: 31.4 PG — SIGNIFICANT CHANGE UP (ref 27–34)
MCHC RBC-ENTMCNC: 32 GM/DL — SIGNIFICANT CHANGE UP (ref 32–36)
MCV RBC AUTO: 97.9 FL — SIGNIFICANT CHANGE UP (ref 80–100)
MCV RBC AUTO: 98 FL — SIGNIFICANT CHANGE UP (ref 80–100)
METHOD TYPE: SIGNIFICANT CHANGE UP
ORGANISM # SPEC MICROSCOPIC CNT: SIGNIFICANT CHANGE UP
ORGANISM # SPEC MICROSCOPIC CNT: SIGNIFICANT CHANGE UP
PLATELET # BLD AUTO: 221 K/UL — SIGNIFICANT CHANGE UP (ref 150–400)
PLATELET # BLD AUTO: 221 K/UL — SIGNIFICANT CHANGE UP (ref 150–400)
POTASSIUM SERPL-MCNC: 3.1 MMOL/L — LOW (ref 3.5–5.3)
POTASSIUM SERPL-MCNC: 3.1 MMOL/L — LOW (ref 3.5–5.3)
POTASSIUM SERPL-SCNC: 3.1 MMOL/L — LOW (ref 3.5–5.3)
POTASSIUM SERPL-SCNC: 3.1 MMOL/L — LOW (ref 3.5–5.3)
PROT SERPL-MCNC: 5.9 G/DL — LOW (ref 6–8.3)
RBC # BLD: 3.02 M/UL — LOW (ref 3.8–5.2)
RBC # BLD: 3.03 M/UL — LOW (ref 3.8–5.2)
RBC # FLD: 17.2 % — HIGH (ref 10.3–14.5)
RBC # FLD: 18.4 % — HIGH (ref 10.3–14.5)
SODIUM SERPL-SCNC: 141 MMOL/L — SIGNIFICANT CHANGE UP (ref 135–145)
SODIUM SERPL-SCNC: 141 MMOL/L — SIGNIFICANT CHANGE UP (ref 135–145)
SPECIMEN SOURCE: SIGNIFICANT CHANGE UP
WBC # BLD: 6.59 K/UL — SIGNIFICANT CHANGE UP (ref 3.8–10.5)
WBC # BLD: 6.8 K/UL — SIGNIFICANT CHANGE UP (ref 3.8–10.5)
WBC # FLD AUTO: 6.59 K/UL — SIGNIFICANT CHANGE UP (ref 3.8–10.5)
WBC # FLD AUTO: 6.8 K/UL — SIGNIFICANT CHANGE UP (ref 3.8–10.5)

## 2019-06-08 RX ORDER — MAGNESIUM SULFATE 500 MG/ML
2 VIAL (ML) INJECTION ONCE
Refills: 0 | Status: COMPLETED | OUTPATIENT
Start: 2019-06-08 | End: 2019-06-08

## 2019-06-08 RX ORDER — POTASSIUM CHLORIDE 20 MEQ
10 PACKET (EA) ORAL
Refills: 0 | Status: COMPLETED | OUTPATIENT
Start: 2019-06-08 | End: 2019-06-08

## 2019-06-08 RX ORDER — ACETAMINOPHEN 500 MG
650 TABLET ORAL ONCE
Refills: 0 | Status: COMPLETED | OUTPATIENT
Start: 2019-06-08 | End: 2019-06-08

## 2019-06-08 RX ADMIN — Medication 50 MILLIGRAM(S): at 16:17

## 2019-06-08 RX ADMIN — HEPARIN SODIUM 5000 UNIT(S): 5000 INJECTION INTRAVENOUS; SUBCUTANEOUS at 05:35

## 2019-06-08 RX ADMIN — Medication 0.5 MILLIGRAM(S): at 05:34

## 2019-06-08 RX ADMIN — Medication 50 MILLIGRAM(S): at 05:35

## 2019-06-08 RX ADMIN — Medication 600 MILLIGRAM(S): at 02:28

## 2019-06-08 RX ADMIN — Medication 650 MILLIGRAM(S): at 21:15

## 2019-06-08 RX ADMIN — HEPARIN SODIUM 5000 UNIT(S): 5000 INJECTION INTRAVENOUS; SUBCUTANEOUS at 16:17

## 2019-06-08 RX ADMIN — Medication 100 MILLIEQUIVALENT(S): at 16:17

## 2019-06-08 RX ADMIN — Medication 100 MILLIEQUIVALENT(S): at 17:25

## 2019-06-08 RX ADMIN — Medication 650 MILLIGRAM(S): at 01:07

## 2019-06-08 RX ADMIN — Medication 50 GRAM(S): at 18:45

## 2019-06-08 RX ADMIN — Medication 2 MILLIGRAM(S): at 20:20

## 2019-06-08 RX ADMIN — Medication 0.5 MILLIGRAM(S): at 16:17

## 2019-06-08 RX ADMIN — SODIUM CHLORIDE 100 MILLILITER(S): 9 INJECTION INTRAMUSCULAR; INTRAVENOUS; SUBCUTANEOUS at 01:14

## 2019-06-08 RX ADMIN — Medication 260 MILLIGRAM(S): at 20:53

## 2019-06-08 RX ADMIN — PANTOPRAZOLE SODIUM 40 MILLIGRAM(S): 20 TABLET, DELAYED RELEASE ORAL at 05:34

## 2019-06-08 NOTE — PHYSICAL THERAPY INITIAL EVALUATION ADULT - ADDITIONAL COMMENTS
Pt lives alone in an apartment with ramp access. Pt is a community ambulator w/ RW. Pt is independent w/ ADL's but she has a friend who she pays to assist w/ IADL's intermittently.

## 2019-06-08 NOTE — PROGRESS NOTE ADULT - SUBJECTIVE AND OBJECTIVE BOX
lung cancer  hpi 74 year old woman with mult malignancies recently early stage lung cancer s/p radiation  here with another episode ileus also n/v diarrhea  now has some dysphagia   pmh sh fh unchanged 7 pt ros dysphagia otherwise neg  physical  cachectic  vs [id number] 118/68 16 99 sat  lungs clear  cor s1s2  abd soft nontender  ext no edema  skin senile ecchymoses    data pending

## 2019-06-08 NOTE — DIETITIAN INITIAL EVALUATION ADULT. - NS AS NUTRI INTERV MEALS SNACK
General/healthful diet/1) Continue regular diet as ordered. Defer consistency to medical team, SLP. 2) Consider health shakes twice daily and monitor tolerance (however, pt with hx of refusing all supplements). RD to add ice cream daily, as pt noted with good intake of same per RD note 5/9/19. 3) Continue to monitor and encourage PO intake. 4) Recommend daily multivitamins. 5) Conduct RD interview as able; obtain further subjective wt/diet hx as able.

## 2019-06-08 NOTE — PHYSICAL THERAPY INITIAL EVALUATION ADULT - GENERAL OBSERVATIONS, REHAB EVAL
Pt encountered in semireclined position in NAD. Pt encountered in semireclined position in NAD. VSS, A/Ox3

## 2019-06-08 NOTE — PHYSICAL THERAPY INITIAL EVALUATION ADULT - PERTINENT HX OF CURRENT PROBLEM, REHAB EVAL
Ms. Boswell is a 73 year old female with pmh which includes multiple hospitalizations for SBO, COPD, and opioid dependence who was admitted via ED with abdominal pain, SBO and found to have pneumonia.

## 2019-06-08 NOTE — DIETITIAN INITIAL EVALUATION ADULT. - ENERGY NEEDS
Ht: 62"   Wt: 82.72 lbs   BMI: 15.1 kg/m2   IBW: 110 lbs (+/-10%)     75 % IBW  Edema: none noted        Skin: stage I to sacrum

## 2019-06-08 NOTE — PROGRESS NOTE ADULT - ASSESSMENT
early stage lung cancer s/p ebrt  await outpt pet/ct for followup    ileus   now dysphagia  GI followup    acute renal failure await repeat labs

## 2019-06-08 NOTE — DIETITIAN INITIAL EVALUATION ADULT. - OTHER INFO
Pt seen for nutrition consult on 6TOW. Per review, pt noted with UBW: 118 lbs x ~17 months ago. Recent wt history as follows: (1/17/19): 80 lbs, (5/6/19): 79.8 lbs, (current dosing wt 6/6/19): 82.76 lbs. Indicates overall wt loss of ~35% x 17 months (from reported UBW x 17 months ago). As noted above, pt declined RD interview; appeared distraught and possibly anxious. RN alerted. Pt admitted with complaints of chest pain, poor oral intake x 2 days. Noted with episode of ileus with N/V and diarrhea. Per Heme/Onc note 6/8/19, "now has some dysphagia".  Noted with several prior admissions related to opioid use, acute renal failure 2/2 dehydration. PMHx significant for breast cancer with right mastectomy, recent early stage lung cancer s/p radiation. Per review of H&P, pt with outpatient medication list notable for Centrum multivitamin. Noted with multiple (loose) BM's (6/7-6/8). Per review of prior RD admission note (5/6/19), "Patient has known narcotic bowel but does not take meds at home because she does not like the diarrhea.  This results in repeat readmissions for the same problem".  No known food allergies noted. No apparent hx of prior chewing/swallowing intolerance, however inconclusive at this time. Will monitor. Currently prescribed regular diet as ordered.

## 2019-06-08 NOTE — DIETITIAN INITIAL EVALUATION ADULT. - ETIOLOGY
predicted inability to meet estimated energy needs in context of altered GI function with multiple comorbidities (SBO, catabolic illness with lung Ca).

## 2019-06-08 NOTE — PROGRESS NOTE ADULT - SUBJECTIVE AND OBJECTIVE BOX
no  cp/sob    REVIEW OF SYSTEMS:  GEN: no fever,    no chills  RESP: no SOB,   no cough  CVS: no chest pain,   no palpitations  GI: no abdominal pain,   no nausea,   no vomiting,   no constipation,   no diarrhea  : no dysuria,   no frequency  NEURO: no headache,   no dizziness  PSYCH: no depression,   not anxious  Derm : no rash    MEDICATIONS  (STANDING):  aztreonam  IVPB      aztreonam  IVPB 1000 milliGRAM(s) IV Intermittent every 12 hours  buDESOnide   0.5 milliGRAM(s) Respule 0.5 milliGRAM(s) Inhalation two times a day  heparin  Injectable 5000 Unit(s) SubCutaneous every 12 hours  pantoprazole    Tablet 40 milliGRAM(s) Oral before breakfast  sodium chloride 0.9%. 1000 milliLiter(s) (100 mL/Hr) IV Continuous <Continuous>  tiotropium 18 MICROgram(s) Capsule 1 Capsule(s) Inhalation daily    MEDICATIONS  (PRN):  acetaminophen   Tablet .. 650 milliGRAM(s) Oral every 6 hours PRN Moderate Pain (4 - 6)  ALBUTerol    90 MICROgram(s) HFA Inhaler 1 Puff(s) Inhalation every 4 hours PRN Bronchospasm  diazepam    Tablet 2 milliGRAM(s) Oral at bedtime PRN anxiety      Vital Signs Last 24 Hrs  T(C): 36.4 (2019 04:17), Max: 36.4 (2019 20:45)  T(F): 97.6 (2019 04:17), Max: 97.6 (2019 20:45)  HR: 66 (2019 04:17) (66 - 78)  BP: 118/68 (2019 04:17) (104/61 - 123/67)  BP(mean): --  RR: 16 (2019 04:17) (16 - 18)  SpO2: 99% (2019 04:17) (94% - 99%)  CAPILLARY BLOOD GLUCOSE        I&O's Summary    2019 07:01  -  2019 07:00  --------------------------------------------------------  IN: 1500 mL / OUT: 3 mL / NET: 1497 mL    2019 07:01  -  2019 06:59  --------------------------------------------------------  IN: 1560 mL / OUT: 17 mL / NET: 1543 mL        PHYSICAL EXAM:  HEAD:  Atraumatic, Normocephalic  NECK: Supple, No   JVD  CHEST/LUNG:   no     rales,     no,    rhonchi  HEART: Regular rate and rhythm;         murmur  ABDOMEN: Soft, Nontender, ;   EXTREMITIES:    no    edema  NEUROLOGY:  alert    LABS:                        10.7   3.3   )-----------( 267      ( 2019 18:47 )             33.9         139  |  88<L>  |  63<H>  ----------------------------<  340<H>  4.0   |  23  |  5.08<H>    Ca    5.8<LL>      2019 18:47    TPro  6.8  /  Alb  3.6  /  TBili  0.4  /  DBili  x   /  AST  50<H>  /  ALT  41  /  AlkPhos  144<H>      PT/INR - ( 2019 18:47 )   PT: 19.9 sec;   INR: 1.70 ratio         PTT - ( 2019 18:47 )  PTT:29.3 sec      Urinalysis Basic - ( 2019 17:49 )    Color: Yellow / Appearance: Slightly Turbid / S.016 / pH: x  Gluc: x / Ketone: Negative  / Bili: Negative / Urobili: Negative   Blood: x / Protein: 100 / Nitrite: Negative   Leuk Esterase: Large / RBC: 2 /hpf /  /HPF   Sq Epi: x / Non Sq Epi: 1 /hpf / Bacteria: Many           @ 22:38  3.8  21      Thyroid Stimulating Hormone, Serum: 3.07 uIU/mL ( @ 17:37)          Consultant(s) Notes Reviewed:      Care Discussed with Consultants/Other Providers:

## 2019-06-08 NOTE — DIETITIAN INITIAL EVALUATION ADULT. - PHYSICAL APPEARANCE
Due to refusal of interview, RD unable to conduct Nutrition Focused Physical Assessment. Per visual observation, pt with severe body fat depletion: orbitals, triceps and overlying ribcage; severe muscle mass depletion: temples, clavicles, interosseous/emaciated

## 2019-06-08 NOTE — DIETITIAN INITIAL EVALUATION ADULT. - ADHERENCE
Per review of RD note 5/6/19, "No diet restrictions, friend/aide shops and prepares meals for her. Has a list at home of high calorie, high protein foods to consume but states that she has never eaten some of them before (i.e. peanut butter), doesn't drink milk. Has been eating mashed potatoes w/butter as night snack.  Has tried several supplements in the past, health shakes, magic cup ice cream, Ensure but didn't like the smell/taste of them, doesn't want to try any other supplements."

## 2019-06-08 NOTE — PHYSICAL THERAPY INITIAL EVALUATION ADULT - CRITERIA FOR SKILLED THERAPEUTIC INTERVENTIONS
functional limitations in following categories/predicted duration of therapy intervention/anticipated equipment needs at discharge/risk reduction/prevention/therapy frequency/anticipated discharge recommendation/impairments found

## 2019-06-08 NOTE — PROGRESS NOTE ADULT - ASSESSMENT
73 year old female with   PMH,    chronic Back pain (on Narcotics), history of multiple SBO's, narcotic associated constipation/ileus, COPD,  smoker,    ca  breast,  right  mastectomy. RENUKA cavitary lesion on ct, ,  c/c cachexia  path from  1/19, with NSCC with squamous  features,  s/p  RT     admitted with abdominal pain / vomiting,   from SBO/  seen by surgery  SHAKIRA on CKD 2/ dehydration    ct scan  with sbo,  anD   right pna    pt is an active smoker, refusing to quit   ct chest , 4/19/18  noted/  oncology dr hernandez   dvt ppx   iv fluids/   protonix  avoid  narcotics, if  possible  on  aztreonam/  vanco, 1  dose/  pt with pna/  gram negative./ and  bandemia  and uti with GNR  labs pending/  follow fs/  discussed with np     < from: CT Abdomen and Pelvis No Cont (06.06.19 @ 21:07) >  IMPRESSION:   Limited evaluation of intra-abdominal and pelvic organs due to lack of IV   contrast and lack of intra-abdominal fat.    Numerous fluid-filled dilated small bowel loops throughout the abdomen,   as well as, distention of the stomach with apparent transitioning in the   right lower quadrant similar to that seen on prior exam from 5/22/2019.   Small right femoral hernia containing a short segment of small bowel.   Colon is collapsed on the current exam.    New right lower lobe and right middle lobe airspace opacities concerning   for pneumonia.  Dr. Morataya discussed the findings with Dr. Adamson at 9:34 PM on 6/6/2019 with   read back.  < end of copied text >                   < from: CT Abdomen and Pelvis No Cont (05.22.19 @ 19:30) >  IMPRESSION:   Suboptimal studysecondary to absence of oral and IV contrast.  Small bowel obstruction with increased dilatation of the small bowel   compared to the prior exam 5/5/2019. Question transition point in the   right mid abdomen. No free intraperitoneal air.    Unchanged tree-in-bud opacities in the right lower lobe with mild   increased tree-in-bud opacities in the left lower lobe compared to prior   CT chest 4/19/2019.  < end of copied text >      < from: CT Chest No Cont (04.19.19 @ 18:50)   INTERPRETATION:  Motion degraded evaluation  Redemonstraion on left upper lobe cavitary mass consistent with patients   known hx of lung CA  Mucoid impacted right lower lobe ariways with scattered tree in bud   opacities  < end of copied text >      Cytopathology - Non Gyn Report (01.18.19 @ 11:43)    Immunohistochemical stains.  The immunophenotype together with the cytomorphology supports the  diagnosis squamous cell carcinoma.    Final Diagnosis  LUNG, LEFT UPPER LOBE, US GUIDED CORE BIOPSY AND FNA  POSITIVE FOR MALIGNANT CELLS.  Non-small cell carcinoma, with squamous features (see note).  Additional studies pending.

## 2019-06-08 NOTE — CHART NOTE - NSCHARTNOTEFT_GEN_A_CORE
Upon Nutritional Assessment by the Registered Dietitian your patient was determined to meet criteria / has evidence of the following diagnosis/diagnoses:          [ ]  Mild Protein Calorie Malnutrition        [ ]  Moderate Protein Calorie Malnutrition        [x] Severe Protein Calorie Malnutrition        [ ] Unspecified Protein Calorie Malnutrition        [ ] Underweight / BMI <19        [ ] Morbid Obesity / BMI > 40      Findings as based on:  [x ] Comprehensive nutrition assessment - Significant wt loss - ~30% x 17 months  [x ] Nutrition Focused Physical Exam - Due to refusal of interview, RD unable to conduct Nutrition Focused Physical Assessment. Per visual observation, pt with severe body fat depletion: orbitals, triceps and overlying ribcage; severe muscle mass depletion: temples, clavicles, interosseous  [x ] Other: BMI 15.1, Catabolic Illness (CA), Predicted suboptimal energy/protein intake, Cachexia       Nutrition Plan/Recommendations:      1) Continue regular diet as ordered. Defer consistency to medical team, SLP.   2) Consider health shakes twice daily and monitor tolerance (however, pt with hx of refusing all supplements). RD to add ice cream daily, as pt noted with good intake of same per RD note 5/9/19.   3) Continue to monitor and encourage PO intake. Staff to aid in assistance of menu ordering/meals PRN.   4) Recommend daily multivitamins.   5) Conduct RD interview as able; obtain further subjective wt/diet hx as able.    Alison Kleiner, RD, [facility] Pager # [phone]     PROVIDER Section:     By signing this assessment you are acknowledging and agree with the diagnosis/diagnoses assigned by the Registered Dietitian        Comments:

## 2019-06-08 NOTE — DIETITIAN INITIAL EVALUATION ADULT. - SOURCE
Pt refused RD interview despite encouragement. Stated "everything is fine!"; and reported that she had spoken to a RD yesterday. Pt well-known to nutrition department, with multiple prior admissions. Documents reviewed./other (specify)/patient

## 2019-06-08 NOTE — DIETITIAN INITIAL EVALUATION ADULT. - ORAL INTAKE PTA
Per review of RD note 5/6/19, pt noted with good PO intake at home, however had N+V x 4 days PTA. Per review of H&P, pt noted with poor intake x 2 days prior to current admission. Pt with hx of chronic, severe protein-calorie malnutrition.

## 2019-06-08 NOTE — DIETITIAN INITIAL EVALUATION ADULT. - NS AS NUTRI INTERV COLLABORAT
Collaboration with other providers/Malnutrition sticker placed in chart, medical team informed of need to sign, recommendations communicated with team

## 2019-06-08 NOTE — PROGRESS NOTE ADULT - SUBJECTIVE AND OBJECTIVE BOX
CARDIOLOGY     PROGRESS  NOTE   ________________________________________________    CHIEF COMPLAINT:Patient is a 74y old  Female who presents with a chief complaint of sepsis/acute renal failure/chest pain (08 Jun 2019 09:07)  c/o pain all over.  	  REVIEW OF SYSTEMS:  CONSTITUTIONAL: No fever, weight loss, or fatigue  EYES: No eye pain, visual disturbances, or discharge  ENT:  No difficulty hearing, tinnitus, vertigo; No sinus or throat pain  NECK: No pain or stiffness  RESPIRATORY: No cough, wheezing, chills or hemoptysis; No Shortness of Breath  CARDIOVASCULAR: No chest pain, palpitations, passing out, dizziness, or leg swelling  GASTROINTESTINAL: No abdominal or epigastric pain. No nausea, vomiting, or hematemesis; No diarrhea or constipation. No melena or hematochezia.  GENITOURINARY: No dysuria, frequency, hematuria, or incontinence  NEUROLOGICAL: No headaches, memory loss, loss of strength, numbness, or tremors  SKIN: No itching, burning, rashes, or lesions   LYMPH Nodes: No enlarged glands  ENDOCRINE: No heat or cold intolerance; No hair loss  MUSCULOSKELETAL: No joint pain or swelling; No muscle, back, or extremity pain  PSYCHIATRIC: No depression, anxiety, mood swings, or difficulty sleeping  HEME/LYMPH: No easy bruising, or bleeding gums  ALLERGY AND IMMUNOLOGIC: No hives or eczema	    [ ] All others negative	  [ ] Unable to obtain    PHYSICAL EXAM:  T(C): 36.4 (06-08-19 @ 04:17), Max: 36.4 (06-07-19 @ 20:45)  HR: 66 (06-08-19 @ 04:17) (66 - 78)  BP: 118/68 (06-08-19 @ 04:17) (104/61 - 123/67)  RR: 16 (06-08-19 @ 04:17) (16 - 18)  SpO2: 99% (06-08-19 @ 04:17) (94% - 99%)  Wt(kg): --  I&O's Summary    07 Jun 2019 07:01  -  08 Jun 2019 07:00  --------------------------------------------------------  IN: 1560 mL / OUT: 19 mL / NET: 1541 mL    08 Jun 2019 07:01  -  08 Jun 2019 10:29  --------------------------------------------------------  IN: 220 mL / OUT: 1 mL / NET: 219 mL        Appearance: Normal	  HEENT:   Normal oral mucosa, PERRL, EOMI	  Lymphatic: No lymphadenopathy  Cardiovascular: Normal S1 S2, No JVD, + murmurs, No edema  Respiratory: Lungs clear to auscultation	  Psychiatry: A & O x 3, Mood & affect appropriate  Gastrointestinal:  Soft, Non-tender, + BS	  Skin: No rashes, No ecchymoses, No cyanosis	  Neurologic: Non-focal  Extremities: Normal range of motion, No clubbing, cyanosis or edema  Vascular: Peripheral pulses palpable 2+ bilaterally    MEDICATIONS  (STANDING):  aztreonam  IVPB      aztreonam  IVPB 1000 milliGRAM(s) IV Intermittent every 12 hours  buDESOnide   0.5 milliGRAM(s) Respule 0.5 milliGRAM(s) Inhalation two times a day  heparin  Injectable 5000 Unit(s) SubCutaneous every 12 hours  pantoprazole    Tablet 40 milliGRAM(s) Oral before breakfast  sodium chloride 0.9%. 1000 milliLiter(s) (100 mL/Hr) IV Continuous <Continuous>  tiotropium 18 MICROgram(s) Capsule 1 Capsule(s) Inhalation daily      TELEMETRY: 	    ECG:  	  RADIOLOGY:  OTHER: 	  	  LABS:	 	    CARDIAC MARKERS:                                10.7   3.3   )-----------( 267      ( 06 Jun 2019 18:47 )             33.9     06-06    139  |  88<L>  |  63<H>  ----------------------------<  340<H>  4.0   |  23  |  5.08<H>    Ca    5.8<LL>      06 Jun 2019 18:47    TPro  6.8  /  Alb  3.6  /  TBili  0.4  /  DBili  x   /  AST  50<H>  /  ALT  41  /  AlkPhos  144<H>  06-06    proBNP: Serum Pro-Brain Natriuretic Peptide: 2352 pg/mL (06-06 @ 18:47)    Lipid Profile:   HgA1c:   TSH: Thyroid Stimulating Hormone, Serum: 3.07 uIU/mL (05-25 @ 17:37)    PT/INR - ( 06 Jun 2019 18:47 )   PT: 19.9 sec;   INR: 1.70 ratio         PTT - ( 06 Jun 2019 18:47 )  PTT:29.3 sec      Assessment and plan  ---------------------------  acute renal failure  check uo  fu renal function  dvt prophylaxis  tolerating PO

## 2019-06-08 NOTE — PHYSICAL THERAPY INITIAL EVALUATION ADULT - PRECAUTIONS/LIMITATIONS, REHAB EVAL
6/6 Pelvic and Chest CT: +PNA and SBO no known precautions/limitations/6/6 Pelvic and Chest CT: +PNA and SBO

## 2019-06-08 NOTE — CHART NOTE - NSCHARTNOTEFT_GEN_A_CORE
MEDICINE PA NOTE  SPECTRA [id number]    Notified by RN that patient had PSVT episode that lasted 5.8 seconds with a HR of 188 bpm. Notified Dr Dennison of this episode and also mentioned patient's low calcium and albumin level, and he suggested no further workup or treatment at this time.

## 2019-06-09 LAB
ALBUMIN SERPL ELPH-MCNC: 3.1 G/DL — LOW (ref 3.3–5)
ALP SERPL-CCNC: 109 U/L — SIGNIFICANT CHANGE UP (ref 40–120)
ALT FLD-CCNC: 20 U/L — SIGNIFICANT CHANGE UP (ref 10–45)
ANION GAP SERPL CALC-SCNC: 17 MMOL/L — SIGNIFICANT CHANGE UP (ref 5–17)
ANION GAP SERPL CALC-SCNC: 18 MMOL/L — HIGH (ref 5–17)
AST SERPL-CCNC: 14 U/L — SIGNIFICANT CHANGE UP (ref 10–40)
BILIRUB SERPL-MCNC: 0.2 MG/DL — SIGNIFICANT CHANGE UP (ref 0.2–1.2)
BUN SERPL-MCNC: 45 MG/DL — HIGH (ref 7–23)
BUN SERPL-MCNC: 48 MG/DL — HIGH (ref 7–23)
CALCIUM SERPL-MCNC: 6.4 MG/DL — CRITICAL LOW (ref 8.4–10.5)
CALCIUM SERPL-MCNC: 7 MG/DL — LOW (ref 8.4–10.5)
CHLORIDE SERPL-SCNC: 106 MMOL/L — SIGNIFICANT CHANGE UP (ref 96–108)
CHLORIDE SERPL-SCNC: 106 MMOL/L — SIGNIFICANT CHANGE UP (ref 96–108)
CO2 SERPL-SCNC: 16 MMOL/L — LOW (ref 22–31)
CO2 SERPL-SCNC: 17 MMOL/L — LOW (ref 22–31)
CREAT SERPL-MCNC: 2.62 MG/DL — HIGH (ref 0.5–1.3)
CREAT SERPL-MCNC: 3.04 MG/DL — HIGH (ref 0.5–1.3)
GLUCOSE BLDC GLUCOMTR-MCNC: 107 MG/DL — HIGH (ref 70–99)
GLUCOSE BLDC GLUCOMTR-MCNC: 111 MG/DL — HIGH (ref 70–99)
GLUCOSE BLDC GLUCOMTR-MCNC: 129 MG/DL — HIGH (ref 70–99)
GLUCOSE BLDC GLUCOMTR-MCNC: 85 MG/DL — SIGNIFICANT CHANGE UP (ref 70–99)
GLUCOSE SERPL-MCNC: 129 MG/DL — HIGH (ref 70–99)
GLUCOSE SERPL-MCNC: 97 MG/DL — SIGNIFICANT CHANGE UP (ref 70–99)
HCT VFR BLD CALC: 33 % — LOW (ref 34.5–45)
HGB BLD-MCNC: 9.9 G/DL — LOW (ref 11.5–15.5)
MAGNESIUM SERPL-MCNC: 1.5 MG/DL — LOW (ref 1.6–2.6)
MAGNESIUM SERPL-MCNC: 2 MG/DL — SIGNIFICANT CHANGE UP (ref 1.6–2.6)
MCHC RBC-ENTMCNC: 29.6 PG — SIGNIFICANT CHANGE UP (ref 27–34)
MCHC RBC-ENTMCNC: 30 GM/DL — LOW (ref 32–36)
MCV RBC AUTO: 98.8 FL — SIGNIFICANT CHANGE UP (ref 80–100)
PLATELET # BLD AUTO: 203 K/UL — SIGNIFICANT CHANGE UP (ref 150–400)
POTASSIUM SERPL-MCNC: 3.5 MMOL/L — SIGNIFICANT CHANGE UP (ref 3.5–5.3)
POTASSIUM SERPL-MCNC: 3.8 MMOL/L — SIGNIFICANT CHANGE UP (ref 3.5–5.3)
POTASSIUM SERPL-SCNC: 3.5 MMOL/L — SIGNIFICANT CHANGE UP (ref 3.5–5.3)
POTASSIUM SERPL-SCNC: 3.8 MMOL/L — SIGNIFICANT CHANGE UP (ref 3.5–5.3)
PROT SERPL-MCNC: 6.3 G/DL — SIGNIFICANT CHANGE UP (ref 6–8.3)
RBC # BLD: 3.34 M/UL — LOW (ref 3.8–5.2)
RBC # FLD: 18.1 % — HIGH (ref 10.3–14.5)
SODIUM SERPL-SCNC: 140 MMOL/L — SIGNIFICANT CHANGE UP (ref 135–145)
SODIUM SERPL-SCNC: 140 MMOL/L — SIGNIFICANT CHANGE UP (ref 135–145)
TSH SERPL-MCNC: 3.26 UIU/ML — SIGNIFICANT CHANGE UP (ref 0.27–4.2)
WBC # BLD: 6.45 K/UL — SIGNIFICANT CHANGE UP (ref 3.8–10.5)
WBC # FLD AUTO: 6.45 K/UL — SIGNIFICANT CHANGE UP (ref 3.8–10.5)

## 2019-06-09 PROCEDURE — 71045 X-RAY EXAM CHEST 1 VIEW: CPT | Mod: 26

## 2019-06-09 RX ORDER — CALCIUM GLUCONATE 100 MG/ML
1 VIAL (ML) INTRAVENOUS ONCE
Refills: 0 | Status: COMPLETED | OUTPATIENT
Start: 2019-06-09 | End: 2019-06-09

## 2019-06-09 RX ORDER — ACETAMINOPHEN 500 MG
650 TABLET ORAL ONCE
Refills: 0 | Status: COMPLETED | OUTPATIENT
Start: 2019-06-09 | End: 2019-06-09

## 2019-06-09 RX ORDER — OXYCODONE HYDROCHLORIDE 5 MG/1
5 TABLET ORAL EVERY 6 HOURS
Refills: 0 | Status: DISCONTINUED | OUTPATIENT
Start: 2019-06-09 | End: 2019-06-09

## 2019-06-09 RX ORDER — MAGNESIUM SULFATE 500 MG/ML
2 VIAL (ML) INJECTION ONCE
Refills: 0 | Status: COMPLETED | OUTPATIENT
Start: 2019-06-09 | End: 2019-06-09

## 2019-06-09 RX ORDER — SIMETHICONE 80 MG/1
80 TABLET, CHEWABLE ORAL ONCE
Refills: 0 | Status: COMPLETED | OUTPATIENT
Start: 2019-06-09 | End: 2019-06-09

## 2019-06-09 RX ORDER — OXYCODONE HYDROCHLORIDE 5 MG/1
5 TABLET ORAL EVERY 6 HOURS
Refills: 0 | Status: DISCONTINUED | OUTPATIENT
Start: 2019-06-09 | End: 2019-06-11

## 2019-06-09 RX ORDER — OXYCODONE HYDROCHLORIDE 5 MG/1
5 TABLET ORAL ONCE
Refills: 0 | Status: DISCONTINUED | OUTPATIENT
Start: 2019-06-09 | End: 2019-06-09

## 2019-06-09 RX ORDER — CEFTRIAXONE 500 MG/1
1 INJECTION, POWDER, FOR SOLUTION INTRAMUSCULAR; INTRAVENOUS EVERY 24 HOURS
Refills: 0 | Status: DISCONTINUED | OUTPATIENT
Start: 2019-06-09 | End: 2019-06-11

## 2019-06-09 RX ORDER — OXYCODONE AND ACETAMINOPHEN 5; 325 MG/1; MG/1
1 TABLET ORAL EVERY 8 HOURS
Refills: 0 | Status: DISCONTINUED | OUTPATIENT
Start: 2019-06-09 | End: 2019-06-09

## 2019-06-09 RX ORDER — METOPROLOL TARTRATE 50 MG
25 TABLET ORAL DAILY
Refills: 0 | Status: DISCONTINUED | OUTPATIENT
Start: 2019-06-09 | End: 2019-06-11

## 2019-06-09 RX ADMIN — PANTOPRAZOLE SODIUM 40 MILLIGRAM(S): 20 TABLET, DELAYED RELEASE ORAL at 05:12

## 2019-06-09 RX ADMIN — CEFTRIAXONE 100 GRAM(S): 500 INJECTION, POWDER, FOR SOLUTION INTRAMUSCULAR; INTRAVENOUS at 16:21

## 2019-06-09 RX ADMIN — OXYCODONE HYDROCHLORIDE 5 MILLIGRAM(S): 5 TABLET ORAL at 09:13

## 2019-06-09 RX ADMIN — Medication 2 MILLIGRAM(S): at 23:37

## 2019-06-09 RX ADMIN — OXYCODONE HYDROCHLORIDE 5 MILLIGRAM(S): 5 TABLET ORAL at 21:51

## 2019-06-09 RX ADMIN — OXYCODONE HYDROCHLORIDE 5 MILLIGRAM(S): 5 TABLET ORAL at 22:00

## 2019-06-09 RX ADMIN — HEPARIN SODIUM 5000 UNIT(S): 5000 INJECTION INTRAVENOUS; SUBCUTANEOUS at 16:21

## 2019-06-09 RX ADMIN — Medication 50 MILLIGRAM(S): at 05:13

## 2019-06-09 RX ADMIN — Medication 100 MILLIGRAM(S): at 23:38

## 2019-06-09 RX ADMIN — TIOTROPIUM BROMIDE 1 CAPSULE(S): 18 CAPSULE ORAL; RESPIRATORY (INHALATION) at 08:59

## 2019-06-09 RX ADMIN — Medication 0.5 MILLIGRAM(S): at 05:13

## 2019-06-09 RX ADMIN — Medication 100 MILLIGRAM(S): at 09:13

## 2019-06-09 RX ADMIN — HEPARIN SODIUM 5000 UNIT(S): 5000 INJECTION INTRAVENOUS; SUBCUTANEOUS at 05:12

## 2019-06-09 RX ADMIN — Medication 50 GRAM(S): at 08:59

## 2019-06-09 RX ADMIN — SODIUM CHLORIDE 100 MILLILITER(S): 9 INJECTION INTRAMUSCULAR; INTRAVENOUS; SUBCUTANEOUS at 03:21

## 2019-06-09 RX ADMIN — Medication 25 MILLIGRAM(S): at 16:21

## 2019-06-09 RX ADMIN — Medication 260 MILLIGRAM(S): at 03:21

## 2019-06-09 RX ADMIN — OXYCODONE HYDROCHLORIDE 5 MILLIGRAM(S): 5 TABLET ORAL at 10:15

## 2019-06-09 RX ADMIN — SIMETHICONE 80 MILLIGRAM(S): 80 TABLET, CHEWABLE ORAL at 01:37

## 2019-06-09 RX ADMIN — Medication 0.5 MILLIGRAM(S): at 16:20

## 2019-06-09 RX ADMIN — Medication 200 GRAM(S): at 09:13

## 2019-06-09 RX ADMIN — Medication 650 MILLIGRAM(S): at 03:39

## 2019-06-09 RX ADMIN — Medication 100 MILLIGRAM(S): at 18:25

## 2019-06-09 NOTE — PROGRESS NOTE ADULT - SUBJECTIVE AND OBJECTIVE BOX
lung cancer  74 year old woman with recent dx lung cancer early stage s/p rt  h/o breast cancer s/p mastectomy postmastectomy pain  h/o hemangiopericytoma s/p mult resections  recurrent ileus  pmh sh fh unchanged 7 pt ros abd pain diarrhea anxiety otherwise neg  physical  in pain off narcotics  vs t98 98 154/75 18 96 sat  lungs clear  cor s1s2  abd soft nontender  ext no edema    data wbc 6590 hgb 9 hct 29.6 plt [id number] cr 3.04

## 2019-06-09 NOTE — PROVIDER CONTACT NOTE (OTHER) - BACKGROUND
SHAKIRA on CKD likely 2/2 dehydration on IVF         SBO without free air likely 2/2 opioid use, no surgical interventions         New RML & RLL opacities on CT ? PNA on IV vanco/aztreonam

## 2019-06-09 NOTE — CONSULT NOTE ADULT - ASSESSMENT
74y female with Breast Ca- remote R mastectomy, recent lung ca- RT, chronic pain, prior SBOs, COPD (active smoker) admitted 6/7 with recurrent vomiting No fever or chills, afebrile since arrival, but leukopenia, L shift, SHAKIRA, concerning for severe sepsis, covered with Vanco x 2, Aztreo.    CT similar ABD findings, but new R infiltrates, likely from aspiration  Sepsis criteria on arrival, now moderating- WBC improved, Creat on decline  No tachycardia, BP stable  Vanco and Aztreo addresses pneumonia, but no easy oral transition    Plan:  Given she is tolerating Aztreo, she would tolerate Ceph  Will switch to IV Ceftriaxone and if continues to improve, can step down to Ceftin 250 mg po BID- another 4 days should suffice for aspiration pneumonia in this setting  Aztreo->CTX->Ceftin will address any question of Klebsiella UTI, although denies  Sxs  Follow temps and CBC/diff

## 2019-06-09 NOTE — PROVIDER CONTACT NOTE (CRITICAL VALUE NOTIFICATION) - BACKGROUND
Pt. came in with abdominal pain, has history of IBS, diverticulitis
Pt. came in with abdominal pain.  Pt. has history of breast ca

## 2019-06-09 NOTE — PROGRESS NOTE ADULT - ASSESSMENT
lung cancer nonsmall cell early stage s/p rt  await outpt pet/ct for followup    recurrent ileus/sbo  now with diarrhea off narcotics  pain evaluation would be appropriate    resolving acute on chronic renal failure

## 2019-06-09 NOTE — PROGRESS NOTE ADULT - SUBJECTIVE AND OBJECTIVE BOX
eating  well/ no abd pain    REVIEW OF SYSTEMS:  GEN: no fever,    no chills  RESP: no SOB,   no cough  CVS: no chest pain,   no palpitations  GI: no abdominal pain,   no nausea,   no vomiting,   no constipation,   no diarrhea  : no dysuria,   no frequency  NEURO: no headache,   no dizziness  PSYCH: no depression,   not anxious  Derm : no rash    MEDICATIONS  (STANDING):  aztreonam  IVPB      aztreonam  IVPB 1000 milliGRAM(s) IV Intermittent every 12 hours  buDESOnide   0.5 milliGRAM(s) Respule 0.5 milliGRAM(s) Inhalation two times a day  heparin  Injectable 5000 Unit(s) SubCutaneous every 12 hours  pantoprazole    Tablet 40 milliGRAM(s) Oral before breakfast  sodium chloride 0.9%. 1000 milliLiter(s) (100 mL/Hr) IV Continuous <Continuous>  tiotropium 18 MICROgram(s) Capsule 1 Capsule(s) Inhalation daily    MEDICATIONS  (PRN):  acetaminophen   Tablet .. 650 milliGRAM(s) Oral every 6 hours PRN Moderate Pain (4 - 6)  ALBUTerol    90 MICROgram(s) HFA Inhaler 1 Puff(s) Inhalation every 4 hours PRN Bronchospasm  diazepam    Tablet 2 milliGRAM(s) Oral at bedtime PRN anxiety      Vital Signs Last 24 Hrs  T(C): 36.7 (09 Jun 2019 04:39), Max: 36.8 (08 Jun 2019 11:25)  T(F): 98 (09 Jun 2019 04:39), Max: 98.2 (08 Jun 2019 11:25)  HR: 98 (09 Jun 2019 04:39) (72 - 98)  BP: 154/75 (09 Jun 2019 04:39) (120/68 - 155/75)  BP(mean): --  RR: 18 (09 Jun 2019 04:39) (17 - 18)  SpO2: 96% (09 Jun 2019 04:39) (95% - 98%)  CAPILLARY BLOOD GLUCOSE      POCT Blood Glucose.: 85 mg/dL (09 Jun 2019 07:20)  POCT Blood Glucose.: 171 mg/dL (08 Jun 2019 21:08)    I&O's Summary    08 Jun 2019 07:01  -  09 Jun 2019 07:00  --------------------------------------------------------  IN: 2050 mL / OUT: 3 mL / NET: 2047 mL        PHYSICAL EXAM:  HEAD:  Atraumatic, Normocephalic  NECK: Supple, No   JVD  CHEST/LUNG:   no     rales,     no,    rhonchi  HEART: Regular rate and rhythm;         murmur  ABDOMEN: Soft, Nontender, ;   EXTREMITIES:     no   edema  NEUROLOGY:  alert    LABS:                        9.0    6.59  )-----------( 221      ( 08 Jun 2019 18:04 )             29.6     06-09    140  |  106  |  48<H>  ----------------------------<  97  3.8   |  17<L>  |  3.04<H>    Ca    5.7<LL>      08 Jun 2019 11:39  Mg     1.5     06-09    TPro  6.3  /  Alb  3.1<L>  /  TBili  0.2  /  DBili  x   /  AST  14  /  ALT  20  /  AlkPhos  109  06-09                    Thyroid Stimulating Hormone, Serum: 3.07 uIU/mL (05-25 @ 17:37)          Consultant(s) Notes Reviewed:      Care Discussed with Consultants/Other Providers:

## 2019-06-09 NOTE — PROGRESS NOTE ADULT - SUBJECTIVE AND OBJECTIVE BOX
CARDIOLOGY     PROGRESS  NOTE   ________________________________________________    CHIEF COMPLAINT:Patient is a 74y old  Female who presents with a chief complaint of sepsis/acute renal failure/chest pain (09 Jun 2019 08:38)  no complain.  	  REVIEW OF SYSTEMS:  CONSTITUTIONAL: No fever, weight loss, or fatigue  EYES: No eye pain, visual disturbances, or discharge  ENT:  No difficulty hearing, tinnitus, vertigo; No sinus or throat pain  NECK: No pain or stiffness  RESPIRATORY: No cough, wheezing, chills or hemoptysis; + Shortness of Breath  CARDIOVASCULAR: No chest pain, palpitations, passing out, dizziness, or leg swelling  GASTROINTESTINAL: No abdominal or epigastric pain. No nausea, vomiting, or hematemesis; No diarrhea or constipation. No melena or hematochezia.  GENITOURINARY: No dysuria, frequency, hematuria, or incontinence  NEUROLOGICAL: No headaches, memory loss, loss of strength, numbness, or tremors  SKIN: No itching, burning, rashes, or lesions   LYMPH Nodes: No enlarged glands  ENDOCRINE: No heat or cold intolerance; No hair loss  MUSCULOSKELETAL: No joint pain or swelling; No muscle, back, or extremity pain  PSYCHIATRIC: No depression, anxiety, mood swings, or difficulty sleeping  HEME/LYMPH: No easy bruising, or bleeding gums  ALLERGY AND IMMUNOLOGIC: No hives or eczema	    [ ] All others negative	  [ ] Unable to obtain    PHYSICAL EXAM:  T(C): 36.7 (06-09-19 @ 04:39), Max: 36.8 (06-08-19 @ 11:25)  HR: 98 (06-09-19 @ 04:39) (72 - 98)  BP: 154/75 (06-09-19 @ 04:39) (120/68 - 155/75)  RR: 18 (06-09-19 @ 04:39) (17 - 18)  SpO2: 96% (06-09-19 @ 04:39) (95% - 98%)  Wt(kg): --  I&O's Summary    08 Jun 2019 07:01  -  09 Jun 2019 07:00  --------------------------------------------------------  IN: 2050 mL / OUT: 3 mL / NET: 2047 mL    09 Jun 2019 07:01  -  09 Jun 2019 10:31  --------------------------------------------------------  IN: 220 mL / OUT: 2 mL / NET: 218 mL        Appearance: Normal	  HEENT:   Normal oral mucosa, PERRL, EOMI	  Lymphatic: No lymphadenopathy  Cardiovascular: Normal S1 S2, No JVD, + murmurs, No edema  Respiratory: Lungs clear to auscultation	  Psychiatry: A & O x 3, Mood & affect appropriate  Gastrointestinal:  Soft, Non-tender, + BS	  Skin: No rashes, No ecchymoses, No cyanosis	  Neurologic: Non-focal  Extremities: Normal range of motion, No clubbing, cyanosis or edema  Vascular: Peripheral pulses palpable 2+ bilaterally    MEDICATIONS  (STANDING):  aztreonam  IVPB      aztreonam  IVPB 1000 milliGRAM(s) IV Intermittent every 12 hours  buDESOnide   0.5 milliGRAM(s) Respule 0.5 milliGRAM(s) Inhalation two times a day  heparin  Injectable 5000 Unit(s) SubCutaneous every 12 hours  pantoprazole    Tablet 40 milliGRAM(s) Oral before breakfast  sodium chloride 0.9%. 1000 milliLiter(s) (100 mL/Hr) IV Continuous <Continuous>  tiotropium 18 MICROgram(s) Capsule 1 Capsule(s) Inhalation daily      TELEMETRY: 	    ECG:  	  RADIOLOGY:  OTHER: 	  	  LABS:	 	    CARDIAC MARKERS:                                9.9    6.45  )-----------( 203      ( 09 Jun 2019 09:34 )             33.0     06-09    140  |  106  |  48<H>  ----------------------------<  97  3.8   |  17<L>  |  3.04<H>    Ca    6.4<LL>      09 Jun 2019 07:04  Mg     1.5     06-09    TPro  6.3  /  Alb  3.1<L>  /  TBili  0.2  /  DBili  x   /  AST  14  /  ALT  20  /  AlkPhos  109  06-09    proBNP: Serum Pro-Brain Natriuretic Peptide: 2352 pg/mL (06-06 @ 18:47)    Lipid Profile:   HgA1c:   TSH: Thyroid Stimulating Hormone, Serum: 3.07 uIU/mL (05-25 @ 17:37)          Assessment and plan  ---------------------------  doing betteer  ?pneumoniae abx as per medicine  continue ivf renal function is improving

## 2019-06-09 NOTE — CONSULT NOTE ADULT - SUBJECTIVE AND OBJECTIVE BOX
HPI:   Patient is a 74y female with Breast Ca- remote R mastectomy, recent lung ca- RT, chronic pain, prior SBOs, COPD, admitted 6/7 with recurrent vomiting; no abdom pain, no fever or chills, but increased weakness.  Afebrile since arrival, but leukopenia, L shift, SHAKIRA, concerning for severe sepsis, covered with Vanco x 2, Aztreo.  She denies vomiting today; still weak with increased cough and SOB at times; no CP.  No  Sxs.    REVIEW OF SYSTEMS:  All other review of systems negative (Comprehensive ROS)    PAST MEDICAL & SURGICAL HISTORY:  Back pain  Bowel obstruction: multiple hospitalizations  Kidney stone  Emphysema  Narcotic Dependence  S/P Radiation Therapy  S/P Chemotherapy, Time Since Greater than 12 Weeks  Narcotic Dysmotile Cilia Syndrome  Chronic Pain Following Surgery or Procedure: following right breast mastectomy  Ankle Fracture: right anle fx s/p cast 2009  History of Small Bowel Obstruction: 1/2010  Asthma  Breast Cancer  S/P hernia surgery: femoral hernia - 2012  S/P Exploratory Laparotomy  S/P Appendectomy  S/P Cholecystectomy  Liver Mass s/p liver rsxn: s/p resection 2009  History of Hysterectomy: 1998  S/P Right Mastectomy: 2006      Allergies  Biaxin (Rash)  Cipro (Headache)  Flagyl (Headache)  penicillin (Rash; Swelling)  sulfa drugs (Unknown)    Antimicrobials Day # 3  aztreonam  IVPB      aztreonam  IVPB 1000 milliGRAM(s) IV Intermittent every 12 hours    Other Medications:  acetaminophen   Tablet .. 650 milliGRAM(s) Oral every 6 hours PRN  ALBUTerol    90 MICROgram(s) HFA Inhaler 1 Puff(s) Inhalation every 4 hours PRN  buDESOnide   0.5 milliGRAM(s) Respule 0.5 milliGRAM(s) Inhalation two times a day  diazepam    Tablet 2 milliGRAM(s) Oral at bedtime PRN  heparin  Injectable 5000 Unit(s) SubCutaneous every 12 hours  metoprolol succinate ER 25 milliGRAM(s) Oral daily  oxyCODONE    IR 5 milliGRAM(s) Oral every 6 hours PRN  pantoprazole    Tablet 40 milliGRAM(s) Oral before breakfast  sodium chloride 0.9%. 1000 milliLiter(s) IV Continuous <Continuous>  tiotropium 18 MICROgram(s) Capsule 1 Capsule(s) Inhalation daily      FAMILY HISTORY:  No pertinent family history in first degree relatives      SOCIAL HISTORY:  Smoking: daily   ETOH: no      Single     T(F): 98 (06-09-19 @ 11:01), Max: 98.1 (06-08-19 @ 20:02)  HR: 90 (06-09-19 @ 11:01)  BP: 144/75 (06-09-19 @ 11:01)  RR: 18 (06-09-19 @ 11:01)  SpO2: 95% (06-09-19 @ 11:01)  Wt(kg): --    PHYSICAL EXAM:  General: thin, frail, alert, no acute distress  Eyes:  anicteric, no conjunctival injection, no discharge  Oropharynx: no lesions or injection 	  Neck: supple, without adenopathy  Lungs: coarse BSs throughout  Heart: regular rate and rhythm; no murmur, rubs or gallops  Abdomen: soft, distended, nontender, without mass or organomegaly  Skin: no lesions  Extremities: no edema  Neurologic: alert, oriented, moves all extremities    LAB RESULTS:                        9.9    6.45  )-----------( 203      ( 09 Jun 2019 09:34 )             33.0     06-09    140  |  106  |  45<H>  ----------------------------<  129<H>  3.5   |  16<L>  |  2.62<H>    Ca    7.0<L>      09 Jun 2019 13:55  Mg     2.0     06-09    TPro  6.3  /  Alb  3.1<L>  /  TBili  0.2  /  DBili  x   /  AST  14  /  ALT  20  /  AlkPhos  109  06-09    MICROBIOLOGY:  RECENT CULTURES:  06-06 @ 23:12 .Urine Klebsiella pneumoniae    >100,000 CFU/ml Klebsiella pneumoniae    06-06 @ 23:11 .Blood     No growth to date.    Rapid Respiratory Viral Panel (05.06.19 @ 05:13)    Rapid RVP Result: [facility]      RADIOLOGY REVIEWED:  Xray Chest 1 View- PORTABLE-Routine (06.09.19 @ 08:48) >  Increased patchy airspace opacities within the right midlung and left   apex. Patchy opacity at the right base is unchanged. No pleural effusion   or pneumothorax.    CT Abdomen and Pelvis No Cont (06.06.19 @ 21:07) >  Limited evaluation of intra-abdominal and pelvic organs due to lack of IV   contrast and lack of intra-abdominal fat.    Numerous fluid-filled dilated small bowel loops throughout the abdomen,   as well as, distention of the stomach with apparent transitioning in the   right lower quadrant similar to that seen on prior exam from 5/22/2019.   Small right femoral hernia containing a short segment of small bowel.   Colon is collapsed on the current exam.    New right lower lobe and right middle lobe airspace opacities concerning   for pneumonia.

## 2019-06-09 NOTE — PROGRESS NOTE ADULT - ASSESSMENT
73 year old female with   PMH,    chronic Back pain (on Narcotics), history of multiple SBO's, narcotic associated constipation/ileus, COPD,  smoker,    ca  breast,  right  mastectomy. RENUKA cavitary lesion on ct, ,  c/c cachexia  path from  1/19, with NSCC with squamous  features,  s/p  RT     admitted with abdominal pain / vomiting,   from SBO/  seen by surgery  SHAKIRA on CKD 2/ dehydration    ct scan  with sbo,  anD   right pna    pt is an active smoker, refusing to quit   ct chest , 4/19/18  noted/  oncology dr hernandez    iv fluids/   protonix  avoid  narcotics, if  possible  on  aztreonam/  vanco, 1  dose/  pt with pna/  gram negative./ and  bandemia  and uti with GNR/ klebsiella  SHAKIRA, resolving  labs in am  cxr     < from: CT Abdomen and Pelvis No Cont (06.06.19 @ 21:07) >  IMPRESSION:   Limited evaluation of intra-abdominal and pelvic organs due to lack of IV   contrast and lack of intra-abdominal fat.    Numerous fluid-filled dilated small bowel loops throughout the abdomen,   as well as, distention of the stomach with apparent transitioning in the   right lower quadrant similar to that seen on prior exam from 5/22/2019.   Small right femoral hernia containing a short segment of small bowel.   Colon is collapsed on the current exam.    New right lower lobe and right middle lobe airspace opacities concerning   for pneumonia.  Dr. Morataya discussed the findings with Dr. Adamson at 9:34 PM on 6/6/2019 with   read back.  < end of copied text >                   < from: CT Abdomen and Pelvis No Cont (05.22.19 @ 19:30) >  IMPRESSION:   Suboptimal studysecondary to absence of oral and IV contrast.  Small bowel obstruction with increased dilatation of the small bowel   compared to the prior exam 5/5/2019. Question transition point in the   right mid abdomen. No free intraperitoneal air.    Unchanged tree-in-bud opacities in the right lower lobe with mild   increased tree-in-bud opacities in the left lower lobe compared to prior   CT chest 4/19/2019.  < end of copied text >      < from: CT Chest No Cont (04.19.19 @ 18:50)   INTERPRETATION:  Motion degraded evaluation  Redemonstraion on left upper lobe cavitary mass consistent with patients   known hx of lung CA  Mucoid impacted right lower lobe ariways with scattered tree in bud   opacities  < end of copied text >      Cytopathology - Non Gyn Report (01.18.19 @ 11:43)    Immunohistochemical stains.  The immunophenotype together with the cytomorphology supports the  diagnosis squamous cell carcinoma.    Final Diagnosis  LUNG, LEFT UPPER LOBE, US GUIDED CORE BIOPSY AND FNA  POSITIVE FOR MALIGNANT CELLS.  Non-small cell carcinoma, with squamous features (see note).  Additional studies pending.

## 2019-06-10 LAB
ANION GAP SERPL CALC-SCNC: 15 MMOL/L — SIGNIFICANT CHANGE UP (ref 5–17)
BUN SERPL-MCNC: 41 MG/DL — HIGH (ref 7–23)
CALCIUM SERPL-MCNC: 6.7 MG/DL — LOW (ref 8.4–10.5)
CHLORIDE SERPL-SCNC: 109 MMOL/L — HIGH (ref 96–108)
CO2 SERPL-SCNC: 17 MMOL/L — LOW (ref 22–31)
CREAT SERPL-MCNC: 2.12 MG/DL — HIGH (ref 0.5–1.3)
GLUCOSE BLDC GLUCOMTR-MCNC: 104 MG/DL — HIGH (ref 70–99)
GLUCOSE BLDC GLUCOMTR-MCNC: 104 MG/DL — HIGH (ref 70–99)
GLUCOSE BLDC GLUCOMTR-MCNC: 123 MG/DL — HIGH (ref 70–99)
GLUCOSE BLDC GLUCOMTR-MCNC: 80 MG/DL — SIGNIFICANT CHANGE UP (ref 70–99)
GLUCOSE SERPL-MCNC: 100 MG/DL — HIGH (ref 70–99)
HCT VFR BLD CALC: 29.6 % — LOW (ref 34.5–45)
HGB BLD-MCNC: 9.2 G/DL — LOW (ref 11.5–15.5)
MAGNESIUM SERPL-MCNC: 1.5 MG/DL — LOW (ref 1.6–2.6)
MCHC RBC-ENTMCNC: 30.4 PG — SIGNIFICANT CHANGE UP (ref 27–34)
MCHC RBC-ENTMCNC: 31.1 GM/DL — LOW (ref 32–36)
MCV RBC AUTO: 97.7 FL — SIGNIFICANT CHANGE UP (ref 80–100)
PLATELET # BLD AUTO: 192 K/UL — SIGNIFICANT CHANGE UP (ref 150–400)
POTASSIUM SERPL-MCNC: 3.8 MMOL/L — SIGNIFICANT CHANGE UP (ref 3.5–5.3)
POTASSIUM SERPL-SCNC: 3.8 MMOL/L — SIGNIFICANT CHANGE UP (ref 3.5–5.3)
RBC # BLD: 3.03 M/UL — LOW (ref 3.8–5.2)
RBC # FLD: 18.4 % — HIGH (ref 10.3–14.5)
SODIUM SERPL-SCNC: 141 MMOL/L — SIGNIFICANT CHANGE UP (ref 135–145)
WBC # BLD: 9.64 K/UL — SIGNIFICANT CHANGE UP (ref 3.8–10.5)
WBC # FLD AUTO: 9.64 K/UL — SIGNIFICANT CHANGE UP (ref 3.8–10.5)

## 2019-06-10 PROCEDURE — 99233 SBSQ HOSP IP/OBS HIGH 50: CPT

## 2019-06-10 RX ORDER — ACETAMINOPHEN 500 MG
650 TABLET ORAL ONCE
Refills: 0 | Status: COMPLETED | OUTPATIENT
Start: 2019-06-10 | End: 2019-06-10

## 2019-06-10 RX ORDER — MAGNESIUM SULFATE 500 MG/ML
2 VIAL (ML) INJECTION ONCE
Refills: 0 | Status: COMPLETED | OUTPATIENT
Start: 2019-06-10 | End: 2019-06-10

## 2019-06-10 RX ORDER — BENZOCAINE AND MENTHOL 5; 1 G/100ML; G/100ML
1 LIQUID ORAL
Refills: 0 | Status: DISCONTINUED | OUTPATIENT
Start: 2019-06-10 | End: 2019-06-11

## 2019-06-10 RX ORDER — SODIUM CHLORIDE 9 MG/ML
1000 INJECTION, SOLUTION INTRAVENOUS
Refills: 0 | Status: DISCONTINUED | OUTPATIENT
Start: 2019-06-10 | End: 2019-06-11

## 2019-06-10 RX ORDER — CALCIUM GLUCONATE 100 MG/ML
1 VIAL (ML) INTRAVENOUS ONCE
Refills: 0 | Status: COMPLETED | OUTPATIENT
Start: 2019-06-10 | End: 2019-06-10

## 2019-06-10 RX ADMIN — Medication 100 MILLIGRAM(S): at 09:44

## 2019-06-10 RX ADMIN — Medication 50 GRAM(S): at 10:51

## 2019-06-10 RX ADMIN — Medication 650 MILLIGRAM(S): at 15:00

## 2019-06-10 RX ADMIN — OXYCODONE HYDROCHLORIDE 5 MILLIGRAM(S): 5 TABLET ORAL at 05:48

## 2019-06-10 RX ADMIN — Medication 260 MILLIGRAM(S): at 14:02

## 2019-06-10 RX ADMIN — Medication 0.5 MILLIGRAM(S): at 16:56

## 2019-06-10 RX ADMIN — Medication 0.5 MILLIGRAM(S): at 05:02

## 2019-06-10 RX ADMIN — SODIUM CHLORIDE 100 MILLILITER(S): 9 INJECTION INTRAMUSCULAR; INTRAVENOUS; SUBCUTANEOUS at 05:04

## 2019-06-10 RX ADMIN — Medication 100 MILLIGRAM(S): at 21:49

## 2019-06-10 RX ADMIN — OXYCODONE HYDROCHLORIDE 5 MILLIGRAM(S): 5 TABLET ORAL at 12:20

## 2019-06-10 RX ADMIN — PANTOPRAZOLE SODIUM 40 MILLIGRAM(S): 20 TABLET, DELAYED RELEASE ORAL at 05:01

## 2019-06-10 RX ADMIN — OXYCODONE HYDROCHLORIDE 5 MILLIGRAM(S): 5 TABLET ORAL at 05:01

## 2019-06-10 RX ADMIN — BENZOCAINE AND MENTHOL 1 LOZENGE: 5; 1 LIQUID ORAL at 13:40

## 2019-06-10 RX ADMIN — Medication 650 MILLIGRAM(S): at 02:00

## 2019-06-10 RX ADMIN — HEPARIN SODIUM 5000 UNIT(S): 5000 INJECTION INTRAVENOUS; SUBCUTANEOUS at 05:02

## 2019-06-10 RX ADMIN — TIOTROPIUM BROMIDE 1 CAPSULE(S): 18 CAPSULE ORAL; RESPIRATORY (INHALATION) at 11:15

## 2019-06-10 RX ADMIN — OXYCODONE HYDROCHLORIDE 5 MILLIGRAM(S): 5 TABLET ORAL at 20:00

## 2019-06-10 RX ADMIN — Medication 25 MILLIGRAM(S): at 05:01

## 2019-06-10 RX ADMIN — SODIUM CHLORIDE 100 MILLILITER(S): 9 INJECTION, SOLUTION INTRAVENOUS at 12:10

## 2019-06-10 RX ADMIN — HEPARIN SODIUM 5000 UNIT(S): 5000 INJECTION INTRAVENOUS; SUBCUTANEOUS at 16:57

## 2019-06-10 RX ADMIN — Medication 2 MILLIGRAM(S): at 22:28

## 2019-06-10 RX ADMIN — CEFTRIAXONE 100 GRAM(S): 500 INJECTION, POWDER, FOR SOLUTION INTRAMUSCULAR; INTRAVENOUS at 16:29

## 2019-06-10 RX ADMIN — Medication 200 GRAM(S): at 12:16

## 2019-06-10 RX ADMIN — OXYCODONE HYDROCHLORIDE 5 MILLIGRAM(S): 5 TABLET ORAL at 11:15

## 2019-06-10 RX ADMIN — Medication 260 MILLIGRAM(S): at 01:20

## 2019-06-10 RX ADMIN — ALBUTEROL 1 PUFF(S): 90 AEROSOL, METERED ORAL at 09:49

## 2019-06-10 RX ADMIN — OXYCODONE HYDROCHLORIDE 5 MILLIGRAM(S): 5 TABLET ORAL at 20:30

## 2019-06-10 NOTE — PROGRESS NOTE ADULT - ASSESSMENT
73 yo F hx Breast Ca- remote R mastectomy, recent lung ca- RT, chronic pain, prior SBOs, COPD (active smoker) admitted 6/7 with recurrent vomiting   No fever or chills, afebrile since arrival, but leukopenia, L shift, SHAKIRA, received empiric Vanco x 2, Aztreo.    CT :new R infiltrates, possible aspiration  normal WBC  No tachycardia, BP stable  tolerates CTX  blood cult -no growth to date, urine cx with Kleb--chronic    Plan:    Cont IV Ceftriaxone  Step down to Ceftin 250 mg po BID- another 3 days, if/when ready for dc  Chronic Klebsiella colonization with pyuria, but no dysuria, also targeted by above regimen

## 2019-06-10 NOTE — PROVIDER CONTACT NOTE (OTHER) - BACKGROUND
DX: Vomiting/dehydration         SHAKIRA on CKD likely 2/2 dehydration on IVF         SBO without free air likely 2/2 opioid use, no surgical interventions

## 2019-06-10 NOTE — PROGRESS NOTE ADULT - SUBJECTIVE AND OBJECTIVE BOX
Patient is a 74y old  Female who presented with a chief complaint of sepsis/acute renal failure/chest pain (10 Sebastien 2019 10:16)      INTERVAL HPI/OVERNIGHT EVENTS:  +BMs - loose   2 loose brown stools today  no nausea or vomiting, no abdominal distension. appetite good  no rectal bleeding or melena  +cough  +c/o being anxious, asking about nicotine patch    MEDICATIONS  (STANDING):  acetaminophen  IVPB .. 650 milliGRAM(s) IV Intermittent once  buDESOnide   0.5 milliGRAM(s) Respule 0.5 milliGRAM(s) Inhalation two times a day  calcium gluconate IVPB 1 Gram(s) IV Intermittent once  cefTRIAXone   IVPB 1 Gram(s) IV Intermittent every 24 hours  heparin  Injectable 5000 Unit(s) SubCutaneous every 12 hours  metoprolol succinate ER 25 milliGRAM(s) Oral daily  pantoprazole    Tablet 40 milliGRAM(s) Oral before breakfast  sodium chloride 0.45% 1000 milliLiter(s) (100 mL/Hr) IV Continuous <Continuous>  tiotropium 18 MICROgram(s) Capsule 1 Capsule(s) Inhalation daily    MEDICATIONS  (PRN):  acetaminophen   Tablet .. 650 milliGRAM(s) Oral every 6 hours PRN Moderate Pain (4 - 6)  ALBUTerol    90 MICROgram(s) HFA Inhaler 1 Puff(s) Inhalation every 4 hours PRN Bronchospasm  benzocaine 15 mG/menthol 3.6 mG Lozenge 1 Lozenge Oral every 3 hours PRN Sore Throat  benzonatate 100 milliGRAM(s) Oral every 8 hours PRN Cough  diazepam    Tablet 2 milliGRAM(s) Oral at bedtime PRN anxiety  oxyCODONE    IR 5 milliGRAM(s) Oral every 6 hours PRN Severe Pain (7 - 10)      Allergies  Biaxin (Rash)  Cipro (Headache)  Flagyl (Headache)  penicillin (Rash; Swelling)  sulfa drugs (Unknown)      Review of Systems:  General:  No fevers, chills, night sweats  CV:  No pain, palpitations, hypo/hypertension  Resp:  No dyspnea,+cough +COPD +lung cancer  GI:  see HPI  :  No pain, bleeding, incontinence, nocturia  Muscle: +chronic back pain  Neuro:  No weakness, tingling, memory problems  Psych:  No fatigue, insomnia, mood problems, depression  Endocrine:  No polyuria, polydypsia, cold/heat intolerance  Heme:  No petechiae, ecchymosis, easy bruisability  Skin:  No rash, tattoos, scars, edema    Vital Signs Last 24 Hrs  T(C): 36.3 (10 Sebastien 2019 10:00), Max: 37.3 (09 Jun 2019 20:11)  T(F): 97.4 (10 Sebastien 2019 10:00), Max: 99.1 (09 Jun 2019 20:11)  HR: 71 (10 Sbeastien 2019 10:00) (71 - 103)  BP: 116/73 (10 Sebastien 2019 10:00) (114/72 - 160/92)  BP(mean): --  RR: 17 (10 Sebastien 2019 10:00) (17 - 20)  SpO2: 92% (10 Sebastien 2019 10:00) (92% - 98%)    PHYSICAL EXAM:  Constitutional: NAD, well-developed thin frail elderly female non toxic appearing, alert   Neck: No LAD, supple no JVD  Respiratory: distant BS +rhonchi  Cardiovascular: S1 and S2, RRR  Gastrointestinal: BS+, soft, NT/ND, neg HSM  Extremities: No peripheral edema, cyanosis +clubbing of fingertips  Vascular: 2+ peripheral pulses  Neurological: A/O x 3, no focal deficits  Psychiatric: anxious, normal affect  Skin: No rashes, anicteric    LABS:                        9.2    9.64  )-----------( 192      ( 10 Sebastien 2019 08:34 )             29.6     06-10    141  |  109<H>  |  41<H>  ----------------------------<  100<H>  3.8   |  17<L>  |  2.12<H>    Ca    6.7<L>      10 Sebastien 2019 06:00  Mg     1.5     06-10    TPro  6.3  /  Alb  3.1<L>  /  TBili  0.2  /  DBili  x   /  AST  14  /  ALT  20  /  AlkPhos  109  06-09    C. difficile GDH &amp; toxins A/B by EIA . (06.07.19 @ 18:40)    Clostridium difficile GDH Toxins A&amp;B, EIA:   Negative    Clostridium difficile GDH Interpretation: Negative for toxigenic C. Difficile.  This specimen is negative for C.  Difficile glutamate dehydrogenase (GDH) antigen and negative for C.  Difficile Toxins A & B, by EIA.       RADIOLOGY & ADDITIONAL TESTS:

## 2019-06-10 NOTE — PROGRESS NOTE ADULT - SUBJECTIVE AND OBJECTIVE BOX
clinicallly improved/  wants to go home everyday  REVIEW OF SYSTEMS:  GEN: no fever,    no chills  RESP: no SOB,   no cough  CVS: no chest pain,   no palpitations  GI: no abdominal pain,   no nausea,   no vomiting,   no constipation,   no diarrhea  : no dysuria,   no frequency  NEURO: no headache,   no dizziness  PSYCH: no depression,   not anxious  Derm : no rash    MEDICATIONS  (STANDING):  buDESOnide   0.5 milliGRAM(s) Respule 0.5 milliGRAM(s) Inhalation two times a day  cefTRIAXone   IVPB 1 Gram(s) IV Intermittent every 24 hours  heparin  Injectable 5000 Unit(s) SubCutaneous every 12 hours  metoprolol succinate ER 25 milliGRAM(s) Oral daily  pantoprazole    Tablet 40 milliGRAM(s) Oral before breakfast  sodium chloride 0.9%. 1000 milliLiter(s) (100 mL/Hr) IV Continuous <Continuous>  tiotropium 18 MICROgram(s) Capsule 1 Capsule(s) Inhalation daily    MEDICATIONS  (PRN):  acetaminophen   Tablet .. 650 milliGRAM(s) Oral every 6 hours PRN Moderate Pain (4 - 6)  ALBUTerol    90 MICROgram(s) HFA Inhaler 1 Puff(s) Inhalation every 4 hours PRN Bronchospasm  benzonatate 100 milliGRAM(s) Oral every 8 hours PRN Cough  diazepam    Tablet 2 milliGRAM(s) Oral at bedtime PRN anxiety  oxyCODONE    IR 5 milliGRAM(s) Oral every 6 hours PRN Severe Pain (7 - 10)      Vital Signs Last 24 Hrs  T(C): 36.7 (10 Sebastien 2019 04:22), Max: 37.3 (09 Jun 2019 20:11)  T(F): 98 (10 Sebastien 2019 04:22), Max: 99.1 (09 Jun 2019 20:11)  HR: 74 (10 Sebastien 2019 04:22) (74 - 103)  BP: 114/72 (10 Sebastien 2019 04:22) (114/72 - 160/92)  BP(mean): --  RR: 18 (10 Sebastien 2019 04:22) (18 - 20)  SpO2: 98% (10 Sebastien 2019 04:22) (95% - 98%)  CAPILLARY BLOOD GLUCOSE      POCT Blood Glucose.: 111 mg/dL (09 Jun 2019 21:10)  POCT Blood Glucose.: 107 mg/dL (09 Jun 2019 16:26)  POCT Blood Glucose.: 129 mg/dL (09 Jun 2019 11:27)    I&O's Summary    09 Jun 2019 07:01  -  10 Sebastien 2019 07:00  --------------------------------------------------------  IN: 2000 mL / OUT: 304 mL / NET: 1696 mL        PHYSICAL EXAM:  HEAD:  Atraumatic, Normocephalic  NECK: Supple, No   JVD  CHEST/LUNG:   no     rales,     no,    rhonchi  HEART: Regular rate and rhythm;         murmur  ABDOMEN: Soft, Nontender, ;   EXTREMITIES:   no     edema  NEUROLOGY:  alert    LABS:                        9.9    6.45  )-----------( 203      ( 09 Jun 2019 09:34 )             33.0     06-10    141  |  109<H>  |  41<H>  ----------------------------<  100<H>  3.8   |  17<L>  |  2.12<H>    Ca    6.7<L>      10 Sebastien 2019 06:00  Mg     1.5     06-10    TPro  6.3  /  Alb  3.1<L>  /  TBili  0.2  /  DBili  x   /  AST  14  /  ALT  20  /  AlkPhos  109  06-09                    Thyroid Stimulating Hormone, Serum: 3.26 uIU/mL (06-08 @ 19:22)          Consultant(s) Notes Reviewed:      Care Discussed with Consultants/Other Providers:

## 2019-06-10 NOTE — PROGRESS NOTE ADULT - SUBJECTIVE AND OBJECTIVE BOX
73 year old woman with h/o multiple malignancies latest early stage lung cancer s/p ebrt no f/u pet yet  multiple admissions for sbo/ileus here for further n/v diarrhea and chest pain      PAST MEDICAL & SURGICAL HISTORY:  Back pain  Bowel obstruction: multiple hospitalizations  Kidney stone  Emphysema  Narcotic Dependence  S/P Radiation Therapy  S/P Chemotherapy, Time Since Greater than 12 Weeks  Narcotic Dysmotile Cilia Syndrome  Chronic Pain Following Surgery or Procedure: following right breast mastectomy  Ankle Fracture: right anle fx s/p cast 2009  History of Small Bowel Obstruction: 1/2010  Asthma  Breast Cancer  S/P hernia surgery: femoral hernia - 2012  S/P Exploratory Laparotomy  S/P Appendectomy  S/P Cholecystectomy  Liver Mass s/p liver rsxn: s/p resection 2009  History of Hysterectomy: 1998  S/P Right Mastectomy: 2006    Medications:  acetaminophen   Tablet .. 650 milliGRAM(s) Oral every 6 hours PRN Moderate Pain (4 - 6)  ALBUTerol    90 MICROgram(s) HFA Inhaler 1 Puff(s) Inhalation every 4 hours PRN Bronchospasm  benzonatate 100 milliGRAM(s) Oral every 8 hours PRN Cough  buDESOnide   0.5 milliGRAM(s) Respule 0.5 milliGRAM(s) Inhalation two times a day  cefTRIAXone   IVPB 1 Gram(s) IV Intermittent every 24 hours  diazepam    Tablet 2 milliGRAM(s) Oral at bedtime PRN anxiety  heparin  Injectable 5000 Unit(s) SubCutaneous every 12 hours  magnesium sulfate  IVPB 2 Gram(s) IV Intermittent once  metoprolol succinate ER 25 milliGRAM(s) Oral daily  oxyCODONE    IR 5 milliGRAM(s) Oral every 6 hours PRN Severe Pain (7 - 10)  pantoprazole    Tablet 40 milliGRAM(s) Oral before breakfast  sodium chloride 0.9%. 1000 milliLiter(s) IV Continuous <Continuous>  tiotropium 18 MICROgram(s) Capsule 1 Capsule(s) Inhalation daily    Labs:  CBC Full  -  ( 10 Sebastien 2019 08:34 )  WBC Count : 9.64 K/uL  Hemoglobin : 9.2 g/dL  Hematocrit : 29.6 %  Platelet Count - Automated : 192 K/uL  Mean Cell Volume : 97.7 fl  Mean Cell Hemoglobin : 30.4 pg  Mean Cell Hemoglobin Concentration : 31.1 gm/dL  Auto Neutrophil # : x  Auto Lymphocyte # : x  Auto Monocyte # : x  Auto Eosinophil # : x  Auto Basophil # : x  Auto Neutrophil % : x  Auto Lymphocyte % : x  Auto Monocyte % : x  Auto Eosinophil % : x  Auto Basophil % : x    06-10    141  |  109<H>  |  41<H>  ----------------------------<  100<H>  3.8   |  17<L>  |  2.12<H>    Ca    6.7<L>      10 Sebastien 2019 06:00  Mg     1.5     06-10    TPro  6.3  /  Alb  3.1<L>  /  TBili  0.2  /  DBili  x   /  AST  14  /  ALT  20  /  AlkPhos  109  06-09      Radiology:             ROS:  Patient comfortable without distress  No SOB or chest pain  No palpitation  No abdominal pain, diarrhaea or constipation  No weakness of extremities  No skin changes or swelling of legs    Vital Signs Last 24 Hrs  T(C): 36.7 (10 Sebastien 2019 04:22), Max: 37.3 (09 Jun 2019 20:11)  T(F): 98 (10 Sebastien 2019 04:22), Max: 99.1 (09 Jun 2019 20:11)  HR: 74 (10 Sebastien 2019 04:22) (74 - 103)  BP: 114/72 (10 Sebastien 2019 04:22) (114/72 - 160/92)  BP(mean): --  RR: 18 (10 Sebastien 2019 04:22) (18 - 20)  SpO2: 98% (10 Sebastien 2019 04:22) (95% - 98%)    Physical exam:  Patient alert and oriented  No distress  CVS: S1, S2 regular or murmur  Chest: bilateral breath sound without rales  Abdomen: soft, not tender, no organomegaly or masses  No focal neuro deficit  No edema      Assessment and Plan: 73 year old woman with h/o multiple malignancies latest early stage lung cancer s/p EBRT no f/u pet yet  multiple admissions for sbo/ileus here for further n/v diarrhea and chest pain  Complaints of cough today and loose bowel movements    PAST MEDICAL & SURGICAL HISTORY:  Back pain  Bowel obstruction: multiple hospitalizations  Kidney stone  Emphysema  Narcotic Dependence  S/P Radiation Therapy  S/P Chemotherapy, Time Since Greater than 12 Weeks  Narcotic Dysmotile Cilia Syndrome  Chronic Pain Following Surgery or Procedure: following right breast mastectomy  Ankle Fracture: right anle fx s/p cast 2009  History of Small Bowel Obstruction: 1/2010  Asthma  Breast Cancer  S/P hernia surgery: femoral hernia - 2012  S/P Exploratory Laparotomy  S/P Appendectomy  S/P Cholecystectomy  Liver Mass s/p liver rsxn: s/p resection 2009  History of Hysterectomy: 1998  S/P Right Mastectomy: 2006    Medications:  acetaminophen   Tablet .. 650 milliGRAM(s) Oral every 6 hours PRN Moderate Pain (4 - 6)  ALBUTerol    90 MICROgram(s) HFA Inhaler 1 Puff(s) Inhalation every 4 hours PRN Bronchospasm  benzonatate 100 milliGRAM(s) Oral every 8 hours PRN Cough  buDESOnide   0.5 milliGRAM(s) Respule 0.5 milliGRAM(s) Inhalation two times a day  cefTRIAXone   IVPB 1 Gram(s) IV Intermittent every 24 hours  diazepam    Tablet 2 milliGRAM(s) Oral at bedtime PRN anxiety  heparin  Injectable 5000 Unit(s) SubCutaneous every 12 hours  magnesium sulfate  IVPB 2 Gram(s) IV Intermittent once  metoprolol succinate ER 25 milliGRAM(s) Oral daily  oxyCODONE    IR 5 milliGRAM(s) Oral every 6 hours PRN Severe Pain (7 - 10)  pantoprazole    Tablet 40 milliGRAM(s) Oral before breakfast  sodium chloride 0.9%. 1000 milliLiter(s) IV Continuous <Continuous>  tiotropium 18 MICROgram(s) Capsule 1 Capsule(s) Inhalation daily    Labs:  CBC Full  -  ( 10 Sebastien 2019 08:34 )  WBC Count : 9.64 K/uL  Hemoglobin : 9.2 g/dL  Hematocrit : 29.6 %  Platelet Count - Automated : 192 K/uL  Mean Cell Volume : 97.7 fl  Mean Cell Hemoglobin : 30.4 pg  Mean Cell Hemoglobin Concentration : 31.1 gm/dL  Auto Neutrophil # : x  Auto Lymphocyte # : x  Auto Monocyte # : x  Auto Eosinophil # : x  Auto Basophil # : x  Auto Neutrophil % : x  Auto Lymphocyte % : x  Auto Monocyte % : x  Auto Eosinophil % : x  Auto Basophil % : x    06-10    141  |  109<H>  |  41<H>  ----------------------------<  100<H>  3.8   |  17<L>  |  2.12<H>    Ca    6.7<L>      10 Sebastien 2019 06:00  Mg     1.5     06-10    TPro  6.3  /  Alb  3.1<L>  /  TBili  0.2  /  DBili  x   /  AST  14  /  ALT  20  /  AlkPhos  109  06-09      Radiology:             ROS:  Patient comfortable without distress  No SOB or chest pain, but cough  No palpitation  No abdominal pain, had loose bowel movements  No weakness of extremities  No skin changes or swelling of legs    Vital Signs Last 24 Hrs  T(C): 36.7 (10 Sebastien 2019 04:22), Max: 37.3 (09 Jun 2019 20:11)  T(F): 98 (10 Sebastien 2019 04:22), Max: 99.1 (09 Jun 2019 20:11)  HR: 74 (10 Sebastien 2019 04:22) (74 - 103)  BP: 114/72 (10 Sebastien 2019 04:22) (114/72 - 160/92)  BP(mean): --  RR: 18 (10 Sebastien 2019 04:22) (18 - 20)  SpO2: 98% (10 Sebastien 2019 04:22) (95% - 98%)    Physical exam:  Patient alert and oriented  very emaciated  CVS: S1, S2 regular or murmur  Chest: bilateral breath sound without rales  Abdomen: soft, not tender, no organomegaly or masses  No focal neuro deficit  No edema      Assessment and Plan:

## 2019-06-10 NOTE — PROGRESS NOTE ADULT - ASSESSMENT
73 year old female with   PMH,    chronic Back pain (on Narcotics), history of multiple SBO's, narcotic associated constipation/ileus, COPD,  smoker,    ca  breast,  right  mastectomy. RENUKA cavitary lesion on ct, ,  c/c cachexia  path from  1/19, with NSCC with squamous  features,  s/p  RT     admitted with abdominal pain / vomiting,   from SBO/  seen by surgery  SHAKIRA on CKD 2/ dehydration    ct scan  with sbo,  anD   right pna    pt is an active smoker, refusing to quit   ct chest , 4/19/18  noted/  oncology dr hernandez    iv fluids/   protonix  avoid  narcotics, if  possible  on  aztreonam/  vanco, 1  dose/  pt with pna/  gram negative./ and  bandemia  and uti with GNR/ klebsiella  SHAKIRA, resolving  on rocephin  per ID       < from: CT Abdomen and Pelvis No Cont (06.06.19 @ 21:07) >  IMPRESSION:   Limited evaluation of intra-abdominal and pelvic organs due to lack of IV   contrast and lack of intra-abdominal fat.    Numerous fluid-filled dilated small bowel loops throughout the abdomen,   as well as, distention of the stomach with apparent transitioning in the   right lower quadrant similar to that seen on prior exam from 5/22/2019.   Small right femoral hernia containing a short segment of small bowel.   Colon is collapsed on the current exam.    New right lower lobe and right middle lobe airspace opacities concerning   for pneumonia.  Dr. Morataya discussed the findings with Dr. Adamson at 9:34 PM on 6/6/2019 with   read back.  < end of copied text >                   < from: CT Abdomen and Pelvis No Cont (05.22.19 @ 19:30) >  IMPRESSION:   Suboptimal studysecondary to absence of oral and IV contrast.  Small bowel obstruction with increased dilatation of the small bowel   compared to the prior exam 5/5/2019. Question transition point in the   right mid abdomen. No free intraperitoneal air.    Unchanged tree-in-bud opacities in the right lower lobe with mild   increased tree-in-bud opacities in the left lower lobe compared to prior   CT chest 4/19/2019.  < end of copied text >      < from: CT Chest No Cont (04.19.19 @ 18:50)   INTERPRETATION:  Motion degraded evaluation  Redemonstraion on left upper lobe cavitary mass consistent with patients   known hx of lung CA  Mucoid impacted right lower lobe ariways with scattered tree in bud   opacities  < end of copied text >      Cytopathology - Non Gyn Report (01.18.19 @ 11:43)    Immunohistochemical stains.  The immunophenotype together with the cytomorphology supports the  diagnosis squamous cell carcinoma.    Final Diagnosis  LUNG, LEFT UPPER LOBE, US GUIDED CORE BIOPSY AND FNA  POSITIVE FOR MALIGNANT CELLS.  Non-small cell carcinoma, with squamous features (see note).  Additional studies pending.

## 2019-06-10 NOTE — CONSULT NOTE ADULT - SUBJECTIVE AND OBJECTIVE BOX
[facility]   MD VICENTA LEONARD MD RUORU WONG, PA    TEL:  OFFICE: [phone]  DR MAYER CELL: [phone]  DONNA WORKMAN CELL: [phone]  DR. PARTIDA CELL: [phone]    -- INITIAL RENAL CONSULT NOTE  --------------------------------------------------------------------------------  HPI:  74y female with Breast Ca- remote R mastectomy, recent lung ca- RT, chronic pain, prior SBOs, COPD, admitted 6/7 with recurrent vomiting        PAST HISTORY  --------------------------------------------------------------------------------  PAST MEDICAL & SURGICAL HISTORY:  Back pain  Bowel obstruction: multiple hospitalizations  Kidney stone  Emphysema  Narcotic Dependence  S/P Radiation Therapy  S/P Chemotherapy, Time Since Greater than 12 Weeks  Narcotic Dysmotile Cilia Syndrome  Chronic Pain Following Surgery or Procedure: following right breast mastectomy  Ankle Fracture: right anle fx s/p cast 2009  History of Small Bowel Obstruction: 1/2010  Asthma  Breast Cancer  S/P hernia surgery: femoral hernia - 2012  S/P Exploratory Laparotomy  S/P Appendectomy  S/P Cholecystectomy  Liver Mass s/p liver rsxn: s/p resection 2009  History of Hysterectomy: 1998  S/P Right Mastectomy: 2006    FAMILY HISTORY:  No pertinent family history in first degree relatives    PAST SOCIAL HISTORY:    ALLERGIES & MEDICATIONS  --------------------------------------------------------------------------------  Allergies    Biaxin (Rash)  Cipro (Headache)  Flagyl (Headache)  penicillin (Rash; Swelling)  sulfa drugs (Unknown)    Intolerances      Standing Inpatient Medications  buDESOnide   0.5 milliGRAM(s) Respule 0.5 milliGRAM(s) Inhalation two times a day  cefTRIAXone   IVPB 1 Gram(s) IV Intermittent every 24 hours  heparin  Injectable 5000 Unit(s) SubCutaneous every 12 hours  magnesium sulfate  IVPB 2 Gram(s) IV Intermittent once  metoprolol succinate ER 25 milliGRAM(s) Oral daily  pantoprazole    Tablet 40 milliGRAM(s) Oral before breakfast  sodium chloride 0.9%. 1000 milliLiter(s) IV Continuous <Continuous>  tiotropium 18 MICROgram(s) Capsule 1 Capsule(s) Inhalation daily    PRN Inpatient Medications  acetaminophen   Tablet .. 650 milliGRAM(s) Oral every 6 hours PRN  ALBUTerol    90 MICROgram(s) HFA Inhaler 1 Puff(s) Inhalation every 4 hours PRN  benzonatate 100 milliGRAM(s) Oral every 8 hours PRN  diazepam    Tablet 2 milliGRAM(s) Oral at bedtime PRN  oxyCODONE    IR 5 milliGRAM(s) Oral every 6 hours PRN      REVIEW OF SYSTEMS  --------------------------------------------------------------------------------  Gen: No fevers/chills  Skin: No rashes  Head/Eyes/Ears: Normal hearing,  Normal vision   Respiratory: No dyspnea, cough  CV: No chest pain  GI: + vomiting  : No dysuria, hematuria  MSK: No  edema  Heme: No easy bruising or bleeding  Psych: No significant depression    All other systems were reviewed and are negative, except as noted.    VITALS/PHYSICAL EXAM  --------------------------------------------------------------------------------  T(C): 36.7 (06-10-19 @ 04:22), Max: 37.3 (06-09-19 @ 20:11)  HR: 74 (06-10-19 @ 04:22) (74 - 103)  BP: 114/72 (06-10-19 @ 04:22) (114/72 - 160/92)  RR: 18 (06-10-19 @ 04:22) (18 - 20)  SpO2: 98% (06-10-19 @ 04:22) (95% - 98%)  Wt(kg): --        06-09-19 @ 07:01  -  06-10-19 @ 07:00  --------------------------------------------------------  IN: 2000 mL / OUT: 304 mL / NET: 1696 mL      Physical Exam:  	Gen: NAD  	HEENT: MMM  	Pulm: CTA B/L  	CV: S1S2  	Abd: Soft, +BS   	Ext: No LE edema B/L  	Neuro: Awake  	Skin: Warm and dry  	Gu no irina    LABS/STUDIES  --------------------------------------------------------------------------------              9.2    9.64  >-----------<  192      [06-10-19 @ 08:34]              29.6     141  |  109  |  41  ----------------------------<  100      [06-10-19 @ 06:00]  3.8   |  17  |  2.12        Ca     6.7     [06-10-19 @ 06:00]      Mg     1.5     [06-10-19 @ 06:00]    TPro  6.3  /  Alb  3.1  /  TBili  0.2  /  DBili  x   /  AST  14  /  ALT  20  /  AlkPhos  109  [06-09-19 @ 07:04]          Creatinine Trend:  SCr 2.12 [06-10 @ 06:00]  SCr 2.62 [06-09 @ 13:55]  SCr 3.04 [06-09 @ 07:04]  SCr 4.02 [06-08 @ 11:39]  SCr 5.08 [06-06 @ 18:47]    Urinalysis - [06-06-19 @ 17:49]      Color Yellow / Appearance Slightly Turbid / SG 1.016 / pH 6.0      Gluc Negative / Ketone Negative  / Bili Negative / Urobili Negative       Blood Moderate / Protein 100 / Leuk Est Large / Nitrite Negative      RBC 2 /  / Hyaline 2 / Gran  / Sq Epi  / Non Sq Epi 1 / Bacteria Many      TSH 3.26      [06-08-19 @ 19:22]    HCV 0.15, Nonreact      [04-19-19 @ 12:32]

## 2019-06-10 NOTE — PROGRESS NOTE ADULT - ASSESSMENT
72 yo F PMHx of Breast Ca s/p right mastectomy chemotherapy tx (2006), Lung Ca, COPD (no home O2), hysterectomy, appendectomy, cholecystectomy, femoral hernia repair, multiple SBOs w/ resection, opioid dependence, p/w chest and abdominal pain. Pt with multiple repeat admissions with CTs read as SBO's but all have resolved with either oral narcan or Movantic/Relistor. Patient has known narcotic bowel dysmotility syndrome.  Now also found to have PNA    No further nausea or abdominal distension - s/p 4+ loose stools  Previous admission, pt noted to have significant Abx associated diarrhea  C diff negative 6/7/19    RECS:  -Continue regular diet  -While on Abx, would continue to hold Movantik doses  -Monitor abdominal exam  -PPI daily  -Abx per ID  -Monitor  Cr    Discussed with pt and Medicine team      Jake Khan PA-C    [facility]  [phone]  After hours and weekend coverage [phone]

## 2019-06-10 NOTE — PROGRESS NOTE ADULT - ASSESSMENT
lung cancer nonsmall cell early stage s/p rt  await outpt pet/ct for followup    recurrent ileus/sbo  now with diarrhea off narcotics  pain evaluation would be appropriate    resolving acute on chronic renal failure Patient with lung cancer nonsmall cell early stage s/p rt, Will get a PET/CT follow-up as outpatient  she had multiple admissions, including for ileus, bowel obstruction, Mostly narcotic-induced  At present is getting treatment with antibiotic for possible aspiration pneumonia, seen by ID    Her SHAKIRA is getting better. Cardiology is seeing her too as she came with chest pain  She had complained of diarrhea, with negative workup so far, seen by GI, monantik was discontinued on Friday for it.

## 2019-06-10 NOTE — CONSULT NOTE ADULT - REASON FOR ADMISSION
sepsis/acute renal failure/chest pain

## 2019-06-10 NOTE — CONSULT NOTE ADULT - ASSESSMENT
74y female with Breast Ca- remote R mastectomy, recent lung ca- RT, chronic pain, prior SBOs, COPD, admitted 6/7 with recurrent vomiting    SHAKIRA  Pt with SHAKIRA likely pre-renal from GI losses  Scr 1.2 on 4/21/2019—1.6 on (5/27/2019 )  PT with Scr 5.08 on admission on 6/7    Renal function improving with IVF  Change IVF to 1/2NS with 75meq naHCO3- 100cc/hr  Monitor renal function  Avoid further nephrotoxics, NSAIDS RCA    Hypocalcemia  likely sec to hypoalbuminemia  Corrected calcium still low at 7.6   Consider calcium suppplement  Monitor serum Ca      HypoMagnesemia  Repleted by primary team  Monitor serum Mg

## 2019-06-10 NOTE — PROGRESS NOTE ADULT - SUBJECTIVE AND OBJECTIVE BOX
CARDIOLOGY     PROGRESS  NOTE   ________________________________________________    CHIEF COMPLAINT:Patient is a 74y old  Female who presents with a chief complaint of sepsis/acute renal failure/chest pain (10 Sebastien 2019 07:47)  doing better.  	  REVIEW OF SYSTEMS:  CONSTITUTIONAL: No fever, weight loss, or fatigue  EYES: No eye pain, visual disturbances, or discharge  ENT:  No difficulty hearing, tinnitus, vertigo; No sinus or throat pain  NECK: No pain or stiffness  RESPIRATORY: No cough, wheezing, chills or hemoptysis; No Shortness of Breath  CARDIOVASCULAR: No chest pain, palpitations, passing out, dizziness, or leg swelling  GASTROINTESTINAL: No abdominal or epigastric pain. No nausea, vomiting, or hematemesis; No diarrhea or constipation. No melena or hematochezia.  GENITOURINARY: No dysuria, frequency, hematuria, or incontinence  NEUROLOGICAL: No headaches, memory loss, loss of strength, numbness, or tremors  SKIN: No itching, burning, rashes, or lesions   LYMPH Nodes: No enlarged glands  ENDOCRINE: No heat or cold intolerance; No hair loss  MUSCULOSKELETAL: No joint pain or swelling; No muscle, back, or extremity pain  PSYCHIATRIC: No depression, anxiety, mood swings, or difficulty sleeping  HEME/LYMPH: No easy bruising, or bleeding gums  ALLERGY AND IMMUNOLOGIC: No hives or eczema	    [ ] All others negative	  [ ] Unable to obtain    PHYSICAL EXAM:  T(C): 36.7 (06-10-19 @ 04:22), Max: 37.3 (06-09-19 @ 20:11)  HR: 74 (06-10-19 @ 04:22) (74 - 103)  BP: 114/72 (06-10-19 @ 04:22) (114/72 - 160/92)  RR: 18 (06-10-19 @ 04:22) (18 - 20)  SpO2: 98% (06-10-19 @ 04:22) (95% - 98%)  Wt(kg): --  I&O's Summary    09 Jun 2019 07:01  -  10 Sebastien 2019 07:00  --------------------------------------------------------  IN: 2000 mL / OUT: 304 mL / NET: 1696 mL        Appearance: Normal	  HEENT:   Normal oral mucosa, PERRL, EOMI	  Lymphatic: No lymphadenopathy  Cardiovascular: Normal S1 S2, No JVD, + murmurs, No edema  Respiratory: Lungs clear to auscultation	  Psychiatry: A & O x 3, Mood & affect appropriate  Gastrointestinal:  Soft, Non-tender, + BS	  Skin: No rashes, No ecchymoses, No cyanosis	  Neurologic: Non-focal  Extremities: Normal range of motion, No clubbing, cyanosis or edema  Vascular: Peripheral pulses palpable 2+ bilaterally    MEDICATIONS  (STANDING):  buDESOnide   0.5 milliGRAM(s) Respule 0.5 milliGRAM(s) Inhalation two times a day  cefTRIAXone   IVPB 1 Gram(s) IV Intermittent every 24 hours  heparin  Injectable 5000 Unit(s) SubCutaneous every 12 hours  magnesium sulfate  IVPB 2 Gram(s) IV Intermittent once  metoprolol succinate ER 25 milliGRAM(s) Oral daily  pantoprazole    Tablet 40 milliGRAM(s) Oral before breakfast  sodium chloride 0.9%. 1000 milliLiter(s) (100 mL/Hr) IV Continuous <Continuous>  tiotropium 18 MICROgram(s) Capsule 1 Capsule(s) Inhalation daily      TELEMETRY: 	    ECG:  	  RADIOLOGY:  OTHER: 	  	  LABS:	 	    CARDIAC MARKERS:                                9.9    6.45  )-----------( 203      ( 09 Jun 2019 09:34 )             33.0     06-10    141  |  109<H>  |  41<H>  ----------------------------<  100<H>  3.8   |  17<L>  |  2.12<H>    Ca    6.7<L>      10 Sebastien 2019 06:00  Mg     1.5     06-10    TPro  6.3  /  Alb  3.1<L>  /  TBili  0.2  /  DBili  x   /  AST  14  /  ALT  20  /  AlkPhos  109  06-09    proBNP: Serum Pro-Brain Natriuretic Peptide: 2352 pg/mL (06-06 @ 18:47)    Lipid Profile:   HgA1c:   TSH: Thyroid Stimulating Hormone, Serum: 3.26 uIU/mL (06-08 @ 19:22)  Thyroid Stimulating Hormone, Serum: 3.07 uIU/mL (05-25 @ 17:37)          Assessment and plan  ---------------------------  no chest pain  renal function is improving  continue current meds  pt advised to stop taking opiod

## 2019-06-10 NOTE — PROGRESS NOTE ADULT - SUBJECTIVE AND OBJECTIVE BOX
CC: f/u for    Patient reports    REVIEW OF SYSTEMS: no fevers, no chills, no nausea, no vomiting, no abd pain, no resp symptoms  All other review of systems negative (Comprehensive ROS)    Antimicrobials Day #    cefTRIAXone   IVPB 1 Gram(s) IV Intermittent every 24 hours    Other Medications Reviewed    T(F): 97.4 (06-10-19 @ 10:00), Max: 99.1 (06-09-19 @ 20:11)  HR: 71 (06-10-19 @ 10:00)  BP: 116/73 (06-10-19 @ 10:00)  RR: 17 (06-10-19 @ 10:00)  SpO2: 92% (06-10-19 @ 10:00)    PHYSICAL EXAM:  General: alert, no acute distress  Eyes:  anicteric, no conjunctival injection, no discharge  Oropharynx: no lesions or injection 	  Neck: supple, without adenopathy  Lungs: clear to auscultation  Heart: regular rate and rhythm; no murmur, rubs or gallops  Abdomen: soft, nondistended, nontender, without mass or organomegaly  Skin: no lesions  Extremities: no clubbing, cyanosis, or edema  Neurologic: alert, oriented, moves all extremities    LAB RESULTS:                        9.2    9.64  )-----------( 192      ( 10 Sebastien 2019 08:34 )             29.6     06-10    141  |  109<H>  |  41<H>  ----------------------------<  100<H>  3.8   |  17<L>  |  2.12<H>    Ca    6.7<L>      10 Sebastien 2019 06:00  Mg     1.5     06-10    TPro  6.3  /  Alb  3.1<L>  /  TBili  0.2  /  DBili  x   /  AST  14  /  ALT  20  /  AlkPhos  109  06-09    LIVER FUNCTIONS - ( 09 Jun 2019 07:04 )  Alb: 3.1 g/dL / Pro: 6.3 g/dL / ALK PHOS: 109 U/L / ALT: 20 U/L / AST: 14 U/L / GGT: x               MICROBIOLOGY REVIEWED:    RADIOLOGY REVIEWED: CC: f/u for PNA, kelb in a urine    Patient reports feeling SOB, no fevers    REVIEW OF SYSTEMS: no fevers, no chills, no nausea, no vomiting, no abd pain, +SOB, no cough  All other review of systems negative (Comprehensive ROS)    Antimicrobials Day #    cefTRIAXone   IVPB 1 Gram(s) IV Intermittent every 24 hours    Other Medications Reviewed    T(F): 97.4 (06-10-19 @ 10:00), Max: 99.1 (06-09-19 @ 20:11)  HR: 71 (06-10-19 @ 10:00)  BP: 116/73 (06-10-19 @ 10:00)  RR: 17 (06-10-19 @ 10:00)  SpO2: 92% (06-10-19 @ 10:00)    PHYSICAL EXAM:  General: alert, no acute distress  Eyes:  anicteric, no conjunctival injection, no discharge  Oropharynx: no lesions or injection 	  Neck: supple, without adenopathy  Lungs: clear to auscultation  Heart: regular rate and rhythm; no murmur, rubs or gallops  Abdomen: soft, nondistended, nontender, without mass or organomegaly  Skin: no lesions  Extremities: no clubbing, cyanosis, or edema  Neurologic: alert, oriented, moves all extremities    LAB RESULTS:                        9.2    9.64  )-----------( 192      ( 10 Sebastien 2019 08:34 )             29.6     06-10    141  |  109<H>  |  41<H>  ----------------------------<  100<H>  3.8   |  17<L>  |  2.12<H>    Ca    6.7<L>      10 Sebastien 2019 06:00  Mg     1.5     06-10    TPro  6.3  /  Alb  3.1<L>  /  TBili  0.2  /  DBili  x   /  AST  14  /  ALT  20  /  AlkPhos  109  06-09    LIVER FUNCTIONS - ( 09 Jun 2019 07:04 )  Alb: 3.1 g/dL / Pro: 6.3 g/dL / ALK PHOS: 109 U/L / ALT: 20 U/L / AST: 14 U/L / GGT: x               MICROBIOLOGY REVIEWED:    RADIOLOGY REVIEWED:

## 2019-06-11 ENCOUNTER — TRANSCRIPTION ENCOUNTER (OUTPATIENT)
Age: 74
End: 2019-06-11

## 2019-06-11 VITALS
RESPIRATION RATE: 18 BRPM | DIASTOLIC BLOOD PRESSURE: 73 MMHG | HEART RATE: 84 BPM | OXYGEN SATURATION: 93 % | TEMPERATURE: 99 F | SYSTOLIC BLOOD PRESSURE: 146 MMHG

## 2019-06-11 LAB
ANION GAP SERPL CALC-SCNC: 13 MMOL/L — SIGNIFICANT CHANGE UP (ref 5–17)
BUN SERPL-MCNC: 31 MG/DL — HIGH (ref 7–23)
CALCIUM SERPL-MCNC: 7.2 MG/DL — LOW (ref 8.4–10.5)
CHLORIDE SERPL-SCNC: 106 MMOL/L — SIGNIFICANT CHANGE UP (ref 96–108)
CO2 SERPL-SCNC: 18 MMOL/L — LOW (ref 22–31)
CREAT SERPL-MCNC: 1.61 MG/DL — HIGH (ref 0.5–1.3)
GLUCOSE BLDC GLUCOMTR-MCNC: 119 MG/DL — HIGH (ref 70–99)
GLUCOSE BLDC GLUCOMTR-MCNC: 88 MG/DL — SIGNIFICANT CHANGE UP (ref 70–99)
GLUCOSE SERPL-MCNC: 103 MG/DL — HIGH (ref 70–99)
MAGNESIUM SERPL-MCNC: 1.5 MG/DL — LOW (ref 1.6–2.6)
POTASSIUM SERPL-MCNC: 4 MMOL/L — SIGNIFICANT CHANGE UP (ref 3.5–5.3)
POTASSIUM SERPL-SCNC: 4 MMOL/L — SIGNIFICANT CHANGE UP (ref 3.5–5.3)
SODIUM SERPL-SCNC: 137 MMOL/L — SIGNIFICANT CHANGE UP (ref 135–145)

## 2019-06-11 PROCEDURE — 99285 EMERGENCY DEPT VISIT HI MDM: CPT | Mod: 25

## 2019-06-11 PROCEDURE — 83735 ASSAY OF MAGNESIUM: CPT

## 2019-06-11 PROCEDURE — 86901 BLOOD TYPING SEROLOGIC RH(D): CPT

## 2019-06-11 PROCEDURE — 93005 ELECTROCARDIOGRAM TRACING: CPT | Mod: XU

## 2019-06-11 PROCEDURE — 82435 ASSAY OF BLOOD CHLORIDE: CPT

## 2019-06-11 PROCEDURE — 97110 THERAPEUTIC EXERCISES: CPT

## 2019-06-11 PROCEDURE — 82565 ASSAY OF CREATININE: CPT

## 2019-06-11 PROCEDURE — 96375 TX/PRO/DX INJ NEW DRUG ADDON: CPT | Mod: XU

## 2019-06-11 PROCEDURE — 82947 ASSAY GLUCOSE BLOOD QUANT: CPT

## 2019-06-11 PROCEDURE — 87186 SC STD MICRODIL/AGAR DIL: CPT

## 2019-06-11 PROCEDURE — 85730 THROMBOPLASTIN TIME PARTIAL: CPT

## 2019-06-11 PROCEDURE — 94640 AIRWAY INHALATION TREATMENT: CPT

## 2019-06-11 PROCEDURE — 85610 PROTHROMBIN TIME: CPT

## 2019-06-11 PROCEDURE — 80048 BASIC METABOLIC PNL TOTAL CA: CPT

## 2019-06-11 PROCEDURE — 87449 NOS EACH ORGANISM AG IA: CPT

## 2019-06-11 PROCEDURE — 97530 THERAPEUTIC ACTIVITIES: CPT

## 2019-06-11 PROCEDURE — 74176 CT ABD & PELVIS W/O CONTRAST: CPT

## 2019-06-11 PROCEDURE — 84295 ASSAY OF SERUM SODIUM: CPT

## 2019-06-11 PROCEDURE — 83880 ASSAY OF NATRIURETIC PEPTIDE: CPT

## 2019-06-11 PROCEDURE — 85014 HEMATOCRIT: CPT

## 2019-06-11 PROCEDURE — 84132 ASSAY OF SERUM POTASSIUM: CPT

## 2019-06-11 PROCEDURE — 82962 GLUCOSE BLOOD TEST: CPT

## 2019-06-11 PROCEDURE — 71045 X-RAY EXAM CHEST 1 VIEW: CPT

## 2019-06-11 PROCEDURE — 84484 ASSAY OF TROPONIN QUANT: CPT

## 2019-06-11 PROCEDURE — 87040 BLOOD CULTURE FOR BACTERIA: CPT

## 2019-06-11 PROCEDURE — 84443 ASSAY THYROID STIM HORMONE: CPT

## 2019-06-11 PROCEDURE — 86850 RBC ANTIBODY SCREEN: CPT

## 2019-06-11 PROCEDURE — 83605 ASSAY OF LACTIC ACID: CPT

## 2019-06-11 PROCEDURE — 85027 COMPLETE CBC AUTOMATED: CPT

## 2019-06-11 PROCEDURE — 36000 PLACE NEEDLE IN VEIN: CPT | Mod: RT,XU

## 2019-06-11 PROCEDURE — 96374 THER/PROPH/DIAG INJ IV PUSH: CPT | Mod: XU

## 2019-06-11 PROCEDURE — 82330 ASSAY OF CALCIUM: CPT

## 2019-06-11 PROCEDURE — 87086 URINE CULTURE/COLONY COUNT: CPT

## 2019-06-11 PROCEDURE — 81001 URINALYSIS AUTO W/SCOPE: CPT

## 2019-06-11 PROCEDURE — 51701 INSERT BLADDER CATHETER: CPT

## 2019-06-11 PROCEDURE — 82803 BLOOD GASES ANY COMBINATION: CPT

## 2019-06-11 PROCEDURE — 80053 COMPREHEN METABOLIC PANEL: CPT

## 2019-06-11 PROCEDURE — 87324 CLOSTRIDIUM AG IA: CPT

## 2019-06-11 PROCEDURE — 86900 BLOOD TYPING SEROLOGIC ABO: CPT

## 2019-06-11 PROCEDURE — 97161 PT EVAL LOW COMPLEX 20 MIN: CPT

## 2019-06-11 RX ORDER — LACTOBACILLUS ACIDOPHILUS 100MM CELL
1 CAPSULE ORAL
Refills: 0 | Status: DISCONTINUED | OUTPATIENT
Start: 2019-06-11 | End: 2019-06-11

## 2019-06-11 RX ORDER — PANTOPRAZOLE SODIUM 20 MG/1
1 TABLET, DELAYED RELEASE ORAL
Qty: 30 | Refills: 0
Start: 2019-06-11 | End: 2019-07-10

## 2019-06-11 RX ORDER — LACTOBACILLUS ACIDOPHILUS 100MM CELL
1 CAPSULE ORAL
Qty: 0 | Refills: 0 | DISCHARGE
Start: 2019-06-11

## 2019-06-11 RX ORDER — METOPROLOL TARTRATE 50 MG
1 TABLET ORAL
Qty: 30 | Refills: 0
Start: 2019-06-11 | End: 2019-07-10

## 2019-06-11 RX ORDER — ACETAMINOPHEN 500 MG
650 TABLET ORAL ONCE
Refills: 0 | Status: COMPLETED | OUTPATIENT
Start: 2019-06-11 | End: 2019-06-11

## 2019-06-11 RX ORDER — CEFUROXIME AXETIL 250 MG
1 TABLET ORAL
Qty: 6 | Refills: 0
Start: 2019-06-11 | End: 2019-06-13

## 2019-06-11 RX ADMIN — Medication 25 MILLIGRAM(S): at 05:25

## 2019-06-11 RX ADMIN — OXYCODONE HYDROCHLORIDE 5 MILLIGRAM(S): 5 TABLET ORAL at 03:20

## 2019-06-11 RX ADMIN — SODIUM CHLORIDE 100 MILLILITER(S): 9 INJECTION, SOLUTION INTRAVENOUS at 01:11

## 2019-06-11 RX ADMIN — Medication 650 MILLIGRAM(S): at 12:25

## 2019-06-11 RX ADMIN — Medication 100 MILLIGRAM(S): at 02:03

## 2019-06-11 RX ADMIN — Medication 100 MILLIGRAM(S): at 08:21

## 2019-06-11 RX ADMIN — Medication 260 MILLIGRAM(S): at 11:29

## 2019-06-11 RX ADMIN — OXYCODONE HYDROCHLORIDE 5 MILLIGRAM(S): 5 TABLET ORAL at 08:19

## 2019-06-11 RX ADMIN — OXYCODONE HYDROCHLORIDE 5 MILLIGRAM(S): 5 TABLET ORAL at 09:30

## 2019-06-11 RX ADMIN — OXYCODONE HYDROCHLORIDE 5 MILLIGRAM(S): 5 TABLET ORAL at 02:04

## 2019-06-11 RX ADMIN — HEPARIN SODIUM 5000 UNIT(S): 5000 INJECTION INTRAVENOUS; SUBCUTANEOUS at 05:24

## 2019-06-11 RX ADMIN — TIOTROPIUM BROMIDE 1 CAPSULE(S): 18 CAPSULE ORAL; RESPIRATORY (INHALATION) at 12:16

## 2019-06-11 RX ADMIN — PANTOPRAZOLE SODIUM 40 MILLIGRAM(S): 20 TABLET, DELAYED RELEASE ORAL at 05:25

## 2019-06-11 NOTE — PROGRESS NOTE ADULT - SUBJECTIVE AND OBJECTIVE BOX
no  cpsob    REVIEW OF SYSTEMS:  GEN: no fever,    no chills  RESP: no SOB,   no cough  CVS: no chest pain,   no palpitations  GI: no abdominal pain,   no nausea,   no vomiting,   no constipation,   no diarrhea  : no dysuria,   no frequency  NEURO: no headache,   no dizziness  PSYCH: no depression,   not anxious  Derm : no rash    MEDICATIONS  (STANDING):  acetaminophen  IVPB .. 650 milliGRAM(s) IV Intermittent once  buDESOnide   0.5 milliGRAM(s) Respule 0.5 milliGRAM(s) Inhalation two times a day  cefTRIAXone   IVPB 1 Gram(s) IV Intermittent every 24 hours  heparin  Injectable 5000 Unit(s) SubCutaneous every 12 hours  metoprolol succinate ER 25 milliGRAM(s) Oral daily  pantoprazole    Tablet 40 milliGRAM(s) Oral before breakfast  sodium chloride 0.45% 1000 milliLiter(s) (100 mL/Hr) IV Continuous <Continuous>  tiotropium 18 MICROgram(s) Capsule 1 Capsule(s) Inhalation daily    MEDICATIONS  (PRN):  acetaminophen   Tablet .. 650 milliGRAM(s) Oral every 6 hours PRN Moderate Pain (4 - 6)  ALBUTerol    90 MICROgram(s) HFA Inhaler 1 Puff(s) Inhalation every 4 hours PRN Bronchospasm  benzocaine 15 mG/menthol 3.6 mG Lozenge 1 Lozenge Oral every 3 hours PRN Sore Throat  benzonatate 100 milliGRAM(s) Oral every 8 hours PRN Cough  diazepam    Tablet 2 milliGRAM(s) Oral at bedtime PRN anxiety  oxyCODONE    IR 5 milliGRAM(s) Oral every 6 hours PRN Severe Pain (7 - 10)      Vital Signs Last 24 Hrs  T(C): 37.2 (11 Jun 2019 08:00), Max: 37.3 (10 Sebastien 2019 20:26)  T(F): 99 (11 Jun 2019 08:00), Max: 99.1 (10 Sebastien 2019 20:26)  HR: 94 (11 Jun 2019 08:00) (63 - 94)  BP: 174/83 (11 Jun 2019 08:00) (116/73 - 174/83)  BP(mean): --  RR: 17 (11 Jun 2019 08:00) (17 - 18)  SpO2: 90% (11 Jun 2019 08:00) (90% - 96%)  CAPILLARY BLOOD GLUCOSE      POCT Blood Glucose.: 88 mg/dL (11 Jun 2019 07:38)  POCT Blood Glucose.: 104 mg/dL (10 Sebastien 2019 20:55)  POCT Blood Glucose.: 123 mg/dL (10 Sebastien 2019 16:30)  POCT Blood Glucose.: 104 mg/dL (10 Sebastien 2019 12:03)    I&O's Summary    10 Sebastien 2019 07:01  -  11 Jun 2019 07:00  --------------------------------------------------------  IN: 500 mL / OUT: 3 mL / NET: 497 mL        PHYSICAL EXAM:  HEAD:  Atraumatic, Normocephalic  NECK: Supple, No   JVD  CHEST/LUNG:   no     rales,     no,    rhonchi  HEART: Regular rate and rhythm;         murmur  ABDOMEN: Soft, Nontender, ;   EXTREMITIES:   no     edema  NEUROLOGY:  alert    LABS:                        9.2    9.64  )-----------( 192      ( 10 Sebastien 2019 08:34 )             29.6     06-11    137  |  106  |  31<H>  ----------------------------<  103<H>  4.0   |  18<L>  |  1.61<H>    Ca    7.2<L>      11 Jun 2019 06:18  Mg     1.5     06-11                      Thyroid Stimulating Hormone, Serum: 3.26 uIU/mL (06-08 @ 19:22)          Consultant(s) Notes Reviewed:      Care Discussed with Consultants/Other Providers:

## 2019-06-11 NOTE — DISCHARGE NOTE NURSING/CASE MANAGEMENT/SOCIAL WORK - NSDCDPATPORTLINK_GEN_ALL_CORE
You can access the [facility] Patient Portal, offered by [facility], by registering with the following website: [email or web address]

## 2019-06-11 NOTE — PROGRESS NOTE ADULT - PROVIDER SPECIALTY LIST ADULT
Cardiology
Gastroenterology
Gastroenterology
Heme/Onc
Infectious Disease
Internal Medicine
Nephrology
Cardiology

## 2019-06-11 NOTE — PROGRESS NOTE ADULT - ASSESSMENT
74 yo F PMHx of Breast Ca s/p right mastectomy chemotherapy tx (2006), Lung Ca, COPD (no home O2), hysterectomy, appendectomy, cholecystectomy, femoral hernia repair, multiple SBOs w/ resection, opioid dependence, p/w chest and abdominal pain. Pt with multiple repeat admissions with CTs read as SBO's but all have resolved with either oral narcan or Movantic/Relistor. Patient has known narcotic bowel dysmotility syndrome.  Now also found to have PNA    No further nausea or abdominal distension - s/p 4+ loose stools  Previous admission, pt noted to have significant Abx associated diarrhea  C diff negative 6/7/19    RECS:  -Continue regular diet  -While on Abx, would continue to hold Movantik doses. Once completed Abx, can resume Movantik 12.5 mg daily  -Bacid BID  -Monitor abdominal exam  -PPI daily  -Abx per ID    Discussed with pt and Medicine team    Jake Khan PA-C    [facility]  [phone]  After hours and weekend coverage [phone]

## 2019-06-11 NOTE — PROGRESS NOTE ADULT - SUBJECTIVE AND OBJECTIVE BOX
Patient is a 74y old  Female who presented with a chief complaint of sepsis/acute renal failure/chest pain (11 Jun 2019 08:59)      INTERVAL HPI/OVERNIGHT EVENTS:  tolerating PO   appetite good  eager to go home  +BMs- loose    MEDICATIONS  (STANDING):  acetaminophen  IVPB .. 650 milliGRAM(s) IV Intermittent once  buDESOnide   0.5 milliGRAM(s) Respule 0.5 milliGRAM(s) Inhalation two times a day  cefTRIAXone   IVPB 1 Gram(s) IV Intermittent every 24 hours  heparin  Injectable 5000 Unit(s) SubCutaneous every 12 hours  metoprolol succinate ER 25 milliGRAM(s) Oral daily  pantoprazole    Tablet 40 milliGRAM(s) Oral before breakfast  sodium chloride 0.45% 1000 milliLiter(s) (100 mL/Hr) IV Continuous <Continuous>  tiotropium 18 MICROgram(s) Capsule 1 Capsule(s) Inhalation daily    MEDICATIONS  (PRN):  acetaminophen   Tablet .. 650 milliGRAM(s) Oral every 6 hours PRN Moderate Pain (4 - 6)  ALBUTerol    90 MICROgram(s) HFA Inhaler 1 Puff(s) Inhalation every 4 hours PRN Bronchospasm  benzocaine 15 mG/menthol 3.6 mG Lozenge 1 Lozenge Oral every 3 hours PRN Sore Throat  benzonatate 100 milliGRAM(s) Oral every 8 hours PRN Cough  diazepam    Tablet 2 milliGRAM(s) Oral at bedtime PRN anxiety  oxyCODONE    IR 5 milliGRAM(s) Oral every 6 hours PRN Severe Pain (7 - 10)      Allergies  Biaxin (Rash)  Cipro (Headache)  Flagyl (Headache)  penicillin (Rash; Swelling)  sulfa drugs (Unknown)      Review of Systems:  General:  No fevers, chills, night sweats  CV:  No pain, palpitations, hypo/hypertension  Resp:  No dyspnea,+cough +COPD +lung cancer  GI:  see HPI  :  No pain, bleeding, incontinence, nocturia  Muscle: +chronic back pain  Neuro:  No weakness, tingling, memory problems  Psych:  No fatigue, insomnia, mood problems, depression  Endocrine:  No polyuria, polydypsia, cold/heat intolerance  Heme:  No petechiae, ecchymosis, easy bruisability  Skin:  No rash, tattoos, scars, edema    Vital Signs Last 24 Hrs  T(C): 37.2 (11 Jun 2019 08:00), Max: 37.3 (10 Sebastien 2019 20:26)  T(F): 99 (11 Jun 2019 08:00), Max: 99.1 (10 Sebastien 2019 20:26)  HR: 94 (11 Jun 2019 08:00) (63 - 94)  BP: 174/83 (11 Jun 2019 08:00) (125/65 - 174/83)  BP(mean): --  RR: 17 (11 Jun 2019 08:00) (17 - 18)  SpO2: 90% (11 Jun 2019 08:00) (90% - 96%)    PHYSICAL EXAM:  Constitutional: NAD, well-developed thin frail elderly female non toxic appearing, alert. completed breakfast. on nasal cannula  Neck: No LAD, supple no JVD  Respiratory: distant BS +rhonchi  Cardiovascular: S1 and S2, RRR  Gastrointestinal: BS+, soft, NT/ND, neg HSM  Extremities: No peripheral edema, cyanosis +clubbing of fingertips  Vascular: 2+ peripheral pulses  Neurological: A/O x 3, no focal deficits  Psychiatric: anxious, normal affect  Skin: No rashes, anicteric    LABS:                        9.2    9.64  )-----------( 192      ( 10 Sebastien 2019 08:34 )             29.6     06-11    137  |  106  |  31<H>  ----------------------------<  103<H>  4.0   |  18<L>  |  1.61<H>    Ca    7.2<L>      11 Jun 2019 06:18  Mg     1.5     06-11    LIVER FUNCTIONS - ( 09 Jun 2019 07:04 )  Alb: 3.1 g/dL / Pro: 6.3 g/dL / ALK PHOS: 109 U/L / ALT: 20 U/L / AST: 14 U/L / GGT: x             RADIOLOGY & ADDITIONAL TESTS:

## 2019-06-11 NOTE — PROVIDER CONTACT NOTE (OTHER) - REASON
Bradycardia
Ectopy
PSVT upto 180bpm x 3.6 seconds
PT noted with PSVT on tele
Pt. had PAT up to the 170s for 4.7 seconds
Pt. had PSVT up to 188 for 5.8 seconds
SVT upto 172 bpm for 4.6 sec
PSVT 202 for 3.8 secs

## 2019-06-11 NOTE — PROGRESS NOTE ADULT - SUBJECTIVE AND OBJECTIVE BOX
CARDIOLOGY     PROGRESS  NOTE   ________________________________________________    CHIEF COMPLAINT:Patient is a 74y old  Female who presents with a chief complaint of sepsis/acute renal failure/chest pain (10 Sebastien 2019 13:11)  doing better.  	  REVIEW OF SYSTEMS:  CONSTITUTIONAL: No fever, weight loss, or fatigue  EYES: No eye pain, visual disturbances, or discharge  ENT:  No difficulty hearing, tinnitus, vertigo; No sinus or throat pain  NECK: No pain or stiffness  RESPIRATORY: No cough, wheezing, chills or hemoptysis; No Shortness of Breath  CARDIOVASCULAR: No chest pain, palpitations, passing out, dizziness, or leg swelling  GASTROINTESTINAL: No abdominal or epigastric pain. No nausea, vomiting, or hematemesis; No diarrhea or constipation. No melena or hematochezia.  GENITOURINARY: No dysuria, frequency, hematuria, or incontinence  NEUROLOGICAL: No headaches, memory loss, loss of strength, numbness, or tremors  SKIN: No itching, burning, rashes, or lesions   LYMPH Nodes: No enlarged glands  ENDOCRINE: No heat or cold intolerance; No hair loss  MUSCULOSKELETAL: No joint pain or swelling; No muscle, back, or extremity pain  PSYCHIATRIC: No depression, anxiety, mood swings, or difficulty sleeping  HEME/LYMPH: No easy bruising, or bleeding gums  ALLERGY AND IMMUNOLOGIC: No hives or eczema	    [ ] All others negative	  [ ] Unable to obtain    PHYSICAL EXAM:  T(C): 37.2 (06-11-19 @ 08:00), Max: 37.3 (06-10-19 @ 20:26)  HR: 94 (06-11-19 @ 08:00) (63 - 94)  BP: 174/83 (06-11-19 @ 08:00) (116/73 - 174/83)  RR: 17 (06-11-19 @ 08:00) (17 - 18)  SpO2: 90% (06-11-19 @ 08:00) (90% - 96%)  Wt(kg): --  I&O's Summary    10 Sebastien 2019 07:01  -  11 Jun 2019 07:00  --------------------------------------------------------  IN: 500 mL / OUT: 3 mL / NET: 497 mL        Appearance: Normal	  HEENT:   Normal oral mucosa, PERRL, EOMI	  Lymphatic: No lymphadenopathy  Cardiovascular: Normal S1 S2, No JVD, + murmurs, No edema  Respiratory: Lungs clear to auscultation	  Psychiatry: A & O x 3, Mood & affect appropriate  Gastrointestinal:  Soft, Non-tender, + BS	  Skin: No rashes, No ecchymoses, No cyanosis	  Neurologic: Non-focal  Extremities: Normal range of motion, No clubbing, cyanosis or edema  Vascular: Peripheral pulses palpable 2+ bilaterally    MEDICATIONS  (STANDING):  acetaminophen  IVPB .. 650 milliGRAM(s) IV Intermittent once  buDESOnide   0.5 milliGRAM(s) Respule 0.5 milliGRAM(s) Inhalation two times a day  cefTRIAXone   IVPB 1 Gram(s) IV Intermittent every 24 hours  heparin  Injectable 5000 Unit(s) SubCutaneous every 12 hours  metoprolol succinate ER 25 milliGRAM(s) Oral daily  pantoprazole    Tablet 40 milliGRAM(s) Oral before breakfast  sodium chloride 0.45% 1000 milliLiter(s) (100 mL/Hr) IV Continuous <Continuous>  tiotropium 18 MICROgram(s) Capsule 1 Capsule(s) Inhalation daily      TELEMETRY: 	    ECG:  	  RADIOLOGY:  OTHER: 	  	  LABS:	 	    CARDIAC MARKERS:                                9.2    9.64  )-----------( 192      ( 10 Sebastien 2019 08:34 )             29.6     06-11    137  |  106  |  31<H>  ----------------------------<  103<H>  4.0   |  18<L>  |  1.61<H>    Ca    7.2<L>      11 Jun 2019 06:18  Mg     1.5     06-11      proBNP: Serum Pro-Brain Natriuretic Peptide: 2352 pg/mL (06-06 @ 18:47)    Lipid Profile:   HgA1c:   TSH: Thyroid Stimulating Hormone, Serum: 3.26 uIU/mL (06-08 @ 19:22)  Thyroid Stimulating Hormone, Serum: 3.07 uIU/mL (05-25 @ 17:37)          Assessment and plan  ---------------------------  doing better  increase bp ? check bp closely  renal function is improving dc planning

## 2019-06-11 NOTE — PROGRESS NOTE ADULT - REASON FOR ADMISSION
sepsis/acute renal failure/chest pain

## 2019-06-11 NOTE — PROGRESS NOTE ADULT - ASSESSMENT
Patient with lung cancer nonsmall cell early stage s/p RT  she had multiple admissions, including for ileus, bowel obstruction, Mostly narcotic-induced  Treated with antibiotic for possible aspiration pneumonia, seen by ID    Her SHAKIRA is getting better. Cardiology saw her for chest pain  She had complained of diarrhea, with negative workup, seen by GI, monantik was discontinued on Friday for it.  Diarrhea has improved  Told to follow up with Dr. Reese after d/c, will get PET/Ct as outpatient for lung cancer s/p RT follow up

## 2019-06-11 NOTE — PROGRESS NOTE ADULT - ASSESSMENT
73 year old female with   PMH,    chronic Back pain (on Narcotics), history of multiple SBO's, narcotic associated constipation/ileus, COPD,  smoker,    ca  breast,  right  mastectomy. RENUKA cavitary lesion on ct, ,  c/c cachexia  path from  1/19, with NSCC with squamous  features,  s/p  RT     admitted with abdominal pain / vomiting,   from SBO/  seen by surgery  SHAKIRA on CKD 2/ dehydration    ct scan  with sbo,  anD   right pna    pt is an active smoker, refusing to quit   ct chest , 4/19/18  noted/  oncology dr hernandez    iv fluids/   protonix  avoid  narcotics, if  possible    s/p   aztreonam/  vanco, 1  dose/  pt with pna/  gram negative./ and  bandemia  and uti with GNR/ klebsiella  SHAKIRA, resolving  d/c  on po ceftin       < from: CT Abdomen and Pelvis No Cont (06.06.19 @ 21:07) >  IMPRESSION:   Limited evaluation of intra-abdominal and pelvic organs due to lack of IV   contrast and lack of intra-abdominal fat.    Numerous fluid-filled dilated small bowel loops throughout the abdomen,   as well as, distention of the stomach with apparent transitioning in the   right lower quadrant similar to that seen on prior exam from 5/22/2019.   Small right femoral hernia containing a short segment of small bowel.   Colon is collapsed on the current exam.    New right lower lobe and right middle lobe airspace opacities concerning   for pneumonia.  Dr. Morataya discussed the findings with Dr. Adamson at 9:34 PM on 6/6/2019 with   read back.  < end of copied text >                   < from: CT Abdomen and Pelvis No Cont (05.22.19 @ 19:30) >  IMPRESSION:   Suboptimal studysecondary to absence of oral and IV contrast.  Small bowel obstruction with increased dilatation of the small bowel   compared to the prior exam 5/5/2019. Question transition point in the   right mid abdomen. No free intraperitoneal air.    Unchanged tree-in-bud opacities in the right lower lobe with mild   increased tree-in-bud opacities in the left lower lobe compared to prior   CT chest 4/19/2019.  < end of copied text >      < from: CT Chest No Cont (04.19.19 @ 18:50)   INTERPRETATION:  Motion degraded evaluation  Redemonstraion on left upper lobe cavitary mass consistent with patients   known hx of lung CA  Mucoid impacted right lower lobe ariways with scattered tree in bud   opacities  < end of copied text >      Cytopathology - Non Gyn Report (01.18.19 @ 11:43)    Immunohistochemical stains.  The immunophenotype together with the cytomorphology supports the  diagnosis squamous cell carcinoma.    Final Diagnosis  LUNG, LEFT UPPER LOBE, US GUIDED CORE BIOPSY AND FNA  POSITIVE FOR MALIGNANT CELLS.  Non-small cell carcinoma, with squamous features (see note).  Additional studies pending.

## 2019-06-11 NOTE — PROGRESS NOTE ADULT - SUBJECTIVE AND OBJECTIVE BOX
[facility] [place] [facility]   MD VICENTA LEONARD MD RUORU WONG, PA    TEL:  OFFICE: [phone]  DR MAYER CELL: [phone]  DONNA WORKMAN CELL: [phone]  DR. PARTIDA CELL: [phone]    RENAL FOLLOW UP NOTE  --------------------------------------------------------------------------------  HPI:      Pt seen and examined at bedside.       PAST HISTORY  --------------------------------------------------------------------------------  No significant changes to PMH, PSH, FHx, SHx, unless otherwise noted    ALLERGIES & MEDICATIONS  --------------------------------------------------------------------------------  Allergies    Biaxin (Rash)  Cipro (Headache)  Flagyl (Headache)  penicillin (Rash; Swelling)  sulfa drugs (Unknown)    Intolerances      Standing Inpatient Medications  buDESOnide   0.5 milliGRAM(s) Respule 0.5 milliGRAM(s) Inhalation two times a day  cefTRIAXone   IVPB 1 Gram(s) IV Intermittent every 24 hours  heparin  Injectable 5000 Unit(s) SubCutaneous every 12 hours  lactobacillus acidophilus 1 Tablet(s) Oral two times a day  metoprolol succinate ER 25 milliGRAM(s) Oral daily  pantoprazole    Tablet 40 milliGRAM(s) Oral before breakfast  sodium chloride 0.45% 1000 milliLiter(s) IV Continuous <Continuous>  tiotropium 18 MICROgram(s) Capsule 1 Capsule(s) Inhalation daily    PRN Inpatient Medications  acetaminophen   Tablet .. 650 milliGRAM(s) Oral every 6 hours PRN  ALBUTerol    90 MICROgram(s) HFA Inhaler 1 Puff(s) Inhalation every 4 hours PRN  benzocaine 15 mG/menthol 3.6 mG Lozenge 1 Lozenge Oral every 3 hours PRN  benzonatate 100 milliGRAM(s) Oral every 8 hours PRN  diazepam    Tablet 2 milliGRAM(s) Oral at bedtime PRN  oxyCODONE    IR 5 milliGRAM(s) Oral every 6 hours PRN      REVIEW OF SYSTEMS  --------------------------------------------------------------------------------  General: no fever  CVS: no chest pain  RESP: no sob, no cough      VITALS/PHYSICAL EXAM  --------------------------------------------------------------------------------  T(C): 37.1 (06-11-19 @ 12:05), Max: 37.3 (06-10-19 @ 20:26)  HR: 84 (06-11-19 @ 12:05) (63 - 94)  BP: 146/73 (06-11-19 @ 12:05) (125/65 - 174/83)  RR: 18 (06-11-19 @ 12:05) (17 - 18)  SpO2: 93% (06-11-19 @ 12:05) (90% - 96%)  Wt(kg): --        06-10-19 @ 07:01  -  06-11-19 @ 07:00  --------------------------------------------------------  IN: 500 mL / OUT: 3 mL / NET: 497 mL    06-11-19 @ 07:01  -  06-11-19 @ 13:44  --------------------------------------------------------  IN: 500 mL / OUT: 0 mL / NET: 500 mL      Physical Exam:  	Gen: NAD  	HEENT: MMM  	Pulm: Coarse breath sounds  B/L  	CV: S1S2  	Abd: Soft, +BS  	Ext: No LE edema B/L                      Neuro: Awake   	Skin: Warm and Dry   	    LABS/STUDIES  --------------------------------------------------------------------------------              9.2    9.64  >-----------<  192      [06-10-19 @ 08:34]              29.6     137  |  106  |  31  ----------------------------<  103      [06-11-19 @ 06:18]  4.0   |  18  |  1.61        Ca     7.2     [06-11-19 @ 06:18]      Mg     1.5     [06-11-19 @ 06:18]            Creatinine Trend:  SCr 1.61 [06-11 @ 06:18]  SCr 2.12 [06-10 @ 06:00]  SCr 2.62 [06-09 @ 13:55]  SCr 3.04 [06-09 @ 07:04]  SCr 4.02 [06-08 @ 11:39]    Urinalysis - [06-06-19 @ 17:49]      Color Yellow / Appearance Slightly Turbid / SG 1.016 / pH 6.0      Gluc Negative / Ketone Negative  / Bili Negative / Urobili Negative       Blood Moderate / Protein 100 / Leuk Est Large / Nitrite Negative      RBC 2 /  / Hyaline 2 / Gran  / Sq Epi  / Non Sq Epi 1 / Bacteria Many      TSH 3.26      [06-08-19 @ 19:22]

## 2019-06-11 NOTE — PROVIDER CONTACT NOTE (OTHER) - DATE AND TIME:
07-Jun-2019 04:19
07-Jun-2019 21:15
08-Jun-2019 16:30
09-Jun-2019 04:45
09-Jun-2019 10:45
10-Sebastien-2019 04:20
11-Jun-2019 11:20
10-Sebastien-2019 21:30

## 2019-06-11 NOTE — DISCHARGE NOTE PROVIDER - CARE PROVIDER_API CALL
Jonathan Dennison (DO)  Cardiology Medicine  [address] [place] [address], [facility] [address]  [place] [address]  Phone: [phone]  Fax: [phone]  Follow Up Time:

## 2019-06-11 NOTE — PROGRESS NOTE ADULT - SUBJECTIVE AND OBJECTIVE BOX
73 year old woman with h/o multiple malignancies latest early stage lung cancer s/p EBRT no f/u pet yet  multiple admissions for sbo/ileus here for further n/v diarrhea and chest pain  States diarrhea is better and cough less  PAST MEDICAL & SURGICAL HISTORY:  Back pain  Bowel obstruction: multiple hospitalizations  Kidney stone  Emphysema  Narcotic Dependence  S/P Radiation Therapy  S/P Chemotherapy, Time Since Greater than 12 Weeks  Narcotic Dysmotile Cilia Syndrome  Chronic Pain Following Surgery or Procedure: following right breast mastectomy  Ankle Fracture: right anle fx s/p cast 2009  History of Small Bowel Obstruction: 1/2010  Asthma  Breast Cancer  S/P hernia surgery: femoral hernia - 2012  S/P Exploratory Laparotomy  S/P Appendectomy  S/P Cholecystectomy  Liver Mass s/p liver rsxn: s/p resection 2009  History of Hysterectomy: 1998  S/P Right Mastectomy: 2006    Medications:  acetaminophen   Tablet .. 650 milliGRAM(s) Oral every 6 hours PRN Moderate Pain (4 - 6)  ALBUTerol    90 MICROgram(s) HFA Inhaler 1 Puff(s) Inhalation every 4 hours PRN Bronchospasm  benzocaine 15 mG/menthol 3.6 mG Lozenge 1 Lozenge Oral every 3 hours PRN Sore Throat  benzonatate 100 milliGRAM(s) Oral every 8 hours PRN Cough  buDESOnide   0.5 milliGRAM(s) Respule 0.5 milliGRAM(s) Inhalation two times a day  cefTRIAXone   IVPB 1 Gram(s) IV Intermittent every 24 hours  diazepam    Tablet 2 milliGRAM(s) Oral at bedtime PRN anxiety  heparin  Injectable 5000 Unit(s) SubCutaneous every 12 hours  lactobacillus acidophilus 1 Tablet(s) Oral two times a day  metoprolol succinate ER 25 milliGRAM(s) Oral daily  oxyCODONE    IR 5 milliGRAM(s) Oral every 6 hours PRN Severe Pain (7 - 10)  pantoprazole    Tablet 40 milliGRAM(s) Oral before breakfast  sodium chloride 0.45% 1000 milliLiter(s) IV Continuous <Continuous>  tiotropium 18 MICROgram(s) Capsule 1 Capsule(s) Inhalation daily    Labs:  CBC Full  -  ( 10 Sebastien 2019 08:34 )  WBC Count : 9.64 K/uL  Hemoglobin : 9.2 g/dL  Hematocrit : 29.6 %  Platelet Count - Automated : 192 K/uL  Mean Cell Volume : 97.7 fl  Mean Cell Hemoglobin : 30.4 pg  Mean Cell Hemoglobin Concentration : 31.1 gm/dL  Auto Neutrophil # : x  Auto Lymphocyte # : x  Auto Monocyte # : x  Auto Eosinophil # : x  Auto Basophil # : x  Auto Neutrophil % : x  Auto Lymphocyte % : x  Auto Monocyte % : x  Auto Eosinophil % : x  Auto Basophil % : x    06-11    137  |  106  |  31<H>  ----------------------------<  103<H>  4.0   |  18<L>  |  1.61<H>    Ca    7.2<L>      11 Jun 2019 06:18  Mg     1.5     06-11        Radiology:             ROS:  Patient comfortable without distress  No SOB or chest pain, cough decreased  No palpitation  No abdominal pain, diarrhea is better  No weakness of extremities  No skin changes or swelling of legs    Vital Signs Last 24 Hrs  T(C): 37.1 (11 Jun 2019 12:05), Max: 37.3 (10 Sebastien 2019 20:26)  T(F): 98.7 (11 Jun 2019 12:05), Max: 99.1 (10 Sebastien 2019 20:26)  HR: 84 (11 Jun 2019 12:05) (63 - 94)  BP: 146/73 (11 Jun 2019 12:05) (125/65 - 174/83)  BP(mean): --  RR: 18 (11 Jun 2019 12:05) (17 - 18)  SpO2: 93% (11 Jun 2019 12:05) (90% - 96%)    Physical exam:  Patient alert and oriented  No distress, cachectic  CVS: S1, S2 regular or murmur  Chest: bilateral breath sound without rales  Abdomen: soft, not tender, no organomegaly or masses  No focal neuro deficit  No edema      Assessment and Plan:

## 2019-06-11 NOTE — PROVIDER CONTACT NOTE (OTHER) - NAME OF MD/NP/PA/DO NOTIFIED:
Abi Ceja, NP
Ana Sparks PA
Dr. Santiago
Ragini Reese NP
Rand Clemons, NP
Rand Clemons, NP
Zoraida Faust NP
Rand Clemons

## 2019-06-11 NOTE — PROVIDER CONTACT NOTE (OTHER) - ASSESSMENT
Pt calm and comfort resting in bed
Patient alert and awake. Denies CP/SOB/palpitations at this time. VSS.
Patient alert and oriented x 4. Awake for most of the shift. Cardiac monitoring ongoing. No c/o CP or palpitations offered during the shift. Patient had an episode of SVT upto 180bpm x 7 seconds during the day.
Patient awake in bed, denies CP, SOB, lightheadedness/dizziness. VSS HR: 51 BP: 132/74, SpO2: 98%, T: 97.9F.
Patient denies chest pain, SOB, headache, dizziness/lightheadedness. VSS on 3L NC. Telemetry showed PAT for 2.3 sec HR up to 144.
Pt. asymptomatic, vitals stable
Pt. asymptomatic, was urinating at the time
VSS.  No distress noted.  Patient was working with PT when event on tele occurred.

## 2019-06-11 NOTE — DISCHARGE NOTE PROVIDER - NSDCCPCAREPLAN_GEN_ALL_CORE_FT
PRINCIPAL DISCHARGE DIAGNOSIS  Diagnosis: SBO (small bowel obstruction)  Assessment and Plan of Treatment: Opioid induced, avoid narcotics as much as possible  Resolved      SECONDARY DISCHARGE DIAGNOSES  Diagnosis: PNA (pneumonia)  Assessment and Plan of Treatment: Pneumonia is a lung infection that can cause a fever, cough, and trouble breathing.  Continue all antibiotics as ordered until complete.  Nutrition is important, eat small frequent meals.  Get lots of rest and drink fluids.  Call your health care provider upon arrival home from hospital and make a follow up appointment for one week.  If your cough worsens, you develop fever greater than 101', you have shaking chills, a fast heartbeat, trouble breathing and/or feel your are breathing much faster than usual, call your healthcare provider.  Make sure you wash your hands frequently.    Diagnosis: Dehydration  Assessment and Plan of Treatment: Resolved  Drink enough fluids to keep your urine clear or pale yellow.   Drink small amounts frequently if you have nausea and vomiting.  Eat as you normally do.   Avoid: Foods or drinks high in sugar, Carbonated drinks, Juice, Extremely hot or cold fluids, Drinks with caffeine, Fatty, greasy foods, Alcohol, Tobacco, Overeating, Gelatin desserts.   Wash your hands well to avoid spreading bacteria and viruses.   Only take over-the-counter or prescription medicines for pain, discomfort, or fever as directed by your caregiver.  Ask your caregiver if you should continue all prescribed and over-the-counter medicines.   Keep all follow-up appointments with your caregiver.   SEEK MEDICAL CARE IF:  You have abdominal pain and it increases or stays in one area (localizes).  You have a rash, stiff neck, or severe headache.   You are irritable, sleepy, or difficult to awaken.  You are weak, dizzy, or extremely thirsty.  SEEK IMMEDIATE MEDICAL CARE IF:  You are unable to keep fluids down or you get worse despite treatment.  You have frequent episodes of vomiting or diarrhea.  You have blood or green matter (bile) in your vomit.  You have blood in your stool or your stool looks black and tarry.  You have not urinated in 6 to 8 hours, or you have only urinated a small amount of very dark urine.  You have a fever or you faint    Diagnosis: SHAKIRA (acute kidney injury)  Assessment and Plan of Treatment: Resolved    Diagnosis: Current every day smoker  Assessment and Plan of Treatment: Informed pt of the various negative side effects of smoking including risk of COPD, Lung Ca etc  Strongly recommended that pt stops smoking and pt given various options of smoking cessasion tools such as NRT's and other pharmacotherapies

## 2019-06-11 NOTE — PROGRESS NOTE ADULT - ATTENDING COMMENTS
Catarino Rondon MD, FACP, FACG, AGAF  [facility]  [phone]     After hours and weekend coverage GI service : [phone]

## 2019-06-11 NOTE — PROVIDER CONTACT NOTE (OTHER) - ACTION/TREATMENT ORDERED:
No interventions
Blood work in an hour, beta blocker to be added.
Continue BB. Continue cardiac monitoring.
Continue cardiac monitoring. Continue current medical management.
Monitor patient, stat MAG
No new orders at this time.  Will continue to monitor.
Provider notified. 12 lead EKG preformed, BMP sent. Will continue to monitor and maintain safety.
Provider notified. Will continue to monitor and maintain safety.

## 2019-06-27 PROBLEM — C34.92 SQUAMOUS CELL CARCINOMA OF LEFT LUNG: Status: ACTIVE | Noted: 2019-02-05

## 2019-06-28 ENCOUNTER — APPOINTMENT (OUTPATIENT)
Dept: NUCLEAR MEDICINE | Facility: IMAGING CENTER | Age: 74
End: 2019-06-28

## 2019-07-15 NOTE — PHYSICAL THERAPY INITIAL EVALUATION ADULT - REFERRING PHYSICIAN, REHAB EVAL
AIXA Dennison
Partially impaired: cannot see medication labels or newsprint, but can see obstacles in path, and the surrounding layout; can count fingers at arm's length

## 2019-08-23 NOTE — HISTORY OF PRESENT ILLNESS
[FreeTextEntry1] : Goldie Boswell is a 73 year old woman who is a current everyday 1 pack/day 50 year smoker. She also had right breast mastectomy and chemotherapy in 2006.1998 pt had Hysterectomy at [facility] followed by radiation on [facility] with possibly Dr.Bosworth.she had 3 bowel obstructions and was in hospital. In 12/18 radiographics showed lung mass in RENUKA.\par 1/14/19 pt saw  and then went to see  for who she sees 1x  month since 1998.  Pt was referred here by .\par \par She denies pain at this time but does have some pain in left upper back   where lesion is.  Pt does have a bulging disc in back which causes her  some pain. Takes oxycodone.  SOB on exertion and semi-productive cough white mucus. She is using a walker to get around at this time.

## 2019-08-23 NOTE — REVIEW OF SYSTEMS
[Diarrhea] : diarrhea [Joint Pain] : joint pain [Fatigue] : fatigue [Muscle Pain] : muscle pain [Negative] : Allergic/Immunologic [Dysphagia: Grade 0] : Dysphagia: Grade 0 [Diarrhea: Grade 1 - Increase of <4 stools per day over baseline; mild increase in ostomy output compared to baseline] : Diarrhea: Grade 1 - Increase of <4 stools per day over baseline; mild increase in ostomy output compared to baseline [Hematuria: Grade 0] : Hematuria: Grade 0 [Fatigue: Grade 1 - Fatigue relieved by rest] : Fatigue: Grade 1 - Fatigue relieved by rest [Cough: Grade 0] : Cough: Grade 0 [Dyspnea: Grade 0] : Dyspnea: Grade 0 [FreeTextEntry7] : q2h [FreeTextEntry9] : walker

## 2019-08-23 NOTE — REVIEW OF SYSTEMS
[Night Sweats] : night sweats [Fatigue] : fatigue [Chest Pain] : chest pain [Shortness Of Breath] : shortness of breath [Cough] : cough [Diarrhea] : diarrhea [Joint Pain] : joint pain [Muscle Pain] : muscle pain [Muscle Weakness] : muscle weakness [Gait Disturbance] : gait disturbance [Dizziness] : dizziness [Difficulty Walking] : difficulty walking [Easy Bleeding] : a tendency for easy bleeding [Easy Bruising] : a tendency for easy bruising [Negative] : Integumentary [Constipation: Grade 0] : Constipation: Grade 0 [Diarrhea: Grade 1 - Increase of <4 stools per day over baseline; mild increase in ostomy output compared to baseline] : Diarrhea: Grade 1 - Increase of <4 stools per day over baseline; mild increase in ostomy output compared to baseline [Dysphagia: Grade 0] : Dysphagia: Grade 0 [Nausea: Grade 1 - Loss of appetite without alteration in eating habits] : Nausea: Grade 1 - Loss of appetite without alteration in eating habits [Vomiting: Grade 1 - 1 - 2 episodes ( by 5 minutes) in 24 hrs] : Vomiting: Grade 1 - 1 - 2 episodes ( by 5 minutes) in 24 hrs [Fatigue: Grade 1 - Fatigue relieved by rest] : Fatigue: Grade 1 - Fatigue relieved by rest [Cough: Grade 1 - Mild symptoms; nonprescription intervention indicated] : Cough: Grade 1 - Mild symptoms; nonprescription intervention indicated [Dyspnea: Grade 1 - Shortness of breath with moderate exertion] : Dyspnea: Grade 1 - Shortness of breath with moderate exertion [Hiccups: Grade 0] : Hiccups: Grade 0 [Hoarseness: Grade 0] : Hoarseness: Grade 0 [Dermatitis Radiation: Grade 0] : Dermatitis Radiation: Grade 0 [FreeTextEntry6] : white semi-productive [FreeTextEntry7] : hx of bowel obstructions [FreeTextEntry9] : walker

## 2019-08-23 NOTE — LETTER CLOSING
[Consult Closing:] : Thank you for allowing me to participate in the care of this patient.  If you have any questions, please do not hesitate to contact me. [Sincerely yours,] : Sincerely yours, [FreeTextEntry3] : Brandon Wu MD\par  and Residency \par Department of Radiation Medicine\par [facility]\par [facility]\par [address] [place] [address]\[place] [address]\par Tel: [phone]\par Fax: [phone]\par \par \par

## 2019-08-23 NOTE — PHYSICAL EXAM
[General Appearance - Well Developed] : well developed [General Appearance - Alert] : alert [General Appearance - In No Acute Distress] : in no acute distress [Thin] : thin [Sclera] : the sclera and conjunctiva were normal [Normal] : oriented to person, place and time, the affect was normal, the mood was normal and not anxious

## 2019-08-23 NOTE — HISTORY OF PRESENT ILLNESS
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  She was not a candidate for surgery and received radiation therapy here from 3/14/19-4/8/19. She received 6,000cGy in 15 treatments and tolerated it well . Pt was very fatigued and  weak throughout  treatment and having constant pain in back. Taking oxycodone with some relief.  She now returns for PTE.\par \par Pt was recently in hospital due to low blood pressure and a kidney infection. She denies dysphagia and odynophagia. She denies any changes in breathing. She continues to smoke 1PPD. She has intermittent left sided chest pain that is stable at 5-6 out of 10. She is currently taking oxycodone for the chest pain and her chronic back pain.

## 2019-09-05 ENCOUNTER — APPOINTMENT (OUTPATIENT)
Dept: RADIATION ONCOLOGY | Facility: CLINIC | Age: 74
End: 2019-09-05

## 2019-10-04 NOTE — DIETITIAN INITIAL EVALUATION ADULT. - NS FNS WEIGHT USED FOR CALC
Subjective     Max Mendez is a 54 year old male who presents to clinic today for the following health issues:    HPI   Genitourinary symptoms      Duration: x 3-4 weeks    Description:  ED- hasn't had morning erection for years, has difficulty achieving and maintaining an erection.  He is a cycling enthusiast, has logged 2300 miles on his bike this season. He admits to perineal numbness after riding for a prolonged period and he has normal perineal sensation after he is off the bike for about an hour. No urinary symptoms- dysuria, frequency, urgency, change in urinary stream, hesitancy, dribbling.    Intensity:  moderate    Accompanying signs and symptoms (fever/discharge/nausea/vomiting/back or abdominal pain):  None    History (frequent UTI's/kidney stones/prostate problems): None  Sexually active: no, due to ED symptoms    Precipitating or alleviating factors: None    Therapies tried and outcome: none   Outcome: NA    Patient Active Problem List   Diagnosis     CARDIOVASCULAR SCREENING; LDL GOAL LESS THAN 160     Recurrent herpes simplex     Tinnitus     Ganglion     Allergic rhinitis due to pollen     Left shoulder pain     Adhesive capsulitis of left shoulder     Past Surgical History:   Procedure Laterality Date     COLONOSCOPY WITH CO2 INSUFFLATION N/A 6/1/2018    Procedure: COLONOSCOPY WITH CO2 INSUFFLATION;  COLONOSCOPY, Screen for colon cancer.  bmi  25.1  [facility] fax: [phone]   [facility];  Surgeon: Constance Daly MD;  Location:  OR       Social History     Tobacco Use     Smoking status: Never Smoker     Smokeless tobacco: Never Used   Substance Use Topics     Alcohol use: No     Family History   Problem Relation Age of Onset     Cancer Father         brain tumor     Neurologic Disorder Father         LOC     Prostate Cancer Father      Diabetes No family hx of      Cancer - colorectal No family hx of          Current Outpatient Medications   Medication Sig Dispense  "Refill     Loratadine (CLARITIN PO)        valACYclovir (VALTREX) 500 MG tablet Take 1 tablet (500 mg) by mouth daily 90 tablet 0     zolpidem (AMBIEN) 5 MG tablet Take tablet by mouth 15 minutes prior to sleep, for Sleep Study 1 tablet 0     BP Readings from Last 3 Encounters:   10/04/19 121/71   09/11/19 108/65   07/30/19 128/77    Wt Readings from Last 3 Encounters:   10/04/19 93.4 kg (206 lb)   09/11/19 93.4 kg (206 lb)   07/30/19 89.8 kg (198 lb)              Reviewed and updated as needed this visit by Provider         Review of Systems   ROS COMP: Constitutional, HEENT, cardiovascular, pulmonary, gi and gu systems are negative, except as otherwise noted.      Objective    /71   Pulse 50   Temp 98.2  F (36.8  C) (Oral)   Ht 1.93 m (6' 4\")   Wt 93.4 kg (206 lb)   SpO2 100%   BMI 25.08 kg/m    Body mass index is 25.08 kg/m .  Physical Exam   GENERAL: healthy, alert and no distress  NECK: no adenopathy, no asymmetry, masses, or scars and thyroid normal to palpation  RESP: lungs clear to auscultation - no rales, rhonchi or wheezes  CV: regular rate and rhythm, normal S1 S2, no S3 or S4, no murmur, click or rub, no peripheral edema and peripheral pulses strong  ABDOMEN: soft, nontender, no hepatosplenomegaly, no masses and bowel sounds normal   (male): normal male genitalia without lesions or urethral discharge, no hernia  MS: no gross musculoskeletal defects noted, no edema  SKIN: no suspicious lesions or rashes  NEURO: Normal strength and tone, mentation intact and speech normal  BACK: no CVA tenderness, no paralumbar tenderness  PSYCH: mentation appears normal, affect normal/bright  LYMPH: normal ant/post cervical, supraclavicular nodes  inguinal: no adenopathy    Diagnostic Test Results:  Labs reviewed in Epic  No results found for this or any previous visit (from the past 24 hour(s)).        Assessment & Plan     1. Erectile dysfunction, unspecified erectile dysfunction type  Take a break from " "riding and see if this helps with ED, return to clinic 2 months for recheck, getting labs.  - Lipid panel reflex to direct LDL Fasting; Future  - **TSH with free T4 reflex FUTURE anytime; Future  - **CBC with platelets FUTURE anytime; Future  - **Comprehensive metabolic panel FUTURE anytime; Future  - Testosterone, total; Future  - JUST IN CASE    2. Influenza vaccine needed    - FLU VACCINE, 3 YRS +, IM (FLUZONE)  - ADMIN INFLUENZA VIRUS VAC     BMI:   Estimated body mass index is 25.08 kg/m  as calculated from the following:    Height as of this encounter: 1.93 m (6' 4\").    Weight as of this encounter: 93.4 kg (206 lb).           See Patient Instructions    No follow-ups on file.    TORI Lnyne [facility]      " dosing/5/6 36.2 kg

## 2019-11-06 NOTE — ED ADULT TRIAGE NOTE - ESI TRIAGE ACUITY LEVEL, MLM
You  Sushma Ann MD 2 hours ago (10:12 AM)      Luis Ann,     Do you agree with the following plan?     Day 1-3: Clonidine 0.2 mg Am and noon and 0.3 mg at bedtime   Day 4: Clonidine 0.2 mg TID     Karen Whitaker RN     Routing comment       Makenzie Knutson, [facility]  You; Sushma Ann MD 3 hours ago (9:01 AM)      Hello,     I think he could probably safely reduce from 0.3mg to 0.2mg TID, though if he's had issues in the past, he could reduce the first two doses of the day first.  After 2-3 days, he could then reduce the HS dose from 0.3mg to 0.2mg, as long as the first reduction was well tolerated.     Hope that helps!   Makenzie Carreon comment      Writer received VORB from provider:  1. Clonidine 0.2 mg tabs- Take 0.2 mg Am and noon with 0.3 mg tab bedtime for 3 days then take 0.2 mg TID #90 with 0 refills     Meds refilled per providers approval   Med tab changed to reflect this     Placed a call to patient at [phone] and relayed the above information. Informed patient to take 0.2 mg BID and 0.3 mg at bedtime for the first 3 days and then decrease to 0.2 mg TID. Informed patient to monitor BP and to call the clinic with any side effects. Patient agreed with the plan.     No further action needed by this writer        2

## 2020-01-01 NOTE — PROGRESS NOTE ADULT - ASSESSMENT
73 year old female with   PMH chronic Back pain (on Narcotics), history of multiple SBO's, narcotic associated constipation/ileus, COPD, current everyday smoker,  ca  breast,  right  mastectomy   admitted with abdominal pain and distension, nausea and poor PO intake with associated worsening painful spasms.      c/c  cachexia  and   dehydrated with acute Renal Failure (SHAKIRA) with marked hyperphosphatemia, acidosis   SBO on ct  /   iV hydration  /    SHAKIRA , improving/  ct  scans  noted/  RENUKA  cavitary lesion./ ? pna  SBO, transition point  in ileum  oncology   d r forte  QuantiFeron,  pending/  pending bronch  care per  sicu        < from: CT Chest No Cont (11.20.18 @ 19:17) >  IMPRESSION: Large cavitary lesion in the left upper lobe. Primary   consideration is lung carcinoma. Tuberculosis or a lung abscess is not   excluded.  Mucous plugging in the right lower lobe with groundglass opacities which   may be due to infection or atelectasis.  < end of copied text >     < from: CT Abdomen and Pelvis No Cont (11.20.18 @ 17:07) >  IMPRESSION: Small bowel obstruction with transition point in the ileum   located in theright lower quadrant.  Groundglass opacities in the right lower lobe suggest pneumonia.  Findings discussed with Dr. Diaz on 11/20/2018 at 5:30 PM with read   back.  < end of copied text > 73 year old female with   PMH chronic Back pain (on Narcotics), history of multiple SBO's, narcotic associated constipation/ileus, COPD, current everyday smoker,  ca  breast,  right  mastectomy   admitted with abdominal pain and distension, nausea and poor PO intake with associated worsening painful spasms.      c/c  cachexia  and   dehydrated with acute Renal Failure (SHAKIRA) with marked hyperphosphatemia, acidosis   SBO on ct  /   iV hydration  /    SHAKIRA , improving/ crt  1.7  ct  scans  noted/  RENUKA  cavitary lesion./ ? pna  SBO, transition point  in ileum  oncology   d r forte  QuantiFeron,  pending/  pending bronch  care per  sicu        < from: CT Chest No Cont (11.20.18 @ 19:17) >  IMPRESSION: Large cavitary lesion in the left upper lobe. Primary   consideration is lung carcinoma. Tuberculosis or a lung abscess is not   excluded.  Mucous plugging in the right lower lobe with groundglass opacities which   may be due to infection or atelectasis.  < end of copied text >     < from: CT Abdomen and Pelvis No Cont (11.20.18 @ 17:07) >  IMPRESSION: Small bowel obstruction with transition point in the ileum   located in theright lower quadrant.  Groundglass opacities in the right lower lobe suggest pneumonia.  Findings discussed with Dr. Diaz on 11/20/2018 at 5:30 PM with read   back.  < end of copied text > [FreeTextEntry9] : + umbilical granuloma, surrounding skin- clean

## 2020-01-30 NOTE — ED PROVIDER NOTE - CADM POA URETHRAL CATHETER
Progress Note    Patient: Willy Gorman  Magaly Kumar, APRN   No

## 2020-03-10 NOTE — ED ADULT NURSE NOTE - NSIMPLEMENTINTERV_GEN_ALL_ED
Implemented All Fall with Harm Risk Interventions:  [place] to call system. Call bell, personal items and telephone within reach. Instruct patient to call for assistance. Room bathroom lighting operational. Non-slip footwear when patient is off stretcher. Physically safe environment: no spills, clutter or unnecessary equipment. Stretcher in lowest position, wheels locked, appropriate side rails in place. Provide visual cue, wrist band, yellow gown, etc. Monitor gait and stability. Monitor for mental status changes and reorient to person, place, and time. Review medications for side effects contributing to fall risk. Reinforce activity limits and safety measures with patient and family. Provide visual clues: red socks.
Vaginal

## 2020-09-03 NOTE — CONSULT NOTE ADULT - CONSULT REASON
Palliative Medicine Consult  [place]: [id number] ([id number])    Patient Name: Haley Richardson  YOB: 1977    Date of Initial Consult: 9/2/2020  Reason for Consult: Overwhelming Symptoms  Requesting Provider: Bridger Ramírez MD  Primary Care Physician: None     SUMMARY:   Haley Richardson is a 44 y.o. with a past history of DM, and HTN, who was admitted on 8/31/2020 from home with a diagnosis of acute pancreatitis of unclear etiology. Current medical issues leading to Palliative Medicine involvement include: pain management     PALLIATIVE DIAGNOSES:   1. Abdominal Pain  2. Nausea  3. Constipation  4. Insomnia     PLAN:   1. Prior to meeting patient I did an extensive review of the chart  2. His opioid usage is not extreme, but he seems to be having dose failure  3. His labs are improving, so should not need extensive pain management past the next 24 hours  4. In talking with him, the IV Hydromorphone at 1mg is controlling his pain well, but he is having dose failure at the 2 hour sony, which is causing a cumulative effect on his pain. 5. Checked his  and he has no record of opioid prescriptions in the last year  6. We agreed to do a PCA for 24 hours in order to allow him to self medicate when he needs it, and so that nursing is not in his room every hour to give medication. Would not go past 24 hours, as his usage thus far has been pretty low, and he should need less as time goes on  7. His 24 hour usage was 7mg of IV Hydromorphone, or 0.3mg/hr. I gave him a PCA only with no bolus of 0.2mg every 10 min, but a 4 hour lock out of 3mg. This is higher than his current dose by a little more than 50% so a generous amount. Ordered Narcan per protocol as well, but hopefully he will get pain relief without oversedation as it is PCA only  8. He asked about getting something for sleep-This has been an issue for him for 4 months, so not likely to improve just with pain control.  He seems to have some psychosocial issues weighing on him. We agreed to see how the day goes, and if he is tolerating the PCA OK, can consider something for a sleeping aid  9. Nausea is controlled on current regimen  10. Did add in Senna plus for bowel reg given he is on opioids now- he said he is passing gas, but no bowel movement since he has been here  11. Initial consult note routed to primary continuity provider and/or primary health care team members  12. Communicated plan of care with: Palliative [facility] Team     GOALS OF CARE / TREATMENT PREFERENCES:     GOALS OF CARE:  Patient/Health Care Proxy Stated Goals: Prolong life    TREATMENT PREFERENCES:   Code Status: Full Code    Advance Care Planning:  [x] The [facility] Interdisciplinary Team has updated the ACP Navigator with Health Care Decision Maker and Patient Capacity      Primary Decision Maker: Marieantolin Nowak Spouse - [phone]    Medical Interventions: Full interventions     Other Instructions: Other:    As far as possible, the palliative care team has discussed with patient / health care proxy about goals of care / treatment preferences for patient. HISTORY:     History obtained from: patient    CHIEF COMPLAINT: Pain    HPI/SUBJECTIVE:    The patient is:   [x] Verbal and participatory  [] Non-participatory due to:   Awake, alert, clearly in pain  Frustrated that his body is \"failing\"    Clinical Pain Assessment (nonverbal scale for severity on nonverbal patients):   Clinical Pain Assessment  Severity: 6  Location: abdomen  Character: sharp  Duration: days  Effect: meds help.  but not lasting  Frequency: constant          Duration: for how long has pt been experiencing pain (e.g., 2 days, 1 month, years)  Frequency: how often pain is an issue (e.g., several times per day, once every few days, constant)     FUNCTIONAL ASSESSMENT:     Palliative Performance Scale (PPS):  PPS: 70       PSYCHOSOCIAL/SPIRITUAL SCREENING:     Palliative IDT has assessed this patient for cultural preferences / practices and a referral made as appropriate to needs (Cultural Services, Patient Advocacy, Ethics, etc.)    Any spiritual / Advent concerns:  [] Yes /  [x] No    Caregiver Burnout:  [] Yes /  [x] No /  [] No Caregiver Present      Anticipatory grief assessment:   [x] Normal  / [] Maladaptive       ESAS Anxiety: Anxiety: 0    ESAS Depression:          REVIEW OF SYSTEMS:     Positive and pertinent negative findings in ROS are noted above in HPI. The following systems were [x] reviewed / [] unable to be reviewed as noted in HPI  Other findings are noted below. Systems: constitutional, ears/nose/mouth/throat, respiratory, gastrointestinal, genitourinary, musculoskeletal, integumentary, neurologic, psychiatric, endocrine. Positive findings noted below. Modified ESAS Completed by: provider   Fatigue: 8       Pain: 6   Anxiety: 0 Nausea: 5   Anorexia: 6 Dyspnea: 0     Constipation: No              PHYSICAL EXAM:     From RN flowsheet:  Wt Readings from Last 3 Encounters:   08/31/20 (!) 380 lb 1.2 oz (172.4 kg)   08/20/19 (!) 399 lb 14.6 oz (181.4 kg)   03/14/17 335 lb 15.7 oz (152.4 kg)     Blood pressure (!) 178/101, pulse 88, temperature 98.1 °F (36.7 °C), resp. rate 18, height 6' 8\" (2.032 m), weight (!) 380 lb 1.2 oz (172.4 kg), SpO2 95 %.     Pain Scale 1: Numeric (0 - 10)  Pain Intensity 1: 4  Pain Onset 1: acute  Pain Location 1: Abdomen  Pain Orientation 1: Mid, Upper  Pain Description 1: Aching  Pain Intervention(s) 1: Medication (see MAR)  Last bowel movement, if known:     Constitutional: awake, alert, grumpy but also able to have some humor  Eyes: pupils equal, anicteric  ENMT: no nasal discharge, moist mucous membranes  Cardiovascular: regular rhythm, distal pulses intact  Respiratory: breathing not labored, symmetric  Gastrointestinal: soft tender, +bowel sounds  Musculoskeletal: no deformity, no tenderness to palpation  Skin: warm, and  pain dry  Neurologic: following commands, moving all extremities  Psychiatric: full affect, no hallucinations  Other:       HISTORY:     Active Problems:    Acute peritonitis ([address] (8/31/2020)      Leukocytosis (8/31/2020)      Hyperglycemia (8/31/2020)      Past Medical History:   Diagnosis Date    Hypertension       No past surgical history on file. Family History   Problem Relation Age of Onset    Other Other         no FHX of pancreatitis      History reviewed, no pertinent family history.   Social History     Tobacco Use    Smoking status: Current Every Day Smoker     Packs/day: 0.50   Substance Use Topics    Alcohol use: Yes     Comment: States \"very rare\"     Allergies   Allergen Reactions    Shellfish Containing Products Other (comments)     Causes pancreatitis per patient      Current Facility-Administered Medications   Medication Dose Route Frequency    HYDROmorphone (PF) 25 mg/50 mL (DILAUDID) PCA   IntraVENous TITRATE    losartan (COZAAR) tablet 25 mg  25 mg Oral DAILY    HYDROmorphone (PF) (DILAUDID) injection 1 mg  1 mg IntraVENous Q4H PRN    senna-docusate (PERICOLACE) 8.6-50 mg per tablet 1 Tab  1 Tab Oral QHS    naloxone (NARCAN) injection 0.4 mg  0.4 mg IntraVENous EVERY 2 MINUTES AS NEEDED    amLODIPine (NORVASC) tablet 10 mg  10 mg Oral DAILY    insulin glargine (LANTUS) injection 15 Units  15 Units SubCUTAneous QHS    metoprolol tartrate (LOPRESSOR) tablet 25 mg  25 mg Oral Q12H    piperacillin-tazobactam (ZOSYN) 3.375 g in 0.9% sodium chloride (MBP/ADV) 100 mL  3.375 g IntraVENous Q8H    0.9% sodium chloride infusion  185 mL/hr IntraVENous CONTINUOUS    hydrALAZINE (APRESOLINE) 20 mg/mL injection 10 mg  10 mg IntraVENous Q6H PRN    labetaloL (NORMODYNE;TRANDATE) injection 20 mg  20 mg IntraVENous Q4H PRN    insulin lispro (HUMALOG) injection   SubCUTAneous Q6H    glucose chewable tablet 16 g  4 Tab Oral PRN    dextrose (D50W) injection syrg 12.5-25 g  12.5-25 g IntraVENous PRN  glucagon (GLUCAGEN) injection 1 mg  1 mg IntraMUSCular PRN    sodium chloride (NS) flush 5-40 mL  5-40 mL IntraVENous Q8H    sodium chloride (NS) flush 5-40 mL  5-40 mL IntraVENous PRN    acetaminophen (TYLENOL) tablet 650 mg  650 mg Oral Q6H PRN    Or    acetaminophen (TYLENOL) suppository 650 mg  650 mg Rectal Q6H PRN    polyethylene glycol (MIRALAX) packet 17 g  17 g Oral DAILY PRN    promethazine (PHENERGAN) tablet 12.5 mg  12.5 mg Oral Q6H PRN    Or    ondansetron (ZOFRAN) injection 4 mg  4 mg IntraVENous Q6H PRN    enoxaparin (LOVENOX) injection 40 mg  40 mg SubCUTAneous Q12H          LAB AND IMAGING FINDINGS:     Lab Results   Component Value Date/Time    WBC 18.0 (H) 09/03/2020 06:24 AM    HGB 13.8 09/03/2020 06:24 AM    PLATELET 518 06/36/7608 06:24 AM     Lab Results   Component Value Date/Time    Sodium 136 09/02/2020 12:47 AM    Potassium 3.8 09/02/2020 12:47 AM    Chloride 102 09/02/2020 12:47 AM    CO2 28 09/02/2020 12:47 AM    BUN 10 09/02/2020 12:47 AM    Creatinine 0.87 09/02/2020 12:47 AM    Calcium 8.6 09/02/2020 12:47 AM    Magnesium 1.5 (L) 09/01/2020 01:37 AM      Lab Results   Component Value Date/Time    Alk. phosphatase 87 09/01/2020 01:37 AM    Protein, total 7.8 09/01/2020 01:37 AM    Albumin 3.2 (L) 09/01/2020 01:37 AM    Globulin 4.6 (H) 09/01/2020 01:37 AM     No results found for: INR, PTMR, PTP, PT1, PT2, APTT, INREXT, INREXT   No results found for: IRON, FE, TIBC, IBCT, PSAT, FERR   No results found for: PH, PCO2, PO2  No components found for: GLPOC   No results found for: CPK, CKMB             Total time:   Counseling / coordination time, spent as noted above:   > 50% counseling / coordination?:     Prolonged service was provided for  []30 min   []75 min in face to face time in the presence of the patient, spent as noted above. Time Start:   Time End:   Note: this can only be billed with 86444 (initial) or 91620 (follow up).   If multiple start / stop times, list each separately.

## 2020-10-08 NOTE — ED ADULT NURSE NOTE - NSFALLRSKHARMRISK_ED_ALL_ED
[facility] Emergency Department  [address]  [place] [address]  Phone: [phone]  Fax: [phone]                                    Celso Mathis   MRN: [id number]    Department: [facility] Emergency Department   Date of Visit: 10/8/2020           After Visit Summary Signature Page    I have received my discharge instructions, and my questions have been answered. I have discussed any challenges I see with this plan with the nurse or doctor.    ..........................................................................................................................................  Patient/Patient Representative Signature      ..........................................................................................................................................  Patient Representative Print Name and Relationship to Patient    ..................................................               ................................................  Date                                   Time    ..........................................................................................................................................  Reviewed by Signature/Title    ...................................................              ..............................................  Date                                               Time          22EPIC Rev 08/18        yes

## 2021-03-23 NOTE — DISCHARGE NOTE PROVIDER - NSDCDCMDCOMP_GEN_ALL_CORE
-- DO NOT REPLY / DO NOT REPLY ALL --  -- Message is from the [facility]--    COVID-19 Universal Screening: N/A - Not about scheduling    General Patient Message      Reason for Call: patient needs to confirm more information from her MRI results.     Caller Information       Type Contact Phone    03/23/2021 03:27 PM CDT Phone (Incoming) Alexandria Kaiseryne (Self) [phone] (V)          Alternative phone number: none    Turnaround time given to caller:   \"This message will be sent to [state Provider's name]. The clinical team will fulfill your request as soon as they review your message.\"     This document is complete and the patient is ready for discharge.

## 2021-10-22 NOTE — PROGRESS NOTE ADULT - ASSESSMENT
73 year old female with   PMH chronic Back pain (on Narcotics), history of multiple SBO's, narcotic associated constipation/ileus, COPD,  smoker,    ca  breast,  right  mastectomy. RENUKA cavitary lesion on ct.  c/c cachexia  path from  1/19, with NSCC with squamous  features     admitted with abdominal pain  , from SBO    ct scan noted,  SBO, with ?  internal hernia/  per  surg, pt eating/ doing better   had  sob from wheezing/  pt is an active smoker, refusing to quit/  less  sob today   iv solumedrol and nebs   ct chest , noted/  oncology dr hernandez called  dvt ppx      < from: CT Chest No Cont (04.19.19 @ 18:50)   INTERPRETATION:  Motion degraded evaluation  Redemonstraion on left upper lobe cavitary mass consistent with patients   known hx of lung CA  Mucoid impacted right lower lobe ariways with scattered tree in bud   opacities  < end of copied text >      Cytopathology - Non Gyn Report (01.18.19 @ 11:43)    Immunohistochemical stains.  The immunophenotype together with the cytomorphology supports the  diagnosis squamous cell carcinoma.    Final Diagnosis  LUNG, LEFT UPPER LOBE, US GUIDED CORE BIOPSY AND FNA  POSITIVE FOR MALIGNANT CELLS.  Non-small cell carcinoma, with squamous features (see note).  Additional studies pending. none

## 2022-01-01 NOTE — ED PROVIDER NOTE - NORMAL, MLM
Please contact family to notify that there was a problem with the  metabolic screen test collection and it needs to be re-collected as soon as possible.  An order has been placed and can be drawn at any [facility].   cheryl all pertinent systems normal

## 2022-09-15 NOTE — DISEASE MANAGEMENT
Palliative Care Progress Note    Reason for Palliative Care: Establishing Advance Directives, Complex Decision Making, Goals of Care/Level of Care and Psychosocial Support, Patient/Family     Subjective    Patient reports pain is controlled daughter, c/o left arm pain but overall feeling better.     Review of Systems  Review of Systems        Palliative Care Assessment Tools        Palliative Performance Scale  Palliative Performance Scale: Ambulation Totally Bed-Bound. Unable to do Any Work Extensive Disease. Self-Care Total Care. Intake Mouth Care Only. Consciousness Level Drowsy or Coma.               Objective      Vital Signs:   Vital Last Value (24 Hour) 24 Hour Range   Temperature 97.5 °F (36.4 °C) (09/15/22 1200) Temp  Min: 97.2 °F (36.2 °C)  Max: 98.2 °F (36.8 °C)   Pulse 74 (09/15/22 1200) Pulse  Min: 60  Max: 81   Arterial   Blood Pressure   No data recorded   Non-Invasive  Blood Pressure 111/68 (09/15/22 1200) BP  Min: 103/59  Max: 139/71   Central Venous Pressure   No data recorded   Respiratory 18 (09/15/22 1416) Resp  Min: 10  Max: 21   SpO2 96 % (09/15/22 1337) SpO2  Min: 85 %  Max: 100 %   Dosing Weight: 156.5 kg (345 lb 0.3 oz) (9/14/2022  2:00 AM)  Weight: (!) 157.5 kg (347 lb 3.6 oz) (09/15/22 0500)    Physical Exam  Constitutional: Morbidly obese, comfortable, cooperative, No obvious signs of pain.   HEENT: normocephalic, trach to vent  RESP: decreased breath sounds, unlabored breathing   CV: Nl S1 S2   GI: soft, NT, ND, BS+  MUSC: no limitation in passive, active ROM or UE/LE.    NEURO: Awake, answers yes/no questions  PSYCH: calm, euthymic   INTEG: warm, dry   EXT: warm, no edema  Labs & Imaging  Recent Labs   Lab 09/15/22  0612 09/14/22  1215 09/14/22  0435 09/13/22 1952   SODIUM 135 127* 129* 128*   POTASSIUM 4.1 5.7* 6.5* 5.3*   CHLORIDE 94* 90* 91* 88*   CO2 32 29 32 33*   BUN 79* 88* 87* 80*   CREATININE 1.31* 1.40* 1.44* 1.41*   CALCIUM 8.8 8.4 8.5 8.5   ALBUMIN  --   --   --  2.0*  [Clinical] : TNM Stage: c   BILIRUBIN  --   --   --  0.3   ALKPT  --   --   --  207*   GPT  --   --   --  28   AST  --   --   --  53*   GLUCOSE 73 76 102* 214*      Recent Labs   Lab 09/15/22  0612   WBC 14.2*   RBC 2.73*   HGB 7.7*   HCT 24.7*           Results for orders placed or performed during the hospital encounter of 09/13/22 (from the past 72 hour(s))   XR CHEST PA OR AP 1 VIEW    Impression    FINDINGS AND IMPRESSION:     Tracheostomy device in place.  Low lung volumes.  Heart size stable.   Scattered reticular and nodular opacities both lungs.  Metastasis  suspected.  Please correlate with recent CT chest 08/02/2022 and follow-up  accordingly.  Prominence and redistribution of vasculature with mild  perihilar haziness and right-sided pleural effusion.  Superimposed vascular  congestive changes not excluded.  Patchy atelectasis lung bases.  Right  infrahilar infiltrate not excluded.  Calcified right hilar lymph nodes.      Electronically Signed by: RASHAUN ORTEGA M.D.   Signed on: 9/14/2022 1:13 PM      [facility] EXTREMITY UPPER VENOUS DUPLEX    Impression    Patent left subclavian, axillary and brachial veins. No  thrombus is evident.    Electronically Signed by: RASHAUN ORTEGA M.D.   Signed on: 9/15/2022 6:39 AM            Advance Care Planning/Goals of Care        Medical Decision-making capacity: No, capacity is per physician       Assessment     Acute on chronic anemia  Chronic hypoxemic respiratory failure  Chronic diastolic CHF  Breast cancer  Encounter for palliative care  Role of palliative care benefiting those with advanced and complex illnesses was introduced. Education was provided as to how palliative care assists patients with symptom management, psychosocial issues and decision making.  Patient and daughter understands given that patient's advanced age and comorbidities,  and the ever increasing disease burden, it is not unreasonable to discuss about  end of life issues and the patient in the near future may be  [IIIA] : IIIA [NTNM] : 1 benefited with  palliative/hospice services.   Explained Pc vs hospice care.  I explained hospice, its philosophy and how this service can help both the patient and the family at a variety of settings depending on their needs.  - Inquired about illness understanding-daughter POA is well-informed  - Discussed patient condition at length and acute issues   - Explored goals, hopes and fears in contest of illness -daughter reports she wants to continue with future hospitalization, ICU care and not ready to shift focus to comfort care only.    - Incorporated scenario planning to assist decision making  - Emergency medical treatments/care in event of decline discussed-daughter would like patient to be seen at [facility]/DNR-no CPR/okay with cardioversion, okay with vasopressors and antiarrhythmics.  - Discussed big picture of her illnesses and limitations on degree to which he may recover    9/15-explained palliative vs hospice care to patient.  Patient reports she is feeling okay and is not suffering.  She reports her current condition is acceptable quality of life for her.  She does understand she will continue coming back and forth in the hospital despite close monitoring due to her comorbidities.  She understands she may not be able to be weaned off the ventilator as her goal was to go home but as long as she is on the ventilator she understands will be difficult to go home.  She reports she is okay with future hospitalizations, ICU care and current medical care.  She declines hospice care and as per daughter also they are not interested in comfort care only at this time.     Plan      No new Assessment & Plan notes have been filed under this hospital service since the last note was generated.  Service: Palliative Care  As above. Will continue to follow-up as needed       Total combined time for today's Face to Face encounter was 35 minutes.  Encounter started at 1400 and ended at 1435.   Discussed With: RN    Thank you  [TTNM] : 3 for involving Palliative and Supportive Care. Please contact the covering provider via [employer] with further questions or concerns.    Yvonne Zuñiga CNP    Note to Patient: The 21st Century Cures Act makes medical notes like these available to patients in the interest of transparency. However, be advised this is a medical document. It is intended as peer to peer communication. It is written in medical language and may contain abbreviations or verbiage that are unfamiliar. It may appear blunt or direct. Medical documents are intended to carry relevant information, facts as evident, and the clinical opinion of the practitioner.  This note may have been transcribed using a voice dictation system. Voice recognition errors may occur. This should not be taken to alter the content or meaning of this note.       Metrics          LVAD: No  #1 Post-Visit: Yes  Reason if No #1 on Chart Prior to Discharge from Palliative Care: OTHER  #2 Post-Visit: Yes  #3 Post-Visit: Yes      [MTNM] : 0
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
## 2022-11-07 NOTE — ED PROCEDURE NOTE - CPROC ED NEEDLE GAUGE1
20 [facility]:  I discussed with the patient the risks of golimumab including but not limited to myelosuppression, immunosuppression, autoimmune hepatitis, demyelinating diseases, lymphoma, and serious infections.  The patient understands that monitoring is required including a PPD at baseline and must alert us or the primary physician if symptoms of infection or other concerning signs are noted.

## 2022-11-11 NOTE — PROVIDER CONTACT NOTE (CRITICAL VALUE NOTIFICATION) - NS PROVIDER READ BACK
"  History     Chief Complaint   Patient presents with     Back Pain     HPI  Deshawn Flood is a 49 year old male who presents with moderate worsening low back pain that began last Saturday.  Patient states he has had some mild back issues in the past but usually \"walks them off\".  Never had imaging for his back issues.  Last Saturday while helping his father cut down a tree he developed some low back pain and has progressively worsened over the week.  This morning patient states he \"could not do it anymore due to pain and spasm.\"  Patient denies any history of kidney stone.  He has had no back surgeries.  Denies any saddle paresthesias or loss of bowel or bladder.  Denies any urinary symptoms or hematuria.  Does have chronic renal disease with a nephrotic syndrome.  Also has Crohn's disease but currently quiescent.  Denies fever or chills.  He has had no upper respiratory illness, chest pain, shortness of breath or cough.  The pain radiates across the lower back but does not radiate to his abdomen or groin.  No radicular leg pain or weakness.  Took some pain pills about 2:00 in the morning and thinks it may have been aspirin.    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
"Medium     History of pneumothorax 08/02/2012     Priority: Medium     spontaneous in 2005       CARDIOVASCULAR SCREENING; LDL GOAL LESS THAN 160 08/02/2012     Priority: Medium        Past Medical History:    Past Medical History:   Diagnosis Date     Anemia      Other spontaneous pneumothorax      SPONTANEOUS PNEUMOTHORAX NEC 3/25/2005     Unspecified asthma(493.90)        Past Surgical History:    Past Surgical History:   Procedure Laterality Date     ARTHROSCOPY KNEE  8/3/2012    Procedure: ARTHROSCOPY KNEE;  Left knee Arthroscopy with removal of loose bodies;  Surgeon: Bibi Roberts MD;  Location: [facility] OR     HC REPAIR UMBILICAL GODFREY,<6Y/O,REDUC  1978     IR RENAL BIOPSY LEFT  4/9/2019     TUBE THORACOSTOMY,ABSC/EMPYEMA/HEMO  3/20/2005    Small chest tube with Heimlich valve.       Family History:    Family History   Problem Relation Age of Onset     Autoimmune Disease No family hx of        Social History:  Marital Status:  Single [1]  Social History     Tobacco Use     Smoking status: Light Smoker     Packs/day: 0.10     Years: 15.00     Pack years: 1.50     Types: Cigarettes     Smokeless tobacco: Former     Types: Chew     Tobacco comments:     1 pack per day   Vaping Use     Vaping Use: Never used   Substance Use Topics     Alcohol use: Yes     Alcohol/week: 17.5 standard drinks     Types: 21 Cans of beer per week     Comment: occ     Drug use: No        Medications:    albuterol (VENTOLIN HFA) 108 (90 Base) MCG/ACT inhaler  cyclobenzaprine (FLEXERIL) 10 MG tablet  oxyCODONE-acetaminophen (PERCOCET) 5-325 MG tablet  atorvastatin (LIPITOR) 20 MG tablet  cycloSPORINE modified (GENERIC EQUIVALENT) 50 MG capsule  lisinopril (ZESTRIL) 20 MG tablet  omeprazole (PRILOSEC) 20 MG DR capsule          Review of Systems all other systems are reviewed and are negative.     Physical Exam   BP: (!) 113/92  Pulse: 68  Temp: 98.2  F (36.8  C)  Resp: 18  Height: 175.3 cm (5' 9\")  Weight: 74.8 kg (165 lb)  SpO2: 99 "
%      Physical Exam General alert cooperative male in moderate distress.  Examination of his back reveals some mild lower lumbar tenderness midline without crepitus or step-off.  Diffuse muscular spasm in the lumbar region bilaterally with left greater than right.  No SI tenderness.  Straight leg raising does not cause radicular symptoms but causes spasm in his low back.  Intact strength and sensation for lower extremities.  He has no CVA tenderness.  No rashes over the low back.    ED Course                 Procedures              Critical Care time:  none               Results for orders placed or performed during the hospital encounter of 11/11/22 (from the past 24 hour(s))   XR Lumbar Spine 2/3 Views    Narrative    LUMBAR SPINE TWO TO THREE VIEWS  11/11/2022 8:47 AM     HISTORY: Low lumbar back pain    COMPARISON: 12/29/2020.      Impression    IMPRESSION: Nomenclature is based on 5 lumbar vertebral bodies. Mild  levoconvex curvature with apex at L1-L2. Mild retrolisthesis of L2 on  L3. Alignment otherwise appears unremarkable and overall is unchanged  from the prior examination. Minimal anterior wedging of the L1  vertebral body is unchanged. No definite new lumbar vertebral body  height loss identified. Moderate disc space narrowing at L2-L3 and  mild to moderate versus moderate disc space narrowing at L3-L4. There  is at least mild multilevel disc space narrowing elsewhere. Multiple  endplate marginal osteophytes. Mild-to-moderate multilevel  degenerative facet disease.       Medications   HYDROmorphone (PF) (DILAUDID) injection 0.5 mg (0.5 mg Intravenous Given 11/11/22 0244)   diazepam (VALIUM) injection 5 mg (5 mg Intravenous Given 11/11/22 6136)     Patient thinks he will be able to get a ride.  IV Valium was ordered.  Held on Toradol due to his renal issues.  Decreased pain at rest but with movement recurrent symptoms so IV Dilaudid and x-ray ordered.  X-ray shows some degenerative changes but no acute 
"fracture as noted above.  Patient's pain is now tolerable with IV Dilaudid.  Assessments & Plan (with Medical Decision Making)   Deshawn Flood is a 49 year old male who presents with moderate worsening low back pain that began last Saturday.  Patient states he has had some mild back issues in the past but usually \"walks them off\".  Never had imaging for his back issues.  Last Saturday while helping his father cut down a tree he developed some low back pain and has progressively worsened over the week.  This morning patient states he \"could not do it anymore due to pain and spasm.\"  Patient denies any history of kidney stone.  He has had no back surgeries.  Denies any saddle paresthesias or loss of bowel or bladder.  Denies any urinary symptoms or hematuria.  Does have chronic renal disease with a nephrotic syndrome.  Also has Crohn's disease but currently quiescent.  Denies fever or chills.  He has had no upper respiratory illness, chest pain, shortness of breath or cough.  The pain radiates across the lower back but does not radiate to his abdomen or groin.  No radicular leg pain or weakness.  Took some pain pills about 2:00 in the morning and thinks it may have been aspirin.  On exam he did have some midline low lumbar tenderness without crepitus step-off.  X-ray does not show any acute fracture but does have degenerative changes noted above.  Did have muscle spasm.  Symptoms improved with initially Valium and then Dilaudid.  He will be given Percocet for home.  Flexeril for spasm.  Ice not heat.  Contact his doctor if persistent symptoms to arrange physical therapy.  Handout on lumbar back pain is provided.  I have reviewed the nursing notes.    I have reviewed the findings, diagnosis, plan and need for follow up with the patient.       New Prescriptions    CYCLOBENZAPRINE (FLEXERIL) 10 MG TABLET    Take 1 tablet (10 mg) by mouth 3 times daily as needed    OXYCODONE-ACETAMINOPHEN (PERCOCET) 5-325 MG TABLET    "
Take 1 tablet by mouth every 6 hours as needed for pain       Final diagnoses:   Lumbar back pain       11/11/2022   [facility] EMERGENCY DEPT     Pedro Brown MD  11/11/22 0968    
yes
Anemia    Anxiety    Asthma    Chronic pain    Depression    Fibromyalgia    HTN (hypertension)    Lupus    Myocardial infarction

## 2022-12-21 NOTE — ED PROVIDER NOTE - CPE EDP [facility]C NORM
Pt to ED due to right upper abdominal pain. Pt states that this pain has been off and on since 12/16/22. Pt states that she has been having more severe pain today since 0200. Pt reports vomiting and diarrhea. Pt states the pain gets worse when she eats. Pt states the pain feels better when she is lying on her right side. VSS. normal...

## 2023-03-30 NOTE — PATIENT PROFILE ADULT - LAST BOWEL MOVEMENT DATE
Cryotherapy Text: The wound bed was treated with cryotherapy after the biopsy was performed. 18-Apr-2019

## 2023-05-11 NOTE — PATIENT PROFILE ADULT - NSALCOHOLTYPE_GEN_A_NUR
Che Hayes is a 88 year old female presenting to the walk-in clinic today with brother  for c/o possible sleep paralysis. She says while sleeping, she starts kicking in her sleep trying to wake up but can't. She says \"I'm half sleep\" when this happens.  Denies any nightmares or breathing difficulties when this happens.  Pt says this occurs maybe one a month, but last night it happened 3 times. When this happens she has a harder time going back to sleep.  She says her and her son believes this is the cause after doing some research.    Treatment tried prior to visit: none     Swabs/Specimens collected during rooming process:  None    Work, School or  note needed: No    New vs Established: Established    Patient would like communication of their results via:      Home Phone: [phone] (home)  Okay to leave a message containing results? Yes     wine

## 2023-06-01 NOTE — PATIENT PROFILE ADULT - NSPROGENARRIVEDFROM_GEN_A_NUR
06/01/23 0730   Activity/Group Checklist   Group   (Morning Meeting and Coffee)   Attendance Attended   Attendance Duration (min) 46-60   Interactions Disorganized interaction   Affect/Mood Wide   Goals Achieved Identified feelings; Able to listen to others; Able to engage in interactions emergency department

## 2023-10-01 PROBLEM — Z92.3 HISTORY OF RADIATION THERAPY: Status: RESOLVED | Noted: 2019-02-05 | Resolved: 2023-10-01

## 2023-10-04 NOTE — ED ADULT NURSE NOTE - NSFALLRSKASSESSDT_ED_ALL_ED
----- Message from Kymberly Phelps sent at 10/4/2023 11:57 AM CDT -----  Pt said she spoke to  she not a patient but family friend for clinic to write order for thyroid level and Testerone level please advice her number [phone]     15-Jonathon-2019 15:32

## 2023-10-07 NOTE — PATIENT PROFILE ADULT - FUNCTIONAL SCREEN CURRENT LEVEL: BATHING, MLM
radiation dose to as low as reasonably achievable. COMPARISON: 09/28/2022 CT head HISTORY: ORDERING SYSTEM PROVIDED HISTORY: AMS TECHNOLOGIST PROVIDED HISTORY: Reason for exam:->AMS Has a \"code stroke\" or \"stroke alert\" been called? ->No Decision Support Exception - unselect if not a suspected or confirmed emergency medical condition->Emergency Medical Condition (MA) Reason for Exam: AMS  HYPOGLYCEMIA Additional signs and symptoms: PT MOTION  PT UNABLE TO UNDERSTAND ALL COMMANDS FINDINGS: BRAIN/VENTRICLES: There is no acute intracranial hemorrhage, mass effect or midline shift. No abnormal extra-axial fluid collection. The gray-white differentiation is maintained without evidence of an acute infarct. There are nonspecific areas of hypoattenuation within the periventricular and subcortical white matter, which likely represent chronic microvascular ischemic change. There is prominence of the ventricles and sulci due to global parenchymal volume loss. ORBITS: The visualized portion of the orbits demonstrate no acute abnormality. SINUSES: There is significant mucosal thickening of the ethmoid air cells and maxillary sinuses. Small left and moderate right mastoid effusions. SOFT TISSUES/SKULL:  No acute abnormality of the visualized skull or soft tissues. 1.  No acute intracranial abnormality. 2.  Findings compatible with chronic microvascular ischemic disease and involutional change. 3.  Significant mucosal thickening of the ethmoid air cells and maxillary sinuses. Correlate for clinical evidence of sinusitis.        Electronically signed by Norris Gonzalez MD on 10/7/2023 at 9:18 AM 2 = assistive person

## 2024-01-04 NOTE — DISCHARGE NOTE PROVIDER - NSDCCAREPROVSEEN_GEN_ALL_CORE_FT
Hpi Title: Evaluation of Skin Lesions
Additional History: Presents for ISK treatment on trunk. She reports that her back gets very itchy so she uses castor oil which helps.
Jonathan Dennison Ariana  [facility] Medicine, Advance PracticeTeam  [facility], [place]

## 2024-01-12 NOTE — ED ADULT NURSE REASSESSMENT NOTE - VOIDING
Unable to reach patient by phone to discuss rescheduling Functional Restoration appointment of  1-4-2024. Letter will be mailed for rescheduling.  
arevalo cath in place

## 2024-02-12 NOTE — DISCHARGE NOTE ADULT - THE PATIENT HAS
[facility]   Physical Therapy Treatment Note        Name: [facility]  Clinic Number: [id number]    Therapy Diagnosis:   Encounter Diagnoses   Name Primary?    Quadriceps weakness Yes    Decreased range of motion (ROM) of left knee      Physician: Luisito Gregorio MD    Visit Date: 2/12/2024    Physician Orders: PT Eval and Treat  Medical Diagnosis from Referral:       Encounter Diagnoses   Name Primary?    Sprain of anterior cruciate ligament of left knee, initial encounter      Other tear of medial meniscus of left knee as current injury, initial encounter      Quadriceps weakness Yes    Decreased range of motion (ROM) of left knee        Evaluation Date: 1/11/2024  Authorization Period Expiration: 1/3/2025  Plan of Care Expiration: 4/11/2024                Progress Update: 2/11/2024   Visit # / Visits authorized: 7 / 20 + eval          FOTO: Visit #1 1/11/2024 - Scored: 1 / 3      PRECAUTIONS: Standard Precautions      Patient School: [employer])   Job/Position: [employer]      Date of Surgery  1/9/24   Surgery Performed Left Meniscectomy     PTA Visit #: 0/5     Time In: 3:30  Time Out: 5:00  Total Billable Time: 65 minutes    Subjective     Patient reports: Knee feeling great, has been doing shooting and throwing warmups with [employer] team    He/She was compliant with home exercise program.  Response to previous treatment: no notable change  Functional change: no notable change    Pain: 0/10     Location: left knee     Objective      Objective Measures updated at progress report or POC update only unless otherwise noted.       Treatment     Huan received the treatments listed below:   Therapeutic exercises to develop strength, endurance, ROM, and flexibility for 15 minutes including:   Bike 5'  Elliptical 5'  HSS 2 laps   Quad stretch 2 laps      Manual therapy techniques: Joint mobilizations were applied to the: left knee for 00  "minutes, including:       Neuromuscular re-education activities to improve: Balance, Coordination, Kinesthetic, Sense, Proprioception, and Posture for 25 minutes. The following activities were included:   Lateral Lunges 3x10 each Leg 20# medball 3x10  SL Knee Extension 85# 3x10  Lateral band walks 3 laps  Core circuit: 3 rounds  30" Russian twists  30"leg raises  30" reverse crunches  SL Knee Extension (light) 55# 3x15      Therapeutic activities to improve functional performance for 25 minutes, including:   Walking Lunges Fwd/Bwd 25# 3 laps (10yds)  SLSD 8" step 20#x2 3x10  SL Calf Raise SB 40# 3x10    Patient Education and Home Exercises       Home Exercises Provided and Patient Education Provided     Education provided: (0) minutes  PURPOSE: Patient educated on the impairments noted above and the effects of physical therapy intervention to improve overall condition and QOL.   EXERCISE: Patient was educated on all the above exercise prior/during/after for proper posture, positioning, and execution for safe performance with home exercise program.     Written Home Exercises Provided: yes.  Exercises were reviewed and Huan was able to demonstrate them prior to the end of the session.  Huan demonstrated good  understanding of the education provided. See EMR under Patient Instructions for exercises provided during therapy sessions.    Assessment     Patient is progressing very well. Slight knee extension ROM deficit at start of session but improved with stretching. Focusing on regaining strength in injured limb.    Huan is progressing well towards his goals.   Pt prognosis is Excellent.     Pt will continue to benefit from skilled outpatient physical therapy to address the deficits listed in the problem list box on initial evaluation, provide pt/family education and to maximize pt's level of independence in the home and community environment.     Pt's spiritual, cultural and educational needs considered and pt " agreeable to plan of care and goals.     Anticipated Barriers for therapy: none    SHORT TERM GOALS:  4 week Progress Date Met   Recent signs and systems trend is improving in order to progress towards Long term goals.  [] Met  [] Not Met  [] Progressing     Patient will be independent with Home Exercise Program  in order to further progress and return to maximal function. [] Met  [] Not Met  [] Progressing     Pain rating at Worst: 5 /10 in order to progress towards increased independence with activity. [] Met  [] Not Met  [] Progressing     Patient will be able to correct postural deviations in sitting and standing, to decrease pain and promote postural awareness for injury prevention.  [] Met  [] Not Met  [] Progressing     Patient will improve functional outcome (FOTO) score: by 5% to increase self-worth & perceived functional ability towards long term goals [] Met  [] Not Met  [] Progressing        LONG TERM GOALS: 12 weeks Progress Date Met   Patient will return to normal activites of daily living, recreational, and work related activities with less pain and limitation.  [] Met  [] Not Met  [] Progressing     Patient will improve range of motion  to stated goals in order to return to maximal functional potential.  [] Met  [] Not Met  [] Progressing     Patient will improve Strength to stated goals of appropriate musculature in order to improve functional independence.  [] Met  [] Not Met  [] Progressing     Pain Rating at Best: 1/10 to improve Quality of Life.  [] Met  [] Not Met  [] Progressing     Patient will meet predicted functional outcome (FOTO) score: % to increase self-worth & perceived functional ability. [] Met  [] Not Met  [] Progressing         Plan     Continue Plan of Care (POC) and progress per patient tolerance. See treatment section for details on planned progressions next session.      Irving Bradley, PT     no difficulties

## 2024-03-17 NOTE — ED PROVIDER NOTE - MDM ORDERS SUBMITTED SELECTION
Encounter Date: 3/17/2024       History     Chief Complaint   Patient presents with    Dental Pain     Dental pain times one week states feels like a boil upper gum     This 34-year-old man presents with complaints of an abscess in the right upper outer gum line for the past few days.  He has had recurrent abscesses in the same location.  He states he has been to a dentist and had 2 teeth pulled.       Review of patient's allergies indicates:  No Known Allergies  No past medical history on file.  No past surgical history on file.  No family history on file.  Social History     Tobacco Use    Smoking status: Every Day     Types: Cigarettes    Smokeless tobacco: Never     Review of Systems   Constitutional:  Negative for fever.   HENT:  Positive for dental problem. Negative for sore throat.    Respiratory:  Negative for shortness of breath.    Cardiovascular:  Negative for chest pain.   Gastrointestinal:  Negative for nausea.   Genitourinary:  Negative for dysuria.   Musculoskeletal:  Negative for back pain.   Skin:  Negative for rash.   Neurological:  Negative for weakness.   Hematological:  Does not bruise/bleed easily.       Physical Exam     Initial Vitals [03/17/24 1552]   BP Pulse Resp Temp SpO2   (!) 144/92 75 20 98 °F (36.7 °C) 100 %      MAP       --         Physical Exam    Constitutional: He appears well-developed and well-nourished.   HENT:   Head: Normocephalic and atraumatic.   Mouth/Throat: Mucous membranes are normal.   There is a swelling on the right upper gum line.   Eyes: EOM are normal. Pupils are equal, round, and reactive to light.   Neck: Neck supple.   Normal range of motion.  Cardiovascular:  Normal rate, regular rhythm, normal heart sounds and intact distal pulses.           Pulmonary/Chest: Breath sounds normal.   Abdominal: Abdomen is soft. Bowel sounds are normal.   Musculoskeletal:         General: Normal range of motion.      Cervical back: Normal range of motion and neck supple.      Neurological: He is alert and oriented to person, place, and time. He has normal strength.   Skin: Skin is warm and dry. Capillary refill takes less than 2 seconds.   Psychiatric: He has a normal mood and affect. His behavior is normal. Judgment and thought content normal.         ED Course   Procedures  Labs Reviewed - No data to display       Imaging Results    None          Medications - No data to display  Medical Decision Making                                    Clinical Impression:  Final diagnoses:  [K04.7] Dental abscess (Primary)          ED Disposition Condition    Discharge Stable          ED Prescriptions       Medication Sig Dispense Start Date End Date Auth. Provider    amoxicillin (AMOXIL) 875 MG tablet Take 1 tablet (875 mg total) by mouth 2 (two) times daily. 14 tablet 3/17/2024 -- Stephan Bernard MD    HYDROcodone-acetaminophen (NORCO) 5-325 mg per tablet Take 1 tablet by mouth every 6 (six) hours as needed for Pain. 12 tablet 3/17/2024 -- Stephan Bernard MD          Follow-up Information       Follow up With Specialties Details Why Contact Info    Follow up with your dentist.                 Stephan Bernard MD  03/17/24 5528     Labs/Imaging Studies/EKG/Medications

## 2024-05-01 NOTE — PROVIDER CONTACT NOTE (OTHER) - RECOMMENDATIONS
Continue Regimen: Finasteride 5mg po QD MD notified Modify Regimen: Increase to 1/2 pill Minoxidil po qd Discontinue Regimen: Spironolactone Render In Strict Bullet Format?: No Detail Level: Zone

## 2024-05-22 NOTE — DISCHARGE NOTE NURSING/CASE MANAGEMENT/SOCIAL WORK - NSTOBACCONEVERSMOKERY/N_GEN_A
Subjective:       Patient ID: Nehemias Bruce is a 42 y.o. male.    Chief Complaint: Preventative Health Care (Physical)    Patient presents with:  Preventative Health Care: Physical     now.  Wife notices he is stopping breathing at night.    His brother has sleep apnea.    C/o building pressure in ears after flying about 3 weeks ago.    ADHD -was following with psychiatry, Dr. Torres, in [place]; taking Adderall 30mg 1/3 PRN; adderall causing some sleep issues; main issues is procrastination and avoidance of activities involving sustained mental activities. Some delayed sleep when taking the adderall.  He tried Straterra, but this caused some jitteriness and insomnia issues.      The 10-year ASCVD risk score (Moi GUNDERSON, et al., 2019) is: 2.7%    Values used to calculate the score:      Age: 42 years      Sex: Male      Is Non- : No      Diabetic: No      Tobacco smoker: No      Systolic Blood Pressure: 128 mmHg      Is BP treated: No      HDL Cholesterol: 38 mg/dL      Total Cholesterol: 243 mg/dL                Past Medical History:   Diagnosis Date    ADHD     age 15;        Past Surgical History:   Procedure Laterality Date    TONSILLECTOMY         Review of patient's allergies indicates:  No Known Allergies    Social History     Socioeconomic History    Marital status:     Number of children: 1   Occupational History    Occupation: [employer]; [employer] owner   Tobacco Use    Smoking status: Former     Types: Cigarettes    Smokeless tobacco: Never   Substance and Sexual Activity    Alcohol use: Yes     Alcohol/week: 3.0 standard drinks of alcohol     Types: 3 Standard drinks or equivalent per week     Comment: social    Drug use: Yes    Sexual activity: Yes       Current Outpatient Medications on File Prior to Visit   Medication Sig Dispense Refill    dextroamphetamine-amphetamine 30 mg Tab Take 5 mg by mouth 4 (four) times a week. Pt states he takes it about 4 times a week  "but it more as he feels needs to  0    aspirin (ECOTRIN) 81 MG EC tablet Take 1 tablet (81 mg total) by mouth once daily. (Patient not taking: Reported on 5/22/2024) 60 tablet 0     No current facility-administered medications on file prior to visit.       Family History   Problem Relation Name Age of Onset    Cancer Father          tongue    Coronary artery disease Maternal Grandfather      Coronary artery disease Paternal Grandmother      Pancreatic cancer Paternal Grandmother         Review of Systems   Constitutional:  Negative for appetite change, chills, fever and unexpected weight change.   HENT:  Negative for sore throat and trouble swallowing.    Eyes:  Negative for pain and visual disturbance.   Respiratory:  Negative for cough, chest tightness, shortness of breath and wheezing.    Cardiovascular:  Negative for chest pain, palpitations and leg swelling.   Gastrointestinal:  Negative for abdominal pain, blood in stool, constipation, diarrhea and nausea.   Genitourinary:  Negative for difficulty urinating, dysuria and hematuria.   Musculoskeletal:  Negative for arthralgias, gait problem and neck pain.   Skin:  Negative for rash and wound.   Neurological:  Negative for dizziness, weakness, light-headedness and headaches.   Hematological:  Negative for adenopathy.   Psychiatric/Behavioral:  Negative for dysphoric mood.        Objective:      /74   Pulse 99   Resp 18   Ht 6' 1" (1.854 m)   Wt 108.1 kg (238 lb 5.1 oz)   SpO2 96%   BMI 31.44 kg/m²   Physical Exam  Constitutional:       General: He is not in acute distress.     Appearance: He is well-developed.   HENT:      Head: Normocephalic and atraumatic.      Right Ear: External ear normal. There is impacted cerumen.      Left Ear: External ear normal. There is impacted cerumen.      Mouth/Throat:      Pharynx: Uvula midline. No oropharyngeal exudate.   Eyes:      General: Lids are normal.      Conjunctiva/sclera: Conjunctivae normal.      Pupils: " Yes Pupils are equal, round, and reactive to light.   Neck:      Thyroid: No thyroid mass or thyromegaly.      Trachea: Phonation normal.   Cardiovascular:      Rate and Rhythm: Normal rate and regular rhythm.      Heart sounds: Normal heart sounds. No murmur heard.     No friction rub. No gallop.   Pulmonary:      Effort: Pulmonary effort is normal. No respiratory distress.      Breath sounds: Normal breath sounds. No wheezing or rales.   Abdominal:      General: Abdomen is flat. Bowel sounds are normal.      Palpations: Abdomen is soft.   Musculoskeletal:         General: Normal range of motion.      Cervical back: Full passive range of motion without pain, normal range of motion and neck supple.   Lymphadenopathy:      Cervical: No cervical adenopathy.   Skin:     General: Skin is warm and dry.   Neurological:      Mental Status: He is alert and oriented to person, place, and time.      Cranial Nerves: No cranial nerve deficit.      Coordination: Coordination normal.   Psychiatric:         Speech: Speech normal.         Behavior: Behavior normal.         Thought Content: Thought content normal.         Judgment: Judgment normal.         Results for orders placed or performed during the hospital encounter of 02/02/24   CBC Auto Differential   Result Value Ref Range    WBC 8.45 3.90 - 12.70 K/uL    RBC 5.27 4.60 - 6.20 M/uL    Hemoglobin 15.2 14.0 - 18.0 g/dL    Hematocrit 44.8 40.0 - 54.0 %    MCV 85 82 - 98 fL    MCH 28.8 27.0 - 31.0 pg    MCHC 33.9 32.0 - 36.0 g/dL    RDW 12.9 11.5 - 14.5 %    Platelets 273 150 - 450 K/uL    MPV 10.4 9.2 - 12.9 fL    Immature Granulocytes 0.4 0.0 - 0.5 %    Gran # (ANC) 4.7 1.8 - 7.7 K/uL    Immature Grans (Abs) 0.03 0.00 - 0.04 K/uL    Lymph # 2.7 1.0 - 4.8 K/uL    Mono # 0.8 0.3 - 1.0 K/uL    Eos # 0.1 0.0 - 0.5 K/uL    Baso # 0.05 0.00 - 0.20 K/uL    nRBC 0 0 /100 WBC    Gran % 56.0 38.0 - 73.0 %    Lymph % 32.0 18.0 - 48.0 %    Mono % 9.5 4.0 - 15.0 %    Eosinophil % 1.5 0.0 -  8.0 %    Basophil % 0.6 0.0 - 1.9 %    Differential Method Automated    Comprehensive Metabolic Panel   Result Value Ref Range    Sodium 140 136 - 145 mmol/L    Potassium 4.0 3.5 - 5.1 mmol/L    Chloride 104 95 - 110 mmol/L    CO2 25 22 - 31 mmol/L    Glucose 112 (H) 70 - 110 mg/dL    BUN 17 9 - 21 mg/dL    Creatinine 0.87 0.50 - 1.40 mg/dL    Calcium 9.7 8.4 - 10.2 mg/dL    Total Protein 7.8 6.0 - 8.4 g/dL    Albumin 4.8 3.5 - 5.2 g/dL    Total Bilirubin 0.5 0.2 - 1.3 mg/dL    Alkaline Phosphatase 87 38 - 145 U/L    AST 38 17 - 59 U/L    ALT 54 (H) 0 - 50 U/L    Anion Gap 11 5 - 12 mmol/L    eGFR >60 >60 mL/min/1.73 m^2   Protime-INR   Result Value Ref Range    PT 12.2 11.8 - 14.7 sec    INR 0.9    APTT   Result Value Ref Range    aPTT 28.2 24.6 - 36.7 sec   Hemoglobin A1c   Result Value Ref Range    Hemoglobin A1C 6.0 (H) 0.0 - 5.6 %    Estimated Avg Glucose 126 68 - 131 mg/dL   Lipid panel   Result Value Ref Range    Cholesterol 243 (H) 120 - 199 mg/dL    Triglycerides 213 (H) 30 - 150 mg/dL    HDL 38 (L) 40 - 75 mg/dL    LDL Cholesterol 162.4 (H) 63.0 - 159.0 mg/dL    HDL/Cholesterol Ratio 15.6 (L) 20.0 - 50.0 %    Total Cholesterol/HDL Ratio 6.4 (H) 2.0 - 5.0    Non-HDL Cholesterol 205 mg/dL   TSH   Result Value Ref Range    TSH 5.460 (H) 0.400 - 4.000 uIU/mL   EKG 12-lead   Result Value Ref Range    QRS Duration 98 ms    [facility] QTC Calculation 412 ms   POCT glucose   Result Value Ref Range    POCT Glucose 106 70 - 110 mg/dL       Assessment:       1. Preventative health care    2. Sleep apnea, unspecified type    3. Bilateral impacted cerumen          Plan:       Preventative health care    Sleep apnea, unspecified type  -     Home Sleep Study; Future    Bilateral impacted cerumen  -     Ambulatory referral/consult to ENT; Future; Expected date: 05/29/2024          Counseled on regular exercise, maintenance of a healthy weight, balanced diet rich in fruits/vegetables and lean protein, and avoidance of unhealthy  habits like smoking and excessive alcohol intake.

## 2024-06-04 NOTE — PATIENT PROFILE ADULT - NSPROCHRONICPAINRELIEVE_GEN_A_NUR
[facility]  [address]  [place] [address]    June 4, 2024    Niesha Adhikari  [id number] [address] [address]      Dear Niesha,    I have been attempting to reach you since our last contact. I would like to continue to work with you and provide any additional support you may need on achieving your health care related goals. I would appreciate if you would give me a call at [phone] to let me know if you would like to continue working together. I know that there are many things that can affect our ability to communicate and I hope we can continue to work together.    All of us at the [facility] are invested in your health and are here to assist you in meeting your goals.     Sincerely,    LUCAS Paulino  
rest/relaxation

## 2024-07-02 NOTE — PATIENT PROFILE ADULT - FLU SEASON?
"Patient refuses  mg at this time. Patient states, \"I've never had chest pain like this before. I need to talk to the doctor about it first.\"  " No

## 2024-09-04 NOTE — PATIENT PROFILE ADULT - STATED REASON FOR ADMISSION
"Upon calling PT for EKG, PT informed triage staff he no longer wishes to be seen by an emergency physician and reported he is \"feeling better\" and would return if symptoms returned. PT informed of potential risks of leaving without being seen by an emergency physician. PT verbalized understanding. PT encouraged to return to ED if condition worsens.   Triage RN informed and PT dismissed from system.   " I had abdominal pain

## 2024-12-28 NOTE — CONSULT NOTE ADULT - ATTENDING COMMENTS
Thoracic Surgery Progress Note  2024 10:21 AM     Patient: Isabel Mchugh   MRN: [id number]   : 1952     SUBJECTIVE  No acute events overnight. No new complaints from pt. States she is feeling a little better today. Denies significant SOB. Still has a cough. No fever, chills, chest pain, nausea or vomiting. No additional fevers documented.    PHYSICAL EXAM  Vitals:  Blood pressure 119/74, pulse 78, temperature 97.3 °F (36.3 °C), temperature source Oral, resp. rate 18, height 5' 7\" (1.702 m), weight 56.6 kg (124 lb 12.5 oz), SpO2 92%.    General: no acute distress  CV: regular rate  Resp: non labored breathing   GI: abdomen soft, nontender, nondistended  MSK: Moving all 4 extremities  Neuro: Alert  Skin: Warm, dry    INTAKE/OUTPUT  No intake/output data recorded.  No intake/output data recorded.     LABS  WBC (K/mcL)   Date Value   2024 7.9     RBC (mil/mcL)   Date Value   2024 4.33     HCT (%)   Date Value   2024 36.9     HGB (g/dL)   Date Value   2024 11.9 (L)     PLT (K/mcL)   Date Value   2024 143       Sodium (mmol/L)   Date Value   2024 139     Potassium (mmol/L)   Date Value   2024 3.6     Chloride (mmol/L)   Date Value   2024 107     Glucose (mg/dL)   Date Value   2024 139 (H)     Calcium (mg/dL)   Date Value   2024 8.5     Carbon Dioxide (mmol/L)   Date Value   2024 28     BUN (mg/dL)   Date Value   2024 19     Creatinine (mg/dL)   Date Value   2024 0.86       GOT/AST (Units/L)   Date Value   2024 24     GPT/ALT (Units/L)   Date Value   2024 20     No results found for: \"GGTP\"  Alkaline Phosphatase (Units/L)   Date Value   2024 158 (H)     Bilirubin, Total (mg/dL)   Date Value   2024 0.2       IMAGING  TRANSTHORACIC ECHO (TTE) LIMITED W/ W/O IMAGING AGENT    Result Date: 2024  *[facility]* [address] [address] [phone] Limited  Transthoracic Echocardiogram (TTE) Patient: Isabel Mchugh     Study Date/Time:       Dec 26 2024 1:02PM MRN:     [id number]             FIN#:                  [id number] :     1952           Ht/Wt:                 170.2cm 56.3kg Age:     72                   BSA/BMI:               1.62m^2 19.4kg/m^2 Gender:  F                    Baseline BP:           116 / 70 *Ordering Physician:* Francisco Aviles MD  *Referring Physician:* Francisco Aviles  *Attending Physician:* Lukas Summers  *Diagnostic Physician:* Kaylan Wilcox MD *Sonographer:* Na Marquez [facility]  *Fellow:* Delia Redman  ------------------------------------------------------------------------------ INDICATIONS:   Dyspnea. ------------------------------------------------------------------------------ STUDY CONCLUSIONS SUMMARY: 1. Left ventricle: The cavity size is normal. Wall thickness is normal.    Systolic function is normal. The ejection fraction is 63%. 2. Right ventricle: The cavity size is normal. Systolic function is normal. 3. No significant valve abnormalities. ------------------------------------------------------------------------------ STUDY DATA:  Patient room number: [address].  Procedure:  A transthoracic echocardiogram was performed. Image quality was good.  Limited 2D, limited spectral Doppler, and color Doppler.  Study status:  Routine.  Study completion:  There were no complications. FINDINGS BASELINE ECG:   Normal sinus rhythm. LEFT VENTRICLE:  The cavity size is normal. Wall thickness is normal. Systolic function is normal. Wall motion is normal; there are no regional wall motion abnormalities.    The ejection fraction was measured by biplane method of disks. The ejection fraction is 63%. AORTIC VALVE:  The annulus is mildly calcified. The valve is trileaflet. The leaflets are mildly calcified. AORTA: Aortic root: The root is normal-sized. MITRAL VALVE:  The annulus is normal. The leaflets are normal thickness. Inflow  velocity is within the normal range. There is no evidence for stenosis. There is no regurgitation. LEFT ATRIUM:  The atrium is normal in size. RIGHT VENTRICLE:  The cavity size is normal. Systolic function is normal. PULMONIC VALVE:   The valve is structurally normal. There is no regurgitation. TRICUSPID VALVE:  The valve is structurally normal. There is trivial regurgitation. RIGHT ATRIUM:  The atrium is normal in size. SYSTEMIC VEINS: Inferior vena cava: The IVC is dilated.  Respirophasic diameter changes are blunted (< 50%). ------------------------------------------------------------------------------ Measurements  Left ventricle            Value        Ref        Left ventricle continued     Value          Ref  BELTRAN, LAX chord        (L) 3.7   cm     3.8 - 5.2  ESV, 2-p                 (N) 25    ml       14 - 42  ESD, LAX chord        (N) 2.6   cm     2.2 - 3.5  EF, 2-p                  (N) 63    %        54 - 74  BELTRAN/bsa, LAX chord    (N) 2.3   cm/m^2 2.3 - 3.1  SV, 2-p                      42    ml       ---------  ESD/bsa, LAX chord    (N) 1.6   cm/m^2 1.3 - 2.1  EDV/bsa, 2-p             (N) 41    ml/m^2   29 - 61  PW, ED, LAX           (H) 1.0   cm     0.6 - 0.9  ESV/bsa, 2-p             (N) 15    ml/m^2   8 - 24  BELTRAN major ax, A4C         7.5   cm     ---------  SV/bsa, 2-p                  25.9  ml/m^2   ---------  ESD major ax, A4C         7.1   cm     ---------  FS major axis, A4C        5     %      ---------  Right ventricle              Value          Ref  BELTRAN/bsa major ax, A4C     4.6   cm/m^2 ---------  BELTRAN, LAX                     2.3   cm       ---------  ESD/bsa major ax, A4C     4.4   cm/m^2 ---------  TAPSE, MM                (N) 2.2   cm       >=1.7  JET, A4C                  26.0  cm^2   ---------  CASH, A4C                  14.5  cm^2   ---------  Left atrium                  Value          Ref  FAC, A4C                  44    %      ---------  AP dim, ES               (N) 2.7   cm       2.7  - 3.8  IVS, ED               (H) 1.0   cm     0.6 - 0.9  AP dim index, ES         (N) 1.7   cm/m^2   1.5 - 2.3  PW, ED                (H) 1.0   cm     0.6 - 0.9  Area ES, A4C             (N) 16    cm^2     <=20  IVS/PW, ED                1.03         ---------  Area/bsa ES, A4C             9.55  cm^2/m^2 ---------  EDV                   (N) 50    ml     46 - 106   Area ES, A2C                 9     cm^2     ---------  ESV                   (N) 17    ml     14 - 42    Area/bsa ES, A2C             5.68  cm^2/m^2 ---------  EF                    (N) 63    %      54 - 74    SI dim, A2C                  3.1   cm       ---------  SV                        33    ml     ---------  Vol, S                   (N) 22    ml       22 - 52  EDV/bsa               (N) 31    ml/m^2 29 - 61    Vol/bsa, S               (L) 14    ml/m^2   16 - 34  ESV/bsa               (N) 11    ml/m^2 8 - 24     Vol, ES, 1-p A4C         (N) 31    ml       22 - 52  SV/bsa                    21    ml/m^2 ---------  Vol/bsa, ES, 1-p A4C     (N) 19    ml/m^2   11 - 40  SV, 1-p A4C               48    ml     ---------  Vol, ES, 1-p A2C         (N) 22    ml       22 - 52  SV/bsa, 1-p A4C           29    ml/m^2 ---------  Vol/bsa, ES, 1-p A2C     (N) 13    ml/m^2   13 - 40  EDV, 2-p              (N) 67    ml     46 - 106 Legend: (L)  and  (H)  kamaljit values outside specified reference range. (N)  marks values inside specified reference range. Prepared and electronically signed by Kaylan Wilcox MD 12/26/2024 15:15 Prior Signatures: Preliminary Reviewed by Delia Redman - 12/26/2024 2:45:23 PM       Assessment:  Isabel Mchugh is a 72 year old female with a PMH of biliary cirrhosis, COPD, tobacco use who presented to the ED for cough, congestion, sore throat and SOB for the last several days. In the ED, pt was febrile. Labs grossly unremarkable. CTA chest showed a consolidative nodule 1.5cm opacity RUL with mild adjacent ground glass changes likely PNA,  cannot exclude malignancy. Several other bilateral ground glass and small 5mm cavitary nodule in RLL also favored to be infectious/inflammatory, 1cm right hilar lymph node. Thoracic Surgery was consulted for further evaluation.     Plan:  -CT imaging reviewed. Suspect likely infectious etiology to findings. Quantiferon in process.   -Continue abx per ID.   -Pulmonology on board, appreciate recommendations, checking additional infectious markers, plan for repeat CT in 6-8wks with EBUS after that if this fails to resolve. Agree with this plan.  -Advised smoking cessation  -If quantiferon study negative, then pt can be discharged from a Thoracic Surgery standpoint. Follow up with Pulmonology for repeat CT in a few weeks.    Discussed with Dr. Blackwood.    Melonie Barnett MD PGY-3  Thoracic Surgery     Pt evaluated in ER and in SICU with Dr Brewer  Recurrent SBO presented with SBO  Hypotensive, intravascular depletion, contraction alkalosis, hypokalemia  Acute renal failure    IVF bolus and hydration  Close monitoring of I/O electrolytes, Chavarria placed  May need vasopressor support  Bronchodilators and ICS for COPD  Worsening of lung ca with expanding mass on her new CT  Discussed with surgery  Critical care time spent > 35 mts managing this pt

## 2025-01-08 NOTE — PATIENT PROFILE ADULT - NSPROGENANESREACTION_GEN_A_NUR
SLE labs look good, mild increase in CRP expected in viral illness. Same management 
no previous reaction